# Patient Record
Sex: FEMALE | Race: WHITE | NOT HISPANIC OR LATINO | Employment: OTHER | URBAN - NONMETROPOLITAN AREA
[De-identification: names, ages, dates, MRNs, and addresses within clinical notes are randomized per-mention and may not be internally consistent; named-entity substitution may affect disease eponyms.]

---

## 2017-12-13 ENCOUNTER — TRANSCRIBE ORDERS (OUTPATIENT)
Dept: ADMINISTRATIVE | Facility: HOSPITAL | Age: 72
End: 2017-12-13

## 2017-12-13 DIAGNOSIS — Z87.891 PERSONAL HISTORY OF TOBACCO USE, PRESENTING HAZARDS TO HEALTH: ICD-10-CM

## 2017-12-13 DIAGNOSIS — Z78.0 POSTMENOPAUSAL STATE: Primary | ICD-10-CM

## 2017-12-18 ENCOUNTER — HOSPITAL ENCOUNTER (OUTPATIENT)
Dept: BONE DENSITY | Facility: HOSPITAL | Age: 72
Discharge: HOME | End: 2017-12-18
Attending: PEDIATRICS | Admitting: PEDIATRICS

## 2017-12-18 ENCOUNTER — HOSPITAL ENCOUNTER (OUTPATIENT)
Dept: CT IMAGING | Facility: HOSPITAL | Age: 72
Discharge: HOME | End: 2017-12-18
Attending: PEDIATRICS

## 2017-12-18 DIAGNOSIS — Z87.891 PERSONAL HISTORY OF TOBACCO USE, PRESENTING HAZARDS TO HEALTH: ICD-10-CM

## 2017-12-18 DIAGNOSIS — Z78.0 POSTMENOPAUSAL STATE: ICD-10-CM

## 2017-12-18 PROCEDURE — G0297 LDCT FOR LUNG CA SCREEN: HCPCS

## 2017-12-18 PROCEDURE — 77080 DXA BONE DENSITY AXIAL: CPT

## 2017-12-20 ENCOUNTER — LAB (OUTPATIENT)
Dept: LAB | Facility: HOSPITAL | Age: 72
End: 2017-12-20
Attending: PEDIATRICS

## 2017-12-20 ENCOUNTER — TRANSCRIBE ORDERS (OUTPATIENT)
Dept: ADMINISTRATIVE | Facility: HOSPITAL | Age: 72
End: 2017-12-20

## 2017-12-20 DIAGNOSIS — M81.0 AGE-RELATED OSTEOPOROSIS WITHOUT CURRENT PATHOLOGICAL FRACTURE: ICD-10-CM

## 2017-12-20 DIAGNOSIS — M81.0 AGE-RELATED OSTEOPOROSIS WITHOUT CURRENT PATHOLOGICAL FRACTURE: Primary | ICD-10-CM

## 2017-12-20 LAB — 25(OH)D3 SERPL-MCNC: 67.9 NG/ML (ref 30–100)

## 2017-12-20 PROCEDURE — 36415 COLL VENOUS BLD VENIPUNCTURE: CPT

## 2017-12-20 PROCEDURE — 82306 VITAMIN D 25 HYDROXY: CPT | Performed by: PEDIATRICS

## 2019-05-08 ENCOUNTER — HOSPITAL ENCOUNTER (OUTPATIENT)
Dept: NON INVASIVE DIAGNOSTICS | Age: 74
Discharge: HOME | End: 2019-05-08
Payer: MEDICARE

## 2019-05-08 VITALS — DIASTOLIC BLOOD PRESSURE: 74 MMHG | WEIGHT: 138.89 LBS | HEART RATE: 96 BPM | SYSTOLIC BLOOD PRESSURE: 159 MMHG

## 2019-05-08 LAB
LV EF: 58 %
LVEF MODALITY: NORMAL

## 2019-05-08 PROCEDURE — 6360000002 HC RX W HCPCS: Performed by: INTERNAL MEDICINE

## 2019-05-08 PROCEDURE — 93226 XTRNL ECG REC<48 HR SCAN A/R: CPT

## 2019-05-08 PROCEDURE — 93225 XTRNL ECG REC<48 HRS REC: CPT

## 2019-05-08 PROCEDURE — 2580000003 HC RX 258: Performed by: INTERNAL MEDICINE

## 2019-05-08 PROCEDURE — 93306 TTE W/DOPPLER COMPLETE: CPT

## 2019-05-08 PROCEDURE — 93350 STRESS TTE ONLY: CPT

## 2019-05-08 RX ORDER — SODIUM CHLORIDE 9 MG/ML
INJECTION, SOLUTION INTRAVENOUS
Status: COMPLETED | OUTPATIENT
Start: 2019-05-08 | End: 2019-05-08

## 2019-05-08 RX ORDER — DOBUTAMINE HYDROCHLORIDE 200 MG/100ML
10 INJECTION INTRAVENOUS CONTINUOUS PRN
Status: DISCONTINUED | OUTPATIENT
Start: 2019-05-08 | End: 2019-05-09 | Stop reason: HOSPADM

## 2019-05-08 RX ADMIN — DOBUTAMINE HYDROCHLORIDE 10 MCG/KG/MIN: 200 INJECTION INTRAVENOUS at 11:25

## 2019-05-08 RX ADMIN — SODIUM CHLORIDE: 9 INJECTION, SOLUTION INTRAVENOUS at 11:25

## 2019-05-11 NOTE — PROCEDURES
Holter monitor report    Device placed on 5/8/19 at 12:12 PM    Summary impressions:    1. Sinus rhythm mechanism throughout average heart rate 76 bpm minimal heart rate 55 bpm maximum heart rate 100 bpm    2. No pauses greater than 2.0 seconds recorded    3. 40 4/7/20 PVCs recorded including one run of 4 beats of ventricular tachycardia at a rate of 146 bpm 16 triplets and 394 couplets    4. No atrial fibrillation recorded    5. No episodes of ST segment depression recorded    6. 27 minutes 16 seconds of bradycardia recorded    7. 7 seconds of tachycardia recorded    8.  No symptoms or activities were submitted for correlation

## 2019-05-21 ENCOUNTER — OFFICE VISIT (OUTPATIENT)
Dept: CARDIOLOGY | Age: 74
End: 2019-05-21
Payer: MEDICARE

## 2019-05-21 VITALS
WEIGHT: 151 LBS | HEART RATE: 74 BPM | SYSTOLIC BLOOD PRESSURE: 158 MMHG | HEIGHT: 64 IN | DIASTOLIC BLOOD PRESSURE: 86 MMHG | BODY MASS INDEX: 25.78 KG/M2

## 2019-05-21 DIAGNOSIS — R06.00 DYSPNEA, UNSPECIFIED TYPE: ICD-10-CM

## 2019-05-21 DIAGNOSIS — I10 ESSENTIAL HYPERTENSION: Primary | ICD-10-CM

## 2019-05-21 DIAGNOSIS — I10 ESSENTIAL HYPERTENSION: ICD-10-CM

## 2019-05-21 DIAGNOSIS — R00.2 HEART PALPITATIONS: ICD-10-CM

## 2019-05-21 LAB
ANION GAP SERPL CALCULATED.3IONS-SCNC: 16 MMOL/L (ref 7–19)
BUN BLDV-MCNC: 20 MG/DL (ref 8–23)
CALCIUM SERPL-MCNC: 10.2 MG/DL (ref 8.8–10.2)
CHLORIDE BLD-SCNC: 106 MMOL/L (ref 98–111)
CO2: 23 MMOL/L (ref 22–29)
CREAT SERPL-MCNC: 0.9 MG/DL (ref 0.5–0.9)
GFR NON-AFRICAN AMERICAN: >60
GLUCOSE BLD-MCNC: 105 MG/DL (ref 74–109)
HCT VFR BLD CALC: 45 % (ref 37–47)
HEMOGLOBIN: 14.3 G/DL (ref 12–16)
MCH RBC QN AUTO: 30.6 PG (ref 27–31)
MCHC RBC AUTO-ENTMCNC: 31.8 G/DL (ref 33–37)
MCV RBC AUTO: 96.4 FL (ref 81–99)
PDW BLD-RTO: 13.3 % (ref 11.5–14.5)
PLATELET # BLD: 327 K/UL (ref 130–400)
PMV BLD AUTO: 11.3 FL (ref 9.4–12.3)
POTASSIUM SERPL-SCNC: 4.7 MMOL/L (ref 3.5–5)
RBC # BLD: 4.67 M/UL (ref 4.2–5.4)
SODIUM BLD-SCNC: 145 MMOL/L (ref 136–145)
TSH SERPL DL<=0.05 MIU/L-ACNC: 1.85 UIU/ML (ref 0.27–4.2)
VITAMIN B-12: >2000 PG/ML (ref 211–946)
WBC # BLD: 13.5 K/UL (ref 4.8–10.8)

## 2019-05-21 PROCEDURE — 0296T PR EXT ECG > 48HR TO 21 DAY RCRD W/CONECT INTL RCRD: CPT | Performed by: NURSE PRACTITIONER

## 2019-05-21 PROCEDURE — 99203 OFFICE O/P NEW LOW 30 MIN: CPT | Performed by: NURSE PRACTITIONER

## 2019-05-21 PROCEDURE — 93000 ELECTROCARDIOGRAM COMPLETE: CPT | Performed by: NURSE PRACTITIONER

## 2019-05-21 RX ORDER — LORATADINE 10 MG/1
10 TABLET ORAL DAILY
COMMUNITY

## 2019-05-21 RX ORDER — IBUPROFEN 200 MG
200 TABLET ORAL EVERY 6 HOURS PRN
COMMUNITY

## 2019-05-21 RX ORDER — ACETAMINOPHEN 160 MG
TABLET,DISINTEGRATING ORAL DAILY
COMMUNITY

## 2019-05-21 RX ORDER — AZITHROMYCIN 250 MG/1
250 TABLET, FILM COATED ORAL DAILY
COMMUNITY

## 2019-05-21 RX ORDER — METHYLPREDNISOLONE 4 MG/1
4 TABLET ORAL SEE ADMIN INSTRUCTIONS
COMMUNITY

## 2019-05-21 RX ORDER — ALPRAZOLAM 0.5 MG/1
0.5 TABLET ORAL 2 TIMES DAILY
COMMUNITY

## 2019-05-21 RX ORDER — GABAPENTIN 600 MG/1
900 TABLET ORAL 3 TIMES DAILY
COMMUNITY

## 2019-05-21 RX ORDER — CYANOCOBALAMIN (VITAMIN B-12) 5000 MCG
TABLET,DISINTEGRATING ORAL DAILY
COMMUNITY

## 2019-05-21 RX ORDER — MIRTAZAPINE 15 MG/1
15 TABLET, ORALLY DISINTEGRATING ORAL NIGHTLY
COMMUNITY

## 2019-05-21 NOTE — PROGRESS NOTES
[facility]  [address]  [phone]      Chief Complaint / Reason for Being Seen:  Referral to cardiology for Holter monitor results and shortness of breath. 1. Essential hypertension    2. Heart palpitations    3. Dyspnea, unspecified type        Subjective:  Patient presents to clinic today as a referral from primary care for Holter monitor results and shortness of breath. Patient has a negative family history of coronary artery disease. Patient is a former smoker she quit 3 years ago, she does a 58 year history of smoking a pack and a half of cigarettes per day. Patient denies any chest pain, pressure or tightness. She does have shortness of breath that she's had for a long time due to her COPD but this has worsen over the last several weeks. Patient also with complaints of fatigue and lethargy. Patient states since she stopped smoking that she was \"no longer with COPD\". Primary care provider has educated patient to understand that the COPD is a long-term diagnoses with her. Patient saw her primary care provider on Thursday and was given prescription for antibiotics and Medrol Dosepak. Patient has not started that yet. She wanted to talk with cardiology 1st. She and daughter states they were referred to cardiology for possible pacemaker/defibrillator implant. Primary care provider has ordered 2-D echo which showed  Normal left ventricular size with preserved LV function and an estimated   ejection fraction of approximately 55-60%.  Grade I diastolic dysfunction.   No evidence of left ventricular mass or thrombus noted. 24-hour Holter monitor was placed with results of sinus rhythm throughout. Average heart rate 76 bpm minimal heart rate 55 bpm, maximum heart rate 100 bpm. There were no pauses greater than 2 seconds recorded. There were 40 PVCs recorded including one run of a 4 beat VT run at a rate of 146 bpm, 16 triplets and 394 couplets. There is no atrial fib recorded. No episodes of ST segment depression recorded. 27 minutes 16 seconds of bradycardia recorded. 7 seconds of tachycardia reported. Dobutamine stress echo 5/8/2019  Dobutamine stress echocardiogram without clinical, electrocardiographic,   or echocardiographic evidence of myocardial ischemia.   Test was supervised by Dr. Jerome Jj . Criteria for pacemaker implantation includes pauses greater than 3 seconds. This criteria was not met on the 24-hour Holter monitor. Patient with a normal EF on 2-D echo 55-60%. Criteria for defibrillator is not met due to normal EF. Old records have been obtained from the referring providers. Those records have been reviewed and summarized. Dahiana Beebe is a 66 y.o. female with the following history as recorded in [facility]: There are no active problems to display for this patient. Current Outpatient Medications   Medication Sig Dispense Refill    Tiotropium Bromide-Olodaterol (STIOLTO RESPIMAT) 2.5-2.5 MCG/ACT AERS Inhale 2 puffs into the lungs daily      ALPRAZolam (XANAX) 0.5 MG tablet Take 0.5 mg by mouth 2 times daily.  Cyanocobalamin (VITAMIN B-12) 5000 MCG TBDP Take by mouth daily      loratadine (ALLERGY RELIEF) 10 MG tablet Take 10 mg by mouth daily      ibuprofen (ADVIL;MOTRIN) 200 MG tablet Take 200 mg by mouth every 6 hours as needed for Pain      mirtazapine (REMERON SOL-TAB) 15 MG disintegrating tablet Take 15 mg by mouth nightly      gabapentin (NEURONTIN) 600 MG tablet Take 900 mg by mouth 3 times daily.  Cholecalciferol (VITAMIN D3) 2000 units CAPS Take by mouth daily      azithromycin (ZITHROMAX) 250 MG tablet Take 250 mg by mouth daily 2 tablets today then one daily for 4 days      methylPREDNISolone (MEDROL DOSEPACK) 4 MG tablet Take 4 mg by mouth See Admin Instructions Take by mouth. No current facility-administered medications for this visit.       Allergies: Albuterol; Codeine; Levaquin [levofloxacin]; Lortab [hydrocodone-acetaminophen]; and Tramadol  Past Medical History:   Diagnosis Date    Bigeminal rhythm     COPD (chronic obstructive pulmonary disease) (HCC)     Essential hypertension     RICHARD (generalized anxiety disorder)     Hypercholesteremia     Neuropathy     Type 2 diabetes mellitus (HCC)      Past Surgical History:   Procedure Laterality Date    GALLBLADDER SURGERY      HYSTERECTOMY      JOINT REPLACEMENT Right     MASTECTOMY, BILATERAL       No family history on file. Social History     Tobacco Use    Smoking status: Former Smoker     Last attempt to quit: 2015     Years since quittin.3   Substance Use Topics    Alcohol use: Not on file          Review of System:      Except as noted in HPI, cardiovascular and respiratory systems are otherwise negative. Objective:    BP (!) 158/86 (Site: Right Upper Arm, Position: Sitting, Cuff Size: Medium Adult)   Pulse 74   Ht 5' 4\" (1.626 m)   Wt 151 lb (68.5 kg)   BMI 25.92 kg/m²     GENERAL - well developed and well nourished    HEENT -  PERRLA, Hearing appears normal  NECK - no thyromegaly, no JVD, trachea is in the midline  CARDIOVASCULAR - PMI is in the mid line clavicular position, Normal S1 and S2 with no systolic murmur. No S3 or S4    PULMONARY - no respiratory distress. No wheezes or rales. Lungs are clear to ausculation   ABDOMEN  - soft, non tender, no rebound  MUSCULOSKELETAL  - range of motion of the upper and lower extermites appears normal and equal and is without pain   EXTREMITIES - no significant edema   NEUROLOGIC - gait and station are normal  SKIN - turgor is normal  PSYCHIATRIC - normal mood and affect, alert and orientated x 3,      ASSESSMENT:    ALL THE CARDIOLOGY PROBLEMS ARE LISTED ABOVE; HOWEVER, THE FOLLOWING SPECIFIC CARDIAC PROBLEMS / CONDITIONS WERE ADDRESSED AND TREATED DURING THE OFFICE VISIT TODAY:       Cardiac Specific Problem  Discussion and Plan         1. Shortness of breath. Initial encounter   Will order pulmonary function test. CBC         2. 4 beat run of VT   Initial encounter   EKG in the office sinus rhythm with a heart rate of 71 bpm with bigeminy. Incomplete right bundle branch block. Will order BMP to evaluate potassium level. Patient with a negative dobutamine stress echo. Will place 14 day ZIO patch to address any further runs of VT, any prolonged pauses or atrial fib that was not noted on the 24-hour Holter monitor. 3. Fatigue   Initial encounter   Will order CBC, TSH levels and vitamin B12. PLAN:    Orders Placed This Encounter   Procedures    Basic Metabolic Panel     Standing Status:   Future     Standing Expiration Date:   5/21/2020    TSH without Reflex    CBC     Standing Status:   Future     Standing Expiration Date:   5/21/2020    Vitamin B12     Standing Status:   Future     Standing Expiration Date:   5/21/2020    EKG 12 lead     Order Specific Question:   Reason for Exam?     Answer: Other    KS PT RECORDED SPIROMETRY    KS EXT ECG > 48HR TO 21 DAY RCRD W/CONECT INTL RCRD (Zio Recording)     No orders of the defined types were placed in this encounter. Return in about 1 month (around 6/18/2019) for results . Discussed with patient and adult child. I greatly appreciate the opportunity to meet Vladimir Lowry and your confidence in allowing me to participate in her cardiovascular care.       BOUBACAR Thomas NP 5/21/2019 1:27 PM

## 2019-05-23 ENCOUNTER — TELEPHONE (OUTPATIENT)
Dept: CARDIOLOGY | Age: 74
End: 2019-05-23

## 2019-05-23 NOTE — TELEPHONE ENCOUNTER
Katelynn Durham, APRN - NP  Brisa York LPN             Is let patient know of normal TSH level    Result Notes for TSH without Reflex     Notes recorded by Brisa York LPN on 1/28/7777 at 6:34 AM CDT  Left message with normal results

## 2019-05-31 ENCOUNTER — HOSPITAL ENCOUNTER (OUTPATIENT)
Dept: PULMONOLOGY | Age: 74
Discharge: HOME | End: 2019-05-31
Payer: MEDICARE

## 2019-05-31 ENCOUNTER — OUTSIDE FACILITY SERVICE (OUTPATIENT)
Dept: PULMONOLOGY | Facility: CLINIC | Age: 74
End: 2019-05-31

## 2019-05-31 DIAGNOSIS — R06.00 DYSPNEA, UNSPECIFIED TYPE: ICD-10-CM

## 2019-05-31 PROCEDURE — 94727 GAS DIL/WSHOT DETER LNG VOL: CPT | Performed by: INTERNAL MEDICINE

## 2019-05-31 PROCEDURE — 94010 BREATHING CAPACITY TEST: CPT

## 2019-05-31 PROCEDURE — 94010 BREATHING CAPACITY TEST: CPT | Performed by: INTERNAL MEDICINE

## 2019-05-31 PROCEDURE — 94729 DIFFUSING CAPACITY: CPT

## 2019-05-31 PROCEDURE — 94729 DIFFUSING CAPACITY: CPT | Performed by: INTERNAL MEDICINE

## 2019-05-31 PROCEDURE — 94727 GAS DIL/WSHOT DETER LNG VOL: CPT

## 2019-06-04 NOTE — PROCEDURES
[facility]                               PULMONARY FUNCTION    PATIENT NAME: Vy Falcon                    :        1945  MED REC NO:   [id number]                              ROOM:  ACCOUNT NO:   [de-identified]                           ADMIT DATE: 2019  PROVIDER:     Sal Rai MD    DATE OF PROCEDURE:  2019    PULMONARY FUNCTION TESTS    IMPRESSION:  1. Spirometry shows mild airflow obstruction. 2.  Mid flow is reduced. 3.  Maximum ventilation is lower limits of normal.  4.  Lung volume measurements show elevation in residual volume  suggesting gas trapping. 5.  Total lung capacity is elevated suggesting hyperinflation.   6.  Diffusion capacity is reduced, but when corrected for alveolar  volume, it is normal.        Warner Hernandez MD    D: 2019 16:00:43      T: 2019 16:29:13     [id number]  Job#: [id number]     Doc#: [id number]    CC:

## 2019-06-05 ENCOUNTER — TELEPHONE (OUTPATIENT)
Dept: CARDIOLOGY | Age: 74
End: 2019-06-05

## 2019-06-17 ENCOUNTER — HOSPITAL ENCOUNTER (OUTPATIENT)
Dept: CT IMAGING | Age: 74
Discharge: HOME | End: 2019-06-17
Payer: MEDICARE

## 2019-06-17 ENCOUNTER — TELEPHONE (OUTPATIENT)
Dept: CARDIOLOGY | Age: 74
End: 2019-06-17

## 2019-06-17 DIAGNOSIS — R91.1 LUNG NODULE: ICD-10-CM

## 2019-06-17 PROCEDURE — 71250 CT THORAX DX C-: CPT

## 2019-06-17 NOTE — TELEPHONE ENCOUNTER
Mike Jones called with Dr. Cleopatra Pineda office requesting results of ZIO report. Report faxed to Dr. Juan Thomas office.

## 2019-06-19 ENCOUNTER — TELEPHONE (OUTPATIENT)
Dept: CARDIOLOGY | Facility: CLINIC | Age: 74
End: 2019-06-19

## 2019-06-19 ENCOUNTER — OFFICE VISIT (OUTPATIENT)
Dept: CARDIOLOGY | Facility: CLINIC | Age: 74
End: 2019-06-19

## 2019-06-19 DIAGNOSIS — I47.20 VT (VENTRICULAR TACHYCARDIA) (HCC): Primary | ICD-10-CM

## 2019-06-19 DIAGNOSIS — R06.02 SHORTNESS OF BREATH: ICD-10-CM

## 2019-06-19 PROCEDURE — 99204 OFFICE O/P NEW MOD 45 MIN: CPT | Performed by: INTERNAL MEDICINE

## 2019-06-19 PROCEDURE — 93000 ELECTROCARDIOGRAM COMPLETE: CPT | Performed by: INTERNAL MEDICINE

## 2019-06-19 NOTE — PROGRESS NOTES
Subjective:     Encounter Date:06/19/2019      Patient ID: Pattie Liu is a 74 y.o. female who presents for further evaluation of PVCs and shortness of breath.    Referring provider: Rafat Espinoza MD  Reason for referral: PVCs    Chief Complaint: Shortness of breath    Shortness of Breath   This is a new problem. The current episode started more than 1 month ago. The problem occurs daily. The problem has been gradually worsening. Pertinent negatives include no abdominal pain, chest pain, claudication, fever, headaches, hemoptysis, leg swelling, neck pain, orthopnea, PND, rash, sputum production, syncope, vomiting or wheezing. The symptoms are aggravated by exercise. The patient has no known risk factors for DVT/PE. She has tried nothing for the symptoms. There is no history of CAD.     This is a 74-year-old female with no prior cardiac history who presents for further evaluation of increasing shortness of breath, found to have frequent PVCs.  Patient says that over the past month or so, she has noted increasing shortness of breath to the point of shortness of breath with minimal exertion.  She has had a work-up which consisted of an echocardiogram, Holter monitor and ultimately a cardiac patch monitor.  The patch monitor showed short runs of ventricular tachycardia and a large burden of PVCs.  For this reason, the patient was referred to cardiology for further care.  Patient denies any significant palpitations.  She says that she has had her pulse taken manually with a pulse ox multiple times and says that her heart rate has been in the 30s or 40s.  She is unsure if this is sinus rhythm or has been related to PVCs which were not identified by pulse oximetry.  Her average heart rate on her cardiac monitor, however was normal.  Her ECG today in our office does show bigeminal PVCs.  The patient is short of breath at this time.  She denies orthopnea, PND or any significant edema.  She denies any chest  pain.  No lightheadedness, dizziness, syncope.    The following portions of the patient's history were reviewed and updated as appropriate: allergies, current medications, past family history, past medical history, past social history, past surgical history and problem list.     Past Medical History:   Diagnosis Date   • GERD (gastroesophageal reflux disease)    • Lung nodule      Past Surgical History:   Procedure Laterality Date   • BREAST IMPLANT REMOVAL     • BREAST TISSUE EXPANDER REMOVAL INSERTION OF IMPLANT     • CHOLECYSTECTOMY     • HYSTERECTOMY     • MASTECTOMY      BILATERAL   • OOPHORECTOMY         Current Outpatient Medications:   •  ALPRAZolam (XANAX) 0.5 MG tablet, Every Night., Disp: , Rfl:   •  clobetasol (CLOBEX) 0.05 % lotion, 2 (Two) Times a Day., Disp: , Rfl:   •  Fexofenadine HCl (ALLEGRA ALLERGY PO), 4 mg., Disp: , Rfl:   •  gabapentin (NEURONTIN) 600 MG tablet, Daily., Disp: , Rfl:   •  ibuprofen (ADVIL) 200 MG tablet, Every 6 (Six) Hours., Disp: , Rfl:   •  metoprolol tartrate (LOPRESSOR) 25 MG tablet, Take 0.5 tablets by mouth 2 (Two) Times a Day., Disp: 30 tablet, Rfl: 11  •  mirtazapine (REMERON SOLTAB) 15 MG disintegrating tablet, Take 15 mg by mouth Every Night., Disp: , Rfl:   •  STIOLTO RESPIMAT 2.5-2.5 MCG/ACT aerosol solution inhaler, , Disp: , Rfl:     Allergies   Allergen Reactions   • Albuterol Other (See Comments)   • Codeine Other (See Comments)   • Hydrocodone-Acetaminophen Other (See Comments)   • Levofloxacin Other (See Comments)   • Oxycodone-Acetaminophen Other (See Comments)   • Tramadol Other (See Comments)   • Zanaflex  [Tizanidine Hcl] Other (See Comments)     Social History     Socioeconomic History   • Marital status:      Spouse name: Not on file   • Number of children: Not on file   • Years of education: Not on file   • Highest education level: Not on file   Tobacco Use   • Smoking status: Former Smoker     Years: 62.00     Types: Electronic Cigarette,  Cigarettes   • Smokeless tobacco: Never Used   • Tobacco comment: USUES NO NICOTINE   Substance and Sexual Activity   • Alcohol use: No     Frequency: Never   • Drug use: No   • Sexual activity: Defer     Family History   Problem Relation Age of Onset   • Cancer Mother    • Cancer Father      Review of Systems   Constitution: Negative for chills, fever, night sweats and weight loss.   HENT: Negative for congestion and hearing loss.    Eyes: Negative for blurred vision and pain.   Cardiovascular: Positive for dyspnea on exertion. Negative for chest pain, claudication, leg swelling, orthopnea, palpitations, paroxysmal nocturnal dyspnea and syncope.   Respiratory: Positive for shortness of breath. Negative for cough, hemoptysis, sputum production and wheezing.    Endocrine: Negative for cold intolerance, heat intolerance, polydipsia and polyuria.   Hematologic/Lymphatic: Negative for adenopathy and bleeding problem. Does not bruise/bleed easily.   Skin: Negative for color change, poor wound healing and rash.   Musculoskeletal: Positive for arthritis, back pain and joint pain. Negative for joint swelling, myalgias and neck pain.   Gastrointestinal: Negative for abdominal pain, change in bowel habit, constipation, diarrhea, heartburn, hematochezia, melena, nausea and vomiting.   Genitourinary: Negative for dysuria, frequency, hematuria and nocturia.   Neurological: Negative for dizziness, focal weakness, headaches, light-headedness, loss of balance and numbness.   Psychiatric/Behavioral: Negative for altered mental status, memory loss and substance abuse.   Allergic/Immunologic: Negative for hives and persistent infections.         ECG 12 Lead  Date/Time: 6/19/2019 10:32 AM  Performed by: Edi Armijo MD  Authorized by: Edi Armijo MD   Previous ECG: no previous ECG available  Rhythm: sinus rhythm  Ectopy: unifocal PVCs and bigeminy  Rate: normal  BPM: 75  Conduction: incomplete right bundle branch  block  QRS axis: normal    Clinical impression: abnormal EKG               Objective:     Physical Exam   Constitutional: She is oriented to person, place, and time. Vital signs are normal. She appears well-developed and well-nourished. She is cooperative.  Non-toxic appearance. No distress.   HENT:   Head: Normocephalic and atraumatic.   Right Ear: External ear normal.   Left Ear: External ear normal.   Nose: Nose normal.   Mouth/Throat: Uvula is midline, oropharynx is clear and moist and mucous membranes are normal. Mucous membranes are not pale, not dry and not cyanotic. No oropharyngeal exudate.   Eyes: EOM and lids are normal. Pupils are equal, round, and reactive to light.   Neck: Normal range of motion. Neck supple. No hepatojugular reflux and no JVD present. Carotid bruit is not present. No tracheal deviation and no edema present. No thyroid mass and no thyromegaly present.   Cardiovascular: Normal rate, regular rhythm, S1 normal, S2 normal, normal heart sounds, intact distal pulses and normal pulses.  Occasional extrasystoles are present. PMI is not displaced. Exam reveals no gallop and no friction rub.   No murmur heard.  Pulses:       Radial pulses are 2+ on the right side, and 2+ on the left side.        Femoral pulses are 2+ on the right side, and 2+ on the left side.       Dorsalis pedis pulses are 2+ on the right side, and 2+ on the left side.        Posterior tibial pulses are 2+ on the right side, and 2+ on the left side.   Pulmonary/Chest: Effort normal and breath sounds normal. No accessory muscle usage. No respiratory distress. She has no wheezes. She has no rales. She exhibits no tenderness.   Abdominal: Soft. Normal appearance and bowel sounds are normal. She exhibits no distension, no abdominal bruit and no pulsatile midline mass. There is no hepatosplenomegaly. There is no tenderness.   Musculoskeletal: Normal range of motion. She exhibits no edema, tenderness or deformity.   Lymphadenopathy:  "    She has no cervical adenopathy.   Neurological: She is oriented to person, place, and time. She has normal strength. No cranial nerve deficit.   Skin: Skin is warm, dry and intact. No rash noted. She is not diaphoretic. No cyanosis or erythema. Nails show no clubbing.   Psychiatric: She has a normal mood and affect. Her speech is normal and behavior is normal. Thought content normal.   Vitals reviewed.    /63    Heart Rate 75    SpO2 98 %    Weight 68.5 kg (151 lb)    Height 162.6 cm (64\")    BMI (Calculated) 25.9      Data/Lab Review:     Holter 5/8/19:  1. Sinus rhythm mechanism throughout average heart rate 76 bpm minimal heart rate 55 bpm maximum heart rate 100 bpm  2. No pauses greater than 2.0 seconds recorded  3. 40 4/7/20 PVCs recorded including one run of 4 beats of ventricular tachycardia at a rate of 146 bpm 16 triplets and 394 couplets  4. No atrial fibrillation recorded  5. No episodes of ST segment depression recorded  6. 27 minutes 16 seconds of bradycardia recorded  7. 7 seconds of tachycardia recorded  8. No symptoms or activities were submitted for correlation     ==============================================================     Stress echo (DSE) 5/8/2019:  Dobutamine stress echocardiogram without clinical, electrocardiographic, or echocardiographic evidence of myocardial ischemia.     ==============================================================     Echo 5/8/2019:  Normal left ventricular size with preserved LV function and an estimated  ejection fraction of approximately 55-60%.  Grade I diastolic dysfunction.  No evidence of left ventricular mass or thrombus noted.      Assessment:          Diagnosis Plan   1. VT (ventricular tachycardia) (CMS/HCC)  ECG 12 Lead   2. Shortness of breath          Plan:       1. VT (ventricular tachycardia) (CMS/HCC)  metoprolol tartrate (LOPRESSOR) 25 MG tablet     Case Request Cath Lab: Left Heart Cath     aspirin chewable tablet 324 mg     aspirin EC " tablet 81 mg     sodium chloride 0.9 % infusion   2. SOB (shortness of breath)  Case Request Cath Lab: Left Heart Cath             1.  Ventricular tachycardia: Patient has multiple episodes of nonsustained ventricular tachycardia on her cardiac monitor.  On today's ECG, she has bigeminal PVCs.  This is likely the cause for her shortness of breath.  In addition, this is likely the reason for manual palpation of a pulse that is in the 30s or 40 range (bigeminal PVCs).  She did have an echocardiogram about 1 month ago showing normal systolic function and only grade 1 diastolic dysfunction.  I think that it is reasonable, especially given multiple runs of nonsustained ventricular tachycardia, to evaluate further for ischemia.  Even though her stress test was relatively low risk, I think concern still exists, therefore I have ordered a cardiac catheterization to further evaluate.  In addition, we will place her on low-dose beta-blocker.  The patient does not carry a diagnosis of hypertension so we will start low with 12.5 mill grams twice daily.    2.  Shortness of breath: Presumably, the patient shortness of breath is due to her ventricular ectopy.  We will evaluate further for cardiac ischemia.  Her lungs sound clear on examination today.  No evidence of heart failure.  Further plans will be pending the results the patient's cardiac catheterization.    Patient's There is no height or weight on file to calculate BMI. BMI is above normal parameters. Recommendations include: exercise counseling and nutrition counseling.    Follow-up: Pending the results the patient's cardiac catheterization.

## 2019-06-19 NOTE — PROGRESS NOTES
Edi Armijo MD   Physician   Cardiology   Progress Notes   Sign at close encounter   Encounter Date:  6/19/2019               Sign at close encounter        Expand All Collapse All          Show:Clear all  [x]Manual[x]Template[x]Copied    Added by:  [x]Edi Armijo MD      []Erickver for details            Subjective:      Encounter Date:06/19/2019        Subjective     Patient ID: Pattie Liu is a 74 y.o. female ***     Referring Provider: Rafat Espinoza MD  Reason for Referral: Frequent PVCs     Chief Complaint: Establish care - PVCs     History of Present Illness     The following portions of the patient's history were reviewed and updated as appropriate: allergies, current medications, past family history, past medical history, past social history, past surgical history and problem list.      Medical History   Past Medical History:   Diagnosis Date   • GERD (gastroesophageal reflux disease)     • Lung nodule           Surgical History         Past Surgical History:   Procedure Laterality Date   • BREAST IMPLANT REMOVAL       • BREAST TISSUE EXPANDER REMOVAL INSERTION OF IMPLANT       • CHOLECYSTECTOMY       • HYSTERECTOMY       • MASTECTOMY         BILATERAL   • OOPHORECTOMY                Current Outpatient Medications:   •  ALPRAZolam (XANAX) 0.5 MG tablet, Every Night., Disp: , Rfl:   •  clobetasol (CLOBEX) 0.05 % lotion, 2 (Two) Times a Day., Disp: , Rfl:   •  Fexofenadine HCl (ALLEGRA ALLERGY PO), 4 mg., Disp: , Rfl:   •  gabapentin (NEURONTIN) 600 MG tablet, Daily., Disp: , Rfl:   •  ibuprofen (ADVIL) 200 MG tablet, Every 6 (Six) Hours., Disp: , Rfl:   •  mirtazapine (REMERON SOLTAB) 15 MG disintegrating tablet, Take 15 mg by mouth Every Night., Disp: , Rfl:   •  STIOLTO RESPIMAT 2.5-2.5 MCG/ACT aerosol solution inhaler, , Disp: , Rfl:   •  metoprolol tartrate (LOPRESSOR) 25 MG tablet, Take 0.5 tablets by mouth 2 (Two) Times a Day., Disp: 30 tablet, Rfl: 11          Allergies      Allergen Reactions   • Albuterol Other (See Comments)   • Codeine Other (See Comments)   • Hydrocodone-Acetaminophen Other (See Comments)   • Levofloxacin Other (See Comments)   • Oxycodone-Acetaminophen Other (See Comments)   • Tramadol Other (See Comments)   • Zanaflex  [Tizanidine Hcl] Other (See Comments)      Social History            Tobacco Use   • Smoking status: Former Smoker       Years: 62.00       Types: Electronic Cigarette, Cigarettes   • Smokeless tobacco: Never Used   • Tobacco comment: USUES NO NICOTINE   Substance Use Topics   • Alcohol use: No       Frequency: Never            Family History   Problem Relation Age of Onset   • Cancer Mother     • Cancer Father        Review of Systems   Constitution: Positive for malaise/fatigue. Negative for chills, fever, night sweats and weight loss.   HENT: Negative for congestion and hearing loss.    Eyes: Negative for blurred vision and pain.   Cardiovascular: Positive for dyspnea on exertion. Negative for chest pain, claudication, leg swelling, orthopnea, palpitations, paroxysmal nocturnal dyspnea and syncope.   Respiratory: Positive for shortness of breath. Negative for cough, hemoptysis and wheezing.    Endocrine: Negative for cold intolerance, heat intolerance, polydipsia and polyuria.   Hematologic/Lymphatic: Negative for adenopathy and bleeding problem. Does not bruise/bleed easily.   Skin: Negative for color change, poor wound healing and rash.   Musculoskeletal: Negative for arthritis, back pain, joint pain, joint swelling, myalgias and neck pain.   Gastrointestinal: Negative for abdominal pain, change in bowel habit, constipation, diarrhea, heartburn, hematochezia, melena, nausea and vomiting.   Genitourinary: Negative for dysuria, frequency, hematuria and nocturia.   Neurological: Negative for dizziness, focal weakness, headaches, light-headedness, loss of balance and numbness.   Psychiatric/Behavioral: Negative for altered mental status, memory  loss and substance abuse.   Allergic/Immunologic: Negative for hives and persistent infections.         Procedures          Objective:      Objective     Physical Exam   Constitutional: She is oriented to person, place, and time. Vital signs are normal. She appears well-developed and well-nourished. She is cooperative.  Non-toxic appearance. No distress.   HENT:   Head: Normocephalic and atraumatic.   Right Ear: External ear normal.   Left Ear: External ear normal.   Nose: Nose normal.   Mouth/Throat: Uvula is midline, oropharynx is clear and moist and mucous membranes are normal. Mucous membranes are not pale, not dry and not cyanotic. No oropharyngeal exudate.   Eyes: EOM and lids are normal. Pupils are equal, round, and reactive to light.   Neck: Normal range of motion. Neck supple. No hepatojugular reflux and no JVD present. Carotid bruit is not present. No tracheal deviation and no edema present. No thyroid mass and no thyromegaly present.   Cardiovascular: Normal rate, regular rhythm, S1 normal, S2 normal, normal heart sounds, intact distal pulses and normal pulses.  Occasional extrasystoles are present. PMI is not displaced. Exam reveals no gallop and no friction rub.   No murmur heard.  Pulses:       Radial pulses are 2+ on the right side, and 2+ on the left side.        Femoral pulses are 2+ on the right side, and 2+ on the left side.       Dorsalis pedis pulses are 2+ on the right side, and 2+ on the left side.        Posterior tibial pulses are 2+ on the right side, and 2+ on the left side.   Pulmonary/Chest: Effort normal and breath sounds normal. No accessory muscle usage. No respiratory distress. She has no wheezes. She has no rales. She exhibits no tenderness.   Abdominal: Soft. Normal appearance and bowel sounds are normal. She exhibits no distension, no abdominal bruit and no pulsatile midline mass. There is no hepatosplenomegaly. There is no tenderness.   Musculoskeletal: Normal range of motion.  "She exhibits no edema, tenderness or deformity.   Lymphadenopathy:     She has no cervical adenopathy.   Neurological: She is oriented to person, place, and time. She has normal strength. No cranial nerve deficit.   Skin: Skin is warm, dry and intact. No rash noted. She is not diaphoretic. No cyanosis or erythema. Nails show no clubbing.   Psychiatric: She has a normal mood and affect. Her speech is normal and behavior is normal. Thought content normal.   Vitals reviewed.     /63   Pulse 75   Ht 162.6 cm (64\")   Wt 68.5 kg (151 lb)   SpO2 98%   BMI 25.92 kg/m²      Data/Lab Review:      Holter 5/8/19:  1. Sinus rhythm mechanism throughout average heart rate 76 bpm minimal heart rate 55 bpm maximum heart rate 100 bpm  2. No pauses greater than 2.0 seconds recorded  3. 40 4/7/20 PVCs recorded including one run of 4 beats of ventricular tachycardia at a rate of 146 bpm 16 triplets and 394 couplets  4. No atrial fibrillation recorded  5. No episodes of ST segment depression recorded  6. 27 minutes 16 seconds of bradycardia recorded  7. 7 seconds of tachycardia recorded  8. No symptoms or activities were submitted for correlation     ==============================================================     Stress echo (DSE) 5/8/2019:  Dobutamine stress echocardiogram without clinical, electrocardiographic, or echocardiographic evidence of myocardial ischemia.     ==============================================================     Echo 5/8/2019:  Normal left ventricular size with preserved LV function and an estimated  ejection fraction of approximately 55-60%.   Grade I diastolic dysfunction.   No evidence of left ventricular mass or thrombus noted.        Assessment:      Assessment            Diagnosis Plan   1. VT (ventricular tachycardia) (CMS/HCC)  metoprolol tartrate (LOPRESSOR) 25 MG tablet     Case Request Cath Lab: Left Heart Cath     aspirin chewable tablet 324 mg     aspirin EC tablet 81 mg     sodium " chloride 0.9 % infusion   2. SOB (shortness of breath)  Case Request Cath Lab: Left Heart Cath              Plan:      Plan                                       Office Visit on 6/19/2019            Detailed Report

## 2019-06-21 ENCOUNTER — HOSPITAL ENCOUNTER (OUTPATIENT)
Facility: HOSPITAL | Age: 74
Discharge: HOME | End: 2019-06-21
Attending: INTERNAL MEDICINE | Admitting: INTERNAL MEDICINE

## 2019-06-21 VITALS
OXYGEN SATURATION: 96 % | WEIGHT: 150.4 LBS | TEMPERATURE: 97.8 F | HEART RATE: 66 BPM | RESPIRATION RATE: 14 BRPM | HEIGHT: 65 IN | DIASTOLIC BLOOD PRESSURE: 72 MMHG | BODY MASS INDEX: 25.06 KG/M2 | SYSTOLIC BLOOD PRESSURE: 144 MMHG

## 2019-06-21 DIAGNOSIS — R06.02 SOB (SHORTNESS OF BREATH): ICD-10-CM

## 2019-06-21 DIAGNOSIS — I47.20 VT (VENTRICULAR TACHYCARDIA) (HCC): ICD-10-CM

## 2019-06-21 LAB
ANION GAP SERPL CALCULATED.3IONS-SCNC: 11 MMOL/L (ref 4–13)
BUN BLD-MCNC: 18 MG/DL (ref 5–21)
BUN/CREAT SERPL: 22.2 (ref 7–25)
CALCIUM SPEC-SCNC: 9.6 MG/DL (ref 8.4–10.4)
CHLORIDE SERPL-SCNC: 108 MMOL/L (ref 98–110)
CO2 SERPL-SCNC: 26 MMOL/L (ref 24–31)
CREAT BLD-MCNC: 0.81 MG/DL (ref 0.5–1.4)
DEPRECATED RDW RBC AUTO: 44.1 FL (ref 40–54)
ERYTHROCYTE [DISTWIDTH] IN BLOOD BY AUTOMATED COUNT: 13.2 % (ref 12–15)
GFR SERPL CREATININE-BSD FRML MDRD: 69 ML/MIN/1.73
GLUCOSE BLD-MCNC: 102 MG/DL (ref 70–100)
HCT VFR BLD AUTO: 42.4 % (ref 37–47)
HGB BLD-MCNC: 14.3 G/DL (ref 12–16)
MCH RBC QN AUTO: 30.9 PG (ref 28–32)
MCHC RBC AUTO-ENTMCNC: 33.7 G/DL (ref 33–36)
MCV RBC AUTO: 91.6 FL (ref 82–98)
PLATELET # BLD AUTO: 351 10*3/MM3 (ref 130–400)
PMV BLD AUTO: 10.4 FL (ref 6–12)
POTASSIUM BLD-SCNC: 3.8 MMOL/L (ref 3.5–5.3)
RBC # BLD AUTO: 4.63 10*6/MM3 (ref 4.2–5.4)
SODIUM BLD-SCNC: 145 MMOL/L (ref 135–145)
WBC NRBC COR # BLD: 11.02 10*3/MM3 (ref 4.8–10.8)

## 2019-06-21 PROCEDURE — C1894 INTRO/SHEATH, NON-LASER: HCPCS | Performed by: INTERNAL MEDICINE

## 2019-06-21 PROCEDURE — 80048 BASIC METABOLIC PNL TOTAL CA: CPT | Performed by: INTERNAL MEDICINE

## 2019-06-21 PROCEDURE — 25010000002 DIPHENHYDRAMINE PER 50 MG: Performed by: INTERNAL MEDICINE

## 2019-06-21 PROCEDURE — 75630 X-RAY AORTA LEG ARTERIES: CPT | Performed by: INTERNAL MEDICINE

## 2019-06-21 PROCEDURE — 63710000001 ASPIRIN 81 MG CHEWABLE TABLET

## 2019-06-21 PROCEDURE — 93454 CORONARY ARTERY ANGIO S&I: CPT | Performed by: INTERNAL MEDICINE

## 2019-06-21 PROCEDURE — 93567 NJX CAR CTH SPRVLV AORTGRPHY: CPT | Performed by: INTERNAL MEDICINE

## 2019-06-21 PROCEDURE — C1769 GUIDE WIRE: HCPCS | Performed by: INTERNAL MEDICINE

## 2019-06-21 PROCEDURE — A9270 NON-COVERED ITEM OR SERVICE: HCPCS

## 2019-06-21 PROCEDURE — 99152 MOD SED SAME PHYS/QHP 5/>YRS: CPT | Performed by: INTERNAL MEDICINE

## 2019-06-21 PROCEDURE — 85027 COMPLETE CBC AUTOMATED: CPT | Performed by: INTERNAL MEDICINE

## 2019-06-21 PROCEDURE — 25010000002 FENTANYL CITRATE (PF) 100 MCG/2ML SOLUTION: Performed by: INTERNAL MEDICINE

## 2019-06-21 PROCEDURE — 25010000002 MIDAZOLAM PER 1 MG: Performed by: INTERNAL MEDICINE

## 2019-06-21 PROCEDURE — C1760 CLOSURE DEV, VASC: HCPCS | Performed by: INTERNAL MEDICINE

## 2019-06-21 PROCEDURE — 25010000002 HEPARIN (PORCINE): Performed by: INTERNAL MEDICINE

## 2019-06-21 PROCEDURE — L1830 KO IMMOB CANVAS LONG PRE OTS: HCPCS | Performed by: INTERNAL MEDICINE

## 2019-06-21 RX ORDER — SODIUM CHLORIDE 0.9 % (FLUSH) 0.9 %
3 SYRINGE (ML) INJECTION EVERY 12 HOURS SCHEDULED
Status: DISCONTINUED | OUTPATIENT
Start: 2019-06-21 | End: 2019-06-21 | Stop reason: HOSPADM

## 2019-06-21 RX ORDER — DIPHENHYDRAMINE HYDROCHLORIDE 50 MG/ML
INJECTION INTRAMUSCULAR; INTRAVENOUS AS NEEDED
Status: DISCONTINUED | OUTPATIENT
Start: 2019-06-21 | End: 2019-06-21 | Stop reason: HOSPADM

## 2019-06-21 RX ORDER — ASPIRIN 81 MG/1
TABLET, CHEWABLE ORAL
Status: COMPLETED
Start: 2019-06-21 | End: 2019-06-21

## 2019-06-21 RX ORDER — ATORVASTATIN CALCIUM 20 MG/1
20 TABLET, FILM COATED ORAL DAILY
Qty: 30 TABLET | Refills: 11 | Status: SHIPPED | OUTPATIENT
Start: 2019-06-21

## 2019-06-21 RX ORDER — MIDAZOLAM HYDROCHLORIDE 1 MG/ML
INJECTION INTRAMUSCULAR; INTRAVENOUS AS NEEDED
Status: DISCONTINUED | OUTPATIENT
Start: 2019-06-21 | End: 2019-06-21 | Stop reason: HOSPADM

## 2019-06-21 RX ORDER — SODIUM CHLORIDE 0.9 % (FLUSH) 0.9 %
5 SYRINGE (ML) INJECTION AS NEEDED
Status: DISCONTINUED | OUTPATIENT
Start: 2019-06-21 | End: 2019-06-21 | Stop reason: HOSPADM

## 2019-06-21 RX ORDER — SODIUM CHLORIDE 9 MG/ML
100 INJECTION, SOLUTION INTRAVENOUS CONTINUOUS
Status: DISCONTINUED | OUTPATIENT
Start: 2019-06-21 | End: 2019-06-21 | Stop reason: HOSPADM

## 2019-06-21 RX ORDER — ASPIRIN 81 MG/1
324 TABLET, CHEWABLE ORAL ONCE
Status: DISCONTINUED | OUTPATIENT
Start: 2019-06-21 | End: 2019-06-21

## 2019-06-21 RX ORDER — LIDOCAINE HYDROCHLORIDE 20 MG/ML
INJECTION, SOLUTION INFILTRATION; PERINEURAL AS NEEDED
Status: DISCONTINUED | OUTPATIENT
Start: 2019-06-21 | End: 2019-06-21 | Stop reason: HOSPADM

## 2019-06-21 RX ORDER — FENTANYL CITRATE 50 UG/ML
INJECTION, SOLUTION INTRAMUSCULAR; INTRAVENOUS AS NEEDED
Status: DISCONTINUED | OUTPATIENT
Start: 2019-06-21 | End: 2019-06-21 | Stop reason: HOSPADM

## 2019-06-21 RX ORDER — ASPIRIN 81 MG/1
324 TABLET, CHEWABLE ORAL DAILY
Status: DISCONTINUED | OUTPATIENT
Start: 2019-06-21 | End: 2019-06-21

## 2019-06-21 RX ORDER — SODIUM CHLORIDE 9 MG/ML
1-3 INJECTION, SOLUTION INTRAVENOUS CONTINUOUS
Status: DISCONTINUED | OUTPATIENT
Start: 2019-06-21 | End: 2019-06-21 | Stop reason: HOSPADM

## 2019-06-21 RX ORDER — ASPIRIN 81 MG/1
81 TABLET ORAL DAILY
Status: DISCONTINUED | OUTPATIENT
Start: 2019-06-22 | End: 2019-06-21 | Stop reason: HOSPADM

## 2019-06-21 RX ADMIN — ASPIRIN 81 MG 324 MG: 81 TABLET ORAL at 07:51

## 2019-06-21 RX ADMIN — SODIUM CHLORIDE 75 ML/KG/HR: 9 INJECTION, SOLUTION INTRAVENOUS at 07:47

## 2019-07-29 ENCOUNTER — TELEPHONE (OUTPATIENT)
Dept: VASCULAR SURGERY | Facility: CLINIC | Age: 74
End: 2019-07-29

## 2019-07-29 ENCOUNTER — OFFICE VISIT (OUTPATIENT)
Dept: CARDIOLOGY | Facility: CLINIC | Age: 74
End: 2019-07-29

## 2019-07-29 VITALS
SYSTOLIC BLOOD PRESSURE: 140 MMHG | HEIGHT: 65 IN | HEART RATE: 78 BPM | OXYGEN SATURATION: 96 % | BODY MASS INDEX: 25.33 KG/M2 | DIASTOLIC BLOOD PRESSURE: 58 MMHG | WEIGHT: 152 LBS

## 2019-07-29 DIAGNOSIS — I49.3 FREQUENT PVCS: Primary | ICD-10-CM

## 2019-07-29 DIAGNOSIS — I73.9 PVD (PERIPHERAL VASCULAR DISEASE) (HCC): ICD-10-CM

## 2019-07-29 DIAGNOSIS — I25.10 CAD IN NATIVE ARTERY: ICD-10-CM

## 2019-07-29 PROCEDURE — 93000 ELECTROCARDIOGRAM COMPLETE: CPT | Performed by: INTERNAL MEDICINE

## 2019-07-29 PROCEDURE — 99214 OFFICE O/P EST MOD 30 MIN: CPT | Performed by: INTERNAL MEDICINE

## 2019-07-29 NOTE — PROGRESS NOTES
Subjective:     Encounter Date: 07/29/2019      Patient ID: Pattie Liu is a 74 y.o. female who presents today for follow-up.  She was initially seen on 6/19/2019 with increasing PVCs and shortness of breath, having then undergone a cardiac catheterization afterwards.  The results of this are referenced below.  She is here for follow-up.    Chief Complaint: Follow-up    Coronary Artery Disease   Presents for follow-up visit. Symptoms include shortness of breath. Pertinent negatives include no chest pain, dizziness, leg swelling or palpitations. The symptoms have been stable. Compliance with diet is variable. Compliance with exercise is poor. Compliance with medications is good.     This patient presents today for routine follow-up.  She says that from a cardiac standpoint, she has done well since we last saw her.  No complaints of palpitations, lightheadedness, dizziness, syncope.  Also, no chest pain.  The patient says that she has had multiple upper respiratory infections and lung issues since last being seen by us.  She says that she does have COPD and this seems to bother her quite a bit.  Even today, she says that she is still having some intermittent coughing at times.  She denies orthopnea, PND, edema.  She says that due to her breathing, she is tired and fatigued often times.  However, she does remain physically active as best as she can.  She would like to be more active but wants to make sure that it is okay from a cardiac standpoint to do so.  No problems or side effects with any of her medications.      Current Outpatient Medications:   •  ALPRAZolam (XANAX) 0.5 MG tablet, Take 0.5 mg by mouth Every Night., Disp: , Rfl:   •  aspirin 81 MG tablet, Take 1 tablet by mouth Daily., Disp: 30 tablet, Rfl: 11  •  atorvastatin (LIPITOR) 20 MG tablet, Take 1 tablet by mouth Daily., Disp: 30 tablet, Rfl: 11  •  gabapentin (NEURONTIN) 600 MG tablet, Take 600 mg by mouth Daily., Disp: , Rfl:   •  ibuprofen  (ADVIL) 200 MG tablet, Every 6 (Six) Hours., Disp: , Rfl:   •  metoprolol tartrate (LOPRESSOR) 25 MG tablet, Take 0.5 tablets by mouth 2 (Two) Times a Day., Disp: 30 tablet, Rfl: 11  •  mirtazapine (REMERON SOLTAB) 15 MG disintegrating tablet, Take 7.5 mg by mouth Every Night., Disp: , Rfl:   •  STIOLTO RESPIMAT 2.5-2.5 MCG/ACT aerosol solution inhaler, , Disp: , Rfl:   •  Fexofenadine HCl (ALLEGRA ALLERGY PO), Take 4 mg by mouth Daily., Disp: , Rfl:     Allergies   Allergen Reactions   • Codeine Other (See Comments)   • Hydrocodone-Acetaminophen Other (See Comments)   • Levofloxacin Other (See Comments)   • Oxycodone-Acetaminophen Other (See Comments)   • Tramadol Other (See Comments)   • Zanaflex  [Tizanidine Hcl] Other (See Comments)   • Albuterol Arrhythmia     Social History     Tobacco Use   • Smoking status: Former Smoker     Years: 62.00     Types: Electronic Cigarette, Cigarettes   • Smokeless tobacco: Never Used   • Tobacco comment: USUES NO NICOTINE   Substance Use Topics   • Alcohol use: No     Frequency: Never     Review of Systems   Constitution: Positive for weakness and malaise/fatigue. Negative for weight loss.   Cardiovascular: Positive for dyspnea on exertion. Negative for chest pain, leg swelling, orthopnea, palpitations, paroxysmal nocturnal dyspnea and syncope.   Respiratory: Positive for shortness of breath. Negative for cough, hemoptysis and wheezing.    Endocrine: Negative for cold intolerance and heat intolerance.   Hematologic/Lymphatic: Negative for bleeding problem. Does not bruise/bleed easily.   Gastrointestinal: Negative for abdominal pain, hematemesis, hematochezia, melena, nausea and vomiting.   Neurological: Negative for dizziness, headaches and loss of balance.       ECG 12 Lead  Date/Time: 7/29/2019 1:13 PM  Performed by: Edi Armijo MD  Authorized by: Edi Armijo MD   Comparison: compared with previous ECG from 6/29/2019  Similar to previous ECG  Rhythm:  "sinus rhythm  Ectopy: unifocal PVCs and bigeminy  Rate: normal  Conduction: conduction normal  ST Segments: ST segments normal  T Waves: T waves normal  QRS axis: normal    Clinical impression: abnormal EKG             Objective:     Physical Exam   Constitutional: She is oriented to person, place, and time. She appears well-developed and well-nourished. No distress.   HENT:   Head: Normocephalic and atraumatic.   Mouth/Throat: Oropharynx is clear and moist.   Eyes: EOM are normal. Pupils are equal, round, and reactive to light.   Neck: Normal range of motion. Neck supple. No JVD present. No thyromegaly present.   Cardiovascular: Normal rate, regular rhythm, S1 normal, S2 normal, normal heart sounds and intact distal pulses. Exam reveals no gallop and no friction rub.   No murmur heard.  Pulmonary/Chest: Effort normal. She has decreased breath sounds.   Abdominal: Soft. Bowel sounds are normal. She exhibits no distension. There is no tenderness.   Musculoskeletal: Normal range of motion. She exhibits no edema.   Neurological: She is alert and oriented to person, place, and time. No cranial nerve deficit.   Skin: Skin is warm and dry. No rash noted. No cyanosis or erythema. Nails show no clubbing.   Psychiatric: She has a normal mood and affect.   Vitals reviewed.    /58 (BP Location: Left arm, Patient Position: Sitting)   Pulse 78   Ht 165.1 cm (65\")   Wt 68.9 kg (152 lb)   SpO2 96%   BMI 25.29 kg/m²     Data/Lab Review:     Cardiac Cath 6/2019:    Selective Coronary Angiography:     Left Main Coronary Artery: The left main coronary artery arises from the left coronary cusp with an extreme angulation of the takeoff and is a long vessel that then bifurcates into the LAD and left circumflex arteries. Luminal irregularities are present, but no significant disease.     Left Anterior Descending Artery: The left anterior descending artery arises from the distal left main coronary artery and supplies to medium " sized diagonal branches and then terminates at the apex.  The LAD tapers significantly towards the apex, however the LAD appears to have no more than mild disease.      Left Circumflex: The left circumflex artery arises from the distal left main artery and supplies essentially one tortuous small caliber obtuse marginal branch.  The circumflex system has no more than mild disease.     Right Coronary Artery: The right coronary artery appears to arise from the right coronary cusp with a severely angulated superior takeoff.  The right coronary artery is dominant to the posterior circulation.  The midportion the right coronary artery appears to have mild to moderate disease.  The distal right coronary artery has moderate stenosis prior to the posterior descending artery.  The posterior descending artery has no more than mild disease.     Selective right iliofemoral angiography: The right iliac artery appears to have diffuse atherosclerotic disease.  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  Lumbar arteries appear to be patent.  The left iliac artery also appears to be diffusely diseased with heavy calcification.    ============================================================    Holter 5/8/19:  1. Sinus rhythm mechanism throughout average heart rate 76 bpm minimal heart rate 55 bpm maximum heart rate 100 bpm  2. No pauses greater than 2.0 seconds recorded  3. 40 4/7/20 PVCs recorded including one run of 4 beats of ventricular tachycardia at a rate of 146 bpm 16 triplets and 394 couplets  4. No atrial fibrillation recorded  5. No episodes of ST segment depression recorded  6. 27 minutes 16 seconds of bradycardia recorded  7. 7 seconds of tachycardia recorded  8. No symptoms or activities were submitted for correlation     ==============================================================     Stress echo (DSE) 5/8/2019:  Dobutamine stress echocardiogram  without clinical, electrocardiographic, or echocardiographic evidence of myocardial ischemia.     ==============================================================     Echo 5/8/2019:  Normal left ventricular size with preserved LV function and an estimated  ejection fraction of approximately 55-60%.  Grade I diastolic dysfunction.  No evidence of left ventricular mass or thrombus noted.        Assessment:          Diagnosis Plan   1. Frequent PVCs  ECG 12 Lead   2. CAD in native artery     3. PVD (peripheral vascular disease) (CMS/[facility])  Ambulatory Referral to Vascular Surgery          Plan:       1.  Frequent PVCs: The patient has had cardiac monitoring which did identify frequent PVCs.  Her cardiac catheterization showed no obstructive coronary artery disease, therefore it is not thought that her PVCs are secondary to ischemia, but rather benign PVCs.  Her echocardiogram showed preserved left ventricular ejection fraction as a 5/8/2019.  This study was reviewed by me again today.  At this time, we will continue to monitor.  She is asymptomatic at this time.  She is currently tolerating low-dose beta-blocker therapy.    2.  Coronary artery disease: The patient does have nonobstructive coronary artery disease based on her cardiac catheterization.  She is on aspirin, statin and beta-blocker therapies and is asymptomatic at this time.    3.  Peripheral vascular disease: The patient was found to have a near occlusion of her distal aorta at the time of her cardiac catheterization.  For this reason, we will refer her to vascular surgery.  She says that her legs do get somewhat fatigued with exertion, although she does not describe pain with exertion.  Previously, I had discussed this case with Dr. Smith, so we will make a referral to his clinic for further evaluation.    Patient's Body mass index is 25.29 kg/m². BMI is above normal parameters. Recommendations include: exercise counseling and nutrition  counseling.    Follow-up: 6 months unless otherwise needed sooner

## 2019-08-07 ENCOUNTER — OFFICE VISIT (OUTPATIENT)
Dept: VASCULAR SURGERY | Facility: CLINIC | Age: 74
End: 2019-08-07

## 2019-08-07 VITALS
HEIGHT: 65 IN | WEIGHT: 151 LBS | OXYGEN SATURATION: 98 % | SYSTOLIC BLOOD PRESSURE: 122 MMHG | DIASTOLIC BLOOD PRESSURE: 72 MMHG | HEART RATE: 84 BPM | BODY MASS INDEX: 25.16 KG/M2

## 2019-08-07 DIAGNOSIS — I70.213 ATHEROSCLEROSIS OF NATIVE ARTERY OF BOTH LOWER EXTREMITIES WITH INTERMITTENT CLAUDICATION (HCC): Primary | ICD-10-CM

## 2019-08-07 DIAGNOSIS — Q25.1 ABDOMINAL AORTIC STENOSIS: ICD-10-CM

## 2019-08-07 PROCEDURE — 99204 OFFICE O/P NEW MOD 45 MIN: CPT | Performed by: SURGERY

## 2019-08-07 NOTE — PROGRESS NOTES
08/07/2019      Edi Armijo MD  [address]  [place] [address]    Pattie Liu  1945    Chief Complaint   Patient presents with   • Establish Care     PVD.  [place] occlusion of the distal aorta during a heart cath.  Pt states that her legs get fatigued after walking, but not painful. Referred by Dr. Edi Armijo.       Dear Edi Armijo, *:      HPI  I had the pleasure of seeing your patient Pattie Liu in the office today.  Thank you kindly for this consultation.  As you recall, Pattie Liu is a 74 y.o.  female who you are currently following for cardiac care.  She had previously been seen for shortness of breath and underwent cardiac catheterization.  Initially right radial access was attempted but was unsuccessful and so a right femoral access was used.  As per report during the procedure there was difficulty passing the wire through the distal aorta and there was finding of distal aortic stenosis.  Ultimately wire and catheter were able to be passed and the catheterization was completed.  However given the findings of aortic stenosis of the abdominal aorta she was referred for vascular evaluation.  Here in the office today she is very anxious.  She reports fatigue to the bilateral lower extremities with any exertion however denies any classic claudication or ischemic rest pain.  She otherwise is without any acute complaints today.  I did review the report of the cardiac catheterization and reviewed the available images however there were no images saved of the abdominal aorta that I could review.    Past Medical History:   Diagnosis Date   • CAD in native artery 7/29/2019   • GERD (gastroesophageal reflux disease)    • Lung nodule        Past Surgical History:   Procedure Laterality Date   • BREAST IMPLANT REMOVAL     • BREAST TISSUE EXPANDER REMOVAL INSERTION OF IMPLANT     • CARDIAC CATHETERIZATION N/A 6/21/2019    Procedure: Left Heart Cath;  Surgeon:  Edi Armijo MD;  Location: [facility] INVASIVE LOCATION;  Service: Cardiology   • CHOLECYSTECTOMY     • HYSTERECTOMY     • MASTECTOMY      BILATERAL   • OOPHORECTOMY         Family History   Problem Relation Age of Onset   • Cancer Mother    • Cancer Father        Social History     Socioeconomic History   • Marital status:      Spouse name: Not on file   • Number of children: Not on file   • Years of education: Not on file   • Highest education level: Not on file   Tobacco Use   • Smoking status: Former Smoker     Years: 62.00     Types: Electronic Cigarette, Cigarettes   • Smokeless tobacco: Never Used   • Tobacco comment: USUES NO NICOTINE   Substance and Sexual Activity   • Alcohol use: No     Frequency: Never   • Drug use: No   • Sexual activity: Defer       Allergies   Allergen Reactions   • Codeine Other (See Comments)   • Hydrocodone-Acetaminophen Other (See Comments)   • Levofloxacin Other (See Comments)   • Oxycodone-Acetaminophen Other (See Comments)   • Tramadol Other (See Comments)   • Zanaflex  [Tizanidine Hcl] Other (See Comments)   • Albuterol Arrhythmia       Prior to Admission medications    Medication Sig Start Date End Date Taking? Authorizing Provider   ALPRAZolam (XANAX) 0.5 MG tablet Take 0.5 mg by mouth Every Night. 4/2/19  Yes Wilfredo Andujar MD   aspirin 81 MG tablet Take 1 tablet by mouth Daily.   Yes Edi Armijo MD   atorvastatin (LIPITOR) 20 MG tablet Take 1 tablet by mouth Daily. 6/21/19  Yes Edi Armijo MD   Fexofenadine HCl (ALLEGRA ALLERGY PO) Take 4 mg by mouth Daily.   Yes ProviderWilfredo MD   Fluticasone-Umeclidin-Vilant (TRELEGY ELLIPTA IN) Inhale Daily.   Yes ProviderWilfredo MD   gabapentin (NEURONTIN) 600 MG tablet Take 600 mg by mouth Daily.   Yes Wilfredo Andujar MD   ibuprofen (ADVIL) 200 MG tablet Every 6 (Six) Hours.   Yes Wilfredo Andujar MD   metoprolol tartrate (LOPRESSOR) 25 MG tablet Take 0.5  "tablets by mouth 2 (Two) Times a Day. 6/19/19  Yes Edi Armijo MD   mirtazapine (REMERON SOLTAB) 15 MG disintegrating tablet Take 7.5 mg by mouth Every Night.   Yes Provider, MD ANDREW VillalobosOLTO RESPIMAT 2.5-2.5 MCG/ACT aerosol solution inhaler  5/6/19 8/7/19  Provider, MD Wilfredo       Review of Systems   Constitutional: Negative.  Negative for activity change, appetite change, chills, diaphoresis, fatigue and fever.   HENT: Negative.  Negative for congestion, sneezing, sore throat and trouble swallowing.    Eyes: Negative.  Negative for visual disturbance.   Respiratory: Positive for shortness of breath (With exertion). Negative for chest tightness.    Cardiovascular: Negative.  Negative for chest pain, palpitations and leg swelling.   Gastrointestinal: Negative.  Negative for abdominal distention, abdominal pain, nausea and vomiting.   Endocrine: Negative.    Genitourinary: Negative.    Musculoskeletal: Positive for back pain (She has chronic low back pain).        She reports feelings of fatigue to the bilateral lower extremities with exertion.  However she denies any pain.   Skin: Negative.    Allergic/Immunologic: Negative.    Neurological: Negative.    Hematological: Negative.    Psychiatric/Behavioral: Negative.        /72   Pulse 84   Ht 165.1 cm (65\")   Wt 68.5 kg (151 lb)   SpO2 98%   BMI 25.13 kg/m²   Physical Exam   Constitutional: She is oriented to person, place, and time. She appears well-developed and well-nourished.   HENT:   Head: Normocephalic and atraumatic.   Eyes: EOM are normal. Pupils are equal, round, and reactive to light.   Neck: Normal range of motion. Neck supple. No JVD present.   Cardiovascular: Normal rate and regular rhythm.   Pulses:       Carotid pulses are 2+ on the right side, and 2+ on the left side.       Radial pulses are 0 on the right side, and 2+ on the left side.        Femoral pulses are 1+ on the right side, and 1+ on the left side.       " Popliteal pulses are 0 on the right side, and 0 on the left side.        Dorsalis pedis pulses are 0 on the right side, and 0 on the left side.        Posterior tibial pulses are 0 on the right side, and 0 on the left side.   Her right distal radial pulse is nonpalpable and this is the site of attempted access for her previous cardiac cath.  She does have a palpable right ulnar pulse.    She has monophasic Doppler signals bilaterally at the DP and PT.  Her femoral pulses are palpable but diminished.   Pulmonary/Chest: Effort normal. No respiratory distress.   Abdominal: Soft. She exhibits no distension and no mass. There is no tenderness.   Musculoskeletal: Normal range of motion. She exhibits no edema, tenderness or deformity.   Neurological: She is alert and oriented to person, place, and time. No sensory deficit. She exhibits normal muscle tone.   Skin: Skin is warm and dry. Capillary refill takes 2 to 3 seconds.   Psychiatric: She has a normal mood and affect. Her behavior is normal. Judgment and thought content normal.   Vitals reviewed.      No results found.    Patient Active Problem List   Diagnosis   • Frequent PVCs   • Shortness of breath   • SOB (shortness of breath)   • CAD in native artery   • PVD (peripheral vascular disease) (CMS/[facility])         ICD-10-CM ICD-9-CM   1. Atherosclerosis of native artery of both lower extremities with intermittent claudication (CMS/[facility]) I70.213 440.21   2. Abdominal aortic stenosis Q25.1 747.22       Lab Frequency Next Occurrence   CT Angio Abdominal Aorta Bilateral Iliofem Runoff With & Without Contrast Once 08/21/2019       Plan: After thoroughly evaluating Pattie Liu, I believe the best course of action is to obtain further imaging.  Clinically she does have decreased femoral pulses bilaterally although they are palpable.  She also does have evidence of lower extremity peripheral vascular disease and has only Doppler signals at the DP and PT bilaterally.  As such I  have ordered a CTA of the abdominal aorta with runoff for further evaluation of her arterial system.  This will be done in the next 1 to 2 weeks and then I will see her back here in the office to review the images and make further recommendations.  The patient can continue taking their current medication regimen as previously planned. I did discuss vascular risk factors as they pertain to the progression of vascular disease including controlling hypertension, and hyperlipidemia. Patient's Body mass index is 25.13 kg/m². BMI is above normal parameters. Recommendations include: educational material. This was all discussed in full with complete understanding.    Thank you for allowing me to participate in the care of your patient.  Please do not hesitate with any questions or concerns.  I will keep you aware of any further encounters with Pattie Liu.        Sincerely yours,         Juancarlos Smith MD

## 2019-08-07 NOTE — PATIENT INSTRUCTIONS

## 2019-08-21 ENCOUNTER — OFFICE VISIT (OUTPATIENT)
Dept: VASCULAR SURGERY | Facility: CLINIC | Age: 74
End: 2019-08-21

## 2019-08-21 ENCOUNTER — HOSPITAL ENCOUNTER (OUTPATIENT)
Dept: CT IMAGING | Facility: HOSPITAL | Age: 74
Discharge: HOME | End: 2019-08-21
Admitting: SURGERY

## 2019-08-21 VITALS
WEIGHT: 151 LBS | BODY MASS INDEX: 25.16 KG/M2 | HEIGHT: 65 IN | OXYGEN SATURATION: 97 % | SYSTOLIC BLOOD PRESSURE: 132 MMHG | DIASTOLIC BLOOD PRESSURE: 72 MMHG | HEART RATE: 82 BPM

## 2019-08-21 DIAGNOSIS — R06.02 SHORTNESS OF BREATH: ICD-10-CM

## 2019-08-21 DIAGNOSIS — I70.213 ATHEROSCLEROSIS OF NATIVE ARTERY OF BOTH LOWER EXTREMITIES WITH INTERMITTENT CLAUDICATION (HCC): ICD-10-CM

## 2019-08-21 DIAGNOSIS — Q25.1 ABDOMINAL AORTIC STENOSIS: ICD-10-CM

## 2019-08-21 DIAGNOSIS — I25.10 CAD IN NATIVE ARTERY: ICD-10-CM

## 2019-08-21 DIAGNOSIS — I70.0 ATHEROSCLEROSIS OF ABDOMINAL AORTA (HCC): Primary | ICD-10-CM

## 2019-08-21 LAB — CREAT BLDA-MCNC: 1 MG/DL (ref 0.6–1.3)

## 2019-08-21 PROCEDURE — 0 IOPAMIDOL PER 1 ML: Performed by: SURGERY

## 2019-08-21 PROCEDURE — 82565 ASSAY OF CREATININE: CPT

## 2019-08-21 PROCEDURE — 75635 CT ANGIO ABDOMINAL ARTERIES: CPT

## 2019-08-21 PROCEDURE — 99214 OFFICE O/P EST MOD 30 MIN: CPT | Performed by: SURGERY

## 2019-08-21 RX ADMIN — IOPAMIDOL 200 ML: 755 INJECTION, SOLUTION INTRAVENOUS at 11:44

## 2019-08-21 NOTE — PATIENT INSTRUCTIONS

## 2019-08-22 NOTE — PROGRESS NOTES
08/21/2019      Rafat Espinoza MD  [address]  [place] [address]        Pattie SCHWARTZ Noe  1945    Chief Complaint   Patient presents with   • Follow-up     2 wk f/u with CTA of the abdomen.         Dear Rafat Espinoza MD:    HPI     I had the pleasure of seeing your patient in the office today for follow up.  As you recall, the patient is a 74 y.o. female who we are currently following for abdominal aortic stenosis.  She returns today after having undergone a CTA of the abdomen and pelvis for further evaluation.  She had recently undergone a cardiac catheterization with an attempted radial approach however this was unsuccessful and so there was a right femoral artery approach however the cardiologist noted some difficulty traversing the distal aorta at the bifurcation and had some findings of stenosis.  When seen in the office last week she did report fatigue when walking but denied classic claudication symptoms with no pain to the lower extremities and no ischemic rest pain.  She reports that her ambulation was more limited by generalized fatigue in the bilateral legs as well as her COPD which made her short of breath with exertion.  I did review her CTA today in the office and it does show some evidence of calcification and mild to moderate stenosis at the aortic bifurcation however the distal aorta and bilateral common iliac arteries remain patent.  She does have diffuse atherosclerotic disease otherwise distally in the bilateral lower extremities but no significant lesions.  She otherwise feels well today and is without new complaints.      Review of Systems   Constitutional: Negative.  Negative for activity change, appetite change, chills, diaphoresis, fatigue and fever.   HENT: Negative.  Negative for congestion, sneezing, sore throat and trouble swallowing.    Eyes: Negative.  Negative for visual disturbance.   Respiratory: Positive for shortness of breath (With exertion). Negative for chest  "tightness.    Cardiovascular: Negative.  Negative for chest pain, palpitations and leg swelling.   Gastrointestinal: Negative.  Negative for abdominal distention, abdominal pain, nausea and vomiting.   Endocrine: Negative.    Genitourinary: Negative.    Musculoskeletal: Positive for back pain (She has chronic low back pain).        She reports feelings of fatigue to the bilateral lower extremities with exertion.  However she denies any pain.   Skin: Negative.    Allergic/Immunologic: Negative.    Neurological: Negative.    Hematological: Negative.    Psychiatric/Behavioral: Negative.        /72   Pulse 82   Ht 165.1 cm (65\")   Wt 68.5 kg (151 lb)   SpO2 97%   BMI 25.13 kg/m²   Physical Exam   Constitutional: She is oriented to person, place, and time. She appears well-developed and well-nourished.   HENT:   Head: Normocephalic and atraumatic.   Eyes: EOM are normal. Pupils are equal, round, and reactive to light.   Neck: Normal range of motion. Neck supple. No JVD present.   Cardiovascular: Normal rate and regular rhythm.   Pulses:       Carotid pulses are 2+ on the right side, and 2+ on the left side.       Radial pulses are 0 on the right side, and 2+ on the left side.        Femoral pulses are 2+ on the right side, and 2+ on the left side.       Popliteal pulses are 0 on the right side, and 0 on the left side.        Dorsalis pedis pulses are 1+ on the right side, and 1+ on the left side.        Posterior tibial pulses are 0 on the right side, and 0 on the left side.   Her right distal radial pulse is nonpalpable and this is the site of attempted access for her previous cardiac cath.  She does have a palpable right ulnar pulse.    Today I was more easily able to palpate her femoral pulses and there are 2+ and equal bilaterally.  It was still difficult to appreciate a palpable popliteal pulse but she does have strong Doppler signals present.  I was also able to palpate DP pulses bilaterally today and " she was noted to have multiphasic Doppler signals in the same location.  Posterior tibial pulses were difficult to palpate but she did have multiphasic Doppler signals present they are bilaterally at the PTs as well today.  Her feet are both warm and well perfused appearing.   Pulmonary/Chest: Effort normal. No respiratory distress.   Abdominal: Soft. She exhibits no distension and no mass. There is no tenderness.   Musculoskeletal: Normal range of motion. She exhibits no edema, tenderness or deformity.   Neurological: She is alert and oriented to person, place, and time. No sensory deficit. She exhibits normal muscle tone.   Skin: Skin is warm and dry. Capillary refill takes 2 to 3 seconds.   Psychiatric: She has a normal mood and affect. Her behavior is normal. Judgment and thought content normal.   Vitals reviewed.      DIAGNOSTIC DATA:    Ct Angio Abdominal Aorta Bilateral Iliofem Runoff With & Without Contrast    Result Date: 8/21/2019  Narrative: Exam: CT ANGIO ABDOMINAL AORTA BILAT ILIOFEM RUNOFF W WO CONTRAST-  Indication: Claudication  Comparison: None available.  DOSE LENGTH PRODUCT: 735 mGy cm. Automated exposure control was also utilized to decrease patient radiation dose.  Findings:  VASCULAR FINDINGS:  The abdominal aorta is nonaneurysmal and contains diffuse atherosclerotic calcification. Immediately prior to the bifurcation, there is focal narrowing of the abdominal aorta up to 50%.  Celiac trunk is widely patent. SMA is patent with mild proximal atherosclerotic narrowing. TIANA is patent. Renal arteries have mild atherosclerotic calcification at their origins but appear widely patent. There is an accessory left renal artery.  Right sided vasculature: Small focal nonflow-limiting dissection of the right common iliac artery (coronal series 6 image 72 and 73). Right common iliac artery is otherwise unremarkable. The right internal and external iliac arteries are widely patent. The right common femoral  artery is widely patent. The right deep femoral artery is widely patent. The right superficial femoral artery contains a few scattered areas of mild-moderate atherosclerotic narrowing distally. The right popliteal artery is widely patent. Normal trifurcation. The right anterior tibial and posterior tibial arteries are patent to the foot with patent dorsalis pedis and plantar arteries. The right peroneal artery is not visualized beyond the distal calf. Two-vessel runoff.  Left-sided vasculature: The left common iliac artery contains atherosclerotic calcification but does not demonstrate severe narrowing. The left internal and external iliac artery contains multifocal areas of mild-moderate atherosclerotic narrowing. The left common femoral artery is widely patent. The left deep femoral artery is widely patent. The left superficial femoral artery contains multifocal areas of mild-moderate atherosclerotic narrowing. The left popliteal artery is widely patent. Normal trifurcation. The left anterior and posterior tibial arteries are not visualized beyond the distal calf. Peroneal artery is patent through the foot and supplies the plantar artery. Left dorsalis pedis is not confidently identified. One vessel runoff.  NONVASCULAR FINDINGS: Bilateral breast augmentation. Lung bases are clear. Arterial phase liver, spleen, adrenal glands, and pancreas appear normal. Gallbladder is surgically absent. No biliary ductal dilatation. Low-density exophytic right lower pole renal lesion. No focal bladder abnormality. Sigmoid diverticulosis without evidence of diverticulitis. Normal appendix. No ascites or free pelvic fluid. No pelvic mass. No enlarged abdominal or pelvic lymph nodes. Right hip arthroplasty hardware. Multilevel degenerative change of the lumbar spine.      Impression:  Nonaneurysmal atherosclerotic abdominal aorta with focal 50% narrowing at the bifurcation.  Multifocal mild to moderate atherosclerotic narrowing in  the major vasculature of the right lower extremity with nonvisualization of the right peroneal artery beyond the distal calf. Two-vessel runoff.  Multifocal mild to moderate atherosclerotic narrowing in the major vasculature of the left lower extremity with nonvisualization of the anterior and posterior tibial arteries beyond the distal calf. One vessel runoff. This report was finalized on 08/21/2019 12:31 by Dr. Jose Luevano MD.      Patient Active Problem List   Diagnosis   • Frequent PVCs   • Shortness of breath   • SOB (shortness of breath)   • CAD in native artery   • PVD (peripheral vascular disease) (CMS/[facility])         ICD-10-CM ICD-9-CM   1. Atherosclerosis of abdominal aorta (CMS/[facility]) I70.0 440.0   2. CAD in native artery I25.10 414.01   3. Shortness of breath R06.02 786.05       Lab Frequency Next Occurrence   US Ankle / Brachial Indices Extremity Complete Once 11/17/2019       PLAN: After thoroughly evaluating Pattie Liu, I believe the best course of action is to remain conservative from a vascular standpoint.  I did review her CTA of the abdomen and pelvis with runoff today closely in the office and it does show some degree of stenosis in the distal abdominal aorta at the bifurcation with calcification present but this area remains patent and the bilateral common iliac arteries also remain widely patent.  Runoff shows some mild diffuse atherosclerotic disease in the bilateral lower extremities however no areas of significant stenosis.  As well and her clinical exam today I was able to appreciate palpable dorsalis pedis pulses in her femoral pulses bilaterally were 2+.  Given these findings and the fact that she does not have classic claudication symptoms and that she has no pain to the bilateral lower extremities and more fatigue which is generalized with activity.  She does have a history of COPD and does get short of breath with any exertion and this may be adding to her fatigue.  Given all these  findings I would tend to remain conservative from a vascular standpoint and continue with surveillance as she does have evidence of good perfusion to her bilateral lower extremities all the way to the feet.  She also does have a history of chronic lower back pain and may have a component of radiculopathy which may also contribute to her feelings of weakness and fatigue in her legs at times.  She also does have a history of peripheral neuropathy and does take gabapentin.  I will plan to see her back in the office in 3 months for continued surveillance and will repeat an TOMASA/PVR at that time.  The patient is to continue taking their medications as previously discussed.   I did discuss vascular risk factors as they pertain to the progression of vascular disease including controlling hypertension, and hyperlipidemia. Patient's Body mass index is 25.13 kg/m². BMI is above normal parameters. Recommendations include: educational material. This was all discussed in full with complete understanding.  Thank you for allowing me to participate in the care of your patient.  Please do not hesitate to call with any questions or concerns.  We will keep you aware of any further encounters with Pattie Liu.      Sincerely Yours,      Juancarlos Smith MD

## 2019-11-14 ENCOUNTER — APPOINTMENT (OUTPATIENT)
Dept: ULTRASOUND IMAGING | Facility: HOSPITAL | Age: 74
End: 2019-11-14

## 2019-11-15 ENCOUNTER — APPOINTMENT (OUTPATIENT)
Dept: ULTRASOUND IMAGING | Facility: HOSPITAL | Age: 74
End: 2019-11-15

## 2020-01-13 ENCOUNTER — TELEPHONE (OUTPATIENT)
Dept: VASCULAR SURGERY | Facility: CLINIC | Age: 75
End: 2020-01-13

## 2020-01-13 NOTE — TELEPHONE ENCOUNTER
Called the patient to remind her of her appts for Wednesday for testing and to see Dr. Smith.  She stated that she had already talked to someone from [facility] reminding her of her appt.

## 2020-01-15 ENCOUNTER — HOSPITAL ENCOUNTER (OUTPATIENT)
Dept: ULTRASOUND IMAGING | Facility: HOSPITAL | Age: 75
Discharge: HOME | End: 2020-01-15
Admitting: SURGERY

## 2020-01-15 DIAGNOSIS — I70.0 ATHEROSCLEROSIS OF ABDOMINAL AORTA (HCC): ICD-10-CM

## 2020-01-15 PROCEDURE — 93923 UPR/LXTR ART STDY 3+ LVLS: CPT

## 2020-01-15 PROCEDURE — 93924 LWR XTR VASC STDY BILAT: CPT | Performed by: SURGERY

## 2020-01-24 ENCOUNTER — OFFICE VISIT (OUTPATIENT)
Dept: VASCULAR SURGERY | Facility: CLINIC | Age: 75
End: 2020-01-24

## 2020-01-24 VITALS
HEIGHT: 65 IN | BODY MASS INDEX: 24.49 KG/M2 | SYSTOLIC BLOOD PRESSURE: 98 MMHG | DIASTOLIC BLOOD PRESSURE: 42 MMHG | WEIGHT: 147 LBS | HEART RATE: 91 BPM | OXYGEN SATURATION: 98 %

## 2020-01-24 DIAGNOSIS — I74.09 AORTOILIAC OCCLUSIVE DISEASE (HCC): ICD-10-CM

## 2020-01-24 DIAGNOSIS — I70.213 ATHEROSCLEROSIS OF NATIVE ARTERY OF BOTH LOWER EXTREMITIES WITH INTERMITTENT CLAUDICATION (HCC): Primary | ICD-10-CM

## 2020-01-24 PROCEDURE — 99214 OFFICE O/P EST MOD 30 MIN: CPT | Performed by: SURGERY

## 2020-01-24 RX ORDER — NAPROXEN SODIUM 220 MG
220 TABLET ORAL 2 TIMES DAILY PRN
Status: ON HOLD | COMMUNITY
End: 2021-12-10 | Stop reason: ALTCHOICE

## 2020-01-24 RX ORDER — LISINOPRIL 10 MG/1
20 TABLET ORAL DAILY
COMMUNITY

## 2020-01-24 RX ORDER — ASCORBIC ACID 125 MG
1 TABLET,CHEWABLE ORAL DAILY
COMMUNITY

## 2020-01-24 NOTE — PROGRESS NOTES
01/24/2020      Rafat Espinoza MD  [address]  [place] [address]        Pattie SCHWARTZ Noe  1945    Chief Complaint   Patient presents with   • Follow-up     3 month f/u with ABIs.  Pt last seen in the office on 8/21/19. Pt states that she is doing about the same.        Dear Rafat Espinoaz MD:    HPI     I had the pleasure of seeing your patient in the office today for follow up.  As you recall, the patient is a 74 y.o. female who we are currently following for peripheral vascular disease.  She returns today after having undergone TOMASA/PVR on 1/15 for continued surveillance.  I did review these images in the office in the show TOMASA values bilaterally of 0.85 which represents mild arterial insufficiency of the bilateral lower extremities.  Overall she reports no significant changes.  She continues to have very mild claudication to the bilateral lower extremities but this is not limiting.  She also does continue to report shortness of breath with exertion that is her more limiting factor.  She otherwise continues on her aspirin and statin on a daily basis.  She has no other acute complaints today.      Review of Systems   Constitutional: Negative.  Negative for activity change, appetite change, chills, diaphoresis, fatigue and fever.   HENT: Negative.  Negative for congestion, sneezing, sore throat and trouble swallowing.    Eyes: Negative.  Negative for visual disturbance.   Respiratory: Positive for shortness of breath (With exertion). Negative for chest tightness.    Cardiovascular: Negative.  Negative for chest pain, palpitations and leg swelling.   Gastrointestinal: Negative.  Negative for abdominal distention, abdominal pain, nausea and vomiting.   Endocrine: Negative.    Genitourinary: Negative.    Musculoskeletal: Positive for back pain (She has chronic low back pain).        She reports feelings of fatigue to the bilateral lower extremities with exertion.  However she denies any pain.   Skin:  "Negative.    Allergic/Immunologic: Negative.    Neurological: Negative.    Hematological: Negative.    Psychiatric/Behavioral: Negative.        BP 98/42   Pulse 91   Ht 165.1 cm (65\")   Wt 66.7 kg (147 lb)   SpO2 98%   BMI 24.46 kg/m²   Physical Exam   Constitutional: She is oriented to person, place, and time. She appears well-developed and well-nourished.   HENT:   Head: Normocephalic and atraumatic.   Eyes: Pupils are equal, round, and reactive to light. EOM are normal.   Neck: Normal range of motion. Neck supple. No JVD present.   Cardiovascular: Normal rate and regular rhythm.   Pulses:       Carotid pulses are 2+ on the right side, and 2+ on the left side.       Radial pulses are 1+ on the right side, and 2+ on the left side.        Femoral pulses are 2+ on the right side, and 2+ on the left side.       Popliteal pulses are 0 on the right side, and 0 on the left side.        Dorsalis pedis pulses are 1+ on the right side, and 1+ on the left side.        Posterior tibial pulses are 0 on the right side, and 0 on the left side.   Pulmonary/Chest: Effort normal. No respiratory distress.   Abdominal: Soft. She exhibits no distension and no mass. There is no tenderness.   Musculoskeletal: Normal range of motion. She exhibits no edema, tenderness or deformity.   Neurological: She is alert and oriented to person, place, and time. No sensory deficit. She exhibits normal muscle tone.   Skin: Skin is warm and dry. Capillary refill takes 2 to 3 seconds.   Psychiatric: She has a normal mood and affect. Her behavior is normal. Judgment and thought content normal.   Vitals reviewed.      DIAGNOSTIC DATA:    Us Ankle / Brachial Indices Extremity Complete    Result Date: 1/15/2020  Narrative:  History: [facility]  Comments: Bilateral lower extremity arterial with multi-level pulse volume recordings and segmental pressures were performed at rest and stress.  The right ankle/brachial index is 0.83. The waveforms are biphasic " without dampening.These findings are consistent with mild arterial insufficiency of the right lower extremity at rest.  The postexercise ABIs 1.02.  The left ankle/brachial index is 0.85. The waveforms are biphasic without dampening. These findings are consistent with mild arterial insufficiency of the left lower extremity at rest.    The postexercise ABIs 1.23.      Impression: Impression: 1. Mild arterial insufficiency of the right lower extremity at rest. 2. Mild arterial insufficiency of the left lower extremity at rest.   This report was finalized on 01/15/2020 15:46 by Dr. Franco Pryor MD.      Patient Active Problem List   Diagnosis   • Frequent PVCs   • Shortness of breath   • SOB (shortness of breath)   • CAD in native artery   • PVD (peripheral vascular disease) (CMS/LTAC, located within [facility])         ICD-10-CM ICD-9-CM   1. Atherosclerosis of native artery of both lower extremities with intermittent claudication (CMS/LTAC, located within [facility]) I70.213 440.21   2. Aortoiliac occlusive disease (CMS/LTAC, located within [facility]) I74.09 444.09           PLAN: After thoroughly evaluating Pattie Liu, I believe the best course of action is to remain conservative from a vascular standpoint.  Overall she continues to be doing well with no change in her lower extremity symptoms.  She does report mild claudication to the bilateral lower extremities at the calf level but this is not lifestyle limiting.  Her TOMASA/PVR which was recently done shows mild arterial insufficiency of the bilateral lower extremities but waveforms are fairly well maintained.  As such I recommended she continue with walking as much as possible that she continues on her aspirin and her statin.  I will plan to see her back in the office in 6 months with repeat TOMASA/PVR for continued surveillance.  The patient is to otherwise continue taking their medications as previously discussed.   I did discuss vascular risk factors as they pertain to the progression of vascular disease including controlling  hypertension, and hyperlipidemia. These factors remain stable. Patient's Body mass index is 24.46 kg/m². BMI is within normal parameters. No follow-up required. This was all discussed in full with complete understanding.  Thank you for allowing me to participate in the care of your patient.  Please do not hesitate to call with any questions or concerns.  We will keep you aware of any further encounters with Pattie Liu.      Sincerely Yours,      Juancarlos Smith MD

## 2020-07-10 ENCOUNTER — TELEPHONE (OUTPATIENT)
Dept: VASCULAR SURGERY | Facility: CLINIC | Age: 75
End: 2020-07-10

## 2020-07-10 NOTE — TELEPHONE ENCOUNTER
Spoke with patient to advised that her appointment had been moved to Aug.  Patient requested that it be moved to Sept due to her being quarantined by doctor.  Rescheduled and mailed to patient.

## 2020-07-31 ENCOUNTER — APPOINTMENT (OUTPATIENT)
Dept: ULTRASOUND IMAGING | Facility: HOSPITAL | Age: 75
End: 2020-07-31

## 2020-08-03 ENCOUNTER — APPOINTMENT (OUTPATIENT)
Dept: ULTRASOUND IMAGING | Facility: HOSPITAL | Age: 75
End: 2020-08-03

## 2020-09-25 ENCOUNTER — APPOINTMENT (OUTPATIENT)
Dept: ULTRASOUND IMAGING | Facility: HOSPITAL | Age: 75
End: 2020-09-25

## 2021-02-22 NOTE — PATIENT INSTRUCTIONS
14-day monitor (ZIO Patch)      ZIO Patch  The ZIO® Patch is a new form of ambulatory cardiac monitoring described as a wearable patch. The Melany Blight is unique compared to traditional Holter monitors as the monitoring device has no leads, no wires and no batteries. The Melany Blight weighs just a few ounces that is a peel and stick device that is worn for an extended monitoring period of up to 14 days. Even though the device is worn for a longer duration than a Holter monitor, the ZIO Patch is said to be preferred by nearly 80% of patients as compared to a traditional 24 Holter monitor. Please allow 8 to 10 days for results, once you have mailed off your device.     Features of the ZIO Patch:  Arguably the smallest ambulatory cardiac monitor   Light weight   No lead wires   Single channel ECG recording   No batteries   Superior patient compliance with a water resistant   Up to 14 days of continuous ECG recording Refill request for   Disp Refills Start End    atorvastatin (Lipitor) 20 MG tablet 90 tablet 1 8/25/2020     Sig - Route: Take 1 tablet by mouth nightly. - Oral    Sent to pharmacy as: Atorvastatin Calcium 20 MG Oral Tablet (Lipitor)    Class: Eprescribe       Disp Refills Start End    finasteride (PROSCAR) 5 MG tablet 90 tablet 3 8/25/2020 8/25/2021    Sig - Route: Take 1 tablet by mouth daily. - Oral    Sent to pharmacy as: Finasteride 5 MG Oral Tablet (PROSCAR)    Class: Eprescribe      LOV: 9/01/20  Upcoming appointment:  2/25/21  Last Rx: see above    *Pt is out of his medication.    Routed to Dr. Andino

## 2021-03-05 ENCOUNTER — IMMUNIZATION (OUTPATIENT)
Age: 76
End: 2021-03-05
Payer: MEDICARE

## 2021-03-05 PROCEDURE — 91300 COVID-19, PFIZER VACCINE 30MCG/0.3ML DOSE: CPT | Performed by: FAMILY MEDICINE

## 2021-03-05 PROCEDURE — 0001A COVID-19, PFIZER VACCINE 30MCG/0.3ML DOSE: CPT | Performed by: FAMILY MEDICINE

## 2021-03-26 ENCOUNTER — IMMUNIZATION (OUTPATIENT)
Age: 76
End: 2021-03-26
Payer: MEDICARE

## 2021-03-26 PROCEDURE — 0002A PR IMM ADMN SARSCOV2 30MCG/0.3ML DIL RECON 2ND DOSE: CPT | Performed by: FAMILY MEDICINE

## 2021-03-26 PROCEDURE — 91300 COVID-19, PFIZER VACCINE 30MCG/0.3ML DOSE: CPT | Performed by: FAMILY MEDICINE

## 2021-06-17 NOTE — TELEPHONE ENCOUNTER
Hpi Title: Evaluation of Skin Lesions
Left message with results of PFT. PULMONARY FUNCTION TESTS     IMPRESSION:  1. Spirometry shows mild airflow obstruction. 2.  Mid flow is reduced. 3.  Maximum ventilation is lower limits of normal.  4.  Lung volume measurements show elevation in residual volume  suggesting gas trapping. 5.  Total lung capacity is elevated suggesting hyperinflation.   6.  Diffusion capacity is reduced, but when corrected for alveolar  volume, it is normal.
How Severe Are Your Spot(S)?: moderate
Have Your Spot(S) Been Treated In The Past?: has not been treated

## 2021-12-06 ENCOUNTER — HOSPITAL ENCOUNTER (INPATIENT)
Facility: HOSPITAL | Age: 76
LOS: 4 days | Discharge: HOME HEALTH | End: 2021-12-10
Attending: EMERGENCY MEDICINE | Admitting: FAMILY MEDICINE

## 2021-12-06 ENCOUNTER — APPOINTMENT (OUTPATIENT)
Dept: GENERAL RADIOLOGY | Facility: HOSPITAL | Age: 76
End: 2021-12-06

## 2021-12-06 ENCOUNTER — APPOINTMENT (OUTPATIENT)
Dept: CT IMAGING | Facility: HOSPITAL | Age: 76
End: 2021-12-06

## 2021-12-06 DIAGNOSIS — R09.02 HYPOXIC: ICD-10-CM

## 2021-12-06 DIAGNOSIS — J18.9 PNEUMONIA OF BOTH LOWER LOBES DUE TO INFECTIOUS ORGANISM: ICD-10-CM

## 2021-12-06 DIAGNOSIS — J18.9 PNEUMONIA DUE TO INFECTIOUS ORGANISM, UNSPECIFIED LATERALITY, UNSPECIFIED PART OF LUNG: Primary | ICD-10-CM

## 2021-12-06 DIAGNOSIS — Z74.09 IMPAIRED MOBILITY: ICD-10-CM

## 2021-12-06 DIAGNOSIS — Z78.9 DECREASED ACTIVITIES OF DAILY LIVING (ADL): ICD-10-CM

## 2021-12-06 LAB
ALBUMIN SERPL-MCNC: 3.5 G/DL (ref 3.5–5.2)
ALBUMIN/GLOB SERPL: 1.1 G/DL
ALP SERPL-CCNC: 86 U/L (ref 39–117)
ALT SERPL W P-5'-P-CCNC: 15 U/L (ref 1–33)
ANION GAP SERPL CALCULATED.3IONS-SCNC: 10 MMOL/L (ref 5–15)
APTT PPP: 39.3 SECONDS (ref 24.1–35)
ARTERIAL PATENCY WRIST A: ABNORMAL
AST SERPL-CCNC: 30 U/L (ref 1–32)
ATMOSPHERIC PRESS: 755 MMHG
BACTERIA UR QL AUTO: ABNORMAL /HPF
BASE EXCESS BLDA CALC-SCNC: 1.1 MMOL/L (ref 0–2)
BASOPHILS # BLD AUTO: 0.02 10*3/MM3 (ref 0–0.2)
BASOPHILS NFR BLD AUTO: 0.2 % (ref 0–1.5)
BDY SITE: ABNORMAL
BILIRUB SERPL-MCNC: 0.2 MG/DL (ref 0–1.2)
BILIRUB UR QL STRIP: NEGATIVE
BODY TEMPERATURE: 37 C
BUN SERPL-MCNC: 23 MG/DL (ref 8–23)
BUN/CREAT SERPL: 22.3 (ref 7–25)
CALCIUM SPEC-SCNC: 9.6 MG/DL (ref 8.6–10.5)
CHLORIDE SERPL-SCNC: 103 MMOL/L (ref 98–107)
CLARITY UR: CLEAR
CO2 SERPL-SCNC: 27 MMOL/L (ref 22–29)
COLOR UR: ABNORMAL
CREAT SERPL-MCNC: 1.03 MG/DL (ref 0.57–1)
CRP SERPL-MCNC: 4.08 MG/DL (ref 0–0.5)
D DIMER PPP FEU-MCNC: 1.01 MG/L (FEU) (ref 0–0.5)
DEPRECATED RDW RBC AUTO: 50.3 FL (ref 37–54)
EOSINOPHIL # BLD AUTO: 0.07 10*3/MM3 (ref 0–0.4)
EOSINOPHIL NFR BLD AUTO: 0.9 % (ref 0.3–6.2)
ERYTHROCYTE [DISTWIDTH] IN BLOOD BY AUTOMATED COUNT: 14 % (ref 12.3–15.4)
GFR SERPL CREATININE-BSD FRML MDRD: 52 ML/MIN/1.73
GLOBULIN UR ELPH-MCNC: 3.1 GM/DL
GLUCOSE SERPL-MCNC: 89 MG/DL (ref 65–99)
GLUCOSE UR STRIP-MCNC: NEGATIVE MG/DL
HCO3 BLDA-SCNC: 26.6 MMOL/L (ref 20–26)
HCT VFR BLD AUTO: 43 % (ref 34–46.6)
HGB BLD-MCNC: 13.4 G/DL (ref 12–15.9)
HGB UR QL STRIP.AUTO: NEGATIVE
HYALINE CASTS UR QL AUTO: ABNORMAL /LPF
IMM GRANULOCYTES # BLD AUTO: 0.03 10*3/MM3 (ref 0–0.05)
IMM GRANULOCYTES NFR BLD AUTO: 0.4 % (ref 0–0.5)
INR PPP: 0.98 (ref 0.91–1.09)
KETONES UR QL STRIP: ABNORMAL
LEUKOCYTE ESTERASE UR QL STRIP.AUTO: ABNORMAL
LIPASE SERPL-CCNC: 16 U/L (ref 13–60)
LYMPHOCYTES # BLD AUTO: 1.89 10*3/MM3 (ref 0.7–3.1)
LYMPHOCYTES NFR BLD AUTO: 23.1 % (ref 19.6–45.3)
Lab: ABNORMAL
Lab: ABNORMAL
MAGNESIUM SERPL-MCNC: 1.9 MG/DL (ref 1.6–2.4)
MCH RBC QN AUTO: 30.4 PG (ref 26.6–33)
MCHC RBC AUTO-ENTMCNC: 31.2 G/DL (ref 31.5–35.7)
MCV RBC AUTO: 97.5 FL (ref 79–97)
MODALITY: ABNORMAL
MONOCYTES # BLD AUTO: 0.75 10*3/MM3 (ref 0.1–0.9)
MONOCYTES NFR BLD AUTO: 9.2 % (ref 5–12)
NEUTROPHILS NFR BLD AUTO: 5.41 10*3/MM3 (ref 1.7–7)
NEUTROPHILS NFR BLD AUTO: 66.2 % (ref 42.7–76)
NITRITE UR QL STRIP: NEGATIVE
NOTIFIED BY: ABNORMAL
NOTIFIED WHO: ABNORMAL
NRBC BLD AUTO-RTO: 0 /100 WBC (ref 0–0.2)
NT-PROBNP SERPL-MCNC: 1235 PG/ML (ref 0–1800)
PCO2 BLDA: 44.6 MM HG (ref 35–45)
PCO2 TEMP ADJ BLD: 44.6 MM HG (ref 35–45)
PH BLDA: 7.38 PH UNITS (ref 7.35–7.45)
PH UR STRIP.AUTO: <=5 [PH] (ref 5–8)
PH, TEMP CORRECTED: 7.38 PH UNITS (ref 7.35–7.45)
PLATELET # BLD AUTO: 240 10*3/MM3 (ref 140–450)
PMV BLD AUTO: 11.2 FL (ref 6–12)
PO2 BLDA: 46.7 MM HG (ref 83–108)
PO2 TEMP ADJ BLD: 46.7 MM HG (ref 83–108)
POTASSIUM SERPL-SCNC: 5.1 MMOL/L (ref 3.5–5.2)
PROCALCITONIN SERPL-MCNC: 0.03 NG/ML (ref 0–0.25)
PROT SERPL-MCNC: 6.6 G/DL (ref 6–8.5)
PROT UR QL STRIP: NEGATIVE
PROTHROMBIN TIME: 12.6 SECONDS (ref 11.9–14.6)
RBC # BLD AUTO: 4.41 10*6/MM3 (ref 3.77–5.28)
RBC # UR STRIP: ABNORMAL /HPF
REF LAB TEST METHOD: ABNORMAL
SAO2 % BLDCOA: 81.8 % (ref 94–99)
SARS-COV-2 RNA PNL SPEC NAA+PROBE: NOT DETECTED
SODIUM SERPL-SCNC: 140 MMOL/L (ref 136–145)
SP GR UR STRIP: 1.02 (ref 1–1.03)
SQUAMOUS #/AREA URNS HPF: ABNORMAL /HPF
T4 FREE SERPL-MCNC: 1.09 NG/DL (ref 0.93–1.7)
TROPONIN T SERPL-MCNC: <0.01 NG/ML (ref 0–0.03)
TSH SERPL DL<=0.05 MIU/L-ACNC: 3.02 UIU/ML (ref 0.27–4.2)
UROBILINOGEN UR QL STRIP: ABNORMAL
VENTILATOR MODE: ABNORMAL
WBC # UR STRIP: ABNORMAL /HPF
WBC NRBC COR # BLD: 8.17 10*3/MM3 (ref 3.4–10.8)

## 2021-12-06 PROCEDURE — 25010000002 AZITHROMYCIN PER 500 MG: Performed by: EMERGENCY MEDICINE

## 2021-12-06 PROCEDURE — 94799 UNLISTED PULMONARY SVC/PX: CPT

## 2021-12-06 PROCEDURE — 84484 ASSAY OF TROPONIN QUANT: CPT | Performed by: EMERGENCY MEDICINE

## 2021-12-06 PROCEDURE — 83690 ASSAY OF LIPASE: CPT | Performed by: EMERGENCY MEDICINE

## 2021-12-06 PROCEDURE — 84439 ASSAY OF FREE THYROXINE: CPT | Performed by: EMERGENCY MEDICINE

## 2021-12-06 PROCEDURE — 81001 URINALYSIS AUTO W/SCOPE: CPT | Performed by: EMERGENCY MEDICINE

## 2021-12-06 PROCEDURE — 0 IOPAMIDOL PER 1 ML: Performed by: EMERGENCY MEDICINE

## 2021-12-06 PROCEDURE — 84443 ASSAY THYROID STIM HORMONE: CPT | Performed by: EMERGENCY MEDICINE

## 2021-12-06 PROCEDURE — 83735 ASSAY OF MAGNESIUM: CPT | Performed by: EMERGENCY MEDICINE

## 2021-12-06 PROCEDURE — 99284 EMERGENCY DEPT VISIT MOD MDM: CPT

## 2021-12-06 PROCEDURE — 83880 ASSAY OF NATRIURETIC PEPTIDE: CPT | Performed by: EMERGENCY MEDICINE

## 2021-12-06 PROCEDURE — 85025 COMPLETE CBC W/AUTO DIFF WBC: CPT | Performed by: EMERGENCY MEDICINE

## 2021-12-06 PROCEDURE — 85730 THROMBOPLASTIN TIME PARTIAL: CPT | Performed by: EMERGENCY MEDICINE

## 2021-12-06 PROCEDURE — 36600 WITHDRAWAL OF ARTERIAL BLOOD: CPT

## 2021-12-06 PROCEDURE — 25010000002 CEFTRIAXONE PER 250 MG: Performed by: EMERGENCY MEDICINE

## 2021-12-06 PROCEDURE — 25010000002 ENOXAPARIN PER 10 MG: Performed by: FAMILY MEDICINE

## 2021-12-06 PROCEDURE — 85379 FIBRIN DEGRADATION QUANT: CPT | Performed by: EMERGENCY MEDICINE

## 2021-12-06 PROCEDURE — 87635 SARS-COV-2 COVID-19 AMP PRB: CPT | Performed by: EMERGENCY MEDICINE

## 2021-12-06 PROCEDURE — P9612 CATHETERIZE FOR URINE SPEC: HCPCS

## 2021-12-06 PROCEDURE — 87040 BLOOD CULTURE FOR BACTERIA: CPT | Performed by: EMERGENCY MEDICINE

## 2021-12-06 PROCEDURE — 36415 COLL VENOUS BLD VENIPUNCTURE: CPT

## 2021-12-06 PROCEDURE — 84145 PROCALCITONIN (PCT): CPT | Performed by: EMERGENCY MEDICINE

## 2021-12-06 PROCEDURE — 71275 CT ANGIOGRAPHY CHEST: CPT

## 2021-12-06 PROCEDURE — 93010 ELECTROCARDIOGRAM REPORT: CPT | Performed by: INTERNAL MEDICINE

## 2021-12-06 PROCEDURE — 71045 X-RAY EXAM CHEST 1 VIEW: CPT

## 2021-12-06 PROCEDURE — 85610 PROTHROMBIN TIME: CPT | Performed by: EMERGENCY MEDICINE

## 2021-12-06 PROCEDURE — 80053 COMPREHEN METABOLIC PANEL: CPT | Performed by: EMERGENCY MEDICINE

## 2021-12-06 PROCEDURE — 82803 BLOOD GASES ANY COMBINATION: CPT

## 2021-12-06 PROCEDURE — 86140 C-REACTIVE PROTEIN: CPT | Performed by: EMERGENCY MEDICINE

## 2021-12-06 PROCEDURE — 93005 ELECTROCARDIOGRAM TRACING: CPT | Performed by: EMERGENCY MEDICINE

## 2021-12-06 RX ORDER — ALPRAZOLAM 0.5 MG/1
0.5 TABLET ORAL 2 TIMES DAILY PRN
Status: DISCONTINUED | OUTPATIENT
Start: 2021-12-06 | End: 2021-12-10 | Stop reason: HOSPADM

## 2021-12-06 RX ORDER — GABAPENTIN 300 MG/1
600 CAPSULE ORAL DAILY
Status: DISCONTINUED | OUTPATIENT
Start: 2021-12-07 | End: 2021-12-10 | Stop reason: HOSPADM

## 2021-12-06 RX ORDER — GABAPENTIN 600 MG/1
1200 TABLET ORAL 2 TIMES DAILY
COMMUNITY

## 2021-12-06 RX ORDER — GABAPENTIN 300 MG/1
300 CAPSULE ORAL 3 TIMES DAILY
Status: DISCONTINUED | OUTPATIENT
Start: 2021-12-06 | End: 2021-12-06

## 2021-12-06 RX ORDER — AMOXICILLIN 250 MG
2 CAPSULE ORAL 2 TIMES DAILY
Status: DISCONTINUED | OUTPATIENT
Start: 2021-12-06 | End: 2021-12-10 | Stop reason: HOSPADM

## 2021-12-06 RX ORDER — POLYETHYLENE GLYCOL 3350 17 G/17G
17 POWDER, FOR SOLUTION ORAL DAILY PRN
Status: DISCONTINUED | OUTPATIENT
Start: 2021-12-06 | End: 2021-12-10 | Stop reason: HOSPADM

## 2021-12-06 RX ORDER — ONDANSETRON 2 MG/ML
4 INJECTION INTRAMUSCULAR; INTRAVENOUS EVERY 6 HOURS PRN
Status: DISCONTINUED | OUTPATIENT
Start: 2021-12-06 | End: 2021-12-10 | Stop reason: HOSPADM

## 2021-12-06 RX ORDER — GABAPENTIN 300 MG/1
900 CAPSULE ORAL 2 TIMES DAILY
Status: DISCONTINUED | OUTPATIENT
Start: 2021-12-06 | End: 2021-12-10 | Stop reason: HOSPADM

## 2021-12-06 RX ORDER — SODIUM CHLORIDE 0.9 % (FLUSH) 0.9 %
10 SYRINGE (ML) INJECTION EVERY 12 HOURS SCHEDULED
Status: DISCONTINUED | OUTPATIENT
Start: 2021-12-06 | End: 2021-12-10 | Stop reason: HOSPADM

## 2021-12-06 RX ORDER — IBUPROFEN 200 MG
200 TABLET ORAL EVERY 6 HOURS PRN
COMMUNITY

## 2021-12-06 RX ORDER — ACETAMINOPHEN 325 MG/1
650 TABLET ORAL EVERY 4 HOURS PRN
Status: DISCONTINUED | OUTPATIENT
Start: 2021-12-06 | End: 2021-12-10 | Stop reason: HOSPADM

## 2021-12-06 RX ORDER — LANOLIN ALCOHOL/MO/W.PET/CERES
5 CREAM (GRAM) TOPICAL NIGHTLY PRN
Status: DISCONTINUED | OUTPATIENT
Start: 2021-12-06 | End: 2021-12-07

## 2021-12-06 RX ORDER — ATORVASTATIN CALCIUM 10 MG/1
20 TABLET, FILM COATED ORAL DAILY
Status: DISCONTINUED | OUTPATIENT
Start: 2021-12-07 | End: 2021-12-10 | Stop reason: HOSPADM

## 2021-12-06 RX ORDER — BISACODYL 10 MG
10 SUPPOSITORY, RECTAL RECTAL DAILY PRN
Status: DISCONTINUED | OUTPATIENT
Start: 2021-12-06 | End: 2021-12-10 | Stop reason: HOSPADM

## 2021-12-06 RX ORDER — LISINOPRIL 20 MG/1
20 TABLET ORAL DAILY
Status: DISCONTINUED | OUTPATIENT
Start: 2021-12-07 | End: 2021-12-10 | Stop reason: HOSPADM

## 2021-12-06 RX ORDER — ASPIRIN 81 MG/1
81 TABLET ORAL DAILY
Status: DISCONTINUED | OUTPATIENT
Start: 2021-12-07 | End: 2021-12-10 | Stop reason: HOSPADM

## 2021-12-06 RX ORDER — SODIUM CHLORIDE 0.9 % (FLUSH) 0.9 %
10 SYRINGE (ML) INJECTION AS NEEDED
Status: DISCONTINUED | OUTPATIENT
Start: 2021-12-06 | End: 2021-12-10 | Stop reason: HOSPADM

## 2021-12-06 RX ORDER — BISACODYL 5 MG/1
5 TABLET, DELAYED RELEASE ORAL DAILY PRN
Status: DISCONTINUED | OUTPATIENT
Start: 2021-12-06 | End: 2021-12-10 | Stop reason: HOSPADM

## 2021-12-06 RX ORDER — ONDANSETRON 4 MG/1
4 TABLET, FILM COATED ORAL EVERY 6 HOURS PRN
Status: DISCONTINUED | OUTPATIENT
Start: 2021-12-06 | End: 2021-12-10 | Stop reason: HOSPADM

## 2021-12-06 RX ADMIN — IOPAMIDOL 100 ML: 755 INJECTION, SOLUTION INTRAVENOUS at 14:12

## 2021-12-06 RX ADMIN — AZITHROMYCIN MONOHYDRATE 500 MG: 500 INJECTION, POWDER, LYOPHILIZED, FOR SOLUTION INTRAVENOUS at 13:45

## 2021-12-06 RX ADMIN — ENOXAPARIN SODIUM 40 MG: 40 INJECTION SUBCUTANEOUS at 22:50

## 2021-12-06 RX ADMIN — SODIUM CHLORIDE 1 G: 900 INJECTION INTRAVENOUS at 13:46

## 2021-12-06 RX ADMIN — SODIUM CHLORIDE, POTASSIUM CHLORIDE, SODIUM LACTATE AND CALCIUM CHLORIDE 1000 ML: 600; 310; 30; 20 INJECTION, SOLUTION INTRAVENOUS at 13:21

## 2021-12-06 RX ADMIN — GABAPENTIN 900 MG: 300 CAPSULE ORAL at 22:50

## 2021-12-06 NOTE — ED PROVIDER NOTES
Subjective   She came in with generalized weakness and some diarrhea.  Her oxygen saturation on the lower side not getting a good saturation monitor on her.  Her heart rate is low also.  She appears generally sick      Weakness - Generalized  Severity:  Moderate  Onset quality:  Gradual  Timing:  Constant  Progression:  Worsening  Chronicity:  New  Context: not alcohol use, not allergies, not change in medication, not drug use, not increased activity and not recent infection    Relieved by:  Nothing  Worsened by:  Nothing  Ineffective treatments:  None tried  Associated symptoms: lethargy    Associated symptoms: no abdominal pain, no anorexia, no arthralgias, no ataxia, no cough, no diarrhea, no dizziness, no drooling, no dysphagia, no falls, no fever, no foul-smelling urine, no headaches, no hematochezia, no melena, no near-syncope, no seizures, no sensory-motor deficit, no shortness of breath and no vomiting    Risk factors: no congestive heart failure, no coronary artery disease, no family hx of stroke, no heart disease and no new medications        Review of Systems   Unable to perform ROS: Acuity of condition   Constitutional: Negative.  Negative for fever.   HENT: Negative.  Negative for drooling.    Eyes: Negative.    Respiratory: Negative.  Negative for cough and shortness of breath.    Cardiovascular: Negative.  Negative for near-syncope.   Gastrointestinal: Negative.  Negative for abdominal pain, anorexia, diarrhea, dysphagia, hematochezia, melena and vomiting.   Musculoskeletal: Negative.  Negative for arthralgias, back pain, falls and neck pain.   Skin: Negative.    Neurological: Negative for dizziness, seizures and headaches.   All other systems reviewed and are negative.      Past Medical History:   Diagnosis Date   • CAD in native artery 7/29/2019   • GERD (gastroesophageal reflux disease)    • Lung nodule        Allergies   Allergen Reactions   • Codeine Other (See Comments)     Unknown.     •  Hydrocodone-Acetaminophen Other (See Comments)   • Levofloxacin Other (See Comments)   • Oxycodone-Acetaminophen Other (See Comments)   • Tramadol Other (See Comments)   • Zanaflex  [Tizanidine Hcl] Other (See Comments)   • Albuterol Arrhythmia       Past Surgical History:   Procedure Laterality Date   • BREAST IMPLANT REMOVAL     • BREAST TISSUE EXPANDER REMOVAL INSERTION OF IMPLANT     • CARDIAC CATHETERIZATION N/A 6/21/2019    Procedure: Left Heart Cath;  Surgeon: Edi Armijo MD;  Location:  [facility];  Service: Cardiology   • CHOLECYSTECTOMY     • HYSTERECTOMY     • MASTECTOMY      BILATERAL   • OOPHORECTOMY         Family History   Problem Relation Age of Onset   • Cancer Mother    • Cancer Father        Social History     Socioeconomic History   • Marital status:    Tobacco Use   • Smoking status: Former Smoker     Years: 62.00     Types: Electronic Cigarette, Cigarettes   • Smokeless tobacco: Never Used   • Tobacco comment: USUES NO NICOTINE   Substance and Sexual Activity   • Alcohol use: No   • Drug use: No   • Sexual activity: Defer           Objective   Physical Exam  Vitals and nursing note reviewed. Exam conducted with a chaperone present.   Constitutional:       General: She is awake.      Appearance: Normal appearance. She is well-developed. She is ill-appearing.   HENT:      Head: Normocephalic and atraumatic.   Eyes:      General: Lids are normal.      Conjunctiva/sclera: Conjunctivae normal.      Pupils: Pupils are equal, round, and reactive to light.   Cardiovascular:      Rate and Rhythm: Regular rhythm. Bradycardia present.      Chest Wall: PMI is not displaced.      Pulses: Decreased pulses.      Heart sounds: Normal heart sounds.   Pulmonary:      Effort: Pulmonary effort is normal.      Breath sounds: Normal breath sounds. No decreased breath sounds.   Abdominal:      General: Abdomen is flat. Bowel sounds are normal.      Palpations: Abdomen is soft.       Tenderness: There is no abdominal tenderness.   Musculoskeletal:         General: Normal range of motion.      Cervical back: Full passive range of motion without pain, normal range of motion and neck supple.   Skin:     General: Skin is warm and dry.      Capillary Refill: Capillary refill takes less than 2 seconds.   Neurological:      General: No focal deficit present.      Mental Status: She is alert and oriented to person, place, and time.      Cranial Nerves: Cranial nerves are intact.      Motor: Motor function is intact.      Deep Tendon Reflexes: Reflexes are normal and symmetric.   Psychiatric:         Behavior: Behavior is cooperative.         Procedures           ED Course  ED Course as of 12/06/21 1539   Mon Dec 06, 2021   1320 Normal sinus rhythm with incomplete right bundle branch block [TS]   1536 Patient oxygen saturation improved she is more awake has got pneumonia CT of the chest is negative [TS]   1537 Case was discussed at length with the patient she is agreeable and staying in the hospital will admit her to the hospital her curb 65 score is 2 [TS]      ED Course User Index  [TS] Julian Lacy MD                                                 MDM  Number of Diagnoses or Management Options  Diagnosis management comments: Neurolyse weakness hypoxia bradycardia we will get some lab work-up       Amount and/or Complexity of Data Reviewed  Clinical lab tests: ordered and reviewed  Tests in the radiology section of CPT®: ordered and reviewed  Tests in the medicine section of CPT®: reviewed and ordered    Risk of Complications, Morbidity, and/or Mortality  Presenting problems: moderate  Diagnostic procedures: moderate  Management options: moderate        Final diagnoses:   Pneumonia due to infectious organism, unspecified laterality, unspecified part of lung   Hypoxic       ED Disposition  ED Disposition     ED Disposition Condition Comment    Decision to Admit  Level of Care: Telemetry [5]    Diagnosis: Pneumonia due to infectious organism, unspecified laterality, unspecified part of lung [[id number]]   Admitting Physician: LOGAN DIEZ [[id number]]   Attending Physician: LOGAN DIEZ [[id number]]   Certification: I Certify That Inpatient Hospital Services Are Medically Necessary For Greater Than 2 Midnights            No follow-up provider specified.       Medication List      No changes were made to your prescriptions during this visit.          Julian Lacy MD  12/06/21 3151

## 2021-12-06 NOTE — ED NOTES
The following fall interventions were initiated:    [x] Patient and/or family given education   [x] Call light within reach and educated on how to use   [x] Bed rails up per protocol    [x] Bed locked and in the lowest position   [] Bed alarm set and on loudest setting   [x] Fall wrist band applied   [x] Non skid footwear applied   [x] Room free of clutter   [x] Patient items within reach   [x] Adequate lighting provided  [x] Falls sign present    [x] Patient moved closer to nursing station   [] Restraints applied        Jacklyn Bourgeois, RN  12/06/21 0410

## 2021-12-07 PROBLEM — I10 ESSENTIAL HYPERTENSION: Status: ACTIVE | Noted: 2021-12-07

## 2021-12-07 PROBLEM — M54.9 CHRONIC BACK PAIN: Status: ACTIVE | Noted: 2021-12-07

## 2021-12-07 PROBLEM — G89.29 CHRONIC BACK PAIN: Status: ACTIVE | Noted: 2021-12-07

## 2021-12-07 LAB
ALBUMIN SERPL-MCNC: 3 G/DL (ref 3.5–5.2)
ALBUMIN/GLOB SERPL: 1 G/DL
ALP SERPL-CCNC: 78 U/L (ref 39–117)
ALT SERPL W P-5'-P-CCNC: 16 U/L (ref 1–33)
ANION GAP SERPL CALCULATED.3IONS-SCNC: 8 MMOL/L (ref 5–15)
AST SERPL-CCNC: 28 U/L (ref 1–32)
BILIRUB SERPL-MCNC: 0.2 MG/DL (ref 0–1.2)
BUN SERPL-MCNC: 16 MG/DL (ref 8–23)
BUN/CREAT SERPL: 20.3 (ref 7–25)
CALCIUM SPEC-SCNC: 9.2 MG/DL (ref 8.6–10.5)
CHLORIDE SERPL-SCNC: 107 MMOL/L (ref 98–107)
CO2 SERPL-SCNC: 27 MMOL/L (ref 22–29)
CREAT SERPL-MCNC: 0.79 MG/DL (ref 0.57–1)
DEPRECATED RDW RBC AUTO: 50.8 FL (ref 37–54)
ERYTHROCYTE [DISTWIDTH] IN BLOOD BY AUTOMATED COUNT: 13.9 % (ref 12.3–15.4)
GFR SERPL CREATININE-BSD FRML MDRD: 70 ML/MIN/1.73
GLOBULIN UR ELPH-MCNC: 3.1 GM/DL
GLUCOSE SERPL-MCNC: 87 MG/DL (ref 65–99)
HCT VFR BLD AUTO: 41.6 % (ref 34–46.6)
HGB BLD-MCNC: 12.7 G/DL (ref 12–15.9)
MCH RBC QN AUTO: 30 PG (ref 26.6–33)
MCHC RBC AUTO-ENTMCNC: 30.5 G/DL (ref 31.5–35.7)
MCV RBC AUTO: 98.1 FL (ref 79–97)
PLATELET # BLD AUTO: 232 10*3/MM3 (ref 140–450)
PMV BLD AUTO: 10.8 FL (ref 6–12)
POTASSIUM SERPL-SCNC: 4.7 MMOL/L (ref 3.5–5.2)
PROT SERPL-MCNC: 6.1 G/DL (ref 6–8.5)
QT INTERVAL: 426 MS
QTC INTERVAL: 450 MS
RBC # BLD AUTO: 4.24 10*6/MM3 (ref 3.77–5.28)
SODIUM SERPL-SCNC: 142 MMOL/L (ref 136–145)
WBC NRBC COR # BLD: 7.27 10*3/MM3 (ref 3.4–10.8)

## 2021-12-07 PROCEDURE — 85027 COMPLETE CBC AUTOMATED: CPT | Performed by: FAMILY MEDICINE

## 2021-12-07 PROCEDURE — 25010000002 AZITHROMYCIN PER 500 MG: Performed by: FAMILY MEDICINE

## 2021-12-07 PROCEDURE — 80053 COMPREHEN METABOLIC PANEL: CPT | Performed by: FAMILY MEDICINE

## 2021-12-07 PROCEDURE — 25010000002 ONDANSETRON PER 1 MG: Performed by: FAMILY MEDICINE

## 2021-12-07 PROCEDURE — 97165 OT EVAL LOW COMPLEX 30 MIN: CPT | Performed by: OCCUPATIONAL THERAPIST

## 2021-12-07 PROCEDURE — 94799 UNLISTED PULMONARY SVC/PX: CPT

## 2021-12-07 PROCEDURE — 25010000002 CEFTRIAXONE PER 250 MG: Performed by: FAMILY MEDICINE

## 2021-12-07 PROCEDURE — 25010000002 ENOXAPARIN PER 10 MG: Performed by: FAMILY MEDICINE

## 2021-12-07 PROCEDURE — 97161 PT EVAL LOW COMPLEX 20 MIN: CPT | Performed by: PHYSICAL THERAPIST

## 2021-12-07 RX ORDER — LANOLIN ALCOHOL/MO/W.PET/CERES
6 CREAM (GRAM) TOPICAL NIGHTLY PRN
Status: DISCONTINUED | OUTPATIENT
Start: 2021-12-07 | End: 2021-12-10 | Stop reason: HOSPADM

## 2021-12-07 RX ORDER — NYSTATIN 100000 [USP'U]/G
POWDER TOPICAL EVERY 12 HOURS SCHEDULED
Status: DISCONTINUED | OUTPATIENT
Start: 2021-12-07 | End: 2021-12-10 | Stop reason: HOSPADM

## 2021-12-07 RX ADMIN — GABAPENTIN 900 MG: 300 CAPSULE ORAL at 09:24

## 2021-12-07 RX ADMIN — NYSTATIN: 100000 POWDER TOPICAL at 21:26

## 2021-12-07 RX ADMIN — NYSTATIN: 100000 POWDER TOPICAL at 13:48

## 2021-12-07 RX ADMIN — GABAPENTIN 600 MG: 300 CAPSULE ORAL at 13:48

## 2021-12-07 RX ADMIN — ONDANSETRON 4 MG: 2 INJECTION INTRAMUSCULAR; INTRAVENOUS at 09:21

## 2021-12-07 RX ADMIN — GABAPENTIN 900 MG: 300 CAPSULE ORAL at 21:43

## 2021-12-07 RX ADMIN — ATORVASTATIN CALCIUM 20 MG: 10 TABLET, FILM COATED ORAL at 09:24

## 2021-12-07 RX ADMIN — SODIUM CHLORIDE 1 G: 900 INJECTION INTRAVENOUS at 15:17

## 2021-12-07 RX ADMIN — SODIUM CHLORIDE, PRESERVATIVE FREE 10 ML: 5 INJECTION INTRAVENOUS at 21:26

## 2021-12-07 RX ADMIN — ENOXAPARIN SODIUM 40 MG: 40 INJECTION SUBCUTANEOUS at 21:43

## 2021-12-07 RX ADMIN — DOCUSATE SODIUM 50 MG AND SENNOSIDES 8.6 MG 2 TABLET: 8.6; 5 TABLET, FILM COATED ORAL at 09:24

## 2021-12-07 RX ADMIN — SODIUM CHLORIDE, PRESERVATIVE FREE 10 ML: 5 INJECTION INTRAVENOUS at 01:06

## 2021-12-07 RX ADMIN — ASPIRIN 81 MG: 81 TABLET, COATED ORAL at 09:24

## 2021-12-07 RX ADMIN — SODIUM CHLORIDE, PRESERVATIVE FREE 10 ML: 5 INJECTION INTRAVENOUS at 09:24

## 2021-12-07 RX ADMIN — LISINOPRIL 20 MG: 20 TABLET ORAL at 09:24

## 2021-12-07 RX ADMIN — ACETAMINOPHEN 650 MG: 325 TABLET, FILM COATED ORAL at 09:21

## 2021-12-07 RX ADMIN — AZITHROMYCIN DIHYDRATE 500 MG: 500 INJECTION, POWDER, LYOPHILIZED, FOR SOLUTION INTRAVENOUS at 01:05

## 2021-12-07 NOTE — PROGRESS NOTES
RT Nebulizer Protocol                        Assessment tool to be used for patients with existing breathing treatments                                                              ordered by hospitalists   0  1  2  3  4      Respiratory History   No Smoking      Smoking History   x   1 Pack/Day      Pulmonary Disease      Exacerbation        Respiratory Rate   Normal   x   20-25      Dyspneic      Accessory Muscles      Severe Dyspnea        Breath Sounds   Clear      Crackles      Crackles/ Rhonchi      Wheezing      Absent/ Severe Wheezing        Chest   X-ray   Clear      1 Lobe Infiltration/ Consolidation/ PE      2 Lobe Same Lung Infiltration/ Consolidation/ PE    x   2 Lobe Infiltration/ Both Lungs/ Consolidation/ PE      Both Lungs/ More Than 1 Lobe/ Atelectasis/ Consolidation/  PE        Cough   Strong Non- Productive   x   Excessive Secretions/ Strong Cough      Excessive Secretions/ Weak Cough      Thick Bronchial Secretions/ Weak Cough      Thick Bronchial Secretions/ No Cough        Total Patient Score =  3  0-4=Q4 PRN  5-9=TID and Q4 PRN  10-14=QID and Q3 PRN  15-19=Q4 and Q2 PRN  20=Q3 and Q2 PRN    Bronchopulmonary Hygiene (CPT)   Q4 ATC Copious secretions, dyspnea, unable to sleep, mucus plug    QID & Q4 PRN Moderate secretions    TID Small amounts of secretions w/ poor cough and history of secretions    BID Unable to deep breathe and cough spontaneously       Lung Expansion Therapy (PEP)   Q4 & PRN at night Severe atelectasis, poor oxygenation    QID  High risk for persistent atelectasis, existence of same    TID At risk for developing atelectasis    BID Unable to deep breathe and cough spontaneously     Instruct, 1 follow up Patients able to perform well on their own      Patient already has Q4prn atrovent tx orders from home medications. Continue as ordered and pt will be re-evaluated in 24 hours.

## 2021-12-07 NOTE — PLAN OF CARE
Goal Outcome Evaluation:  Plan of Care Reviewed With: patient, daughter           Outcome Summary: OT eval completed. Pt is alert and oriented x4, with c/o fatigue. She reports at baseline being completely independent with all adls, t/fs and mobility. Today, she demonstrates decreased balance, activity tolerance and increased SOA with activity. She was able to bring self to sitting at EOB with CGA/Min A. CGA for sit <> stand t/f from EOB/commode and all functional mobility in hallways with rwx and supplemental O2. She would benefit from skilled OT services to address these deficits and assist with return to PLOF. Recommend d/c home with assist and HH.

## 2021-12-07 NOTE — H&P
History and Physical    Patient:  Pattie Liu  MRN: [id number]    CHIEF COMPLAINT: Shortness of breath    History Obtained From: the patient   PCP: Rafat Espinoza MD    HISTORY OF PRESENT ILLNESS:   The patient is a 76 y.o. female who presents with Shortness of breath that began around 12/1/2021.  She states she received the COVID-19 booster 1 that day and started noticing some shortness of breath and weakness approximately 4 hours after receiving the vaccine.  She continued to do her normal activities of daily living, but noticed persistent weakness and shortness of breath.  She had a cough that began as well during this time.  She presented to the emergency department on 12/6/2021 where she was found to have significant hypoxia.  CTA chest negative for PE, but with evidence of right-sided infiltrate concerning for community-acquired pneumonia.  She was given a dose of azithromycin and Rocephin and a breathing treatment in the emergency department with interval improvement.  She does not have an oxygen requirement at baseline, but was requiring oxygen via nasal cannula at the time of admission.    REVIEW OF SYSTEMS:    Review of Systems   Constitutional: Positive for activity change, appetite change and fatigue. Negative for fever.   HENT: Positive for congestion.    Respiratory: Positive for cough, shortness of breath and wheezing.    Cardiovascular: Negative for chest pain, palpitations and leg swelling.   Gastrointestinal: Negative for abdominal pain, nausea and vomiting.   Genitourinary: Negative for difficulty urinating.   Skin: Negative for color change and pallor.   Neurological: Positive for weakness. Negative for dizziness.          Past Medical History:  Past Medical History:   Diagnosis Date   • CAD in native artery 7/29/2019   • COPD (chronic obstructive pulmonary disease) (HCC)    • Essential hypertension 12/7/2021   • GERD (gastroesophageal reflux disease)    • Lung nodule        Past  Surgical History:  Past Surgical History:   Procedure Laterality Date   • BREAST IMPLANT REMOVAL     • BREAST TISSUE EXPANDER REMOVAL INSERTION OF IMPLANT     • CARDIAC CATHETERIZATION N/A 6/21/2019    Procedure: Left Heart Cath;  Surgeon: Edi Armijo MD;  Location:  [facility];  Service: Cardiology   • CHOLECYSTECTOMY     • HYSTERECTOMY     • MASTECTOMY      BILATERAL   • OOPHORECTOMY         Medications Prior to Admission:    Medications Prior to Admission   Medication Sig Dispense Refill Last Dose   • aspirin 81 MG tablet Take 1 tablet by mouth Daily. 30 tablet 11 12/6/2021 at Unknown time   • atorvastatin (LIPITOR) 20 MG tablet Take 1 tablet by mouth Daily. 30 tablet 11 12/5/2021 at Unknown time   • Calcium Carb-Cholecalciferol (CALCIUM 1000 + D PO) Take  by mouth Daily.   Patient Taking Differently   • Cyanocobalamin (B-12) 5000 MCG capsule Take  by mouth Daily.   12/6/2021 at Unknown time   • Fexofenadine HCl (ALLEGRA ALLERGY PO) Take 4 mg by mouth Daily.   12/6/2021 at Unknown time   • Fluticasone-Umeclidin-Vilant (TRELEGY ELLIPTA IN) Inhale Daily.   12/6/2021 at Unknown time   • gabapentin (NEURONTIN) 600 MG tablet Take 600 mg by mouth 3 (Three) Times a Day. 1 1/2 tabs in am  1 tablet at noon  1 1/2 tabs at bedtime   12/6/2021 at Unknown time   • lisinopril (PRINIVIL,ZESTRIL) 10 MG tablet Take 20 mg by mouth Daily.   12/6/2021 at Unknown time   • ALPRAZolam (XANAX) 0.5 MG tablet Take 0.5 mg by mouth 2 (Two) Times a Day As Needed for Anxiety.   Unknown at Unknown time   • ibuprofen (ADVIL,MOTRIN) 200 MG tablet Take 200 mg by mouth Every 6 (Six) Hours As Needed for Mild Pain .   Unknown at Unknown time   • ipratropium (ATROVENT HFA) 17 MCG/ACT inhaler Inhale 2 puffs 4 (Four) Times a Day.   Unknown at Unknown time   • naproxen sodium (ALEVE) 220 MG tablet Take 220 mg by mouth 2 (Two) Times a Day As Needed.          Allergies:  Codeine, Hydrocodone-acetaminophen, Levofloxacin,  "Oxycodone-acetaminophen, Tramadol, Zanaflex  [tizanidine hcl], and Albuterol    Social History:   Social History     Socioeconomic History   • Marital status:    Tobacco Use   • Smoking status: Current Every Day Smoker     Packs/day: 0.50     Years: 62.00     Pack years: 31.00     Types: Cigarettes   • Smokeless tobacco: Never Used   • Tobacco comment: states she has no plan to quit smoking   Vaping Use   • Vaping Use: Former   Substance and Sexual Activity   • Alcohol use: No   • Drug use: No   • Sexual activity: Not Currently       Family History:   Family History   Problem Relation Age of Onset   • Cancer Mother    • Cancer Father            Physical Exam:    Vitals: /48 (BP Location: Left arm, Patient Position: Sitting)   Pulse 65   Temp 97.9 °F (36.6 °C) (Oral)   Resp 16   Ht 160 cm (63\")   Wt 72.3 kg (159 lb 4.8 oz)   SpO2 97%   BMI 28.22 kg/m²   Physical Exam  Vitals reviewed.   Constitutional:       Appearance: Normal appearance. She is not ill-appearing.   HENT:      Head: Normocephalic and atraumatic.   Eyes:      General:         Right eye: No discharge.         Left eye: No discharge.      Extraocular Movements: Extraocular movements intact.      Conjunctiva/sclera: Conjunctivae normal.   Cardiovascular:      Rate and Rhythm: Normal rate and regular rhythm.      Pulses: Normal pulses.      Heart sounds: No murmur heard.      Pulmonary:      Effort: Pulmonary effort is normal. No respiratory distress.      Breath sounds: Rales (Right lower aspect of the posterior lung) present.      Comments: Nasal cannula in place  Abdominal:      General: Abdomen is flat. Bowel sounds are normal. There is no distension.   Musculoskeletal:      Right lower leg: No edema.      Left lower leg: No edema.   Skin:     Capillary Refill: Capillary refill takes less than 2 seconds.   Neurological:      General: No focal deficit present.      Mental Status: She is alert.   Psychiatric:         Mood and Affect: " Mood normal.         Behavior: Behavior normal.           Lab Results (last 24 hours)     Procedure Component Value Units Date/Time    Comprehensive Metabolic Panel [[id number]]  (Abnormal) Collected: 12/07/21 0509    Specimen: Blood Updated: 12/07/21 0559     Glucose 87 mg/dL      BUN 16 mg/dL      Creatinine 0.79 mg/dL      Sodium 142 mmol/L      Potassium 4.7 mmol/L      Comment: Slight hemolysis detected by analyzer. Results may be affected.        Chloride 107 mmol/L      CO2 27.0 mmol/L      Calcium 9.2 mg/dL      Total Protein 6.1 g/dL      Albumin 3.00 g/dL      ALT (SGPT) 16 U/L      AST (SGOT) 28 U/L      Alkaline Phosphatase 78 U/L      Total Bilirubin 0.2 mg/dL      eGFR Non African Amer 70 mL/min/1.73      Globulin 3.1 gm/dL      A/G Ratio 1.0 g/dL      BUN/Creatinine Ratio 20.3     Anion Gap 8.0 mmol/L     Narrative:      GFR Normal >60  Chronic Kidney Disease <60  Kidney Failure <15      CBC (No Diff) [[id number]]  (Abnormal) Collected: 12/07/21 0509    Specimen: Blood Updated: 12/07/21 0537     WBC 7.27 10*3/mm3      RBC 4.24 10*6/mm3      Hemoglobin 12.7 g/dL      Hematocrit 41.6 %      MCV 98.1 fL      MCH 30.0 pg      MCHC 30.5 g/dL      RDW 13.9 %      RDW-SD 50.8 fl      MPV 10.8 fL      Platelets 232 10*3/mm3     Urinalysis With Culture If Indicated - Urine, Catheter In/Out [[id number]]  (Abnormal) Collected: 12/06/21 1354    Specimen: Urine, Catheter In/Out Updated: 12/06/21 1445     Color, UA Dark Yellow     Appearance, UA Clear     pH, UA <=5.0     Specific Gravity, UA 1.020     Glucose, UA Negative     Ketones, UA Trace     Bilirubin, UA Negative     Blood, UA Negative     Protein, UA Negative     Leuk Esterase, UA Trace     Nitrite, UA Negative     Urobilinogen, UA 0.2 E.U./dL    Urinalysis, Microscopic Only - Urine, Catheter In/Out [[id number]]  (Abnormal) Collected: 12/06/21 1354    Specimen: Urine, Catheter In/Out Updated: 12/06/21 1445     RBC, UA None Seen /HPF      WBC, UA 3-5 /HPF       Bacteria, UA None Seen /HPF      Squamous Epithelial Cells, UA 0-2 /HPF      Hyaline Casts, UA 3-6 /LPF      Methodology Manual Light Microscopy    COVID PRE-OP / PRE-PROCEDURE SCREENING ORDER (NO ISOLATION) - Swab, Nasal Cavity [[id number]]  (Normal) Collected: 12/06/21 1259    Specimen: Swab from Nasal Cavity Updated: 12/06/21 1414    Narrative:      The following orders were created for panel order COVID PRE-OP / PRE-PROCEDURE SCREENING ORDER (NO ISOLATION) - Swab, Nasal Cavity.  Procedure                               Abnormality         Status                     ---------                               -----------         ------                     COVID-19,[facility] IN-PAT...[[id number]]  Normal              Final result                 Please view results for these tests on the individual orders.    COVID-19,[facility] IN-HOUSE,Nasal Swab No Transport Media 3-4 HR TAT - Swab, Nasal Cavity [[id number]]  (Normal) Collected: 12/06/21 1259    Specimen: Swab from Nasal Cavity Updated: 12/06/21 1414     COVID19 Not Detected    Narrative:      Fact sheet for providers: https://www.fda.gov/media/049039/download     Fact sheet for patients: https://www.fda.gov/media/805649/download    Test performed by PCR.    Consider negative results in combination with clinical observations, patient history, and epidemiological information.    TSH [[id number]]  (Normal) Collected: 12/06/21 1256    Specimen: Blood Updated: 12/06/21 1335     TSH 3.020 uIU/mL     Comprehensive Metabolic Panel [[id number]]  (Abnormal) Collected: 12/06/21 1256    Specimen: Blood Updated: 12/06/21 1335     Glucose 89 mg/dL      BUN 23 mg/dL      Creatinine 1.03 mg/dL      Sodium 140 mmol/L      Potassium 5.1 mmol/L      Comment: Slight hemolysis detected by analyzer. Results may be affected.        Chloride 103 mmol/L      CO2 27.0 mmol/L      Calcium 9.6 mg/dL      Total Protein 6.6 g/dL      Albumin 3.50 g/dL      ALT (SGPT) 15 U/L      AST (SGOT) 30 U/L       "Comment: Slight hemolysis detected by analyzer. Results may be affected.        Alkaline Phosphatase 86 U/L      Total Bilirubin 0.2 mg/dL      eGFR Non African Amer 52 mL/min/1.73      Globulin 3.1 gm/dL      A/G Ratio 1.1 g/dL      BUN/Creatinine Ratio 22.3     Anion Gap 10.0 mmol/L     Narrative:      GFR Normal >60  Chronic Kidney Disease <60  Kidney Failure <15      T4, Free [[id number]]  (Normal) Collected: 12/06/21 1256    Specimen: Blood Updated: 12/06/21 1335     Free T4 1.09 ng/dL     Narrative:      Results may be falsely increased if patient taking Biotin.      Procalcitonin [[id number]]  (Normal) Collected: 12/06/21 1256    Specimen: Blood Updated: 12/06/21 1334     Procalcitonin 0.03 ng/mL     Narrative:      As a Marker for Sepsis (Non-Neonates):     1. <0.5 ng/mL represents a low risk of severe sepsis and/or septic shock.  2. >2 ng/mL represents a high risk of severe sepsis and/or septic shock.    As a Marker for Lower Respiratory Tract Infections that require antibiotic therapy:  PCT on Admission     Antibiotic Therapy             6-12 Hrs later  >0.5                          Strongly Recommended            >0.25 - <0.5             Recommended  0.1 - 0.25                  Discouraged                       Remeasure/reassess PCT  <0.1                         Strongly Discouraged         Remeasure/reassess PCT      As 28 day mortality risk marker: \"Change in Procalcitonin Result\" (>80% or <=80%) if Day 0 (or Day 1) and Day 4 values are available. Refer to http://www.[employer]-pct-calculator.com/    Change in PCT <=80 %   A decrease of PCT levels below or equal to 80% defines a positive change in PCT test result representing a higher risk for 28-day all-cause mortality of patients diagnosed with severe sepsis or septic shock.    Change in PCT >80 %   A decrease of PCT levels of more than 80% defines a negative change in PCT result representing a lower risk for 28-day all-cause mortality of patients " diagnosed with severe sepsis or septic shock.                C-reactive Protein [[id number]]  (Abnormal) Collected: 12/06/21 1256    Specimen: Blood Updated: 12/06/21 1334     C-Reactive Protein 4.08 mg/dL     Troponin [[id number]]  (Normal) Collected: 12/06/21 1256    Specimen: Blood Updated: 12/06/21 1331     Troponin T <0.010 ng/mL     Narrative:      Troponin T Reference Range:  <= 0.03 ng/mL-   Negative for AMI  >0.03 ng/mL-     Abnormal for myocardial necrosis.  Clinicians would have to utilize clinical acumen, EKG, Troponin and serial changes to determine if it is an Acute Myocardial Infarction or myocardial injury due to an underlying chronic condition.       Results may be falsely decreased if patient taking Biotin.      BNP [[id number]]  (Normal) Collected: 12/06/21 1256    Specimen: Blood Updated: 12/06/21 1330     proBNP 1,235.0 pg/mL     Narrative:      Among patients with dyspnea, NT-proBNP is highly sensitive for the detection of acute congestive heart failure. In addition NT-proBNP of <300 pg/ml effectively rules out acute congestive heart failure with 99% negative predictive value.    Results may be falsely decreased if patient taking Biotin.      Lipase [[id number]]  (Normal) Collected: 12/06/21 1256    Specimen: Blood Updated: 12/06/21 1329     Lipase 16 U/L     Magnesium [[id number]]  (Normal) Collected: 12/06/21 1256    Specimen: Blood Updated: 12/06/21 1328     Magnesium 1.9 mg/dL     D-dimer, Quantitative [[id number]]  (Abnormal) Collected: 12/06/21 1256    Specimen: Blood Updated: 12/06/21 1318     D-Dimer, Quantitative 1.01 mg/L (FEU)     Narrative:      Reference Range is 0-0.50 mg/L FEU. However, results <0.50 mg/L FEU tends to rule out DVT or PE. Results >0.50 mg/L FEU are not useful in predicting absence or presence of DVT or PE.      Protime-INR [[id number]]  (Normal) Collected: 12/06/21 1256    Specimen: Blood Updated: 12/06/21 1318     Protime 12.6 Seconds      INR 0.98    aPTT [[id number]]   (Abnormal) Collected: 12/06/21 1256    Specimen: Blood Updated: 12/06/21 1318     PTT 39.3 seconds     Blood Culture - Blood, Arm, Right [[id number]] Collected: 12/06/21 1250    Specimen: Blood from Arm, Right Updated: 12/06/21 1316    Blood Culture - Blood, Arm, Right [[id number]] Collected: 12/06/21 1256    Specimen: Blood from Arm, Right Updated: 12/06/21 1316    CBC & Differential [[id number]]  (Abnormal) Collected: 12/06/21 1256    Specimen: Blood Updated: 12/06/21 1308    Narrative:      The following orders were created for panel order CBC & Differential.  Procedure                               Abnormality         Status                     ---------                               -----------         ------                     CBC Auto Differential[[id number]]        Abnormal            Final result                 Please view results for these tests on the individual orders.    CBC Auto Differential [[id number]]  (Abnormal) Collected: 12/06/21 1256    Specimen: Blood Updated: 12/06/21 1308     WBC 8.17 10*3/mm3      RBC 4.41 10*6/mm3      Hemoglobin 13.4 g/dL      Hematocrit 43.0 %      MCV 97.5 fL      MCH 30.4 pg      MCHC 31.2 g/dL      RDW 14.0 %      RDW-SD 50.3 fl      MPV 11.2 fL      Platelets 240 10*3/mm3      Neutrophil % 66.2 %      Lymphocyte % 23.1 %      Monocyte % 9.2 %      Eosinophil % 0.9 %      Basophil % 0.2 %      Immature Grans % 0.4 %      Neutrophils, Absolute 5.41 10*3/mm3      Lymphocytes, Absolute 1.89 10*3/mm3      Monocytes, Absolute 0.75 10*3/mm3      Eosinophils, Absolute 0.07 10*3/mm3      Basophils, Absolute 0.02 10*3/mm3      Immature Grans, Absolute 0.03 10*3/mm3      nRBC 0.0 /100 WBC     Blood Gas, Arterial - [[id number]]  (Abnormal) Collected: 12/06/21 1254    Specimen: Arterial Blood Updated: 12/06/21 1250     Site Right Radial     Darren's Test N/A     pH, Arterial 7.384 pH units      pCO2, Arterial 44.6 mm Hg      pO2, Arterial 46.7 mm Hg      Comment: 85 Value below  critical limit        HCO3, Arterial 26.6 mmol/L      Comment: 83 Value above reference range        Base Excess, Arterial 1.1 mmol/L      O2 Saturation, Arterial 81.8 %      Comment: 84 Value below reference range        Temperature 37.0 C      Barometric Pressure for Blood Gas 755 mmHg      Modality Room Air     Ventilator Mode NA     Notified Who DR GARCIA     Notified By [id number]     Notified Time 12/06/2021 12:59     Collected by [id number]     Comment: Meter: [id number]     :  [id number]        pCO2, Temperature Corrected 44.6 mm Hg      pH, Temp Corrected 7.384 pH Units      pO2, Temperature Corrected 46.7 mm Hg            -----------------------------------------------------------------  EKG:   Radiology:     XR Chest 1 View    Result Date: 12/6/2021  EXAM: XR CHEST 1 VW- 12/6/2021 1:06 PM CST  HISTORY: soa   COMPARISON: None.  TECHNIQUE: Frontal radiograph of the chest  FINDINGS: Bilateral groundglass infiltrates are present. Most likely this is interstitial pneumonia.. Cardiac silhouettes mildly enlarged. Vascular calcifications present arch..  The osseous structures and surrounding soft tissues demonstrate no acute abnormality.       1. Bilateral groundglass infiltrates most likely representing interstitial pneumonia.   This report was finalized on 12/06/2021 13:09 by Dr. Delfino Becker MD.    CT Angiogram Chest    Result Date: 12/6/2021  EXAMINATION: CT ANGIOGRAM CHEST-   12/6/2021 2:05 PM CST  HISTORY: PE suspected, low/intermediate prob, positive D-dimer. Generalized weakness and diarrhea. Low O2 saturation. Bradycardia.  In order to have a CT radiation dose as low as reasonably achievable Automated Exposure Control was utilized for adjustment of the mA and/or KV according to patient size.  DLP in mGycm= 286.  CT angiogram chest. CT angiography protocol. CT imaging with bolus IV contrast injection. Under concurrent supervision axial, sagittal, coronal, and three-dimensional data sets were  constructed.  Mild cardiomegaly.  Thoracic aortic calcification with no aneurysm or dissection. Coronary artery calcification is present.  Symmetric normally opacified pulmonary arteries. No pulmonary embolism.  Severe chronic lung changes. Emphysema. Patchy parenchymal opacity within the base of the right upper lobe and dependently within the right lower lobe compatible with superimposed pneumonia. A small focus of peripheral infiltrate is also present within the right middle lobe.  No pneumothorax or pleural effusion.  Summary: 1. No pulmonary embolism. 2. Chronic lung changes. 3. Patchy right-sided infiltrate compatible with pneumonia.            This report was finalized on 12/06/2021 14:34 by Dr. Kunal Miller MD.      Assessment and Plan   1.       Pneumonia due to infectious organism    Chronic back pain    Essential hypertension    Pneumonia: Appears to be community-acquired pneumonia.  Given her allergies she was started on azithromycin and Rocephin in the emergency department.  I will continue these at this time.  She will be on ipratropium nebulizer treatments as well.  RT consulted.    Debility: Seems to be worse from her baseline.  I will have her evaluated by PT/OT.    Hypertension: Continue home medication.    CODE STATUS: Full.    Disposition: Inpatient for increased oxygen requirement, anticipate discharge by 12/10/2021.    Anupam Ledezma MD 12/7/2021 08:34 CST

## 2021-12-07 NOTE — PROGRESS NOTES
SW received consult that says acute decline in self care. With this being the case, JULIAN would recommend PT/OT evaluations to determine what patient will need at discharge.

## 2021-12-07 NOTE — PLAN OF CARE
Goal Outcome Evaluation:  Plan of Care Reviewed With: patient, daughter        Progress: no change  Outcome Summary: PT eval completed. Pt alert and oriented x4 with c/o fatigue, on 2.5L O2 via NC with sats in 90s. Pt performs bed mob with CGA-min A and demonstrates generalized weakness and dec endurance. Pt performs sit to stand t/f and gait training with CGA and RW; able to ambulate 120 feet and demonstrated dec gait speed and forward flexed posture. Pt needed occasional cues for AD placement and safety. Pt left sitting up in recliner with dtr present at end of session and encouraged to perform OOB activity often. Pt will benefit from skilled PT to improve fxl mob, endurance and safety. Recommend d/c home with assist and HH.

## 2021-12-07 NOTE — PROGRESS NOTES
Discharge Planning Assessment   Babak     Patient Name: Pattie Liu  MRN: [id number]  Today's Date: 12/7/2021    Admit Date: 12/6/2021     Discharge Needs Assessment     Row Name 12/07/21 1544       Living Environment    Lives With alone    Current Living Arrangements home/apartment/condo    Primary Care Provided by self    Provides Primary Care For no one    Family Caregiver if Needed child(sheri), adult    Quality of Family Relationships helpful; involved; supportive    Able to Return to Prior Arrangements yes       Resource/Environmental Concerns    Resource/Environmental Concerns none    Transportation Concerns car, none       Transition Planning    Patient/Family Anticipates Transition to home       Discharge Needs Assessment    Readmission Within the Last 30 Days no previous admission in last 30 days    Equipment Currently Used at Home none    Concerns to be Addressed adjustment to diagnosis/illness    Equipment Needed After Discharge oxygen    Discharge Coordination/Progress Patient lives alone but says she has family that is able to help her if needed at discharge. Patient has Rx coverage and PCP. Patient may need oxygen at time of discharge, SW will arrange if needed.              LIONEL PatelW

## 2021-12-07 NOTE — ACP (ADVANCE CARE PLANNING)
"Date of [facility] ACP interview: 12/7/2021  Location of interview: Patient's room  [facility] ACP Facilitator: Pilar Garcia RN  Referral Source: consult  Present for facilitation: Patient and Healthcare Agent - daughter Go    SUMMARY OF ADVANCE CARE PLANNING DISCUSSION:  Pattie visited for consult for [facility] facilitation. We reviewed purpose and goals for advance care planning.    I reviewed with Pattie qualities to consider when choosing a healthcare agent. Pattie had selected Go as her healthcare agent. I encouraged Pattie to discuss her preferences for future care with healthcare agents and others close to her. She assures me they have had detailed discussions.      Pattie identified the following as most important to living well: She does not wish to live as a \"vegetable\"    Pattie describes cultural, Hinduism, spiritual or personal practices/beliefs that are important to her or might help her choose the care wanted, or not wanted: She is a believer and does not fear death.    Goals of medical care for severe, permanent brain injury were explored: Yes     Education materials were provided on: Advance Care Planning    Each section of the advance care/living will document was reviewed and discussed.    Advance care/living will document finished during this facilitation? yes    Time spent on preparation, facilitation and documentation was 31-60 minutes.    RECOMMENDATIONS/FOLLOW-UP:  Completed, faxed to HIM, copies provided for surrogates and PCP.    CONSULT/NOTE ROUTED  yes    Pilar Garcia RN  "

## 2021-12-07 NOTE — THERAPY EVALUATION
Patient Name: Pattie Liu  : 1945    MRN: [id number]                              Today's Date: 2021       Admit Date: 2021    Visit Dx:     ICD-10-CM ICD-9-CM   1. Pneumonia due to infectious organism, unspecified laterality, unspecified part of lung  J18.9 486   2. Hypoxic  R09.02 799.02   3. Decreased activities of daily living (ADL)  Z78.9 V49.89   4. Impaired mobility  Z74.09 799.89     Patient Active Problem List   Diagnosis   • Frequent PVCs   • Shortness of breath   • SOB (shortness of breath)   • CAD in native artery   • PVD (peripheral vascular disease) ([facility])   • Pneumonia due to infectious organism   • Chronic back pain   • Essential hypertension     Past Medical History:   Diagnosis Date   • CAD in native artery 2019   • COPD (chronic obstructive pulmonary disease) ([facility])    • Essential hypertension 2021   • GERD (gastroesophageal reflux disease)    • Lung nodule      Past Surgical History:   Procedure Laterality Date   • BREAST IMPLANT REMOVAL     • BREAST TISSUE EXPANDER REMOVAL INSERTION OF IMPLANT     • CARDIAC CATHETERIZATION N/A 2019    Procedure: Left Heart Cath;  Surgeon: Edi Armijo MD;  Location:  [facility];  Service: Cardiology   • CHOLECYSTECTOMY     • HYSTERECTOMY     • MASTECTOMY      BILATERAL   • OOPHORECTOMY        General Information     Row Name 21 1303          Physical Therapy Time and Intention    Document Type evaluation  presents with generalized weakness, SOA and diarrhea; dx CAP  -SB     Mode of Treatment physical therapy  -SB     Row Name 21 1303          General Information    Patient Profile Reviewed yes  -SB     Prior Level of Function independent:; all household mobility; ADL's; yard work  -SB     Existing Precautions/Restrictions fall; oxygen therapy device and L/min  step over tub, 2.5L  -SB     Barriers to Rehab medically complex  -SB     Row Name 21 1303          Living Environment     Lives With alone  -SB     Row Name 12/07/21 1303          Home Main Entrance    Number of Stairs, Main Entrance four  -SB     Stair Railings, Main Entrance railing on left side (ascending)  -SB     Row Name 12/07/21 1303          Stairs Within Home, Primary    Number of Stairs, Within Home, Primary one  -SB     Stair Railings, Within Home, Primary none  -SB     Row Name 12/07/21 1303          Cognition    Orientation Status (Cognition) oriented x 4  -SB     Row Name 12/07/21 1303          Safety Issues, Functional Mobility    Safety Issues Affecting Function (Mobility) impulsivity; friction/shear risk  -SB     Impairments Affecting Function (Mobility) endurance/activity tolerance; balance  -SB           User Key  (r) = Recorded By, (t) = Taken By, (c) = Cosigned By    Initials Name Provider Type    Marj Becerril PT DPT Physical Therapist               Mobility     Row Name 12/07/21 1303          Bed Mobility    Bed Mobility supine-sit  -SB     Supine-Sit [place] (Bed Mobility) minimum assist (75% patient effort); verbal cues  -SB     Assistive Device (Bed Mobility) head of bed elevated; bed rails  -SB     Comment (Bed Mobility) left sitting up in chair at end of session  -SB     Row Name 12/07/21 1303          Sit-Stand Transfer    Sit-Stand [place] (Transfers) contact guard  -SB     Row Name 12/07/21 1303          Gait/Stairs (Locomotion)    [place] Level (Gait) contact guard; verbal cues  -SB     Assistive Device (Gait) walker, front-wheeled  -SB     Distance in Feet (Gait) 120  -SB     Deviations/Abnormal Patterns (Gait) gait speed decreased  -SB     Bilateral Gait Deviations forward flexed posture  -SB           User Key  (r) = Recorded By, (t) = Taken By, (c) = Cosigned By    Initials Name Provider Type    Marj Becerril, PT DPT Physical Therapist               Obj/Interventions     Row Name 12/07/21 1303          Range of Motion Comprehensive    General Range of Motion bilateral lower  extremity ROM WFL  -SB     Row Name 12/07/21 1303          Strength Comprehensive (MMT)    General Manual Muscle Testing (MMT) Assessment lower extremity strength deficits identified  -SB     Comment, General Manual Muscle Testing (MMT) Assessment BLE 4/5  -SB     Row Name 12/07/21 1303          Balance    Balance Assessment sitting static balance; sitting dynamic balance; standing static balance; standing dynamic balance  -SB     Static Sitting Balance WFL; unsupported; sitting, edge of bed  -SB     Dynamic Sitting Balance sitting, edge of bed; unsupported; WFL  -SB     Static Standing Balance mild impairment; supported; standing  -SB     Dynamic Standing Balance standing; supported; mild impairment  -SB     Row Name 12/07/21 1303          Sensory Assessment (Somatosensory)    Sensory Assessment (Somatosensory) LE sensation intact  -SB           User Key  (r) = Recorded By, (t) = Taken By, (c) = Cosigned By    Initials Name Provider Type    Marj Becerril, PT DPT Physical Therapist               Goals/Plan     Row Name 12/07/21 1303          Bed Mobility Goal 1 (PT)    Activity/Assistive Device (Bed Mobility Goal 1, PT) sit to supine; supine to sit; rolling to left; rolling to right  -SB     [place] Level/Cues Needed (Bed Mobility Goal 1, PT) independent  -SB     Time Frame (Bed Mobility Goal 1, PT) long term goal (LTG)  -SB     Progress/Outcomes (Bed Mobility Goal 1, PT) goal ongoing  -SB     Row Name 12/07/21 1303          Transfer Goal 1 (PT)    Activity/Assistive Device (Transfer Goal 1, PT) sit-to-stand/stand-to-sit; bed-to-chair/chair-to-bed  -SB     [place] Level/Cues Needed (Transfer Goal 1, PT) independent  -SB     Time Frame (Transfer Goal 1, PT) long term goal (LTG)  -SB     Progress/Outcome (Transfer Goal 1, PT) goal ongoing  -SB     Row Name 12/07/21 1303          Gait Training Goal 1 (PT)    Activity/Assistive Device (Gait Training Goal 1, PT) gait (walking locomotion); increase  endurance/gait distance; increase energy conservation; improve balance and speed; other (see comments)  LRAD  -SB     [place] Level (Gait Training Goal 1, PT) standby assist  -SB     Distance (Gait Training Goal 1, PT) 250  -SB     Time Frame (Gait Training Goal 1, PT) long term goal (LTG)  -SB     Progress/Outcome (Gait Training Goal 1, PT) goal ongoing  -SB     Row Name 12/07/21 1303          Stairs Goal 1 (PT)    Activity/Assistive Device (Stairs Goal 1, PT) stairs, all skills; ascending stairs; descending stairs; using handrail, left  -SB     [place] Level/Cues Needed (Stairs Goal 1, PT) contact guard assist  -SB     Number of Stairs (Stairs Goal 1, PT) 4  -SB     Time Frame (Stairs Goal 1, PT) long term goal (LTG)  -SB     Progress/Outcome (Stairs Goal 1, PT) goal ongoing  -SB           User Key  (r) = Recorded By, (t) = Taken By, (c) = Cosigned By    Initials Name Provider Type    Marj Becerril, PT DPT Physical Therapist               Clinical Impression     Row Name 12/07/21 1303          Pain    Additional Documentation Pain Scale: Numbers Pre/Post-Treatment (Group)  -SB     Row Name 12/07/21 1303          Pain Scale: Numbers Pre/Post-Treatment    Pretreatment Pain Rating 0/10 - no pain  -SB     Pain Intervention(s) Medication (See MAR); Repositioned; Ambulation/increased activity  -SB     Row Name 12/07/21 1306          Plan of Care Review    Plan of Care Reviewed With patient; daughter  -SB     Progress no change  -SB     Outcome Summary PT eval completed. Pt alert and oriented x4 with c/o fatigue, on 2.5L O2 via NC with sats in 90s. Pt performs bed mob with CGA-min A and demonstrates generalized weakness and dec endurance. Pt performs sit to stand t/f and gait training with CGA and RW; able to ambulate 120 feet and demonstrated dec gait speed and forward flexed posture. Pt needed occasional cues for AD placement and safety. Pt left sitting up in recliner with dtr present at end of session  and encouraged to perform OOB activity often. Pt will benefit from skilled PT to improve fxl mob, endurance and safety. Recommend d/c home with assist and HH.  -SB     Row Name 12/07/21 1303          Therapy Assessment/Plan (PT)    Patient/Family Therapy Goals Statement (PT) improve function  -SB     Rehab Potential (PT) good, to achieve stated therapy goals  -SB     Criteria for Skilled Interventions Met (PT) yes; meets criteria; skilled treatment is necessary  -SB     Predicted Duration of Therapy Intervention (PT) until d/c or goals achieved  -SB     Row Name 12/07/21 1303          Vital Signs    Pretreatment Heart Rate (beats/min) 59  -SB     Pre SpO2 (%) 96  -SB     O2 Delivery Pre Treatment nasal cannula  2.5L  -SB     Pre Patient Position Supine  -SB     Intra Patient Position Standing  -SB     Post Patient Position Sitting  -SB     Row Name 12/07/21 1303          Positioning and Restraints    Pre-Treatment Position in bed  -SB     Post Treatment Position chair  -SB     In Chair notified nsg; reclined; call light within reach; encouraged to call for assist; with family/caregiver  -SB           User Key  (r) = Recorded By, (t) = Taken By, (c) = Cosigned By    Initials Name Provider Type    Marj Becerril, PT DPT Physical Therapist               Outcome Measures     Row Name 12/07/21 1303          How much help from another person do you currently need...    Turning from your back to your side while in flat bed without using bedrails? 4  -SB     Moving from lying on back to sitting on the side of a flat bed without bedrails? 3  -SB     Moving to and from a bed to a chair (including a wheelchair)? 3  -SB     Standing up from a chair using your arms (e.g., wheelchair, bedside chair)? 3  -SB     Climbing 3-5 steps with a railing? 3  -SB     To walk in hospital room? 3  -SB     AM-PAC 6 Clicks Score (PT) 19  -SB     Row Name 12/07/21 1303 12/07/21 1301       Functional Assessment    Outcome Measure Options  AM-PAC 6 Clicks Basic Mobility (PT)  -SULEMAN AM-PAC 6 Clicks Daily Activity (OT)  -LUIS MIGUEL          User Key  (r) = Recorded By, (t) = Taken By, (c) = Cosigned By    Initials Name Provider Type    SB Marj Bowden, PT DPT Physical Therapist    Emilie Javed, OTR/L, CSRS Occupational Therapist                             Physical Therapy Education                 Title: PT OT SLP Therapies (In Progress)     Topic: Physical Therapy (In Progress)     Point: Mobility training (Done)     Learning Progress Summary           Patient Acceptance, E, VU by SB at 12/7/2021 1551    Comment: pt edu on POC, benefits of act and d/c plans                   Point: Home exercise program (Not Started)     Learner Progress:  Not documented in this visit.          Point: Body mechanics (Done)     Learning Progress Summary           Patient Acceptance, E, VU by SB at 12/7/2021 1551    Comment: pt edu on POC, benefits of act and d/c plans                   Point: Precautions (Done)     Learning Progress Summary           Patient Acceptance, E, VU by SB at 12/7/2021 1551    Comment: pt edu on POC, benefits of act and d/c plans                               User Key     Initials Effective Dates Name Provider Type Discipline     06/16/21 -  Marj Bowden, PT DPT Physical Therapist PT              PT Recommendation and Plan  Planned Therapy Interventions (PT): balance training, bed mobility training, gait training, home exercise program, patient/family education, stair training, strengthening, transfer training  Plan of Care Reviewed With: patient, daughter  Progress: no change  Outcome Summary: PT eval completed. Pt alert and oriented x4 with c/o fatigue, on 2.5L O2 via NC with sats in 90s. Pt performs bed mob with CGA-min A and demonstrates generalized weakness and dec endurance. Pt performs sit to stand t/f and gait training with CGA and RW; able to ambulate 120 feet and demonstrated dec gait speed and forward flexed posture. Pt needed  occasional cues for AD placement and safety. Pt left sitting up in recliner with dtr present at end of session and encouraged to perform OOB activity often. Pt will benefit from skilled PT to improve fxl mob, endurance and safety. Recommend d/c home with assist and HH.     Time Calculation:    PT Charges     Row Name 12/07/21 1551             Time Calculation    Start Time 1303  -SB      Stop Time 1342  -SB      Time Calculation (min) 39 min  -SB      PT Received On 12/07/21  -SB      PT Goal Re-Cert Due Date 12/17/21  -SB            User Key  (r) = Recorded By, (t) = Taken By, (c) = Cosigned By    Initials Name Provider Type    SB Marj Bowden, PT DPT Physical Therapist              Therapy Charges for Today     Code Description Service Date Service Provider Modifiers Qty    [id number] HC PT EVAL LOW COMPLEXITY 3 12/7/2021 Marj Bowden PT DPT GP 1          PT G-Codes  Outcome Measure Options: AM-PAC 6 Clicks Basic Mobility (PT)  AM-PAC 6 Clicks Score (PT): 19  AM-PAC 6 Clicks Score (OT): 21    Marj Bowden PT DPT  12/7/2021

## 2021-12-07 NOTE — THERAPY EVALUATION
Patient Name: Pattie Liu  : 1945    MRN: [id number]                              Today's Date: 2021       Admit Date: 2021    Visit Dx:     ICD-10-CM ICD-9-CM   1. Pneumonia due to infectious organism, unspecified laterality, unspecified part of lung  J18.9 486   2. Hypoxic  R09.02 799.02   3. Decreased activities of daily living (ADL)  Z78.9 V49.89     Patient Active Problem List   Diagnosis   • Frequent PVCs   • Shortness of breath   • SOB (shortness of breath)   • CAD in native artery   • PVD (peripheral vascular disease) ([facility])   • Pneumonia due to infectious organism   • Chronic back pain   • Essential hypertension     Past Medical History:   Diagnosis Date   • CAD in native artery 2019   • COPD (chronic obstructive pulmonary disease) ([facility])    • Essential hypertension 2021   • GERD (gastroesophageal reflux disease)    • Lung nodule      Past Surgical History:   Procedure Laterality Date   • BREAST IMPLANT REMOVAL     • BREAST TISSUE EXPANDER REMOVAL INSERTION OF IMPLANT     • CARDIAC CATHETERIZATION N/A 2019    Procedure: Left Heart Cath;  Surgeon: Edi Armijo MD;  Location:  [facility] CATH INVASIVE LOCATION;  Service: Cardiology   • CHOLECYSTECTOMY     • HYSTERECTOMY     • MASTECTOMY      BILATERAL   • OOPHORECTOMY        General Information     Row Name 21 1301          OT Time and Intention    Document Type evaluation  ED with SOA. Dx: CAP  -JJ     Mode of Treatment occupational therapy  -JJ     Row Name 21 1301          General Information    Patient Profile Reviewed yes  -JJ     Prior Level of Function independent:; all household mobility; transfer; bed mobility; ADL's; home management; cooking; cleaning; driving; yard work  -JJ     Existing Precautions/Restrictions fall; oxygen therapy device and L/min  2L O2/NC  -JJ     Barriers to Rehab medically complex  -JJ     Row Name 21 1301          Living Environment    Lives With alone  -JJ     Row  Name 12/07/21 1301          Home Main Entrance    Number of Stairs, Main Entrance four  -     Stair Railings, Main Entrance railing on left side (ascending)  -     Row Name 12/07/21 1301          Stairs Within Home, Primary    Number of Stairs, Within Home, Primary one  -     Stair Railings, Within Home, Primary none  -     Row Name 12/07/21 1301          Cognition    Orientation Status (Cognition) oriented x 4  -     Row Name 12/07/21 1301          Safety Issues, Functional Mobility    Impairments Affecting Function (Mobility) endurance/activity tolerance; balance  -           User Key  (r) = Recorded By, (t) = Taken By, (c) = Cosigned By    Initials Name Provider Type    Emilie Javed, VENITA/L, CSRS Occupational Therapist                 Mobility/ADL's     Row Name 12/07/21 1301          Bed Mobility    Bed Mobility supine-sit  -     Supine-Sit [place] (Bed Mobility) minimum assist (75% patient effort); verbal cues  -     Assistive Device (Bed Mobility) head of bed elevated; bed rails  -     Row Name 12/07/21 1301          Transfers    Transfers sit-stand transfer  -     Sit-Stand [place] (Transfers) contact guard  -     Row Name 12/07/21 1301          Functional Mobility    Functional Mobility- Ind. Level contact guard assist; 1 person  -     Functional Mobility- Device rollator  -     Functional Mobility- Safety Issues supplemental O2  -     Functional Mobility- Comment in hallway, to bathroom and back to chair.  -     Row Name 12/07/21 1301          Activities of Daily Living    BADL Assessment/Intervention toileting  -     Row Name 12/07/21 1301          Lower Body Dressing Assessment/Training    [place] Level (Lower Body Dressing) --  -     Position (Lower Body Dressing) --  -     Row Name 12/07/21 1301          Toileting Assessment/Training    [place] Level (Toileting) adjust/manage clothing; perform perineal hygiene; set up; supervision  -      Assistive Devices (Toileting) commode; grab bar/safety frame  -J     Position (Toileting) supported standing; unsupported sitting  -JJ           User Key  (r) = Recorded By, (t) = Taken By, (c) = Cosigned By    Initials Name Provider Type    Emilie Javed OTR/L, AMANUEL Occupational Therapist               Obj/Interventions     Row Name 12/07/21 1301          Sensory Assessment (Somatosensory)    Sensory Assessment (Somatosensory) UE sensation intact  -     Row Name 12/07/21 1301          Vision Assessment/Intervention    Visual Impairment/Limitations WFL  -     Row Name 12/07/21 1301          Range of Motion Comprehensive    General Range of Motion bilateral upper extremity ROM WFL  -     Row Name 12/07/21 1301          Strength Comprehensive (MMT)    Comment, General Manual Muscle Testing (MMT) Assessment B UE strength 4/5  -     Row Name 12/07/21 1301          Balance    Balance Assessment sitting static balance; standing static balance  -J     Static Sitting Balance WFL; unsupported; sitting, edge of bed  -JJ     Static Standing Balance mild impairment; supported; standing  -JJ           User Key  (r) = Recorded By, (t) = Taken By, (c) = Cosigned By    Initials Name Provider Type    Emilie Javed OTR/L, AMANUEL Occupational Therapist               Goals/Plan     Row Name 12/07/21 1301          Bathing Goal 1 (OT)    Activity/Device (Bathing Goal 1, OT) bathing skills, all  -JJ     [place] Level/Cues Needed (Bathing Goal 1, OT) modified independence  -JJ     Time Frame (Bathing Goal 1, OT) long term goal (LTG); by discharge  -JJ     Progress/Outcomes (Bathing Goal 1, OT) goal ongoing  -J     Row Name 12/07/21 1301          Dressing Goal 1 (OT)    Activity/Device (Dressing Goal 1, OT) dressing skills, all  -JJ     [place]/Cues Needed (Dressing Goal 1, OT) independent  -JJ     Time Frame (Dressing Goal 1, OT) long term goal (LTG); by discharge  -JJ     Progress/Outcome (Dressing  Goal 1, OT) goal ongoing  -     Row Name 12/07/21 1301          Therapy Assessment/Plan (OT)    Planned Therapy Interventions (OT) activity tolerance training; BADL retraining; functional balance retraining; occupation/activity based interventions; patient/caregiver education/training; strengthening exercise; transfer/mobility retraining  -           User Key  (r) = Recorded By, (t) = Taken By, (c) = Cosigned By    Initials Name Provider Type    Emilie Javed, OTR/L, CSRS Occupational Therapist               Clinical Impression     Row Name 12/07/21 1301          Pain Assessment    Additional Documentation Pain Scale: Numbers Pre/Post-Treatment (Group)  -     Row Name 12/07/21 1301          Pain Scale: Numbers Pre/Post-Treatment    Pretreatment Pain Rating 0/10 - no pain  -     Row Name 12/07/21 1301          Plan of Care Review    Plan of Care Reviewed With patient; daughter  -     Outcome Summary OT eval completed. Pt is alert and oriented x4, with c/o fatigue. She reports at baseline being completely independent with all adls, t/fs and mobility. Today, she demonstrates decreased balance, activity tolerance and increased SOA with activity. She was able to bring self to sitting at EOB with CGA/Min A. CGA for sit <> stand t/f from EOB/commode and all functional mobility in hallways with rwx and supplemental O2. She would benefit from skilled OT services to address these deficits and assist with return to PLOF. Recommend d/c home with assist and HH.  -     Row Name 12/07/21 1301          Therapy Assessment/Plan (OT)    Patient/Family Therapy Goal Statement (OT) return home and to PLOF.  -     Rehab Potential (OT) good, to achieve stated therapy goals  -     Criteria for Skilled Therapeutic Interventions Met (OT) yes; skilled treatment is necessary  -     Therapy Frequency (OT) 5 times/wk  -J     Predicted Duration of Therapy Intervention (OT) 10 days  -     Row Name 12/07/21 1301           Therapy Plan Review/Discharge Plan (OT)    Anticipated Discharge Disposition (OT) home with assist; home with home health  -     Row Name 12/07/21 1301          Vital Signs    Pre Patient Position Supine  -JJ     Intra Patient Position Standing  -JJ     Post Patient Position Sitting  -J     Row Name 12/07/21 1301          Positioning and Restraints    Pre-Treatment Position in bed  -JJ     Post Treatment Position chair  -JJ     In Chair notified nsg; reclined; call light within reach; encouraged to call for assist; with family/caregiver  -           User Key  (r) = Recorded By, (t) = Taken By, (c) = Cosigned By    Initials Name Provider Type    Emilie Javed OTR/L, AMANUEL Occupational Therapist               Outcome Measures     Row Name 12/07/21 1301          How much help from another is currently needed...    Putting on and taking off regular lower body clothing? 3  -JJ     Bathing (including washing, rinsing, and drying) 3  -JJ     Toileting (which includes using toilet bed pan or urinal) 3  -JJ     Putting on and taking off regular upper body clothing 4  -JJ     Taking care of personal grooming (such as brushing teeth) 4  -JJ     Eating meals 4  -JJ     AM-PAC 6 Clicks Score (OT) 21  -     Row Name 12/07/21 1303 12/07/21 1301       Functional Assessment    Outcome Measure Options AM-PAC 6 Clicks Basic Mobility (PT)  -SB AM-PAC 6 Clicks Daily Activity (OT)  -          User Key  (r) = Recorded By, (t) = Taken By, (c) = Cosigned By    Initials Name Provider Type    Marj Becerril, PT DPT Physical Therapist    Emilie Javed OTR/L, CSRS Occupational Therapist                Occupational Therapy Education                 Title: PT OT SLP Therapies (In Progress)     Topic: Occupational Therapy (In Progress)     Point: ADL training (Done)     Description:   Instruct learner(s) on proper safety adaptation and remediation techniques during self care or transfers.   Instruct in proper use of  assistive devices.              Learning Progress Summary           Patient Acceptance, E, VU by LUIS MIGUEL at 12/7/2021 1437                   Point: Home exercise program (Not Started)     Description:   Instruct learner(s) on appropriate technique for monitoring, assisting and/or progressing therapeutic exercises/activities.              Learner Progress:  Not documented in this visit.          Point: Precautions (Done)     Description:   Instruct learner(s) on prescribed precautions during self-care and functional transfers.              Learning Progress Summary           Patient Acceptance, E, VU by LUIS MIGUEL at 12/7/2021 1437                   Point: Body mechanics (Not Started)     Description:   Instruct learner(s) on proper positioning and spine alignment during self-care, functional mobility activities and/or exercises.              Learner Progress:  Not documented in this visit.                      User Key     Initials Effective Dates Name Provider Type Discipline    LUIS MIGUEL 11/10/21 -  Emilie Hinds, VENITA/L, JUANITAS Occupational Therapist OT              OT Recommendation and Plan  Planned Therapy Interventions (OT): activity tolerance training, BADL retraining, functional balance retraining, occupation/activity based interventions, patient/caregiver education/training, strengthening exercise, transfer/mobility retraining  Therapy Frequency (OT): 5 times/wk  Plan of Care Review  Plan of Care Reviewed With: patient, daughter  Outcome Summary: OT eval completed. Pt is alert and oriented x4, with c/o fatigue. She reports at baseline being completely independent with all adls, t/fs and mobility. Today, she demonstrates decreased balance, activity tolerance and increased SOA with activity. She was able to bring self to sitting at EOB with CGA/Min A. CGA for sit <> stand t/f from EOB/commode and all functional mobility in hallways with rwx and supplemental O2. She would benefit from skilled OT services to address these  deficits and assist with return to PLOF. Recommend d/c home with assist and HH.     Time Calculation:    Time Calculation- OT     Row Name 12/07/21 1437             Time Calculation- OT    OT Start Time 1301  add 10 minutes for chart review  -      OT Stop Time 1345  -      OT Time Calculation (min) 44 min  -      OT Received On 12/07/21  -      OT Goal Re-Cert Due Date 12/17/21  -            User Key  (r) = Recorded By, (t) = Taken By, (c) = Cosigned By    Initials Name Provider Type    Emilie Javed OTR/L, CSRS Occupational Therapist              Therapy Charges for Today     Code Description Service Date Service Provider Modifiers Qty    62214021971 HC OT EVAL LOW COMPLEXITY 4 12/7/2021 Emilie Hinds OTR/L, CSRS GO 1               Emilie Hinds, OTR/L, CSRS  12/7/2021

## 2021-12-08 LAB
ALBUMIN SERPL-MCNC: 3.1 G/DL (ref 3.5–5.2)
ALBUMIN/GLOB SERPL: 1 G/DL
ALP SERPL-CCNC: 77 U/L (ref 39–117)
ALT SERPL W P-5'-P-CCNC: 14 U/L (ref 1–33)
ANION GAP SERPL CALCULATED.3IONS-SCNC: 8 MMOL/L (ref 5–15)
AST SERPL-CCNC: 28 U/L (ref 1–32)
BILIRUB SERPL-MCNC: <0.2 MG/DL (ref 0–1.2)
BUN SERPL-MCNC: 12 MG/DL (ref 8–23)
BUN/CREAT SERPL: 17.4 (ref 7–25)
CALCIUM SPEC-SCNC: 9.6 MG/DL (ref 8.6–10.5)
CHLORIDE SERPL-SCNC: 106 MMOL/L (ref 98–107)
CO2 SERPL-SCNC: 28 MMOL/L (ref 22–29)
CREAT SERPL-MCNC: 0.69 MG/DL (ref 0.57–1)
DEPRECATED RDW RBC AUTO: 48.5 FL (ref 37–54)
ERYTHROCYTE [DISTWIDTH] IN BLOOD BY AUTOMATED COUNT: 13.6 % (ref 12.3–15.4)
GFR SERPL CREATININE-BSD FRML MDRD: 82 ML/MIN/1.73
GLOBULIN UR ELPH-MCNC: 3.2 GM/DL
GLUCOSE SERPL-MCNC: 104 MG/DL (ref 65–99)
HCT VFR BLD AUTO: 41.3 % (ref 34–46.6)
HGB BLD-MCNC: 12.8 G/DL (ref 12–15.9)
MCH RBC QN AUTO: 29.9 PG (ref 26.6–33)
MCHC RBC AUTO-ENTMCNC: 31 G/DL (ref 31.5–35.7)
MCV RBC AUTO: 96.5 FL (ref 79–97)
PLATELET # BLD AUTO: 269 10*3/MM3 (ref 140–450)
PMV BLD AUTO: 10.9 FL (ref 6–12)
POTASSIUM SERPL-SCNC: 4.5 MMOL/L (ref 3.5–5.2)
PROT SERPL-MCNC: 6.3 G/DL (ref 6–8.5)
RBC # BLD AUTO: 4.28 10*6/MM3 (ref 3.77–5.28)
SODIUM SERPL-SCNC: 142 MMOL/L (ref 136–145)
WBC NRBC COR # BLD: 9.44 10*3/MM3 (ref 3.4–10.8)

## 2021-12-08 PROCEDURE — 25010000002 ONDANSETRON PER 1 MG: Performed by: FAMILY MEDICINE

## 2021-12-08 PROCEDURE — 97110 THERAPEUTIC EXERCISES: CPT

## 2021-12-08 PROCEDURE — 94799 UNLISTED PULMONARY SVC/PX: CPT

## 2021-12-08 PROCEDURE — 85027 COMPLETE CBC AUTOMATED: CPT | Performed by: FAMILY MEDICINE

## 2021-12-08 PROCEDURE — 25010000002 AZITHROMYCIN PER 500 MG: Performed by: FAMILY MEDICINE

## 2021-12-08 PROCEDURE — 97116 GAIT TRAINING THERAPY: CPT

## 2021-12-08 PROCEDURE — 97535 SELF CARE MNGMENT TRAINING: CPT

## 2021-12-08 PROCEDURE — 80053 COMPREHEN METABOLIC PANEL: CPT | Performed by: FAMILY MEDICINE

## 2021-12-08 PROCEDURE — 25010000002 ENOXAPARIN PER 10 MG: Performed by: FAMILY MEDICINE

## 2021-12-08 PROCEDURE — 25010000002 CEFTRIAXONE PER 250 MG: Performed by: FAMILY MEDICINE

## 2021-12-08 RX ADMIN — IPRATROPIUM BROMIDE 0.5 MG: 0.5 SOLUTION RESPIRATORY (INHALATION) at 15:15

## 2021-12-08 RX ADMIN — NYSTATIN: 100000 POWDER TOPICAL at 08:41

## 2021-12-08 RX ADMIN — AZITHROMYCIN DIHYDRATE 500 MG: 500 INJECTION, POWDER, LYOPHILIZED, FOR SOLUTION INTRAVENOUS at 00:29

## 2021-12-08 RX ADMIN — ATORVASTATIN CALCIUM 20 MG: 10 TABLET, FILM COATED ORAL at 08:40

## 2021-12-08 RX ADMIN — ASPIRIN 81 MG: 81 TABLET, COATED ORAL at 08:40

## 2021-12-08 RX ADMIN — GABAPENTIN 900 MG: 300 CAPSULE ORAL at 21:19

## 2021-12-08 RX ADMIN — AZITHROMYCIN DIHYDRATE 500 MG: 500 INJECTION, POWDER, LYOPHILIZED, FOR SOLUTION INTRAVENOUS at 23:36

## 2021-12-08 RX ADMIN — DOCUSATE SODIUM 50 MG AND SENNOSIDES 8.6 MG 2 TABLET: 8.6; 5 TABLET, FILM COATED ORAL at 08:40

## 2021-12-08 RX ADMIN — IPRATROPIUM BROMIDE 0.5 MG: 0.5 SOLUTION RESPIRATORY (INHALATION) at 10:57

## 2021-12-08 RX ADMIN — SODIUM CHLORIDE, PRESERVATIVE FREE 10 ML: 5 INJECTION INTRAVENOUS at 23:37

## 2021-12-08 RX ADMIN — GABAPENTIN 900 MG: 300 CAPSULE ORAL at 08:46

## 2021-12-08 RX ADMIN — SODIUM CHLORIDE, PRESERVATIVE FREE 10 ML: 5 INJECTION INTRAVENOUS at 08:41

## 2021-12-08 RX ADMIN — ENOXAPARIN SODIUM 40 MG: 40 INJECTION SUBCUTANEOUS at 21:19

## 2021-12-08 RX ADMIN — ONDANSETRON 4 MG: 2 INJECTION INTRAMUSCULAR; INTRAVENOUS at 17:27

## 2021-12-08 RX ADMIN — IPRATROPIUM BROMIDE 0.5 MG: 0.5 SOLUTION RESPIRATORY (INHALATION) at 19:37

## 2021-12-08 RX ADMIN — LISINOPRIL 20 MG: 20 TABLET ORAL at 08:40

## 2021-12-08 RX ADMIN — GABAPENTIN 600 MG: 300 CAPSULE ORAL at 11:44

## 2021-12-08 RX ADMIN — ALPRAZOLAM 0.5 MG: 0.5 TABLET ORAL at 08:46

## 2021-12-08 RX ADMIN — NYSTATIN: 100000 POWDER TOPICAL at 21:20

## 2021-12-08 RX ADMIN — SODIUM CHLORIDE 1 G: 900 INJECTION INTRAVENOUS at 15:54

## 2021-12-08 NOTE — THERAPY TREATMENT NOTE
Acute Care - Occupational Therapy Treatment Note  [facility]     Patient Name: Pattie Liu  : 1945  MRN: [id number]  Today's Date: 2021             Admit Date: 2021       ICD-10-CM ICD-9-CM   1. Pneumonia due to infectious organism, unspecified laterality, unspecified part of lung  J18.9 486   2. Hypoxic  R09.02 799.02   3. Decreased activities of daily living (ADL)  Z78.9 V49.89   4. Impaired mobility  Z74.09 799.89     Patient Active Problem List   Diagnosis   • Frequent PVCs   • Shortness of breath   • SOB (shortness of breath)   • CAD in native artery   • PVD (peripheral vascular disease) ([facility])   • Pneumonia due to infectious organism   • Chronic back pain   • Essential hypertension     Past Medical History:   Diagnosis Date   • CAD in native artery 2019   • COPD (chronic obstructive pulmonary disease) ([facility])    • Essential hypertension 2021   • GERD (gastroesophageal reflux disease)    • Lung nodule      Past Surgical History:   Procedure Laterality Date   • BREAST IMPLANT REMOVAL     • BREAST TISSUE EXPANDER REMOVAL INSERTION OF IMPLANT     • CARDIAC CATHETERIZATION N/A 2019    Procedure: Left Heart Cath;  Surgeon: Edi Armijo MD;  Location: [facility] INVASIVE LOCATION;  Service: Cardiology   • CHOLECYSTECTOMY     • HYSTERECTOMY     • MASTECTOMY      BILATERAL   • OOPHORECTOMY           OT ASSESSMENT FLOWSHEET (last 12 hours)     OT Evaluation and Treatment     Row Name 21 1135                   OT Time and Intention    Subjective Information no complaints  -TS        Document Type therapy note (daily note)  -TS        Mode of Treatment occupational therapy  -TS        Patient Effort good  -TS        Comment on 3L initially, down to 1L end of tx  -TS                  General Information    Existing Precautions/Restrictions fall; oxygen therapy device and L/min  -TS                  Cognition    Personal Safety Interventions fall prevention program  maintained; nonskid shoes/slippers when out of bed  -TS                  Functional Mobility    Functional Mobility- Ind. Level standby assist  -TS        Functional Mobility- Device rolling walker  -TS        Functional Mobility- Comment in room, in BR  -TS                  Transfer Assessment/Treatment    Transfers sit-stand transfer; stand-sit transfer; toilet transfer  -TS                  Transfers    Sit-Stand [place] (Transfers) supervision  -TS        Stand-Sit [place] (Transfers) supervision  -TS        [place] Level (Toilet Transfer) supervision  -TS        Assistive Device (Toilet Transfer) commode; walker, front-wheeled  -TS                  Sit-Stand Transfer    Assistive Device (Sit-Stand Transfers) walker, front-wheeled  -TS                  Stand-Sit Transfer    Assistive Device (Stand-Sit Transfers) walker, front-wheeled  -TS                  Toilet Transfer    Type (Toilet Transfer) sit-stand; stand-sit  -TS                  Activities of Daily Living    BADL Assessment/Intervention toileting  -TS                  Toileting Assessment/Training    [place] Level (Toileting) toileting skills; perform perineal hygiene; adjust/manage clothing; supervision  -TS        Assistive Devices (Toileting) commode  -TS        Position (Toileting) unsupported standing; unsupported sitting  -TS                  Plan of Care Review    Plan of Care Reviewed With patient  -TS        Progress improving  -TS        Outcome Summary Pt on 3L initially when RICE entered room, at end of tx pt on 1L O2 with O2 sat at 95%. Pt ambulates and completing toileting and transfers with S/SBA. Pt plans to discharge home with daughter for 2 weeks to improve strength prior to pt returning home. Pt provided with written education including pulmonary exercises, PLB technique, DME for home, and energy conservation techniques. RICE to follow up with any questions pt may have. Continue OT POC  -TS                  Vital  Signs    Post SpO2 (%) 995  -TS        O2 Delivery Post Treatment nasal cannula  -TS        Post Patient Position Sitting  -TS                  Positioning and Restraints    Pre-Treatment Position bathroom  -TS        Post Treatment Position chair  -TS        In Chair sitting; call light within reach; encouraged to call for assist; with family/caregiver  -TS              User Key  (r) = Recorded By, (t) = Taken By, (c) = Cosigned By    Initials Name Effective Dates    TS Hellen Bai DONGDWAYNE 06/16/21 -                  Occupational Therapy Education                 Title: PT OT SLP Therapies (In Progress)     Topic: Occupational Therapy (In Progress)     Point: ADL training (Done)     Description:   Instruct learner(s) on proper safety adaptation and remediation techniques during self care or transfers.   Instruct in proper use of assistive devices.              Learning Progress Summary           Patient Acceptance, E, VU by  at 12/7/2021 1437                   Point: Home exercise program (Not Started)     Description:   Instruct learner(s) on appropriate technique for monitoring, assisting and/or progressing therapeutic exercises/activities.              Learner Progress:  Not documented in this visit.          Point: Precautions (Done)     Description:   Instruct learner(s) on prescribed precautions during self-care and functional transfers.              Learning Progress Summary           Patient Acceptance, E, VU by  at 12/7/2021 1437                   Point: Body mechanics (Not Started)     Description:   Instruct learner(s) on proper positioning and spine alignment during self-care, functional mobility activities and/or exercises.              Learner Progress:  Not documented in this visit.                      User Key     Initials Effective Dates Name Provider Type Discipline     11/10/21 -  Emilie Hinds OTR/L, CSRS Occupational Therapist OT                  OT Recommendation and Plan      Plan of Care Review  Plan of Care Reviewed With: patient  Progress: improving  Outcome Summary: Pt on 3L initially when RICE entered room, at end of tx pt on 1L O2 with O2 sat at 95%. Pt ambulates and completing toileting and transfers with S/SBA. Pt plans to discharge home with daughter for 2 weeks to improve strength prior to pt returning home. Pt provided with written education including pulmonary exercises, PLB technique, DME for home, and energy conservation techniques. RICE to follow up with any questions pt may have. Continue OT POC  Plan of Care Reviewed With: patient  Outcome Summary: Pt on 3L initially when RICE entered room, at end of tx pt on 1L O2 with O2 sat at 95%. Pt ambulates and completing toileting and transfers with S/SBA. Pt plans to discharge home with daughter for 2 weeks to improve strength prior to pt returning home. Pt provided with written education including pulmonary exercises, PLB technique, DME for home, and energy conservation techniques. RICE to follow up with any questions pt may have. Continue OT POC     Outcome Measures     Row Name 12/08/21 1200             How much help from another is currently needed...    Putting on and taking off regular lower body clothing? 3  -TS      Bathing (including washing, rinsing, and drying) 3  -TS      Toileting (which includes using toilet bed pan or urinal) 4  -TS      Putting on and taking off regular upper body clothing 4  -TS      Taking care of personal grooming (such as brushing teeth) 4  -TS      Eating meals 4  -TS      AM-PAC 6 Clicks Score (OT) 22  -TS            User Key  (r) = Recorded By, (t) = Taken By, (c) = Cosigned By    Initials Name Provider Type    Hellen Bardales RICE Occupational Therapy Assistant                Time Calculation:    Time Calculation- OT     Row Name 12/08/21 1237             Time Calculation- OT    OT Start Time 1135  -TS      OT Stop Time 1200  -TS      OT Time Calculation (min) 25 min  -TS       Total Timed Code Minutes- OT 25 minute(s)  -TS      OT Received On 12/08/21  -TS              Timed Charges    11753 - OT Self Care/Mgmt Minutes 25  -TS              Total Minutes    Timed Charges Total Minutes 25  -TS       Total Minutes 25  -TS            User Key  (r) = Recorded By, (t) = Taken By, (c) = Cosigned By    Initials Name Provider Type    TS Hellen Bai COTA Occupational Therapy Assistant              Therapy Charges for Today     Code Description Service Date Service Provider Modifiers Qty    33132589920 HC OT SELF CARE/MGMT/TRAIN EA 15 MIN 12/8/2021 Hellen Bai COTA GO 2               Hellen UMANA. DWAYNE Bai  12/8/2021

## 2021-12-08 NOTE — PROGRESS NOTES
RT Nebulizer Protocol    Assessment tool to be used for patients with existing breathing treatments ordered by hospitalists                                                                  0  1  2  3  4      Respiratory History   No Smoking      Smoking History   x   1 Pack/Day      Pulmonary Disease      Exacerbation        Respiratory Rate   Normal   x   20-25      Dyspneic      Accessory Muscles      Severe Dyspnea        Breath Sounds   Clear   x   Crackles      Crackles/ Rhonchi      Wheezing      Absent/ Severe Wheezing      Chest   X-ray   Clear      1 Lobe Infiltration/ Consolidation/ PE      2 Lobe Same Lung Infiltration/ Consolidation/ PE       2 Lobe Infiltration/ Both Lungs/ Consolidation/ PE      Both Lungs/ More Than 1 Lobe/ Atelectasis/ Consolidation/  PE        Cough   Strong Non- Productive   x   Excessive Secretions/ Strong Cough      Excessive Secretions/ Weak Cough      Thick Bronchial Secretions/ Weak Cough      Thick Bronchial Secretions/ No Cough        Total Patient Score =  0  0-4=Q4 PRN  5-9=TID and Q4 PRN  10-14=QID and Q3 PRN  15-19=Q4 and Q2 PRN  20=Q3 and Q2 PRN    Bronchopulmonary Hygiene (CPT)   Q4 ATC Copious secretions, dyspnea, unable to sleep, mucus plug    QID & Q4 PRN Moderate secretions    TID Small amounts of secretions w/ poor cough and history of secretions    BID Unable to deep breathe and cough spontaneously       Lung Expansion Therapy (PEP)   Q4 & PRN at night Severe atelectasis, poor oxygenation    QID  High risk for persistent atelectasis, existence of same    TID At risk for developing atelectasis    BID Unable to deep breathe and cough spontaneously     Instruct, 1 follow up Patients able to perform well on their own        No current xray.    No changes at this time

## 2021-12-08 NOTE — PLAN OF CARE
Goal Outcome Evaluation:  Plan of Care Reviewed With: patient, daughter        Progress: improving  Outcome Summary: Pt alert nad oriented this shift. O2 at 2.5-3lnc with o2 sats >90%. Up with CGA to beathroom. Bed alarm and chair alarm when appropriate. Family at bedside.

## 2021-12-08 NOTE — THERAPY TREATMENT NOTE
Acute Care - Physical Therapy Treatment Note  [facility]     Patient Name: Pattie Liu  : 1945  MRN: [id number]  Today's Date: 2021      Visit Dx:     ICD-10-CM ICD-9-CM   1. Pneumonia due to infectious organism, unspecified laterality, unspecified part of lung  J18.9 486   2. Hypoxic  R09.02 799.02   3. Decreased activities of daily living (ADL)  Z78.9 V49.89   4. Impaired mobility  Z74.09 799.89     Patient Active Problem List   Diagnosis   • Frequent PVCs   • Shortness of breath   • SOB (shortness of breath)   • CAD in native artery   • PVD (peripheral vascular disease) (Formerly [facility])   • Pneumonia due to infectious organism   • Chronic back pain   • Essential hypertension     Past Medical History:   Diagnosis Date   • CAD in native artery 2019   • COPD (chronic obstructive pulmonary disease) (Formerly [facility])    • Essential hypertension 2021   • GERD (gastroesophageal reflux disease)    • Lung nodule      Past Surgical History:   Procedure Laterality Date   • BREAST IMPLANT REMOVAL     • BREAST TISSUE EXPANDER REMOVAL INSERTION OF IMPLANT     • CARDIAC CATHETERIZATION N/A 2019    Procedure: Left Heart Cath;  Surgeon: Edi Armijo MD;  Location: [facility] INVASIVE LOCATION;  Service: Cardiology   • CHOLECYSTECTOMY     • HYSTERECTOMY     • MASTECTOMY      BILATERAL   • OOPHORECTOMY       PT Assessment (last 12 hours)     PT Evaluation and Treatment     Row Name 21          Physical Therapy Time and Intention    Subjective Information no complaints (P)   -DS     Document Type therapy note (daily note) (P)   -DS     Mode of Treatment physical therapy (P)   -DS     Row Name 21          General Information    Existing Precautions/Restrictions fall; oxygen therapy device and L/min (P)   3 L  -DS     Row Name 21          Bed Mobility    Comment (Bed Mobility) up in chair (P)   -DS     Row Name 21          Transfers    Transfers sit-stand transfer;  stand-sit transfer (P)   -DS     Sit-Stand [place] (Transfers) contact guard (P)   -DS     Stand-Sit [place] (Transfers) contact guard; standby assist (P)   -DS     Row Name 12/08/21 0927          Sit-Stand Transfer    Assistive Device (Sit-Stand Transfers) walker, front-wheeled (P)   -DS     Row Name 12/08/21 0927          Stand-Sit Transfer    Assistive Device (Stand-Sit Transfers) walker, front-wheeled (P)   -DS     Row Name 12/08/21 0927          Gait/Stairs (Locomotion)    [place] Level (Gait) verbal cues; contact guard (P)   -DS     Assistive Device (Gait) walker, front-wheeled (P)   -DS     Distance in Feet (Gait) 100' (P)   -DS     Deviations/Abnormal Patterns (Gait) gait speed decreased (P)   -DS     Bilateral Gait Deviations forward flexed posture (P)   -DS     Comment (Gait/Stairs) 1 standing rest break (P)   -DS     Row Name 12/08/21 0927          Safety Issues, Functional Mobility    Impairments Affecting Function (Mobility) endurance/activity tolerance; balance (P)   -DS     Row Name 12/08/21 0927          Motor Skills    Therapeutic Exercise aerobic (P)   -DS     Row Name 12/08/21 0927          Aerobic Exercise    Time Performed (Aerobic Exercise) AROM BLE x 20 (P)   -DS     Row Name 12/08/21 0927          Vital Signs    Pre SpO2 (%) 93 (P)   -DS     O2 Delivery Pre Treatment nasal cannula (P)   -DS     Intra SpO2 (%) 88 (P)   -DS     O2 Delivery Intra Treatment nasal cannula (P)   -DS     Post SpO2 (%) 91 (P)   -DS     O2 Delivery Post Treatment nasal cannula (P)   -DS     Row Name 12/08/21 0927          Positioning and Restraints    Pre-Treatment Position sitting in chair/recliner (P)   -DS     Post Treatment Position chair (P)   -DS     In Chair sitting; call light within reach; encouraged to call for assist; with family/caregiver (P)   -DS           User Key  (r) = Recorded By, (t) = Taken By, (c) = Cosigned By    Initials Name Provider Type    DS Alvin Stuart, PTA Student PTA  Student                Physical Therapy Education                 Title: PT OT SLP Therapies (In Progress)     Topic: Physical Therapy (In Progress)     Point: Mobility training (Done)     Learning Progress Summary           Patient Acceptance, E, VU by SB at 12/7/2021 1551    Comment: pt edu on POC, benefits of act and d/c plans                   Point: Home exercise program (Not Started)     Learner Progress:  Not documented in this visit.          Point: Body mechanics (Done)     Learning Progress Summary           Patient Acceptance, E, VU by SB at 12/7/2021 1551    Comment: pt edu on POC, benefits of act and d/c plans                   Point: Precautions (Done)     Learning Progress Summary           Patient Acceptance, E, VU by SB at 12/7/2021 1551    Comment: pt edu on POC, benefits of act and d/c plans                               User Key     Initials Effective Dates Name Provider Type Discipline     06/16/21 -  Majr Bowden, PT DPT Physical Therapist PT              PT Recommendation and Plan     Plan of Care Reviewed With: (P) patient  Progress: (P) improving  Outcome Summary: (P) PT tx completed. Pt has no c/o this am. SBA/CGA sit<>stand transfers. CG amb 100' requiring 1 standing rest break O2 sat to 89% and pt was instructed on proper breathing techniques. Pt demonstrates Forward flexed posture and able to correct once VC given. Pt would benifit with continued therapy to increase strength and endurance.       Time Calculation:    PT Charges     Row Name 12/08/21 1013             Time Calculation    Start Time 0927 (P)   -DS      Stop Time 0952 (P)   -DS      Time Calculation (min) 25 min (P)   -DS      PT Received On 12/08/21 (P)   -DS              Time Calculation- PT    Total Timed Code Minutes- PT 25 minute(s) (P)   -DS            User Key  (r) = Recorded By, (t) = Taken By, (c) = Cosigned By    Initials Name Provider Type    Alvin Gunter, PTA Student PTA Student                  PT  G-Codes  Outcome Measure Options: AM-PAC 6 Clicks Basic Mobility (PT)  AM-PAC 6 Clicks Score (PT): 19  AM-PAC 6 Clicks Score (OT): 21    Alvin Stuart PTA Student  12/8/2021

## 2021-12-08 NOTE — PLAN OF CARE
Goal Outcome Evaluation:  Plan of Care Reviewed With: patient, daughter           Outcome Summary: Oriented, more alert.  O2 bnc @ 4L, rested well

## 2021-12-08 NOTE — PLAN OF CARE
Goal Outcome Evaluation:  Plan of Care Reviewed With: patient        Progress: no change  Outcome Summary: VSS. IV ABX cont. On 3L NC. Worked with PT today. No pain during shift. Up in chair all day. Safety maintained.

## 2021-12-08 NOTE — PROGRESS NOTES
Daily Progress Note  Pattie Liu  MRN: [id number] LOS: 2    Admit Date: 12/6/2021 12/8/2021 08:38 CST    Subjective:          Chief Complaint:  Chief Complaint   Patient presents with   • Weakness - Generalized       Interval History:    Reviewed overnight events and nursing notes.   Overall breathing seems to be improved.  Still subtle congestion in right lower aspect.  Continues to require oxygen at 4 L via nasal cannula.    Review of Systems   Constitutional: Positive for appetite change. Negative for fever.   HENT: Positive for congestion.    Respiratory: Positive for cough, shortness of breath and wheezing.    Cardiovascular: Negative for chest pain, palpitations and leg swelling.   Gastrointestinal: Negative for abdominal pain, nausea and vomiting.   Skin: Negative for color change.       DIET:  Diet Regular    Medications:      aspirin, 81 mg, Oral, Daily  atorvastatin, 20 mg, Oral, Daily  cefTRIAXone, 1 g, Intravenous, Q24H   And  azithromycin, 500 mg, Intravenous, Q24H  enoxaparin, 40 mg, Subcutaneous, Q24H  gabapentin, 600 mg, Oral, Daily  gabapentin, 900 mg, Oral, BID  lisinopril, 20 mg, Oral, Daily  nystatin, , Topical, Q12H  senna-docusate sodium, 2 tablet, Oral, BID  sodium chloride, 10 mL, Intravenous, Q12H        Data:     Code Status:   Code Status and Medical Interventions:   Ordered at: 12/07/21 0834     Code Status (Patient has no pulse and is not breathing):    CPR (Attempt to Resuscitate)     Medical Interventions (Patient has pulse or is breathing):    Full Support       Family History   Problem Relation Age of Onset   • Cancer Mother    • Cancer Father      Social History     Socioeconomic History   • Marital status:    Tobacco Use   • Smoking status: Current Every Day Smoker     Packs/day: 0.50     Years: 62.00     Pack years: 31.00     Types: Cigarettes   • Smokeless tobacco: Never Used   • Tobacco comment: states she has no plan to quit smoking   Vaping Use   • Vaping Use:  Former   Substance and Sexual Activity   • Alcohol use: No   • Drug use: No   • Sexual activity: Not Currently       Labs:    Lab Results (last 72 hours)     Procedure Component Value Units Date/Time    Comprehensive Metabolic Panel [[id number]]  (Abnormal) Collected: 12/08/21 0504    Specimen: Blood Updated: 12/08/21 0544     Glucose 104 mg/dL      BUN 12 mg/dL      Creatinine 0.69 mg/dL      Sodium 142 mmol/L      Potassium 4.5 mmol/L      Comment: Slight hemolysis detected by analyzer. Results may be affected.        Chloride 106 mmol/L      CO2 28.0 mmol/L      Calcium 9.6 mg/dL      Total Protein 6.3 g/dL      Albumin 3.10 g/dL      ALT (SGPT) 14 U/L      AST (SGOT) 28 U/L      Alkaline Phosphatase 77 U/L      Total Bilirubin <0.2 mg/dL      eGFR Non African Amer 82 mL/min/1.73      Globulin 3.2 gm/dL      A/G Ratio 1.0 g/dL      BUN/Creatinine Ratio 17.4     Anion Gap 8.0 mmol/L     Narrative:      GFR Normal >60  Chronic Kidney Disease <60  Kidney Failure <15      CBC (No Diff) [[id number]]  (Abnormal) Collected: 12/08/21 0504    Specimen: Blood Updated: 12/08/21 0526     WBC 9.44 10*3/mm3      RBC 4.28 10*6/mm3      Hemoglobin 12.8 g/dL      Hematocrit 41.3 %      MCV 96.5 fL      MCH 29.9 pg      MCHC 31.0 g/dL      RDW 13.6 %      RDW-SD 48.5 fl      MPV 10.9 fL      Platelets 269 10*3/mm3     Blood Culture - Blood, Arm, Right [[id number]]  (Normal) Collected: 12/06/21 1250    Specimen: Blood from Arm, Right Updated: 12/07/21 1330     Blood Culture No growth at 24 hours    Blood Culture - Blood, Arm, Right [[id number]]  (Normal) Collected: 12/06/21 1256    Specimen: Blood from Arm, Right Updated: 12/07/21 1330     Blood Culture No growth at 24 hours    Comprehensive Metabolic Panel [[id number]]  (Abnormal) Collected: 12/07/21 0509    Specimen: Blood Updated: 12/07/21 0559     Glucose 87 mg/dL      BUN 16 mg/dL      Creatinine 0.79 mg/dL      Sodium 142 mmol/L      Potassium 4.7 mmol/L      Comment: Slight  hemolysis detected by analyzer. Results may be affected.        Chloride 107 mmol/L      CO2 27.0 mmol/L      Calcium 9.2 mg/dL      Total Protein 6.1 g/dL      Albumin 3.00 g/dL      ALT (SGPT) 16 U/L      AST (SGOT) 28 U/L      Alkaline Phosphatase 78 U/L      Total Bilirubin 0.2 mg/dL      eGFR Non African Amer 70 mL/min/1.73      Globulin 3.1 gm/dL      A/G Ratio 1.0 g/dL      BUN/Creatinine Ratio 20.3     Anion Gap 8.0 mmol/L     Narrative:      GFR Normal >60  Chronic Kidney Disease <60  Kidney Failure <15      CBC (No Diff) [[id number]]  (Abnormal) Collected: 12/07/21 0509    Specimen: Blood Updated: 12/07/21 0537     WBC 7.27 10*3/mm3      RBC 4.24 10*6/mm3      Hemoglobin 12.7 g/dL      Hematocrit 41.6 %      MCV 98.1 fL      MCH 30.0 pg      MCHC 30.5 g/dL      RDW 13.9 %      RDW-SD 50.8 fl      MPV 10.8 fL      Platelets 232 10*3/mm3     Urinalysis With Culture If Indicated - Urine, Catheter In/Out [[id number]]  (Abnormal) Collected: 12/06/21 1354    Specimen: Urine, Catheter In/Out Updated: 12/06/21 1445     Color, UA Dark Yellow     Appearance, UA Clear     pH, UA <=5.0     Specific Gravity, UA 1.020     Glucose, UA Negative     Ketones, UA Trace     Bilirubin, UA Negative     Blood, UA Negative     Protein, UA Negative     Leuk Esterase, UA Trace     Nitrite, UA Negative     Urobilinogen, UA 0.2 E.U./dL    Urinalysis, Microscopic Only - Urine, Catheter In/Out [[id number]]  (Abnormal) Collected: 12/06/21 1354    Specimen: Urine, Catheter In/Out Updated: 12/06/21 1445     RBC, UA None Seen /HPF      WBC, UA 3-5 /HPF      Bacteria, UA None Seen /HPF      Squamous Epithelial Cells, UA 0-2 /HPF      Hyaline Casts, UA 3-6 /LPF      Methodology Manual Light Microscopy    COVID PRE-OP / PRE-PROCEDURE SCREENING ORDER (NO ISOLATION) - Swab, Nasal Cavity [[id number]]  (Normal) Collected: 12/06/21 1259    Specimen: Swab from Nasal Cavity Updated: 12/06/21 1414    Narrative:      The following orders were created for  panel order COVID PRE-OP / PRE-PROCEDURE SCREENING ORDER (NO ISOLATION) - Swab, Nasal Cavity.  Procedure                               Abnormality         Status                     ---------                               -----------         ------                     COVID-19,[facility] IN-PAT...[[id number]]  Normal              Final result                 Please view results for these tests on the individual orders.    COVID-19,[facility] IN-HOUSE,Nasal Swab No Transport Media 3-4 HR TAT - Swab, Nasal Cavity [[id number]]  (Normal) Collected: 12/06/21 1259    Specimen: Swab from Nasal Cavity Updated: 12/06/21 1414     COVID19 Not Detected    Narrative:      Fact sheet for providers: https://www.fda.gov/media/954829/download     Fact sheet for patients: https://www.fda.gov/media/698448/download    Test performed by PCR.    Consider negative results in combination with clinical observations, patient history, and epidemiological information.    TSH [[id number]]  (Normal) Collected: 12/06/21 1256    Specimen: Blood Updated: 12/06/21 1335     TSH 3.020 uIU/mL     Comprehensive Metabolic Panel [[id number]]  (Abnormal) Collected: 12/06/21 1256    Specimen: Blood Updated: 12/06/21 1335     Glucose 89 mg/dL      BUN 23 mg/dL      Creatinine 1.03 mg/dL      Sodium 140 mmol/L      Potassium 5.1 mmol/L      Comment: Slight hemolysis detected by analyzer. Results may be affected.        Chloride 103 mmol/L      CO2 27.0 mmol/L      Calcium 9.6 mg/dL      Total Protein 6.6 g/dL      Albumin 3.50 g/dL      ALT (SGPT) 15 U/L      AST (SGOT) 30 U/L      Comment: Slight hemolysis detected by analyzer. Results may be affected.        Alkaline Phosphatase 86 U/L      Total Bilirubin 0.2 mg/dL      eGFR Non African Amer 52 mL/min/1.73      Globulin 3.1 gm/dL      A/G Ratio 1.1 g/dL      BUN/Creatinine Ratio 22.3     Anion Gap 10.0 mmol/L     Narrative:      GFR Normal >60  Chronic Kidney Disease <60  Kidney Failure <15      T4, Free  "[[id number]]  (Normal) Collected: 12/06/21 1256    Specimen: Blood Updated: 12/06/21 1335     Free T4 1.09 ng/dL     Narrative:      Results may be falsely increased if patient taking Biotin.      Procalcitonin [[id number]]  (Normal) Collected: 12/06/21 1256    Specimen: Blood Updated: 12/06/21 1334     Procalcitonin 0.03 ng/mL     Narrative:      As a Marker for Sepsis (Non-Neonates):     1. <0.5 ng/mL represents a low risk of severe sepsis and/or septic shock.  2. >2 ng/mL represents a high risk of severe sepsis and/or septic shock.    As a Marker for Lower Respiratory Tract Infections that require antibiotic therapy:  PCT on Admission     Antibiotic Therapy             6-12 Hrs later  >0.5                          Strongly Recommended            >0.25 - <0.5             Recommended  0.1 - 0.25                  Discouraged                       Remeasure/reassess PCT  <0.1                         Strongly Discouraged         Remeasure/reassess PCT      As 28 day mortality risk marker: \"Change in Procalcitonin Result\" (>80% or <=80%) if Day 0 (or Day 1) and Day 4 values are available. Refer to http://www.[email or web address] [place]-pct-calculator.com/    Change in PCT <=80 %   A decrease of PCT levels below or equal to 80% defines a positive change in PCT test result representing a higher risk for 28-day all-cause mortality of patients diagnosed with severe sepsis or septic shock.    Change in PCT >80 %   A decrease of PCT levels of more than 80% defines a negative change in PCT result representing a lower risk for 28-day all-cause mortality of patients diagnosed with severe sepsis or septic shock.                C-reactive Protein [[id number]]  (Abnormal) Collected: 12/06/21 1256    Specimen: Blood Updated: 12/06/21 1334     C-Reactive Protein 4.08 mg/dL     Troponin [[id number]]  (Normal) Collected: 12/06/21 1256    Specimen: Blood Updated: 12/06/21 1331     Troponin T <0.010 ng/mL     Narrative:      Troponin T Reference Range:  <= 0.03 " ng/mL-   Negative for AMI  >0.03 ng/mL-     Abnormal for myocardial necrosis.  Clinicians would have to utilize clinical acumen, EKG, Troponin and serial changes to determine if it is an Acute Myocardial Infarction or myocardial injury due to an underlying chronic condition.       Results may be falsely decreased if patient taking Biotin.      BNP [[id number]]  (Normal) Collected: 12/06/21 1256    Specimen: Blood Updated: 12/06/21 1330     proBNP 1,235.0 pg/mL     Narrative:      Among patients with dyspnea, NT-proBNP is highly sensitive for the detection of acute congestive heart failure. In addition NT-proBNP of <300 pg/ml effectively rules out acute congestive heart failure with 99% negative predictive value.    Results may be falsely decreased if patient taking Biotin.      Lipase [[id number]]  (Normal) Collected: 12/06/21 1256    Specimen: Blood Updated: 12/06/21 1329     Lipase 16 U/L     Magnesium [[id number]]  (Normal) Collected: 12/06/21 1256    Specimen: Blood Updated: 12/06/21 1328     Magnesium 1.9 mg/dL     D-dimer, Quantitative [[id number]]  (Abnormal) Collected: 12/06/21 1256    Specimen: Blood Updated: 12/06/21 1318     D-Dimer, Quantitative 1.01 mg/L (FEU)     Narrative:      Reference Range is 0-0.50 mg/L FEU. However, results <0.50 mg/L FEU tends to rule out DVT or PE. Results >0.50 mg/L FEU are not useful in predicting absence or presence of DVT or PE.      Protime-INR [[id number]]  (Normal) Collected: 12/06/21 1256    Specimen: Blood Updated: 12/06/21 1318     Protime 12.6 Seconds      INR 0.98    aPTT [[id number]]  (Abnormal) Collected: 12/06/21 1256    Specimen: Blood Updated: 12/06/21 1318     PTT 39.3 seconds     CBC & Differential [[id number]]  (Abnormal) Collected: 12/06/21 1256    Specimen: Blood Updated: 12/06/21 1308    Narrative:      The following orders were created for panel order CBC & Differential.  Procedure                               Abnormality         Status                      ---------                               -----------         ------                     CBC Auto Differential[[id number]]        Abnormal            Final result                 Please view results for these tests on the individual orders.    CBC Auto Differential [[id number]]  (Abnormal) Collected: 12/06/21 1256    Specimen: Blood Updated: 12/06/21 1308     WBC 8.17 10*3/mm3      RBC 4.41 10*6/mm3      Hemoglobin 13.4 g/dL      Hematocrit 43.0 %      MCV 97.5 fL      MCH 30.4 pg      MCHC 31.2 g/dL      RDW 14.0 %      RDW-SD 50.3 fl      MPV 11.2 fL      Platelets 240 10*3/mm3      Neutrophil % 66.2 %      Lymphocyte % 23.1 %      Monocyte % 9.2 %      Eosinophil % 0.9 %      Basophil % 0.2 %      Immature Grans % 0.4 %      Neutrophils, Absolute 5.41 10*3/mm3      Lymphocytes, Absolute 1.89 10*3/mm3      Monocytes, Absolute 0.75 10*3/mm3      Eosinophils, Absolute 0.07 10*3/mm3      Basophils, Absolute 0.02 10*3/mm3      Immature Grans, Absolute 0.03 10*3/mm3      nRBC 0.0 /100 WBC     Blood Gas, Arterial - [[id number]]  (Abnormal) Collected: 12/06/21 1254    Specimen: Arterial Blood Updated: 12/06/21 1250     Site Right Radial     Darren's Test N/A     pH, Arterial 7.384 pH units      pCO2, Arterial 44.6 mm Hg      pO2, Arterial 46.7 mm Hg      Comment: 85 Value below critical limit        HCO3, Arterial 26.6 mmol/L      Comment: 83 Value above reference range        Base Excess, Arterial 1.1 mmol/L      O2 Saturation, Arterial 81.8 %      Comment: 84 Value below reference range        Temperature 37.0 C      Barometric Pressure for Blood Gas 755 mmHg      Modality Room Air     Ventilator Mode NA     Notified Who DR GARCIA     Notified By [id number]     Notified Time 12/06/2021 12:59     Collected by [id number]     Comment: Meter: [id number]     :  [id number]        pCO2, Temperature Corrected 44.6 mm Hg      pH, Temp Corrected 7.384 pH Units      pO2, Temperature Corrected 46.7 mm Hg             Objective:  "    Vitals: /62 (BP Location: Left arm, Patient Position: Sitting)   Pulse 55   Temp 97.4 °F (36.3 °C) (Oral)   Resp 18   Ht 160 cm (63\")   Wt 66.4 kg (146 lb 6.4 oz)   SpO2 95%   BMI 25.93 kg/m²      Intake/Output Summary (Last 24 hours) at 2021 0838  Last data filed at 2021 0357  Gross per 24 hour   Intake 360 ml   Output 1100 ml   Net -740 ml    Temp (24hrs), Av.9 °F (36.6 °C), Min:97.4 °F (36.3 °C), Max:98.1 °F (36.7 °C)      Physical Exam  Vitals reviewed.   Constitutional:       Appearance: Normal appearance. She is not ill-appearing.   HENT:      Head: Normocephalic and atraumatic.   Eyes:      General:         Right eye: No discharge.         Left eye: No discharge.      Extraocular Movements: Extraocular movements intact.      Conjunctiva/sclera: Conjunctivae normal.   Cardiovascular:      Rate and Rhythm: Normal rate and regular rhythm.      Pulses: Normal pulses.      Heart sounds: No murmur heard.      Pulmonary:      Effort: Pulmonary effort is normal. No respiratory distress.      Breath sounds: Rales (Best appreciated in the right posterior lower aspect) present.   Abdominal:      General: Abdomen is flat. Bowel sounds are normal. There is no distension.   Musculoskeletal:      Right lower leg: No edema.      Left lower leg: No edema.   Skin:     Capillary Refill: Capillary refill takes less than 2 seconds.   Neurological:      General: No focal deficit present.      Mental Status: She is alert.   Psychiatric:         Mood and Affect: Mood normal.         Behavior: Behavior normal.             Assessment and Plan:     Primary Problem:  Pneumonia due to infectious organism    Hospital Problem list:    Pneumonia due to infectious organism    Chronic back pain    Essential hypertension      PMH:  Past Medical History:   Diagnosis Date   • CAD in native artery 2019   • COPD (chronic obstructive pulmonary disease) (HCC)    • Essential hypertension 2021   • GERD " (gastroesophageal reflux disease)    • Lung nodule        Treatment Plan:  Pneumonia: Appears to be community-acquired pneumonia.  Given her allergies she was started on azithromycin and Rocephin in the emergency department.  She will be on ipratropium nebulizer treatments as well.  RT consulted.  -Continue azithromycin and Rocephin.     Debility: Seems to be worse from her baseline.  PT/OT evaluation performed.  Will need assist at home, case management consulted to assist with discharge planning as she does not want to go to a rehab facility but does also not want to go to her daughter's house.  She is unable to have 24-hour care at her house, but does want to return home.     Hypertension: Continue home medication.     CODE STATUS: Full.     Disposition: Inpatient for increased oxygen requirement, anticipate discharge by 12/10/2021.    Reviewed treatment plans with the patient and/or family.   30 minutes spent in face to face interaction and coordination of care.     Electronically signed by Anupam Ledezma MD on 12/8/2021 at 08:38 CST

## 2021-12-08 NOTE — PLAN OF CARE
Goal Outcome Evaluation:  Plan of Care Reviewed With: patient        Progress: improving  Outcome Summary: Pt on 3L initially when RICE entered room, at end of tx pt on 1L O2 with O2 sat at 95%. Pt ambulates and completing toileting and transfers with S/SBA. Pt plans to discharge home with daughter for 2 weeks to improve strength prior to pt returning home. Pt provided with written education including pulmonary exercises, PLB technique, DME for home, and energy conservation techniques. RICE to follow up with any questions pt may have. Continue OT POC

## 2021-12-08 NOTE — PLAN OF CARE
Goal Outcome Evaluation:  Plan of Care Reviewed With: (P) patient       Problem: Adult Inpatient Plan of Care  Goal: Plan of Care Review  Outcome: Ongoing, Progressing  Flowsheets (Taken 12/8/2021 1009)  Progress: improving (Pended)  Plan of Care Reviewed With: patient (Pended)  Outcome Summary: PT tx completed. Pt has no c/o this am. Pt on 3 L O2. SBA/CGA sit<>stand transfers. CG amb 100' requiring 1 standing rest break O2 sat to 89% and pt was instructed on proper breathing techniques. Pt demonstrates Forward flexed posture and able to correct once VC given. Pt would benifit with continued therapy to increase strength and endurance. (Pended)

## 2021-12-09 LAB
ALBUMIN SERPL-MCNC: 3.2 G/DL (ref 3.5–5.2)
ALBUMIN/GLOB SERPL: 1.1 G/DL
ALP SERPL-CCNC: 75 U/L (ref 39–117)
ALT SERPL W P-5'-P-CCNC: 13 U/L (ref 1–33)
ANION GAP SERPL CALCULATED.3IONS-SCNC: 7 MMOL/L (ref 5–15)
AST SERPL-CCNC: 28 U/L (ref 1–32)
BILIRUB SERPL-MCNC: 0.2 MG/DL (ref 0–1.2)
BUN SERPL-MCNC: 12 MG/DL (ref 8–23)
BUN/CREAT SERPL: 15.2 (ref 7–25)
CALCIUM SPEC-SCNC: 9.4 MG/DL (ref 8.6–10.5)
CHLORIDE SERPL-SCNC: 104 MMOL/L (ref 98–107)
CO2 SERPL-SCNC: 32 MMOL/L (ref 22–29)
CREAT SERPL-MCNC: 0.79 MG/DL (ref 0.57–1)
DEPRECATED RDW RBC AUTO: 47.4 FL (ref 37–54)
ERYTHROCYTE [DISTWIDTH] IN BLOOD BY AUTOMATED COUNT: 13.2 % (ref 12.3–15.4)
GFR SERPL CREATININE-BSD FRML MDRD: 70 ML/MIN/1.73
GLOBULIN UR ELPH-MCNC: 3 GM/DL
GLUCOSE SERPL-MCNC: 94 MG/DL (ref 65–99)
HCT VFR BLD AUTO: 41.4 % (ref 34–46.6)
HGB BLD-MCNC: 13.3 G/DL (ref 12–15.9)
MCH RBC QN AUTO: 31.4 PG (ref 26.6–33)
MCHC RBC AUTO-ENTMCNC: 32.1 G/DL (ref 31.5–35.7)
MCV RBC AUTO: 97.6 FL (ref 79–97)
PLATELET # BLD AUTO: 331 10*3/MM3 (ref 140–450)
PMV BLD AUTO: 10.9 FL (ref 6–12)
POTASSIUM SERPL-SCNC: 4.3 MMOL/L (ref 3.5–5.2)
PROT SERPL-MCNC: 6.2 G/DL (ref 6–8.5)
RBC # BLD AUTO: 4.24 10*6/MM3 (ref 3.77–5.28)
SODIUM SERPL-SCNC: 143 MMOL/L (ref 136–145)
WBC NRBC COR # BLD: 8.26 10*3/MM3 (ref 3.4–10.8)

## 2021-12-09 PROCEDURE — 80053 COMPREHEN METABOLIC PANEL: CPT | Performed by: FAMILY MEDICINE

## 2021-12-09 PROCEDURE — 97110 THERAPEUTIC EXERCISES: CPT

## 2021-12-09 PROCEDURE — 97535 SELF CARE MNGMENT TRAINING: CPT

## 2021-12-09 PROCEDURE — 94799 UNLISTED PULMONARY SVC/PX: CPT

## 2021-12-09 PROCEDURE — 25010000002 CEFTRIAXONE PER 250 MG: Performed by: FAMILY MEDICINE

## 2021-12-09 PROCEDURE — 25010000002 ENOXAPARIN PER 10 MG: Performed by: FAMILY MEDICINE

## 2021-12-09 PROCEDURE — 25010000002 AZITHROMYCIN PER 500 MG: Performed by: FAMILY MEDICINE

## 2021-12-09 PROCEDURE — 97116 GAIT TRAINING THERAPY: CPT

## 2021-12-09 PROCEDURE — 97530 THERAPEUTIC ACTIVITIES: CPT

## 2021-12-09 PROCEDURE — 85027 COMPLETE CBC AUTOMATED: CPT | Performed by: FAMILY MEDICINE

## 2021-12-09 RX ADMIN — NYSTATIN 1 APPLICATION: 100000 POWDER TOPICAL at 20:46

## 2021-12-09 RX ADMIN — ASPIRIN 81 MG: 81 TABLET, COATED ORAL at 08:11

## 2021-12-09 RX ADMIN — SODIUM CHLORIDE 1 G: 900 INJECTION INTRAVENOUS at 15:50

## 2021-12-09 RX ADMIN — DOCUSATE SODIUM 50 MG AND SENNOSIDES 8.6 MG 2 TABLET: 8.6; 5 TABLET, FILM COATED ORAL at 08:11

## 2021-12-09 RX ADMIN — SODIUM CHLORIDE, PRESERVATIVE FREE 10 ML: 5 INJECTION INTRAVENOUS at 08:12

## 2021-12-09 RX ADMIN — NYSTATIN: 100000 POWDER TOPICAL at 08:12

## 2021-12-09 RX ADMIN — IPRATROPIUM BROMIDE 0.5 MG: 0.5 SOLUTION RESPIRATORY (INHALATION) at 10:33

## 2021-12-09 RX ADMIN — IPRATROPIUM BROMIDE 0.5 MG: 0.5 SOLUTION RESPIRATORY (INHALATION) at 14:14

## 2021-12-09 RX ADMIN — GABAPENTIN 900 MG: 300 CAPSULE ORAL at 08:11

## 2021-12-09 RX ADMIN — GABAPENTIN 900 MG: 300 CAPSULE ORAL at 20:44

## 2021-12-09 RX ADMIN — IPRATROPIUM BROMIDE 0.5 MG: 0.5 SOLUTION RESPIRATORY (INHALATION) at 07:23

## 2021-12-09 RX ADMIN — ALPRAZOLAM 0.5 MG: 0.5 TABLET ORAL at 11:13

## 2021-12-09 RX ADMIN — ENOXAPARIN SODIUM 40 MG: 40 INJECTION SUBCUTANEOUS at 20:45

## 2021-12-09 RX ADMIN — LISINOPRIL 20 MG: 20 TABLET ORAL at 08:11

## 2021-12-09 RX ADMIN — ATORVASTATIN CALCIUM 20 MG: 10 TABLET, FILM COATED ORAL at 08:11

## 2021-12-09 RX ADMIN — IPRATROPIUM BROMIDE 0.5 MG: 0.5 SOLUTION RESPIRATORY (INHALATION) at 20:21

## 2021-12-09 RX ADMIN — GABAPENTIN 600 MG: 300 CAPSULE ORAL at 11:13

## 2021-12-09 RX ADMIN — SODIUM CHLORIDE, PRESERVATIVE FREE 10 ML: 5 INJECTION INTRAVENOUS at 20:46

## 2021-12-09 RX ADMIN — AZITHROMYCIN DIHYDRATE 500 MG: 500 INJECTION, POWDER, LYOPHILIZED, FOR SOLUTION INTRAVENOUS at 23:15

## 2021-12-09 NOTE — PROGRESS NOTES
Daily Progress Note  Pattie Liu  MRN: [id number] LOS: 3    Admit Date: 12/6/2021 12/9/2021 08:12 CST    Subjective:          Chief Complaint:  Chief Complaint   Patient presents with   • Weakness - Generalized       Interval History:    Reviewed overnight events and nursing notes.   Overall breathing seems to be improved.  Still subtle congestion in right lower aspect.  Continues to require oxygen, but down to 3 L via nasal cannula.    Review of Systems   Constitutional: Positive for appetite change. Negative for fever.   HENT: Positive for congestion.    Respiratory: Positive for cough, shortness of breath and wheezing.    Cardiovascular: Negative for chest pain, palpitations and leg swelling.   Gastrointestinal: Negative for abdominal pain, nausea and vomiting.   Skin: Negative for color change.       DIET:  Diet Regular    Medications:      aspirin, 81 mg, Oral, Daily  atorvastatin, 20 mg, Oral, Daily  cefTRIAXone, 1 g, Intravenous, Q24H   And  azithromycin, 500 mg, Intravenous, Q24H  enoxaparin, 40 mg, Subcutaneous, Q24H  gabapentin, 600 mg, Oral, Daily  gabapentin, 900 mg, Oral, BID  ipratropium, 0.5 mg, Nebulization, 4x Daily - RT  lisinopril, 20 mg, Oral, Daily  nystatin, , Topical, Q12H  senna-docusate sodium, 2 tablet, Oral, BID  sodium chloride, 10 mL, Intravenous, Q12H        Data:     Code Status:   Code Status and Medical Interventions:   Ordered at: 12/07/21 0834     Code Status (Patient has no pulse and is not breathing):    CPR (Attempt to Resuscitate)     Medical Interventions (Patient has pulse or is breathing):    Full Support       Family History   Problem Relation Age of Onset   • Cancer Mother    • Cancer Father      Social History     Socioeconomic History   • Marital status:    Tobacco Use   • Smoking status: Current Every Day Smoker     Packs/day: 0.50     Years: 62.00     Pack years: 31.00     Types: Cigarettes   • Smokeless tobacco: Never Used   • Tobacco comment: states  she has no plan to quit smoking   Vaping Use   • Vaping Use: Former   Substance and Sexual Activity   • Alcohol use: No   • Drug use: No   • Sexual activity: Not Currently       Labs:    Lab Results (last 72 hours)     Procedure Component Value Units Date/Time    Comprehensive Metabolic Panel [[id number]]  (Abnormal) Collected: 12/09/21 0459    Specimen: Blood Updated: 12/09/21 0607     Glucose 94 mg/dL      BUN 12 mg/dL      Creatinine 0.79 mg/dL      Sodium 143 mmol/L      Potassium 4.3 mmol/L      Chloride 104 mmol/L      CO2 32.0 mmol/L      Calcium 9.4 mg/dL      Total Protein 6.2 g/dL      Albumin 3.20 g/dL      ALT (SGPT) 13 U/L      AST (SGOT) 28 U/L      Alkaline Phosphatase 75 U/L      Total Bilirubin 0.2 mg/dL      eGFR Non African Amer 70 mL/min/1.73      Globulin 3.0 gm/dL      A/G Ratio 1.1 g/dL      BUN/Creatinine Ratio 15.2     Anion Gap 7.0 mmol/L     Narrative:      GFR Normal >60  Chronic Kidney Disease <60  Kidney Failure <15      CBC (No Diff) [[id number]]  (Abnormal) Collected: 12/09/21 0459    Specimen: Blood Updated: 12/09/21 0544     WBC 8.26 10*3/mm3      RBC 4.24 10*6/mm3      Hemoglobin 13.3 g/dL      Hematocrit 41.4 %      MCV 97.6 fL      MCH 31.4 pg      MCHC 32.1 g/dL      RDW 13.2 %      RDW-SD 47.4 fl      MPV 10.9 fL      Platelets 331 10*3/mm3     Blood Culture - Blood, Arm, Right [[id number]]  (Normal) Collected: 12/06/21 1250    Specimen: Blood from Arm, Right Updated: 12/08/21 1330     Blood Culture No growth at 2 days    Blood Culture - Blood, Arm, Right [[id number]]  (Normal) Collected: 12/06/21 1256    Specimen: Blood from Arm, Right Updated: 12/08/21 1330     Blood Culture No growth at 2 days    Comprehensive Metabolic Panel [[id number]]  (Abnormal) Collected: 12/08/21 0504    Specimen: Blood Updated: 12/08/21 0544     Glucose 104 mg/dL      BUN 12 mg/dL      Creatinine 0.69 mg/dL      Sodium 142 mmol/L      Potassium 4.5 mmol/L      Comment: Slight hemolysis detected by  analyzer. Results may be affected.        Chloride 106 mmol/L      CO2 28.0 mmol/L      Calcium 9.6 mg/dL      Total Protein 6.3 g/dL      Albumin 3.10 g/dL      ALT (SGPT) 14 U/L      AST (SGOT) 28 U/L      Alkaline Phosphatase 77 U/L      Total Bilirubin <0.2 mg/dL      eGFR Non African Amer 82 mL/min/1.73      Globulin 3.2 gm/dL      A/G Ratio 1.0 g/dL      BUN/Creatinine Ratio 17.4     Anion Gap 8.0 mmol/L     Narrative:      GFR Normal >60  Chronic Kidney Disease <60  Kidney Failure <15      CBC (No Diff) [[id number]]  (Abnormal) Collected: 12/08/21 0504    Specimen: Blood Updated: 12/08/21 0526     WBC 9.44 10*3/mm3      RBC 4.28 10*6/mm3      Hemoglobin 12.8 g/dL      Hematocrit 41.3 %      MCV 96.5 fL      MCH 29.9 pg      MCHC 31.0 g/dL      RDW 13.6 %      RDW-SD 48.5 fl      MPV 10.9 fL      Platelets 269 10*3/mm3     Comprehensive Metabolic Panel [[id number]]  (Abnormal) Collected: 12/07/21 0509    Specimen: Blood Updated: 12/07/21 0559     Glucose 87 mg/dL      BUN 16 mg/dL      Creatinine 0.79 mg/dL      Sodium 142 mmol/L      Potassium 4.7 mmol/L      Comment: Slight hemolysis detected by analyzer. Results may be affected.        Chloride 107 mmol/L      CO2 27.0 mmol/L      Calcium 9.2 mg/dL      Total Protein 6.1 g/dL      Albumin 3.00 g/dL      ALT (SGPT) 16 U/L      AST (SGOT) 28 U/L      Alkaline Phosphatase 78 U/L      Total Bilirubin 0.2 mg/dL      eGFR Non African Amer 70 mL/min/1.73      Globulin 3.1 gm/dL      A/G Ratio 1.0 g/dL      BUN/Creatinine Ratio 20.3     Anion Gap 8.0 mmol/L     Narrative:      GFR Normal >60  Chronic Kidney Disease <60  Kidney Failure <15      CBC (No Diff) [[id number]]  (Abnormal) Collected: 12/07/21 0509    Specimen: Blood Updated: 12/07/21 0537     WBC 7.27 10*3/mm3      RBC 4.24 10*6/mm3      Hemoglobin 12.7 g/dL      Hematocrit 41.6 %      MCV 98.1 fL      MCH 30.0 pg      MCHC 30.5 g/dL      RDW 13.9 %      RDW-SD 50.8 fl      MPV 10.8 fL      Platelets 232  10*3/mm3     Urinalysis With Culture If Indicated - Urine, Catheter In/Out [[id number]]  (Abnormal) Collected: 12/06/21 1354    Specimen: Urine, Catheter In/Out Updated: 12/06/21 1445     Color, UA Dark Yellow     Appearance, UA Clear     pH, UA <=5.0     Specific Gravity, UA 1.020     Glucose, UA Negative     Ketones, UA Trace     Bilirubin, UA Negative     Blood, UA Negative     Protein, UA Negative     Leuk Esterase, UA Trace     Nitrite, UA Negative     Urobilinogen, UA 0.2 E.U./dL    Urinalysis, Microscopic Only - Urine, Catheter In/Out [[id number]]  (Abnormal) Collected: 12/06/21 1354    Specimen: Urine, Catheter In/Out Updated: 12/06/21 1445     RBC, UA None Seen /HPF      WBC, UA 3-5 /HPF      Bacteria, UA None Seen /HPF      Squamous Epithelial Cells, UA 0-2 /HPF      Hyaline Casts, UA 3-6 /LPF      Methodology Manual Light Microscopy    COVID PRE-OP / PRE-PROCEDURE SCREENING ORDER (NO ISOLATION) - Swab, Nasal Cavity [[id number]]  (Normal) Collected: 12/06/21 1259    Specimen: Swab from Nasal Cavity Updated: 12/06/21 1414    Narrative:      The following orders were created for panel order COVID PRE-OP / PRE-PROCEDURE SCREENING ORDER (NO ISOLATION) - Swab, Nasal Cavity.  Procedure                               Abnormality         Status                     ---------                               -----------         ------                     COVID-19,[facility] IN-PAT...[[id number]]  Normal              Final result                 Please view results for these tests on the individual orders.    COVID-19,[facility] IN-HOUSE,Nasal Swab No Transport Media 3-4 HR TAT - Swab, Nasal Cavity [[id number]]  (Normal) Collected: 12/06/21 1259    Specimen: Swab from Nasal Cavity Updated: 12/06/21 1414     COVID19 Not Detected    Narrative:      Fact sheet for providers: https://www.fda.gov/media/768200/download     Fact sheet for patients: https://www.fda.gov/media/876274/download    Test performed by PCR.    Consider negative  results in combination with clinical observations, patient history, and epidemiological information.    TSH [[id number]]  (Normal) Collected: 12/06/21 1256    Specimen: Blood Updated: 12/06/21 1335     TSH 3.020 uIU/mL     Comprehensive Metabolic Panel [[id number]]  (Abnormal) Collected: 12/06/21 1256    Specimen: Blood Updated: 12/06/21 1335     Glucose 89 mg/dL      BUN 23 mg/dL      Creatinine 1.03 mg/dL      Sodium 140 mmol/L      Potassium 5.1 mmol/L      Comment: Slight hemolysis detected by analyzer. Results may be affected.        Chloride 103 mmol/L      CO2 27.0 mmol/L      Calcium 9.6 mg/dL      Total Protein 6.6 g/dL      Albumin 3.50 g/dL      ALT (SGPT) 15 U/L      AST (SGOT) 30 U/L      Comment: Slight hemolysis detected by analyzer. Results may be affected.        Alkaline Phosphatase 86 U/L      Total Bilirubin 0.2 mg/dL      eGFR Non African Amer 52 mL/min/1.73      Globulin 3.1 gm/dL      A/G Ratio 1.1 g/dL      BUN/Creatinine Ratio 22.3     Anion Gap 10.0 mmol/L     Narrative:      GFR Normal >60  Chronic Kidney Disease <60  Kidney Failure <15      T4, Free [[id number]]  (Normal) Collected: 12/06/21 1256    Specimen: Blood Updated: 12/06/21 1335     Free T4 1.09 ng/dL     Narrative:      Results may be falsely increased if patient taking Biotin.      Procalcitonin [[id number]]  (Normal) Collected: 12/06/21 1256    Specimen: Blood Updated: 12/06/21 1334     Procalcitonin 0.03 ng/mL     Narrative:      As a Marker for Sepsis (Non-Neonates):     1. <0.5 ng/mL represents a low risk of severe sepsis and/or septic shock.  2. >2 ng/mL represents a high risk of severe sepsis and/or septic shock.    As a Marker for Lower Respiratory Tract Infections that require antibiotic therapy:  PCT on Admission     Antibiotic Therapy             6-12 Hrs later  >0.5                          Strongly Recommended            >0.25 - <0.5             Recommended  0.1 - 0.25                  Discouraged                        "Remeasure/reassess PCT  <0.1                         Strongly Discouraged         Remeasure/reassess PCT      As 28 day mortality risk marker: \"Change in Procalcitonin Result\" (>80% or <=80%) if Day 0 (or Day 1) and Day 4 values are available. Refer to http://www.[facility]-pct-calculator.com/    Change in PCT <=80 %   A decrease of PCT levels below or equal to 80% defines a positive change in PCT test result representing a higher risk for 28-day all-cause mortality of patients diagnosed with severe sepsis or septic shock.    Change in PCT >80 %   A decrease of PCT levels of more than 80% defines a negative change in PCT result representing a lower risk for 28-day all-cause mortality of patients diagnosed with severe sepsis or septic shock.                C-reactive Protein [[id number]]  (Abnormal) Collected: 12/06/21 1256    Specimen: Blood Updated: 12/06/21 1334     C-Reactive Protein 4.08 mg/dL     Troponin [[id number]]  (Normal) Collected: 12/06/21 1256    Specimen: Blood Updated: 12/06/21 1331     Troponin T <0.010 ng/mL     Narrative:      Troponin T Reference Range:  <= 0.03 ng/mL-   Negative for AMI  >0.03 ng/mL-     Abnormal for myocardial necrosis.  Clinicians would have to utilize clinical acumen, EKG, Troponin and serial changes to determine if it is an Acute Myocardial Infarction or myocardial injury due to an underlying chronic condition.       Results may be falsely decreased if patient taking Biotin.      BNP [[id number]]  (Normal) Collected: 12/06/21 1256    Specimen: Blood Updated: 12/06/21 1330     proBNP 1,235.0 pg/mL     Narrative:      Among patients with dyspnea, NT-proBNP is highly sensitive for the detection of acute congestive heart failure. In addition NT-proBNP of <300 pg/ml effectively rules out acute congestive heart failure with 99% negative predictive value.    Results may be falsely decreased if patient taking Biotin.      Lipase [[id number]]  (Normal) Collected: 12/06/21 1256    Specimen: " Blood Updated: 12/06/21 1329     Lipase 16 U/L     Magnesium [[id number]]  (Normal) Collected: 12/06/21 1256    Specimen: Blood Updated: 12/06/21 1328     Magnesium 1.9 mg/dL     D-dimer, Quantitative [[id number]]  (Abnormal) Collected: 12/06/21 1256    Specimen: Blood Updated: 12/06/21 1318     D-Dimer, Quantitative 1.01 mg/L (FEU)     Narrative:      Reference Range is 0-0.50 mg/L FEU. However, results <0.50 mg/L FEU tends to rule out DVT or PE. Results >0.50 mg/L FEU are not useful in predicting absence or presence of DVT or PE.      Protime-INR [[id number]]  (Normal) Collected: 12/06/21 1256    Specimen: Blood Updated: 12/06/21 1318     Protime 12.6 Seconds      INR 0.98    aPTT [[id number]]  (Abnormal) Collected: 12/06/21 1256    Specimen: Blood Updated: 12/06/21 1318     PTT 39.3 seconds     CBC & Differential [[id number]]  (Abnormal) Collected: 12/06/21 1256    Specimen: Blood Updated: 12/06/21 1308    Narrative:      The following orders were created for panel order CBC & Differential.  Procedure                               Abnormality         Status                     ---------                               -----------         ------                     CBC Auto Differential[[id number]]        Abnormal            Final result                 Please view results for these tests on the individual orders.    CBC Auto Differential [[id number]]  (Abnormal) Collected: 12/06/21 1256    Specimen: Blood Updated: 12/06/21 1308     WBC 8.17 10*3/mm3      RBC 4.41 10*6/mm3      Hemoglobin 13.4 g/dL      Hematocrit 43.0 %      MCV 97.5 fL      MCH 30.4 pg      MCHC 31.2 g/dL      RDW 14.0 %      RDW-SD 50.3 fl      MPV 11.2 fL      Platelets 240 10*3/mm3      Neutrophil % 66.2 %      Lymphocyte % 23.1 %      Monocyte % 9.2 %      Eosinophil % 0.9 %      Basophil % 0.2 %      Immature Grans % 0.4 %      Neutrophils, Absolute 5.41 10*3/mm3      Lymphocytes, Absolute 1.89 10*3/mm3      Monocytes, Absolute 0.75 10*3/mm3       "Eosinophils, Absolute 0.07 10*3/mm3      Basophils, Absolute 0.02 10*3/mm3      Immature Grans, Absolute 0.03 10*3/mm3      nRBC 0.0 /100 WBC     Blood Gas, Arterial - [[id number]]  (Abnormal) Collected: 21 1254    Specimen: Arterial Blood Updated: 21 1250     Site Right Radial     Darren's Test N/A     pH, Arterial 7.384 pH units      pCO2, Arterial 44.6 mm Hg      pO2, Arterial 46.7 mm Hg      Comment: 85 Value below critical limit        HCO3, Arterial 26.6 mmol/L      Comment: 83 Value above reference range        Base Excess, Arterial 1.1 mmol/L      O2 Saturation, Arterial 81.8 %      Comment: 84 Value below reference range        Temperature 37.0 C      Barometric Pressure for Blood Gas 755 mmHg      Modality Room Air     Ventilator Mode NA     Notified Who DR GARCIA     Notified By 2012     Notified Time 2021 12:59     Collected by 2012     Comment: Meter: [id number]     :  2012        pCO2, Temperature Corrected 44.6 mm Hg      pH, Temp Corrected 7.384 pH Units      pO2, Temperature Corrected 46.7 mm Hg               Objective:     Vitals: /58 (BP Location: Left arm, Patient Position: Lying)   Pulse 69   Temp 97.7 °F (36.5 °C) (Oral)   Resp 16   Ht 160 cm (63\")   Wt 65.4 kg (144 lb 3.2 oz)   SpO2 94%   BMI 25.54 kg/m²      Intake/Output Summary (Last 24 hours) at 2021 0812  Last data filed at 2021 0744  Gross per 24 hour   Intake 300 ml   Output 1500 ml   Net -1200 ml    Temp (24hrs), Av.8 °F (36.6 °C), Min:97.6 °F (36.4 °C), Max:98.1 °F (36.7 °C)      Physical Exam  Vitals reviewed.   Constitutional:       Appearance: Normal appearance. She is not ill-appearing.   HENT:      Head: Normocephalic and atraumatic.   Eyes:      General:         Right eye: No discharge.         Left eye: No discharge.      Extraocular Movements: Extraocular movements intact.      Conjunctiva/sclera: Conjunctivae normal.   Cardiovascular:      Rate and Rhythm: " Normal rate and regular rhythm.      Pulses: Normal pulses.      Heart sounds: No murmur heard.      Pulmonary:      Effort: Pulmonary effort is normal. No respiratory distress.      Breath sounds: Rales (Best appreciated in the right posterior lower aspect) present.   Abdominal:      General: Abdomen is flat. Bowel sounds are normal. There is no distension.   Musculoskeletal:      Right lower leg: No edema.      Left lower leg: No edema.   Skin:     Capillary Refill: Capillary refill takes less than 2 seconds.   Neurological:      General: No focal deficit present.      Mental Status: She is alert.   Psychiatric:         Mood and Affect: Mood normal.         Behavior: Behavior normal.             Assessment and Plan:     Primary Problem:  Pneumonia due to infectious organism    Hospital Problem list:    Pneumonia due to infectious organism    Chronic back pain    Essential hypertension      PMH:  Past Medical History:   Diagnosis Date   • CAD in native artery 7/29/2019   • COPD (chronic obstructive pulmonary disease) ([facility])    • Essential hypertension 12/7/2021   • GERD (gastroesophageal reflux disease)    • Lung nodule        Treatment Plan:  Pneumonia: Appears to be community-acquired pneumonia.  Given her allergies she was started on azithromycin and Rocephin in the emergency department.  She will be on ipratropium nebulizer treatments as well.  RT following.  -Continue azithromycin and Rocephin started 12/6/2021, to finish 12/10/2021  -Currently on 3 L via nasal cannula, not on oxygen at home.     Debility: Seems to be worse from her baseline.  PT/OT evaluation performed.  Will need assist at home, case management consulted to assist with discharge planning as she does not want to go to a rehab facility but does also not want to go to her daughter's house.  She is unable to have 24-hour care at her house, but does want to return home.  -She has agreed to stay at her daughter's house in [place], [place] for 2  weeks.  She will need home health in [place].     Hypertension: Continue home medication.     CODE STATUS: Full.     Disposition: Inpatient for increased oxygen requirement, anticipate discharge by 12/10/2021.    Reviewed treatment plans with the patient and/or family.   30 minutes spent in face to face interaction and coordination of care.     Electronically signed by Anupam Ledezma MD on 12/9/2021 at 08:12 CST

## 2021-12-09 NOTE — PLAN OF CARE
Goal Outcome Evaluation:  Plan of Care Reviewed With: patient, daughter        Progress: improving  Outcome Summary: OT tx completed on this date. Pt is A&Ox4. Pt sitting up in recliner in her room. Pt is on 3L/NC with O2 sats remaining in the upper 90s during all aspects of tx. Pt complete sit to stands and static standing activities with RW for UE support to increase standing tolerance for ADLs requiring SBA and tolerating standing for 3-4 minute intervals x 2 episodes. Pt completed functional mobility in her room with transitional movements and change in thresholds with RW for UE support requiring SBA and no losses of balance. Pt completed toileting tasks with toilet and grab bar in BR and ambulatory level grooming tasks of washing hands and face at sink level with RW for UE support requiring SBA for all aspects. Pt’s daughter present during all aspects of tx. Pt will be discharging to her daughters home and receive HH. OT POC to continue until discharged.

## 2021-12-09 NOTE — PLAN OF CARE
Goal Outcome Evaluation:  Plan of Care Reviewed With: (P) patient    Problem: Adult Inpatient Plan of Care  Goal: Plan of Care Review  Flowsheets (Taken 12/9/2021 1042)  Plan of Care Reviewed With: patient (Pended)  Outcome Summary: PT TX completed. Pt has no c/o this am. Pt was positioned in chair on 3 L O2 upon arrival. Supervision sit<>stand T/F, CG/VC amb 150', pt required VC to increase trunk extension and handling of RW. Pt able to correct and maintain once cues given. Pt would benefit with HH to increase endurance and oxygen therapy. (Pended)

## 2021-12-09 NOTE — PLAN OF CARE
Goal Outcome Evaluation:  Plan of Care Reviewed With: patient, daughter           Outcome Summary: Uneventful night, ambulating to bathroom with assistance of daughter, natalie c/o

## 2021-12-09 NOTE — THERAPY TREATMENT NOTE
Acute Care - Occupational Therapy Treatment Note  [facility]     Patient Name: Pattie Liu  : 1945  MRN: [id number]  Today's Date: 2021             Admit Date: 2021       ICD-10-CM ICD-9-CM   1. Pneumonia due to infectious organism, unspecified laterality, unspecified part of lung  J18.9 486   2. Hypoxic  R09.02 799.02   3. Decreased activities of daily living (ADL)  Z78.9 V49.89   4. Impaired mobility  Z74.09 799.89     Patient Active Problem List   Diagnosis   • Frequent PVCs   • Shortness of breath   • SOB (shortness of breath)   • CAD in native artery   • PVD (peripheral vascular disease) ([facility])   • Pneumonia due to infectious organism   • Chronic back pain   • Essential hypertension     Past Medical History:   Diagnosis Date   • CAD in native artery 2019   • COPD (chronic obstructive pulmonary disease) ([facility])    • Essential hypertension 2021   • GERD (gastroesophageal reflux disease)    • Lung nodule      Past Surgical History:   Procedure Laterality Date   • BREAST IMPLANT REMOVAL     • BREAST TISSUE EXPANDER REMOVAL INSERTION OF IMPLANT     • CARDIAC CATHETERIZATION N/A 2019    Procedure: Left Heart Cath;  Surgeon: Edi Armijo MD;  Location: [facility] INVASIVE LOCATION;  Service: Cardiology   • CHOLECYSTECTOMY     • HYSTERECTOMY     • MASTECTOMY      BILATERAL   • OOPHORECTOMY           OT ASSESSMENT FLOWSHEET (last 12 hours)     OT Evaluation and Treatment     Row Name 21 1437                   OT Time and Intention    Subjective Information complains of; weakness; fatigue  -LS        Document Type therapy note (daily note)  -LS        Mode of Treatment occupational therapy  -LS        Patient Effort good  -LS                  General Information    Patient Profile Reviewed yes  -LS        Existing Precautions/Restrictions fall; oxygen therapy device and L/min  -LS        Barriers to Rehab medically complex  -LS                  Cognition    Orientation  Status (Cognition) oriented x 4  -LS                  Pain Assessment    Additional Documentation Pain Scale: Numbers Pre/Post-Treatment (Group)  -LS                  Pain Scale: Numbers Pre/Post-Treatment    Pretreatment Pain Rating 0/10 - no pain  -LS        Posttreatment Pain Rating 0/10 - no pain  -LS                  Functional Mobility    Functional Mobility- Ind. Level supervision required  -LS        Functional Mobility- Device rolling walker  -LS                  Transfer Assessment/Treatment    Transfers sit-stand transfer; stand-sit transfer  -LS                  Transfers    Sit-Stand Sebastian (Transfers) supervision  -LS        Stand-Sit Sebastian (Transfers) supervision  -LS        Sebastian Level (Toilet Transfer) standby assist  -LS        Assistive Device (Toilet Transfer) commode; grab bars/safety frame  -LS                  Toilet Transfer    Type (Toilet Transfer) sit-stand; stand-sit  -LS                  Balance    Balance Assessment sitting static balance; sitting dynamic balance; sit to stand dynamic balance; standing static balance; standing dynamic balance  -LS        Static Sitting Balance WFL  -LS        Dynamic Sitting Balance WFL  -LS        Sit to Stand Dynamic Balance WFL  -LS        Static Standing Balance WFL  -LS        Dynamic Standing Balance WFL  -LS                  Activities of Daily Living    BADL Assessment/Intervention toileting; grooming  -LS                  Grooming Assessment/Training    Sebastian Level (Grooming) wash face, hands  -LS        Position (Grooming) supported standing  -LS                  Toileting Assessment/Training    Sebastian Level (Toileting) toileting skills  -LS        Assistive Devices (Toileting) commode; grab bar/safety frame  -LS        Position (Toileting) supported sitting  -LS                  Plan of Care Review    Plan of Care Reviewed With patient; daughter  -LS        Progress improving  -LS        Outcome Summary OT tx  completed on this date. Pt is A&Ox4. Pt sitting up in recliner in her room. Pt is on 3L/NC with O2 sats remaining in the upper 90s during all aspects of tx. Pt complete sit to stands and static standing activities with RW for UE support to increase standing tolerance for ADLs requiring SBA and tolerating standing for 3-4 minute intervals x 2 episodes. Pt completed functional mobility in her room with transitional movements and change in thresholds with RW for UE support requiring SBA and no losses of balance. Pt completed toileting tasks with toilet and grab bar in BR and ambulatory level grooming tasks of washing hands and face at sink level with RW for UE support requiring SBA for all aspects. Pt’s daughter present during all aspects of tx. Pt will be discharging to her daughters home and receive HH. OT POC to continue until discharged.  -LS                  Vital Signs    Pre SpO2 (%) 93  93  -LS        O2 Delivery Pre Treatment nasal cannula  -LS        Intra SpO2 (%) 94  -LS        O2 Delivery Intra Treatment nasal cannula  -LS        Post SpO2 (%) 94  -LS        O2 Delivery Post Treatment nasal cannula  -LS        Pre Patient Position Sitting  -LS        Intra Patient Position Standing  -LS        Post Patient Position Sitting  -LS                  Positioning and Restraints    Pre-Treatment Position sitting in chair/recliner  -LS        Post Treatment Position chair  -LS        In Chair call light within reach; encouraged to call for assist; with family/caregiver  -                  Therapy Plan Review/Discharge Plan (OT)    Anticipated Discharge Disposition (OT) home with assist; home with home health  -              User Key  (r) = Recorded By, (t) = Taken By, (c) = Cosigned By    Initials Name Effective Dates    Brooklyn Lagos COTA 09/15/21 -                  Occupational Therapy Education                 Title: PT OT SLP Therapies (In Progress)     Topic: Occupational Therapy (In Progress)      Point: ADL training (Done)     Description:   Instruct learner(s) on proper safety adaptation and remediation techniques during self care or transfers.   Instruct in proper use of assistive devices.              Learning Progress Summary           Patient Acceptance, E, VU by ASHWIN at 12/9/2021 1553    Comment: Pt educated to on reaching back for safety with sit to stands and ADL transfers for maximum safety.    Acceptance, E, VU by LUIS MIGUEL at 12/7/2021 1437                   Point: Home exercise program (Not Started)     Description:   Instruct learner(s) on appropriate technique for monitoring, assisting and/or progressing therapeutic exercises/activities.              Learner Progress:  Not documented in this visit.          Point: Precautions (Done)     Description:   Instruct learner(s) on prescribed precautions during self-care and functional transfers.              Learning Progress Summary           Patient Acceptance, E, VU by LUIS MIGUEL at 12/7/2021 1437                   Point: Body mechanics (Not Started)     Description:   Instruct learner(s) on proper positioning and spine alignment during self-care, functional mobility activities and/or exercises.              Learner Progress:  Not documented in this visit.                      User Key     Initials Effective Dates Name Provider Type Discipline    LUIS MIGUEL 11/10/21 -  Emilie Hinds OTR/L, CSRS Occupational Therapist OT     09/15/21 -  Brooklyn Alba COTA Occupational Therapy Assistant THERAPIES                  OT Recommendation and Plan     Plan of Care Review  Plan of Care Reviewed With: patient, daughter  Progress: improving  Outcome Summary: OT tx completed on this date. Pt is A&Ox4. Pt sitting up in recliner in her room. Pt is on 3L/NC with O2 sats remaining in the upper 90s during all aspects of tx. Pt complete sit to stands and static standing activities with RW for UE support to increase standing tolerance for ADLs requiring SBA and tolerating standing for  3-4 minute intervals x 2 episodes. Pt completed functional mobility in her room with transitional movements and change in thresholds with RW for UE support requiring SBA and no losses of balance. Pt completed toileting tasks with toilet and grab bar in BR and ambulatory level grooming tasks of washing hands and face at sink level with RW for UE support requiring SBA for all aspects. Pt’s daughter present during all aspects of tx. Pt will be discharging to her daughters home and receive HH. OT POC to continue until discharged.  Plan of Care Reviewed With: patient, daughter  Outcome Summary: OT tx completed on this date. Pt is A&Ox4. Pt sitting up in recliner in her room. Pt is on 3L/NC with O2 sats remaining in the upper 90s during all aspects of tx. Pt complete sit to stands and static standing activities with RW for UE support to increase standing tolerance for ADLs requiring SBA and tolerating standing for 3-4 minute intervals x 2 episodes. Pt completed functional mobility in her room with transitional movements and change in thresholds with RW for UE support requiring SBA and no losses of balance. Pt completed toileting tasks with toilet and grab bar in BR and ambulatory level grooming tasks of washing hands and face at sink level with RW for UE support requiring SBA for all aspects. Pt’s daughter present during all aspects of tx. Pt will be discharging to her daughters home and receive HH. OT POC to continue until discharged.     Outcome Measures     Row Name 12/09/21 1500 12/08/21 1200          How much help from another is currently needed...    Putting on and taking off regular lower body clothing? 4  -LS 3  -TS     Bathing (including washing, rinsing, and drying) 4  -LS 3  -TS     Toileting (which includes using toilet bed pan or urinal) 4  -LS 4  -TS     Putting on and taking off regular upper body clothing 4  -LS 4  -TS     Taking care of personal grooming (such as brushing teeth) 4  -LS 4  -TS     Eating  meals 4  -LS 4  -TS     AM-PAC 6 Clicks Score (OT) 24  -LS 22  -TS           User Key  (r) = Recorded By, (t) = Taken By, (c) = Cosigned By    Initials Name Provider Type    Hellen Bardales COTA Occupational Therapy Assistant    Brooklyn Lagos COTA Occupational Therapy Assistant                Time Calculation:    Time Calculation- OT     Row Name 12/09/21 1555             Time Calculation- OT    OT Start Time 1437  -LS      OT Stop Time 1547  -      OT Time Calculation (min) 70 min  -      Total Timed Code Minutes- OT 70 minute(s)  -      OT Received On 12/09/21  -            User Key  (r) = Recorded By, (t) = Taken By, (c) = Cosigned By    Initials Name Provider Type    Brooklyn Lagos COTA Occupational Therapy Assistant              Therapy Charges for Today     Code Description Service Date Service Provider Modifiers Qty    12236658919 HC OT THERAPEUTIC ACT EA 15 MIN 12/9/2021 Brooklyn Alba COTA GO 3    38379400859 HC OT SELF CARE/MGMT/TRAIN EA 15 MIN 12/9/2021 Brooklyn Alba COTA GO 2               DWAYNE ANDERSON  12/9/2021

## 2021-12-09 NOTE — THERAPY TREATMENT NOTE
Acute Care - Physical Therapy Treatment Note  [facility]     Patient Name: Pattie Liu  : 1945  MRN: [id number]  Today's Date: 2021      Visit Dx:     ICD-10-CM ICD-9-CM   1. Pneumonia due to infectious organism, unspecified laterality, unspecified part of lung  J18.9 486   2. Hypoxic  R09.02 799.02   3. Decreased activities of daily living (ADL)  Z78.9 V49.89   4. Impaired mobility  Z74.09 799.89     Patient Active Problem List   Diagnosis   • Frequent PVCs   • Shortness of breath   • SOB (shortness of breath)   • CAD in native artery   • PVD (peripheral vascular disease) ([facility])   • Pneumonia due to infectious organism   • Chronic back pain   • Essential hypertension     Past Medical History:   Diagnosis Date   • CAD in native artery 2019   • COPD (chronic obstructive pulmonary disease) ([facility])    • Essential hypertension 2021   • GERD (gastroesophageal reflux disease)    • Lung nodule      Past Surgical History:   Procedure Laterality Date   • BREAST IMPLANT REMOVAL     • BREAST TISSUE EXPANDER REMOVAL INSERTION OF IMPLANT     • CARDIAC CATHETERIZATION N/A 2019    Procedure: Left Heart Cath;  Surgeon: Edi Armijo MD;  Location: [facility] INVASIVE LOCATION;  Service: Cardiology   • CHOLECYSTECTOMY     • HYSTERECTOMY     • MASTECTOMY      BILATERAL   • OOPHORECTOMY       PT Assessment (last 12 hours)     PT Evaluation and Treatment     Row Name 21 1010          Physical Therapy Time and Intention    Document Type therapy note (daily note) (P)   -DS     Mode of Treatment physical therapy (P)   -DS     Row Name 21 1010          General Information    Existing Precautions/Restrictions fall; oxygen therapy device and L/min (P)   3L  -DS     Row Name 21 1010          Pain Scale: Numbers Pre/Post-Treatment    Pretreatment Pain Rating 0/10 - no pain (P)   -DS     Row Name 21 1010          Bed Mobility    Comment (Bed Mobility) up in chair (P)   -DS     Row  Name 12/09/21 1010          Transfers    Transfers sit-stand transfer; stand-sit transfer (P)   -DS     Sit-Stand Bosque (Transfers) supervision (P)   -DS     Stand-Sit [place] (Transfers) supervision (P)   -DS     Row Name 12/09/21 1010          Sit-Stand Transfer    Assistive Device (Sit-Stand Transfers) walker, front-wheeled (P)   -DS     Row Name 12/09/21 1010          Stand-Sit Transfer    Assistive Device (Stand-Sit Transfers) walker, front-wheeled (P)   -DS     Row Name 12/09/21 1010          Gait/Stairs (Locomotion)    Bosque Level (Gait) verbal cues; contact guard (P)   -DS     Assistive Device (Gait) walker, front-wheeled (P)   -DS     Distance in Feet (Gait) 150' (P)   -DS     Deviations/Abnormal Patterns (Gait) gait speed decreased (P)   -DS     Bilateral Gait Deviations forward flexed posture (P)   -DS     Row Name 12/09/21 1010          Safety Issues, Functional Mobility    Safety Issues Affecting Function (Mobility) impulsivity; friction/shear risk (P)   -DS     Impairments Affecting Function (Mobility) balance; endurance/activity tolerance (P)   -DS     Row Name 12/09/21 1010          Motor Skills    Therapeutic Exercise aerobic (P)   -DS     Row Name 12/09/21 1010          Aerobic Exercise    Time Performed (Aerobic Exercise) AROM BLE X 20 (P)   -DS     Row Name 12/09/21 1010          Positioning and Restraints    Pre-Treatment Position sitting in chair/recliner (P)   -DS     Post Treatment Position chair (P)   -DS     In Chair sitting; call light within reach; encouraged to call for assist; with family/caregiver (P)   -DS           User Key  (r) = Recorded By, (t) = Taken By, (c) = Cosigned By    Initials Name Provider Type    Alvin Gunter PTA Student PTA Student                Physical Therapy Education                 Title: PT OT SLP Therapies (In Progress)     Topic: Physical Therapy (In Progress)     Point: Mobility training (Done)     Learning Progress Summary            Patient Acceptance, E, VU by SB at 12/7/2021 1551    Comment: pt edu on POC, benefits of act and d/c plans                   Point: Home exercise program (Not Started)     Learner Progress:  Not documented in this visit.          Point: Body mechanics (Done)     Learning Progress Summary           Patient Acceptance, E, VU by SB at 12/7/2021 1551    Comment: pt edu on POC, benefits of act and d/c plans                   Point: Precautions (Done)     Learning Progress Summary           Patient Acceptance, E, VU by SB at 12/7/2021 1551    Comment: pt edu on POC, benefits of act and d/c plans                               User Key     Initials Effective Dates Name Provider Type Discipline    SB 06/16/21 -  Marj Bowden, PT DPT Physical Therapist PT              PT Recommendation and Plan     Plan of Care Reviewed With: (P) patient  Progress: improving  Outcome Summary: (P) PT tx completed. Pt has no c/o this am. Pt was positioned in chair on 3 L O2 upon arrival. Supervision sit<>stand T/F, CG/VC amb 150', pt required VC to increase trunk extension and handling of RW. Pt able to correct and maintaine once cues given. Pt would benifit with HH to increase endurance and oxygen therapy.   Outcome Measures     Row Name 12/08/21 1200             How much help from another is currently needed...    Putting on and taking off regular lower body clothing? 3  -TS      Bathing (including washing, rinsing, and drying) 3  -TS      Toileting (which includes using toilet bed pan or urinal) 4  -TS      Putting on and taking off regular upper body clothing 4  -TS      Taking care of personal grooming (such as brushing teeth) 4  -TS      Eating meals 4  -TS      AM-PAC 6 Clicks Score (OT) 22  -TS            User Key  (r) = Recorded By, (t) = Taken By, (c) = Cosigned By    Initials Name Provider Type    TS Hellen Bai COTA Occupational Therapy Assistant                 Time Calculation:    PT Charges     Row Name 12/09/21  1051             Time Calculation    Start Time 1010 (P)   -DS      Stop Time 1033 (P)   -DS      Time Calculation (min) 23 min (P)   -DS      PT Received On 12/09/21 (P)   -DS              Time Calculation- PT    Total Timed Code Minutes- PT 23 minute(s) (P)   -DS            User Key  (r) = Recorded By, (t) = Taken By, (c) = Cosigned By    Initials Name Provider Type    Alvin Gunter PTA Student PTA Student                  PT G-Codes  Outcome Measure Options: AM-PAC 6 Clicks Basic Mobility (PT)  AM-PAC 6 Clicks Score (PT): 19  AM-PAC 6 Clicks Score (OT): 22    Alvin Stuart PTA Student  12/9/2021

## 2021-12-09 NOTE — PLAN OF CARE
Goal Outcome Evaluation:  Plan of Care Reviewed With: patient        Progress: improving  Outcome Summary: VSS. IV ABX cont. Denies pain during shift. Possible DC tomorrow.

## 2021-12-10 ENCOUNTER — READMISSION MANAGEMENT (OUTPATIENT)
Dept: CALL CENTER | Facility: HOSPITAL | Age: 76
End: 2021-12-10

## 2021-12-10 VITALS
OXYGEN SATURATION: 90 % | WEIGHT: 144.19 LBS | DIASTOLIC BLOOD PRESSURE: 59 MMHG | RESPIRATION RATE: 16 BRPM | BODY MASS INDEX: 25.55 KG/M2 | SYSTOLIC BLOOD PRESSURE: 149 MMHG | HEART RATE: 73 BPM | HEIGHT: 63 IN | TEMPERATURE: 97.9 F

## 2021-12-10 LAB
ALBUMIN SERPL-MCNC: 3.5 G/DL (ref 3.5–5.2)
ALBUMIN/GLOB SERPL: 1.4 G/DL
ALP SERPL-CCNC: 77 U/L (ref 39–117)
ALT SERPL W P-5'-P-CCNC: 15 U/L (ref 1–33)
ANION GAP SERPL CALCULATED.3IONS-SCNC: 8 MMOL/L (ref 5–15)
AST SERPL-CCNC: 20 U/L (ref 1–32)
BILIRUB SERPL-MCNC: 0.3 MG/DL (ref 0–1.2)
BUN SERPL-MCNC: 13 MG/DL (ref 8–23)
BUN/CREAT SERPL: 20.3 (ref 7–25)
CALCIUM SPEC-SCNC: 9.8 MG/DL (ref 8.6–10.5)
CHLORIDE SERPL-SCNC: 105 MMOL/L (ref 98–107)
CO2 SERPL-SCNC: 30 MMOL/L (ref 22–29)
CREAT SERPL-MCNC: 0.64 MG/DL (ref 0.57–1)
DEPRECATED RDW RBC AUTO: 46.7 FL (ref 37–54)
ERYTHROCYTE [DISTWIDTH] IN BLOOD BY AUTOMATED COUNT: 13.2 % (ref 12.3–15.4)
GFR SERPL CREATININE-BSD FRML MDRD: 90 ML/MIN/1.73
GLOBULIN UR ELPH-MCNC: 2.5 GM/DL
GLUCOSE SERPL-MCNC: 102 MG/DL (ref 65–99)
HCT VFR BLD AUTO: 42.2 % (ref 34–46.6)
HGB BLD-MCNC: 13.7 G/DL (ref 12–15.9)
MCH RBC QN AUTO: 31.1 PG (ref 26.6–33)
MCHC RBC AUTO-ENTMCNC: 32.5 G/DL (ref 31.5–35.7)
MCV RBC AUTO: 95.9 FL (ref 79–97)
PLATELET # BLD AUTO: 393 10*3/MM3 (ref 140–450)
PMV BLD AUTO: 10.7 FL (ref 6–12)
POTASSIUM SERPL-SCNC: 4.5 MMOL/L (ref 3.5–5.2)
PROT SERPL-MCNC: 6 G/DL (ref 6–8.5)
RBC # BLD AUTO: 4.4 10*6/MM3 (ref 3.77–5.28)
SODIUM SERPL-SCNC: 143 MMOL/L (ref 136–145)
WBC NRBC COR # BLD: 8.58 10*3/MM3 (ref 3.4–10.8)

## 2021-12-10 PROCEDURE — 97530 THERAPEUTIC ACTIVITIES: CPT

## 2021-12-10 PROCEDURE — 94799 UNLISTED PULMONARY SVC/PX: CPT

## 2021-12-10 PROCEDURE — 97110 THERAPEUTIC EXERCISES: CPT

## 2021-12-10 PROCEDURE — 85027 COMPLETE CBC AUTOMATED: CPT | Performed by: FAMILY MEDICINE

## 2021-12-10 PROCEDURE — 97116 GAIT TRAINING THERAPY: CPT

## 2021-12-10 PROCEDURE — 94618 PULMONARY STRESS TESTING: CPT

## 2021-12-10 PROCEDURE — 80053 COMPREHEN METABOLIC PANEL: CPT | Performed by: FAMILY MEDICINE

## 2021-12-10 RX ORDER — PREDNISONE 10 MG/1
TABLET ORAL
Qty: 14 TABLET | Refills: 0 | Status: SHIPPED | OUTPATIENT
Start: 2021-12-10 | End: 2021-12-22

## 2021-12-10 RX ADMIN — ALPRAZOLAM 0.5 MG: 0.5 TABLET ORAL at 08:10

## 2021-12-10 RX ADMIN — ASPIRIN 81 MG: 81 TABLET, COATED ORAL at 08:11

## 2021-12-10 RX ADMIN — SODIUM CHLORIDE, PRESERVATIVE FREE 10 ML: 5 INJECTION INTRAVENOUS at 08:11

## 2021-12-10 RX ADMIN — IPRATROPIUM BROMIDE 0.5 MG: 0.5 SOLUTION RESPIRATORY (INHALATION) at 07:16

## 2021-12-10 RX ADMIN — IPRATROPIUM BROMIDE 0.5 MG: 0.5 SOLUTION RESPIRATORY (INHALATION) at 10:30

## 2021-12-10 RX ADMIN — GABAPENTIN 600 MG: 300 CAPSULE ORAL at 12:24

## 2021-12-10 RX ADMIN — NYSTATIN 1 APPLICATION: 100000 POWDER TOPICAL at 08:11

## 2021-12-10 RX ADMIN — LISINOPRIL 20 MG: 20 TABLET ORAL at 08:10

## 2021-12-10 RX ADMIN — GABAPENTIN 900 MG: 300 CAPSULE ORAL at 08:10

## 2021-12-10 RX ADMIN — ATORVASTATIN CALCIUM 20 MG: 10 TABLET, FILM COATED ORAL at 08:10

## 2021-12-10 NOTE — DISCHARGE PLACEMENT REQUEST
"Pattie Solomon (76 y.o. Female)             Date of Birth Social Security Number Address Home Phone MRN    1945  [address]  [facility] [address] [phone] [id number]    Hoahaoism Marital Status             Shinto        Admission Date Admission Type Admitting Provider Attending Provider Department, Room/Bed    12/6/21 Emergency [place], MD [place] Iqbal Andrew W, MD [facility] 4B, 445/1    Discharge Date Discharge Disposition Discharge Destination           Home or Self Care              Attending Provider: Anupam Ledezma MD    Allergies: Codeine, Hydrocodone-acetaminophen, Levofloxacin, Oxycodone-acetaminophen, Tramadol, Zanaflex  [Tizanidine Hcl], Albuterol    Isolation: None   Infection: None   Code Status: CPR   Advance Care Planning Activity    Ht: 160 cm (63\")   Wt: 65.4 kg (144 lb 3 oz)    Admission Cmt: None   Principal Problem: Pneumonia due to infectious organism [J18.9]                 Active Insurance as of 12/6/2021     Primary Coverage     Payor Plan Insurance Group Employer/Plan Group    MEDICARE MEDICARE A & B      Payor Plan Address Payor Plan Phone Number Payor Plan Fax Number Effective Dates    [address] [phone]  3/1/2008 - None Entered    [facility] [id number]       Subscriber Name Subscriber Birth Date Member ID       PATTIE SOLOMON 1945 [id number]           Secondary Coverage     Payor Plan Insurance Group Employer/Plan Group    [employer] SUP AAR HEALTH CARE OPTIONS      Payor Plan Address Payor Plan Phone Number Payor Plan Fax Number Effective Dates    [facility] [phone]  1/1/2020 - None Entered    [address]       [facility] [address]       Subscriber Name Subscriber Birth Date Member ID       PATTIE SOLOMON 1945 [id number]                 Emergency Contacts      (Rel.) Home Phone Work Phone Mobile Phone    CANDIDO GUADALUPE (Daughter) [phone] -- --        Home Nebulizer [] (Order " [id number])  Order  Date: 12/10/2021 Department: [address] Ordering/Authorizing: Anupam Ledezma MD       Order History  Outpatient  Date/Time Action Taken User Additional Information   12/10/21 1237 Sign Светлана Luong RN Ordering Mode: Telephone with readback   12/10/21 1257 Verbal Cosign Anupam Ledezma MD      Order Details    Frequency Duration Priority Order Class   None None Routine External     Start Date/Time    Start Date   12/10/21     Order Information    Order Date Service Start Date Start Time   12/10/21 Medicine 12/10/21      Reference Links    Associated Reports   View Encounter     Order Questions    Question Answer   Nebulizer Equipment:  Nebulizer w/ Compressor   Nebulizer Accessories  Nebulizer Kit - Administration Set, Non-Disposable   Length of Need (99 Months = Lifetime) 99 Months = Lifetime   Note: Custom values to enter length of need for DME            Verbal Order Info    Action Created on Order Mode Entered by Responsible Provider Signed by Signed on   Ordering 12/10/21 1237 Telephone with readback Светлана Luong RN Kinchen, Andrew W, MD Kinchen, Andrew W, MD 12/10/21 1257     Source Order Set / Preference List    Preference List   AMB SUPPLIES [1416]     Clinical Indications     ICD-10-CM ICD-9-CM   Pneumonia of both lower lobes due to infectious organism     J18.9 486     Reprint Order Requisition    Home Nebulizer (Order #[id number]) on 12/10/21     Encounter    View Encounter            Order Provider Info        Office phone Pager E-mail   Ordering User Светлана Luong RN -- -- --   Authorizing Provider Anupam Ledezma [phone] -- --   Attending Provider Anupam Ledezma [phone] -- --   Entered By Светлана Luong RN -- -- --   Ordering Provider Anupam Ledezma [phone] -- --   Signed By (on 12/10/2021 1257) Anupam Ledezma [phone] -- --     Tracking Reports    Cosign Tracking Order Transmittal Tracking    Authorized by:  Anupam Ledezma MD  (NPI: [id number])           Lab Component SmartPhrase Guide    Home Nebulizer (Order #[id number]) on 12/10/21     Oxygen Therapy [EQ60] (Order [id number])  Order  Date: 12/10/2021 Department: [address] Ordering/Authorizing: Anupam Ledezma MD       Order History  Outpatient  Date/Time Action Taken User Additional Information   12/10/21 1231 Sign Светлана Luong RN Ordering Mode: Verbal with readback   12/10/21 1257 Verbal Cosign Anupam Ledezma MD      Order Details    Frequency Duration Priority Order Class   None None Routine External     Start Date/Time    Start Date   12/10/21     Order Information    Order Date Service Start Date Start Time   12/10/21 Medicine 12/10/21      Comments    Oxygen per NC, 2 liters at rest / 4 liters with exertion.            Reference Links    Associated Reports   View Encounter     Order Questions    Question Answer   Delivery Modality Nasal Cannula   Liters Per Minute: 2   Duration: Continuous   Equipment  Oxygen Concentrator &  &  Portable Gaseous Oxygen System & Portable Oxygen Contents Gaseous &  Conserving Regulator   Length of Need (99 Months = Lifetime) 99 Months = Lifetime   Note: Custom values to enter length of need for DME            Verbal Order Info    Action Created on Order Mode Entered by Responsible Provider Signed by Signed on   Ordering 12/10/21 1231 Verbal with readback Светлана Luong RN Kinchen, Andrew W, MD Kinchen, Andrew W, MD 12/10/21 1257     Source Order Set / Preference List    Preference List   AMB SUPPLIES [1416]     Clinical Indications     ICD-10-CM ICD-9-CM   Pneumonia due to infectious organism, unspecified laterality, unspecified part of lung  - Primary     J18.9 486     Reprint Order Requisition    Oxygen Therapy (Order #[id number]) on 12/10/21     Encounter    View Encounter            Order Provider Info        Office phone Pager E-mail   Ordering User Светлана Luong  MISTY SCHWARTZ -- -- --   Authorizing Provider Anupam Ledezma [phone] -- --   Attending Provider Anupam Ledezma [phone] -- --   Entered By Светлана Luong RN -- -- --   Ordering Provider Anupam Ledezma [phone] -- --   Signed By (on 12/10/2021 1257) Anupam Ledezma -[phone] -- --     Tracking Reports    Cosign Tracking Order Transmittal Tracking   Authorized by:  Anupam Ledezma MD  (NPI: [id number])           Lab Component SmartPhrase Guide    Oxygen Therapy (Order #[id number]) on 12/10/21            History & Physical      Anupam Ledezma MD at 12/07/21 0832            History and Physical    Patient:  Pattie Liu  MRN: [id number]    CHIEF COMPLAINT: Shortness of breath    History Obtained From: the patient   PCP: Rafat Espinoza MD    HISTORY OF PRESENT ILLNESS:   The patient is a 76 y.o. female who presents with Shortness of breath that began around 12/1/2021.  She states she received the COVID-19 booster 1 that day and started noticing some shortness of breath and weakness approximately 4 hours after receiving the vaccine.  She continued to do her normal activities of daily living, but noticed persistent weakness and shortness of breath.  She had a cough that began as well during this time.  She presented to the emergency department on 12/6/2021 where she was found to have significant hypoxia.  CTA chest negative for PE, but with evidence of right-sided infiltrate concerning for community-acquired pneumonia.  She was given a dose of azithromycin and Rocephin and a breathing treatment in the emergency department with interval improvement.  She does not have an oxygen requirement at baseline, but was requiring oxygen via nasal cannula at the time of admission.    REVIEW OF SYSTEMS:    Review of Systems   Constitutional: Positive for activity change, appetite change and fatigue. Negative for fever.   HENT: Positive for congestion.    Respiratory: Positive for cough,  shortness of breath and wheezing.    Cardiovascular: Negative for chest pain, palpitations and leg swelling.   Gastrointestinal: Negative for abdominal pain, nausea and vomiting.   Genitourinary: Negative for difficulty urinating.   Skin: Negative for color change and pallor.   Neurological: Positive for weakness. Negative for dizziness.          Past Medical History:  Past Medical History:   Diagnosis Date   • CAD in native artery 7/29/2019   • COPD (chronic obstructive pulmonary disease) (HCC)    • Essential hypertension 12/7/2021   • GERD (gastroesophageal reflux disease)    • Lung nodule        Past Surgical History:  Past Surgical History:   Procedure Laterality Date   • BREAST IMPLANT REMOVAL     • BREAST TISSUE EXPANDER REMOVAL INSERTION OF IMPLANT     • CARDIAC CATHETERIZATION N/A 6/21/2019    Procedure: Left Heart Cath;  Surgeon: Edi Armijo MD;  Location: [facility] CATH INVASIVE LOCATION;  Service: Cardiology   • CHOLECYSTECTOMY     • HYSTERECTOMY     • MASTECTOMY      BILATERAL   • OOPHORECTOMY         Medications Prior to Admission:    Medications Prior to Admission   Medication Sig Dispense Refill Last Dose   • aspirin 81 MG tablet Take 1 tablet by mouth Daily. 30 tablet 11 12/6/2021 at Unknown time   • atorvastatin (LIPITOR) 20 MG tablet Take 1 tablet by mouth Daily. 30 tablet 11 12/5/2021 at Unknown time   • Calcium Carb-Cholecalciferol (CALCIUM 1000 + D PO) Take  by mouth Daily.   Patient Taking Differently   • Cyanocobalamin (B-12) 5000 MCG capsule Take  by mouth Daily.   12/6/2021 at Unknown time   • Fexofenadine HCl (ALLEGRA ALLERGY PO) Take 4 mg by mouth Daily.   12/6/2021 at Unknown time   • Fluticasone-Umeclidin-Vilant (TRELEGY ELLIPTA IN) Inhale Daily.   12/6/2021 at Unknown time   • gabapentin (NEURONTIN) 600 MG tablet Take 600 mg by mouth 3 (Three) Times a Day. 1 1/2 tabs in am  1 tablet at noon  1 1/2 tabs at bedtime   12/6/2021 at Unknown time   • lisinopril (PRINIVIL,ZESTRIL) 10  "MG tablet Take 20 mg by mouth Daily.   12/6/2021 at Unknown time   • ALPRAZolam (XANAX) 0.5 MG tablet Take 0.5 mg by mouth 2 (Two) Times a Day As Needed for Anxiety.   Unknown at Unknown time   • ibuprofen (ADVIL,MOTRIN) 200 MG tablet Take 200 mg by mouth Every 6 (Six) Hours As Needed for Mild Pain .   Unknown at Unknown time   • ipratropium (ATROVENT HFA) 17 MCG/ACT inhaler Inhale 2 puffs 4 (Four) Times a Day.   Unknown at Unknown time   • naproxen sodium (ALEVE) 220 MG tablet Take 220 mg by mouth 2 (Two) Times a Day As Needed.          Allergies:  Codeine, Hydrocodone-acetaminophen, Levofloxacin, Oxycodone-acetaminophen, Tramadol, Zanaflex  [tizanidine hcl], and Albuterol    Social History:   Social History     Socioeconomic History   • Marital status:    Tobacco Use   • Smoking status: Current Every Day Smoker     Packs/day: 0.50     Years: 62.00     Pack years: 31.00     Types: Cigarettes   • Smokeless tobacco: Never Used   • Tobacco comment: states she has no plan to quit smoking   Vaping Use   • Vaping Use: Former   Substance and Sexual Activity   • Alcohol use: No   • Drug use: No   • Sexual activity: Not Currently       Family History:   Family History   Problem Relation Age of Onset   • Cancer Mother    • Cancer Father            Physical Exam:    Vitals: /48 (BP Location: Left arm, Patient Position: Sitting)   Pulse 65   Temp 97.9 °F (36.6 °C) (Oral)   Resp 16   Ht 160 cm (63\")   Wt 72.3 kg (159 lb 4.8 oz)   SpO2 97%   BMI 28.22 kg/m²   Physical Exam  Vitals reviewed.   Constitutional:       Appearance: Normal appearance. She is not ill-appearing.   HENT:      Head: Normocephalic and atraumatic.   Eyes:      General:         Right eye: No discharge.         Left eye: No discharge.      Extraocular Movements: Extraocular movements intact.      Conjunctiva/sclera: Conjunctivae normal.   Cardiovascular:      Rate and Rhythm: Normal rate and regular rhythm.      Pulses: Normal pulses.      " Heart sounds: No murmur heard.      Pulmonary:      Effort: Pulmonary effort is normal. No respiratory distress.      Breath sounds: Rales (Right lower aspect of the posterior lung) present.      Comments: Nasal cannula in place  Abdominal:      General: Abdomen is flat. Bowel sounds are normal. There is no distension.   Musculoskeletal:      Right lower leg: No edema.      Left lower leg: No edema.   Skin:     Capillary Refill: Capillary refill takes less than 2 seconds.   Neurological:      General: No focal deficit present.      Mental Status: She is alert.   Psychiatric:         Mood and Affect: Mood normal.         Behavior: Behavior normal.           Lab Results (last 24 hours)     Procedure Component Value Units Date/Time    Comprehensive Metabolic Panel [[id number]]  (Abnormal) Collected: 12/07/21 0509    Specimen: Blood Updated: 12/07/21 0559     Glucose 87 mg/dL      BUN 16 mg/dL      Creatinine 0.79 mg/dL      Sodium 142 mmol/L      Potassium 4.7 mmol/L      Comment: Slight hemolysis detected by analyzer. Results may be affected.        Chloride 107 mmol/L      CO2 27.0 mmol/L      Calcium 9.2 mg/dL      Total Protein 6.1 g/dL      Albumin 3.00 g/dL      ALT (SGPT) 16 U/L      AST (SGOT) 28 U/L      Alkaline Phosphatase 78 U/L      Total Bilirubin 0.2 mg/dL      eGFR Non African Amer 70 mL/min/1.73      Globulin 3.1 gm/dL      A/G Ratio 1.0 g/dL      BUN/Creatinine Ratio 20.3     Anion Gap 8.0 mmol/L     Narrative:      GFR Normal >60  Chronic Kidney Disease <60  Kidney Failure <15      CBC (No Diff) [[id number]]  (Abnormal) Collected: 12/07/21 0509    Specimen: Blood Updated: 12/07/21 0537     WBC 7.27 10*3/mm3      RBC 4.24 10*6/mm3      Hemoglobin 12.7 g/dL      Hematocrit 41.6 %      MCV 98.1 fL      MCH 30.0 pg      MCHC 30.5 g/dL      RDW 13.9 %      RDW-SD 50.8 fl      MPV 10.8 fL      Platelets 232 10*3/mm3     Urinalysis With Culture If Indicated - Urine, Catheter In/Out [[id number]]  (Abnormal)  Collected: 12/06/21 1354    Specimen: Urine, Catheter In/Out Updated: 12/06/21 1445     Color, UA Dark Yellow     Appearance, UA Clear     pH, UA <=5.0     Specific Gravity, UA 1.020     Glucose, UA Negative     Ketones, UA Trace     Bilirubin, UA Negative     Blood, UA Negative     Protein, UA Negative     Leuk Esterase, UA Trace     Nitrite, UA Negative     Urobilinogen, UA 0.2 E.U./dL    Urinalysis, Microscopic Only - Urine, Catheter In/Out [[id number]]  (Abnormal) Collected: 12/06/21 1354    Specimen: Urine, Catheter In/Out Updated: 12/06/21 1445     RBC, UA None Seen /HPF      WBC, UA 3-5 /HPF      Bacteria, UA None Seen /HPF      Squamous Epithelial Cells, UA 0-2 /HPF      Hyaline Casts, UA 3-6 /LPF      Methodology Manual Light Microscopy    COVID PRE-OP / PRE-PROCEDURE SCREENING ORDER (NO ISOLATION) - Swab, Nasal Cavity [[id number]]  (Normal) Collected: 12/06/21 1259    Specimen: Swab from Nasal Cavity Updated: 12/06/21 1414    Narrative:      The following orders were created for panel order COVID PRE-OP / PRE-PROCEDURE SCREENING ORDER (NO ISOLATION) - Swab, Nasal Cavity.  Procedure                               Abnormality         Status                     ---------                               -----------         ------                     COVID-19,[facility] IN-PAT...[[id number]]  Normal              Final result                 Please view results for these tests on the individual orders.    COVID-19,[facility] IN-HOUSE,Nasal Swab No Transport Media 3-4 HR TAT - Swab, Nasal Cavity [[id number]]  (Normal) Collected: 12/06/21 1259    Specimen: Swab from Nasal Cavity Updated: 12/06/21 1414     COVID19 Not Detected    Narrative:      Fact sheet for providers: https://www.fda.gov/media/244006/download     Fact sheet for patients: https://www.fda.gov/media/464333/download    Test performed by PCR.    Consider negative results in combination with clinical observations, patient history, and epidemiological  information.    TSH [[id number]]  (Normal) Collected: 12/06/21 1256    Specimen: Blood Updated: 12/06/21 1335     TSH 3.020 uIU/mL     Comprehensive Metabolic Panel [[id number]]  (Abnormal) Collected: 12/06/21 1256    Specimen: Blood Updated: 12/06/21 1335     Glucose 89 mg/dL      BUN 23 mg/dL      Creatinine 1.03 mg/dL      Sodium 140 mmol/L      Potassium 5.1 mmol/L      Comment: Slight hemolysis detected by analyzer. Results may be affected.        Chloride 103 mmol/L      CO2 27.0 mmol/L      Calcium 9.6 mg/dL      Total Protein 6.6 g/dL      Albumin 3.50 g/dL      ALT (SGPT) 15 U/L      AST (SGOT) 30 U/L      Comment: Slight hemolysis detected by analyzer. Results may be affected.        Alkaline Phosphatase 86 U/L      Total Bilirubin 0.2 mg/dL      eGFR Non African Amer 52 mL/min/1.73      Globulin 3.1 gm/dL      A/G Ratio 1.1 g/dL      BUN/Creatinine Ratio 22.3     Anion Gap 10.0 mmol/L     Narrative:      GFR Normal >60  Chronic Kidney Disease <60  Kidney Failure <15      T4, Free [[id number]]  (Normal) Collected: 12/06/21 1256    Specimen: Blood Updated: 12/06/21 1335     Free T4 1.09 ng/dL     Narrative:      Results may be falsely increased if patient taking Biotin.      Procalcitonin [[id number]]  (Normal) Collected: 12/06/21 1256    Specimen: Blood Updated: 12/06/21 1334     Procalcitonin 0.03 ng/mL     Narrative:      As a Marker for Sepsis (Non-Neonates):     1. <0.5 ng/mL represents a low risk of severe sepsis and/or septic shock.  2. >2 ng/mL represents a high risk of severe sepsis and/or septic shock.    As a Marker for Lower Respiratory Tract Infections that require antibiotic therapy:  PCT on Admission     Antibiotic Therapy             6-12 Hrs later  >0.5                          Strongly Recommended            >0.25 - <0.5             Recommended  0.1 - 0.25                  Discouraged                       Remeasure/reassess PCT  <0.1                         Strongly Discouraged          "Remeasure/reassess PCT      As 28 day mortality risk marker: \"Change in Procalcitonin Result\" (>80% or <=80%) if Day 0 (or Day 1) and Day 4 values are available. Refer to http://www.[facility]-pct-calculator.com/    Change in PCT <=80 %   A decrease of PCT levels below or equal to 80% defines a positive change in PCT test result representing a higher risk for 28-day all-cause mortality of patients diagnosed with severe sepsis or septic shock.    Change in PCT >80 %   A decrease of PCT levels of more than 80% defines a negative change in PCT result representing a lower risk for 28-day all-cause mortality of patients diagnosed with severe sepsis or septic shock.                C-reactive Protein [[id number]]  (Abnormal) Collected: 12/06/21 1256    Specimen: Blood Updated: 12/06/21 1334     C-Reactive Protein 4.08 mg/dL     Troponin [[id number]]  (Normal) Collected: 12/06/21 1256    Specimen: Blood Updated: 12/06/21 1331     Troponin T <0.010 ng/mL     Narrative:      Troponin T Reference Range:  <= 0.03 ng/mL-   Negative for AMI  >0.03 ng/mL-     Abnormal for myocardial necrosis.  Clinicians would have to utilize clinical acumen, EKG, Troponin and serial changes to determine if it is an Acute Myocardial Infarction or myocardial injury due to an underlying chronic condition.       Results may be falsely decreased if patient taking Biotin.      BNP [[id number]]  (Normal) Collected: 12/06/21 1256    Specimen: Blood Updated: 12/06/21 1330     proBNP 1,235.0 pg/mL     Narrative:      Among patients with dyspnea, NT-proBNP is highly sensitive for the detection of acute congestive heart failure. In addition NT-proBNP of <300 pg/ml effectively rules out acute congestive heart failure with 99% negative predictive value.    Results may be falsely decreased if patient taking Biotin.      Lipase [[id number]]  (Normal) Collected: 12/06/21 1256    Specimen: Blood Updated: 12/06/21 1329     Lipase 16 U/L     Magnesium [[id number]]  (Normal) " Collected: 12/06/21 1256    Specimen: Blood Updated: 12/06/21 1328     Magnesium 1.9 mg/dL     D-dimer, Quantitative [[id number]]  (Abnormal) Collected: 12/06/21 1256    Specimen: Blood Updated: 12/06/21 1318     D-Dimer, Quantitative 1.01 mg/L (FEU)     Narrative:      Reference Range is 0-0.50 mg/L FEU. However, results <0.50 mg/L FEU tends to rule out DVT or PE. Results >0.50 mg/L FEU are not useful in predicting absence or presence of DVT or PE.      Protime-INR [[id number]]  (Normal) Collected: 12/06/21 1256    Specimen: Blood Updated: 12/06/21 1318     Protime 12.6 Seconds      INR 0.98    aPTT [[id number]]  (Abnormal) Collected: 12/06/21 1256    Specimen: Blood Updated: 12/06/21 1318     PTT 39.3 seconds     Blood Culture - Blood, Arm, Right [[id number]] Collected: 12/06/21 1250    Specimen: Blood from Arm, Right Updated: 12/06/21 1316    Blood Culture - Blood, Arm, Right [[id number]] Collected: 12/06/21 1256    Specimen: Blood from Arm, Right Updated: 12/06/21 1316    CBC & Differential [[id number]]  (Abnormal) Collected: 12/06/21 1256    Specimen: Blood Updated: 12/06/21 1308    Narrative:      The following orders were created for panel order CBC & Differential.  Procedure                               Abnormality         Status                     ---------                               -----------         ------                     CBC Auto Differential[[id number]]        Abnormal            Final result                 Please view results for these tests on the individual orders.    CBC Auto Differential [[id number]]  (Abnormal) Collected: 12/06/21 1256    Specimen: Blood Updated: 12/06/21 1308     WBC 8.17 10*3/mm3      RBC 4.41 10*6/mm3      Hemoglobin 13.4 g/dL      Hematocrit 43.0 %      MCV 97.5 fL      MCH 30.4 pg      MCHC 31.2 g/dL      RDW 14.0 %      RDW-SD 50.3 fl      MPV 11.2 fL      Platelets 240 10*3/mm3      Neutrophil % 66.2 %      Lymphocyte % 23.1 %      Monocyte % 9.2 %      Eosinophil % 0.9  %      Basophil % 0.2 %      Immature Grans % 0.4 %      Neutrophils, Absolute 5.41 10*3/mm3      Lymphocytes, Absolute 1.89 10*3/mm3      Monocytes, Absolute 0.75 10*3/mm3      Eosinophils, Absolute 0.07 10*3/mm3      Basophils, Absolute 0.02 10*3/mm3      Immature Grans, Absolute 0.03 10*3/mm3      nRBC 0.0 /100 WBC     Blood Gas, Arterial - [[id number]]  (Abnormal) Collected: 12/06/21 1254    Specimen: Arterial Blood Updated: 12/06/21 1250     Site Right Radial     Darren's Test N/A     pH, Arterial 7.384 pH units      pCO2, Arterial 44.6 mm Hg      pO2, Arterial 46.7 mm Hg      Comment: 85 Value below critical limit        HCO3, Arterial 26.6 mmol/L      Comment: 83 Value above reference range        Base Excess, Arterial 1.1 mmol/L      O2 Saturation, Arterial 81.8 %      Comment: 84 Value below reference range        Temperature 37.0 C      Barometric Pressure for Blood Gas 755 mmHg      Modality Room Air     Ventilator Mode NA     Notified Who DR GARCIA     Notified By [id number]     Notified Time 12/06/2021 12:59     Collected by [id number]     Comment: Meter: [id number]     :  [id number]        pCO2, Temperature Corrected 44.6 mm Hg      pH, Temp Corrected 7.384 pH Units      pO2, Temperature Corrected 46.7 mm Hg            -----------------------------------------------------------------  EKG:   Radiology:     XR Chest 1 View    Result Date: 12/6/2021  EXAM: XR CHEST 1 VW- 12/6/2021 1:06 PM CST  HISTORY: soa   COMPARISON: None.  TECHNIQUE: Frontal radiograph of the chest  FINDINGS: Bilateral groundglass infiltrates are present. Most likely this is interstitial pneumonia.. Cardiac silhouettes mildly enlarged. Vascular calcifications present arch..  The osseous structures and surrounding soft tissues demonstrate no acute abnormality.       1. Bilateral groundglass infiltrates most likely representing interstitial pneumonia.   This report was finalized on 12/06/2021 13:09 by Dr. Delfino Becker MD.    CT  Angiogram Chest    Result Date: 12/6/2021  EXAMINATION: CT ANGIOGRAM CHEST-   12/6/2021 2:05 PM CST  HISTORY: PE suspected, low/intermediate prob, positive D-dimer. Generalized weakness and diarrhea. Low O2 saturation. Bradycardia.  In order to have a CT radiation dose as low as reasonably achievable Automated Exposure Control was utilized for adjustment of the mA and/or KV according to patient size.  DLP in mGycm= 286.  CT angiogram chest. CT angiography protocol. CT imaging with bolus IV contrast injection. Under concurrent supervision axial, sagittal, coronal, and three-dimensional data sets were constructed.  Mild cardiomegaly.  Thoracic aortic calcification with no aneurysm or dissection. Coronary artery calcification is present.  Symmetric normally opacified pulmonary arteries. No pulmonary embolism.  Severe chronic lung changes. Emphysema. Patchy parenchymal opacity within the base of the right upper lobe and dependently within the right lower lobe compatible with superimposed pneumonia. A small focus of peripheral infiltrate is also present within the right middle lobe.  No pneumothorax or pleural effusion.  Summary: 1. No pulmonary embolism. 2. Chronic lung changes. 3. Patchy right-sided infiltrate compatible with pneumonia.            This report was finalized on 12/06/2021 14:34 by Dr. Kunal Miller MD.      Assessment and Plan   1.       Pneumonia due to infectious organism    Chronic back pain    Essential hypertension    Pneumonia: Appears to be community-acquired pneumonia.  Given her allergies she was started on azithromycin and Rocephin in the emergency department.  I will continue these at this time.  She will be on ipratropium nebulizer treatments as well.  RT consulted.    Debility: Seems to be worse from her baseline.  I will have her evaluated by PT/OT.    Hypertension: Continue home medication.    CODE STATUS: Full.    Disposition: Inpatient for increased oxygen requirement, anticipate  discharge by 12/10/2021.    Anupam Ledezma MD 12/7/2021 08:34 CST      Electronically signed by Anupam Ledezma MD at 12/07/21 0839          Physician Progress Notes (most recent note)      Anupam Ledezma MD at 12/09/21 0812            Daily Progress Note  Pattie Liu  MRN: [id number] LOS: 3    Admit Date: 12/6/2021 12/9/2021 08:12 CST    Subjective:          Chief Complaint:  Chief Complaint   Patient presents with   • Weakness - Generalized       Interval History:    Reviewed overnight events and nursing notes.   Overall breathing seems to be improved.  Still subtle congestion in right lower aspect.  Continues to require oxygen, but down to 3 L via nasal cannula.    Review of Systems   Constitutional: Positive for appetite change. Negative for fever.   HENT: Positive for congestion.    Respiratory: Positive for cough, shortness of breath and wheezing.    Cardiovascular: Negative for chest pain, palpitations and leg swelling.   Gastrointestinal: Negative for abdominal pain, nausea and vomiting.   Skin: Negative for color change.       DIET:  Diet Regular    Medications:      aspirin, 81 mg, Oral, Daily  atorvastatin, 20 mg, Oral, Daily  cefTRIAXone, 1 g, Intravenous, Q24H   And  azithromycin, 500 mg, Intravenous, Q24H  enoxaparin, 40 mg, Subcutaneous, Q24H  gabapentin, 600 mg, Oral, Daily  gabapentin, 900 mg, Oral, BID  ipratropium, 0.5 mg, Nebulization, 4x Daily - RT  lisinopril, 20 mg, Oral, Daily  nystatin, , Topical, Q12H  senna-docusate sodium, 2 tablet, Oral, BID  sodium chloride, 10 mL, Intravenous, Q12H        Data:     Code Status:   Code Status and Medical Interventions:   Ordered at: 12/07/21 0834     Code Status (Patient has no pulse and is not breathing):    CPR (Attempt to Resuscitate)     Medical Interventions (Patient has pulse or is breathing):    Full Support       Family History   Problem Relation Age of Onset   • Cancer Mother    • Cancer Father      Social History      Socioeconomic History   • Marital status:    Tobacco Use   • Smoking status: Current Every Day Smoker     Packs/day: 0.50     Years: 62.00     Pack years: 31.00     Types: Cigarettes   • Smokeless tobacco: Never Used   • Tobacco comment: states she has no plan to quit smoking   Vaping Use   • Vaping Use: Former   Substance and Sexual Activity   • Alcohol use: No   • Drug use: No   • Sexual activity: Not Currently       Labs:    Lab Results (last 72 hours)     Procedure Component Value Units Date/Time    Comprehensive Metabolic Panel [[id number]]  (Abnormal) Collected: 12/09/21 0459    Specimen: Blood Updated: 12/09/21 0607     Glucose 94 mg/dL      BUN 12 mg/dL      Creatinine 0.79 mg/dL      Sodium 143 mmol/L      Potassium 4.3 mmol/L      Chloride 104 mmol/L      CO2 32.0 mmol/L      Calcium 9.4 mg/dL      Total Protein 6.2 g/dL      Albumin 3.20 g/dL      ALT (SGPT) 13 U/L      AST (SGOT) 28 U/L      Alkaline Phosphatase 75 U/L      Total Bilirubin 0.2 mg/dL      eGFR Non African Amer 70 mL/min/1.73      Globulin 3.0 gm/dL      A/G Ratio 1.1 g/dL      BUN/Creatinine Ratio 15.2     Anion Gap 7.0 mmol/L     Narrative:      GFR Normal >60  Chronic Kidney Disease <60  Kidney Failure <15      CBC (No Diff) [[id number]]  (Abnormal) Collected: 12/09/21 0459    Specimen: Blood Updated: 12/09/21 0544     WBC 8.26 10*3/mm3      RBC 4.24 10*6/mm3      Hemoglobin 13.3 g/dL      Hematocrit 41.4 %      MCV 97.6 fL      MCH 31.4 pg      MCHC 32.1 g/dL      RDW 13.2 %      RDW-SD 47.4 fl      MPV 10.9 fL      Platelets 331 10*3/mm3     Blood Culture - Blood, Arm, Right [[id number]]  (Normal) Collected: 12/06/21 1250    Specimen: Blood from Arm, Right Updated: 12/08/21 1330     Blood Culture No growth at 2 days    Blood Culture - Blood, Arm, Right [[id number]]  (Normal) Collected: 12/06/21 1256    Specimen: Blood from Arm, Right Updated: 12/08/21 1330     Blood Culture No growth at 2 days    Comprehensive Metabolic Panel  [[id number]]  (Abnormal) Collected: 12/08/21 0504    Specimen: Blood Updated: 12/08/21 0544     Glucose 104 mg/dL      BUN 12 mg/dL      Creatinine 0.69 mg/dL      Sodium 142 mmol/L      Potassium 4.5 mmol/L      Comment: Slight hemolysis detected by analyzer. Results may be affected.        Chloride 106 mmol/L      CO2 28.0 mmol/L      Calcium 9.6 mg/dL      Total Protein 6.3 g/dL      Albumin 3.10 g/dL      ALT (SGPT) 14 U/L      AST (SGOT) 28 U/L      Alkaline Phosphatase 77 U/L      Total Bilirubin <0.2 mg/dL      eGFR Non African Amer 82 mL/min/1.73      Globulin 3.2 gm/dL      A/G Ratio 1.0 g/dL      BUN/Creatinine Ratio 17.4     Anion Gap 8.0 mmol/L     Narrative:      GFR Normal >60  Chronic Kidney Disease <60  Kidney Failure <15      CBC (No Diff) [[id number]]  (Abnormal) Collected: 12/08/21 0504    Specimen: Blood Updated: 12/08/21 0526     WBC 9.44 10*3/mm3      RBC 4.28 10*6/mm3      Hemoglobin 12.8 g/dL      Hematocrit 41.3 %      MCV 96.5 fL      MCH 29.9 pg      MCHC 31.0 g/dL      RDW 13.6 %      RDW-SD 48.5 fl      MPV 10.9 fL      Platelets 269 10*3/mm3     Comprehensive Metabolic Panel [[id number]]  (Abnormal) Collected: 12/07/21 0509    Specimen: Blood Updated: 12/07/21 0559     Glucose 87 mg/dL      BUN 16 mg/dL      Creatinine 0.79 mg/dL      Sodium 142 mmol/L      Potassium 4.7 mmol/L      Comment: Slight hemolysis detected by analyzer. Results may be affected.        Chloride 107 mmol/L      CO2 27.0 mmol/L      Calcium 9.2 mg/dL      Total Protein 6.1 g/dL      Albumin 3.00 g/dL      ALT (SGPT) 16 U/L      AST (SGOT) 28 U/L      Alkaline Phosphatase 78 U/L      Total Bilirubin 0.2 mg/dL      eGFR Non African Amer 70 mL/min/1.73      Globulin 3.1 gm/dL      A/G Ratio 1.0 g/dL      BUN/Creatinine Ratio 20.3     Anion Gap 8.0 mmol/L     Narrative:      GFR Normal >60  Chronic Kidney Disease <60  Kidney Failure <15      CBC (No Diff) [[id number]]  (Abnormal) Collected: 12/07/21 6333    Specimen:  Blood Updated: 12/07/21 0537     WBC 7.27 10*3/mm3      RBC 4.24 10*6/mm3      Hemoglobin 12.7 g/dL      Hematocrit 41.6 %      MCV 98.1 fL      MCH 30.0 pg      MCHC 30.5 g/dL      RDW 13.9 %      RDW-SD 50.8 fl      MPV 10.8 fL      Platelets 232 10*3/mm3     Urinalysis With Culture If Indicated - Urine, Catheter In/Out [[id number]]  (Abnormal) Collected: 12/06/21 1354    Specimen: Urine, Catheter In/Out Updated: 12/06/21 1445     Color, UA Dark Yellow     Appearance, UA Clear     pH, UA <=5.0     Specific Gravity, UA 1.020     Glucose, UA Negative     Ketones, UA Trace     Bilirubin, UA Negative     Blood, UA Negative     Protein, UA Negative     Leuk Esterase, UA Trace     Nitrite, UA Negative     Urobilinogen, UA 0.2 E.U./dL    Urinalysis, Microscopic Only - Urine, Catheter In/Out [[id number]]  (Abnormal) Collected: 12/06/21 1354    Specimen: Urine, Catheter In/Out Updated: 12/06/21 1445     RBC, UA None Seen /HPF      WBC, UA 3-5 /HPF      Bacteria, UA None Seen /HPF      Squamous Epithelial Cells, UA 0-2 /HPF      Hyaline Casts, UA 3-6 /LPF      Methodology Manual Light Microscopy    COVID PRE-OP / PRE-PROCEDURE SCREENING ORDER (NO ISOLATION) - Swab, Nasal Cavity [[id number]]  (Normal) Collected: 12/06/21 1259    Specimen: Swab from Nasal Cavity Updated: 12/06/21 1414    Narrative:      The following orders were created for panel order COVID PRE-OP / PRE-PROCEDURE SCREENING ORDER (NO ISOLATION) - Swab, Nasal Cavity.  Procedure                               Abnormality         Status                     ---------                               -----------         ------                     COVID-19,[facility] IN-PAT...[[id number]]  Normal              Final result                 Please view results for these tests on the individual orders.    COVID-19,[facility] IN-HOUSE,Nasal Swab No Transport Media 3-4 HR TAT - Swab, Nasal Cavity [[id number]]  (Normal) Collected: 12/06/21 1259    Specimen: Swab from Nasal Cavity  Updated: 12/06/21 1414     COVID19 Not Detected    Narrative:      Fact sheet for providers: https://www.fda.gov/media/146073/download     Fact sheet for patients: https://www.fda.gov/media/715411/download    Test performed by PCR.    Consider negative results in combination with clinical observations, patient history, and epidemiological information.    TSH [[id number]]  (Normal) Collected: 12/06/21 1256    Specimen: Blood Updated: 12/06/21 1335     TSH 3.020 uIU/mL     Comprehensive Metabolic Panel [[id number]]  (Abnormal) Collected: 12/06/21 1256    Specimen: Blood Updated: 12/06/21 1335     Glucose 89 mg/dL      BUN 23 mg/dL      Creatinine 1.03 mg/dL      Sodium 140 mmol/L      Potassium 5.1 mmol/L      Comment: Slight hemolysis detected by analyzer. Results may be affected.        Chloride 103 mmol/L      CO2 27.0 mmol/L      Calcium 9.6 mg/dL      Total Protein 6.6 g/dL      Albumin 3.50 g/dL      ALT (SGPT) 15 U/L      AST (SGOT) 30 U/L      Comment: Slight hemolysis detected by analyzer. Results may be affected.        Alkaline Phosphatase 86 U/L      Total Bilirubin 0.2 mg/dL      eGFR Non African Amer 52 mL/min/1.73      Globulin 3.1 gm/dL      A/G Ratio 1.1 g/dL      BUN/Creatinine Ratio 22.3     Anion Gap 10.0 mmol/L     Narrative:      GFR Normal >60  Chronic Kidney Disease <60  Kidney Failure <15      T4, Free [[id number]]  (Normal) Collected: 12/06/21 1256    Specimen: Blood Updated: 12/06/21 1335     Free T4 1.09 ng/dL     Narrative:      Results may be falsely increased if patient taking Biotin.      Procalcitonin [[id number]]  (Normal) Collected: 12/06/21 1256    Specimen: Blood Updated: 12/06/21 1334     Procalcitonin 0.03 ng/mL     Narrative:      As a Marker for Sepsis (Non-Neonates):     1. <0.5 ng/mL represents a low risk of severe sepsis and/or septic shock.  2. >2 ng/mL represents a high risk of severe sepsis and/or septic shock.    As a Marker for Lower Respiratory Tract Infections that  "require antibiotic therapy:  PCT on Admission     Antibiotic Therapy             6-12 Hrs later  >0.5                          Strongly Recommended            >0.25 - <0.5             Recommended  0.1 - 0.25                  Discouraged                       Remeasure/reassess PCT  <0.1                         Strongly Discouraged         Remeasure/reassess PCT      As 28 day mortality risk marker: \"Change in Procalcitonin Result\" (>80% or <=80%) if Day 0 (or Day 1) and Day 4 values are available. Refer to http://www.[email or web address]-[email or web address]-[email or web address]/    Change in PCT <=80 %   A decrease of PCT levels below or equal to 80% defines a positive change in PCT test result representing a higher risk for 28-day all-cause mortality of patients diagnosed with severe sepsis or septic shock.    Change in PCT >80 %   A decrease of PCT levels of more than 80% defines a negative change in PCT result representing a lower risk for 28-day all-cause mortality of patients diagnosed with severe sepsis or septic shock.                C-reactive Protein [[id number]]  (Abnormal) Collected: 12/06/21 1256    Specimen: Blood Updated: 12/06/21 1334     C-Reactive Protein 4.08 mg/dL     Troponin [[id number]]  (Normal) Collected: 12/06/21 1256    Specimen: Blood Updated: 12/06/21 1331     Troponin T <0.010 ng/mL     Narrative:      Troponin T Reference Range:  <= 0.03 ng/mL-   Negative for AMI  >0.03 ng/mL-     Abnormal for myocardial necrosis.  Clinicians would have to utilize clinical acumen, EKG, Troponin and serial changes to determine if it is an Acute Myocardial Infarction or myocardial injury due to an underlying chronic condition.       Results may be falsely decreased if patient taking Biotin.      BNP [[id number]]  (Normal) Collected: 12/06/21 1256    Specimen: Blood Updated: 12/06/21 1330     proBNP 1,235.0 pg/mL     Narrative:      Among patients with dyspnea, NT-proBNP is highly sensitive for the detection of acute congestive heart failure. " In addition NT-proBNP of <300 pg/ml effectively rules out acute congestive heart failure with 99% negative predictive value.    Results may be falsely decreased if patient taking Biotin.      Lipase [[id number]]  (Normal) Collected: 12/06/21 1256    Specimen: Blood Updated: 12/06/21 1329     Lipase 16 U/L     Magnesium [[id number]]  (Normal) Collected: 12/06/21 1256    Specimen: Blood Updated: 12/06/21 1328     Magnesium 1.9 mg/dL     D-dimer, Quantitative [[id number]]  (Abnormal) Collected: 12/06/21 1256    Specimen: Blood Updated: 12/06/21 1318     D-Dimer, Quantitative 1.01 mg/L (FEU)     Narrative:      Reference Range is 0-0.50 mg/L FEU. However, results <0.50 mg/L FEU tends to rule out DVT or PE. Results >0.50 mg/L FEU are not useful in predicting absence or presence of DVT or PE.      Protime-INR [[id number]]  (Normal) Collected: 12/06/21 1256    Specimen: Blood Updated: 12/06/21 1318     Protime 12.6 Seconds      INR 0.98    aPTT [[id number]]  (Abnormal) Collected: 12/06/21 1256    Specimen: Blood Updated: 12/06/21 1318     PTT 39.3 seconds     CBC & Differential [[id number]]  (Abnormal) Collected: 12/06/21 1256    Specimen: Blood Updated: 12/06/21 1308    Narrative:      The following orders were created for panel order CBC & Differential.  Procedure                               Abnormality         Status                     ---------                               -----------         ------                     CBC Auto Differential[[id number]]        Abnormal            Final result                 Please view results for these tests on the individual orders.    CBC Auto Differential [[id number]]  (Abnormal) Collected: 12/06/21 1256    Specimen: Blood Updated: 12/06/21 1308     WBC 8.17 10*3/mm3      RBC 4.41 10*6/mm3      Hemoglobin 13.4 g/dL      Hematocrit 43.0 %      MCV 97.5 fL      MCH 30.4 pg      MCHC 31.2 g/dL      RDW 14.0 %      RDW-SD 50.3 fl      MPV 11.2 fL      Platelets 240 10*3/mm3      Neutrophil  "% 66.2 %      Lymphocyte % 23.1 %      Monocyte % 9.2 %      Eosinophil % 0.9 %      Basophil % 0.2 %      Immature Grans % 0.4 %      Neutrophils, Absolute 5.41 10*3/mm3      Lymphocytes, Absolute 1.89 10*3/mm3      Monocytes, Absolute 0.75 10*3/mm3      Eosinophils, Absolute 0.07 10*3/mm3      Basophils, Absolute 0.02 10*3/mm3      Immature Grans, Absolute 0.03 10*3/mm3      nRBC 0.0 /100 WBC     Blood Gas, Arterial - [[id number]]  (Abnormal) Collected: 21 1254    Specimen: Arterial Blood Updated: 21 1250     Site Right Radial     Darren's Test N/A     pH, Arterial 7.384 pH units      pCO2, Arterial 44.6 mm Hg      pO2, Arterial 46.7 mm Hg      Comment: 85 Value below critical limit        HCO3, Arterial 26.6 mmol/L      Comment: 83 Value above reference range        Base Excess, Arterial 1.1 mmol/L      O2 Saturation, Arterial 81.8 %      Comment: 84 Value below reference range        Temperature 37.0 C      Barometric Pressure for Blood Gas 755 mmHg      Modality Room Air     Ventilator Mode NA     Notified Who DR GARCIA     Notified By 2012     Notified Time 2021 12:59     Collected by 2012     Comment: Meter: [id number]     :  2012        pCO2, Temperature Corrected 44.6 mm Hg      pH, Temp Corrected 7.384 pH Units      pO2, Temperature Corrected 46.7 mm Hg               Objective:     Vitals: /58 (BP Location: Left arm, Patient Position: Lying)   Pulse 69   Temp 97.7 °F (36.5 °C) (Oral)   Resp 16   Ht 160 cm (63\")   Wt 65.4 kg (144 lb 3.2 oz)   SpO2 94%   BMI 25.54 kg/m²      Intake/Output Summary (Last 24 hours) at 2021 0812  Last data filed at 2021 0744  Gross per 24 hour   Intake 300 ml   Output 1500 ml   Net -1200 ml    Temp (24hrs), Av.8 °F (36.6 °C), Min:97.6 °F (36.4 °C), Max:98.1 °F (36.7 °C)      Physical Exam  Vitals reviewed.   Constitutional:       Appearance: Normal appearance. She is not ill-appearing.   HENT:      Head: " Normocephalic and atraumatic.   Eyes:      General:         Right eye: No discharge.         Left eye: No discharge.      Extraocular Movements: Extraocular movements intact.      Conjunctiva/sclera: Conjunctivae normal.   Cardiovascular:      Rate and Rhythm: Normal rate and regular rhythm.      Pulses: Normal pulses.      Heart sounds: No murmur heard.      Pulmonary:      Effort: Pulmonary effort is normal. No respiratory distress.      Breath sounds: Rales (Best appreciated in the right posterior lower aspect) present.   Abdominal:      General: Abdomen is flat. Bowel sounds are normal. There is no distension.   Musculoskeletal:      Right lower leg: No edema.      Left lower leg: No edema.   Skin:     Capillary Refill: Capillary refill takes less than 2 seconds.   Neurological:      General: No focal deficit present.      Mental Status: She is alert.   Psychiatric:         Mood and Affect: Mood normal.         Behavior: Behavior normal.             Assessment and Plan:     Primary Problem:  Pneumonia due to infectious organism    Hospital Problem list:    Pneumonia due to infectious organism    Chronic back pain    Essential hypertension      PMH:  Past Medical History:   Diagnosis Date   • CAD in native artery 7/29/2019   • COPD (chronic obstructive pulmonary disease) ([facility])    • Essential hypertension 12/7/2021   • GERD (gastroesophageal reflux disease)    • Lung nodule        Treatment Plan:  Pneumonia: Appears to be community-acquired pneumonia.  Given her allergies she was started on azithromycin and Rocephin in the emergency department.  She will be on ipratropium nebulizer treatments as well.  RT following.  -Continue azithromycin and Rocephin started 12/6/2021, to finish 12/10/2021  -Currently on 3 L via nasal cannula, not on oxygen at home.     Debility: Seems to be worse from her baseline.  PT/OT evaluation performed.  Will need assist at home, case management consulted to assist with discharge  planning as she does not want to go to a rehab facility but does also not want to go to her daughter's house.  She is unable to have 24-hour care at her house, but does want to return home.  -She has agreed to stay at her daughter's house in [place], [place] for 2 weeks.  She will need home health in [place].     Hypertension: Continue home medication.     CODE STATUS: Full.     Disposition: Inpatient for increased oxygen requirement, anticipate discharge by 12/10/2021.    Reviewed treatment plans with the patient and/or family.   30 minutes spent in face to face interaction and coordination of care.     Electronically signed by Anupam Ledezma MD on 12/9/2021 at 08:12 CST      Electronically signed by Anupam Ledezma MD at 12/09/21 0816          Respiratory Therapy Notes (most recent note)      Chin Houston, RRT at 12/10/21 1114          Exercise Oximetry    Patient Name:Pattie Liu   MRN: [id number]   Date: 12/10/21             ROOM AIR BASELINE   SpO2% 80   Heart Rate    Blood Pressure      EXERCISE ON ROOM AIR SpO2% EXERCISE ON O2 @  3LPM SpO2%   1 MINUTE  1 MINUTE 85   2 MINUTES  2 MINUTES o2 @ 4lpm  89   3 MINUTES  3 MINUTES 90   4 MINUTES  4 MINUTES 90   5 MINUTES  5 MINUTES    6 MINUTES  6 MINUTES               Distance Walked   Distance Walked   Dyspnea (Daniel Scale)   Dyspnea (Daniel Scale)   Fatigue (Daniel Scale)   Fatigue (Daniel Scale)   SpO2% Post Exercise   SpO2% Post Exercise   HR Post Exercise   HR Post Exercise   Time to Recovery   Time to Recovery     Comments: Patient will need oxygen @ 2lpm at all times. With exertion patient will need 4lpm .     Electronically signed by Chin Houston, RRT at 12/10/21 1112

## 2021-12-10 NOTE — THERAPY TREATMENT NOTE
Acute Care - Physical Therapy Treatment Note  [facility]     Patient Name: Pattie Liu  : 1945  MRN: [id number]  Today's Date: 12/10/2021      Visit Dx:     ICD-10-CM ICD-9-CM   1. Pneumonia due to infectious organism, unspecified laterality, unspecified part of lung  J18.9 486   2. Hypoxic  R09.02 799.02   3. Decreased activities of daily living (ADL)  Z78.9 V49.89   4. Impaired mobility  Z74.09 799.89     Patient Active Problem List   Diagnosis   • Frequent PVCs   • Shortness of breath   • SOB (shortness of breath)   • CAD in native artery   • PVD (peripheral vascular disease) (Formerly [facility])   • Pneumonia due to infectious organism   • Chronic back pain   • Essential hypertension     Past Medical History:   Diagnosis Date   • CAD in native artery 2019   • COPD (chronic obstructive pulmonary disease) ([facility])    • Essential hypertension 2021   • GERD (gastroesophageal reflux disease)    • Lung nodule      Past Surgical History:   Procedure Laterality Date   • BREAST IMPLANT REMOVAL     • BREAST TISSUE EXPANDER REMOVAL INSERTION OF IMPLANT     • CARDIAC CATHETERIZATION N/A 2019    Procedure: Left Heart Cath;  Surgeon: Edi Armijo MD;  Location: [facility] INVASIVE LOCATION;  Service: Cardiology   • CHOLECYSTECTOMY     • HYSTERECTOMY     • MASTECTOMY      BILATERAL   • OOPHORECTOMY       PT Assessment (last 12 hours)     PT Evaluation and Treatment     Row Name 12/10/21 1054          Physical Therapy Time and Intention    Subjective Information no complaints  -NW     Document Type therapy note (daily note)  -NW     Mode of Treatment physical therapy  -NW     Patient Effort good  -NW     Row Name 12/10/21 1054          General Information    Existing Precautions/Restrictions fall; oxygen therapy device and L/min  3L at rest but needs 4L w activity  -NW     Row Name 12/10/21 1054          Pain Scale: Numbers Pre/Post-Treatment    Pretreatment Pain Rating 0/10 - no pain  -NW     Row Name  12/10/21 1054          Bed Mobility    Comment (Bed Mobility) chair  -NW     Row Name 12/10/21 1054          Transfers    Sit-Stand [place] (Transfers) supervision  -NW     Stand-Sit [place] (Transfers) modified independence  -NW     Row Name 12/10/21 1054          Sit-Stand Transfer    Assistive Device (Sit-Stand Transfers) walker, front-wheeled  -NW     Row Name 12/10/21 1054          Stand-Sit Transfer    Assistive Device (Stand-Sit Transfers) walker, front-wheeled  -NW     Row Name 12/10/21 1054          Gait/Stairs (Locomotion)    [place] Level (Gait) verbal cues; supervision  -NW     Assistive Device (Gait) walker, front-wheeled  -NW     Distance in Feet (Gait) 125x2  -NW     Deviations/Abnormal Patterns (Gait) gait speed decreased; stride length decreased  -NW     Comment (Gait/Stairs) reviewed home safety and POC along w/ energy conservation  -NW     Row Name 12/10/21 1054          Safety Issues, Functional Mobility    Impairments Affecting Function (Mobility) endurance/activity tolerance  -NW     Row Name 12/10/21 1054          Vital Signs    Pre SpO2 (%) 93  -NW     O2 Delivery Pre Treatment nasal cannula  -NW     Intra SpO2 (%) 87  -NW     O2 Delivery Intra Treatment nasal cannula  -NW     Post SpO2 (%) 85  but w/ deep breathing came up to 92%  -NW     O2 Delivery Post Treatment nasal cannula  -NW     Row Name 12/10/21 1054          Positioning and Restraints    Pre-Treatment Position sitting in chair/recliner  -NW     Post Treatment Position chair  -NW     In Chair sitting; call light within reach; encouraged to call for assist; with family/caregiver  -NW           User Key  (r) = Recorded By, (t) = Taken By, (c) = Cosigned By    Initials Name Provider Type    NW Idania Hurst PTA Physical Therapy Assistant                Physical Therapy Education                 Title: PT OT SLP Therapies (In Progress)     Topic: Physical Therapy (In Progress)     Point: Mobility training (Done)      Learning Progress Summary           Patient Acceptance, E, VU by SB at 12/7/2021 1551    Comment: pt edu on POC, benefits of act and d/c plans                   Point: Home exercise program (Not Started)     Learner Progress:  Not documented in this visit.          Point: Body mechanics (Done)     Learning Progress Summary           Patient Acceptance, E, VU by SB at 12/7/2021 1551    Comment: pt edu on POC, benefits of act and d/c plans                   Point: Precautions (Done)     Learning Progress Summary           Patient Acceptance, E, VU by SB at 12/7/2021 1551    Comment: pt edu on POC, benefits of act and d/c plans                               User Key     Initials Effective Dates Name Provider Type Discipline    SB 06/16/21 -  Marj Bowden, PT DPT Physical Therapist PT              PT Recommendation and Plan     Plan of Care Reviewed With: patient  Progress: improving  Outcome Summary: Pt up in chair. sit-stand supervision. Pt amb 125'x2 rwx supervision. requried standing rest due to decreased endurance. Monitored o2 on 3L 93% before amb. after 125' and standing rest 87% then after amb bk to room dropped to 85%. May need 4L o2 w/ exertion. Discussed POC and enrgy conserrvation. Pt planss to d/c home w/ HH w/ daughte for few weeks in hopes to get back home independently. Pt may need a rwx for home.   Outcome Measures     Row Name 12/09/21 1500 12/08/21 1200          How much help from another is currently needed...    Putting on and taking off regular lower body clothing? 4  -LS 3  -TS     Bathing (including washing, rinsing, and drying) 4  -LS 3  -TS     Toileting (which includes using toilet bed pan or urinal) 4  -LS 4  -TS     Putting on and taking off regular upper body clothing 4  -LS 4  -TS     Taking care of personal grooming (such as brushing teeth) 4  -LS 4  -TS     Eating meals 4  -LS 4  -TS     AM-PAC 6 Clicks Score (OT) 24  -LS 22  -TS           User Key  (r) = Recorded By, (t) = Taken By,  (c) = Cosigned By    Initials Name Provider Type    TS Hellen Bai COTA Occupational Therapy Assistant    [place] COTA Occupational Therapy Assistant                 Time Calculation:    PT Charges     Row Name 12/10/21 1125             Time Calculation    Start Time 1054  -NW      Stop Time 1120  -NW      Time Calculation (min) 26 min  -NW      PT Received On 12/10/21  -NW      PT Goal Re-Cert Due Date 12/17/21  -NW              Time Calculation- PT    Total Timed Code Minutes- PT 26 minute(s)  -NW              Timed Charges    58652 - Gait Training Minutes  18  -NW      11680 - PT Therapeutic Activity Minutes 8  -NW              Total Minutes    Timed Charges Total Minutes 26  -NW       Total Minutes 26  -NW            User Key  (r) = Recorded By, (t) = Taken By, (c) = Cosigned By    Initials Name Provider Type    NW Idania Hurst PTA Physical Therapy Assistant              Therapy Charges for Today     Code Description Service Date Service Provider Modifiers Qty    09186605407 HC PT THER PROC EA 15 MIN 12/9/2021 Idania Hurst, PTA GP 1    48761042908 HC GAIT TRAINING EA 15 MIN 12/9/2021 Idania Hurst, PTA GP 1    70316509940 HC GAIT TRAINING EA 15 MIN 12/10/2021 Idania Hurst, PTA GP 1    85641250460 HC PT THERAPEUTIC ACT EA 15 MIN 12/10/2021 Idania Hurst, PTA GP 1          PT G-Codes  Outcome Measure Options: AM-PAC 6 Clicks Basic Mobility (PT)  AM-PAC 6 Clicks Score (PT): 19  AM-PAC 6 Clicks Score (OT): 24    Idania Hurst PTA  12/10/2021

## 2021-12-10 NOTE — DISCHARGE PLACEMENT REQUEST
"From:  CATHERINE Cantu  [phone]    PT GOING TO ADDRESS:    [address] [place] [address]   [id number]    Pattie Solomon (76 y.o. Female)             Date of Birth Social Security Number Address Home Phone MRN    1945  [id number]  [facility] [phone] [id number]    Church Marital Status             Ep[employer]        Admission Date Admission Type Admitting Provider Attending Provider Department, Room/Bed    12/6/21 Emergency [place], MD Darien Iqbal Andrew W, MD [facility] 4B, 445/1    Discharge Date Discharge Disposition Discharge Destination           Home or Self Care              Attending Provider: Anupam Ledezma MD    Allergies: Codeine, Hydrocodone-acetaminophen, Levofloxacin, Oxycodone-acetaminophen, Tramadol, Zanaflex  [Tizanidine Hcl], Albuterol    Isolation: None   Infection: None   Code Status: CPR   Advance Care Planning Activity    Ht: 160 cm (63\")   Wt: 65.4 kg (144 lb 3 oz)    Admission Cmt: None   Principal Problem: Pneumonia due to infectious organism [J18.9]                 Active Insurance as of 12/6/2021     Primary Coverage     Payor Plan Insurance Group Employer/Plan Group    MEDICARE MEDICARE A & B      Payor Plan Address Payor Plan Phone Number Payor Plan Fax Number Effective Dates    [address] [phone]  3/1/2008 - None Entered    [facility] [id number]       Subscriber Name Subscriber Birth Date Member ID       PATTIE SOLOMON 1945 [id number]           Secondary Coverage     Payor Plan Insurance Group Employer/Plan Group    AARP MC SUP AARP HEALTH CARE OPTIONS      Payor Plan Address Payor Plan Phone Number Payor Plan Fax Number Effective Dates    [facility] [phone]  1/1/2020 - None Entered    [address]       [facility] [address]       Subscriber Name Subscriber Birth Date Member ID       PATTIE SOLOMON 1945 [id number]                 Emergency Contacts      (Rel.) Home Phone Work Phone " Mobile Phone    CANDIDO GUADALUPE (Daughter) [phone] -- --        Ambulatory Referral to Home Health [[id number]] (Order [id number])  Order  Date: 12/10/2021 Department: [address] Ordering/Authorizing: Anupam Ledezma MD       Order History  Outpatient  Date/Time Action Taken User Additional Information   12/10/21 1235 Sign Светлана Luong RN Ordering Mode: Telephone with readback     Order Details    Frequency Duration Priority Order Class   None None Routine Internal Referral     Start Date/Time    Start Date   12/10/21     Order Information    Order Date Service Start Date Start Time   12/10/21 Medicine 12/10/21      Reference Links    Associated Reports External References   View Encounter Current Health Referral Information     Order Questions    Question Answer   Face to Face Visit Date: 12/10/2021   Follow-up provider for Plan of Care? I will be treating the patient on an ongoing basis.  Please send me the Plan of Care for signature.   Follow-up provider: LOGAN DIEZ   Reason/Clinical Findings Pneumonia   Describe mobility limitations that make leaving home difficult: decreased strength, activity intolerance   Nursing/Therapeutic Services Requested Skilled Nursing    Physical Therapy    Occupational Therapy   Skilled nursing orders: Medication education    Cardiopulmonary assessments    O2 instruction   PT orders: Therapeutic exercise    Gait Training    Strengthening    Home safety assessment   Weight Bearing Status As Tolerated   Occupational orders: Activities of daily living    Strengthening    Home safety assessment    Energy conservation   Frequency: 1 Week 1            Verbal Order Info    Action Created on Order Mode Entered by Responsible Provider Signed by Signed on   Ordering 12/10/21 1238 Telephone with readback Светлана Luong RN Kinchen, Andrew W, MD       Source Order Set / Preference List    Preference List   AMB [facility] REFERRALS [[id number]]     Clinical Indications      ICD-10-CM ICD-9-CM   Hypoxic     R09.02 799.02   Decreased activities of daily living (ADL)     Z78.9 V49.89   Impaired mobility     Z74.09 799.89   Pneumonia of both lower lobes due to infectious organism     J18.9 486     Reprint Order Requisition    Ambulatory Referral to Home Health (Order #[id number]) on 12/10/21     Encounter    View Encounter            Order Provider Info        Office phone Pager E-mail   Ordering User Светлана Luong RN -- -- --   Authorizing Provider Anupam Ledezma [phone] -- --   Attending Provider Anupam Ledezma [phone] -- --   Entered By Светлана Luong RN -- -- --   Ordering Provider Anupam Ledezma [phone] -- --     Tracking Reports    Cosign Tracking Order Transmittal Tracking   Authorized by:  Anupam Ledezma MD  (NPI: [id number])           Lab Component SmartPhrase Guide    Ambulatory Referral to Home Health (Order #[id number]) on 12/10/21            History & Physical      Anupam Ledezma MD at 12/07/21 0832            History and Physical    Patient:  Pattie Liu  MRN: [id number]    CHIEF COMPLAINT: Shortness of breath    History Obtained From: the patient   PCP: Rafat Espinoza MD    HISTORY OF PRESENT ILLNESS:   The patient is a 76 y.o. female who presents with Shortness of breath that began around 12/1/2021.  She states she received the COVID-19 booster 1 that day and started noticing some shortness of breath and weakness approximately 4 hours after receiving the vaccine.  She continued to do her normal activities of daily living, but noticed persistent weakness and shortness of breath.  She had a cough that began as well during this time.  She presented to the emergency department on 12/6/2021 where she was found to have significant hypoxia.  CTA chest negative for PE, but with evidence of right-sided infiltrate concerning for community-acquired pneumonia.  She was given a dose of azithromycin and Rocephin and a breathing  treatment in the emergency department with interval improvement.  She does not have an oxygen requirement at baseline, but was requiring oxygen via nasal cannula at the time of admission.    REVIEW OF SYSTEMS:    Review of Systems   Constitutional: Positive for activity change, appetite change and fatigue. Negative for fever.   HENT: Positive for congestion.    Respiratory: Positive for cough, shortness of breath and wheezing.    Cardiovascular: Negative for chest pain, palpitations and leg swelling.   Gastrointestinal: Negative for abdominal pain, nausea and vomiting.   Genitourinary: Negative for difficulty urinating.   Skin: Negative for color change and pallor.   Neurological: Positive for weakness. Negative for dizziness.          Past Medical History:  Past Medical History:   Diagnosis Date   • CAD in native artery 7/29/2019   • COPD (chronic obstructive pulmonary disease) (HCC)    • Essential hypertension 12/7/2021   • GERD (gastroesophageal reflux disease)    • Lung nodule        Past Surgical History:  Past Surgical History:   Procedure Laterality Date   • BREAST IMPLANT REMOVAL     • BREAST TISSUE EXPANDER REMOVAL INSERTION OF IMPLANT     • CARDIAC CATHETERIZATION N/A 6/21/2019    Procedure: Left Heart Cath;  Surgeon: Edi Armijo MD;  Location: [facility] CATH INVASIVE LOCATION;  Service: Cardiology   • CHOLECYSTECTOMY     • HYSTERECTOMY     • MASTECTOMY      BILATERAL   • OOPHORECTOMY         Medications Prior to Admission:    Medications Prior to Admission   Medication Sig Dispense Refill Last Dose   • aspirin 81 MG tablet Take 1 tablet by mouth Daily. 30 tablet 11 12/6/2021 at Unknown time   • atorvastatin (LIPITOR) 20 MG tablet Take 1 tablet by mouth Daily. 30 tablet 11 12/5/2021 at Unknown time   • Calcium Carb-Cholecalciferol (CALCIUM 1000 + D PO) Take  by mouth Daily.   Patient Taking Differently   • Cyanocobalamin (B-12) 5000 MCG capsule Take  by mouth Daily.   12/6/2021 at Unknown time   •  "Fexofenadine HCl (ALLEGRA ALLERGY PO) Take 4 mg by mouth Daily.   12/6/2021 at Unknown time   • Fluticasone-Umeclidin-Vilant (TRELEGY ELLIPTA IN) Inhale Daily.   12/6/2021 at Unknown time   • gabapentin (NEURONTIN) 600 MG tablet Take 600 mg by mouth 3 (Three) Times a Day. 1 1/2 tabs in am  1 tablet at noon  1 1/2 tabs at bedtime   12/6/2021 at Unknown time   • lisinopril (PRINIVIL,ZESTRIL) 10 MG tablet Take 20 mg by mouth Daily.   12/6/2021 at Unknown time   • ALPRAZolam (XANAX) 0.5 MG tablet Take 0.5 mg by mouth 2 (Two) Times a Day As Needed for Anxiety.   Unknown at Unknown time   • ibuprofen (ADVIL,MOTRIN) 200 MG tablet Take 200 mg by mouth Every 6 (Six) Hours As Needed for Mild Pain .   Unknown at Unknown time   • ipratropium (ATROVENT HFA) 17 MCG/ACT inhaler Inhale 2 puffs 4 (Four) Times a Day.   Unknown at Unknown time   • naproxen sodium (ALEVE) 220 MG tablet Take 220 mg by mouth 2 (Two) Times a Day As Needed.          Allergies:  Codeine, Hydrocodone-acetaminophen, Levofloxacin, Oxycodone-acetaminophen, Tramadol, Zanaflex  [tizanidine hcl], and Albuterol    Social History:   Social History     Socioeconomic History   • Marital status:    Tobacco Use   • Smoking status: Current Every Day Smoker     Packs/day: 0.50     Years: 62.00     Pack years: 31.00     Types: Cigarettes   • Smokeless tobacco: Never Used   • Tobacco comment: states she has no plan to quit smoking   Vaping Use   • Vaping Use: Former   Substance and Sexual Activity   • Alcohol use: No   • Drug use: No   • Sexual activity: Not Currently       Family History:   Family History   Problem Relation Age of Onset   • Cancer Mother    • Cancer Father            Physical Exam:    Vitals: /48 (BP Location: Left arm, Patient Position: Sitting)   Pulse 65   Temp 97.9 °F (36.6 °C) (Oral)   Resp 16   Ht 160 cm (63\")   Wt 72.3 kg (159 lb 4.8 oz)   SpO2 97%   BMI 28.22 kg/m²   Physical Exam  Vitals reviewed.   Constitutional:       " Appearance: Normal appearance. She is not ill-appearing.   HENT:      Head: Normocephalic and atraumatic.   Eyes:      General:         Right eye: No discharge.         Left eye: No discharge.      Extraocular Movements: Extraocular movements intact.      Conjunctiva/sclera: Conjunctivae normal.   Cardiovascular:      Rate and Rhythm: Normal rate and regular rhythm.      Pulses: Normal pulses.      Heart sounds: No murmur heard.      Pulmonary:      Effort: Pulmonary effort is normal. No respiratory distress.      Breath sounds: Rales (Right lower aspect of the posterior lung) present.      Comments: Nasal cannula in place  Abdominal:      General: Abdomen is flat. Bowel sounds are normal. There is no distension.   Musculoskeletal:      Right lower leg: No edema.      Left lower leg: No edema.   Skin:     Capillary Refill: Capillary refill takes less than 2 seconds.   Neurological:      General: No focal deficit present.      Mental Status: She is alert.   Psychiatric:         Mood and Affect: Mood normal.         Behavior: Behavior normal.           Lab Results (last 24 hours)     Procedure Component Value Units Date/Time    Comprehensive Metabolic Panel [[id number]]  (Abnormal) Collected: 12/07/21 0509    Specimen: Blood Updated: 12/07/21 0559     Glucose 87 mg/dL      BUN 16 mg/dL      Creatinine 0.79 mg/dL      Sodium 142 mmol/L      Potassium 4.7 mmol/L      Comment: Slight hemolysis detected by analyzer. Results may be affected.        Chloride 107 mmol/L      CO2 27.0 mmol/L      Calcium 9.2 mg/dL      Total Protein 6.1 g/dL      Albumin 3.00 g/dL      ALT (SGPT) 16 U/L      AST (SGOT) 28 U/L      Alkaline Phosphatase 78 U/L      Total Bilirubin 0.2 mg/dL      eGFR Non African Amer 70 mL/min/1.73      Globulin 3.1 gm/dL      A/G Ratio 1.0 g/dL      BUN/Creatinine Ratio 20.3     Anion Gap 8.0 mmol/L     Narrative:      GFR Normal >60  Chronic Kidney Disease <60  Kidney Failure <15      CBC (No Diff) [[id number]]   (Abnormal) Collected: 12/07/21 0509    Specimen: Blood Updated: 12/07/21 0537     WBC 7.27 10*3/mm3      RBC 4.24 10*6/mm3      Hemoglobin 12.7 g/dL      Hematocrit 41.6 %      MCV 98.1 fL      MCH 30.0 pg      MCHC 30.5 g/dL      RDW 13.9 %      RDW-SD 50.8 fl      MPV 10.8 fL      Platelets 232 10*3/mm3     Urinalysis With Culture If Indicated - Urine, Catheter In/Out [[id number]]  (Abnormal) Collected: 12/06/21 1354    Specimen: Urine, Catheter In/Out Updated: 12/06/21 1445     Color, UA Dark Yellow     Appearance, UA Clear     pH, UA <=5.0     Specific Gravity, UA 1.020     Glucose, UA Negative     Ketones, UA Trace     Bilirubin, UA Negative     Blood, UA Negative     Protein, UA Negative     Leuk Esterase, UA Trace     Nitrite, UA Negative     Urobilinogen, UA 0.2 E.U./dL    Urinalysis, Microscopic Only - Urine, Catheter In/Out [[id number]]  (Abnormal) Collected: 12/06/21 1354    Specimen: Urine, Catheter In/Out Updated: 12/06/21 1445     RBC, UA None Seen /HPF      WBC, UA 3-5 /HPF      Bacteria, UA None Seen /HPF      Squamous Epithelial Cells, UA 0-2 /HPF      Hyaline Casts, UA 3-6 /LPF      Methodology Manual Light Microscopy    COVID PRE-OP / PRE-PROCEDURE SCREENING ORDER (NO ISOLATION) - Swab, Nasal Cavity [[id number]]  (Normal) Collected: 12/06/21 1259    Specimen: Swab from Nasal Cavity Updated: 12/06/21 1414    Narrative:      The following orders were created for panel order COVID PRE-OP / PRE-PROCEDURE SCREENING ORDER (NO ISOLATION) - Swab, Nasal Cavity.  Procedure                               Abnormality         Status                     ---------                               -----------         ------                     COVID-19,[facility] IN-PAT...[[id number]]  Normal              Final result                 Please view results for these tests on the individual orders.    COVID-19,[facility] IN-HOUSE,Nasal Swab No Transport Media 3-4 HR TAT - Swab, Nasal Cavity [[id number]]  (Normal) Collected:  12/06/21 1259    Specimen: Swab from Nasal Cavity Updated: 12/06/21 1414     COVID19 Not Detected    Narrative:      Fact sheet for providers: https://www.fda.gov/media/592457/download     Fact sheet for patients: https://www.fda.gov/media/484155/download    Test performed by PCR.    Consider negative results in combination with clinical observations, patient history, and epidemiological information.    TSH [[id number]]  (Normal) Collected: 12/06/21 1256    Specimen: Blood Updated: 12/06/21 1335     TSH 3.020 uIU/mL     Comprehensive Metabolic Panel [[id number]]  (Abnormal) Collected: 12/06/21 1256    Specimen: Blood Updated: 12/06/21 1335     Glucose 89 mg/dL      BUN 23 mg/dL      Creatinine 1.03 mg/dL      Sodium 140 mmol/L      Potassium 5.1 mmol/L      Comment: Slight hemolysis detected by analyzer. Results may be affected.        Chloride 103 mmol/L      CO2 27.0 mmol/L      Calcium 9.6 mg/dL      Total Protein 6.6 g/dL      Albumin 3.50 g/dL      ALT (SGPT) 15 U/L      AST (SGOT) 30 U/L      Comment: Slight hemolysis detected by analyzer. Results may be affected.        Alkaline Phosphatase 86 U/L      Total Bilirubin 0.2 mg/dL      eGFR Non African Amer 52 mL/min/1.73      Globulin 3.1 gm/dL      A/G Ratio 1.1 g/dL      BUN/Creatinine Ratio 22.3     Anion Gap 10.0 mmol/L     Narrative:      GFR Normal >60  Chronic Kidney Disease <60  Kidney Failure <15      T4, Free [[id number]]  (Normal) Collected: 12/06/21 1256    Specimen: Blood Updated: 12/06/21 1335     Free T4 1.09 ng/dL     Narrative:      Results may be falsely increased if patient taking Biotin.      Procalcitonin [[id number]]  (Normal) Collected: 12/06/21 1256    Specimen: Blood Updated: 12/06/21 1334     Procalcitonin 0.03 ng/mL     Narrative:      As a Marker for Sepsis (Non-Neonates):     1. <0.5 ng/mL represents a low risk of severe sepsis and/or septic shock.  2. >2 ng/mL represents a high risk of severe sepsis and/or septic shock.    As a Marker  "for Lower Respiratory Tract Infections that require antibiotic therapy:  PCT on Admission     Antibiotic Therapy             6-12 Hrs later  >0.5                          Strongly Recommended            >0.25 - <0.5             Recommended  0.1 - 0.25                  Discouraged                       Remeasure/reassess PCT  <0.1                         Strongly Discouraged         Remeasure/reassess PCT      As 28 day mortality risk marker: \"Change in Procalcitonin Result\" (>80% or <=80%) if Day 0 (or Day 1) and Day 4 values are available. Refer to http://www.[facility]-pct-calculator.com/    Change in PCT <=80 %   A decrease of PCT levels below or equal to 80% defines a positive change in PCT test result representing a higher risk for 28-day all-cause mortality of patients diagnosed with severe sepsis or septic shock.    Change in PCT >80 %   A decrease of PCT levels of more than 80% defines a negative change in PCT result representing a lower risk for 28-day all-cause mortality of patients diagnosed with severe sepsis or septic shock.                C-reactive Protein [[id number]]  (Abnormal) Collected: 12/06/21 1256    Specimen: Blood Updated: 12/06/21 1334     C-Reactive Protein 4.08 mg/dL     Troponin [[id number]]  (Normal) Collected: 12/06/21 1256    Specimen: Blood Updated: 12/06/21 1331     Troponin T <0.010 ng/mL     Narrative:      Troponin T Reference Range:  <= 0.03 ng/mL-   Negative for AMI  >0.03 ng/mL-     Abnormal for myocardial necrosis.  Clinicians would have to utilize clinical acumen, EKG, Troponin and serial changes to determine if it is an Acute Myocardial Infarction or myocardial injury due to an underlying chronic condition.       Results may be falsely decreased if patient taking Biotin.      BNP [[id number]]  (Normal) Collected: 12/06/21 1256    Specimen: Blood Updated: 12/06/21 1330     proBNP 1,235.0 pg/mL     Narrative:      Among patients with dyspnea, NT-proBNP is highly sensitive for the " detection of acute congestive heart failure. In addition NT-proBNP of <300 pg/ml effectively rules out acute congestive heart failure with 99% negative predictive value.    Results may be falsely decreased if patient taking Biotin.      Lipase [[id number]]  (Normal) Collected: 12/06/21 1256    Specimen: Blood Updated: 12/06/21 1329     Lipase 16 U/L     Magnesium [[id number]]  (Normal) Collected: 12/06/21 1256    Specimen: Blood Updated: 12/06/21 1328     Magnesium 1.9 mg/dL     D-dimer, Quantitative [[id number]]  (Abnormal) Collected: 12/06/21 1256    Specimen: Blood Updated: 12/06/21 1318     D-Dimer, Quantitative 1.01 mg/L (FEU)     Narrative:      Reference Range is 0-0.50 mg/L FEU. However, results <0.50 mg/L FEU tends to rule out DVT or PE. Results >0.50 mg/L FEU are not useful in predicting absence or presence of DVT or PE.      Protime-INR [[id number]]  (Normal) Collected: 12/06/21 1256    Specimen: Blood Updated: 12/06/21 1318     Protime 12.6 Seconds      INR 0.98    aPTT [[id number]]  (Abnormal) Collected: 12/06/21 1256    Specimen: Blood Updated: 12/06/21 1318     PTT 39.3 seconds     Blood Culture - Blood, Arm, Right [[id number]] Collected: 12/06/21 1250    Specimen: Blood from Arm, Right Updated: 12/06/21 1316    Blood Culture - Blood, Arm, Right [[id number]] Collected: 12/06/21 1256    Specimen: Blood from Arm, Right Updated: 12/06/21 1316    CBC & Differential [[id number]]  (Abnormal) Collected: 12/06/21 1256    Specimen: Blood Updated: 12/06/21 1308    Narrative:      The following orders were created for panel order CBC & Differential.  Procedure                               Abnormality         Status                     ---------                               -----------         ------                     CBC Auto Differential[[id number]]        Abnormal            Final result                 Please view results for these tests on the individual orders.    CBC Auto Differential [[id number]]  (Abnormal)  Collected: 12/06/21 1256    Specimen: Blood Updated: 12/06/21 1308     WBC 8.17 10*3/mm3      RBC 4.41 10*6/mm3      Hemoglobin 13.4 g/dL      Hematocrit 43.0 %      MCV 97.5 fL      MCH 30.4 pg      MCHC 31.2 g/dL      RDW 14.0 %      RDW-SD 50.3 fl      MPV 11.2 fL      Platelets 240 10*3/mm3      Neutrophil % 66.2 %      Lymphocyte % 23.1 %      Monocyte % 9.2 %      Eosinophil % 0.9 %      Basophil % 0.2 %      Immature Grans % 0.4 %      Neutrophils, Absolute 5.41 10*3/mm3      Lymphocytes, Absolute 1.89 10*3/mm3      Monocytes, Absolute 0.75 10*3/mm3      Eosinophils, Absolute 0.07 10*3/mm3      Basophils, Absolute 0.02 10*3/mm3      Immature Grans, Absolute 0.03 10*3/mm3      nRBC 0.0 /100 WBC     Blood Gas, Arterial - [[id number]]  (Abnormal) Collected: 12/06/21 1254    Specimen: Arterial Blood Updated: 12/06/21 1250     Site Right Radial     Darren's Test N/A     pH, Arterial 7.384 pH units      pCO2, Arterial 44.6 mm Hg      pO2, Arterial 46.7 mm Hg      Comment: 85 Value below critical limit        HCO3, Arterial 26.6 mmol/L      Comment: 83 Value above reference range        Base Excess, Arterial 1.1 mmol/L      O2 Saturation, Arterial 81.8 %      Comment: 84 Value below reference range        Temperature 37.0 C      Barometric Pressure for Blood Gas 755 mmHg      Modality Room Air     Ventilator Mode NA     Notified Who DR GARCIA     Notified By [id number]     Notified Time 12/06/2021 12:59     Collected by [id number]     Comment: Meter: [id number]     :  [id number]        pCO2, Temperature Corrected 44.6 mm Hg      pH, Temp Corrected 7.384 pH Units      pO2, Temperature Corrected 46.7 mm Hg            -----------------------------------------------------------------  EKG:   Radiology:     XR Chest 1 View    Result Date: 12/6/2021  EXAM: XR CHEST 1 VW- 12/6/2021 1:06 PM CST  HISTORY: soa   COMPARISON: None.  TECHNIQUE: Frontal radiograph of the chest  FINDINGS: Bilateral groundglass infiltrates are  present. Most likely this is interstitial pneumonia.. Cardiac silhouettes mildly enlarged. Vascular calcifications present arch..  The osseous structures and surrounding soft tissues demonstrate no acute abnormality.       1. Bilateral groundglass infiltrates most likely representing interstitial pneumonia.   This report was finalized on 12/06/2021 13:09 by Dr. Delfino Becker MD.    CT Angiogram Chest    Result Date: 12/6/2021  EXAMINATION: CT ANGIOGRAM CHEST-   12/6/2021 2:05 PM CST  HISTORY: PE suspected, low/intermediate prob, positive D-dimer. Generalized weakness and diarrhea. Low O2 saturation. Bradycardia.  In order to have a CT radiation dose as low as reasonably achievable Automated Exposure Control was utilized for adjustment of the mA and/or KV according to patient size.  DLP in mGycm= 286.  CT angiogram chest. CT angiography protocol. CT imaging with bolus IV contrast injection. Under concurrent supervision axial, sagittal, coronal, and three-dimensional data sets were constructed.  Mild cardiomegaly.  Thoracic aortic calcification with no aneurysm or dissection. Coronary artery calcification is present.  Symmetric normally opacified pulmonary arteries. No pulmonary embolism.  Severe chronic lung changes. Emphysema. Patchy parenchymal opacity within the base of the right upper lobe and dependently within the right lower lobe compatible with superimposed pneumonia. A small focus of peripheral infiltrate is also present within the right middle lobe.  No pneumothorax or pleural effusion.  Summary: 1. No pulmonary embolism. 2. Chronic lung changes. 3. Patchy right-sided infiltrate compatible with pneumonia.            This report was finalized on 12/06/2021 14:34 by Dr. Kunal Miller MD.      Assessment and Plan   1.       Pneumonia due to infectious organism    Chronic back pain    Essential hypertension    Pneumonia: Appears to be community-acquired pneumonia.  Given her allergies she was started on  azithromycin and Rocephin in the emergency department.  I will continue these at this time.  She will be on ipratropium nebulizer treatments as well.  RT consulted.    Debility: Seems to be worse from her baseline.  I will have her evaluated by PT/OT.    Hypertension: Continue home medication.    CODE STATUS: Full.    Disposition: Inpatient for increased oxygen requirement, anticipate discharge by 12/10/2021.    Anupam Ledezma MD 12/7/2021 08:34 CST      Electronically signed by Anupam Ledezma MD at 12/07/21 0839          Physician Progress Notes (most recent note)      Anupam Ledezma MD at 12/09/21 0812            Daily Progress Note  Pattie Liu  MRN: [id number] LOS: 3    Admit Date: 12/6/2021 12/9/2021 08:12 CST    Subjective:          Chief Complaint:  Chief Complaint   Patient presents with   • Weakness - Generalized       Interval History:    Reviewed overnight events and nursing notes.   Overall breathing seems to be improved.  Still subtle congestion in right lower aspect.  Continues to require oxygen, but down to 3 L via nasal cannula.    Review of Systems   Constitutional: Positive for appetite change. Negative for fever.   HENT: Positive for congestion.    Respiratory: Positive for cough, shortness of breath and wheezing.    Cardiovascular: Negative for chest pain, palpitations and leg swelling.   Gastrointestinal: Negative for abdominal pain, nausea and vomiting.   Skin: Negative for color change.       DIET:  Diet Regular    Medications:      aspirin, 81 mg, Oral, Daily  atorvastatin, 20 mg, Oral, Daily  cefTRIAXone, 1 g, Intravenous, Q24H   And  azithromycin, 500 mg, Intravenous, Q24H  enoxaparin, 40 mg, Subcutaneous, Q24H  gabapentin, 600 mg, Oral, Daily  gabapentin, 900 mg, Oral, BID  ipratropium, 0.5 mg, Nebulization, 4x Daily - RT  lisinopril, 20 mg, Oral, Daily  nystatin, , Topical, Q12H  senna-docusate sodium, 2 tablet, Oral, BID  sodium chloride, 10 mL, Intravenous,  Q12H        Data:     Code Status:   Code Status and Medical Interventions:   Ordered at: 12/07/21 0834     Code Status (Patient has no pulse and is not breathing):    CPR (Attempt to Resuscitate)     Medical Interventions (Patient has pulse or is breathing):    Full Support       Family History   Problem Relation Age of Onset   • Cancer Mother    • Cancer Father      Social History     Socioeconomic History   • Marital status:    Tobacco Use   • Smoking status: Current Every Day Smoker     Packs/day: 0.50     Years: 62.00     Pack years: 31.00     Types: Cigarettes   • Smokeless tobacco: Never Used   • Tobacco comment: states she has no plan to quit smoking   Vaping Use   • Vaping Use: Former   Substance and Sexual Activity   • Alcohol use: No   • Drug use: No   • Sexual activity: Not Currently       Labs:    Lab Results (last 72 hours)     Procedure Component Value Units Date/Time    Comprehensive Metabolic Panel [[id number]]  (Abnormal) Collected: 12/09/21 0459    Specimen: Blood Updated: 12/09/21 0607     Glucose 94 mg/dL      BUN 12 mg/dL      Creatinine 0.79 mg/dL      Sodium 143 mmol/L      Potassium 4.3 mmol/L      Chloride 104 mmol/L      CO2 32.0 mmol/L      Calcium 9.4 mg/dL      Total Protein 6.2 g/dL      Albumin 3.20 g/dL      ALT (SGPT) 13 U/L      AST (SGOT) 28 U/L      Alkaline Phosphatase 75 U/L      Total Bilirubin 0.2 mg/dL      eGFR Non African Amer 70 mL/min/1.73      Globulin 3.0 gm/dL      A/G Ratio 1.1 g/dL      BUN/Creatinine Ratio 15.2     Anion Gap 7.0 mmol/L     Narrative:      GFR Normal >60  Chronic Kidney Disease <60  Kidney Failure <15      CBC (No Diff) [[id number]]  (Abnormal) Collected: 12/09/21 0459    Specimen: Blood Updated: 12/09/21 0544     WBC 8.26 10*3/mm3      RBC 4.24 10*6/mm3      Hemoglobin 13.3 g/dL      Hematocrit 41.4 %      MCV 97.6 fL      MCH 31.4 pg      MCHC 32.1 g/dL      RDW 13.2 %      RDW-SD 47.4 fl      MPV 10.9 fL      Platelets 331 10*3/mm3     Blood  Culture - Blood, Arm, Right [[id number]]  (Normal) Collected: 12/06/21 1250    Specimen: Blood from Arm, Right Updated: 12/08/21 1330     Blood Culture No growth at 2 days    Blood Culture - Blood, Arm, Right [[id number]]  (Normal) Collected: 12/06/21 1256    Specimen: Blood from Arm, Right Updated: 12/08/21 1330     Blood Culture No growth at 2 days    Comprehensive Metabolic Panel [[id number]]  (Abnormal) Collected: 12/08/21 0504    Specimen: Blood Updated: 12/08/21 0544     Glucose 104 mg/dL      BUN 12 mg/dL      Creatinine 0.69 mg/dL      Sodium 142 mmol/L      Potassium 4.5 mmol/L      Comment: Slight hemolysis detected by analyzer. Results may be affected.        Chloride 106 mmol/L      CO2 28.0 mmol/L      Calcium 9.6 mg/dL      Total Protein 6.3 g/dL      Albumin 3.10 g/dL      ALT (SGPT) 14 U/L      AST (SGOT) 28 U/L      Alkaline Phosphatase 77 U/L      Total Bilirubin <0.2 mg/dL      eGFR Non African Amer 82 mL/min/1.73      Globulin 3.2 gm/dL      A/G Ratio 1.0 g/dL      BUN/Creatinine Ratio 17.4     Anion Gap 8.0 mmol/L     Narrative:      GFR Normal >60  Chronic Kidney Disease <60  Kidney Failure <15      CBC (No Diff) [[id number]]  (Abnormal) Collected: 12/08/21 0504    Specimen: Blood Updated: 12/08/21 0526     WBC 9.44 10*3/mm3      RBC 4.28 10*6/mm3      Hemoglobin 12.8 g/dL      Hematocrit 41.3 %      MCV 96.5 fL      MCH 29.9 pg      MCHC 31.0 g/dL      RDW 13.6 %      RDW-SD 48.5 fl      MPV 10.9 fL      Platelets 269 10*3/mm3     Comprehensive Metabolic Panel [[id number]]  (Abnormal) Collected: 12/07/21 0509    Specimen: Blood Updated: 12/07/21 0559     Glucose 87 mg/dL      BUN 16 mg/dL      Creatinine 0.79 mg/dL      Sodium 142 mmol/L      Potassium 4.7 mmol/L      Comment: Slight hemolysis detected by analyzer. Results may be affected.        Chloride 107 mmol/L      CO2 27.0 mmol/L      Calcium 9.2 mg/dL      Total Protein 6.1 g/dL      Albumin 3.00 g/dL      ALT (SGPT) 16 U/L      AST (SGOT)  28 U/L      Alkaline Phosphatase 78 U/L      Total Bilirubin 0.2 mg/dL      eGFR Non African Amer 70 mL/min/1.73      Globulin 3.1 gm/dL      A/G Ratio 1.0 g/dL      BUN/Creatinine Ratio 20.3     Anion Gap 8.0 mmol/L     Narrative:      GFR Normal >60  Chronic Kidney Disease <60  Kidney Failure <15      CBC (No Diff) [[id number]]  (Abnormal) Collected: 12/07/21 0509    Specimen: Blood Updated: 12/07/21 0537     WBC 7.27 10*3/mm3      RBC 4.24 10*6/mm3      Hemoglobin 12.7 g/dL      Hematocrit 41.6 %      MCV 98.1 fL      MCH 30.0 pg      MCHC 30.5 g/dL      RDW 13.9 %      RDW-SD 50.8 fl      MPV 10.8 fL      Platelets 232 10*3/mm3     Urinalysis With Culture If Indicated - Urine, Catheter In/Out [[id number]]  (Abnormal) Collected: 12/06/21 1354    Specimen: Urine, Catheter In/Out Updated: 12/06/21 1445     Color, UA Dark Yellow     Appearance, UA Clear     pH, UA <=5.0     Specific Gravity, UA 1.020     Glucose, UA Negative     Ketones, UA Trace     Bilirubin, UA Negative     Blood, UA Negative     Protein, UA Negative     Leuk Esterase, UA Trace     Nitrite, UA Negative     Urobilinogen, UA 0.2 E.U./dL    Urinalysis, Microscopic Only - Urine, Catheter In/Out [[id number]]  (Abnormal) Collected: 12/06/21 1354    Specimen: Urine, Catheter In/Out Updated: 12/06/21 1445     RBC, UA None Seen /HPF      WBC, UA 3-5 /HPF      Bacteria, UA None Seen /HPF      Squamous Epithelial Cells, UA 0-2 /HPF      Hyaline Casts, UA 3-6 /LPF      Methodology Manual Light Microscopy    COVID PRE-OP / PRE-PROCEDURE SCREENING ORDER (NO ISOLATION) - Swab, Nasal Cavity [[id number]]  (Normal) Collected: 12/06/21 1259    Specimen: Swab from Nasal Cavity Updated: 12/06/21 1414    Narrative:      The following orders were created for panel order COVID PRE-OP / PRE-PROCEDURE SCREENING ORDER (NO ISOLATION) - Swab, Nasal Cavity.  Procedure                               Abnormality         Status                     ---------                                -----------         ------                     COVID-19,[facility] IN-PAT...[[id number]]  Normal              Final result                 Please view results for these tests on the individual orders.    COVID-19,[facility] IN-HOUSE,Nasal Swab No Transport Media 3-4 HR TAT - Swab, Nasal Cavity [[id number]]  (Normal) Collected: 12/06/21 1259    Specimen: Swab from Nasal Cavity Updated: 12/06/21 1414     COVID19 Not Detected    Narrative:      Fact sheet for providers: https://www.fda.gov/media/123310/download     Fact sheet for patients: https://www.fda.gov/media/847565/download    Test performed by PCR.    Consider negative results in combination with clinical observations, patient history, and epidemiological information.    TSH [[id number]]  (Normal) Collected: 12/06/21 1256    Specimen: Blood Updated: 12/06/21 1335     TSH 3.020 uIU/mL     Comprehensive Metabolic Panel [[id number]]  (Abnormal) Collected: 12/06/21 1256    Specimen: Blood Updated: 12/06/21 1335     Glucose 89 mg/dL      BUN 23 mg/dL      Creatinine 1.03 mg/dL      Sodium 140 mmol/L      Potassium 5.1 mmol/L      Comment: Slight hemolysis detected by analyzer. Results may be affected.        Chloride 103 mmol/L      CO2 27.0 mmol/L      Calcium 9.6 mg/dL      Total Protein 6.6 g/dL      Albumin 3.50 g/dL      ALT (SGPT) 15 U/L      AST (SGOT) 30 U/L      Comment: Slight hemolysis detected by analyzer. Results may be affected.        Alkaline Phosphatase 86 U/L      Total Bilirubin 0.2 mg/dL      eGFR Non African Amer 52 mL/min/1.73      Globulin 3.1 gm/dL      A/G Ratio 1.1 g/dL      BUN/Creatinine Ratio 22.3     Anion Gap 10.0 mmol/L     Narrative:      GFR Normal >60  Chronic Kidney Disease <60  Kidney Failure <15      T4, Free [[id number]]  (Normal) Collected: 12/06/21 1256    Specimen: Blood Updated: 12/06/21 1335     Free T4 1.09 ng/dL     Narrative:      Results may be falsely increased if patient taking Biotin.      Procalcitonin [[id number]]   "(Normal) Collected: 12/06/21 1256    Specimen: Blood Updated: 12/06/21 1334     Procalcitonin 0.03 ng/mL     Narrative:      As a Marker for Sepsis (Non-Neonates):     1. <0.5 ng/mL represents a low risk of severe sepsis and/or septic shock.  2. >2 ng/mL represents a high risk of severe sepsis and/or septic shock.    As a Marker for Lower Respiratory Tract Infections that require antibiotic therapy:  PCT on Admission     Antibiotic Therapy             6-12 Hrs later  >0.5                          Strongly Recommended            >0.25 - <0.5             Recommended  0.1 - 0.25                  Discouraged                       Remeasure/reassess PCT  <0.1                         Strongly Discouraged         Remeasure/reassess PCT      As 28 day mortality risk marker: \"Change in Procalcitonin Result\" (>80% or <=80%) if Day 0 (or Day 1) and Day 4 values are available. Refer to http://www.[employer]pct-calculator.com/    Change in PCT <=80 %   A decrease of PCT levels below or equal to 80% defines a positive change in PCT test result representing a higher risk for 28-day all-cause mortality of patients diagnosed with severe sepsis or septic shock.    Change in PCT >80 %   A decrease of PCT levels of more than 80% defines a negative change in PCT result representing a lower risk for 28-day all-cause mortality of patients diagnosed with severe sepsis or septic shock.                C-reactive Protein [[id number]]  (Abnormal) Collected: 12/06/21 1256    Specimen: Blood Updated: 12/06/21 1334     C-Reactive Protein 4.08 mg/dL     Troponin [[id number]]  (Normal) Collected: 12/06/21 1256    Specimen: Blood Updated: 12/06/21 1331     Troponin T <0.010 ng/mL     Narrative:      Troponin T Reference Range:  <= 0.03 ng/mL-   Negative for AMI  >0.03 ng/mL-     Abnormal for myocardial necrosis.  Clinicians would have to utilize clinical acumen, EKG, Troponin and serial changes to determine if it is an Acute Myocardial Infarction or " myocardial injury due to an underlying chronic condition.       Results may be falsely decreased if patient taking Biotin.      BNP [[id number]]  (Normal) Collected: 12/06/21 1256    Specimen: Blood Updated: 12/06/21 1330     proBNP 1,235.0 pg/mL     Narrative:      Among patients with dyspnea, NT-proBNP is highly sensitive for the detection of acute congestive heart failure. In addition NT-proBNP of <300 pg/ml effectively rules out acute congestive heart failure with 99% negative predictive value.    Results may be falsely decreased if patient taking Biotin.      Lipase [[id number]]  (Normal) Collected: 12/06/21 1256    Specimen: Blood Updated: 12/06/21 1329     Lipase 16 U/L     Magnesium [[id number]]  (Normal) Collected: 12/06/21 1256    Specimen: Blood Updated: 12/06/21 1328     Magnesium 1.9 mg/dL     D-dimer, Quantitative [[id number]]  (Abnormal) Collected: 12/06/21 1256    Specimen: Blood Updated: 12/06/21 1318     D-Dimer, Quantitative 1.01 mg/L (FEU)     Narrative:      Reference Range is 0-0.50 mg/L FEU. However, results <0.50 mg/L FEU tends to rule out DVT or PE. Results >0.50 mg/L FEU are not useful in predicting absence or presence of DVT or PE.      Protime-INR [[id number]]  (Normal) Collected: 12/06/21 1256    Specimen: Blood Updated: 12/06/21 1318     Protime 12.6 Seconds      INR 0.98    aPTT [[id number]]  (Abnormal) Collected: 12/06/21 1256    Specimen: Blood Updated: 12/06/21 1318     PTT 39.3 seconds     CBC & Differential [[id number]]  (Abnormal) Collected: 12/06/21 1256    Specimen: Blood Updated: 12/06/21 1308    Narrative:      The following orders were created for panel order CBC & Differential.  Procedure                               Abnormality         Status                     ---------                               -----------         ------                     CBC Auto Differential[[id number]]        Abnormal            Final result                 Please view results for these tests on the  "individual orders.    CBC Auto Differential [[id number]]  (Abnormal) Collected: 12/06/21 1256    Specimen: Blood Updated: 12/06/21 1308     WBC 8.17 10*3/mm3      RBC 4.41 10*6/mm3      Hemoglobin 13.4 g/dL      Hematocrit 43.0 %      MCV 97.5 fL      MCH 30.4 pg      MCHC 31.2 g/dL      RDW 14.0 %      RDW-SD 50.3 fl      MPV 11.2 fL      Platelets 240 10*3/mm3      Neutrophil % 66.2 %      Lymphocyte % 23.1 %      Monocyte % 9.2 %      Eosinophil % 0.9 %      Basophil % 0.2 %      Immature Grans % 0.4 %      Neutrophils, Absolute 5.41 10*3/mm3      Lymphocytes, Absolute 1.89 10*3/mm3      Monocytes, Absolute 0.75 10*3/mm3      Eosinophils, Absolute 0.07 10*3/mm3      Basophils, Absolute 0.02 10*3/mm3      Immature Grans, Absolute 0.03 10*3/mm3      nRBC 0.0 /100 WBC     Blood Gas, Arterial - [[id number]]  (Abnormal) Collected: 12/06/21 1254    Specimen: Arterial Blood Updated: 12/06/21 1250     Site Right Radial     Darren's Test N/A     pH, Arterial 7.384 pH units      pCO2, Arterial 44.6 mm Hg      pO2, Arterial 46.7 mm Hg      Comment: 85 Value below critical limit        HCO3, Arterial 26.6 mmol/L      Comment: 83 Value above reference range        Base Excess, Arterial 1.1 mmol/L      O2 Saturation, Arterial 81.8 %      Comment: 84 Value below reference range        Temperature 37.0 C      Barometric Pressure for Blood Gas 755 mmHg      Modality Room Air     Ventilator Mode NA     Notified Who DR GARCIA     Notified By [id number]     Notified Time 12/06/2021 12:59     Collected by [id number]     Comment: Meter: [id number]     :  [id number]        pCO2, Temperature Corrected 44.6 mm Hg      pH, Temp Corrected 7.384 pH Units      pO2, Temperature Corrected 46.7 mm Hg               Objective:     Vitals: /58 (BP Location: Left arm, Patient Position: Lying)   Pulse 69   Temp 97.7 °F (36.5 °C) (Oral)   Resp 16   Ht 160 cm (63\")   Wt 65.4 kg (144 lb 3.2 oz)   SpO2 94%   BMI 25.54 kg/m²  "     Intake/Output Summary (Last 24 hours) at 2021 0812  Last data filed at 2021 0744  Gross per 24 hour   Intake 300 ml   Output 1500 ml   Net -1200 ml    Temp (24hrs), Av.8 °F (36.6 °C), Min:97.6 °F (36.4 °C), Max:98.1 °F (36.7 °C)      Physical Exam  Vitals reviewed.   Constitutional:       Appearance: Normal appearance. She is not ill-appearing.   HENT:      Head: Normocephalic and atraumatic.   Eyes:      General:         Right eye: No discharge.         Left eye: No discharge.      Extraocular Movements: Extraocular movements intact.      Conjunctiva/sclera: Conjunctivae normal.   Cardiovascular:      Rate and Rhythm: Normal rate and regular rhythm.      Pulses: Normal pulses.      Heart sounds: No murmur heard.      Pulmonary:      Effort: Pulmonary effort is normal. No respiratory distress.      Breath sounds: Rales (Best appreciated in the right posterior lower aspect) present.   Abdominal:      General: Abdomen is flat. Bowel sounds are normal. There is no distension.   Musculoskeletal:      Right lower leg: No edema.      Left lower leg: No edema.   Skin:     Capillary Refill: Capillary refill takes less than 2 seconds.   Neurological:      General: No focal deficit present.      Mental Status: She is alert.   Psychiatric:         Mood and Affect: Mood normal.         Behavior: Behavior normal.             Assessment and Plan:     Primary Problem:  Pneumonia due to infectious organism    Hospital Problem list:    Pneumonia due to infectious organism    Chronic back pain    Essential hypertension      PMH:  Past Medical History:   Diagnosis Date   • CAD in native artery 2019   • COPD (chronic obstructive pulmonary disease) ([facility])    • Essential hypertension 2021   • GERD (gastroesophageal reflux disease)    • Lung nodule        Treatment Plan:  Pneumonia: Appears to be community-acquired pneumonia.  Given her allergies she was started on azithromycin and Rocephin in the emergency  department.  She will be on ipratropium nebulizer treatments as well.  RT following.  -Continue azithromycin and Rocephin started 2021, to finish 12/10/2021  -Currently on 3 L via nasal cannula, not on oxygen at home.     Debility: Seems to be worse from her baseline.  PT/OT evaluation performed.  Will need assist at home, case management consulted to assist with discharge planning as she does not want to go to a rehab facility but does also not want to go to her daughter's house.  She is unable to have 24-hour care at her house, but does want to return home.  -She has agreed to stay at her daughter's house in [place], [place] for 2 weeks.  She will need home health in [place].     Hypertension: Continue home medication.     CODE STATUS: Full.     Disposition: Inpatient for increased oxygen requirement, anticipate discharge by 12/10/2021.    Reviewed treatment plans with the patient and/or family.   30 minutes spent in face to face interaction and coordination of care.     Electronically signed by Anupam Ledezma MD on 2021 at 08:12 CST      Electronically signed by Anupam Ledezma MD at 21 0816       Consult Notes (most recent note)    No notes of this type exist for this encounter.              Discharge Summary      Anupam Ledezma MD at 12/10/21 1144            Hospital Discharge Summary    Pattie Liu  :  1945  MRN:  [id number]    Admit date:  2021  Discharge date:  12/10/2021    Admitting Physician:    Rafat Espinoza MD    Discharge Diagnoses:      Pneumonia due to infectious organism    Chronic back pain    Essential hypertension      Hospital Course:   The patient is a 76 y.o. female who presents with Shortness of breath that began around 2021.  She states she received the COVID-19 booster 1 that day and started noticing some shortness of breath and weakness approximately 4 hours after receiving the vaccine.  She continued to do her normal activities of  daily living, but noticed persistent weakness and shortness of breath.  She had a cough that began as well during this time.  She presented to the emergency department on 12/6/2021 where she was found to have significant hypoxia.  CTA chest negative for PE, but with evidence of right-sided infiltrate concerning for community-acquired pneumonia.  She was treated with 5 days of azithromycin and Rocephin and breathing treatments with improvement.  She does not have an oxygen requirement at baseline, but has required oxygen during admission. She will be discharged home to her daughter's house in [place], [place] with home health and home oxygen.    The patient was admitted for the above noted medical/surgical issues. Please see daily progress note for further details concerning their stay. The patient improved throughout their stay and reached maximum medical improvement on the day of discharge. The patient/family agree with the treatment plan as outlined above. All questions concerning their stay were answered prior to discharge. They understand the importance of follow up concerning any abnormal test results.     Physical Exam    Discharge Medications:         Discharge Medications      New Medications      Instructions Start Date   predniSONE 10 MG tablet  Commonly known as: DELTASONE   Take 2 tablets by mouth Daily for 4 days, THEN 1 tablet Daily for 4 days, THEN 0.5 tablets Daily for 4 days.   Start Date: December 10, 2021        Continue These Medications      Instructions Start Date   ALLEGRA ALLERGY PO   4 mg, Oral, Daily      ALPRAZolam 0.5 MG tablet  Commonly known as: XANAX   0.5 mg, Oral, 2 Times Daily PRN      aspirin 81 MG tablet   81 mg, Oral, Daily      atorvastatin 20 MG tablet  Commonly known as: LIPITOR   20 mg, Oral, Daily      B-12 5000 MCG capsule   Oral, Daily      CALCIUM 1000 + D PO   Oral, Daily      gabapentin 600 MG tablet  Commonly known as: NEURONTIN   600 mg, Oral, 3 Times Daily, 1 1/2  tabs in am 1 tablet at noon 1 1/2 tabs at bedtime       ibuprofen 200 MG tablet  Commonly known as: ADVIL,MOTRIN   200 mg, Oral, Every 6 Hours PRN      ipratropium 17 MCG/ACT inhaler  Commonly known as: ATROVENT HFA   2 puffs, Inhalation, 4 Times Daily - RT      lisinopril 10 MG tablet  Commonly known as: PRINIVIL,ZESTRIL   20 mg, Oral, Daily      naproxen sodium 220 MG tablet  Commonly known as: ALEVE   220 mg, Oral, 2 Times Daily PRN      TRELEGY ELLIPTA IN   Inhalation, Daily             Activity: As tolerated. HH ordered. Oxygen at 2 L at rest and 4 L with activity    Diet: as before    Consults: PT/OT/SW/RT    Significant Diagnostic Studies:    XR Chest 1 View    Result Date: 12/6/2021  1. Bilateral groundglass infiltrates most likely representing interstitial pneumonia.   This report was finalized on 12/06/2021 13:09 by Dr. Delfino Becker MD.           Treatments:   Azithro, rocephin, duo-meri    Disposition:   Discharge home with daughter with HH and O2. Patient will need oxygen @ 2lpm at all times. With exertion patient will need 4lpm .     Time spent on discharge including discussion with patient/family, SW, and coordination of care.     Follow up with Rafat Espinoza MD in 2 weeks.    Signed:  Neal Rowan MD   12/10/2021, 11:48 CST      Electronically signed by Neal Rowan MD at 12/10/21 1148       Discharge Order (From admission, onward)     Start     Ordered    12/10/21 1143  Discharge patient  Once        Expected Discharge Date: 12/10/21    Discharge Disposition: Home or Self Care    Physician of Record for Attribution - Please select from Treatment Team: NEAL ROWAN [[id number]]    Review needed by CMO to determine Physician of Record: No       Question Answer Comment   Physician of Record for Attribution - Please select from Treatment Team NEAL ROWAN    Review needed by CMO to determine Physician of Record No        12/10/21 0482

## 2021-12-10 NOTE — PLAN OF CARE
Goal Outcome Evaluation:      Pt has oxygen wnl on 3L. IV abx continues, plans to switch to po soon. At d/c pt will go home with daughter and have home health. No c/o pain. Daughter in room with pt. Safety maintained.     Tele: nsr/sb [phone]

## 2021-12-10 NOTE — PLAN OF CARE
Goal Outcome Evaluation:         Pt. Sitting in chair with daughter in room with her refused adl shower/dressing, and agreed to ue exs! Pt. States that she will feel better showering at home! Ue exs performed to increase her ability with adl tasks and transfers!

## 2021-12-10 NOTE — DISCHARGE SUMMARY
Hospital Discharge Summary    Pattie Liu  :  1945  MRN:  [id number]    Admit date:  2021  Discharge date:  12/10/2021    Admitting Physician:    Rafat Espinoza MD    Discharge Diagnoses:      Pneumonia due to infectious organism    Chronic back pain    Essential hypertension      Hospital Course:   The patient is a 76 y.o. female who presents with Shortness of breath that began around 2021.  She states she received the COVID-19 booster 1 that day and started noticing some shortness of breath and weakness approximately 4 hours after receiving the vaccine.  She continued to do her normal activities of daily living, but noticed persistent weakness and shortness of breath.  She had a cough that began as well during this time.  She presented to the emergency department on 2021 where she was found to have significant hypoxia.  CTA chest negative for PE, but with evidence of right-sided infiltrate concerning for community-acquired pneumonia.  She was treated with 5 days of azithromycin and Rocephin and breathing treatments with improvement.  She does not have an oxygen requirement at baseline, but has required oxygen during admission. She will be discharged home to her daughter's house in [place], [place] with home health and home oxygen.    The patient was admitted for the above noted medical/surgical issues. Please see daily progress note for further details concerning their stay. The patient improved throughout their stay and reached maximum medical improvement on the day of discharge. The patient/family agree with the treatment plan as outlined above. All questions concerning their stay were answered prior to discharge. They understand the importance of follow up concerning any abnormal test results.     Physical Exam  Vitals reviewed.   Constitutional:       Appearance: Normal appearance. She is not ill-appearing.   HENT:      Head: Normocephalic and atraumatic.   Eyes:       General:         Right eye: No discharge.         Left eye: No discharge.      Extraocular Movements: Extraocular movements intact.      Conjunctiva/sclera: Conjunctivae normal.   Cardiovascular:      Rate and Rhythm: Normal rate and regular rhythm.      Pulses: Normal pulses.      Heart sounds: No murmur heard.      Pulmonary:      Effort: Pulmonary effort is normal. No respiratory distress.      Breath sounds: Rales (Best appreciated in the right posterior lower aspect) present.   Abdominal:      General: Abdomen is flat. Bowel sounds are normal. There is no distension.   Musculoskeletal:      Right lower leg: No edema.      Left lower leg: No edema.   Skin:     Capillary Refill: Capillary refill takes less than 2 seconds.   Neurological:      General: No focal deficit present.      Mental Status: She is alert.   Psychiatric:         Mood and Affect: Mood normal.         Behavior: Behavior normal.         Discharge Medications:         Discharge Medications      New Medications      Instructions Start Date   predniSONE 10 MG tablet  Commonly known as: DELTASONE   Take 2 tablets by mouth Daily for 4 days, THEN 1 tablet Daily for 4 days, THEN 0.5 tablets Daily for 4 days.   Start Date: December 10, 2021        Changes to Medications      Instructions Start Date   Trelegy Ellipta 200-62.5-25 MCG/INH inhaler  Generic drug: Fluticasone-Umeclidin-Vilant  What changed:   · medication strength  · how much to take   2 puffs, Inhalation, Daily      Trelegy Ellipta 100-62.5-25 MCG/INH inhaler  Generic drug: Fluticasone-Umeclidin-Vilant  What changed: You were already taking a medication with the same name, and this prescription was added. Make sure you understand how and when to take each.   1 puff, Inhalation, Nightly         Continue These Medications      Instructions Start Date   ALLEGRA ALLERGY PO   4 mg, Oral, Daily      ALPRAZolam 0.5 MG tablet  Commonly known as: XANAX   0.5 mg, Oral, 2 Times Daily PRN      aspirin  81 MG tablet   81 mg, Oral, Daily      atorvastatin 20 MG tablet  Commonly known as: LIPITOR   20 mg, Oral, Daily      B-12 5000 MCG capsule   Oral, Daily      CALCIUM 1000 + D PO   Oral, Daily      gabapentin 600 MG tablet  Commonly known as: NEURONTIN   600 mg, Oral, 3 Times Daily, 1 1/2 tabs in am 1 tablet at noon 1 1/2 tabs at bedtime       ibuprofen 200 MG tablet  Commonly known as: ADVIL,MOTRIN   200 mg, Oral, Every 6 Hours PRN      ipratropium 17 MCG/ACT inhaler  Commonly known as: ATROVENT HFA   2 puffs, Inhalation, 4 Times Daily - RT      lisinopril 10 MG tablet  Commonly known as: PRINIVIL,ZESTRIL   20 mg, Oral, Daily      naproxen sodium 220 MG tablet  Commonly known as: ALEVE   220 mg, Oral, 2 Times Daily PRN             Activity: As tolerated. HH ordered. Oxygen at 2 L at rest and 4 L with activity    Diet: as before    Consults: PT/OT/SW/RT    Significant Diagnostic Studies:    XR Chest 1 View    Result Date: 12/6/2021  1. Bilateral groundglass infiltrates most likely representing interstitial pneumonia.   This report was finalized on 12/06/2021 13:09 by Dr. Delfino Becker MD.           Treatments:   Azithro, rocephin, duo-meri    Disposition:   Discharge home with daughter with HH and O2. Patient will need oxygen @ 2lpm at all times. With exertion patient will need 4lpm .     Time spent on discharge including discussion with patient/family, SW, and coordination of care.     Follow up with Rafat Espinoza MD in 2 weeks.    Signed:  Anupam Ledezma MD   12/10/2021, 13:01 CST

## 2021-12-10 NOTE — PROGRESS NOTES
Exercise Oximetry    Patient Name:Pattie Liu   MRN: [id number]   Date: 12/10/21             ROOM AIR BASELINE   SpO2% 80   Heart Rate    Blood Pressure      EXERCISE ON ROOM AIR SpO2% EXERCISE ON O2 @  3LPM SpO2%   1 MINUTE  1 MINUTE 85   2 MINUTES  2 MINUTES o2 @ 4lpm  89   3 MINUTES  3 MINUTES 90   4 MINUTES  4 MINUTES 90   5 MINUTES  5 MINUTES    6 MINUTES  6 MINUTES               Distance Walked   Distance Walked   Dyspnea (Daniel Scale)   Dyspnea (Daniel Scale)   Fatigue (Daniel Scale)   Fatigue (Daniel Scale)   SpO2% Post Exercise   SpO2% Post Exercise   HR Post Exercise   HR Post Exercise   Time to Recovery   Time to Recovery     Comments: Patient will need oxygen @ 2lpm at all times. With exertion patient will need 4lpm .

## 2021-12-10 NOTE — CASE MANAGEMENT/SOCIAL WORK
Continued Stay Note   [facility]     Patient Name: Pattie Liu  MRN: [id number]  Today's Date: 12/10/2021    Admit Date: 12/6/2021     Discharge Plan     Row Name 12/10/21 1113       Plan    Plan Home    Patient/Family in Agreement with Plan yes    Plan Comments Chart reviewed; events noted.  Pt plans to go stay with her daugher upon discharge.  Therapy is recommending home health.  Pt on 3LPM 02.  SW will follow for MD orders to arrange hh/02.               Discharge Codes    No documentation.                     CATHERINE Ramirez

## 2021-12-10 NOTE — CASE MANAGEMENT/SOCIAL WORK
Continued Stay Note   [place]     Patient Name: Pattie Liu  MRN: [id number]  Today's Date: 12/10/2021    Admit Date: 12/6/2021     Discharge Plan     Row Name 12/10/21 1245       Plan    Plan Home with home 02 and home health    Provided Post Acute Provider List? Yes    Delivered To Patient; Support Person    Method of Delivery In person    Patient/Family in Agreement with Plan yes    Final Discharge Disposition Code 06 - home with home health care    Final Note Referral for home 02 and nebulizer.  Pt was provided options and chose [facility].  SW spoke to Columba at [facility] to inform of referral and that pt would be staying with family at [address] [place] [address].  She will be staying there for 2 weeks prior to going home.  They will bring portable tank to pt's room today prior to dc.  Pt was provided options and chose [facility]  JULIAN spoke to [facility] to inform of referral and faxed.  [facility]:  Phone:  [phone], fax: [phone].    Row Name 12/10/21 1113       Plan    Plan Home    Patient/Family in Agreement with Plan yes    Plan Comments Chart reviewed; events noted.  Pt plans to go stay with her daugher upon discharge.  Therapy is recommending home health.  Pt on 3LPM 02.  JULIAN will follow for MD orders to arrange hh/02.               Discharge Codes    No documentation.               Expected Discharge Date and Time     Expected Discharge Date Expected Discharge Time    Dec 10, 2021             CATHERINE Ramirez

## 2021-12-10 NOTE — THERAPY TREATMENT NOTE
Acute Care - Occupational Therapy Treatment Note  [facility]     Patient Name: Pattie Liu  : 1945  MRN: [id number]  Today's Date: 12/10/2021             Admit Date: 2021       ICD-10-CM ICD-9-CM   1. Pneumonia due to infectious organism, unspecified laterality, unspecified part of lung  J18.9 486   2. Hypoxic  R09.02 799.02   3. Decreased activities of daily living (ADL)  Z78.9 V49.89   4. Impaired mobility  Z74.09 799.89   5. Pneumonia of both lower lobes due to infectious organism  J18.9 486     Patient Active Problem List   Diagnosis   • Frequent PVCs   • Shortness of breath   • SOB (shortness of breath)   • CAD in native artery   • PVD (peripheral vascular disease) ([facility])   • Pneumonia due to infectious organism   • Chronic back pain   • Essential hypertension     Past Medical History:   Diagnosis Date   • CAD in native artery 2019   • COPD (chronic obstructive pulmonary disease) ([facility])    • Essential hypertension 2021   • GERD (gastroesophageal reflux disease)    • Lung nodule      Past Surgical History:   Procedure Laterality Date   • BREAST IMPLANT REMOVAL     • BREAST TISSUE EXPANDER REMOVAL INSERTION OF IMPLANT     • CARDIAC CATHETERIZATION N/A 2019    Procedure: Left Heart Cath;  Surgeon: Edi Armijo MD;  Location: [facility] INVASIVE LOCATION;  Service: Cardiology   • CHOLECYSTECTOMY     • HYSTERECTOMY     • MASTECTOMY      BILATERAL   • OOPHORECTOMY           OT ASSESSMENT FLOWSHEET (last 12 hours)     OT Evaluation and Treatment     Row Name 12/10/21 1306                   OT Time and Intention    Subjective Information complains of; weakness; fatigue  -CJ        Document Type therapy note (daily note)  -CJ        Mode of Treatment occupational therapy  -CJ        Patient Effort good  -CJ                  General Information    Existing Precautions/Restrictions fall; oxygen therapy device and L/min  -CJ                  Pain Assessment    Additional  Documentation Pain Scale: Word Pre/Post-Treatment (Group)  -                  Pain Scale: Numbers Pre/Post-Treatment    Pretreatment Pain Rating 0/10 - no pain  -        Posttreatment Pain Rating 0/10 - no pain  -        Pain Intervention(s) Repositioned; Rest  -                  Bed Mobility    Comment (Bed Mobility) sitting in chair!  -                  Motor Skills    Motor Skills therapeutic exercise  -        Therapeutic Exercise elbow/forearm  -                  Elbow/Forearm (Therapeutic Exercise)    Elbow/Forearm (Therapeutic Exercise) AROM (active range of motion); strengthening exercise  -        Elbow/Forearm AROM (Therapeutic Exercise) bilateral; flexion; extension; sitting; 10 repetitions; 3 sets  -        Elbow/Forearm Strengthening (Therapeutic Exercise) bilateral; flexion; extension; sitting; 2 lb free weight; 3 lb free weight; 10 repetitions; 3 sets  -                  Positioning and Restraints    Pre-Treatment Position sitting in chair/recliner  -        Post Treatment Position chair  -        In Chair sitting; call light within reach; encouraged to call for assist; with family/caregiver  -                  Progress Summary (OT)    Progress Toward Functional Goals (OT) progress toward functional goals is good  -        Barriers to Overall Progress (OT) Fall/o2!  -        Impairments Still Limiting Function (OT) Plan d/c!  -              User Key  (r) = Recorded By, (t) = Taken By, (c) = Cosigned By    Initials Name Effective Dates     Elvin Doan COTA 06/16/21 -                  Occupational Therapy Education                 Title: PT OT SLP Therapies (Done)     Topic: Occupational Therapy (Done)     Point: ADL training (Resolved)     Description:   Instruct learner(s) on proper safety adaptation and remediation techniques during self care or transfers.   Instruct in proper use of assistive devices.              Learning Progress Summary           Patient  Acceptance, E, VU by ASHWIN at 12/9/2021 1553    Comment: Pt educated to on reaching back for safety with sit to stands and ADL transfers for maximum safety.    Acceptance, E, VU by LUIS MIGUEL at 12/7/2021 1437                   Point: Home exercise program (Done)     Description:   Instruct learner(s) on appropriate technique for monitoring, assisting and/or progressing therapeutic exercises/activities.              Learning Progress Summary           Patient Acceptance, E,TB, VU,DU,NR by  at 12/10/2021 1306    Comment: Pt. sitting in chair performed ue exs to increase her safety with adl tasks!   Family Acceptance, E,TB, VU,DU,NR by  at 12/10/2021 1306    Comment: Pt. sitting in chair performed ue exs to increase her safety with adl tasks!                   Point: Precautions (Done)     Description:   Instruct learner(s) on prescribed precautions during self-care and functional transfers.              Learning Progress Summary           Patient Acceptance, E,TB, VU,DU,NR by  at 12/10/2021 1306    Comment: Pt. sitting in chair performed ue exs to increase her safety with adl tasks!    Acceptance, E, VU by LUIS MIGUEL at 12/7/2021 1437   Family Acceptance, E,TB, VU,DU,NR by  at 12/10/2021 1306    Comment: Pt. sitting in chair performed ue exs to increase her safety with adl tasks!                   Point: Body mechanics (Done)     Description:   Instruct learner(s) on proper positioning and spine alignment during self-care, functional mobility activities and/or exercises.              Learning Progress Summary           Patient Acceptance, E,TB, VU,DU,NR by  at 12/10/2021 1306    Comment: Pt. sitting in chair performed ue exs to increase her safety with adl tasks!   Family Acceptance, E,TB, VU,DU,NR by  at 12/10/2021 1306    Comment: Pt. sitting in chair performed ue exs to increase her safety with adl tasks!                               User Key     Initials Effective Dates Name Provider Type Discipline     06/16/21 -   Elvin Doan COTA Occupational Therapy Assistant OT    J 11/10/21 -  Emilie Hinds OTR/L, CSRS Occupational Therapist OT     09/15/21 -  Brooklyn Alba COTA Occupational Therapy Assistant THERAPIES                  OT Recommendation and Plan     Progress Toward Functional Goals (OT): progress toward functional goals is good     Outcome Measures     Row Name 12/10/21 1306 12/09/21 1500 12/08/21 1200       How much help from another is currently needed...    Putting on and taking off regular lower body clothing? 4  -CJ 4  -LS 3  -TS    Bathing (including washing, rinsing, and drying) 4  - 4  -LS 3  -TS    Toileting (which includes using toilet bed pan or urinal) 4  - 4  -LS 4  -TS    Putting on and taking off regular upper body clothing 4  - 4  -LS 4  -TS    Taking care of personal grooming (such as brushing teeth) 4  -CJ 4  -LS 4  -TS    Eating meals 4  - 4  -LS 4  -TS    AM-PAC 6 Clicks Score (OT) 24  - 24  -LS 22  -TS       Functional Assessment    Outcome Measure Options AM-PAC 6 Clicks Daily Activity (OT)  - -- --          User Key  (r) = Recorded By, (t) = Taken By, (c) = Cosigned By    Initials Name Provider Type    CJ Elvin Doan COTA Occupational Therapy Assistant    TS Hellen Bai, DWAYNE Occupational Therapy Assistant     Brooklyn Alba COTA Occupational Therapy Assistant                Time Calculation:    Time Calculation- OT     Row Name 12/10/21 1306 12/10/21 1125          Time Calculation- OT    OT Start Time 1306  - --     OT Stop Time 1332  -CJ --     OT Time Calculation (min) 26 min  - --     Total Timed Code Minutes- OT 26 minute(s)  - --     OT Received On 12/10/21  - --            Timed Charges    41599 - OT Therapeutic Exercise Minutes 26 -CJ --     84809 - Gait Training Minutes  -- 18  -NW            Total Minutes    Timed Charges Total Minutes 26 -CJ 18  -NW      Total Minutes 26 -CJ 18  -NW           User Key  (r) = Recorded By, (t) =  Taken By, (c) = Cosigned By    Initials Name Provider Type    CJ Elvin Doan COTA Occupational Therapy Assistant    NW Idania Hurst, PTA Physical Therapy Assistant              Therapy Charges for Today     Code Description Service Date Service Provider Modifiers Qty    14809741451 HC OT THER PROC EA 15 MIN 12/10/2021 Elvin Doan COTA GO 2               DWAYNE King  12/10/2021

## 2021-12-10 NOTE — PLAN OF CARE
Goal Outcome Evaluation:  Plan of Care Reviewed With: patient        Progress: improving  Outcome Summary: Pt up in chair. sit-stand supervision. Pt amb 125'x2 rwx supervision. requried standing rest due to decreased endurance. Monitored o2 on 3L 93% before amb. after 125' and standing rest 87% then after amb bk to room dropped to 85%. May need 4L o2 w/ exertion. Discussed POC and enrgy conserrvation. Pt planss to d/c home w/ HH w/ [facility]aught[facility] for few weeks in hopes to get back home independently. Pt may need a rwx for home.

## 2021-12-11 LAB
BACTERIA SPEC AEROBE CULT: NORMAL
BACTERIA SPEC AEROBE CULT: NORMAL

## 2021-12-11 NOTE — OUTREACH NOTE
Prep Survey      Responses   [facility] facility patient discharged from? [facility]   Is LACE score < 7 ? No   Emergency Room discharge w/ pulse [facility]? No   Eligibility Readm Mgmt   Discharge diagnosis Pneumonia due to infectious organism   Does the patient have one of the following disease processes/diagnoses(primary or secondary)? COPD/Pneumonia   Does the patient have Home health ordered? Yes   What is the Home health agency?  [facility]    Is there a DME ordered? Yes   What DME was ordered? Oxygen and Nebulizer per [facility]    Prep survey completed? Yes          Alexandria Fernandez RN

## 2021-12-11 NOTE — THERAPY DISCHARGE NOTE
Acute Care - Physical Therapy Discharge Summary  [facility]       Patient Name: Pattie Liu  : 1945  MRN: [id number]    Today's Date: 2021                 Admit Date: 2021      PT Recommendation and Plan    Visit Dx:    ICD-10-CM ICD-9-CM   1. Pneumonia due to infectious organism, unspecified laterality, unspecified part of lung  J18.9 486   2. Hypoxic  R09.02 799.02   3. Decreased activities of daily living (ADL)  Z78.9 V49.89   4. Impaired mobility  Z74.09 799.89   5. Pneumonia of both lower lobes due to infectious organism  J18.9 486        Outcome Measures     Row Name 12/10/21 1306 21 1500 21 1200       How much help from another is currently needed...    Putting on and taking off regular lower body clothing? 4  - 4  -LS 3  -TS    Bathing (including washing, rinsing, and drying) 4  - 4  -LS 3  -TS    Toileting (which includes using toilet bed pan or urinal) 4  - 4  -LS 4  -TS    Putting on and taking off regular upper body clothing 4  - 4  -LS 4  -TS    Taking care of personal grooming (such as brushing teeth) 4  - 4  -LS 4  -TS    Eating meals 4  - 4  -LS 4  -TS    AM-PAC 6 Clicks Score (OT) 24  - 24  - 22  -TS       Functional Assessment    Outcome Measure Options AM-PAC 6 Clicks Daily Activity (OT)  - -- --          User Key  (r) = Recorded By, (t) = Taken By, (c) = Cosigned By    Initials Name Provider Type     Elvin Doan COTA Occupational Therapy Assistant    TS Hellen Bai COTA Occupational Therapy Assistant    Brooklyn Lagos COTA Occupational Therapy Assistant                     PT Rehab Goals     Row Name 21 0649             Bed Mobility Goal 1 (PT)    Activity/Assistive Device (Bed Mobility Goal 1, PT) sit to supine; supine to sit; rolling to left; rolling to right  -      [place]aron Level/Cues Needed (Bed Mobility Goal 1, PT) independent  -      Time Frame (Bed Mobility Goal 1, PT) long term goal (LTG)  -       Progress/Outcomes (Bed Mobility Goal 1, PT) goal not met  -AH              Transfer Goal 1 (PT)    Activity/Assistive Device (Transfer Goal 1, PT) sit-to-stand/stand-to-sit; bed-to-chair/chair-to-bed  -AH      [place] Level/Cues Needed (Transfer Goal 1, PT) independent  -AH      Time Frame (Transfer Goal 1, PT) long term goal (LTG)  -AH      Progress/Outcome (Transfer Goal 1, PT) goal not met  -AH              Gait Training Goal 1 (PT)    Activity/Assistive Device (Gait Training Goal 1, PT) gait (walking locomotion); increase endurance/gait distance; increase energy conservation; improve balance and speed; other (see comments)  LRAD  -AH      [place] Level (Gait Training Goal 1, PT) standby assist  -AH      Distance (Gait Training Goal 1, PT) 250  -AH      Time Frame (Gait Training Goal 1, PT) long term goal (LTG)  -AH      Progress/Outcome (Gait Training Goal 1, PT) goal not met  -AH              Stairs Goal 1 (PT)    Activity/Assistive Device (Stairs Goal 1, PT) stairs, all skills; ascending stairs; descending stairs; using handrail, left  -AH      [place] Level/Cues Needed (Stairs Goal 1, PT) contact guard assist  -AH      Number of Stairs (Stairs Goal 1, PT) 4  -AH      Time Frame (Stairs Goal 1, PT) long term goal (LTG)  -AH      Progress/Outcome (Stairs Goal 1, PT) goal not met  -AH            User Key  (r) = Recorded By, (t) = Taken By, (c) = Cosigned By    Initials Name Provider Type [facility]    Rashida Shea PTA Physical Therapy Assistant PT                    PT Discharge Summary  Reason for Discharge: Discharge from facility  Outcomes Achieved: Refer to plan of care for updates on goals achieved  Discharge Destination: Home      Rashida Crockett PTA   12/11/2021

## 2021-12-11 NOTE — THERAPY DISCHARGE NOTE
Acute Care - Occupational Therapy Discharge Summary  [facility]     Patient Name: Pattie Liu  : 1945  MRN: [id number]    Today's Date: 2021                 Admit Date: 2021        OT Recommendation and Plan    Visit Dx:    ICD-10-CM ICD-9-CM   1. Pneumonia due to infectious organism, unspecified laterality, unspecified part of lung  J18.9 486   2. Hypoxic  R09.02 799.02   3. Decreased activities of daily living (ADL)  Z78.9 V49.89   4. Impaired mobility  Z74.09 799.89   5. Pneumonia of both lower lobes due to infectious organism  J18.9 486                OT Rehab Goals     Row Name 21 1500             Bathing Goal 1 (OT)    Activity/Device (Bathing Goal 1, OT) bathing skills, all  -MW      Alexander Level/Cues Needed (Bathing Goal 1, OT) modified independence  -MW      Time Frame (Bathing Goal 1, OT) long term goal (LTG); by discharge  -MW      Progress/Outcomes (Bathing Goal 1, OT) goal not met  -MW              Dressing Goal 1 (OT)    Activity/Device (Dressing Goal 1, OT) dressing skills, all  -MW      Alexander/Cues Needed (Dressing Goal 1, OT) independent  -MW      Time Frame (Dressing Goal 1, OT) long term goal (LTG); by discharge  -MW      Progress/Outcome (Dressing Goal 1, OT) goal not met  -MW            User Key  (r) = Recorded By, (t) = Taken By, (c) = Cosigned By    Initials Name Provider Type Discipline    Janeen Maki, OTR/L Occupational Therapist OT                 Outcome Measures     Row Name 12/10/21 1306 21 1500          How much help from another is currently needed...    Putting on and taking off regular lower body clothing? 4  -CJ 4  -LS     Bathing (including washing, rinsing, and drying) 4  -CJ 4  -LS     Toileting (which includes using toilet bed pan or urinal) 4  -CJ 4  -LS     Putting on and taking off regular upper body clothing 4  -CJ 4  -LS     Taking care of personal grooming (such as brushing teeth) 4  -CJ 4  -LS     Eating meals 4  -CJ  4  -LS     AM-PAC 6 Clicks Score (OT) 24  -CJ 24  -LS            Functional Assessment    Outcome Measure Options AM-PAC 6 Clicks Daily Activity (OT)  - --           User Key  (r) = Recorded By, (t) = Taken By, (c) = Cosigned By    Initials Name Provider Type    CJ Elvin Doan COTA Occupational Therapy Assistant    Brooklyn Lagos COTA Occupational Therapy Assistant                Timed Therapy Charges  Total Units: 2    Charges  Total Units: 2    Procedure Name Documented Minutes Units Code    HC OT THER PROC EA 15 MIN 26  2    20762 (CPT®)               Documented Minutes  Total Minutes: 26    Therapy Provided Minutes    86898 - OT Therapeutic Exercise Minutes 26                    OT Discharge Summary  Anticipated Discharge Disposition (OT): home with assist, home with home health  Reason for Discharge: Discharge from facility  Outcomes Achieved: Refer to plan of care for updates on goals achieved  Discharge Destination: Home with assist, Home with home health      Janeen Cole OTR/L  12/11/2021

## 2021-12-15 ENCOUNTER — READMISSION MANAGEMENT (OUTPATIENT)
Dept: CALL CENTER | Facility: HOSPITAL | Age: 76
End: 2021-12-15

## 2021-12-15 NOTE — OUTREACH NOTE
"COPD/PN Week 1 Survey      Responses   [facility] patient discharged from? [place]   Does the patient have one of the following disease processes/diagnoses(primary or secondary)? COPD/Pneumonia   Was the primary reason for admission: Pneumonia   Week 1 attempt successful? Yes   Call start time 1614   Call end time 1621   Discharge diagnosis Pneumonia due to infectious organism   Meds reviewed with patient/caregiver? Yes   Is the patient having any side effects they believe may be caused by any medication additions or changes? Yes   Side effects comments  Steroids \"are making want to climb the wall\".   Does the patient have all medications ordered at discharge? Yes   Is the patient taking all medications as directed (includes completed medication regime)? Yes   Does the patient have a primary care provider?  Yes   Does the patient have an appointment with their PCP or specialist within 7 days of discharge? Yes   Has the patient kept scheduled appointments due by today? N/A   Comments States she has follow ups scheduled for the 12/17 and 12/22 but not who they are with.    What DME was ordered? Oxygen and Nebulizer per [facility]    Has all DME been delivered? Yes   Pulse Ox monitoring Intermittent   Pulse Ox device source Patient   O2 Sat comments Sats have been in the 90% on 2.5 L at rest and 4L when up   O2 Sat: education provided Sat levels,  When to seek care,  Monitoring frequency   O2 Sat education comments If sats drop below 90% while at rest with oxygen, she is to go to ED   Psychosocial issues? No   Comments Pt is currently staying with her daughter in [place]   Did the patient receive a copy of their discharge instructions? Yes   Nursing interventions Reviewed instructions with patient   What is the patient's perception of their health status since discharge? Improving   If the patient is a current smoker, are they able to teach back resources for cessation? Not a smoker  [States she smoked for 60 years but " does not currently]   Is the patient/caregiver able to teach back the hierarchy of who to call/visit for symptoms/problems? PCP, Specialist, Home health nurse, Urgent Care, ED, 911 Yes   Is the patient/caregiver able to teach back signs and symptoms of worsening condition: Fever/chills,  Shortness of breath,  Chest pain   Is the patient/caregiver able to teach back importance of completing antibiotic course of treatment? Yes   Week 1 call completed? Yes   Wrap up additional comments States she is doing very well, Is up moving around, helping wrap [place] present.  Does dishes after dinner.  States she does not need home health.            Radha Harvey LPN

## 2022-02-08 ENCOUNTER — HOSPITAL ENCOUNTER (OUTPATIENT)
Dept: GENERAL RADIOLOGY | Facility: HOSPITAL | Age: 77
Discharge: HOME | End: 2022-02-08
Admitting: FAMILY MEDICINE

## 2022-02-08 ENCOUNTER — TRANSCRIBE ORDERS (OUTPATIENT)
Dept: ADMINISTRATIVE | Facility: HOSPITAL | Age: 77
End: 2022-02-08

## 2022-02-08 DIAGNOSIS — J18.9 PNEUMONIA OF LOWER LOBE DUE TO INFECTIOUS ORGANISM, UNSPECIFIED LATERALITY: Primary | ICD-10-CM

## 2022-02-08 DIAGNOSIS — J18.9 PNEUMONIA OF LOWER LOBE DUE TO INFECTIOUS ORGANISM, UNSPECIFIED LATERALITY: ICD-10-CM

## 2022-02-08 PROCEDURE — 71046 X-RAY EXAM CHEST 2 VIEWS: CPT

## 2022-04-19 ENCOUNTER — TRANSCRIBE ORDERS (OUTPATIENT)
Dept: HOME HEALTH SERVICES | Facility: HOME HEALTHCARE | Age: 77
End: 2022-04-19

## 2022-04-19 ENCOUNTER — HOME HEALTH ADMISSION (OUTPATIENT)
Dept: HOME HEALTH SERVICES | Facility: HOME HEALTHCARE | Age: 77
End: 2022-04-19

## 2022-04-19 DIAGNOSIS — J44.1 OBSTRUCTIVE CHRONIC BRONCHITIS WITH EXACERBATION: Primary | ICD-10-CM

## 2022-04-21 ENCOUNTER — HOME CARE VISIT (OUTPATIENT)
Dept: HOME HEALTH SERVICES | Facility: CLINIC | Age: 77
End: 2022-04-21

## 2022-04-21 VITALS
HEART RATE: 61 BPM | OXYGEN SATURATION: 99 % | SYSTOLIC BLOOD PRESSURE: 122 MMHG | TEMPERATURE: 97.3 F | RESPIRATION RATE: 16 BRPM | DIASTOLIC BLOOD PRESSURE: 70 MMHG

## 2022-04-21 PROCEDURE — G0151 HHCP-SERV OF PT,EA 15 MIN: HCPCS

## 2022-04-21 NOTE — HOME HEALTH
Pre-screened for COVID 19.      Patient is 77 year old female using continous 2L continous oxygen.  Patient reports weakness, decreased endurance, shortness of breath, decreased balance, and fatigue.

## 2022-04-27 ENCOUNTER — HOME CARE VISIT (OUTPATIENT)
Dept: HOME HEALTH SERVICES | Facility: CLINIC | Age: 77
End: 2022-04-27

## 2022-04-27 VITALS
SYSTOLIC BLOOD PRESSURE: 124 MMHG | DIASTOLIC BLOOD PRESSURE: 62 MMHG | TEMPERATURE: 98.9 F | OXYGEN SATURATION: 97 % | RESPIRATION RATE: 18 BRPM | HEART RATE: 40 BPM

## 2022-04-27 PROCEDURE — G0157 HHC PT ASSISTANT EA 15: HCPCS

## 2022-04-27 NOTE — HOME HEALTH
Covid 19 pre-screen performed. Pt states no falls or changes to insurance but did have some new medications not listed which therapist added to med list. Pt sitting outside w/O2 donned upon arrival. Next visit to focus on balance and knowledge of therex.

## 2022-04-29 ENCOUNTER — HOME CARE VISIT (OUTPATIENT)
Dept: HOME HEALTH SERVICES | Facility: CLINIC | Age: 77
End: 2022-04-29

## 2022-04-29 NOTE — CASE COMMUNICATION
To Dr. Espinoza,    Patient missed a physical therapy visit on 4/29/2022 due to patient refused visit today.

## 2022-05-03 ENCOUNTER — HOME CARE VISIT (OUTPATIENT)
Dept: HOME HEALTH SERVICES | Facility: CLINIC | Age: 77
End: 2022-05-03

## 2022-05-03 VITALS
OXYGEN SATURATION: 96 % | DIASTOLIC BLOOD PRESSURE: 62 MMHG | RESPIRATION RATE: 18 BRPM | HEART RATE: 60 BPM | SYSTOLIC BLOOD PRESSURE: 128 MMHG | TEMPERATURE: 99.1 F

## 2022-05-03 PROCEDURE — G0157 HHC PT ASSISTANT EA 15: HCPCS

## 2022-05-03 NOTE — HOME HEALTH
Covid 19 pre-screen performed. Pt states no falls or changes to insurance or medication since last visit. Next visit to focus on gt mechanics and knowledge/compliance of HEP.

## 2022-05-04 ENCOUNTER — HOME CARE VISIT (OUTPATIENT)
Dept: HOME HEALTH SERVICES | Facility: CLINIC | Age: 77
End: 2022-05-04

## 2022-05-04 VITALS
DIASTOLIC BLOOD PRESSURE: 60 MMHG | OXYGEN SATURATION: 96 % | SYSTOLIC BLOOD PRESSURE: 140 MMHG | RESPIRATION RATE: 18 BRPM | TEMPERATURE: 96.9 F | HEART RATE: 72 BPM

## 2022-05-04 PROCEDURE — G0157 HHC PT ASSISTANT EA 15: HCPCS

## 2022-05-04 NOTE — HOME HEALTH
Covid 19 pre-screen performed. Pt states no falls or changes to insurance or medication since last visit. Pt states she doesn't feel good due to chest congestion. Next visit to focus on outdoor amb and progressive HEP.

## 2022-05-10 ENCOUNTER — HOME CARE VISIT (OUTPATIENT)
Dept: HOME HEALTH SERVICES | Facility: CLINIC | Age: 77
End: 2022-05-10

## 2022-05-10 VITALS
DIASTOLIC BLOOD PRESSURE: 54 MMHG | RESPIRATION RATE: 18 BRPM | TEMPERATURE: 98.4 F | HEART RATE: 45 BPM | OXYGEN SATURATION: 95 % | SYSTOLIC BLOOD PRESSURE: 144 MMHG

## 2022-05-10 PROCEDURE — G0157 HHC PT ASSISTANT EA 15: HCPCS

## 2022-05-10 NOTE — HOME HEALTH
Covid 19 pre-screen performed.Pt states no falls or changes to insurance or medicaiton since last visit. Pt states she went to [facility] yesterday and was given a steroid and antibiotic shot for her bronchitis. Pt outside on [facility] upon arrival. Next visit PT will assess for d/c.

## 2022-05-12 ENCOUNTER — HOME CARE VISIT (OUTPATIENT)
Dept: HOME HEALTH SERVICES | Facility: CLINIC | Age: 77
End: 2022-05-12

## 2022-05-12 VITALS
OXYGEN SATURATION: 94 % | TEMPERATURE: 97.1 F | DIASTOLIC BLOOD PRESSURE: 60 MMHG | SYSTOLIC BLOOD PRESSURE: 120 MMHG | HEART RATE: 74 BPM | RESPIRATION RATE: 18 BRPM

## 2022-05-12 PROCEDURE — G0151 HHCP-SERV OF PT,EA 15 MIN: HCPCS

## 2022-12-20 ENCOUNTER — APPOINTMENT (OUTPATIENT)
Dept: GENERAL RADIOLOGY | Facility: HOSPITAL | Age: 77
DRG: 558 | End: 2022-12-20
Payer: MEDICARE

## 2022-12-20 ENCOUNTER — APPOINTMENT (OUTPATIENT)
Dept: CT IMAGING | Facility: HOSPITAL | Age: 77
DRG: 558 | End: 2022-12-20
Payer: MEDICARE

## 2022-12-20 ENCOUNTER — HOSPITAL ENCOUNTER (INPATIENT)
Facility: HOSPITAL | Age: 77
LOS: 2 days | Discharge: HOME HEALTH | DRG: 558 | End: 2022-12-22
Attending: EMERGENCY MEDICINE | Admitting: FAMILY MEDICINE
Payer: MEDICARE

## 2022-12-20 DIAGNOSIS — R06.02 SOB (SHORTNESS OF BREATH): ICD-10-CM

## 2022-12-20 DIAGNOSIS — N28.1 RENAL CYST, RIGHT: ICD-10-CM

## 2022-12-20 DIAGNOSIS — T79.6XXA TRAUMATIC RHABDOMYOLYSIS, INITIAL ENCOUNTER: ICD-10-CM

## 2022-12-20 DIAGNOSIS — Z78.9 DECREASED ACTIVITIES OF DAILY LIVING (ADL): ICD-10-CM

## 2022-12-20 DIAGNOSIS — M51.36 DDD (DEGENERATIVE DISC DISEASE), LUMBAR: ICD-10-CM
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
## 2022-12-20 DIAGNOSIS — J18.9 PNEUMONIA DUE TO INFECTIOUS ORGANISM, UNSPECIFIED LATERALITY, UNSPECIFIED PART OF LUNG: ICD-10-CM

## 2022-12-20 LAB
ALBUMIN SERPL-MCNC: 4.1 G/DL (ref 3.5–5.2)
ALBUMIN/GLOB SERPL: 1.4 G/DL
ALP SERPL-CCNC: 92 U/L (ref 39–117)
ALT SERPL W P-5'-P-CCNC: 54 U/L (ref 1–33)
AMORPH URATE CRY URNS QL MICRO: ABNORMAL /HPF
ANION GAP SERPL CALCULATED.3IONS-SCNC: 12 MMOL/L (ref 5–15)
ANION GAP SERPL CALCULATED.3IONS-SCNC: 13 MMOL/L (ref 5–15)
APTT PPP: 37.1 SECONDS (ref 24.1–35)
AST SERPL-CCNC: 146 U/L (ref 1–32)
BACTERIA UR QL AUTO: ABNORMAL /HPF
BASOPHILS # BLD AUTO: 0.06 10*3/MM3 (ref 0–0.2)
BASOPHILS NFR BLD AUTO: 0.3 % (ref 0–1.5)
BILIRUB SERPL-MCNC: 0.5 MG/DL (ref 0–1.2)
BILIRUB UR QL STRIP: NEGATIVE
BUN SERPL-MCNC: 23 MG/DL (ref 8–23)
BUN SERPL-MCNC: 24 MG/DL (ref 8–23)
BUN/CREAT SERPL: 24.7 (ref 7–25)
BUN/CREAT SERPL: 25.8 (ref 7–25)
CALCIUM SPEC-SCNC: 10.1 MG/DL (ref 8.6–10.5)
CALCIUM SPEC-SCNC: 10.4 MG/DL (ref 8.6–10.5)
CHLORIDE SERPL-SCNC: 103 MMOL/L (ref 98–107)
CHLORIDE SERPL-SCNC: 105 MMOL/L (ref 98–107)
CK SERPL-CCNC: 5142 U/L (ref 20–180)
CLARITY UR: CLEAR
CO2 SERPL-SCNC: 26 MMOL/L (ref 22–29)
CO2 SERPL-SCNC: 28 MMOL/L (ref 22–29)
COLOR UR: ABNORMAL
CREAT SERPL-MCNC: 0.93 MG/DL (ref 0.57–1)
CREAT SERPL-MCNC: 0.93 MG/DL (ref 0.57–1)
DEPRECATED RDW RBC AUTO: 45.6 FL (ref 37–54)
DEPRECATED RDW RBC AUTO: 46.3 FL (ref 37–54)
EGFRCR SERPLBLD CKD-EPI 2021: 62.6 ML/MIN/1.73
EGFRCR SERPLBLD CKD-EPI 2021: 62.6 ML/MIN/1.73
EOSINOPHIL # BLD AUTO: 0.05 10*3/MM3 (ref 0–0.4)
EOSINOPHIL NFR BLD AUTO: 0.3 % (ref 0.3–6.2)
ERYTHROCYTE [DISTWIDTH] IN BLOOD BY AUTOMATED COUNT: 12.8 % (ref 12.3–15.4)
ERYTHROCYTE [DISTWIDTH] IN BLOOD BY AUTOMATED COUNT: 13 % (ref 12.3–15.4)
GLOBULIN UR ELPH-MCNC: 3 GM/DL
GLUCOSE SERPL-MCNC: 123 MG/DL (ref 65–99)
GLUCOSE SERPL-MCNC: 128 MG/DL (ref 65–99)
GLUCOSE UR STRIP-MCNC: NEGATIVE MG/DL
HCT VFR BLD AUTO: 42.3 % (ref 34–46.6)
HCT VFR BLD AUTO: 44.4 % (ref 34–46.6)
HGB BLD-MCNC: 13.7 G/DL (ref 12–15.9)
HGB BLD-MCNC: 13.8 G/DL (ref 12–15.9)
HGB UR QL STRIP.AUTO: NEGATIVE
HYALINE CASTS UR QL AUTO: ABNORMAL /LPF
IMM GRANULOCYTES # BLD AUTO: 0.13 10*3/MM3 (ref 0–0.05)
IMM GRANULOCYTES NFR BLD AUTO: 0.7 % (ref 0–0.5)
INR PPP: 1.01 (ref 0.91–1.09)
KETONES UR QL STRIP: ABNORMAL
LEUKOCYTE ESTERASE UR QL STRIP.AUTO: NEGATIVE
LYMPHOCYTES # BLD AUTO: 1.77 10*3/MM3 (ref 0.7–3.1)
LYMPHOCYTES # BLD MANUAL: 2.16 10*3/MM3 (ref 0.7–3.1)
LYMPHOCYTES NFR BLD AUTO: 9.2 % (ref 19.6–45.3)
LYMPHOCYTES NFR BLD MANUAL: 2 % (ref 5–12)
MAGNESIUM SERPL-MCNC: 2.2 MG/DL (ref 1.6–2.4)
MCH RBC QN AUTO: 30.5 PG (ref 26.6–33)
MCH RBC QN AUTO: 31.2 PG (ref 26.6–33)
MCHC RBC AUTO-ENTMCNC: 31.1 G/DL (ref 31.5–35.7)
MCHC RBC AUTO-ENTMCNC: 32.4 G/DL (ref 31.5–35.7)
MCV RBC AUTO: 96.4 FL (ref 79–97)
MCV RBC AUTO: 98.2 FL (ref 79–97)
METAMYELOCYTES NFR BLD MANUAL: 1 % (ref 0–0)
MONOCYTES # BLD AUTO: 1.05 10*3/MM3 (ref 0.1–0.9)
MONOCYTES # BLD: 0.36 10*3/MM3 (ref 0.1–0.9)
MONOCYTES NFR BLD AUTO: 5.5 % (ref 5–12)
NEUTROPHILS # BLD AUTO: 15.27 10*3/MM3 (ref 1.7–7)
NEUTROPHILS NFR BLD AUTO: 16.09 10*3/MM3 (ref 1.7–7)
NEUTROPHILS NFR BLD AUTO: 84 % (ref 42.7–76)
NEUTROPHILS NFR BLD MANUAL: 78 % (ref 42.7–76)
NEUTS BAND NFR BLD MANUAL: 7 % (ref 0–5)
NITRITE UR QL STRIP: NEGATIVE
NRBC BLD AUTO-RTO: 0 /100 WBC (ref 0–0.2)
PH UR STRIP.AUTO: <=5 [PH] (ref 5–8)
PLAT MORPH BLD: NORMAL
PLATELET # BLD AUTO: 331 10*3/MM3 (ref 140–450)
PLATELET # BLD AUTO: 382 10*3/MM3 (ref 140–450)
PMV BLD AUTO: 10.5 FL (ref 6–12)
PMV BLD AUTO: 11 FL (ref 6–12)
POTASSIUM SERPL-SCNC: 4.4 MMOL/L (ref 3.5–5.2)
POTASSIUM SERPL-SCNC: 4.6 MMOL/L (ref 3.5–5.2)
PROT SERPL-MCNC: 7.1 G/DL (ref 6–8.5)
PROT UR QL STRIP: ABNORMAL
PROTHROMBIN TIME: 13.4 SECONDS (ref 11.8–14.8)
RBC # BLD AUTO: 4.39 10*6/MM3 (ref 3.77–5.28)
RBC # BLD AUTO: 4.52 10*6/MM3 (ref 3.77–5.28)
RBC # UR STRIP: ABNORMAL /HPF
RBC MORPH BLD: NORMAL
REF LAB TEST METHOD: ABNORMAL
SODIUM SERPL-SCNC: 143 MMOL/L (ref 136–145)
SODIUM SERPL-SCNC: 144 MMOL/L (ref 136–145)
SP GR UR STRIP: 1.03 (ref 1–1.03)
SQUAMOUS #/AREA URNS HPF: ABNORMAL /HPF
UROBILINOGEN UR QL STRIP: ABNORMAL
VARIANT LYMPHS NFR BLD MANUAL: 12 % (ref 19.6–45.3)
WBC # UR STRIP: ABNORMAL /HPF
WBC MORPH BLD: NORMAL
WBC NRBC COR # BLD: 17.97 10*3/MM3 (ref 3.4–10.8)
WBC NRBC COR # BLD: 19.15 10*3/MM3 (ref 3.4–10.8)

## 2022-12-20 PROCEDURE — 90471 IMMUNIZATION ADMIN: CPT | Performed by: EMERGENCY MEDICINE

## 2022-12-20 PROCEDURE — 72128 CT CHEST SPINE W/O DYE: CPT

## 2022-12-20 PROCEDURE — 70450 CT HEAD/BRAIN W/O DYE: CPT

## 2022-12-20 PROCEDURE — 85730 THROMBOPLASTIN TIME PARTIAL: CPT | Performed by: EMERGENCY MEDICINE

## 2022-12-20 PROCEDURE — 36415 COLL VENOUS BLD VENIPUNCTURE: CPT

## 2022-12-20 PROCEDURE — 99285 EMERGENCY DEPT VISIT HI MDM: CPT

## 2022-12-20 PROCEDURE — 70486 CT MAXILLOFACIAL W/O DYE: CPT

## 2022-12-20 PROCEDURE — 93010 ELECTROCARDIOGRAM REPORT: CPT | Performed by: HOSPITALIST

## 2022-12-20 PROCEDURE — 93005 ELECTROCARDIOGRAM TRACING: CPT | Performed by: EMERGENCY MEDICINE

## 2022-12-20 PROCEDURE — 72131 CT LUMBAR SPINE W/O DYE: CPT

## 2022-12-20 PROCEDURE — 25010000002 ONDANSETRON PER 1 MG: Performed by: FAMILY MEDICINE

## 2022-12-20 PROCEDURE — 25010000002 TETANUS-DIPHTH-ACELL PERTUSSIS 5-2.5-18.5 LF-MCG/0.5 SUSPENSION PREFILLED SYRINGE: Performed by: EMERGENCY MEDICINE

## 2022-12-20 PROCEDURE — 83735 ASSAY OF MAGNESIUM: CPT | Performed by: FAMILY MEDICINE

## 2022-12-20 PROCEDURE — 85025 COMPLETE CBC W/AUTO DIFF WBC: CPT | Performed by: FAMILY MEDICINE

## 2022-12-20 PROCEDURE — P9612 CATHETERIZE FOR URINE SPEC: HCPCS

## 2022-12-20 PROCEDURE — 80053 COMPREHEN METABOLIC PANEL: CPT | Performed by: EMERGENCY MEDICINE

## 2022-12-20 PROCEDURE — 85610 PROTHROMBIN TIME: CPT | Performed by: EMERGENCY MEDICINE

## 2022-12-20 PROCEDURE — 71045 X-RAY EXAM CHEST 1 VIEW: CPT

## 2022-12-20 PROCEDURE — 85025 COMPLETE CBC W/AUTO DIFF WBC: CPT | Performed by: EMERGENCY MEDICINE

## 2022-12-20 PROCEDURE — 72125 CT NECK SPINE W/O DYE: CPT

## 2022-12-20 PROCEDURE — 90715 TDAP VACCINE 7 YRS/> IM: CPT | Performed by: EMERGENCY MEDICINE

## 2022-12-20 PROCEDURE — 81001 URINALYSIS AUTO W/SCOPE: CPT | Performed by: EMERGENCY MEDICINE

## 2022-12-20 PROCEDURE — 85007 BL SMEAR W/DIFF WBC COUNT: CPT | Performed by: FAMILY MEDICINE

## 2022-12-20 PROCEDURE — 82550 ASSAY OF CK (CPK): CPT | Performed by: EMERGENCY MEDICINE

## 2022-12-20 RX ORDER — SODIUM CHLORIDE 9 MG/ML
40 INJECTION, SOLUTION INTRAVENOUS AS NEEDED
Status: DISCONTINUED | OUTPATIENT
Start: 2022-12-20 | End: 2022-12-22 | Stop reason: HOSPADM

## 2022-12-20 RX ORDER — ONDANSETRON 2 MG/ML
4 INJECTION INTRAMUSCULAR; INTRAVENOUS EVERY 6 HOURS PRN
Status: DISCONTINUED | OUTPATIENT
Start: 2022-12-20 | End: 2022-12-22 | Stop reason: HOSPADM

## 2022-12-20 RX ORDER — FAMOTIDINE 20 MG/1
40 TABLET, FILM COATED ORAL DAILY
Status: DISCONTINUED | OUTPATIENT
Start: 2022-12-21 | End: 2022-12-22 | Stop reason: HOSPADM

## 2022-12-20 RX ORDER — ACETAMINOPHEN 500 MG
1000 TABLET ORAL 3 TIMES DAILY
Status: DISCONTINUED | OUTPATIENT
Start: 2022-12-20 | End: 2022-12-22 | Stop reason: HOSPADM

## 2022-12-20 RX ORDER — ONDANSETRON 4 MG/1
4 TABLET, FILM COATED ORAL EVERY 6 HOURS PRN
Status: DISCONTINUED | OUTPATIENT
Start: 2022-12-20 | End: 2022-12-22 | Stop reason: HOSPADM

## 2022-12-20 RX ORDER — SODIUM CHLORIDE 0.9 % (FLUSH) 0.9 %
10 SYRINGE (ML) INJECTION AS NEEDED
Status: DISCONTINUED | OUTPATIENT
Start: 2022-12-20 | End: 2022-12-22 | Stop reason: HOSPADM

## 2022-12-20 RX ORDER — ENOXAPARIN SODIUM 100 MG/ML
30 INJECTION SUBCUTANEOUS DAILY
Status: DISCONTINUED | OUTPATIENT
Start: 2022-12-21 | End: 2022-12-21

## 2022-12-20 RX ORDER — SODIUM CHLORIDE 0.9 % (FLUSH) 0.9 %
10 SYRINGE (ML) INJECTION EVERY 12 HOURS SCHEDULED
Status: DISCONTINUED | OUTPATIENT
Start: 2022-12-20 | End: 2022-12-22 | Stop reason: HOSPADM

## 2022-12-20 RX ORDER — SODIUM CHLORIDE 9 MG/ML
125 INJECTION, SOLUTION INTRAVENOUS CONTINUOUS
Status: DISCONTINUED | OUTPATIENT
Start: 2022-12-20 | End: 2022-12-22 | Stop reason: HOSPADM

## 2022-12-20 RX ADMIN — SODIUM CHLORIDE 125 ML/HR: 9 INJECTION, SOLUTION INTRAVENOUS at 21:23

## 2022-12-20 RX ADMIN — SODIUM CHLORIDE 500 ML: 9 INJECTION, SOLUTION INTRAVENOUS at 18:06

## 2022-12-20 RX ADMIN — TETANUS TOXOID, REDUCED DIPHTHERIA TOXOID AND ACELLULAR PERTUSSIS VACCINE, ADSORBED 0.5 ML: 5; 2.5; 8; 8; 2.5 SUSPENSION INTRAMUSCULAR at 17:01

## 2022-12-20 RX ADMIN — ONDANSETRON 4 MG: 2 INJECTION INTRAMUSCULAR; INTRAVENOUS at 21:32

## 2022-12-20 RX ADMIN — ACETAMINOPHEN 1000 MG: 500 TABLET ORAL at 21:22

## 2022-12-20 RX ADMIN — Medication 10 ML: at 21:23

## 2022-12-20 NOTE — ED PROVIDER NOTES
Subjective   History of Present Illness  Patient is a 77-year-old female who presents to the ER status post a fall.  Patient states she has chronic back pain and has had some weakness to her bilateral lower extremities for quite some time.  Patient states that yesterday around 10 AM her legs gave out and she fell to the ground.  Patient was unable to get up and has laid in the floor until today when someone found her.  Patient states she did scoot around trying to get to the restroom.  She is unsure of her last tetanus immunization.  Patient is unsure if she hit her head or had any loss of consciousness during the fall.  She does not take blood thinners.  Patient does live alone and does not feel that she can care for herself at this time.  She denies any fever, chest pain, shortness of air, abdominal pain, nausea vomiting diarrhea, urinary changes, numbness, neck or back pain, extremity pain.        Review of Systems   HENT: Negative.    Eyes: Negative.    Respiratory: Negative.    Cardiovascular: Negative.    Gastrointestinal: Negative.    Endocrine: Negative.    Genitourinary: Negative.    Musculoskeletal: Positive for back pain.   Skin: Negative.    Allergic/Immunologic: Negative.    Neurological: Positive for weakness.   Hematological: Negative.    Psychiatric/Behavioral: Negative.    All other systems reviewed and are negative.      Past Medical History:   Diagnosis Date   • CAD in native artery 7/29/2019   • COPD (chronic obstructive pulmonary disease) (HCC)    • Essential hypertension 12/7/2021   • GERD (gastroesophageal reflux disease)    • Lung nodule        Allergies   Allergen Reactions   • Codeine Other (See Comments)     Unknown.     • Hydrocodone-Acetaminophen Other (See Comments)   • Levofloxacin Other (See Comments)   • Oxycodone-Acetaminophen Other (See Comments)   • Tramadol Other (See Comments)   • Zanaflex  [Tizanidine Hcl] Other (See Comments)   • Albuterol Arrhythmia       Past Surgical  History:   Procedure Laterality Date   • BREAST IMPLANT REMOVAL     • BREAST TISSUE EXPANDER REMOVAL INSERTION OF IMPLANT     • CARDIAC CATHETERIZATION N/A 6/21/2019    Procedure: Left Heart Cath;  Surgeon: Edi Armijo MD;  Location:  [facility] CATH INVASIVE LOC[facility];  Service: Cardiology   • CHOLECYSTECTOMY     • HYSTERECTOMY     • MASTECTOMY      BILATERAL   • OOPHORECTOMY         Family History   Problem Relation Age of Onset   • Cancer Mother    • Cancer Father        Social History     Socioeconomic History   • Marital status:    Tobacco Use   • Smoking status: Every Day     Packs/day: 0.50     Years: 62.00     Pack years: 31.00     Types: Cigarettes   • Smokeless tobacco: Never   • Tobacco comments:     states she has no plan to quit smoking   Vaping Use   • Vaping Use: Former   Substance and Sexual Activity   • Alcohol use: No   • Drug use: No   • Sexual activity: Not Currently           Objective   Physical Exam  Vitals and nursing note reviewed.   Constitutional:       Appearance: She is well-developed.   HENT:      Head: Normocephalic.      Comments: Ecchymosis and edema to the left forehead and left periorbital area, nontender to palpation  Eyes:      Conjunctiva/sclera: Conjunctivae normal.      Pupils: Pupils are equal, round, and reactive to light.   Cardiovascular:      Rate and Rhythm: Normal rate and regular rhythm.      Heart sounds: Normal heart sounds.   Pulmonary:      Effort: Pulmonary effort is normal.      Breath sounds: Normal breath sounds.   Abdominal:      Palpations: Abdomen is soft.      Tenderness: There is no abdominal tenderness.   Musculoskeletal:         General: No deformity. Normal range of motion.      Cervical back: Normal range of motion.      Comments: See skin exam, nontender to palpation including CTL spines and all extremities, normal range of motion, neurovascularly intact, pulses 2+   Skin:     General: Skin is warm.      Comments: Superficial  abrasion/skin tears to the bilateral elbows, ecchymosis to the bilateral forearms   Neurological:      Mental Status: She is alert and oriented to person, place, and time.      Cranial Nerves: Cranial nerves 2-12 are intact.      Sensory: Sensation is intact.      Motor: Motor function is intact.   Psychiatric:         Behavior: Behavior normal.         Procedures           ED Course      Patient was given a Tdap and IV fluids     EKG: Sinus rhythm with a rate of 83 with PVCs, prolonged QT, bigeminy, no acute ischemia or infarction    Lab Results (last 24 hours)     Procedure Component Value Units Date/Time    CBC & Differential [[id number]]  (Abnormal) Collected: 12/20/22 1652    Specimen: Blood Updated: 12/20/22 1707    Narrative:      The following orders were created for panel order CBC & Differential.  Procedure                               Abnormality         Status                     ---------                               -----------         ------                     CBC Auto Differential[[id number]]        Abnormal            Final result                 Please view results for these tests on the individual orders.    Comprehensive Metabolic Panel [[id number]]  (Abnormal) Collected: 12/20/22 1652    Specimen: Blood Updated: 12/20/22 1733     Glucose 128 mg/dL      BUN 24 mg/dL      Creatinine 0.93 mg/dL      Sodium 144 mmol/L      Potassium 4.4 mmol/L      Comment: Slight hemolysis detected by analyzer. Results may be affected.        Chloride 103 mmol/L      CO2 28.0 mmol/L      Calcium 10.4 mg/dL      Total Protein 7.1 g/dL      Albumin 4.10 g/dL      ALT (SGPT) 54 U/L      AST (SGOT) 146 U/L      Alkaline Phosphatase 92 U/L      Total Bilirubin 0.5 mg/dL      Globulin 3.0 gm/dL      A/G Ratio 1.4 g/dL      BUN/Creatinine Ratio 25.8     Anion Gap 13.0 mmol/L      eGFR 62.6 mL/min/1.73      Comment: National Kidney Foundation and American Society of Nephrology (ASN) Task Force recommended calculation based  on the Chronic Kidney Disease Epidemiology Collaboration (CKD-EPI) equation refit without adjustment for race.       Narrative:      GFR Normal >60  Chronic Kidney Disease <60  Kidney Failure <15    The GFR formula is only valid for adults with stable renal function between ages 18 and 70.    Protime-INR [[id number]]  (Normal) Collected: 12/20/22 1652    Specimen: Blood Updated: 12/20/22 1719     Protime 13.4 Seconds      INR 1.01    aPTT [[id number]]  (Abnormal) Collected: 12/20/22 1652    Specimen: Blood Updated: 12/20/22 1719     PTT 37.1 seconds     CK [[id number]]  (Abnormal) Collected: 12/20/22 1652    Specimen: Blood Updated: 12/20/22 1748     Creatine Kinase 5,142 U/L     CBC Auto Differential [[id number]]  (Abnormal) Collected: 12/20/22 1652    Specimen: Blood Updated: 12/20/22 1707     WBC 19.15 10*3/mm3      RBC 4.39 10*6/mm3      Hemoglobin 13.7 g/dL      Hematocrit 42.3 %      MCV 96.4 fL      MCH 31.2 pg      MCHC 32.4 g/dL      RDW 12.8 %      RDW-SD 45.6 fl      MPV 10.5 fL      Platelets 382 10*3/mm3      Neutrophil % 84.0 %      Lymphocyte % 9.2 %      Monocyte % 5.5 %      Eosinophil % 0.3 %      Basophil % 0.3 %      Immature Grans % 0.7 %      Neutrophils, Absolute 16.09 10*3/mm3      Lymphocytes, Absolute 1.77 10*3/mm3      Monocytes, Absolute 1.05 10*3/mm3      Eosinophils, Absolute 0.05 10*3/mm3      Basophils, Absolute 0.06 10*3/mm3      Immature Grans, Absolute 0.13 10*3/mm3      nRBC 0.0 /100 WBC     Urinalysis With Culture If Indicated - Straight Cath [[id number]]  (Abnormal) Collected: 12/20/22 1708    Specimen: Urine from Straight Cath Updated: 12/20/22 1735     Color, UA Dark Yellow     Appearance, UA Clear     pH, UA <=5.0     Specific Gravity, UA 1.026     Glucose, UA Negative     Ketones, UA Trace     Bilirubin, UA Negative     Blood, UA Negative     Protein, UA 30 mg/dL (1+)     Leuk Esterase, UA Negative     Nitrite, UA Negative     Urobilinogen, UA 0.2 E.U./dL    Narrative:      In  absence of clinical symptoms, the presence of pyuria, bacteria, and/or nitrites on the urinalysis result does not correlate with infection.    Urinalysis, Microscopic Only - Straight Cath [[id number]]  (Abnormal) Collected: 12/20/22 1708    Specimen: Urine from Straight Cath Updated: 12/20/22 1554     RBC, UA 0-2 /HPF      WBC, UA 0-2 /HPF      Comment: Urine culture not indicated.        Bacteria, UA Trace /HPF      Squamous Epithelial Cells, UA 3-6 /HPF      Hyaline Casts, UA None Seen /LPF      Amorphous Crystals, UA Moderate/2+ /HPF      Methodology Manual Light Microscopy        CT Head Without Contrast   Final Result       1. Mild cerebral and cerebellar atrophy with chronic microvascular   disease but no evidence of acute intracranial process. 2. Air-fluid   levels within both maxillary sinuses suggesting sinusitis. Given history   of fall this could also potentially represent posttraumatic fluid.           This report was finalized on 12/20/2022 16:50 by Dr. Nik Randolph MD.      CT Facial Bones Without Contrast   Final Result   1. No facial bone fracture is seen.   2. Mucosal thickening and/or fluid in both maxillary sinuses. Mucosal   thickening involving the anterior ethmoid air cells and the inferior   frontal sinuses bilaterally. FINDINGS consistent with sinusitis.   3. Other nonacute findings, as discussed.       The full report of this exam was immediately signed and available to the   emergency room. The patient is currently in the emergency room.       This report was finalized on 12/20/2022 16:50 by Dr. Delfino Gamino MD.      CT Cervical Spine Without Contrast   Final Result   1. No evidence of acute osseous injury or malalignment in the cervical   spine.           This report was finalized on 12/20/2022 16:54 by Dr. Nik Randolph MD.      CT Thoracic Spine Without Contrast   Final Result   . 1. No evidence of acute fracture or listhesis. There is   spondylosis in the mid and lower  thoracic spine with an accentuated   thoracic kyphosis.       This report was finalized on 12/20/2022 16:57 by Dr. Nik Randolph MD.      CT Lumbar Spine Without Contrast   Final Result   1. No lumbar spine fracture is seen.   2. Degenerative changes of the lumbar spine, as described.   3. A 1.2 cm exophytic lesion arising from the lower pole right kidney   with a Hounsfield unit measurement of 41 is not a simple cyst. This   could be a complex cyst or a solid lesion. It is not fully evaluated on   this exam.   4. Atheromatous disease of the aortoiliac vessels.       The full report of this exam was immediately signed and available to the   emergency room. The patient is currently in the emergency room.    This report was finalized on 12/20/2022 16:57 by Dr. Delfino Gamino MD.      XR Chest 1 View   Final Result   1. Similar chronic changes with no acute process identified.   This report was finalized on 12/20/2022 16:15 by Dr. Emilie Crabtree MD.           labs showed an elevated ALT and AST and an elevated CK consistent with rhabdo.  Chest x-ray showed no acute findings..  CT scan of the head showed no acute findings other than sinusitis.  CT scan of the face showed no facial fractures.  CT scan of the C-spine showed no acute findings.  CT of the T-spine showed no acute findings.  CT of the L-spine showed no acute fractures.  Did show degenerative changes of the lumbar spine and an exophytic lesion arising from the right kidney.  Patient was then admitted to Dr. Espinoza's service for further work-up and treatment.                              [facility]    Final diagnoses:   Fall, initial encounter   Traumatic rhabdomyolysis, initial encounter ([facility])   DDD (degenerative disc disease), lumbar   Renal cyst, right       ED Disposition  ED Disposition     ED Disposition   Decision to Admit    Condition   --    Comment   --             No follow-up provider specified.       Medication List      No changes were made to your  prescriptions during this visit.          Zina Leslie MD  12/20/22 1816       Zina Leslie MD  12/20/22 1817

## 2022-12-21 PROBLEM — T79.6XXA TRAUMATIC RHABDOMYOLYSIS (HCC): Status: ACTIVE | Noted: 2022-12-21

## 2022-12-21 PROBLEM — M51.36 DEGENERATIVE DISC DISEASE, LUMBAR: Status: ACTIVE | Noted: 2022-12-21

## 2022-12-21 PROBLEM — D64.9 ANEMIA: Status: ACTIVE | Noted: 2022-12-21

## 2022-12-21 PROBLEM — M51.369 DEGENERATIVE DISC DISEASE, LUMBAR: Status: ACTIVE | Noted: 2022-12-21

## 2022-12-21 PROBLEM — D72.829 LEUKOCYTOSIS: Status: ACTIVE | Noted: 2022-12-21

## 2022-12-21 LAB
ANION GAP SERPL CALCULATED.3IONS-SCNC: 9 MMOL/L (ref 5–15)
BASOPHILS # BLD AUTO: 0.05 10*3/MM3 (ref 0–0.2)
BASOPHILS NFR BLD AUTO: 0.3 % (ref 0–1.5)
BUN SERPL-MCNC: 23 MG/DL (ref 8–23)
BUN/CREAT SERPL: 23.7 (ref 7–25)
CALCIUM SPEC-SCNC: 9.2 MG/DL (ref 8.6–10.5)
CHLORIDE SERPL-SCNC: 107 MMOL/L (ref 98–107)
CK SERPL-CCNC: 2874 U/L (ref 20–180)
CO2 SERPL-SCNC: 25 MMOL/L (ref 22–29)
CREAT SERPL-MCNC: 0.97 MG/DL (ref 0.57–1)
DEPRECATED RDW RBC AUTO: 47.3 FL (ref 37–54)
EGFRCR SERPLBLD CKD-EPI 2021: 59.6 ML/MIN/1.73
EOSINOPHIL # BLD AUTO: 0.34 10*3/MM3 (ref 0–0.4)
EOSINOPHIL NFR BLD AUTO: 2.4 % (ref 0.3–6.2)
ERYTHROCYTE [DISTWIDTH] IN BLOOD BY AUTOMATED COUNT: 13.1 % (ref 12.3–15.4)
GLUCOSE SERPL-MCNC: 100 MG/DL (ref 65–99)
HCT VFR BLD AUTO: 37.4 % (ref 34–46.6)
HGB BLD-MCNC: 11.5 G/DL (ref 12–15.9)
IMM GRANULOCYTES # BLD AUTO: 0.08 10*3/MM3 (ref 0–0.05)
IMM GRANULOCYTES NFR BLD AUTO: 0.6 % (ref 0–0.5)
LYMPHOCYTES # BLD AUTO: 3.27 10*3/MM3 (ref 0.7–3.1)
LYMPHOCYTES NFR BLD AUTO: 22.6 % (ref 19.6–45.3)
MAGNESIUM SERPL-MCNC: 2.1 MG/DL (ref 1.6–2.4)
MCH RBC QN AUTO: 30.4 PG (ref 26.6–33)
MCHC RBC AUTO-ENTMCNC: 30.7 G/DL (ref 31.5–35.7)
MCV RBC AUTO: 98.9 FL (ref 79–97)
MONOCYTES # BLD AUTO: 0.97 10*3/MM3 (ref 0.1–0.9)
MONOCYTES NFR BLD AUTO: 6.7 % (ref 5–12)
NEUTROPHILS NFR BLD AUTO: 67.4 % (ref 42.7–76)
NEUTROPHILS NFR BLD AUTO: 9.75 10*3/MM3 (ref 1.7–7)
NRBC BLD AUTO-RTO: 0 /100 WBC (ref 0–0.2)
PLATELET # BLD AUTO: 322 10*3/MM3 (ref 140–450)
PMV BLD AUTO: 10.9 FL (ref 6–12)
POTASSIUM SERPL-SCNC: 4.1 MMOL/L (ref 3.5–5.2)
QT INTERVAL: 454 MS
QTC INTERVAL: 533 MS
RBC # BLD AUTO: 3.78 10*6/MM3 (ref 3.77–5.28)
SODIUM SERPL-SCNC: 141 MMOL/L (ref 136–145)
WBC NRBC COR # BLD: 14.46 10*3/MM3 (ref 3.4–10.8)

## 2022-12-21 PROCEDURE — 83735 ASSAY OF MAGNESIUM: CPT | Performed by: FAMILY MEDICINE

## 2022-12-21 PROCEDURE — 25010000002 ONDANSETRON PER 1 MG: Performed by: FAMILY MEDICINE

## 2022-12-21 PROCEDURE — 82550 ASSAY OF CK (CPK): CPT | Performed by: FAMILY MEDICINE

## 2022-12-21 PROCEDURE — 85025 COMPLETE CBC W/AUTO DIFF WBC: CPT | Performed by: FAMILY MEDICINE

## 2022-12-21 PROCEDURE — 25010000002 ENOXAPARIN PER 10 MG: Performed by: FAMILY MEDICINE

## 2022-12-21 PROCEDURE — 97166 OT EVAL MOD COMPLEX 45 MIN: CPT

## 2022-12-21 PROCEDURE — 80048 BASIC METABOLIC PNL TOTAL CA: CPT | Performed by: FAMILY MEDICINE

## 2022-12-21 RX ORDER — GABAPENTIN 400 MG/1
1200 CAPSULE ORAL EVERY 12 HOURS SCHEDULED
Status: DISCONTINUED | OUTPATIENT
Start: 2022-12-21 | End: 2022-12-22 | Stop reason: HOSPADM

## 2022-12-21 RX ORDER — ATORVASTATIN CALCIUM 10 MG/1
20 TABLET, FILM COATED ORAL NIGHTLY
Status: DISCONTINUED | OUTPATIENT
Start: 2022-12-21 | End: 2022-12-22 | Stop reason: HOSPADM

## 2022-12-21 RX ORDER — LISINOPRIL 20 MG/1
20 TABLET ORAL DAILY
Status: DISCONTINUED | OUTPATIENT
Start: 2022-12-21 | End: 2022-12-22 | Stop reason: HOSPADM

## 2022-12-21 RX ORDER — ASPIRIN 81 MG/1
81 TABLET ORAL DAILY
Status: DISCONTINUED | OUTPATIENT
Start: 2022-12-21 | End: 2022-12-22 | Stop reason: HOSPADM

## 2022-12-21 RX ORDER — ENOXAPARIN SODIUM 100 MG/ML
40 INJECTION SUBCUTANEOUS DAILY
Status: DISCONTINUED | OUTPATIENT
Start: 2022-12-22 | End: 2022-12-22 | Stop reason: HOSPADM

## 2022-12-21 RX ORDER — ALPRAZOLAM 0.5 MG/1
0.5 TABLET ORAL 2 TIMES DAILY PRN
Status: DISCONTINUED | OUTPATIENT
Start: 2022-12-21 | End: 2022-12-22 | Stop reason: HOSPADM

## 2022-12-21 RX ADMIN — LISINOPRIL 20 MG: 20 TABLET ORAL at 10:08

## 2022-12-21 RX ADMIN — ONDANSETRON 4 MG: 2 INJECTION INTRAMUSCULAR; INTRAVENOUS at 15:36

## 2022-12-21 RX ADMIN — ACETAMINOPHEN 1000 MG: 500 TABLET ORAL at 20:32

## 2022-12-21 RX ADMIN — SODIUM CHLORIDE 125 ML/HR: 9 INJECTION, SOLUTION INTRAVENOUS at 12:23

## 2022-12-21 RX ADMIN — ACETAMINOPHEN 1000 MG: 500 TABLET ORAL at 09:03

## 2022-12-21 RX ADMIN — GABAPENTIN 1200 MG: 400 CAPSULE ORAL at 20:32

## 2022-12-21 RX ADMIN — FAMOTIDINE 40 MG: 20 TABLET, FILM COATED ORAL at 09:04

## 2022-12-21 RX ADMIN — Medication 10 ML: at 09:04

## 2022-12-21 RX ADMIN — ENOXAPARIN SODIUM 30 MG: 100 INJECTION SUBCUTANEOUS at 09:04

## 2022-12-21 RX ADMIN — ASPIRIN 81 MG: 81 TABLET, COATED ORAL at 10:08

## 2022-12-21 RX ADMIN — SODIUM CHLORIDE 125 ML/HR: 9 INJECTION, SOLUTION INTRAVENOUS at 23:47

## 2022-12-21 RX ADMIN — GABAPENTIN 1200 MG: 400 CAPSULE ORAL at 10:08

## 2022-12-21 RX ADMIN — ALPRAZOLAM 0.5 MG: 0.5 TABLET ORAL at 20:37

## 2022-12-21 RX ADMIN — Medication 10 ML: at 20:32

## 2022-12-21 RX ADMIN — ACETAMINOPHEN 1000 MG: 500 TABLET ORAL at 15:36

## 2022-12-21 RX ADMIN — ATORVASTATIN CALCIUM 20 MG: 10 TABLET, FILM COATED ORAL at 20:32

## 2022-12-21 RX ADMIN — ONDANSETRON 4 MG: 2 INJECTION INTRAMUSCULAR; INTRAVENOUS at 10:08

## 2022-12-21 NOTE — H&P
History and Physical    Patient:  Pattie Liu  MRN: [id number]    CHIEF COMPLAINT: Fall    History Obtained From: the patient   PCP: Rafat Espinoza MD    HISTORY OF PRESENT ILLNESS:   The patient is a 77 y.o. female who presents with traumatic rhabdomyolysis following a fall from standing while at home alone.  She was down for greater than 24 hours before family came to check on her.  She did not lose consciousness.  CT scans of the cervical spine, thoracic spine, lumbar spine, head, face all without acute fractures from the emergency department.  Her CPK was found to be greater than 5000 in the emergency department.    REVIEW OF SYSTEMS:    Review of Systems   Constitutional: Positive for activity change and fatigue. Negative for fever.   Respiratory: Positive for shortness of breath.    Gastrointestinal: Negative for abdominal pain and nausea.   Musculoskeletal: Positive for gait problem.   Neurological: Positive for weakness. Negative for syncope.          Past Medical History:  Past Medical History:   Diagnosis Date   • CAD in native artery 7/29/2019   • COPD (chronic obstructive pulmonary disease) (HCC)    • Essential hypertension 12/7/2021   • GERD (gastroesophageal reflux disease)    • Lung nodule        Past Surgical History:  Past Surgical History:   Procedure Laterality Date   • BREAST IMPLANT REMOVAL     • BREAST TISSUE EXPANDER REMOVAL INSERTION OF IMPLANT     • CARDIAC CATHETERIZATION N/A 6/21/2019    Procedure: Left Heart Cath;  Surgeon: Edi Armijo MD;  Location: [facility] CATH INVASIVE LOCATION;  Service: Cardiology   • CHOLECYSTECTOMY     • HYSTERECTOMY     • MASTECTOMY      BILATERAL   • OOPHORECTOMY         Medications Prior to Admission:    Medications Prior to Admission   Medication Sig Dispense Refill Last Dose   • ALPRAZolam (XANAX) 0.5 MG tablet Take 0.5 mg by mouth 2 (Two) Times a Day As Needed for Anxiety.      • aspirin 81 MG tablet Take 1 tablet by mouth Daily. 30  tablet 11    • atorvastatin (LIPITOR) 20 MG tablet Take 1 tablet by mouth Daily. 30 tablet 11    • Budeson-Glycopyrrol-Formoterol (Breztri Aerosphere) 160-9-4.8 MCG/ACT aerosol inhaler Inhale 2 puffs 2 (Two) Times a Day.      • Calcium Carb-Cholecalciferol (CALCIUM 1000 + D PO) Take 1 tablet by mouth Daily.      • Cyanocobalamin (B-12) 5000 MCG capsule Take 1 capsule by mouth Daily.      • Fexofenadine HCl (ALLEGRA ALLERGY PO) Take 4 mg by mouth Daily.      • Fluticasone-Umeclidin-Vilant (Trelegy Ellipta) 100-62.5-25 MCG/INH inhaler Inhale 1 puff Every Night. (Patient not taking: Reported on 5/10/2022) 1 each 0    • Fluticasone-Umeclidin-Vilant (Trelegy Ellipta) 200-62.5-25 MCG/INH inhaler Inhale 2 puffs Daily. (Patient not taking: Reported on 5/10/2022) 1 each 0    • gabapentin (NEURONTIN) 600 MG tablet Take 1,200 mg by mouth 2 (Two) Times a Day.      • ibuprofen (ADVIL,MOTRIN) 200 MG tablet Take 200 mg by mouth Every 6 (Six) Hours As Needed for Mild Pain .      • levalbuterol (XOPENEX) 1.25 MG/3ML nebulizer solution Take 1 ampule by nebulization Every 4 (Four) Hours As Needed.      • lisinopril (PRINIVIL,ZESTRIL) 10 MG tablet Take 20 mg by mouth Daily.      • O2 (OXYGEN) Inhale 2 L/min Continuous.      • omeprazole (PrilOSEC) 20 MG capsule Take 20 mg by mouth Daily.          Allergies:  Codeine, Hydrocodone-acetaminophen, Levofloxacin, Oxycodone-acetaminophen, Tramadol, Zanaflex  [tizanidine hcl], and Albuterol    Social History:   Social History     Socioeconomic History   • Marital status:    Tobacco Use   • Smoking status: Every Day     Packs/day: 0.50     Years: 62.00     Pack years: 31.00     Types: Cigarettes   • Smokeless tobacco: Never   • Tobacco comments:     states she has no plan to quit smoking   Vaping Use   • Vaping Use: Former   Substance and Sexual Activity   • Alcohol use: No   • Drug use: No   • Sexual activity: Not Currently       Family History:   Family History   Problem Relation Age  of Onset   • Cancer Mother    • Cancer Father            Physical Exam:    Vitals: /63 (BP Location: Left arm, Patient Position: Lying)   Pulse 53   Temp 98.3 °F (36.8 °C) (Oral)   Resp 18   Ht 165.1 cm (65\")   Wt 71.3 kg (157 lb 3 oz)   SpO2 97%   BMI 26.16 kg/m²   Physical Exam  Vitals reviewed.   Constitutional:       Appearance: Normal appearance. She is not ill-appearing.   HENT:      Head: Normocephalic and atraumatic.   Eyes:      General:         Right eye: No discharge.         Left eye: No discharge.      Extraocular Movements: Extraocular movements intact.      Conjunctiva/sclera: Conjunctivae normal.   Cardiovascular:      Rate and Rhythm: Normal rate and regular rhythm.      Pulses: Normal pulses.      Heart sounds: No murmur heard.  Pulmonary:      Effort: Pulmonary effort is normal. No respiratory distress.   Abdominal:      General: Abdomen is flat. Bowel sounds are normal. There is no distension.   Musculoskeletal:      Right lower leg: No edema.      Left lower leg: No edema.   Skin:     Capillary Refill: Capillary refill takes less than 2 seconds.      Findings: Bruising present.   Neurological:      General: No focal deficit present.      Mental Status: She is alert.   Psychiatric:         Mood and Affect: Mood normal.         Behavior: Behavior normal.           Lab Results (last 24 hours)     Procedure Component Value Units Date/Time    CK [[id number]]  (Abnormal) Collected: 12/21/22 0412    Specimen: Blood Updated: 12/21/22 0541     Creatine Kinase 2,874 U/L     Basic Metabolic Panel [[id number]]  (Abnormal) Collected: 12/21/22 0412    Specimen: Blood Updated: 12/21/22 0528     Glucose 100 mg/dL      BUN 23 mg/dL      Creatinine 0.97 mg/dL      Sodium 141 mmol/L      Potassium 4.1 mmol/L      Chloride 107 mmol/L      CO2 25.0 mmol/L      Calcium 9.2 mg/dL      BUN/Creatinine Ratio 23.7     Anion Gap 9.0 mmol/L      eGFR 59.6 mL/min/1.73      Comment: National Kidney Foundation and  American Society of Nephrology (ASN) Task Force recommended calculation based on the Chronic Kidney Disease Epidemiology Collaboration (CKD-EPI) equation refit without adjustment for race.       Narrative:      GFR Normal >60  Chronic Kidney Disease <60  Kidney Failure <15    The GFR formula is only valid for adults with stable renal function between ages 18 and 70.    Magnesium [[id number]]  (Normal) Collected: 12/21/22 0412    Specimen: Blood Updated: 12/21/22 0522     Magnesium 2.1 mg/dL     CBC & Differential [[id number]]  (Abnormal) Collected: 12/21/22 0412    Specimen: Blood Updated: 12/21/22 0501    Narrative:      The following orders were created for panel order CBC & Differential.  Procedure                               Abnormality         Status                     ---------                               -----------         ------                     CBC Auto Differential[[id number]]        Abnormal            Final result                 Please view results for these tests on the individual orders.    CBC Auto Differential [[id number]]  (Abnormal) Collected: 12/21/22 0412    Specimen: Blood Updated: 12/21/22 0501     WBC 14.46 10*3/mm3      RBC 3.78 10*6/mm3      Hemoglobin 11.5 g/dL      Hematocrit 37.4 %      MCV 98.9 fL      MCH 30.4 pg      MCHC 30.7 g/dL      RDW 13.1 %      RDW-SD 47.3 fl      MPV 10.9 fL      Platelets 322 10*3/mm3      Neutrophil % 67.4 %      Lymphocyte % 22.6 %      Monocyte % 6.7 %      Eosinophil % 2.4 %      Basophil % 0.3 %      Immature Grans % 0.6 %      Neutrophils, Absolute 9.75 10*3/mm3      Lymphocytes, Absolute 3.27 10*3/mm3      Monocytes, Absolute 0.97 10*3/mm3      Eosinophils, Absolute 0.34 10*3/mm3      Basophils, Absolute 0.05 10*3/mm3      Immature Grans, Absolute 0.08 10*3/mm3      nRBC 0.0 /100 WBC     Manual Differential [[id number]]  (Abnormal) Collected: 12/20/22 2128    Specimen: Blood Updated: 12/20/22 2222     Neutrophil % 78.0 %      Lymphocyte % 12.0 %       Monocyte % 2.0 %      Bands %  7.0 %      Metamyelocyte % 1.0 %      Neutrophils Absolute 15.27 10*3/mm3      Lymphocytes Absolute 2.16 10*3/mm3      Monocytes Absolute 0.36 10*3/mm3      RBC Morphology Normal     WBC Morphology Normal     Platelet Morphology Normal    CBC & Differential [[id number]]  (Abnormal) Collected: 12/20/22 2128    Specimen: Blood Updated: 12/20/22 2222    Narrative:      The following orders were created for panel order CBC & Differential.  Procedure                               Abnormality         Status                     ---------                               -----------         ------                     CBC Auto Differential[[id number]]        Abnormal            Final result                 Please view results for these tests on the individual orders.    CBC Auto Differential [[id number]]  (Abnormal) Collected: 12/20/22 2128    Specimen: Blood Updated: 12/20/22 2222     WBC 17.97 10*3/mm3      RBC 4.52 10*6/mm3      Hemoglobin 13.8 g/dL      Hematocrit 44.4 %      MCV 98.2 fL      MCH 30.5 pg      MCHC 31.1 g/dL      RDW 13.0 %      RDW-SD 46.3 fl      MPV 11.0 fL      Platelets 331 10*3/mm3     Basic Metabolic Panel [[id number]]  (Abnormal) Collected: 12/20/22 2128    Specimen: Blood Updated: 12/20/22 2218     Glucose 123 mg/dL      BUN 23 mg/dL      Creatinine 0.93 mg/dL      Sodium 143 mmol/L      Potassium 4.6 mmol/L      Chloride 105 mmol/L      CO2 26.0 mmol/L      Calcium 10.1 mg/dL      BUN/Creatinine Ratio 24.7     Anion Gap 12.0 mmol/L      eGFR 62.6 mL/min/1.73      Comment: National Kidney Foundation and American Society of Nephrology (ASN) Task Force recommended calculation based on the Chronic Kidney Disease Epidemiology Collaboration (CKD-EPI) equation refit without adjustment for race.       Narrative:      GFR Normal >60  Chronic Kidney Disease <60  Kidney Failure <15    The GFR formula is only valid for adults with stable renal function between ages 18 and 70.     Magnesium [[id number]]  (Normal) Collected: 12/20/22 2128    Specimen: Blood Updated: 12/20/22 2213     Magnesium 2.2 mg/dL     CK [[id number]]  (Abnormal) Collected: 12/20/22 1652    Specimen: Blood Updated: 12/20/22 1748     Creatine Kinase 5,142 U/L     Urinalysis With Culture If Indicated - Straight Cath [[id number]]  (Abnormal) Collected: 12/20/22 1708    Specimen: Urine from Straight Cath Updated: 12/20/22 1735     Color, UA Dark Yellow     Appearance, UA Clear     pH, UA <=5.0     Specific Gravity, UA 1.026     Glucose, UA Negative     Ketones, UA Trace     Bilirubin, UA Negative     Blood, UA Negative     Protein, UA 30 mg/dL (1+)     Leuk Esterase, UA Negative     Nitrite, UA Negative     Urobilinogen, UA 0.2 E.U./dL    Narrative:      In absence of clinical symptoms, the presence of pyuria, bacteria, and/or nitrites on the urinalysis result does not correlate with infection.    Urinalysis, Microscopic Only - Straight Cath [[id number]]  (Abnormal) Collected: 12/20/22 1708    Specimen: Urine from Straight Cath Updated: 12/20/22 1735     RBC, UA 0-2 /HPF      WBC, UA 0-2 /HPF      Comment: Urine culture not indicated.        Bacteria, UA Trace /HPF      Squamous Epithelial Cells, UA 3-6 /HPF      Hyaline Casts, UA None Seen /LPF      Amorphous Crystals, UA Moderate/2+ /HPF      Methodology Manual Light Microscopy    Comprehensive Metabolic Panel [[id number]]  (Abnormal) Collected: 12/20/22 1652    Specimen: Blood Updated: 12/20/22 1733     Glucose 128 mg/dL      BUN 24 mg/dL      Creatinine 0.93 mg/dL      Sodium 144 mmol/L      Potassium 4.4 mmol/L      Comment: Slight hemolysis detected by analyzer. Results may be affected.        Chloride 103 mmol/L      CO2 28.0 mmol/L      Calcium 10.4 mg/dL      Total Protein 7.1 g/dL      Albumin 4.10 g/dL      ALT (SGPT) 54 U/L      AST (SGOT) 146 U/L      Alkaline Phosphatase 92 U/L      Total Bilirubin 0.5 mg/dL      Globulin 3.0 gm/dL      A/G Ratio 1.4 g/dL       BUN/Creatinine Ratio 25.8     Anion Gap 13.0 mmol/L      eGFR 62.6 mL/min/1.73      Comment: National Kidney Foundation and American Society of Nephrology (ASN) Task Force recommended calculation based on the Chronic Kidney Disease Epidemiology Collaboration (CKD-EPI) equation refit without adjustment for race.       Narrative:      GFR Normal >60  Chronic Kidney Disease <60  Kidney Failure <15    The GFR formula is only valid for adults with stable renal function between ages 18 and 70.    Protime-INR [[id number]]  (Normal) Collected: 12/20/22 1652    Specimen: Blood Updated: 12/20/22 1719     Protime 13.4 Seconds      INR 1.01    aPTT [[id number]]  (Abnormal) Collected: 12/20/22 1652    Specimen: Blood Updated: 12/20/22 1719     PTT 37.1 seconds     CBC & Differential [[id number]]  (Abnormal) Collected: 12/20/22 1652    Specimen: Blood Updated: 12/20/22 1707    Narrative:      The following orders were created for panel order CBC & Differential.  Procedure                               Abnormality         Status                     ---------                               -----------         ------                     CBC Auto Differential[[id number]]        Abnormal            Final result                 Please view results for these tests on the individual orders.    CBC Auto Differential [[id number]]  (Abnormal) Collected: 12/20/22 1652    Specimen: Blood Updated: 12/20/22 1707     WBC 19.15 10*3/mm3      RBC 4.39 10*6/mm3      Hemoglobin 13.7 g/dL      Hematocrit 42.3 %      MCV 96.4 fL      MCH 31.2 pg      MCHC 32.4 g/dL      RDW 12.8 %      RDW-SD 45.6 fl      MPV 10.5 fL      Platelets 382 10*3/mm3      Neutrophil % 84.0 %      Lymphocyte % 9.2 %      Monocyte % 5.5 %      Eosinophil % 0.3 %      Basophil % 0.3 %      Immature Grans % 0.7 %      Neutrophils, Absolute 16.09 10*3/mm3      Lymphocytes, Absolute 1.77 10*3/mm3      Monocytes, Absolute 1.05 10*3/mm3      Eosinophils, Absolute 0.05 10*3/mm3       Basophils, Absolute 0.06 10*3/mm3      Immature Grans, Absolute 0.13 10*3/mm3      nRBC 0.0 /100 WBC            -----------------------------------------------------------------  EKG:   Radiology:     CT Head Without Contrast    Result Date: 12/20/2022  CT HEAD WO CONTRAST- 12/20/2022 4:22 PM CST  HISTORY: Head trauma, minor (Age >= 65y)  COMPARISON: None  DLP: 654 mGy cm. All CT scans are performed using dose optimization techniques as appropriate to the performed exam and including at least one of the following: Automated exposure control, adjustment of the mA and/or kV according to size, and the use of the iterative reconstruction technique.  TECHNIQUE: Serial axial tomographic images of the brain were obtained without the use of intravenous contrast.  FINDINGS: The midline structures are nondisplaced. There is mild cerebral and cerebellar atrophy, with an associated increase in the prominence of the ventricles and sulci. The basilar cisterns are normal in size and configuration. There is no evidence of intracranial hemorrhage or mass-effect. There is low attenuation in the periventricular white matter, consistent with chronic ischemic change. There are no abnormal extra-axial fluid collections. There is no evidence of tonsillar herniation.  The included orbits and their contents are unremarkable. Air-fluid levels are noted within both maxillary sinuses. The visualized paranasal sinuses and mastoid air cells are otherwise normally aerated.. The visualized osseous structures and overlying soft tissues of the skull and face are intact.       1. Mild cerebral and cerebellar atrophy with chronic microvascular disease but no evidence of acute intracranial process. 2. Air-fluid levels within both maxillary sinuses suggesting sinusitis. Given history of fall this could also potentially represent posttraumatic fluid.   This report was finalized on 12/20/2022 16:50 by Dr. Nik Randolph MD.    CT Cervical Spine Without  Contrast    Result Date: 12/20/2022  CT CERVICAL SPINE WO CONTRAST- 12/20/2022 4:22 PM CST  HISTORY: Neck trauma (Age >= 65y)  COMPARISON: None  DOSE LENGTH PRODUCT: 402 mGy cm. All CT scans are performed using dose optimization techniques as appropriate to the performed exam and including at least one of the following: Automated exposure control, adjustment of the mA and/or kV according to size, and the use of the iterative reconstruction technique.  TECHNIQUE: Serial helical tomographic images of the cervical spine were obtained without the use of intravenous contrast. Additionally, sagittal and coronal reformatted images were also provided for review.  FINDINGS: Alignment: Normal.  Bones: There is no evidence of fracture. Vertebral body heights are maintained.  Disc spaces: Maintained.  Canal and neuroforamina: Patent.  Soft tissues: Unremarkable.  Lung apices: Clear. No pneumothorax. There are interstitial lung changes within the lung apices including thickening of interlobar and interlobular septa suggesting an element of interstitial fibrosis.      1. No evidence of acute osseous injury or malalignment in the cervical spine.   This report was finalized on 12/20/2022 16:54 by Dr. Nik Randolph MD.    CT Thoracic Spine Without Contrast    Result Date: 12/20/2022  EXAMINATION: CT thoracic spine without contrast  HISTORY: Fall with pain  DOSE: 680. All CT scans are performed using dose optimization techniques as appropriate to the performed exam and including at least one of the following: Automated exposure control, adjustment of the mA and/or kV according to size, and the use of the iterative reconstruction technique.  FINDINGS: Multiple contiguous axial images are obtained through the thoracic spine at 2 mm intervals with reformatted images obtained in the sagittal and coronal projection from the original data set. There is no evidence of acute fracture or malalignment. The disc space heights are  well-maintained. There is an accentuated thoracic kyphosis with spondylosis in the mid and lower thoracic spine.      . 1. No evidence of acute fracture or listhesis. There is spondylosis in the mid and lower thoracic spine with an accentuated thoracic kyphosis.  This report was finalized on 12/20/2022 16:57 by Dr. Nik Randolph MD.    CT Lumbar Spine Without Contrast    Result Date: 12/20/2022  EXAMINATION:  CT LUMBAR SPINE WO CONTRAST-  12/20/2022 4:22 PM CST  HISTORY: Back trauma, no prior imaging (Age >= 16y). The patient fell. Rule out fracture.  TECHNIQUE: Spiral CT was performed of the lumbar spine. Sagittal and coronal images were reconstructed.  DLP: 398 mGy-cm. Automated dosage reduction technique was utilized to reduce patient dosage.  COMPARISON: No comparison study.  FINDINGS: There is atheromatous disease of the aortoiliac vessels. There is a 1.2 cm exophytic lesion arising from the lower pole right kidney with a Hounsfield unit measurement of 41. This is not a simple cyst. No acute lumbar spine fracture is seen.  DISC SPACES:  T12-L1: There is mild to moderate disc narrowing.. There is mild to moderate disc bulging. There is mild facet arthropathy. There is no significant spinal or foraminal stenosis.  L1-2: The disc is fairly well-maintained in height. There is mild disc bulging. There is mild facet arthropathy. There is mild to moderate foraminal narrowing on the left at this level.  L2-3: The disc is maintained in height. There is mild disc bulging and mild facet arthropathy. There is mild to moderate foraminal narrowing on the left.  L3-4: The disc is maintained in height. There is mild disc bulging. There is moderate to severe facet arthropathy. There is mild thickening of ligamentum flavum. There is minimal narrowing of the central canal. There is moderate left and mild right-sided foraminal narrowing.  L4-5: The disc is maintained in height. There is mild disc bulging. There is moderate  facet arthropathy and thickening of ligamentum flavum. There is no central spinal canal narrowing. There is mild inferior recess foraminal narrowing bilaterally.  L5-S1: There is mild to moderate disc narrowing with vacuum disc phenomena. There is mild disc bulging. There is moderate right and mild left-sided facet arthropathy. There is no central spinal canal narrowing. There is mild to moderate foraminal narrowing bilaterally.      1. No lumbar spine fracture is seen. 2. Degenerative changes of the lumbar spine, as described. 3. A 1.2 cm exophytic lesion arising from the lower pole right kidney with a Hounsfield unit measurement of 41 is not a simple cyst. This could be a complex cyst or a solid lesion. It is not fully evaluated on this exam. 4. Atheromatous disease of the aortoiliac vessels.  The full report of this exam was immediately signed and available to the emergency room. The patient is currently in the emergency room. This report was finalized on 12/20/2022 16:57 by Dr. Delfino Gamino MD.    XR Chest 1 View    Result Date: 12/20/2022  HISTORY: Fall  CXR: Frontal view the chest obtained  COMPARISON: 02/08/2022  FINDINGS: Stable chronic prominence of interstitial markings with no acute consolidation. Stable mediastinal contours. No pleural effusion or pneumothorax. No acute appearing regional bony pathology.      1. Similar chronic changes with no acute process identified. This report was finalized on 12/20/2022 16:15 by Dr. Emilie Crabtree MD.    CT Facial Bones Without Contrast    Result Date: 12/20/2022  EXAMINATION:  CT FACIAL BONES WO CONTRAST-  12/20/2022 4:22 PM CST  HISTORY: The patient fell. Facial pain.  TECHNIQUE: Spiral CT was performed of the facial bones. Sagittal and coronal images were reconstructed.  DLP: 207 mGy-cm. Automated dosage reduction technique was utilized to reduce patient dosage.  COMPARISON: No comparison study.  FINDINGS: No facial bone fracture is identified. There is  mucosal thickening and/or fluid in the maxillary sinuses bilaterally. There is mild mucosal thickening involving anterior ethmoid air cells and the inferior frontal sinuses bilaterally. The sphenoid sinuses are clear. The mastoid air cells are clear. There is carotid artery calcification in the neck bilaterally.      1. No facial bone fracture is seen. 2. Mucosal thickening and/or fluid in both maxillary sinuses. Mucosal thickening involving the anterior ethmoid air cells and the inferior frontal sinuses bilaterally. FINDINGS consistent with sinusitis. 3. Other nonacute findings, as discussed.  The full report of this exam was immediately signed and available to the emergency room. The patient is currently in the emergency room.  This report was finalized on 12/20/2022 16:50 by Dr. Delfino Gamino MD.      Assessment and Plan   1.       Fall, initial encounter    Traumatic rhabdomyolysis (HCC)    Leukocytosis    Anemia    Degenerative disc disease, lumbar    Rhabdomyolysis: After falling while home alone and being down for greater than 24 hours.  Monitor CPK.  IVF.    Fall: From standing without syncope while home alone.  Concern for this happening again if she is discharged back home.  We discussed skilled nursing facility versus assisted living, she is hesitant at this time.  We will have case management discuss options.    DVT prophylaxis: Lovenox    CODE STATUS: Full    Disposition: Inpatient    Anupam Ledezma MD 12/21/2022 08:32 CST

## 2022-12-21 NOTE — PROGRESS NOTES
Pharmacy Dosing Service  Anticoagulant  Enoxaparin    Assessment/Action/Plan:  Lovenox dose adjusted to 40mg sc q24 based on indication and renal function. Pharmacy will continue to follow daily and adjust as needed.      Subjective:  Pattie Liu is a 77 y.o. female on Enoxaparin 30 mg SQ every 24 hours for indication of VTE prophylaxis.  Objective:  [Ht: 165.1 cm (65\"); Wt: 71.3 kg (157 lb 3 oz); BMI: Body mass index is 26.16 kg/m².]  Estimated Creatinine Clearance: 46.5 mL/min (by C-G formula based on SCr of 0.97 mg/dL).     Lab Results   Component Value Date    INR 1.01 12/20/2022    PROTIME 13.4 12/20/2022       Lab Results   Component Value Date    HGB 11.5 (L) 12/21/2022    HGB 13.8 12/20/2022    HGB 13.7 12/20/2022      Lab Results   Component Value Date     12/21/2022     12/20/2022     12/20/2022       CHERI Vieyra PharmADAMARIS  12/21/22 12:14 CST

## 2022-12-21 NOTE — PLAN OF CARE
Goal Outcome Evaluation:  Plan of Care Reviewed With: patient, daughter        Progress: no change  Outcome Evaluation: OT eval completed. Pt in fowlers upon therapist arrival; A&Ox4; 4/10 generalized pain reported; Pt's daughters also present. Pt/Pt's daughters report that the Pt performed all BADLs at Mod I at [facility], however, BADLs were difficult for the Pt. Pt performed supine>sit utilizing bedrail with HOB elevated with Min A; sit>supine utilizing bedrail with HOB elevated with CGA. Pt dependent for donning B socks while seated EOB. Pt performed sit<>stand utilizing rwx with CGA. Pt ambulated short distance in room utilizing rwx with CGA. Pt additionally demonstrates significant BUE weakness as evidenced by MMT. Skilled OT intervention indicated in order to address deficits in fxl mobility, fxl activity tolerance, balance, coordination, and strength. Recommend short term rehab at discharge; Family reports that they would like for the Pt to go to inpatient rehab at [facility].

## 2022-12-21 NOTE — THERAPY EVALUATION
Patient Name: Pattie Liu  : 1945    MRN: [id number]                              Today's Date: 2022       Admit Date: 2022    Visit Dx:     ICD-10-CM ICD-9-CM   1. Fall, initial encounter  W19.XXXA E888.9   2. Traumatic rhabdomyolysis, initial encounter (formerly [facility])  T79.6XXA 958.6   3. DDD (degenerative disc disease), lumbar  M51.36 722.52   4. Renal cyst, right  N28.1 753.10     Patient Active Problem List   Diagnosis   • Frequent PVCs   • Shortness of breath   • SOB (shortness of breath)   • CAD in native artery   • PVD (peripheral vascular disease) (formerly [facility])   • Pneumonia due to infectious organism   • Chronic back pain   • Essential hypertension   • Fall, initial encounter   • Traumatic rhabdomyolysis (formerly [facility])   • Leukocytosis   • Anemia   • Degenerative disc disease, lumbar     Past Medical History:   Diagnosis Date   • CAD in native artery 2019   • COPD (chronic obstructive pulmonary disease) (formerly [facility])    • Essential hypertension 2021   • GERD (gastroesophageal reflux disease)    • Lung nodule      Past Surgical History:   Procedure Laterality Date   • BREAST IMPLANT REMOVAL     • BREAST TISSUE EXPANDER REMOVAL INSERTION OF IMPLANT     • CARDIAC CATHETERIZATION N/A 2019    Procedure: Left Heart Cath;  Surgeon: Edi Armijo MD;  Location: [facility] CATH INVASIVE LOCATION;  Service: Cardiology   • CHOLECYSTECTOMY     • HYSTERECTOMY     • MASTECTOMY      BILATERAL   • OOPHORECTOMY        General Information     Row Name 22 0917          OT Time and Intention    Document Type evaluation  Presented with traumatic rhabdomyolysis following a fall from standing while at home alone; down for greater than 24 hours. Dx: Fall, initial encounter, Traumatic rhabdomyolysis, Leukocytosis, Anemia, Degenerative disc disease, lumbar.  -LS     Mode of Treatment occupational therapy  -LS     Row Name 22 0917          General Information    Patient Profile Reviewed yes  -LS     Prior Level  of Function independent:;ADL's;all household mobility  Pt's daughter reports that the Pt was grabbing onto things within her home for increased stabilization, but should have been using a rwx or SC.  -     Existing Precautions/Restrictions fall;oxygen therapy device and L/min  3L  -     Row Name 12/21/22 0917          Occupational Profile    Environmental Supports and Barriers (Occupational Profile) Pt lives alone in a mobile home with 5 steps upon entrance with bilateral handrails. Pt sponge bathes only. Pt has a raised toilet seat with no grab bars nearby. Pt sleeps in a regular bed. Pt owns a SC, rwx, manual w/c, rollator, and portable O2.  -     Row Name 12/21/22 0917          Living Environment    People in Home alone  -     Row Name 12/21/22 0917          Home Main Entrance    Number of Stairs, Main Entrance five  -     Stair Railings, Main Entrance railings on both sides of stairs  -     Row Name 12/21/22 0917          Stairs Within Home, Primary    Number of Stairs, Within Home, Primary none  -     Row Name 12/21/22 0917          Cognition    Orientation Status (Cognition) oriented x 4  -     Row Name 12/21/22 0917          Safety Issues, Functional Mobility    Safety Issues Affecting Function (Mobility) safety precautions follow-through/compliance  -     Impairments Affecting Function (Mobility) balance;endurance/activity tolerance;range of motion (ROM);strength;pain  -           User Key  (r) = Recorded By, (t) = Taken By, (c) = Cosigned By    Initials Name Provider Type     Kala Silva, OTR/L Occupational Therapist                 Mobility/ADL's     Row Name 12/21/22 0917          Bed Mobility    Bed Mobility supine-sit;sit-supine  -LS     Supine-Sit [place] (Bed Mobility) minimum assist (75% patient effort)  -     Sit-Supine [place] (Bed Mobility) contact guard  -     Assistive Device (Bed Mobility) bed rails;head of bed elevated  -     Row Name 12/21/22 0917           Transfers    Transfers sit-stand transfer;stand-sit transfer  -LS     Row Name 12/21/22 0917          Sit-Stand Transfer    Sit-Stand [place] (Transfers) contact guard  -LS     Assistive Device (Sit-Stand Transfers) walker, front-wheeled  -LS     Row Name 12/21/22 0917          Stand-Sit Transfer    Stand-Sit [place] (Transfers) contact guard  -LS     Assistive Device (Stand-Sit Transfers) walker, front-wheeled  -LS     Row Name 12/21/22 0917          Functional Mobility    Functional Mobility- Ind. Level contact guard assist  Pt ambulated short distance in room utilizing rwx with CGA.  -LS     Row Name 12/21/22 0917          Activities of Daily Living    BADL Assessment/Intervention upper body dressing;lower body dressing;toileting  -LS     Row Name 12/21/22 0917          Upper Body Dressing Assessment/Training    [place] Level (Upper Body Dressing) upper body dressing skills;standby assist  -LS     Position (Upper Body Dressing) edge of bed sitting  -LS     Row Name 12/21/22 0917          Lower Body Dressing Assessment/Training    [place] Level (Lower Body Dressing) lower body dressing skills;maximum assist (25% patient effort)  -LS     Position (Lower Body Dressing) edge of bed sitting;supported standing  -LS     Row Name 12/21/22 0917          Toileting Assessment/Training    [place] Level (Toileting) toileting skills;maximum assist (25% patient effort)  -LS     Position (Toileting) unsupported sitting;supported standing  -LS           User Key  (r) = Recorded By, (t) = Taken By, (c) = Cosigned By    Initials Name Provider Type    Kala Longoria, OTR/L Occupational Therapist               Obj/Interventions     Row Name 12/21/22 0917          Sensory Assessment (Somatosensory)    Sensory Assessment (Somatosensory) UE sensation intact  -     Sensory Assessment Pt reports occasional numbness/tingling in B hands.  -LS     Row Name 12/21/22 0917          Vision Assessment/Intervention     Visual Impairment/Limitations WFL;corrective lenses for reading  -LS     Row Name 12/21/22 0917          Range of Motion Comprehensive    General Range of Motion bilateral upper extremity ROM WFL  -     Row Name 12/21/22 0917          Strength Comprehensive (MMT)    General Manual Muscle Testing (MMT) Assessment upper extremity strength deficits identified  -     Comment, General Manual Muscle Testing (MMT) Assessment BUE shoulder flex/ext 3-/5; BUE strength grossly 4-/5 at all other joints in all planes.  -     Row Name 12/21/22 0917          Balance    Balance Assessment sitting static balance;sitting dynamic balance;standing static balance;standing dynamic balance  -LS     Static Sitting Balance standby assist  -LS     Dynamic Sitting Balance contact guard  -LS     Position, Sitting Balance sitting edge of bed  -LS     Static Standing Balance contact guard  -LS     Dynamic Standing Balance contact guard  -LS     Position/Device Used, Standing Balance walker, front-wheeled  -LS           User Key  (r) = Recorded By, (t) = Taken By, (c) = Cosigned By    Initials Name Provider Type     Kala Silva, OTR/L Occupational Therapist               Goals/Plan     Row Name 12/21/22 0917          Transfer Goal 1 (OT)    Activity/Assistive Device (Transfer Goal 1, OT) toilet  -LS     [place] Level/Cues Needed (Transfer Goal 1, OT) supervision required  -LS     Time Frame (Transfer Goal 1, OT) long term goal (LTG);10 days  -LS     Progress/Outcome (Transfer Goal 1, OT) new goal  -     Row Name 12/21/22 0917          Dressing Goal 1 (OT)    Activity/Device (Dressing Goal 1, OT) dressing skills, all  -LS     [place]/Cues Needed (Dressing Goal 1, OT) minimum assist (75% or more patient effort)  -LS     Time Frame (Dressing Goal 1, OT) long term goal (LTG);10 days  -LS     Progress/Outcome (Dressing Goal 1, OT) new goal  -     Row Name 12/21/22 0917          Toileting Goal 1 (OT)    Activity/Device  (Toileting Goal 1, OT) toileting skills, all  -LS     [place] Level/Cues Needed (Toileting Goal 1, OT) supervision required  -LS     Time Frame (Toileting Goal 1, OT) long term goal (LTG);10 days  -LS     Progress/Outcome (Toileting Goal 1, OT) new goal  -LS     Row Name 12/21/22 0917          Therapy Assessment/Plan (OT)    Planned Therapy Interventions (OT) activity tolerance training;occupation/activity based interventions;functional balance retraining;ROM/therapeutic exercise;adaptive equipment training;BADL retraining;manual therapy/joint mobilization;strengthening exercise;passive ROM/stretching;transfer/mobility retraining;patient/caregiver education/training  -LS           User Key  (r) = Recorded By, (t) = Taken By, (c) = Cosigned By    Initials Name Provider Type    Kala Longoria OTR/L Occupational Therapist               Clinical Impression     Row Name 12/21/22 0917          Pain Assessment    Pretreatment Pain Rating 4/10  -LS     Posttreatment Pain Rating 8/10  -LS     Pain Location generalized  -LS     Pain Intervention(s) Medication (See MAR);Ambulation/increased activity;Repositioned;Nursing Notified  -LS     Row Name 12/21/22 0917          Plan of Care Review    Plan of Care Reviewed With patient;daughter  -LS     Progress no change  -LS     Outcome Evaluation OT eval completed. Pt in fowlers upon therapist arrival; A&Ox4; 4/10 generalized pain reported; Pt's daughters also present. Pt/Pt's daughters report that the Pt performed all BADLs at Mod I at [facility], however, BADLs were difficult for the Pt. Pt performed supine>sit utilizing bedrail with HOB elevated with Min A; sit>supine utilizing bedrail with HOB elevated with CGA. Pt dependent for donning B socks while seated EOB. Pt performed sit<>stand utilizing rwx with CGA. Pt ambulated short distance in room utilizing rwx with CGA. Pt additionally demonstrates significant BUE weakness as evidenced by MMT. Skilled OT intervention indicated in  order to address deficits in fxl mobility, fxl activity tolerance, balance, coordination, and strength. Recommend short term rehab at discharge; Family reports that they would like for the Pt to go to inpatient rehab at [facility].  -     Row Name 12/21/22 0917          Therapy Assessment/Plan (OT)    Rehab Potential (OT) good, to achieve stated therapy goals  -     Criteria for Skilled Therapeutic Interventions Met (OT) yes;skilled treatment is necessary  -     Therapy Frequency (OT) 5 times/wk  -     Row Name 12/21/22 0917          Therapy Plan Review/Discharge Plan (OT)    Anticipated Discharge Disposition (OT) sub acute care setting  -     Row Name 12/21/22 0917          Positioning and Restraints    Pre-Treatment Position in bed  -LS     Post Treatment Position bed  -LS     In Bed fowlers;call light within reach;encouraged to call for assist;with family/caregiver;side rails up x2  -LS           User Key  (r) = Recorded By, (t) = Taken By, (c) = Cosigned By    Initials Name Provider Type    Kala Longoria, OTR/L Occupational Therapist               Outcome Measures     Row Name 12/21/22 0917          How much help from another is currently needed...    Putting on and taking off regular lower body clothing? 2  -LS     Bathing (including washing, rinsing, and drying) 2  -LS     Toileting (which includes using toilet bed pan or urinal) 2  -LS     Putting on and taking off regular upper body clothing 3  -LS     Taking care of personal grooming (such as brushing teeth) 4  -LS     Eating meals 4  -LS     AM-PAC 6 Clicks Score (OT) 17  -     Row Name 12/21/22 0903          How much help from another person do you currently need...    Turning from your back to your side while in flat bed without using bedrails? 3  -TB     Moving from lying on back to sitting on the side of a flat bed without bedrails? 3  -TB     Moving to and from a bed to a chair (including a wheelchair)? 2  -TB     Standing up from a chair  using your arms (e.g., wheelchair, bedside chair)? 2  -TB     Climbing 3-5 steps with a railing? 2  -TB     To walk in hospital room? 2  -TB     AM-PAC 6 Clicks Score (PT) 14  -TB     Highest level of mobility 4 --> Transferred to chair/commode  -TB     Row Name 12/21/22 0917          Functional Assessment    Outcome Measure Options AM-PAC 6 Clicks Daily Activity (OT)  -           User Key  (r) = Recorded By, (t) = Taken By, (c) = Cosigned By    Initials Name Provider Type    TB [place] RN Registered Nurse    Kala Longoria, OTR/L Occupational Therapist                Occupational Therapy Education     Title: PT OT SLP Therapies (In Progress)     Topic: Occupational Therapy (In Progress)     Point: ADL training (Done)     Description:   Instruct learner(s) on proper safety adaptation and remediation techniques during self care or transfers.   Instruct in proper use of assistive devices.              Learning Progress Summary           Patient Acceptance, E, VU,NR by  at 12/21/2022 1012                   Point: Home exercise program (Not Started)     Description:   Instruct learner(s) on appropriate technique for monitoring, assisting and/or progressing therapeutic exercises/activities.              Learner Progress:  Not documented in this visit.          Point: Precautions (Done)     Description:   Instruct learner(s) on prescribed precautions during self-care and functional transfers.              Learning Progress Summary           Patient Acceptance, E, VU,NR by  at 12/21/2022 1012                   Point: Body mechanics (Done)     Description:   Instruct learner(s) on proper positioning and spine alignment during self-care, functional mobility activities and/or exercises.              Learning Progress Summary           Patient Acceptance, E, VU,NR by  at 12/21/2022 1012                               User Key     Initials Effective Dates Name Provider Type Discipline     06/20/22 -   Kala Silva OTR/L Occupational Therapist OT              OT Recommendation and Plan  Planned Therapy Interventions (OT): activity tolerance training, occupation/activity based interventions, functional balance retraining, ROM/therapeutic exercise, adaptive equipment training, BADL retraining, manual therapy/joint mobilization, strengthening exercise, passive ROM/stretching, transfer/mobility retraining, patient/caregiver education/training  Therapy Frequency (OT): 5 times/wk  Plan of Care Review  Plan of Care Reviewed With: patient, daughter  Progress: no change  Outcome Evaluation: OT eval completed. Pt in fowlers upon therapist arrival; A&Ox4; 4/10 generalized pain reported; Pt's daughters also present. Pt/Pt's daughters report that the Pt performed all BADLs at [facility] at [facility], however, BADLs were difficult for the Pt. Pt performed supine>sit utilizing bedrail with HOB elevated with Min A; sit>supine utilizing bedrail with HOB elevated with CGA. Pt dependent for donning B socks while seated EOB. Pt performed sit<>stand utilizing rwx with CGA. Pt ambulated short distance in room utilizing rwx with CGA. Pt additionally demonstrates significant BUE weakness as evidenced by MMT. Skilled OT intervention indicated in order to address deficits in fxl mobility, fxl activity tolerance, balance, coordination, and strength. Recommend short term rehab at discharge; Family reports that they would like for the Pt to go to inpatient rehab at [facility].     Time Calculation:    Time Calculation- OT     Row Name 12/21/22 0917             Time Calculation- OT    OT Start Time 0917  +10 minutes chart review  -      OT Stop Time 1002  -      OT Time Calculation (min) 45 min  -      OT Non-Billable Time (min) 55 min  -      OT Received On 12/21/22  -      OT Goal Re-Cert Due Date 12/31/22  -            User Key  (r) = Recorded By, (t) = Taken By, (c) = Cosigned By    Initials Name Provider Type    Kala Longoria OTR/ABHISHEK  Occupational Therapist              Therapy Charges for Today     Code Description Service Date Service Provider Modifiers Qty    84391051835 HC OT EVAL MOD COMPLEXITY 4 12/21/2022 Kala Silva, JENR/L GO 1               Kala Silva OTR/ABHISHEK  12/21/2022

## 2022-12-21 NOTE — PLAN OF CARE
Goal Outcome Evaluation:  Plan of Care Reviewed With: patient        Progress: no change  Outcome Evaluation: Patient is alert and oriented times 4 with periods of confusion, mobility limited, has scattered bruising, abrasions to knees and elbows, excoriation to bhavna area and under her breasts, continues on IV antibiotics, moves extremities with some difficulty, Tele running SB in the 40's-50's  PPP, VSS, safety maintained.

## 2022-12-21 NOTE — CASE MANAGEMENT/SOCIAL WORK
Discharge Planning Assessment  [facility]     Patient Name: Pattie Liu  MRN: [id number]  Today's Date: 12/21/2022    Admit Date: 12/20/2022        Discharge Needs Assessment     Row Name 12/21/22 1245       Living Environment    People in Home alone    Current Living Arrangements home    Primary Care Provided by self    Provides Primary Care For no one, unable/limited ability to care for self    Caregiving Concerns weak, falling    Family Caregiver if Needed child(sheri), adult    Family Caregiver Names bj Elam, yamiledest daughter;  Friend , Lauren Delgado    Quality of Family Relationships helpful;involved;supportive    Able to Return to Prior Arrangements no       Resource/Environmental Concerns    Resource/Environmental Concerns none       Transition Planning    Patient/Family Anticipates Transition to home with help/services    Patient/Family Anticipated Services at Transition home health care    Transportation Anticipated family or friend will provide       Discharge Needs Assessment    Readmission Within the Last 30 Days no previous admission in last 30 days    Current Outpatient/Agency/Support Group homecare agency    Equipment Currently Used at Home walker, rolling;respiratory supplies;oxygen  [employer]    Concerns to be Addressed no discharge needs identified    Equipment Needed After Discharge none    Outpatient/Agency/Support Group Needs homecare agency    Discharge Facility/Level of Care Needs home with home health    Provided Post Acute Provider List? N/A    N/A Provider List Comment Daughter has used [facility]  before , requesting referral with this agency    Provided Post Acute Provider Quality & Resource List? N/A    Patient's Choice of Community Agency(s) [facility]  [phone]   Fax:  [phone]    Discharge Coordination/Progress Patient usually lives alone. Oxygen in home by [employer]. Too weak to return home per therapy holden/ MD. Daughter requesting patient to  come home with her and have [facility] Home health services arranged. States last time this was tried Home health refused to admit after she got down to [place].  CM called [facility] , spoke with Kimberlee. Informed they would accept referral and PCP would need to be will to follow services.  updated SW who will send referral to [facility] who serves [place], [place],  daughter's address:  [address] ,  [place] [address].    contact num:  [phone],  Fax:  [phone].   Pending home health acceptance.    If home health is not appropriate in home [facility] has TCU that daughter would be interested in referral.     Received call from [facility] admissions, Emily, [phone]. Contact number if TCU bed needed. Fax number:  [phone].  This hospital is closest to daughter who will be caregiver post inpt rehab.               Discharge Plan    No documentation.               Continued Care and Services - Admitted Since 12/20/2022    Coordination has not been started for this encounter.       Expected Discharge Date and Time     Expected Discharge Date Expected Discharge Time    Dec 23, 2022          Demographic Summary    No documentation.                Functional Status    No documentation.                Psychosocial    No documentation.                Abuse/Neglect    No documentation.                Legal    No documentation.                Substance Abuse    No documentation.                Patient Forms    No documentation.                   Chasity Segura RN

## 2022-12-22 VITALS
OXYGEN SATURATION: 95 % | SYSTOLIC BLOOD PRESSURE: 142 MMHG | HEIGHT: 65 IN | DIASTOLIC BLOOD PRESSURE: 52 MMHG | WEIGHT: 157.19 LBS | BODY MASS INDEX: 26.19 KG/M2 | RESPIRATION RATE: 18 BRPM | TEMPERATURE: 97.9 F | HEART RATE: 56 BPM

## 2022-12-22 LAB
ANION GAP SERPL CALCULATED.3IONS-SCNC: 5 MMOL/L (ref 5–15)
BASOPHILS # BLD AUTO: 0.05 10*3/MM3 (ref 0–0.2)
BASOPHILS NFR BLD AUTO: 0.4 % (ref 0–1.5)
BUN SERPL-MCNC: 14 MG/DL (ref 8–23)
BUN/CREAT SERPL: 15.4 (ref 7–25)
CALCIUM SPEC-SCNC: 9.5 MG/DL (ref 8.6–10.5)
CHLORIDE SERPL-SCNC: 112 MMOL/L (ref 98–107)
CK SERPL-CCNC: 1649 U/L (ref 20–180)
CO2 SERPL-SCNC: 25 MMOL/L (ref 22–29)
CREAT SERPL-MCNC: 0.91 MG/DL (ref 0.57–1)
DEPRECATED RDW RBC AUTO: 46.8 FL (ref 37–54)
EGFRCR SERPLBLD CKD-EPI 2021: 64.3 ML/MIN/1.73
EOSINOPHIL # BLD AUTO: 0.48 10*3/MM3 (ref 0–0.4)
EOSINOPHIL NFR BLD AUTO: 4.2 % (ref 0.3–6.2)
ERYTHROCYTE [DISTWIDTH] IN BLOOD BY AUTOMATED COUNT: 12.8 % (ref 12.3–15.4)
GLUCOSE SERPL-MCNC: 92 MG/DL (ref 65–99)
HCT VFR BLD AUTO: 36.7 % (ref 34–46.6)
HGB BLD-MCNC: 11.2 G/DL (ref 12–15.9)
IMM GRANULOCYTES # BLD AUTO: 0.06 10*3/MM3 (ref 0–0.05)
IMM GRANULOCYTES NFR BLD AUTO: 0.5 % (ref 0–0.5)
LYMPHOCYTES # BLD AUTO: 3.13 10*3/MM3 (ref 0.7–3.1)
LYMPHOCYTES NFR BLD AUTO: 27.1 % (ref 19.6–45.3)
MAGNESIUM SERPL-MCNC: 1.9 MG/DL (ref 1.6–2.4)
MCH RBC QN AUTO: 30.5 PG (ref 26.6–33)
MCHC RBC AUTO-ENTMCNC: 30.5 G/DL (ref 31.5–35.7)
MCV RBC AUTO: 100 FL (ref 79–97)
MONOCYTES # BLD AUTO: 0.79 10*3/MM3 (ref 0.1–0.9)
MONOCYTES NFR BLD AUTO: 6.8 % (ref 5–12)
NEUTROPHILS NFR BLD AUTO: 61 % (ref 42.7–76)
NEUTROPHILS NFR BLD AUTO: 7.04 10*3/MM3 (ref 1.7–7)
NRBC BLD AUTO-RTO: 0 /100 WBC (ref 0–0.2)
PLATELET # BLD AUTO: 288 10*3/MM3 (ref 140–450)
PMV BLD AUTO: 10.9 FL (ref 6–12)
POTASSIUM SERPL-SCNC: 4 MMOL/L (ref 3.5–5.2)
RBC # BLD AUTO: 3.67 10*6/MM3 (ref 3.77–5.28)
SODIUM SERPL-SCNC: 142 MMOL/L (ref 136–145)
WBC NRBC COR # BLD: 11.55 10*3/MM3 (ref 3.4–10.8)

## 2022-12-22 PROCEDURE — 85025 COMPLETE CBC W/AUTO DIFF WBC: CPT | Performed by: FAMILY MEDICINE

## 2022-12-22 PROCEDURE — 25010000002 ENOXAPARIN PER 10 MG: Performed by: FAMILY MEDICINE

## 2022-12-22 PROCEDURE — 83735 ASSAY OF MAGNESIUM: CPT | Performed by: FAMILY MEDICINE

## 2022-12-22 PROCEDURE — 97535 SELF CARE MNGMENT TRAINING: CPT

## 2022-12-22 PROCEDURE — 80048 BASIC METABOLIC PNL TOTAL CA: CPT | Performed by: FAMILY MEDICINE

## 2022-12-22 PROCEDURE — 82550 ASSAY OF CK (CPK): CPT | Performed by: FAMILY MEDICINE

## 2022-12-22 RX ADMIN — ASPIRIN 81 MG: 81 TABLET, COATED ORAL at 08:55

## 2022-12-22 RX ADMIN — FAMOTIDINE 40 MG: 20 TABLET, FILM COATED ORAL at 08:55

## 2022-12-22 RX ADMIN — ENOXAPARIN SODIUM 40 MG: 100 INJECTION SUBCUTANEOUS at 08:55

## 2022-12-22 RX ADMIN — ACETAMINOPHEN 1000 MG: 500 TABLET ORAL at 08:55

## 2022-12-22 RX ADMIN — GABAPENTIN 1200 MG: 400 CAPSULE ORAL at 08:55

## 2022-12-22 RX ADMIN — LISINOPRIL 20 MG: 20 TABLET ORAL at 08:55

## 2022-12-22 RX ADMIN — Medication 10 ML: at 08:56

## 2022-12-22 NOTE — DISCHARGE SUMMARY
Hospital Discharge Summary    Pattie Liu  :  1945  MRN:  [id number]    Admit date:  2022  Discharge date: 2022    Admitting Physician:    Rafat Espinoza MD    Discharge Diagnoses:      Fall, initial encounter    Traumatic rhabdomyolysis (HCC)    Leukocytosis    Anemia    Degenerative disc disease, lumbar      Hospital Course:   The patient is a 77 y.o. female who presents with traumatic rhabdomyolysis following a fall from standing while at home alone.  She was down for greater than 24 hours before family came to check on her.  She did not lose consciousness.  CT scans of the cervical spine, thoracic spine, lumbar spine, head, face all without acute fractures from the emergency department.  Her CPK was found to be greater than 5000 in the emergency department.  Significant improvement with IVF.  CPK down to 1600 at the time of discharge.  Encourage continue hydration.    Is felt she is not safe to be home alone at this time.  She will go to her daughter's house for now in [place] where home health will be following.    The patient was admitted for the above noted medical/surgical issues. Please see daily progress note for further details concerning their stay. The patient improved throughout their stay and reached maximum medical improvement on the day of discharge. The patient/family agree with the treatment plan as outlined above. All questions concerning their stay were answered prior to discharge. They understand the importance of follow up concerning any abnormal test results.     Physical Exam  Vitals reviewed.   Constitutional:       Appearance: Normal appearance. She is not ill-appearing.   HENT:      Head: Normocephalic and atraumatic.   Eyes:      General:         Right eye: No discharge.         Left eye: No discharge.      Extraocular Movements: Extraocular movements intact.      Conjunctiva/sclera: Conjunctivae normal.   Cardiovascular:      Rate and Rhythm: Normal rate  and regular rhythm.      Pulses: Normal pulses.      Heart sounds: No murmur heard.  Pulmonary:      Effort: Pulmonary effort is normal. No respiratory distress.      Breath sounds: No wheezing.   Abdominal:      General: Abdomen is flat. Bowel sounds are normal. There is no distension.   Skin:     Capillary Refill: Capillary refill takes less than 2 seconds.      Findings: Bruising present.   Neurological:      General: No focal deficit present.      Mental Status: She is alert.   Psychiatric:         Mood and Affect: Mood normal.         Behavior: Behavior normal.           Discharge Medications:         Discharge Medications      Continue These Medications      Instructions Start Date   ALLEGRA ALLERGY PO   4 mg, Oral, Daily      ALPRAZolam 0.5 MG tablet  Commonly known as: XANAX   0.5 mg, Oral, 2 Times Daily PRN      aspirin 81 MG tablet   81 mg, Oral, Daily      atorvastatin 20 MG tablet  Commonly known as: LIPITOR   20 mg, Oral, Daily      B-12 5000 MCG capsule   1 capsule, Oral, Daily      Breztri Aerosphere 160-9-4.8 MCG/ACT aerosol inhaler  Generic drug: Budeson-Glycopyrrol-Formoterol   2 puffs, Inhalation, 2 Times Daily      CALCIUM 1000 + D PO   1 tablet, Oral, Daily      gabapentin 600 MG tablet  Commonly known as: NEURONTIN   1,200 mg, Oral, 2 Times Daily      ibuprofen 200 MG tablet  Commonly known as: ADVIL,MOTRIN   200 mg, Oral, Every 6 Hours PRN      levalbuterol 1.25 MG/3ML nebulizer solution  Commonly known as: XOPENEX   1 ampule, Nebulization, Every 4 Hours PRN      lisinopril 10 MG tablet  Commonly known as: PRINIVIL,ZESTRIL   20 mg, Oral, Daily      O2  Commonly known as: OXYGEN   2 L/min, Inhalation, Continuous      PrilOSEC 20 MG capsule  Generic drug: omeprazole   20 mg, Oral, Daily         Stop These Medications    Trelegy Ellipta 100-62.5-25 MCG/ACT inhaler  Generic drug: Fluticasone-Umeclidin-Vilant     Trelegy Ellipta 200-62.5-25 MCG/ACT aerosol powder   Generic drug:  Fluticasone-Umeclidin-Vilant            Activity: As tolerated    Diet: Regular    Consults: None    Significant Diagnostic Studies:    CT Head Without Contrast    Result Date: 12/20/2022   1. Mild cerebral and cerebellar atrophy with chronic microvascular disease but no evidence of acute intracranial process. 2. Air-fluid levels within both maxillary sinuses suggesting sinusitis. Given history of fall this could also potentially represent posttraumatic fluid.   This report was finalized on 12/20/2022 16:50 by Dr. Nik Randolph MD.    CT Cervical Spine Without Contrast    Result Date: 12/20/2022  1. No evidence of acute osseous injury or malalignment in the cervical spine.   This report was finalized on 12/20/2022 16:54 by Dr. Nik Randolph MD.    CT Thoracic Spine Without Contrast    Result Date: 12/20/2022  . 1. No evidence of acute fracture or listhesis. There is spondylosis in the mid and lower thoracic spine with an accentuated thoracic kyphosis.  This report was finalized on 12/20/2022 16:57 by Dr. Nik Randolph MD.    CT Lumbar Spine Without Contrast    Result Date: 12/20/2022  1. No lumbar spine fracture is seen. 2. Degenerative changes of the lumbar spine, as described. 3. A 1.2 cm exophytic lesion arising from the lower pole right kidney with a Hounsfield unit measurement of 41 is not a simple cyst. This could be a complex cyst or a solid lesion. It is not fully evaluated on this exam. 4. Atheromatous disease of the aortoiliac vessels.  The full report of this exam was immediately signed and available to the emergency room. The patient is currently in the emergency room. This report was finalized on 12/20/2022 16:57 by Dr. Delfino Gamino MD.    XR Chest 1 View    Result Date: 12/20/2022  1. Similar chronic changes with no acute process identified. This report was finalized on 12/20/2022 16:15 by Dr. Emilie Crabtree MD.    CT Facial Bones Without Contrast    Result Date: 12/20/2022  1. No facial  bone fracture is seen. 2. Mucosal thickening and/or fluid in both maxillary sinuses. Mucosal thickening involving the anterior ethmoid air cells and the inferior frontal sinuses bilaterally. FINDINGS consistent with sinusitis. 3. Other nonacute findings, as discussed.  The full report of this exam was immediately signed and available to the emergency room. The patient is currently in the emergency room.  This report was finalized on 12/20/2022 16:50 by Dr. Delfino Gamino MD.           Treatments:   IV fluids    Disposition:   Discharge home to daughter's house with home health    Time spent on discharge including discussion with patient/family, SW, and coordination of care.     Follow up with Rafat Espinoza MD in 1-2 weeks.    Signed:  Anupam Ledezma MD   12/22/2022, 09:26 CST

## 2022-12-22 NOTE — CASE MANAGEMENT/SOCIAL WORK
Continued Stay Note  [facility]     Patient Name: Pattie Liu  MRN: [id number]  Today's Date: 12/22/2022    Admit Date: 12/20/2022    Plan: DC summary/HH orders faxed to [facility]. Daughter to transport home to get home oxygen equipment then head to [facility].   Discharge Plan     Row Name 12/22/22 0955       Plan    Plan DC summary/HH orders faxed to [facility]. Daughter to transport home to get home oxygen equipment then head to [facility].    Final Discharge Disposition Code 06 - home with home health care    Final Note Home with [facility]. Daughter to transport home.    Row Name 12/22/22 0843       Plan    Plan Faxed referral to [facility]. spoke with  who will have her  for acceptance of in home skilled care. Also called [facility] to see if they service this [place] area for oxygen needs. pending return call and acceptance by [facility].    Row Name 12/22/22 0836       Plan    Final Discharge Disposition Code 06 - home with home health care               Discharge Codes    No documentation.               Expected Discharge Date and Time     Expected Discharge Date Expected Discharge Time    Dec 22, 2022             Chasity Segura RN

## 2022-12-22 NOTE — THERAPY TREATMENT NOTE
Acute Care - Occupational Therapy Treatment Note  [facility]     Patient Name: Pattie Liu  : 1945  MRN: [id number]  Today's Date: 2022             Admit Date: 2022       ICD-10-CM ICD-9-CM   1. Fall, initial encounter  W19.XXXA E888.9   2. Traumatic rhabdomyolysis, initial encounter ([facility])  T79.6XXA 958.6   3. DDD (degenerative disc disease), lumbar  M51.36 722.52   4. Renal cyst, right  N28.1 753.10   5. SOB (shortness of breath)  R06.02 786.05   6. Pneumonia due to infectious organism, unspecified laterality, unspecified part of lung  J18.9 486   7. Decreased activities of daily living (ADL)  Z78.9 V49.89     Patient Active Problem List   Diagnosis   • Frequent PVCs   • Shortness of breath   • SOB (shortness of breath)   • CAD in native artery   • PVD (peripheral vascular disease) ([facility])   • Pneumonia due to infectious organism   • Chronic back pain   • Essential hypertension   • Fall, initial encounter   • Traumatic rhabdomyolysis ([facility])   • Leukocytosis   • Anemia   • Degenerative disc disease, lumbar     Past Medical History:   Diagnosis Date   • CAD in native artery 2019   • COPD (chronic obstructive pulmonary disease) ([facility])    • Essential hypertension 2021   • GERD (gastroesophageal reflux disease)    • Lung nodule      Past Surgical History:   Procedure Laterality Date   • BREAST IMPLANT REMOVAL     • BREAST TISSUE EXPANDER REMOVAL INSERTION OF IMPLANT     • CARDIAC CATHETERIZATION N/A 2019    Procedure: Left Heart Cath;  Surgeon: Edi Armijo MD;  Location: [facility] INVASIVE LOCATION;  Service: Cardiology   • CHOLECYSTECTOMY     • HYSTERECTOMY     • MASTECTOMY      BILATERAL   • OOPHORECTOMY           OT ASSESSMENT FLOWSHEET (last 12 hours)     OT Evaluation and Treatment     Row Name 22 0730                   OT Time and Intention    Subjective Information no complaints  -TS        Document Type therapy note (daily note)  -TS        Mode of Treatment  occupational therapy  -TS           General Information    Existing Precautions/Restrictions fall;oxygen therapy device and L/min  -TS           Pain Assessment    Pretreatment Pain Rating 0/10 - no pain  -TS        Posttreatment Pain Rating 0/10 - no pain  -TS           Cognition    Personal Safety Interventions fall prevention program maintained;gait belt;nonskid shoes/slippers when out of bed  -TS           Activities of Daily Living    BADL Assessment/Intervention bathing;upper body dressing;lower body dressing  -TS           Bathing Assessment/Intervention    [place] Level (Bathing) upper body;lower body  -TS        Assistive Devices (Bathing) shower chair  -TS        Position (Bathing) unsupported sitting  -TS           Upper Body Dressing Assessment/Training    [place] Level (Upper Body Dressing) don;pull-over garment;set up  -TS        Position (Upper Body Dressing) unsupported sitting  -TS           Lower Body Dressing Assessment/Training    [place] Level (Lower Body Dressing) don;pants/bottoms;socks;supervision  -TS        Position (Lower Body Dressing) unsupported sitting;supported standing  -TS           Functional Mobility    Functional Mobility- Ind. Level standby assist  -TS        Functional Mobility- Device walker, front-wheeled  -TS           Transfer Assessment/Treatment    Transfers sit-stand transfer;stand-sit transfer;shower transfer  -TS           Sit-Stand Transfer    Sit-Stand [place] (Transfers) standby assist  -TS        Assistive Device (Sit-Stand Transfers) walker, front-wheeled  -TS           Stand-Sit Transfer    Stand-Sit [place] (Transfers) standby assist  -TS        Assistive Device (Stand-Sit Transfers) walker, front-wheeled  -TS           Shower Transfer    Type (Shower Transfer) sit-stand;stand-sit  -TS        [place] Level (Shower Transfer) stand by assist  -TS        Assistive Device (Shower Transfer) shower chair;grab bar, tub/shower  -TS            Positioning and Restraints    Pre-Treatment Position standing in room  -TS        Post Treatment Position chair  -TS        In Chair sitting;call light within reach;encouraged to call for assist;with family/caregiver  -TS              User Key  (r) = Recorded By, (t) = Taken By, (c) = Cosigned By    Initials Name Effective Dates    TS Hellen Bai COTA 06/16/21 -                  Occupational Therapy Education     Title: PT OT SLP Therapies (In Progress)     Topic: Occupational Therapy (In Progress)     Point: ADL training (Done)     Description:   Instruct learner(s) on proper safety adaptation and remediation techniques during self care or transfers.   Instruct in proper use of assistive devices.              Learning Progress Summary           Patient Acceptance, E, VU,NR by  at 12/21/2022 1012                   Point: Home exercise program (Not Started)     Description:   Instruct learner(s) on appropriate technique for monitoring, assisting and/or progressing therapeutic exercises/activities.              Learner Progress:  Not documented in this visit.          Point: Precautions (Done)     Description:   Instruct learner(s) on prescribed precautions during self-care and functional transfers.              Learning Progress Summary           Patient Acceptance, E, VU,NR by  at 12/21/2022 1012                   Point: Body mechanics (Done)     Description:   Instruct learner(s) on proper positioning and spine alignment during self-care, functional mobility activities and/or exercises.              Learning Progress Summary           Patient Acceptance, E, VU,NR by  at 12/21/2022 1012                               User Key     Initials Effective Dates Name Provider Type Discipline     06/20/22 -  Kala Silva, OTR/L Occupational Therapist OT                  OT Recommendation and Plan              Time Calculation:    Time Calculation- OT     Row Name 12/22/22 1250             Time  Calculation- OT    OT Start Time 0730  -TS      OT Stop Time 0845  -TS      OT Time Calculation (min) 75 min  -TS      Total Timed Code Minutes- OT 75 minute(s)  -TS      OT Received On 12/22/22  -TS         Timed Charges    45358 - OT Self Care/Mgmt Minutes 75  -TS         Total Minutes    Timed Charges Total Minutes 75  -TS       Total Minutes 75  -TS            User Key  (r) = Recorded By, (t) = Taken By, (c) = Cosigned By    Initials Name Provider Type    TS Hellen Bai COTA Occupational Therapist Assistant              Therapy Charges for Today     Code Description Service Date Service Provider Modifiers Qty    [id number] HC OT SELF CARE/MGMT/TRAIN EA 15 MIN 12/22/2022 Hellen Bai COTA GO 5               Hellen [employer]MA[employer]. DWAYNE Bai  12/22/2022

## 2022-12-22 NOTE — THERAPY DISCHARGE NOTE
Acute Care - Occupational Therapy Discharge Summary  [facility]     Patient Name: Pattie Liu  : 1945  MRN: [id number]    Today's Date: 2022                 Admit Date: 2022        OT Recommendation and Plan    Visit Dx:    ICD-10-CM ICD-9-CM   1. Fall, initial encounter  W19.XXXA E888.9   2. Traumatic rhabdomyolysis, initial encounter ([facility])  T79.6XXA 958.6   3. DDD (degenerative disc disease), lumbar  M51.36 722.52   4. Renal cyst, right  N28.1 753.10   5. SOB (shortness of breath)  R06.02 786.05   6. Pneumonia due to infectious organism, unspecified laterality, unspecified part of lung  J18.9 486                OT Rehab Goals     Row Name 22 1100             Transfer Goal 1 (OT)    Activity/Assistive Device (Transfer Goal 1, OT) toilet  -AC      [place] Level/Cues Needed (Transfer Goal 1, OT) supervision required  -AC      Time Frame (Transfer Goal 1, OT) long term goal (LTG);10 days  -AC      Progress/Outcome (Transfer Goal 1, OT) goal not met  -AC         Dressing Goal 1 (OT)    Activity/Device (Dressing Goal 1, OT) dressing skills, all  -AC      [place]/Cues Needed (Dressing Goal 1, OT) minimum assist (75% or more patient effort)  -AC      Time Frame (Dressing Goal 1, OT) long term goal (LTG);10 days  -AC      Progress/Outcome (Dressing Goal 1, OT) goal not met  -AC         Toileting Goal 1 (OT)    Activity/Device (Toileting Goal 1, OT) toileting skills, all  -AC      [place] Level/Cues Needed (Toileting Goal 1, OT) supervision required  -AC      Time Frame (Toileting Goal 1, OT) long term goal (LTG);10 days  -AC      Progress/Outcome (Toileting Goal 1, OT) goal not met  -AC            User Key  (r) = Recorded By, (t) = Taken By, (c) = Cosigned By    Initials Name Provider Type Discipline    AC Eusebio Nielsen, OTR/L, CNT Occupational Therapist OT                            OT Discharge Summary  Anticipated Discharge Disposition (OT): home with home health  Reason  for Discharge: Discharge from facility  Outcomes Achieved: Refer to plan of care for updates on goals achieved  Discharge Destination: Home with home health      Eusebio Nielsen OTR/L, CNT  12/22/2022

## 2022-12-22 NOTE — CASE MANAGEMENT/SOCIAL WORK
Continued Stay Note   [place]     Patient Name: Pattie Liu  MRN: [id number]  Today's Date: 12/22/2022    Admit Date: 12/20/2022    Plan: Faxed referral to [facility]. spoke with  who will have her  for acceptance of in home skilled care. Also called [facility] to see if they service this [place] area for oxygen needs. pending return call and acceptance by [facility].   Discharge Plan     Row Name 12/22/22 0843       Plan    Plan Faxed referral to [facility]. spoke with  who will have her  for acceptance of in home skilled care. Also called [facility] to see if they service this [place] area for oxygen needs. pending return call and acceptance by [facility].    Row Name 12/22/22 0836       Plan    Final Discharge Disposition Code 06 - home with home health care               Discharge Codes    No documentation.               Expected Discharge Date and Time     Expected Discharge Date Expected Discharge Time    Dec 24, 2022             Chasity Segura RN

## 2022-12-22 NOTE — NURSING NOTE
Patient discharged home today, educated patient and daughter about discharge paperwork. Both v/u. IV D/C. Patient is currently on 02 therapy and patient daughter states that she does not have a portable 02 in the car and is not willing to wait for someone to bring one as she is worried about the inclement weather and wants to get home she states that its only 30 mins away and she will be fine without oxygen for this short amount of time.Nurse educated the risks about patient not wearing 02 and daughter is aware. Patient left private vehicle.

## 2022-12-22 NOTE — PLAN OF CARE
Goal Outcome Evaluation:               Pt screened for PT.  DC today and declined PT evaluation and stair training.  PT to sign off.

## 2022-12-22 NOTE — PLAN OF CARE
Goal Outcome Evaluation:              Outcome Evaluation: Pt AxOx4, Zofran given x 2 for nausea w/ fair results.  Pt up x 1 to BSC.  Scheduled Tylenol given for pain w/ good results.  Pt had poor appetite this shift.  Periorbital swelling, brusing and abrasions noted.  Family at bedside assisting w/ care.  Pt would like to dc home w/ hh.       Pt AxOx4 but reports seeing [employer]it[employer] Lights and cobwebs in her room

## 2022-12-23 NOTE — PROGRESS NOTES
Enter Query Response Below      Query Response:     Nontraumatic rhabdomyolysis    Electronically signed by Anupam Ledezma MD, 22, 8:10 AM CST.           If applicable, please update the problem list.       Patient: Pattie Liu        : 1945  Account: [id number]           Admit Date: 2022        How to Respond to this query:       a. Click New Note     b. Answer query within the yellow box.                c. Update the Problem List, if applicable.      If you have any questions about this query contact me at: [email or web address]     Dr. Ledezma:    Patient admitted  after fall at home and being down greater than 24 hours.  Creatine kinase 5142 in ED.  Treatment included IV fluid bolus and 125 ml/hr. Creatine kinase levels - 2874, - 1649.    Please clarify if the patient is being treated/monitored for:    *Traumatic rhabdomyolysis with additional clinical indicators _______  *Nontraumatic rhabdomyolysis  *Other ____ (specify)  *Unable to determine      By submitting this query, we are merely seeking further clarification of documentation to accurately reflect all conditions that you are monitoring, evaluating, treating or that extend the hospitalization or utilize additional resources of care. Please utilize your independent clinical judgment when addressing the question(s) above.     This query and your response, once completed, will be entered into the legal medical record.    Sincerely,  Yoly Blue RN CCDS  Clinical Documentation Integrity Program

## 2023-05-04 ENCOUNTER — HOSPITAL ENCOUNTER (INPATIENT)
Facility: HOSPITAL | Age: 78
LOS: 4 days | Discharge: NURSING, REHAB OR LONG-TERM CARE | End: 2023-05-11
Attending: FAMILY MEDICINE | Admitting: FAMILY MEDICINE
Payer: MEDICARE

## 2023-05-04 DIAGNOSIS — J96.11 CHRONIC RESPIRATORY FAILURE WITH HYPOXIA: ICD-10-CM

## 2023-05-04 DIAGNOSIS — M54.9 CHRONIC BACK PAIN, UNSPECIFIED BACK LOCATION, UNSPECIFIED BACK PAIN LATERALITY: Primary | ICD-10-CM

## 2023-05-04 DIAGNOSIS — Z74.09 IMPAIRED MOBILITY: ICD-10-CM

## 2023-05-04 DIAGNOSIS — G89.29 CHRONIC BACK PAIN, UNSPECIFIED BACK LOCATION, UNSPECIFIED BACK PAIN LATERALITY: Primary | ICD-10-CM

## 2023-05-04 PROBLEM — E86.0 DEHYDRATION: Status: ACTIVE | Noted: 2023-05-04

## 2023-05-04 LAB
ALBUMIN SERPL-MCNC: 4.1 G/DL (ref 3.5–5.2)
ALBUMIN/GLOB SERPL: 1.6 G/DL
ALP SERPL-CCNC: 90 U/L (ref 39–117)
ALT SERPL W P-5'-P-CCNC: 15 U/L (ref 1–33)
ANION GAP SERPL CALCULATED.3IONS-SCNC: 8 MMOL/L (ref 5–15)
AST SERPL-CCNC: 16 U/L (ref 1–32)
BACTERIA UR QL AUTO: ABNORMAL /HPF
BASOPHILS # BLD AUTO: 0.05 10*3/MM3 (ref 0–0.2)
BASOPHILS NFR BLD AUTO: 0.5 % (ref 0–1.5)
BILIRUB SERPL-MCNC: 0.2 MG/DL (ref 0–1.2)
BILIRUB UR QL STRIP: NEGATIVE
BUN SERPL-MCNC: 16 MG/DL (ref 8–23)
BUN/CREAT SERPL: 17.6 (ref 7–25)
CALCIUM SPEC-SCNC: 10.1 MG/DL (ref 8.6–10.5)
CHLORIDE SERPL-SCNC: 105 MMOL/L (ref 98–107)
CLARITY UR: ABNORMAL
CO2 SERPL-SCNC: 32 MMOL/L (ref 22–29)
COLOR UR: ABNORMAL
CREAT SERPL-MCNC: 0.91 MG/DL (ref 0.57–1)
DEPRECATED RDW RBC AUTO: 46.8 FL (ref 37–54)
EGFRCR SERPLBLD CKD-EPI 2021: 63.9 ML/MIN/1.73
EOSINOPHIL # BLD AUTO: 0.23 10*3/MM3 (ref 0–0.4)
EOSINOPHIL NFR BLD AUTO: 2.2 % (ref 0.3–6.2)
ERYTHROCYTE [DISTWIDTH] IN BLOOD BY AUTOMATED COUNT: 13.4 % (ref 12.3–15.4)
GLOBULIN UR ELPH-MCNC: 2.5 GM/DL
GLUCOSE SERPL-MCNC: 110 MG/DL (ref 65–99)
GLUCOSE UR STRIP-MCNC: NEGATIVE MG/DL
HCT VFR BLD AUTO: 42.3 % (ref 34–46.6)
HGB BLD-MCNC: 13.1 G/DL (ref 12–15.9)
HGB UR QL STRIP.AUTO: NEGATIVE
HYALINE CASTS UR QL AUTO: ABNORMAL /LPF
IMM GRANULOCYTES # BLD AUTO: 0.03 10*3/MM3 (ref 0–0.05)
IMM GRANULOCYTES NFR BLD AUTO: 0.3 % (ref 0–0.5)
KETONES UR QL STRIP: ABNORMAL
LEUKOCYTE ESTERASE UR QL STRIP.AUTO: ABNORMAL
LYMPHOCYTES # BLD AUTO: 2.48 10*3/MM3 (ref 0.7–3.1)
LYMPHOCYTES NFR BLD AUTO: 24.1 % (ref 19.6–45.3)
MAGNESIUM SERPL-MCNC: 2.1 MG/DL (ref 1.6–2.4)
MCH RBC QN AUTO: 29.3 PG (ref 26.6–33)
MCHC RBC AUTO-ENTMCNC: 31 G/DL (ref 31.5–35.7)
MCV RBC AUTO: 94.6 FL (ref 79–97)
MONOCYTES # BLD AUTO: 0.54 10*3/MM3 (ref 0.1–0.9)
MONOCYTES NFR BLD AUTO: 5.3 % (ref 5–12)
NEUTROPHILS NFR BLD AUTO: 6.95 10*3/MM3 (ref 1.7–7)
NEUTROPHILS NFR BLD AUTO: 67.6 % (ref 42.7–76)
NITRITE UR QL STRIP: POSITIVE
NRBC BLD AUTO-RTO: 0 /100 WBC (ref 0–0.2)
PH UR STRIP.AUTO: 6 [PH] (ref 5–8)
PLATELET # BLD AUTO: 300 10*3/MM3 (ref 140–450)
PMV BLD AUTO: 11.1 FL (ref 6–12)
POTASSIUM SERPL-SCNC: 3.9 MMOL/L (ref 3.5–5.2)
PROT SERPL-MCNC: 6.6 G/DL (ref 6–8.5)
PROT UR QL STRIP: ABNORMAL
RBC # BLD AUTO: 4.47 10*6/MM3 (ref 3.77–5.28)
RBC # UR STRIP: ABNORMAL /HPF
REF LAB TEST METHOD: ABNORMAL
SODIUM SERPL-SCNC: 145 MMOL/L (ref 136–145)
SP GR UR STRIP: 1.02 (ref 1–1.03)
SQUAMOUS #/AREA URNS HPF: ABNORMAL /HPF
TSH SERPL DL<=0.05 MIU/L-ACNC: 2.31 UIU/ML (ref 0.27–4.2)
UROBILINOGEN UR QL STRIP: ABNORMAL
WBC # UR STRIP: ABNORMAL /HPF
WBC NRBC COR # BLD: 10.28 10*3/MM3 (ref 3.4–10.8)

## 2023-05-04 PROCEDURE — 85025 COMPLETE CBC W/AUTO DIFF WBC: CPT | Performed by: FAMILY MEDICINE

## 2023-05-04 PROCEDURE — 81001 URINALYSIS AUTO W/SCOPE: CPT | Performed by: FAMILY MEDICINE

## 2023-05-04 PROCEDURE — 87088 URINE BACTERIA CULTURE: CPT | Performed by: FAMILY MEDICINE

## 2023-05-04 PROCEDURE — 87040 BLOOD CULTURE FOR BACTERIA: CPT | Performed by: FAMILY MEDICINE

## 2023-05-04 PROCEDURE — 87086 URINE CULTURE/COLONY COUNT: CPT | Performed by: FAMILY MEDICINE

## 2023-05-04 PROCEDURE — 83735 ASSAY OF MAGNESIUM: CPT | Performed by: FAMILY MEDICINE

## 2023-05-04 PROCEDURE — 25010000002 ENOXAPARIN PER 10 MG: Performed by: FAMILY MEDICINE

## 2023-05-04 PROCEDURE — 87186 SC STD MICRODIL/AGAR DIL: CPT | Performed by: FAMILY MEDICINE

## 2023-05-04 PROCEDURE — 84443 ASSAY THYROID STIM HORMONE: CPT | Performed by: FAMILY MEDICINE

## 2023-05-04 PROCEDURE — 25010000002 CEFTRIAXONE PER 250 MG: Performed by: FAMILY MEDICINE

## 2023-05-04 PROCEDURE — 80053 COMPREHEN METABOLIC PANEL: CPT | Performed by: FAMILY MEDICINE

## 2023-05-04 RX ORDER — GABAPENTIN 400 MG/1
800 CAPSULE ORAL EVERY 12 HOURS SCHEDULED
Status: DISCONTINUED | OUTPATIENT
Start: 2023-05-04 | End: 2023-05-11 | Stop reason: HOSPADM

## 2023-05-04 RX ORDER — ALPRAZOLAM 0.5 MG/1
1 TABLET ORAL 2 TIMES DAILY
Status: DISCONTINUED | OUTPATIENT
Start: 2023-05-04 | End: 2023-05-11 | Stop reason: HOSPADM

## 2023-05-04 RX ORDER — BISACODYL 5 MG/1
5 TABLET, DELAYED RELEASE ORAL DAILY PRN
Status: DISCONTINUED | OUTPATIENT
Start: 2023-05-04 | End: 2023-05-11 | Stop reason: HOSPADM

## 2023-05-04 RX ORDER — CITALOPRAM 20 MG/1
20 TABLET ORAL DAILY
Status: DISCONTINUED | OUTPATIENT
Start: 2023-05-05 | End: 2023-05-11 | Stop reason: HOSPADM

## 2023-05-04 RX ORDER — ONDANSETRON 2 MG/ML
4 INJECTION INTRAMUSCULAR; INTRAVENOUS EVERY 6 HOURS PRN
Status: DISCONTINUED | OUTPATIENT
Start: 2023-05-04 | End: 2023-05-11 | Stop reason: HOSPADM

## 2023-05-04 RX ORDER — MONTELUKAST SODIUM 4 MG/1
1 TABLET, CHEWABLE ORAL DAILY
COMMUNITY

## 2023-05-04 RX ORDER — SODIUM CHLORIDE 0.9 % (FLUSH) 0.9 %
10 SYRINGE (ML) INJECTION EVERY 12 HOURS SCHEDULED
Status: DISCONTINUED | OUTPATIENT
Start: 2023-05-04 | End: 2023-05-11 | Stop reason: HOSPADM

## 2023-05-04 RX ORDER — BUMETANIDE 1 MG/1
1 TABLET ORAL DAILY
COMMUNITY

## 2023-05-04 RX ORDER — ENOXAPARIN SODIUM 100 MG/ML
40 INJECTION SUBCUTANEOUS DAILY
Status: DISCONTINUED | OUTPATIENT
Start: 2023-05-04 | End: 2023-05-11 | Stop reason: HOSPADM

## 2023-05-04 RX ORDER — GABAPENTIN 800 MG/1
1600 TABLET ORAL 2 TIMES DAILY
COMMUNITY

## 2023-05-04 RX ORDER — LISINOPRIL 20 MG/1
20 TABLET ORAL DAILY
Status: DISCONTINUED | OUTPATIENT
Start: 2023-05-05 | End: 2023-05-11 | Stop reason: HOSPADM

## 2023-05-04 RX ORDER — POLYETHYLENE GLYCOL 3350 17 G/17G
17 POWDER, FOR SOLUTION ORAL DAILY PRN
Status: DISCONTINUED | OUTPATIENT
Start: 2023-05-04 | End: 2023-05-11 | Stop reason: HOSPADM

## 2023-05-04 RX ORDER — LOPERAMIDE HYDROCHLORIDE 2 MG/1
2 CAPSULE ORAL 4 TIMES DAILY PRN
Status: DISCONTINUED | OUTPATIENT
Start: 2023-05-04 | End: 2023-05-11 | Stop reason: HOSPADM

## 2023-05-04 RX ORDER — SODIUM CHLORIDE 0.9 % (FLUSH) 0.9 %
10 SYRINGE (ML) INJECTION AS NEEDED
Status: DISCONTINUED | OUTPATIENT
Start: 2023-05-04 | End: 2023-05-11 | Stop reason: HOSPADM

## 2023-05-04 RX ORDER — LOPERAMIDE HYDROCHLORIDE 2 MG/1
2 CAPSULE ORAL 4 TIMES DAILY PRN
Status: ON HOLD | COMMUNITY
End: 2023-05-11 | Stop reason: SDUPTHER

## 2023-05-04 RX ORDER — CITALOPRAM 20 MG/1
20 TABLET ORAL DAILY
Status: ON HOLD | COMMUNITY
End: 2023-05-05 | Stop reason: DRUGHIGH

## 2023-05-04 RX ORDER — SODIUM CHLORIDE 9 MG/ML
40 INJECTION, SOLUTION INTRAVENOUS AS NEEDED
Status: DISCONTINUED | OUTPATIENT
Start: 2023-05-04 | End: 2023-05-11 | Stop reason: HOSPADM

## 2023-05-04 RX ORDER — DICYCLOMINE HCL 20 MG
20 TABLET ORAL 3 TIMES DAILY PRN
COMMUNITY

## 2023-05-04 RX ORDER — AMOXICILLIN 250 MG
2 CAPSULE ORAL 2 TIMES DAILY
Status: DISCONTINUED | OUTPATIENT
Start: 2023-05-04 | End: 2023-05-11 | Stop reason: HOSPADM

## 2023-05-04 RX ORDER — BISACODYL 10 MG
10 SUPPOSITORY, RECTAL RECTAL DAILY PRN
Status: DISCONTINUED | OUTPATIENT
Start: 2023-05-04 | End: 2023-05-11 | Stop reason: HOSPADM

## 2023-05-04 RX ORDER — ACETAMINOPHEN 325 MG/1
650 TABLET ORAL EVERY 4 HOURS PRN
Status: DISCONTINUED | OUTPATIENT
Start: 2023-05-04 | End: 2023-05-11 | Stop reason: HOSPADM

## 2023-05-04 RX ORDER — MELOXICAM 15 MG/1
15 TABLET ORAL DAILY
COMMUNITY

## 2023-05-04 RX ORDER — ONDANSETRON 4 MG/1
4 TABLET, FILM COATED ORAL EVERY 6 HOURS PRN
Status: DISCONTINUED | OUTPATIENT
Start: 2023-05-04 | End: 2023-05-11 | Stop reason: HOSPADM

## 2023-05-04 RX ORDER — ASPIRIN 81 MG/1
81 TABLET ORAL DAILY
Status: DISCONTINUED | OUTPATIENT
Start: 2023-05-05 | End: 2023-05-11 | Stop reason: HOSPADM

## 2023-05-04 RX ORDER — CHOLECALCIFEROL (VITAMIN D3) 125 MCG
5 CAPSULE ORAL NIGHTLY PRN
Status: DISCONTINUED | OUTPATIENT
Start: 2023-05-04 | End: 2023-05-11 | Stop reason: HOSPADM

## 2023-05-04 RX ORDER — BUMETANIDE 1 MG/1
1 TABLET ORAL DAILY
Status: DISCONTINUED | OUTPATIENT
Start: 2023-05-05 | End: 2023-05-11 | Stop reason: HOSPADM

## 2023-05-04 RX ORDER — ACETAMINOPHEN 650 MG/1
650 SUPPOSITORY RECTAL EVERY 4 HOURS PRN
Status: DISCONTINUED | OUTPATIENT
Start: 2023-05-04 | End: 2023-05-11 | Stop reason: HOSPADM

## 2023-05-04 RX ORDER — ACETAMINOPHEN 160 MG/5ML
650 SOLUTION ORAL EVERY 4 HOURS PRN
Status: DISCONTINUED | OUTPATIENT
Start: 2023-05-04 | End: 2023-05-11 | Stop reason: HOSPADM

## 2023-05-04 RX ORDER — SODIUM CHLORIDE 9 MG/ML
50 INJECTION, SOLUTION INTRAVENOUS CONTINUOUS
Status: DISCONTINUED | OUTPATIENT
Start: 2023-05-04 | End: 2023-05-11 | Stop reason: HOSPADM

## 2023-05-04 RX ORDER — DICYCLOMINE HCL 20 MG
20 TABLET ORAL 3 TIMES DAILY PRN
Status: DISCONTINUED | OUTPATIENT
Start: 2023-05-04 | End: 2023-05-11 | Stop reason: HOSPADM

## 2023-05-04 RX ADMIN — ENOXAPARIN SODIUM 40 MG: 100 INJECTION SUBCUTANEOUS at 21:26

## 2023-05-04 RX ADMIN — ALPRAZOLAM 1 MG: 0.5 TABLET ORAL at 21:26

## 2023-05-04 RX ADMIN — CEFTRIAXONE 1 G: 1 INJECTION, POWDER, FOR SOLUTION INTRAMUSCULAR; INTRAVENOUS at 23:43

## 2023-05-04 RX ADMIN — SODIUM CHLORIDE 50 ML/HR: 9 INJECTION, SOLUTION INTRAVENOUS at 19:59

## 2023-05-04 RX ADMIN — GABAPENTIN 800 MG: 400 CAPSULE ORAL at 21:26

## 2023-05-05 PROBLEM — N39.0 ACUTE UTI: Status: ACTIVE | Noted: 2023-05-05

## 2023-05-05 PROBLEM — J96.11 CHRONIC RESPIRATORY FAILURE WITH HYPOXIA: Status: ACTIVE | Noted: 2023-05-05

## 2023-05-05 PROBLEM — Z74.09 IMPAIRED MOBILITY: Status: ACTIVE | Noted: 2023-05-05

## 2023-05-05 LAB
ANION GAP SERPL CALCULATED.3IONS-SCNC: 9 MMOL/L (ref 5–15)
BASOPHILS # BLD AUTO: 0.06 10*3/MM3 (ref 0–0.2)
BASOPHILS NFR BLD AUTO: 0.5 % (ref 0–1.5)
BUN SERPL-MCNC: 18 MG/DL (ref 8–23)
BUN/CREAT SERPL: 22.8 (ref 7–25)
CALCIUM SPEC-SCNC: 9.2 MG/DL (ref 8.6–10.5)
CHLORIDE SERPL-SCNC: 109 MMOL/L (ref 98–107)
CO2 SERPL-SCNC: 25 MMOL/L (ref 22–29)
CREAT SERPL-MCNC: 0.79 MG/DL (ref 0.57–1)
DEPRECATED RDW RBC AUTO: 47.6 FL (ref 37–54)
EGFRCR SERPLBLD CKD-EPI 2021: 75.7 ML/MIN/1.73
EOSINOPHIL # BLD AUTO: 0.34 10*3/MM3 (ref 0–0.4)
EOSINOPHIL NFR BLD AUTO: 3.1 % (ref 0.3–6.2)
ERYTHROCYTE [DISTWIDTH] IN BLOOD BY AUTOMATED COUNT: 13.2 % (ref 12.3–15.4)
GLUCOSE SERPL-MCNC: 105 MG/DL (ref 65–99)
HCT VFR BLD AUTO: 38.7 % (ref 34–46.6)
HGB BLD-MCNC: 11.9 G/DL (ref 12–15.9)
IMM GRANULOCYTES # BLD AUTO: 0.04 10*3/MM3 (ref 0–0.05)
IMM GRANULOCYTES NFR BLD AUTO: 0.4 % (ref 0–0.5)
LYMPHOCYTES # BLD AUTO: 3.12 10*3/MM3 (ref 0.7–3.1)
LYMPHOCYTES NFR BLD AUTO: 28.5 % (ref 19.6–45.3)
MCH RBC QN AUTO: 29.7 PG (ref 26.6–33)
MCHC RBC AUTO-ENTMCNC: 30.7 G/DL (ref 31.5–35.7)
MCV RBC AUTO: 96.5 FL (ref 79–97)
MONOCYTES # BLD AUTO: 0.67 10*3/MM3 (ref 0.1–0.9)
MONOCYTES NFR BLD AUTO: 6.1 % (ref 5–12)
NEUTROPHILS NFR BLD AUTO: 6.73 10*3/MM3 (ref 1.7–7)
NEUTROPHILS NFR BLD AUTO: 61.4 % (ref 42.7–76)
NRBC BLD AUTO-RTO: 0 /100 WBC (ref 0–0.2)
PLATELET # BLD AUTO: 257 10*3/MM3 (ref 140–450)
PMV BLD AUTO: 11.6 FL (ref 6–12)
POTASSIUM SERPL-SCNC: 3.8 MMOL/L (ref 3.5–5.2)
RBC # BLD AUTO: 4.01 10*6/MM3 (ref 3.77–5.28)
SODIUM SERPL-SCNC: 143 MMOL/L (ref 136–145)
WBC NRBC COR # BLD: 10.96 10*3/MM3 (ref 3.4–10.8)

## 2023-05-05 PROCEDURE — 85025 COMPLETE CBC W/AUTO DIFF WBC: CPT | Performed by: FAMILY MEDICINE

## 2023-05-05 PROCEDURE — G0378 HOSPITAL OBSERVATION PER HR: HCPCS

## 2023-05-05 PROCEDURE — 97161 PT EVAL LOW COMPLEX 20 MIN: CPT | Performed by: PHYSICAL THERAPIST

## 2023-05-05 PROCEDURE — 25010000002 ENOXAPARIN PER 10 MG: Performed by: FAMILY MEDICINE

## 2023-05-05 PROCEDURE — 97166 OT EVAL MOD COMPLEX 45 MIN: CPT

## 2023-05-05 PROCEDURE — 25010000002 CEFTRIAXONE PER 250 MG: Performed by: FAMILY MEDICINE

## 2023-05-05 PROCEDURE — 80048 BASIC METABOLIC PNL TOTAL CA: CPT | Performed by: FAMILY MEDICINE

## 2023-05-05 RX ORDER — CITALOPRAM 10 MG/1
30 TABLET ORAL DAILY
COMMUNITY

## 2023-05-05 RX ORDER — ONDANSETRON HYDROCHLORIDE 8 MG/1
8 TABLET, FILM COATED ORAL 4 TIMES DAILY PRN
COMMUNITY

## 2023-05-05 RX ORDER — LISINOPRIL 20 MG/1
20 TABLET ORAL DAILY
COMMUNITY

## 2023-05-05 RX ADMIN — ASPIRIN 81 MG: 81 TABLET, COATED ORAL at 08:40

## 2023-05-05 RX ADMIN — CITALOPRAM 20 MG: 20 TABLET, FILM COATED ORAL at 08:40

## 2023-05-05 RX ADMIN — ALPRAZOLAM 1 MG: 0.5 TABLET ORAL at 21:30

## 2023-05-05 RX ADMIN — ENOXAPARIN SODIUM 40 MG: 100 INJECTION SUBCUTANEOUS at 08:40

## 2023-05-05 RX ADMIN — LISINOPRIL 20 MG: 20 TABLET ORAL at 08:40

## 2023-05-05 RX ADMIN — Medication 10 ML: at 21:34

## 2023-05-05 RX ADMIN — DOCUSATE SODIUM 50 MG AND SENNOSIDES 8.6 MG 2 TABLET: 8.6; 5 TABLET, FILM COATED ORAL at 08:40

## 2023-05-05 RX ADMIN — DOCUSATE SODIUM 50 MG AND SENNOSIDES 8.6 MG 2 TABLET: 8.6; 5 TABLET, FILM COATED ORAL at 21:30

## 2023-05-05 RX ADMIN — CEFTRIAXONE 1 G: 1 INJECTION, POWDER, FOR SOLUTION INTRAMUSCULAR; INTRAVENOUS at 21:34

## 2023-05-05 RX ADMIN — ALPRAZOLAM 1 MG: 0.5 TABLET ORAL at 08:40

## 2023-05-05 RX ADMIN — BUMETANIDE 1 MG: 1 TABLET ORAL at 08:40

## 2023-05-05 RX ADMIN — GABAPENTIN 800 MG: 400 CAPSULE ORAL at 08:40

## 2023-05-05 RX ADMIN — GABAPENTIN 800 MG: 400 CAPSULE ORAL at 21:30

## 2023-05-05 NOTE — PLAN OF CARE
Goal Outcome Evaluation:  Plan of Care Reviewed With: patient, daughter        Progress: no change  Outcome Evaluation: PT eval complete. Pt A&O x4 w/ 3L NC. Pt reports being independent w/ ADLs, using single point cane for mobility prior to admission. Daughter present in room and reports that pt has hx of injections into the low back every 3 wks for pain management, following these injections the pt has improved functional mobility and reduced LBP. Per report these are saline injections. Pt has been living w/ daughter for ~6 mo d/t frequent falls when living at home independently. Today pt was able to come EOB w/ SBA and sat EOB w/ good balance. Pt completed sit <> stand w/ RW and CGA and was able to ambulate a few short distances w/ RW, w/ breaks d/t fatigue. Pt assisted into bedside recliner upon return to room, left with daughter present in room. Pt demonstrated reduced activity tolerance, impaired functional mobility, reduced strength, and is of increased fall risk warranting the need for skilled PT services. PT recommend d/c to SNF d/t safety concern prior to returning home independently.

## 2023-05-05 NOTE — PLAN OF CARE
"Goal Outcome Evaluation:  Plan of Care Reviewed With: patient, daughter        Progress: no change  Outcome Evaluation: OT marlonal completed. Pt A&Ox4 with c/o pain at low back and B shoulders prior to activity. Pt reports PLOF of I with overall decline in last 2 years, reporting multiple falls d/t B LE buckling at times of back \"giving out\". Today, pt came to EOB with SBA, donned slippers with SBA, completed sit<>stand t/f with CGA, fxl mobility with CGA using FWW, toileting with CGA/SBA, and grooming with CGA/SBA. Pt attempted to abandon FWW at times while in BR, requiring cues. Skilled OT warranted to provide education and address pain, balance, activity tolerance, coordination, and strength in order to increase pt safety and I during ADLs, fxl mobility, and fxl t/f's. Recommend rehab placement at D/C prior to return home.  "

## 2023-05-05 NOTE — THERAPY EVALUATION
Patient Name: Pattie Liu  : 1945    MRN: [id number]                              Today's Date: 2023       Admit Date: 2023    Visit Dx:     ICD-10-CM ICD-9-CM   1. Impaired mobility  Z74.09 799.89     Patient Active Problem List   Diagnosis   • Frequent PVCs   • Shortness of breath   • SOB (shortness of breath)   • CAD in native artery   • PVD (peripheral vascular disease)   • Pneumonia due to infectious organism   • Chronic back pain   • Essential hypertension   • Fall, initial encounter   • Traumatic rhabdomyolysis   • Leukocytosis   • Anemia   • Degenerative disc disease, lumbar   • Dehydration   • Acute UTI   • Chronic respiratory failure with hypoxia   • Impaired mobility     Past Medical History:   Diagnosis Date   • CAD in native artery 2019   • COPD (chronic obstructive pulmonary disease)    • Essential hypertension 2021   • GERD (gastroesophageal reflux disease)    • Lung nodule      Past Surgical History:   Procedure Laterality Date   • BREAST IMPLANT REMOVAL     • BREAST TISSUE EXPANDER REMOVAL INSERTION OF IMPLANT     • CARDIAC CATHETERIZATION N/A 2019    Procedure: Left Heart Cath;  Surgeon: Edi Armijo MD;  Location:  [facility];  Service: Cardiology   • CHOLECYSTECTOMY     • HYSTERECTOMY     • MASTECTOMY      BILATERAL   • OOPHORECTOMY        General Information     Row Name 23 0955          Physical Therapy Time and Intention    Document Type evaluation  pt admitted d/t increased generaized weakness, falls; Hx of COPD, CAD, PVD, HTN  -MS (r) JQ (t) MS (c)     Mode of Treatment physical therapy  -MS (r) JQ (t) MS (c)     Row Name 23 0955          General Information    Patient Profile Reviewed yes  -MS (r) JQ (t) MS (c)     Prior Level of Function independent:;ADL's;all household mobility  single point cane for functional mobility, occassional RW when increased weakness  -MS (r) JQ (t) MS (c)     Existing  Precautions/Restrictions fall;oxygen therapy device and L/min  3L  -MS (r) JQ (t) MS (c)     Barriers to Rehab previous functional deficit  -MS (r) JQ (t) MS (c)     Row Name 05/05/23 0955          Living Environment    People in Home child(sheri), adult  Pt has lived w/ daughter for ~6 mo d/t falls at home and safety concern. Pt plans to go to SNF and then return home independently  -MS (r) JQ (t) MS (c)     Row Name 05/05/23 0955          Home Main Entrance    Number of Stairs, Main Entrance four  -MS (r) JQ (t) MS (c)     Stair Railings, Main Entrance railing on left side (ascending)  -MS (r) JQ (t) MS (c)     Row Name 05/05/23 0955          Stairs Within Home, Primary    Number of Stairs, Within Home, Primary none  -MS (r) JQ (t) MS (c)     Row Name 05/05/23 0955          Cognition    Orientation Status (Cognition) oriented x 4;verbal cues/prompts needed for orientation  -MS (r) JQ (t) MS (c)     Row Name 05/05/23 0955          Safety Issues, Functional Mobility    Safety Issues Affecting Function (Mobility) awareness of need for assistance;friction/shear risk  -MS (r) JQ (t) MS (c)     Impairments Affecting Function (Mobility) balance;coordination;endurance/activity tolerance;pain;shortness of breath;strength  -MS (r) JQ (t) MS (c)           User Key  (r) = Recorded By, (t) = Taken By, (c) = Cosigned By    Initials Name Provider Type    Sue Pearce, PT, DPT, NCS Physical Therapist    Makenna Toussaint, PT Student PT Student               Mobility     Row Name 05/05/23 0955          Bed Mobility    Bed Mobility supine-sit  -MS (r) JQ (t) MS (c)     Supine-Sit [place] (Bed Mobility) standby assist  -MS (r) JQ (t) MS (c)     Assistive Device (Bed Mobility) bed rails  -MS (r) JQ (t) MS (c)     Row Name 05/05/23 0955          Bed-Chair Transfer    Bed-Chair [place] (Transfers) contact guard;verbal cues  -MS (r) JQ (t) MS (c)     Assistive Device (Bed-Chair Transfers) walker, front-wheeled  -MS (r) JQ  (t) MS (c)     Row Name 05/05/23 0955          Sit-Stand Transfer    Sit-Stand [place] (Transfers) contact guard;verbal cues  -MS (r) JQ (t) MS (c)     Assistive Device (Sit-Stand Transfers) walker, front-wheeled  -MS (r) JQ (t) MS (c)     Row Name 05/05/23 0955          Gait/Stairs (Locomotion)    [place] Level (Gait) contact guard;verbal cues  -MS (r) JQ (t) MS (c)     Assistive Device (Gait) walker, front-wheeled  -MS (r) JQ (t) MS (c)     Distance in Feet (Gait) 25 ft x2, break d/t fatigue before returning to room,10 ft bed <> bathroom. Verbal cues for AD use.  -MS (r) JQ (t) MS (c)           User Key  (r) = Recorded By, (t) = Taken By, (c) = Cosigned By    Initials Name Provider Type    Sue Pearce, PT, DPT, NCS Physical Therapist    Makenna Toussaint, PT Student PT Student               Obj/Interventions     Row Name 05/05/23 0955          Range of Motion Comprehensive    Comment, General Range of Motion (B) UE AROM WFL, (B) LE AROM WFL  -MS (r) JQ (t) MS (c)     Row Name 05/05/23 0955          Strength Comprehensive (MMT)    Comment, General Manual Muscle Testing (MMT) Assessment (B) UE 4+/5, (B) LE 4+/5 with the exception of (B) hip flex 4/5  -MS (r) JQ (t) MS (c)     Row Name 05/05/23 0955          Motor Skills    Motor Skills coordination  -MS (r) JQ (t) MS (c)     Coordination fine motor deficit;bilateral;upper extremity;finger to nose  finger opposition  -MS (r) JQ (t) MS (c)     Row Name 05/05/23 0955          Balance    Balance Assessment sitting static balance;sitting dynamic balance;standing static balance;standing dynamic balance  -MS (r) JQ (t) MS (c)     Static Sitting Balance standby assist  -MS (r) JQ (t) MS (c)     Dynamic Sitting Balance standby assist  -MS (r) JQ (t) MS (c)     Position, Sitting Balance sitting edge of bed  -MS (r) JQ (t) MS (c)     Static Standing Balance standby assist  -MS (r) JQ (t) MS (c)     Dynamic Standing Balance contact guard  -MS (r) JQ (t) MS (c)      Position/Device Used, Standing Balance walker, front-wheeled  -MS (r) JQ (t) MS (c)     Row Name 05/05/23 0955          Sensory Assessment (Somatosensory)    Sensory Assessment (Somatosensory) sensation intact  -MS (r) JQ (t) MS (c)           User Key  (r) = Recorded By, (t) = Taken By, (c) = Cosigned By    Initials Name Provider Type    MS Jose Sue RICO, PT, DPT, NCS Physical Therapist    Makenna Toussaint, PT Student PT Student               Goals/Plan     Row Name 05/05/23 0955          Bed Mobility Goal 1 (PT)    Activity/Assistive Device (Bed Mobility Goal 1, PT) sit to supine/supine to sit  -MS (r) JQ (t) MS (c)     [place] Level/Cues Needed (Bed Mobility Goal 1, PT) independent  -MS (r) JQ (t) MS (c)     Time Frame (Bed Mobility Goal 1, PT) long term goal (LTG);by discharge  -MS (r) JQ (t) MS (c)     Progress/Outcomes (Bed Mobility Goal 1, PT) new goal  -MS (r) JQ (t) MS (c)     Row Name 05/05/23 0955          Transfer Goal 1 (PT)    Activity/Assistive Device (Transfer Goal 1, PT) sit-to-stand/stand-to-sit;bed-to-chair/chair-to-bed  -MS (r) JQ (t) MS (c)     [place] Level/Cues Needed (Transfer Goal 1, PT) independent  -MS (r) JQ (t) MS (c)     Time Frame (Transfer Goal 1, PT) long term goal (LTG);by discharge  -MS (r) JQ (t) MS (c)     Progress/Outcome (Transfer Goal 1, PT) new goal  -MS (r) JQ (t) MS (c)     Row Name 05/05/23 0955          Gait Training Goal 1 (PT)    Activity/Assistive Device (Gait Training Goal 1, PT) gait (walking locomotion);assistive device use;improve balance and speed;increase endurance/gait distance;decrease fall risk;walker, rolling  -MS (r) JQ (t) MS (c)     [place] Level (Gait Training Goal 1, PT) standby assist  -MS (r) JQ (t) MS (c)     Distance (Gait Training Goal 1, PT) 50 ft w/ navigating turns, walking backwards, obstacle navigation  -MS (r) JQ (t) MS (c)     Time Frame (Gait Training Goal 1, PT) long term goal (LTG);by discharge  -MS (r) JQ (t) MS (c)      Progress/Outcome (Gait Training Goal 1, PT) new goal  -MS (r) JQ (t) MS (c)     Row Name 05/05/23 0955          Therapy Assessment/Plan (PT)    Planned Therapy Interventions (PT) balance training;bed mobility training;gait training;transfer training;strengthening;ROM (range of motion);patient/family education  -MS (r) JQ (t) MS (c)           User Key  (r) = Recorded By, (t) = Taken By, (c) = Cosigned By    Initials Name Provider Type    Sue Pearce RICO, PT, DPT, NCS Physical Therapist    Makenna Toussaint, PT Student PT Student               Clinical Impression     Row Name 05/05/23 0921          Pain    Pretreatment Pain Rating 1/10  -MS (r) JQ (t) MS (c)     Posttreatment Pain Rating 1/10  -MS (r) JQ (t) MS (c)     Pain Location - Side/Orientation Bilateral  -MS (r) JQ (t) MS (c)     Pain Location - back;shoulder  -MS (r) JQ (t) MS (c)     Pre/Posttreatment Pain Comment Pt reports chronic LBP, (B) sh pain lying in bed  -MS (r) JQ (t) MS (c)     Pain Intervention(s) Medication (See MAR);Ambulation/increased activity;Repositioned  -MS (r) JQ (t) MS (c)     Row Name 05/05/23 0955          Plan of Care Review    Plan of Care Reviewed With patient;daughter  -MS (r) JQ (t) MS (c)     Progress no change  -MS (r) JQ (t) MS (c)     Outcome Evaluation PT eval complete. Pt A&O x4 w/ 3L NC. Pt reports being independent w/ ADLs, using single point cane for mobility prior to admission. Daughter present in room and reports that pt has hx of injections into the low back every 3 wks for pain management, following these injections the pt has improved functional mobility and reduced LBP. Per report these are saline injections. Pt has been living w/ daughter for ~6 mo d/t frequent falls when living at home independently. Today pt was able to come EOB w/ SBA and sat EOB w/ good balance. Pt completed sit <> stand w/ RW and CGA and was able to ambulate a few short distances w/ RW, w/ breaks d/t fatigue. Pt assisted into bedside  recliner upon return to room, left with daughter present in room. Pt demonstrated reduced activity tolerance, impaired functional mobility, reduced strength, and is of increased fall risk warranting the need for skilled PT services. PT recommend d/c to SNF d/t safety concern prior to returning home independently.  -MS (r) JQ (t) MS (c)     Row Name 05/05/23 0955          Therapy Assessment/Plan (PT)    Patient/Family Therapy Goals Statement (PT) Improve functional mobility, activity tolerance  -MS (r) JQ (t) MS (c)     Rehab Potential (PT) good, to achieve stated therapy goals  -MS (r) JQ (t) MS (c)     Criteria for Skilled Interventions Met (PT) yes  -MS (r) JQ (t) MS (c)     Therapy Frequency (PT) 2 times/day  -MS (r) JQ (t) MS (c)     Predicted Duration of Therapy Intervention (PT) until d/c  -MS (r) JQ (t) MS (c)     Row Name 05/05/23 0955          Vital Signs    Pre SpO2 (%) 96  -MS (r) JQ (t) MS (c)     O2 Delivery Pre Treatment supplemental O2  3L  -MS (r) JQ (t) MS (c)     Post SpO2 (%) 95  -MS (r) JQ (t) MS (c)     O2 Delivery Post Treatment supplemental O2  3L  -MS (r) JQ (t) MS (c)     Pre Patient Position Supine  -MS (r) JQ (t) MS (c)     Post Patient Position Sitting  -MS (r) JQ (t) MS (c)     Row Name 05/05/23 0955          Positioning and Restraints    Pre-Treatment Position in bed  -MS (r) JQ (t) MS (c)     Post Treatment Position chair  -MS (r) JQ (t) MS (c)     In Chair notified nsg;sitting;call light within reach;encouraged to call for assist;with family/caregiver  -MS (r) JQ (t) MS (c)           User Key  (r) = Recorded By, (t) = Taken By, (c) = Cosigned By    Initials Name Provider Type    Sue Pearce, PT, DPT, NCS Physical Therapist    Makenna Toussaint, PT Student PT Student               Outcome Measures     Row Name 05/05/23 4507          How much help from another person do you currently need...    Turning from your back to your side while in flat bed without using bedrails? 4  -MS  (r) JQ (t) MS (c)     Moving from lying on back to sitting on the side of a flat bed without bedrails? 4  -MS (r) JQ (t) MS (c)     Moving to and from a bed to a chair (including a wheelchair)? 3  -MS (r) JQ (t) MS (c)     Standing up from a chair using your arms (e.g., wheelchair, bedside chair)? 3  -MS (r) JQ (t) MS (c)     Climbing 3-5 steps with a railing? 3  -MS (r) JQ (t) MS (c)     To walk in hospital room? 3  -MS (r) JQ (t) MS (c)     AM-PAC 6 Clicks Score (PT) 20  -MS (r) JQ (t)     Highest level of mobility 6 --> Walked 10 steps or more  -MS (r) JQ (t)     Row Name 05/05/23 0955 05/05/23 0949       Functional Assessment    Outcome Measure Options AM-PAC 6 Clicks Basic Mobility (PT)  -MS (r) JQ (t) MS (c) AM-PAC 6 Clicks Daily Activity (OT)  -LR          User Key  (r) = Recorded By, (t) = Taken By, (c) = Cosigned By    Initials Name Provider Type    MS Garcia Sue RICO, PT, DPT, NCS Physical Therapist    LR Germania Stern, OTR/L Occupational Therapist    Makenna Toussaint, PT Student PT Student                             Physical Therapy Education     Title: PT OT SLP Therapies (In Progress)     Topic: Physical Therapy (In Progress)     Point: Mobility training (Done)     Learning Progress Summary           Patient Acceptance, E, VU by TRISTON at 5/5/2023 1107    Comment: Pt educated on AD use, transfer tech, d/c planning, benefits of skilled PT                   Point: Home exercise program (Not Started)     Learner Progress:  Not documented in this visit.          Point: Body mechanics (Done)     Learning Progress Summary           Patient Acceptance, E, VU by TRISTON at 5/5/2023 1107    Comment: Pt educated on AD use, transfer tech, d/c planning, benefits of skilled PT                   Point: Precautions (Done)     Learning Progress Summary           Patient Acceptance, E, VU by TRISTON at 5/5/2023 1107    Comment: Pt educated on AD use, transfer tech, d/c planning, benefits of skilled PT                                User Key     Initials Effective Dates Name Provider Type Discipline    JQ 02/20/23 -  Sharri [place]ica, PT Student PT Student PT              PT Recommendation and Plan  Planned Therapy Interventions (PT): balance training, bed mobility training, gait training, transfer training, strengthening, ROM (range of motion), patient/family education  Plan of Care Reviewed With: patient, daughter  Progress: no change  Outcome Evaluation: PT eval complete. Pt A&O x4 w/ 3L NC. Pt reports being independent w/ ADLs, using single point cane for mobility prior to admission. Daughter present in room and reports that pt has hx of injections into the low back every 3 wks for pain management, following these injections the pt has improved functional mobility and reduced LBP. Per report these are saline injections. Pt has been living w/ daughter for ~6 mo d/t frequent falls when living at home independently. Today pt was able to come EOB w/ SBA and sat EOB w/ good balance. Pt completed sit <> stand w/ RW and CGA and was able to ambulate a few short distances w/ RW, w/ breaks d/t fatigue. Pt assisted into bedside recliner upon return to room, left with daughter present in room. Pt demonstrated reduced activity tolerance, impaired functional mobility, reduced strength, and is of increased fall risk warranting the need for skilled PT services. PT recommend d/c to SNF d/t safety concern prior to returning home independently.     Time Calculation:    PT Charges     Row Name 05/05/23 0955             Time Calculation    Start Time 0955  -MS (r) JQ (t) MS (c)      Stop Time 1039  -MS (r) JQ (t) MS (c)      Time Calculation (min) 44 min  -MS (r) JQ (t)      PT Received On 05/05/23  -MS (r) JQ (t) MS (c)      PT Goal Re-Cert Due Date 05/15/23  -MS (r) JQ (t) MS (c)         Untimed Charges    PT Eval/Re-eval Minutes 44  -MS (r) JQ (t) MS (c)         Total Minutes    Untimed Charges Total Minutes 44  -MS (r) JQ (t)       Total Minutes 44  -MS  (r) JQ (t)            User Key  (r) = Recorded By, (t) = Taken By, (c) = Cosigned By    Initials Name Provider Type    Sue Pearce, PT, DPT, NCS Physical Therapist    Makenna Toussaint PT Student PT Student                  PT G-Codes  Outcome Measure Options: AM-PAC 6 Clicks Basic Mobility (PT)  AM-PAC 6 Clicks Score (PT): 20  AM-PAC 6 Clicks Score (OT): 19  PT Discharge Summary  Anticipated Discharge Disposition (PT): skilled nursing facility    Makenna Kirkland PT Student  5/5/2023

## 2023-05-05 NOTE — THERAPY EVALUATION
Patient Name: Pattie Liu  : 1945    MRN: [id number]                              Today's Date: 2023       Admit Date: 2023    Visit Dx: No diagnosis found.  Patient Active Problem List   Diagnosis   • Frequent PVCs   • Shortness of breath   • SOB (shortness of breath)   • CAD in native artery   • PVD (peripheral vascular disease)   • Pneumonia due to infectious organism   • Chronic back pain   • Essential hypertension   • Fall, initial encounter   • Traumatic rhabdomyolysis   • Leukocytosis   • Anemia   • Degenerative disc disease, lumbar   • Dehydration   • Acute UTI   • Chronic respiratory failure with hypoxia     Past Medical History:   Diagnosis Date   • CAD in native artery 2019   • COPD (chronic obstructive pulmonary disease)    • Essential hypertension 2021   • GERD (gastroesophageal reflux disease)    • Lung nodule      Past Surgical History:   Procedure Laterality Date   • BREAST IMPLANT REMOVAL     • BREAST TISSUE EXPANDER REMOVAL INSERTION OF IMPLANT     • CARDIAC CATHETERIZATION N/A 2019    Procedure: Left Heart Cath;  Surgeon: Edi Armioj MD;  Location: [facility] CATH INVASIVE LOCATION;  Service: Cardiology   • CHOLECYSTECTOMY     • HYSTERECTOMY     • MASTECTOMY      BILATERAL   • OOPHORECTOMY        General Information     Row Name 23 0949          OT Time and Intention    Document Type evaluation  Pt presents with c/o weakness, recurrent falls. Dx: UTI, chronic respiratory failure with hypoxia. Hx includes: COPD, CAD, HTN  -LR     Mode of Treatment occupational therapy;co-treatment  -LR     Row Name 23 0949          General Information    Patient Profile Reviewed yes  -LR     Prior Level of Function independent:;all household mobility;community mobility;transfer;ADL's;home management;cooking;cleaning;shopping;using stairs;yard work  spongebathing at baseline; pt and daughter report decline over last 2 years with fluctuation in assist required.   -LR     Existing Precautions/Restrictions fall;oxygen therapy device and L/min  -LR     Barriers to Rehab medically complex;previous functional deficit  -LR     Row Name 05/05/23 0949          Occupational Profile    Environmental Supports and Barriers (Occupational Profile) tub/shower combo, cane, FWW, 2-3 L home O2  -LR     Row Name 05/05/23 0949          Living Environment    People in Home child(sheri), adult  Pt has lived with daughter for last 6 months secondary to falls and safety concerns. Pt plans to return home I'ly following rehab stay.  -LR     Row Name 05/05/23 0949          Home Main Entrance    Number of Stairs, Main Entrance four  -LR     Stair Railings, Main Entrance railing on left side (ascending)  -LR     Row Name 05/05/23 0949          Stairs Within Home, Primary    Number of Stairs, Within Home, Primary none  -LR     Row Name 05/05/23 0949          Cognition    Orientation Status (Cognition) oriented x 4;verbal cues/prompts needed for orientation  -LR     Row Name 05/05/23 0949          Safety Issues, Functional Mobility    Safety Issues Affecting Function (Mobility) at risk behavior observed;awareness of need for assistance;friction/shear risk;impulsivity;insight into deficits/self-awareness;judgment;positioning of assistive device;problem-solving;safety precaution awareness;safety precautions follow-through/compliance;sequencing abilities  -LR     Impairments Affecting Function (Mobility) balance;coordination;endurance/activity tolerance;pain;shortness of breath;strength  -LR           User Key  (r) = Recorded By, (t) = Taken By, (c) = Cosigned By    Initials Name Provider Type    LR Germania Stern, JENR/L Occupational Therapist                 Mobility/ADL's     Row Name 05/05/23 0949          Bed Mobility    Bed Mobility supine-sit  -LR     Supine-Sit [place] (Bed Mobility) standby assist  -LR     Assistive Device (Bed Mobility) bed rails  -LR     Row Name 05/05/23 0949           Transfers    Transfers sit-stand transfer;stand-sit transfer  -LR     Row Name 05/05/23 0949          Sit-Stand Transfer    Sit-Stand [place] (Transfers) contact guard;verbal cues  -LR     Assistive Device (Sit-Stand Transfers) walker, front-wheeled  -LR     Row Name 05/05/23 0949          Stand-Sit Transfer    Stand-Sit [place] (Transfers) contact guard;verbal cues  -LR     Assistive Device (Stand-Sit Transfers) walker, front-wheeled  -LR     Row Name 05/05/23 0949          Functional Mobility    Functional Mobility- Ind. Level contact guard assist;verbal cues required  -LR     Functional Mobility- Device walker, front-wheeled  -LR     Functional Mobility- Safety Issues supplemental O2  -LR     Row Name 05/05/23 0949          Activities of Daily Living    BADL Assessment/Intervention lower body dressing;toileting;grooming  -LR     Row Name 05/05/23 0949          Lower Body Dressing Assessment/Training    [place] Level (Lower Body Dressing) don;shoes/slippers;standby assist  -LR     Position (Lower Body Dressing) edge of bed sitting  -LR     Comment, (Lower Body Dressing) heel lift in L shoe  -LR     Row Name 05/05/23 0949          Toileting Assessment/Training    [place] Level (Toileting) toileting skills;contact guard assist;standby assist;verbal cues  -LR     Assistive Devices (Toileting) commode;grab bar/safety frame  -LR     Position (Toileting) unsupported sitting;supported standing  -LR     Row Name 05/05/23 0949          Grooming Assessment/Training    [place] Level (Grooming) wash face, hands;contact guard assist;standby assist;verbal cues  -LR     Position (Grooming) sink side;supported standing  -LR           User Key  (r) = Recorded By, (t) = Taken By, (c) = Cosigned By    Initials Name Provider Type    LR Germania Stern, JENR/L Occupational Therapist               Obj/Interventions     Row Name 05/05/23 0949          Sensory Assessment (Somatosensory)    Sensory Assessment  (Somatosensory) UE sensation intact  -LR     Row Name 05/05/23 0949          Vision Assessment/Intervention    Visual Impairment/Limitations WFL  -LR     Row Name 05/05/23 0949          Range of Motion Comprehensive    General Range of Motion bilateral upper extremity ROM WFL  -LR     Row Name 05/05/23 0949          Strength Comprehensive (MMT)    General Manual Muscle Testing (MMT) Assessment upper extremity strength deficits identified  -LR     Comment, General Manual Muscle Testing (MMT) Assessment B UE 4+/5  -LR     Row Name 05/05/23 0949          Motor Skills    Motor Skills coordination  -LR     Coordination fine motor deficit;gross motor deficit;bilateral;upper extremity;minimal impairment  -LR     Row Name 05/05/23 0949          Balance    Balance Assessment sitting static balance;sitting dynamic balance;standing static balance;standing dynamic balance  -LR     Static Sitting Balance supervision  -LR     Dynamic Sitting Balance standby assist  -LR     Position, Sitting Balance unsupported;sitting edge of bed  -LR     Static Standing Balance contact guard;standby assist  -LR     Dynamic Standing Balance contact guard  -LR     Position/Device Used, Standing Balance supported;walker, front-wheeled  -LR           User Key  (r) = Recorded By, (t) = Taken By, (c) = Cosigned By    Initials Name Provider Type    LR Germania Stern, OTR/L Occupational Therapist               Goals/Plan     Row Name 05/05/23 0949          Transfer Goal 1 (OT)    Activity/Assistive Device (Transfer Goal 1, OT) bed-to-chair/chair-to-bed;toilet  -LR     [place] Level/Cues Needed (Transfer Goal 1, OT) supervision required  -LR     Time Frame (Transfer Goal 1, OT) long term goal (LTG);by discharge  -LR     Progress/Outcome (Transfer Goal 1, OT) new goal  -LR     Row Name 05/05/23 0949          Dressing Goal 1 (OT)    Activity/Device (Dressing Goal 1, OT) dressing skills, all  -LR     [place]/Cues Needed (Dressing Goal 1, OT)  "supervision required  -LR     Time Frame (Dressing Goal 1, OT) long term goal (LTG);by discharge  -LR     Progress/Outcome (Dressing Goal 1, OT) new goal  -LR     Row Name 05/05/23 0949          Toileting Goal 1 (OT)    Activity/Device (Toileting Goal 1, OT) toileting skills, all  -LR     [place] Level/Cues Needed (Toileting Goal 1, OT) supervision required  -LR     Time Frame (Toileting Goal 1, OT) long term goal (LTG);by discharge  -LR     Progress/Outcome (Toileting Goal 1, OT) new goal  -LR     Row Name 05/05/23 0949          Therapy Assessment/Plan (OT)    Planned Therapy Interventions (OT) activity tolerance training;adaptive equipment training;BADL retraining;functional balance retraining;IADL retraining;neuromuscular control/coordination retraining;occupation/activity based interventions;patient/caregiver education/training;ROM/therapeutic exercise;strengthening exercise;transfer/mobility retraining  -LR           User Key  (r) = Recorded By, (t) = Taken By, (c) = Cosigned By    Initials Name Provider Type    LR Germania Stern, OTR/L Occupational Therapist               Clinical Impression     Row Name 05/05/23 0949          Pain Assessment    Pretreatment Pain Rating 1/10  -LR     Posttreatment Pain Rating 1/10  -LR     Pain Location - Side/Orientation Bilateral  -LR     Pain Location lower  -LR     Pain Location - back;shoulder  -LR     Pre/Posttreatment Pain Comment B shoulders prior to activity, pt reports chronic low back pain  -LR     Pain Intervention(s) Medication (See MAR);Repositioned;Ambulation/increased activity  -LR     Row Name 05/05/23 0949          Plan of Care Review    Plan of Care Reviewed With patient;daughter  -LR     Progress no change  -LR     Outcome Evaluation OT eval completed. Pt A&Ox4 with c/o pain at low back and B shoulders prior to activity. Pt reports PLOF of I with overall decline in last 2 years, reporting multiple falls d/t B LE buckling at times of back \"giving " "out\". Today, pt came to EOB with SBA, donned slippers with SBA, completed sit<>stand t/f with CGA, fxl mobility with CGA using FWW, toileting with CGA/SBA, and grooming with CGA/SBA. Pt attempted to abandon FWW at times while in BR, requiring cues. Skilled OT warranted to provide education and address pain, balance, activity tolerance, coordination, and strength in order to increase pt safety and I during ADLs, fxl mobility, and fxl t/f's. Recommend rehab placement at D/C prior to return home.  -LR     Row Name 05/05/23 0949          Therapy Assessment/Plan (OT)    Rehab Potential (OT) good, to achieve stated therapy goals  -LR     Criteria for Skilled Therapeutic Interventions Met (OT) yes;skilled treatment is necessary  -LR     Therapy Frequency (OT) 5 times/wk  -LR     Predicted Duration of Therapy Intervention (OT) until hospital D/C  -LR     Row Name 05/05/23 0949          Therapy Plan Review/Discharge Plan (OT)    Anticipated Discharge Disposition (OT) [facility] nursing facility  -LR     Row Name 05/05/23 0949          Vital Signs    Pretreatment Heart Rate (beats/min) 54  -LR     Pre SpO2 (%) 96  -LR     O2 Delivery Pre Treatment supplemental O2  2.5 L  -LR     O2 Delivery Intra Treatment supplemental O2  3 L  -LR     Post SpO2 (%) 96  -LR     O2 Delivery Post Treatment supplemental O2  -LR     Pre Patient Position Supine  -LR     Intra Patient Position Standing  -LR     Post Patient Position Sitting  -LR     Row Name 05/05/23 0949          Positioning and Restraints    Pre-Treatment Position in bed  -LR     Post Treatment Position chair  -LR     In Chair notified nsg;sitting;call light within reach;encouraged to call for assist;with family/caregiver;with other staff  -LR           User Key  (r) = Recorded By, (t) = Taken By, (c) = Cosigned By    Initials Name Provider Type    LR Germania Stern, OTR/L Occupational Therapist               Outcome Measures     Row Name 05/05/23 0949          How much help " from another is currently needed...    Putting on and taking off regular lower body clothing? 3  -LR     Bathing (including washing, rinsing, and drying) 3  -LR     Toileting (which includes using toilet bed pan or urinal) 3  -LR     Putting on and taking off regular upper body clothing 3  -LR     Taking care of personal grooming (such as brushing teeth) 3  -LR     Eating meals 4  -LR     AM-PAC 6 Clicks Score (OT) 19  -LR     Row Name 05/05/23 0949          Functional Assessment    Outcome Measure Options AM-PAC 6 Clicks Daily Activity (OT)  -LR           User Key  (r) = Recorded By, (t) = Taken By, (c) = Cosigned By    Initials Name Provider Type    LR Germania Stern, OTR/L Occupational Therapist                Occupational Therapy Education     Title: PT OT SLP Therapies (In Progress)     Topic: Occupational Therapy (In Progress)     Point: ADL training (Done)     Description:   Instruct learner(s) on proper safety adaptation and remediation techniques during self care or transfers.   Instruct in proper use of assistive devices.              Learning Progress Summary           Patient Acceptance, E,D, VU,NR by LR at 5/5/2023 1051   Family Acceptance, E,D, VU,NR by LR at 5/5/2023 1051                   Point: Home exercise program (Not Started)     Description:   Instruct learner(s) on appropriate technique for monitoring, assisting and/or progressing therapeutic exercises/activities.              Learner Progress:  Not documented in this visit.          Point: Precautions (Done)     Description:   Instruct learner(s) on prescribed precautions during self-care and functional transfers.              Learning Progress Summary           Patient Acceptance, E,D, VU,NR by LR at 5/5/2023 1051   Family Acceptance, E,D, VU,NR by LR at 5/5/2023 1051                   Point: Body mechanics (Done)     Description:   Instruct learner(s) on proper positioning and spine alignment during self-care, functional mobility  "activities and/or exercises.              Learning Progress Summary           Patient Acceptance, E,D, VU,NR by LR at 5/5/2023 1051   Family Acceptance, E,D, VU,NR by LR at 5/5/2023 1051                               User Key     Initials Effective Dates Name Provider Type Discipline    LR 04/25/23 -  Germania Stern, OTR/L Occupational Therapist OT              OT Recommendation and Plan  Planned Therapy Interventions (OT): activity tolerance training, adaptive equipment training, BADL retraining, functional balance retraining, IADL retraining, neuromuscular control/coordination retraining, occupation/activity based interventions, patient/caregiver education/training, ROM/therapeutic exercise, strengthening exercise, transfer/mobility retraining  Therapy Frequency (OT): 5 times/wk  Plan of Care Review  Plan of Care Reviewed With: patient, daughter  Progress: no change  Outcome Evaluation: OT eval completed. Pt A&Ox4 with c/o pain at low back and B shoulders prior to activity. Pt reports PLOF of I with overall decline in last 2 years, reporting multiple falls d/t B LE buckling at times of back \"giving out\". Today, pt came to EOB with SBA, donned slippers with SBA, completed sit<>stand t/f with CGA, fxl mobility with CGA using FWW, toileting with CGA/SBA, and grooming with CGA/SBA. Pt attempted to abandon FWW at times while in BR, requiring cues. Skilled OT warranted to provide education and address pain, balance, activity tolerance, coordination, and strength in order to increase pt safety and I during ADLs, fxl mobility, and fxl t/f's. Recommend rehab placement at D/C prior to return home.     Time Calculation:    Time Calculation- OT     Row Name 05/05/23 0949             Time Calculation- OT    OT Start Time 0949  -LR      OT Stop Time 1045  -LR      OT Time Calculation (min) 56 min  -LR      OT Received On 05/05/23  -LR      OT Goal Re-Cert Due Date 05/15/23  -LR         Untimed Charges    OT Eval/Re-eval " Minutes 56  -LR         Total Minutes    Untimed Charges Total Minutes 56  -LR       Total Minutes 56  -LR            User Key  (r) = Recorded By, (t) = Taken By, (c) = Cosigned By    Initials Name Provider Type    LR Germania Stern OTR/L Occupational Therapist              Therapy Charges for Today     Code Description Service Date Service Provider Modifiers Qty    39800262716 HC OT EVAL MOD COMPLEXITY 4 5/5/2023 Germania Stern OTR/L GO 1               VENITA Romo/ABHISHEK  5/5/2023

## 2023-05-05 NOTE — CASE MANAGEMENT/SOCIAL WORK
Discharge Planning Assessment  [facility]     Patient Name: Pattie Liu  MRN: [id number]  Today's Date: 5/5/2023    Admit Date: 5/4/2023        Discharge Needs Assessment     Row Name 05/05/23 1037       Living Environment    People in Home child(sheri), adult    Current Living Arrangements home    Primary Care Provided by child(sheri);homecare agency    Family Caregiver if Needed child(sheri), adult    Quality of Family Relationships helpful;involved;supportive    Able to Return to Prior Arrangements yes       Resource/Environmental Concerns    Resource/Environmental Concerns none       Food Insecurity    Within the past 12 months, you worried that your food would run out before you got the money to buy more. Never true    Within the past 12 months, the food you bought just didn't last and you didn't have money to get more. Never true       Transition Planning    Patient/Family Anticipates Transition to long-term care facility    Transportation Anticipated family or friend will provide       Discharge Needs Assessment    Readmission Within the Last 30 Days no previous admission in last 30 days    Current Outpatient/Agency/Support Group skilled nursing facility    Discharge Facility/Level of Care Needs nursing facility, skilled    Provided Post Acute Provider List? Yes    Post Acute Provider List Nursing Home    Delivered To Support Person    Support Person [facility]    Method of Delivery Telephone    Patient's Choice of Community Agency(s) [facility]    Discharge Coordination/Progress Patient lives with her daughter and did have [facility] prior to admission. Patient's daughter says that [facility] discharged patient before admission here. Patient's daughter says that she and patient both feel short term rehab is needed and would like referral to [facility]. SW sent referral to Jennifer with [facility]. Will await bed offer. Three night inpatient stay will be complete on Sunday,  5/7.                Marissa Ribeiro MSW

## 2023-05-05 NOTE — H&P
History and Physical    Patient:  Pattie Liu  MRN: [id number]    CHIEF COMPLAINT: Weakness, recurrent falls    History Obtained From: the patient   PCP: Rafat Espinoza MD    HISTORY OF PRESENT ILLNESS:   The patient is a 78 y.o. female with COPD, coronary artery disease, peripheral vascular disease, hypertension who presents with increasing decline at home with recurrent falls.  Patient does note some urinary frequency and dysuria when initiating stream.  Denies any fever or chills.  She has chronic hypoxic respiratory failure and wears 3 L of oxygen at home due to COPD.  She was directly admitted from the office for further evaluation and work-up as she was unsafe to go home.  This morning, she is resting comfortably in bed.  Does continue to complain of urinary urgency.  She is incontinent of bowel and wears diapers.  Has chronic diarrhea as well.  REVIEW OF SYSTEMS:    Review of Systems   Constitutional: Negative for chills and fever.   HENT: Negative for congestion and sore throat.    Respiratory: Negative for shortness of breath.    Cardiovascular: Negative for chest pain and leg swelling.   Gastrointestinal: Positive for diarrhea. Negative for nausea and vomiting.   Genitourinary: Positive for dysuria and urgency.   Musculoskeletal: Positive for back pain. Negative for neck pain.   Skin: Negative for pallor and rash.   Neurological: Negative for syncope and headaches.   Psychiatric/Behavioral: Negative for agitation and confusion.          Past Medical History:  Past Medical History:   Diagnosis Date   • CAD in native artery 7/29/2019   • COPD (chronic obstructive pulmonary disease)    • Essential hypertension 12/7/2021   • GERD (gastroesophageal reflux disease)    • Lung nodule        Past Surgical History:  Past Surgical History:   Procedure Laterality Date   • BREAST IMPLANT REMOVAL     • BREAST TISSUE EXPANDER REMOVAL INSERTION OF IMPLANT     • CARDIAC CATHETERIZATION N/A 6/21/2019     Procedure: Left Heart Cath;  Surgeon: Edi Armijo MD;  Location:  [facility] CATH INVASIVE LOCATION;  Service: Cardiology   • CHOLECYSTECTOMY     • HYSTERECTOMY     • MASTECTOMY      BILATERAL   • OOPHORECTOMY         Medications Prior to Admission:    Medications Prior to Admission   Medication Sig Dispense Refill Last Dose   • ALPRAZolam (XANAX) 1 MG tablet Take 1 tablet by mouth 2 (Two) Times a Day.      • aspirin 81 MG tablet Take 1 tablet by mouth Daily. 30 tablet 11    • Budeson-Glycopyrrol-Formoterol (Breztri Aerosphere) 160-9-4.8 MCG/ACT aerosol inhaler Inhale 2 puffs 2 (Two) Times a Day.      • bumetanide (BUMEX) 1 MG tablet Take 1 tablet by mouth Daily.      • citalopram (CeleXA) 20 MG tablet Take 1 tablet by mouth Daily.      • colestipol (COLESTID) 1 g tablet Take 1 tablet by mouth Daily.      • dicyclomine (BENTYL) 20 MG tablet Take 1 tablet by mouth 3 (Three) Times a Day As Needed.      • gabapentin (NEURONTIN) 800 MG tablet Take 2 tablets by mouth 2 (Two) Times a Day.      • Ginkgo Biloba (GINKOBA PO) Take 60 mg by mouth Daily.      • lisinopril (PRINIVIL,ZESTRIL) 10 MG tablet Take 2 tablets by mouth Daily.      • loperamide (IMODIUM) 2 MG capsule Take 1 capsule by mouth 4 (Four) Times a Day As Needed for Diarrhea.      • meloxicam (MOBIC) 15 MG tablet Take 1 tablet by mouth Daily.      • Misc Natural Products (GINSENG-COMPLEX PO) Take 2 capsules by mouth 2 (Two) Times a Day.      • O2 (OXYGEN) Inhale 2 L/min Continuous.      • atorvastatin (LIPITOR) 20 MG tablet Take 1 tablet by mouth Daily. 30 tablet 11    • Calcium Carb-Cholecalciferol (CALCIUM 1000 + D PO) Take 1 tablet by mouth Daily.      • Cyanocobalamin (B-12) 5000 MCG capsule Take 1 capsule by mouth Daily.      • Fexofenadine HCl (ALLEGRA ALLERGY PO) Take 4 mg by mouth Daily.      • ibuprofen (ADVIL,MOTRIN) 200 MG tablet Take 1 tablet by mouth Every 6 (Six) Hours As Needed for Mild Pain.      • levalbuterol (XOPENEX) 1.25 MG/3ML  "nebulizer solution Take 1 ampule by nebulization Every 4 (Four) Hours As Needed.      • omeprazole (priLOSEC) 20 MG capsule Take 1 capsule by mouth Daily.          Allergies:  Codeine, Hydrocodone-acetaminophen, Levofloxacin, Oxycodone-acetaminophen, Tramadol, Zanaflex  [tizanidine hcl], and Albuterol    Social History:   Social History     Socioeconomic History   • Marital status:    Tobacco Use   • Smoking status: Every Day     Packs/day: 0.50     Years: 62.00     Pack years: 31.00     Types: Cigarettes   • Smokeless tobacco: Never   • Tobacco comments:     states she has no plan to quit smoking   Vaping Use   • Vaping Use: Former   Substance and Sexual Activity   • Alcohol use: No   • Drug use: No   • Sexual activity: Not Currently       Family History:   Family History   Problem Relation Age of Onset   • Cancer Mother    • Cancer Father            Physical Exam:    Vitals: /64 (BP Location: Left arm, Patient Position: Lying)   Pulse 56   Temp 98.2 °F (36.8 °C) (Oral)   Resp 16   Ht 160 cm (63\")   Wt 60.8 kg (134 lb)   SpO2 96%   BMI 23.74 kg/m²   Physical Exam  Constitutional:       Appearance: She is well-developed.   HENT:      Head: Normocephalic and atraumatic.   Eyes:      Conjunctiva/sclera: Conjunctivae normal.      Pupils: Pupils are equal, round, and reactive to light.   Cardiovascular:      Rate and Rhythm: Normal rate and regular rhythm.      Heart sounds: Normal heart sounds. No murmur heard.    No friction rub.   Pulmonary:      Effort: Pulmonary effort is normal. No respiratory distress.      Breath sounds: Normal breath sounds. No wheezing or rales.   Abdominal:      General: Bowel sounds are normal. There is no distension.      Palpations: Abdomen is soft.      Tenderness: There is no abdominal tenderness.   Musculoskeletal:         General: Normal range of motion.      Cervical back: Normal range of motion.   Skin:     Capillary Refill: Capillary refill takes less than 2 " seconds.   Neurological:      Mental Status: She is alert and oriented to person, place, and time.      Cranial Nerves: No cranial nerve deficit.   Psychiatric:         Behavior: Behavior normal.           Lab Results (last 24 hours)     Procedure Component Value Units Date/Time    Basic Metabolic Panel [[id number]]  (Abnormal) Collected: 05/05/23 0455    Specimen: Blood Updated: 05/05/23 0605     Glucose 105 mg/dL      BUN 18 mg/dL      Creatinine 0.79 mg/dL      Sodium 143 mmol/L      Potassium 3.8 mmol/L      Chloride 109 mmol/L      CO2 25.0 mmol/L      Calcium 9.2 mg/dL      BUN/Creatinine Ratio 22.8     Anion Gap 9.0 mmol/L      eGFR 75.7 mL/min/1.73     Narrative:      GFR Normal >60  Chronic Kidney Disease <60  Kidney Failure <15    The GFR formula is only valid for adults with stable renal function between ages 18 and 70.    CBC & Differential [[id number]]  (Abnormal) Collected: 05/05/23 0455    Specimen: Blood Updated: 05/05/23 0544    Narrative:      The following orders were created for panel order CBC & Differential.  Procedure                               Abnormality         Status                     ---------                               -----------         ------                     CBC Auto Differential[[id number]]        Abnormal            Final result                 Please view results for these tests on the individual orders.    CBC Auto Differential [[id number]]  (Abnormal) Collected: 05/05/23 0455    Specimen: Blood Updated: 05/05/23 0544     WBC 10.96 10*3/mm3      RBC 4.01 10*6/mm3      Hemoglobin 11.9 g/dL      Hematocrit 38.7 %      MCV 96.5 fL      MCH 29.7 pg      MCHC 30.7 g/dL      RDW 13.2 %      RDW-SD 47.6 fl      MPV 11.6 fL      Platelets 257 10*3/mm3      Neutrophil % 61.4 %      Lymphocyte % 28.5 %      Monocyte % 6.1 %      Eosinophil % 3.1 %      Basophil % 0.5 %      Immature Grans % 0.4 %      Neutrophils, Absolute 6.73 10*3/mm3      Lymphocytes, Absolute 3.12 10*3/mm3       Monocytes, Absolute 0.67 10*3/mm3      Eosinophils, Absolute 0.34 10*3/mm3      Basophils, Absolute 0.06 10*3/mm3      Immature Grans, Absolute 0.04 10*3/mm3      nRBC 0.0 /100 WBC     Blood Culture - Blood, Hand, Right [[id number]] Collected: 05/04/23 2327    Specimen: Blood from Hand, Right Updated: 05/04/23 2334    Blood Culture - Blood, Hand, Left [[id number]] Collected: 05/04/23 2327    Specimen: Blood from Hand, Left Updated: 05/04/23 2333    Urine Culture - Urine, Urine, Clean Catch [[id number]] Collected: 05/04/23 2005    Specimen: Urine, Clean Catch Updated: 05/04/23 2233    TSH [[id number]]  (Normal) Collected: 05/04/23 1955    Specimen: Blood Updated: 05/04/23 2032     TSH 2.310 uIU/mL     Urinalysis, Microscopic Only - Urine, Clean Catch [[id number]]  (Abnormal) Collected: 05/04/23 2005    Specimen: Urine, Clean Catch Updated: 05/04/23 2022     RBC, UA 0-2 /HPF      WBC, UA Too Numerous to Count /HPF      Bacteria, UA 4+ /HPF      Squamous Epithelial Cells, UA 0-2 /HPF      Hyaline Casts, UA 3-6 /LPF      Methodology Automated Microscopy    Urinalysis With Microscopic If Indicated (No Culture) - Urine, Clean Catch [[id number]]  (Abnormal) Collected: 05/04/23 2005    Specimen: Urine, Clean Catch Updated: 05/04/23 2022     Color, UA Dark Yellow     Appearance, UA Cloudy     pH, UA 6.0     Specific Gravity, UA 1.022     Glucose, UA Negative     Ketones, UA Trace     Bilirubin, UA Negative     Blood, UA Negative     Protein, UA Trace     Leuk Esterase, UA Moderate (2+)     Nitrite, UA Positive     Urobilinogen, UA 0.2 E.U./dL    Comprehensive Metabolic Panel [[id number]]  (Abnormal) Collected: 05/04/23 1955    Specimen: Blood Updated: 05/04/23 2018     Glucose 110 mg/dL      BUN 16 mg/dL      Creatinine 0.91 mg/dL      Sodium 145 mmol/L      Potassium 3.9 mmol/L      Chloride 105 mmol/L      CO2 32.0 mmol/L      Calcium 10.1 mg/dL      Total Protein 6.6 g/dL      Albumin 4.1 g/dL      ALT (SGPT) 15 U/L      AST  (SGOT) 16 U/L      Alkaline Phosphatase 90 U/L      Total Bilirubin 0.2 mg/dL      Globulin 2.5 gm/dL      A/G Ratio 1.6 g/dL      BUN/Creatinine Ratio 17.6     Anion Gap 8.0 mmol/L      eGFR 63.9 mL/min/1.73     Narrative:      GFR Normal >60  Chronic Kidney Disease <60  Kidney Failure <15    The GFR formula is only valid for adults with stable renal function between ages 18 and 70.    Magnesium [[id number]]  (Normal) Collected: 05/04/23 1955    Specimen: Blood Updated: 05/04/23 2015     Magnesium 2.1 mg/dL     CBC Auto Differential [[id number]]  (Abnormal) Collected: 05/04/23 1955    Specimen: Blood Updated: 05/04/23 2001     WBC 10.28 10*3/mm3      RBC 4.47 10*6/mm3      Hemoglobin 13.1 g/dL      Hematocrit 42.3 %      MCV 94.6 fL      MCH 29.3 pg      MCHC 31.0 g/dL      RDW 13.4 %      RDW-SD 46.8 fl      MPV 11.1 fL      Platelets 300 10*3/mm3      Neutrophil % 67.6 %      Lymphocyte % 24.1 %      Monocyte % 5.3 %      Eosinophil % 2.2 %      Basophil % 0.5 %      Immature Grans % 0.3 %      Neutrophils, Absolute 6.95 10*3/mm3      Lymphocytes, Absolute 2.48 10*3/mm3      Monocytes, Absolute 0.54 10*3/mm3      Eosinophils, Absolute 0.23 10*3/mm3      Basophils, Absolute 0.05 10*3/mm3      Immature Grans, Absolute 0.03 10*3/mm3      nRBC 0.0 /100 WBC            -----------------------------------------------------------------    Radiology:     No results found.    Assessment and Plan   Acute UTI  Likely contributing to her decline given that she is symptomatic with dysuria.  Placed on Rocephin.  Await cultures.  Blood cultures obtained as well.    Dehydration  Giving gentle IV fluids with improvement.    Recurrent falls  PT/OT evaluation.  Case management consulted as patient desires rehab placement at [facility].    Chronic respiratory failure with hypoxia  Secondary to COPD, at baseline.  Monitor respiratory status with IV fluids.    Disposition: Inpatient    CODE STATUS: DNR    DVT prophylaxis:  Lovenox      Dehydration    CAD in native artery    PVD (peripheral vascular disease)    Essential hypertension    Acute UTI    Chronic respiratory failure with hypoxia      Obed Dan MD 5/5/2023 07:31 CDT

## 2023-05-05 NOTE — PLAN OF CARE
Goal Outcome Evaluation:  Plan of Care Reviewed With: patient        Progress: no change       Urine sampled collected and sent to lab. Blood culture collected, before IV ABX were infused. Pt is on 3L of oxygen, sating in the mid to upper 90s. Lovenox given for VTE prevention. No distress noted.

## 2023-05-06 LAB
ANION GAP SERPL CALCULATED.3IONS-SCNC: 11 MMOL/L (ref 5–15)
BACTERIA SPEC AEROBE CULT: ABNORMAL
BASOPHILS # BLD AUTO: 0.05 10*3/MM3 (ref 0–0.2)
BASOPHILS NFR BLD AUTO: 0.6 % (ref 0–1.5)
BUN SERPL-MCNC: 17 MG/DL (ref 8–23)
BUN/CREAT SERPL: 20.7 (ref 7–25)
CALCIUM SPEC-SCNC: 9.1 MG/DL (ref 8.6–10.5)
CHLORIDE SERPL-SCNC: 107 MMOL/L (ref 98–107)
CO2 SERPL-SCNC: 25 MMOL/L (ref 22–29)
CREAT SERPL-MCNC: 0.82 MG/DL (ref 0.57–1)
DEPRECATED RDW RBC AUTO: 46.2 FL (ref 37–54)
EGFRCR SERPLBLD CKD-EPI 2021: 72.4 ML/MIN/1.73
EOSINOPHIL # BLD AUTO: 0.36 10*3/MM3 (ref 0–0.4)
EOSINOPHIL NFR BLD AUTO: 4.2 % (ref 0.3–6.2)
ERYTHROCYTE [DISTWIDTH] IN BLOOD BY AUTOMATED COUNT: 13.1 % (ref 12.3–15.4)
GLUCOSE SERPL-MCNC: 87 MG/DL (ref 65–99)
HCT VFR BLD AUTO: 39.8 % (ref 34–46.6)
HGB BLD-MCNC: 12.6 G/DL (ref 12–15.9)
IMM GRANULOCYTES # BLD AUTO: 0.03 10*3/MM3 (ref 0–0.05)
IMM GRANULOCYTES NFR BLD AUTO: 0.4 % (ref 0–0.5)
LYMPHOCYTES # BLD AUTO: 3.19 10*3/MM3 (ref 0.7–3.1)
LYMPHOCYTES NFR BLD AUTO: 37.4 % (ref 19.6–45.3)
MCH RBC QN AUTO: 30.1 PG (ref 26.6–33)
MCHC RBC AUTO-ENTMCNC: 31.7 G/DL (ref 31.5–35.7)
MCV RBC AUTO: 95 FL (ref 79–97)
MONOCYTES # BLD AUTO: 0.6 10*3/MM3 (ref 0.1–0.9)
MONOCYTES NFR BLD AUTO: 7 % (ref 5–12)
NEUTROPHILS NFR BLD AUTO: 4.29 10*3/MM3 (ref 1.7–7)
NEUTROPHILS NFR BLD AUTO: 50.4 % (ref 42.7–76)
NRBC BLD AUTO-RTO: 0 /100 WBC (ref 0–0.2)
PLATELET # BLD AUTO: 269 10*3/MM3 (ref 140–450)
PMV BLD AUTO: 11.6 FL (ref 6–12)
POTASSIUM SERPL-SCNC: 3.7 MMOL/L (ref 3.5–5.2)
RBC # BLD AUTO: 4.19 10*6/MM3 (ref 3.77–5.28)
SODIUM SERPL-SCNC: 143 MMOL/L (ref 136–145)
WBC NRBC COR # BLD: 8.52 10*3/MM3 (ref 3.4–10.8)

## 2023-05-06 PROCEDURE — 80048 BASIC METABOLIC PNL TOTAL CA: CPT | Performed by: FAMILY MEDICINE

## 2023-05-06 PROCEDURE — G0378 HOSPITAL OBSERVATION PER HR: HCPCS

## 2023-05-06 PROCEDURE — 97110 THERAPEUTIC EXERCISES: CPT

## 2023-05-06 PROCEDURE — 85025 COMPLETE CBC W/AUTO DIFF WBC: CPT | Performed by: FAMILY MEDICINE

## 2023-05-06 PROCEDURE — 97116 GAIT TRAINING THERAPY: CPT

## 2023-05-06 PROCEDURE — 25010000002 CEFTRIAXONE PER 250 MG: Performed by: FAMILY MEDICINE

## 2023-05-06 PROCEDURE — 25010000002 ENOXAPARIN PER 10 MG: Performed by: FAMILY MEDICINE

## 2023-05-06 RX ORDER — NICOTINE 21 MG/24HR
1 PATCH, TRANSDERMAL 24 HOURS TRANSDERMAL
Status: DISCONTINUED | OUTPATIENT
Start: 2023-05-06 | End: 2023-05-11 | Stop reason: HOSPADM

## 2023-05-06 RX ADMIN — ALPRAZOLAM 1 MG: 0.5 TABLET ORAL at 20:23

## 2023-05-06 RX ADMIN — NICOTINE 1 PATCH: 21 PATCH, EXTENDED RELEASE TRANSDERMAL at 19:40

## 2023-05-06 RX ADMIN — BUMETANIDE 1 MG: 1 TABLET ORAL at 08:26

## 2023-05-06 RX ADMIN — DOCUSATE SODIUM 50 MG AND SENNOSIDES 8.6 MG 2 TABLET: 8.6; 5 TABLET, FILM COATED ORAL at 08:26

## 2023-05-06 RX ADMIN — GABAPENTIN 800 MG: 400 CAPSULE ORAL at 08:26

## 2023-05-06 RX ADMIN — ENOXAPARIN SODIUM 40 MG: 100 INJECTION SUBCUTANEOUS at 08:25

## 2023-05-06 RX ADMIN — GABAPENTIN 800 MG: 400 CAPSULE ORAL at 20:23

## 2023-05-06 RX ADMIN — ASPIRIN 81 MG: 81 TABLET, COATED ORAL at 08:26

## 2023-05-06 RX ADMIN — DOCUSATE SODIUM 50 MG AND SENNOSIDES 8.6 MG 2 TABLET: 8.6; 5 TABLET, FILM COATED ORAL at 20:23

## 2023-05-06 RX ADMIN — LISINOPRIL 20 MG: 20 TABLET ORAL at 08:26

## 2023-05-06 RX ADMIN — CITALOPRAM 20 MG: 20 TABLET, FILM COATED ORAL at 08:26

## 2023-05-06 RX ADMIN — ALPRAZOLAM 1 MG: 0.5 TABLET ORAL at 08:26

## 2023-05-06 RX ADMIN — Medication 10 ML: at 08:27

## 2023-05-06 RX ADMIN — CEFTRIAXONE 1 G: 1 INJECTION, POWDER, FOR SOLUTION INTRAMUSCULAR; INTRAVENOUS at 20:24

## 2023-05-06 RX ADMIN — Medication 10 ML: at 20:25

## 2023-05-06 NOTE — PLAN OF CARE
Goal Outcome Evaluation:  Plan of Care Reviewed With: patient, daughter        Progress: no change  Outcome Evaluation: Patient having no c/o of pain so far this shift. Up with assist  and with walker. Ambulated in hallway. Voiding. Daughter at bedside.

## 2023-05-06 NOTE — PLAN OF CARE
Goal Outcome Evaluation:  Plan of Care Reviewed With: patient        Progress: improving  Outcome Evaluation: Pt sitting EOB on 2L 02 96%.PTA checked 02 on patients toe due to patient's shellac nail polish. Pt was cga to stand with vc's and was able to walk 40ft with RW at one time. Pt's post 02 95% on 2L. Pt continues to be weak and would benefit from continued PT at SNF.

## 2023-05-06 NOTE — CASE MANAGEMENT/SOCIAL WORK
Continued Stay Note   [facility]ak     Patient Name: Pattie Liu  MRN: [id number]  Today's Date: 5/6/2023    Admit Date: 5/4/2023    Plan: Referral to [facility]   Discharge Plan     Row Name 05/06/23 1514       Plan    Plan Referral to [facility]    Plan Comments Have left a message for Jennifer [phone] at [facility] to find out if pt has been accepted.               Discharge Codes    No documentation.               Expected Discharge Date and Time     Expected Discharge Date Expected Discharge Time    May 6, 2023             MIKE Aviles

## 2023-05-06 NOTE — THERAPY TREATMENT NOTE
Acute Care - Physical Therapy Treatment Note  [facility]     Patient Name: Pattie Liu  : 1945  MRN: [id number]  Today's Date: 2023      Visit Dx:     ICD-10-CM ICD-9-CM   1. Impaired mobility  Z74.09 799.89     Patient Active Problem List   Diagnosis   • Frequent PVCs   • Shortness of breath   • SOB (shortness of breath)   • CAD in native artery   • PVD (peripheral vascular disease)   • Pneumonia due to infectious organism   • Chronic back pain   • Essential hypertension   • Fall, initial encounter   • Traumatic rhabdomyolysis   • Leukocytosis   • Anemia   • Degenerative disc disease, lumbar   • Dehydration   • Acute UTI   • Chronic respiratory failure with hypoxia   • Impaired mobility     Past Medical History:   Diagnosis Date   • CAD in native artery 2019   • COPD (chronic obstructive pulmonary disease)    • Essential hypertension 2021   • GERD (gastroesophageal reflux disease)    • Lung nodule      Past Surgical History:   Procedure Laterality Date   • BREAST IMPLANT REMOVAL     • BREAST TISSUE EXPANDER REMOVAL INSERTION OF IMPLANT     • CARDIAC CATHETERIZATION N/A 2019    Procedure: Left Heart Cath;  Surgeon: Edi Armijo MD;  Location: [facility] INVASIVE LOCATION;  Service: Cardiology   • CHOLECYSTECTOMY     • HYSTERECTOMY     • MASTECTOMY      BILATERAL   • OOPHORECTOMY       PT Assessment (last 12 hours)     PT Evaluation and Treatment     Row Name 23 1126          Physical Therapy Time and Intention    Subjective Information no complaints  -AE     Document Type therapy note (daily note)  -AE     Mode of Treatment physical therapy  -AE     Row Name 23 1126          General Information    Existing Precautions/Restrictions fall;oxygen therapy device and L/min  2L  -AE     Row Name 23 1126          Pain    Pretreatment Pain Rating 0/10 - no pain  -AE     Posttreatment Pain Rating 0/10 - no pain  -AE     Row Name 23 1126          Bed Mobility     Comment, (Bed Mobility) sitting EOB with family  -AE     Row Name 05/06/23 1126          Sit-Stand Transfer    Sit-Stand Mountain (Transfers) contact guard;verbal cues  practiced x 5 reps post walk  -AE     Row Name 05/06/23 1126          Stand-Sit Transfer    Stand-Sit Mountain (Transfers) contact guard;verbal cues  -AE     Row Name 05/06/23 1126          Gait/Stairs (Locomotion)    Mountain Level (Gait) contact guard  -AE     Assistive Device (Gait) walker, front-wheeled  -AE     Distance in Feet (Gait) 40  x 2 reps  -AE     Bilateral Gait Deviations forward flexed posture  -AE     Row Name 05/06/23 1126          Knee (Therapeutic Exercise)    Knee (Therapeutic Exercise) AROM (active range of motion)  -AE     Knee AROM (Therapeutic Exercise) heel slides;20 repititions  -AE     Row Name 05/06/23 1126          Ankle (Therapeutic Exercise)    Ankle (Therapeutic Exercise) AROM (active range of motion)  -AE     Ankle AROM (Therapeutic Exercise) dorsiflexion;plantarflexion;20 repititions  -AE     Row Name             Wound 05/04/23 2135 Left lower arm    Wound - Properties Group Placement Date: 05/04/23  -LL Placement Time: 2135 -LL Side: Left  -LL Orientation: lower  -LL Location: arm  -LL    Retired Wound - Properties Group Placement Date: 05/04/23  -LL Placement Time: 2135 -LL Side: Left  -LL Orientation: lower  -LL Location: arm  -LL    Retired Wound - Properties Group Date first assessed: 05/04/23  -LL Time first assessed: 2135 -LL Side: Left  -LL Location: arm  -LL    Row Name 05/06/23 1126          Vital Signs    Pre SpO2 (%) 96  -AE     O2 Delivery Pre Treatment supplemental O2  2L  -AE     O2 Delivery Intra Treatment supplemental O2  2L  -AE     Post SpO2 (%) 95  -AE     O2 Delivery Post Treatment supplemental O2  2L  -AE     Row Name 05/06/23 1126          Positioning and Restraints    Pre-Treatment Position in bed  -AE     Post Treatment Position bed  -AE     In Bed sitting EOB;call light  within reach;with family/caregiver  -AE           User Key  (r) = Recorded By, (t) = Taken By, (c) = Cosigned By    Initials Name Provider Type    AE Grisel Denny PTA Physical Therapist Assistant    Candy Gallardo, RN Registered Nurse                Physical Therapy Education     Title: PT OT SLP Therapies (Done)     Topic: Physical Therapy (Done)     Point: Mobility training (Done)     Learning Progress Summary           Patient Eager, E, VU by AE at 5/6/2023 1256    Comment: t/f's    Acceptance, E,TB, VU by CM at 5/6/2023 1055    Acceptance, E, VU by JQ at 5/5/2023 1107    Comment: Pt educated on AD use, transfer tech, d/c planning, benefits of skilled PT   Family Eager, E, VU by AE at 5/6/2023 1256    Comment: t/f's    Acceptance, E,TB, VU by CM at 5/6/2023 1055                   Point: Home exercise program (Done)     Learning Progress Summary           Patient Eager, E, VU by AE at 5/6/2023 1256    Comment: t/f's    Acceptance, E,TB, VU by CM at 5/6/2023 1055   Family Eager, E, VU by AE at 5/6/2023 1256    Comment: t/f's    Acceptance, E,TB, VU by CM at 5/6/2023 1055                   Point: Body mechanics (Done)     Learning Progress Summary           Patient Acceptance, E,TB, VU by CM at 5/6/2023 1055    Acceptance, E, VU by JQ at 5/5/2023 1107    Comment: Pt educated on AD use, transfer tech, d/c planning, benefits of skilled PT   Family Acceptance, E,TB, VU by CM at 5/6/2023 1055                   Point: Precautions (Done)     Learning Progress Summary           Patient Acceptance, E,TB, VU by CM at 5/6/2023 1055    Acceptance, E, VU by JQ at 5/5/2023 1107    Comment: Pt educated on AD use, transfer tech, d/c planning, benefits of skilled PT   Family Acceptance, E,TB, VU by CM at 5/6/2023 1055                               User Key     Initials Effective Dates Name Provider Type Discipline    AE 02/03/23 -  Grisel Denny PTA Physical Therapist Assistant PT    CM 03/02/23 -  Ana Marsh, RN  Registered Nurse Nurse    JJOÃO 02/20/23 -  Makenna Kirkland, JEFFREY Student PT Student PT              PT Recommendation and Plan     Plan of Care Reviewed With: patient  Progress: improving  Outcome Evaluation: Pt sitting EOB on 2L 02 96%.PTA checked 02 on patients toe due to patient's shellac nail polish. Pt was cga to stand with v's and was able to walk 40ft with RW at one time. Pt's post 02 95% on 2L. Pt continues to be weak and would benefit from continued PT at SNF.   Outcome Measures     Row Name 05/06/23 1200             How much help from another person do you currently need...    Turning from your back to your side while in flat bed without using bedrails? 4  -AE      Moving from lying on back to sitting on the side of a flat bed without bedrails? 4  -AE      Moving to and from a bed to a chair (including a wheelchair)? 3  -AE      Standing up from a chair using your arms (e.g., wheelchair, bedside chair)? 3  -AE      Climbing 3-5 steps with a railing? 3  -AE      To walk in hospital room? 3  -AE      AM-PAC 6 Clicks Score (PT) 20  -AE         Functional Assessment    Outcome Measure Options AM-PAC 6 Clicks Basic Mobility (PT)  -AE            User Key  (r) = Recorded By, (t) = Taken By, (c) = Cosigned By    Initials Name Provider Type    AE Grisel Denny, PTA Physical Therapist Assistant                 Time Calculation:    PT Charges     Row Name 05/06/23 1303             Time Calculation    Start Time 1126  -AE      Stop Time 1153  -AE      Time Calculation (min) 27 min  -AE      PT Received On 05/06/23  -AE      PT Goal Re-Cert Due Date 05/15/23  -AE         Time Calculation- PT    Total Timed Code Minutes- PT 27 minute(s)  -AE         Timed Charges    21090 - PT Therapeutic Exercise Minutes 8  -AE      53670 - Gait Training Minutes  15  -AE         Total Minutes    Timed Charges Total Minutes 23  -AE       Total Minutes 23  -AE            User Key  (r) = Recorded By, (t) = Taken By, (c) = Cosigned By     Initials Name Provider Type    AE Grisel Denny PTA Physical Therapist Assistant              Therapy Charges for Today     Code Description Service Date Service Provider Modifiers Qty    76198269536 HC GAIT TRAINING EA 15 MIN 5/6/2023 Grisel Denny PTA GP 1    19164013682 HC PT THER PROC EA 15 MIN 5/6/2023 Grisel Denny PTA GP 1          PT G-Codes  Outcome Measure Options: AM-PAC 6 Clicks Basic Mobility (PT)  AM-PAC 6 Clicks Score (PT): 20  AM-PAC 6 Clicks Score (OT): 19    Grisel Denny PTA  5/6/2023

## 2023-05-06 NOTE — PROGRESS NOTES
"    Daily Progress Note  Pattie Liu  MRN: [id number] LOS: 1    Admit Date: 2023 09:14 CDT    Subjective:         Interval History:    Reviewed overnight events and nursing notes.     Doing well this morning.  Sitting up at side of bed.  Is doing fairly well with PT/OT but has rehab recs.    ROS:  Review of Systems   Constitutional: Negative for chills and fever.   Respiratory: Negative for cough, chest tightness and shortness of breath.    Cardiovascular: Negative for chest pain.   Gastrointestinal: Negative for abdominal pain, diarrhea, nausea and vomiting.       DIET:  Diet: Regular/House Diet; Texture: Regular Texture (IDDSI 7); Fluid Consistency: Thin (IDDSI 0)    Medications:   O2, 2 L/min, Last Rate: 2 L/min (23)  sodium chloride, 50 mL/hr, Last Rate: 50 mL/hr (23)      ALPRAZolam, 1 mg, Oral, BID  aspirin, 81 mg, Oral, Daily  bumetanide, 1 mg, Oral, Daily  cefTRIAXone, 1 g, Intravenous, Q24H  citalopram, 20 mg, Oral, Daily  enoxaparin, 40 mg, Subcutaneous, Daily  gabapentin, 800 mg, Oral, Q12H  lisinopril, 20 mg, Oral, Daily  senna-docusate sodium, 2 tablet, Oral, BID  sodium chloride, 10 mL, Intravenous, Q12H          Objective:     Vitals: /52 (BP Location: Left arm, Patient Position: Lying)   Pulse 55   Temp 98.4 °F (36.9 °C) (Oral)   Resp 16   Ht 160 cm (63\")   Wt 60.8 kg (134 lb)   SpO2 97%   BMI 23.74 kg/m²    Intake/Output Summary (Last 24 hours) at 2023 0914  Last data filed at 2023 1900  Gross per 24 hour   Intake 480 ml   Output --   Net 480 ml    Temp (24hrs), Av.2 °F (36.8 °C), Min:97.3 °F (36.3 °C), Max:99 °F (37.2 °C)    Glucose:  No results found for: POCGLU  Physical Examination:   Physical Exam  Constitutional:       General: She is not in acute distress.     Appearance: Normal appearance. She is not ill-appearing or toxic-appearing.   Cardiovascular:      Rate and Rhythm: Normal rate and regular rhythm.      Pulses: Normal " pulses.      Heart sounds: Normal heart sounds. No murmur heard.  Pulmonary:      Effort: Pulmonary effort is normal. No respiratory distress.      Breath sounds: Normal breath sounds. No stridor. No wheezing or rales.   Abdominal:      General: Abdomen is flat. Bowel sounds are normal. There is no distension.      Palpations: Abdomen is soft.      Tenderness: There is no abdominal tenderness.   Neurological:      Mental Status: She is alert.         Labs:  Lab Results (last 24 hours)     Procedure Component Value Units Date/Time    Urine Culture - Urine, Urine, Clean Catch [[id number]]  (Abnormal) Collected: 05/04/23 2005    Specimen: Urine, Clean Catch Updated: 05/06/23 0837     Urine Culture >100,000 CFU/mL Escherichia coli    Narrative:      Colonization of the urinary tract without infection is common. Treatment is discouraged unless the patient is symptomatic, pregnant, or undergoing an invasive urologic procedure.      Basic Metabolic Panel [[id number]]  (Normal) Collected: 05/06/23 0358    Specimen: Blood Updated: 05/06/23 0522     Glucose 87 mg/dL      BUN 17 mg/dL      Creatinine 0.82 mg/dL      Sodium 143 mmol/L      Potassium 3.7 mmol/L      Chloride 107 mmol/L      CO2 25.0 mmol/L      Calcium 9.1 mg/dL      BUN/Creatinine Ratio 20.7     Anion Gap 11.0 mmol/L      eGFR 72.4 mL/min/1.73     Narrative:      GFR Normal >60  Chronic Kidney Disease <60  Kidney Failure <15    The GFR formula is only valid for adults with stable renal function between ages 18 and 70.    CBC & Differential [[id number]]  (Abnormal) Collected: 05/06/23 0358    Specimen: Blood Updated: 05/06/23 0505    Narrative:      The following orders were created for panel order CBC & Differential.  Procedure                               Abnormality         Status                     ---------                               -----------         ------                     CBC Auto Differential[[id number]]        Abnormal            Final result                  Please view results for these tests on the individual orders.    CBC Auto Differential [[id number]]  (Abnormal) Collected: 05/06/23 0358    Specimen: Blood Updated: 05/06/23 0505     WBC 8.52 10*3/mm3      RBC 4.19 10*6/mm3      Hemoglobin 12.6 g/dL      Hematocrit 39.8 %      MCV 95.0 fL      MCH 30.1 pg      MCHC 31.7 g/dL      RDW 13.1 %      RDW-SD 46.2 fl      MPV 11.6 fL      Platelets 269 10*3/mm3      Neutrophil % 50.4 %      Lymphocyte % 37.4 %      Monocyte % 7.0 %      Eosinophil % 4.2 %      Basophil % 0.6 %      Immature Grans % 0.4 %      Neutrophils, Absolute 4.29 10*3/mm3      Lymphocytes, Absolute 3.19 10*3/mm3      Monocytes, Absolute 0.60 10*3/mm3      Eosinophils, Absolute 0.36 10*3/mm3      Basophils, Absolute 0.05 10*3/mm3      Immature Grans, Absolute 0.03 10*3/mm3      nRBC 0.0 /100 WBC     Blood Culture - Blood, Hand, Left [[id number]]  (Normal) Collected: 05/04/23 2327    Specimen: Blood from Hand, Left Updated: 05/05/23 2345     Blood Culture No growth at 24 hours    Blood Culture - Blood, Hand, Right [[id number]]  (Normal) Collected: 05/04/23 2327    Specimen: Blood from Hand, Right Updated: 05/05/23 2345     Blood Culture No growth at 24 hours           Imaging:  No radiology results from the last 24 hrs       Assessment and Plan:     Primary Problem:  Dehydration    Hospital Problem list:    Dehydration    CAD in native artery    PVD (peripheral vascular disease)    Essential hypertension    Acute UTI    Chronic respiratory failure with hypoxia    Impaired mobility        Assessment: 78-year-old female with CAD, PVD, hypertension, and chronic respiratory failure who presents with acute on chronic decline likely secondary to urinary tract infection.    Plan:    E. coli UTI  Continue antibiotics, will narrow as sensitivities arrive.    Acute on chronic decline  Had falls prior to admission.  This is most likely secondary to UTI.  We will continue PT/OT and refer out for  rehabilitation.    Adding nicotine replacement today.    Discharge planning:   Rehab    Reviewed treatment plans with the patient and/or family.     Code Status:   Code Status and Medical Interventions:   Ordered at: 05/05/23 0730     Code Status (Patient has no pulse and is not breathing):    No CPR (Do Not Attempt to Resuscitate)     Medical Interventions (Patient has pulse or is breathing):    Full Support     Release to patient:    Routine Release       Electronically signed by Williams Galvez MD on 5/6/2023 at 09:14 CDT

## 2023-05-06 NOTE — PLAN OF CARE
Goal Outcome Evaluation:  Plan of Care Reviewed With: patient, daughter            VSS, patient has been ambulating well, family is bedside.

## 2023-05-07 LAB
ANION GAP SERPL CALCULATED.3IONS-SCNC: 9 MMOL/L (ref 5–15)
BASOPHILS # BLD AUTO: 0.05 10*3/MM3 (ref 0–0.2)
BASOPHILS NFR BLD AUTO: 0.6 % (ref 0–1.5)
BUN SERPL-MCNC: 16 MG/DL (ref 8–23)
BUN/CREAT SERPL: 21.3 (ref 7–25)
CALCIUM SPEC-SCNC: 9.3 MG/DL (ref 8.6–10.5)
CHLORIDE SERPL-SCNC: 107 MMOL/L (ref 98–107)
CO2 SERPL-SCNC: 26 MMOL/L (ref 22–29)
CREAT SERPL-MCNC: 0.75 MG/DL (ref 0.57–1)
DEPRECATED RDW RBC AUTO: 45.2 FL (ref 37–54)
EGFRCR SERPLBLD CKD-EPI 2021: 80.6 ML/MIN/1.73
EOSINOPHIL # BLD AUTO: 0.4 10*3/MM3 (ref 0–0.4)
EOSINOPHIL NFR BLD AUTO: 5.1 % (ref 0.3–6.2)
ERYTHROCYTE [DISTWIDTH] IN BLOOD BY AUTOMATED COUNT: 13.2 % (ref 12.3–15.4)
GLUCOSE SERPL-MCNC: 88 MG/DL (ref 65–99)
HCT VFR BLD AUTO: 39.5 % (ref 34–46.6)
HGB BLD-MCNC: 12.3 G/DL (ref 12–15.9)
IMM GRANULOCYTES # BLD AUTO: 0.02 10*3/MM3 (ref 0–0.05)
IMM GRANULOCYTES NFR BLD AUTO: 0.3 % (ref 0–0.5)
LYMPHOCYTES # BLD AUTO: 2.82 10*3/MM3 (ref 0.7–3.1)
LYMPHOCYTES NFR BLD AUTO: 36 % (ref 19.6–45.3)
MCH RBC QN AUTO: 29.5 PG (ref 26.6–33)
MCHC RBC AUTO-ENTMCNC: 31.1 G/DL (ref 31.5–35.7)
MCV RBC AUTO: 94.7 FL (ref 79–97)
MONOCYTES # BLD AUTO: 0.6 10*3/MM3 (ref 0.1–0.9)
MONOCYTES NFR BLD AUTO: 7.7 % (ref 5–12)
NEUTROPHILS NFR BLD AUTO: 3.95 10*3/MM3 (ref 1.7–7)
NEUTROPHILS NFR BLD AUTO: 50.3 % (ref 42.7–76)
NRBC BLD AUTO-RTO: 0 /100 WBC (ref 0–0.2)
PLATELET # BLD AUTO: 275 10*3/MM3 (ref 140–450)
PMV BLD AUTO: 11.7 FL (ref 6–12)
POTASSIUM SERPL-SCNC: 3.9 MMOL/L (ref 3.5–5.2)
RBC # BLD AUTO: 4.17 10*6/MM3 (ref 3.77–5.28)
SODIUM SERPL-SCNC: 142 MMOL/L (ref 136–145)
WBC NRBC COR # BLD: 7.84 10*3/MM3 (ref 3.4–10.8)

## 2023-05-07 PROCEDURE — 85025 COMPLETE CBC W/AUTO DIFF WBC: CPT | Performed by: FAMILY MEDICINE

## 2023-05-07 PROCEDURE — G0378 HOSPITAL OBSERVATION PER HR: HCPCS

## 2023-05-07 PROCEDURE — 63710000001 ONDANSETRON PER 8 MG: Performed by: FAMILY MEDICINE

## 2023-05-07 PROCEDURE — 25010000002 ENOXAPARIN PER 10 MG: Performed by: FAMILY MEDICINE

## 2023-05-07 PROCEDURE — 80048 BASIC METABOLIC PNL TOTAL CA: CPT | Performed by: FAMILY MEDICINE

## 2023-05-07 PROCEDURE — 25010000002 CEFTRIAXONE PER 250 MG: Performed by: FAMILY MEDICINE

## 2023-05-07 RX ADMIN — CITALOPRAM 20 MG: 20 TABLET, FILM COATED ORAL at 08:00

## 2023-05-07 RX ADMIN — ENOXAPARIN SODIUM 40 MG: 100 INJECTION SUBCUTANEOUS at 08:01

## 2023-05-07 RX ADMIN — ASPIRIN 81 MG: 81 TABLET, COATED ORAL at 08:01

## 2023-05-07 RX ADMIN — CEFTRIAXONE 1 G: 1 INJECTION, POWDER, FOR SOLUTION INTRAMUSCULAR; INTRAVENOUS at 20:19

## 2023-05-07 RX ADMIN — NICOTINE 1 PATCH: 21 PATCH, EXTENDED RELEASE TRANSDERMAL at 20:15

## 2023-05-07 RX ADMIN — ACETAMINOPHEN 650 MG: 325 TABLET, FILM COATED ORAL at 07:58

## 2023-05-07 RX ADMIN — ALPRAZOLAM 1 MG: 0.5 TABLET ORAL at 20:17

## 2023-05-07 RX ADMIN — GABAPENTIN 800 MG: 400 CAPSULE ORAL at 20:17

## 2023-05-07 RX ADMIN — ONDANSETRON 4 MG: 4 TABLET, FILM COATED ORAL at 08:00

## 2023-05-07 RX ADMIN — Medication 10 ML: at 20:22

## 2023-05-07 RX ADMIN — BUMETANIDE 1 MG: 1 TABLET ORAL at 08:01

## 2023-05-07 RX ADMIN — LISINOPRIL 20 MG: 20 TABLET ORAL at 08:00

## 2023-05-07 RX ADMIN — GABAPENTIN 800 MG: 400 CAPSULE ORAL at 08:01

## 2023-05-07 RX ADMIN — DOCUSATE SODIUM 50 MG AND SENNOSIDES 8.6 MG 2 TABLET: 8.6; 5 TABLET, FILM COATED ORAL at 08:00

## 2023-05-07 RX ADMIN — ALPRAZOLAM 1 MG: 0.5 TABLET ORAL at 08:01

## 2023-05-07 NOTE — PLAN OF CARE
Goal Outcome Evaluation:  Plan of Care Reviewed With: patient, family      Patient waiting for placement, no significant events during shift.   Progress: improving

## 2023-05-07 NOTE — CASE MANAGEMENT/SOCIAL WORK
Continued Stay Note   [place]     Patient Name: Pattie Liu  MRN: [id number]  Today's Date: 5/7/2023    Admit Date: 5/4/2023    Plan: [facility]   Discharge Plan     Row Name 05/07/23 1204       Plan    Plan [facility]    Patient/Family in Agreement with Plan yes    Plan Comments Spoke with Jennifer from [facility] [phone] and they can offer a bed. Informed pt's daughter Kate [phone] and she accepts. They can take pt tomorrow.               Discharge Codes    No documentation.               Expected Discharge Date and Time     Expected Discharge Date Expected Discharge Time    May 6, 2023             MIKE Aviles

## 2023-05-07 NOTE — PLAN OF CARE
Goal Outcome Evaluation:  Plan of Care Reviewed With: patient, daughter        Progress: no change  Outcome Evaluation: PAtient up with assist and walker. No c/o pain so far this shift. Voiding. IVF and IV abx as ordered. Nicotine patch applied.

## 2023-05-07 NOTE — CASE MANAGEMENT/SOCIAL WORK
Continued Stay Note   [place]     Patient Name: Pattie Liu  MRN: [id number]  Today's Date: 5/7/2023    Admit Date: 5/4/2023    Plan: Undetermined   Discharge Plan     Row Name 05/07/23 1604       Plan    Plan Undetermined    Plan Comments Did not realize pt was changed back to observation because she does not meet inpt criteria. She will not have a 3 night inpt stay. [facility] is unable to do a waiver. Informed Dr. Ledezma as well as pt's daughter Kate [phone]. She is concerned of course because pt is in need of snf placement. Only option would be private pay. She would like to speak with a cm tomorrow to help her understand how inpt vs obs is determined.               Discharge Codes    No documentation.               Expected Discharge Date and Time     Expected Discharge Date Expected Discharge Time    May 6, 2023             MIKE Aviles

## 2023-05-07 NOTE — PROGRESS NOTES
"    Daily Progress Note  Pattie Liu  MRN: [id number] LOS: 1    Admit Date: 2023 10:19 CDT    Subjective:         Interval History:    Reviewed overnight events and nursing notes.     Doing well this morning.  No new complaints.    ROS:  Review of Systems   Constitutional: Negative for chills and fever.   Respiratory: Negative for cough, chest tightness and shortness of breath.    Cardiovascular: Negative for chest pain.   Gastrointestinal: Negative for abdominal pain, diarrhea, nausea and vomiting.       DIET:  Diet: Regular/House Diet; Texture: Regular Texture (IDDSI 7); Fluid Consistency: Thin (IDDSI 0)    Medications:   O2, 2 L/min, Last Rate: 2 L/min (23)  sodium chloride, 50 mL/hr, Last Rate: 50 mL/hr (23)      ALPRAZolam, 1 mg, Oral, BID  aspirin, 81 mg, Oral, Daily  bumetanide, 1 mg, Oral, Daily  cefTRIAXone, 1 g, Intravenous, Q24H  citalopram, 20 mg, Oral, Daily  enoxaparin, 40 mg, Subcutaneous, Daily  gabapentin, 800 mg, Oral, Q12H  lisinopril, 20 mg, Oral, Daily  nicotine, 1 patch, Transdermal, Q24H  senna-docusate sodium, 2 tablet, Oral, BID  sodium chloride, 10 mL, Intravenous, Q12H          Objective:     Vitals: /55 (BP Location: Right arm, Patient Position: Lying)   Pulse 53   Temp 98.8 °F (37.1 °C) (Oral)   Resp 16   Ht 160 cm (63\")   Wt 60.8 kg (134 lb)   SpO2 98%   BMI 23.74 kg/m²  No intake or output data in the 24 hours ending 23 1019 Temp (24hrs), Av.5 °F (36.9 °C), Min:98.1 °F (36.7 °C), Max:98.8 °F (37.1 °C)    Glucose:  No results found for: POCGLU  Physical Examination:   Physical Exam  Constitutional:       General: She is not in acute distress.     Appearance: Normal appearance. She is not ill-appearing or toxic-appearing.   Cardiovascular:      Rate and Rhythm: Normal rate and regular rhythm.      Pulses: Normal pulses.      Heart sounds: Normal heart sounds. No murmur heard.  Pulmonary:      Effort: Pulmonary effort is " normal. No respiratory distress.      Breath sounds: Normal breath sounds. No stridor. No wheezing or rales.   Abdominal:      General: Abdomen is flat. Bowel sounds are normal. There is no distension.      Palpations: Abdomen is soft.      Tenderness: There is no abdominal tenderness.   Neurological:      Mental Status: She is alert.         Labs:  Lab Results (last 24 hours)     Procedure Component Value Units Date/Time    Basic Metabolic Panel [[id number]]  (Normal) Collected: 05/07/23 0323    Specimen: Blood Updated: 05/07/23 0455     Glucose 88 mg/dL      BUN 16 mg/dL      Creatinine 0.75 mg/dL      Sodium 142 mmol/L      Potassium 3.9 mmol/L      Chloride 107 mmol/L      CO2 26.0 mmol/L      Calcium 9.3 mg/dL      BUN/Creatinine Ratio 21.3     Anion Gap 9.0 mmol/L      eGFR 80.6 mL/min/1.73     Narrative:      GFR Normal >60  Chronic Kidney Disease <60  Kidney Failure <15    The GFR formula is only valid for adults with stable renal function between ages 18 and 70.    CBC & Differential [[id number]]  (Abnormal) Collected: 05/07/23 0323    Specimen: Blood Updated: 05/07/23 0425    Narrative:      The following orders were created for panel order CBC & Differential.  Procedure                               Abnormality         Status                     ---------                               -----------         ------                     CBC Auto Differential[[id number]]        Abnormal            Final result                 Please view results for these tests on the individual orders.    CBC Auto Differential [[id number]]  (Abnormal) Collected: 05/07/23 0323    Specimen: Blood Updated: 05/07/23 0425     WBC 7.84 10*3/mm3      RBC 4.17 10*6/mm3      Hemoglobin 12.3 g/dL      Hematocrit 39.5 %      MCV 94.7 fL      MCH 29.5 pg      MCHC 31.1 g/dL      RDW 13.2 %      RDW-SD 45.2 fl      MPV 11.7 fL      Platelets 275 10*3/mm3      Neutrophil % 50.3 %      Lymphocyte % 36.0 %      Monocyte % 7.7 %      Eosinophil %  5.1 %      Basophil % 0.6 %      Immature Grans % 0.3 %      Neutrophils, Absolute 3.95 10*3/mm3      Lymphocytes, Absolute 2.82 10*3/mm3      Monocytes, Absolute 0.60 10*3/mm3      Eosinophils, Absolute 0.40 10*3/mm3      Basophils, Absolute 0.05 10*3/mm3      Immature Grans, Absolute 0.02 10*3/mm3      nRBC 0.0 /100 WBC     Blood Culture - Blood, Hand, Left [[id number]]  (Normal) Collected: 05/04/23 2327    Specimen: Blood from Hand, Left Updated: 05/06/23 2345     Blood Culture No growth at 2 days    Blood Culture - Blood, Hand, Right [[id number]]  (Normal) Collected: 05/04/23 2327    Specimen: Blood from Hand, Right Updated: 05/06/23 2345     Blood Culture No growth at 2 days    Urine Culture - Urine, Urine, Clean Catch [[id number]]  (Abnormal)  (Susceptibility) Collected: 05/04/23 2005    Specimen: Urine, Clean Catch Updated: 05/06/23 1116     Urine Culture >100,000 CFU/mL Escherichia coli    Narrative:      Colonization of the urinary tract without infection is common. Treatment is discouraged unless the patient is symptomatic, pregnant, or undergoing an invasive urologic procedure.        Susceptibility      Escherichia coli      [place]      Ampicillin Resistant     Ampicillin + Sulbactam Intermediate      Cefazolin Susceptible      Cefepime Susceptible      Ceftazidime Susceptible      Ceftriaxone Susceptible      Gentamicin Resistant     Levofloxacin Susceptible      Nitrofurantoin Susceptible      Piperacillin + Tazobactam Susceptible      Tobramycin Susceptible      Trimethoprim + Sulfamethoxazole Resistant                                 Imaging:  No radiology results from the last 24 hrs       Assessment and Plan:     Primary Problem:  Dehydration    Hospital Problem list:    Dehydration    CAD in native artery    PVD (peripheral vascular disease)    Essential hypertension    Acute UTI    Chronic respiratory failure with hypoxia    Impaired mobility      Assessment: 78-year-old female with CAD, PVD,  hypertension, and chronic respiratory failure who presents with acute on chronic decline likely secondary to urinary tract infection.     Plan:     E. coli UTI  Continue antibiotics, sensitive to ceftriaxone.  We will continue, last day on 5/8.     Acute on chronic decline  Had falls prior to admission.  This is most likely secondary to UTI.  We will continue PT/OT and refer out for rehabilitation.     Referral placed to [facility]      Discharge planning:   Rehab    Reviewed treatment plans with the patient and/or family.     Code Status:   Code Status and Medical Interventions:   Ordered at: 05/05/23 0730     Code Status (Patient has no pulse and is not breathing):    No CPR (Do Not Attempt to Resuscitate)     Medical Interventions (Patient has pulse or is breathing):    Full Support     Release to patient:    Routine Release       Electronically signed by Williams Galvez MD on 5/7/2023 at 10:19 CDT

## 2023-05-08 PROBLEM — N39.0 UTI (URINARY TRACT INFECTION): Status: ACTIVE | Noted: 2023-05-08

## 2023-05-08 LAB
ANION GAP SERPL CALCULATED.3IONS-SCNC: 10 MMOL/L (ref 5–15)
BUN SERPL-MCNC: 13 MG/DL (ref 8–23)
BUN/CREAT SERPL: 17.8 (ref 7–25)
CALCIUM SPEC-SCNC: 8.9 MG/DL (ref 8.6–10.5)
CHLORIDE SERPL-SCNC: 106 MMOL/L (ref 98–107)
CO2 SERPL-SCNC: 26 MMOL/L (ref 22–29)
CREAT SERPL-MCNC: 0.73 MG/DL (ref 0.57–1)
EGFRCR SERPLBLD CKD-EPI 2021: 83.3 ML/MIN/1.73
GLUCOSE SERPL-MCNC: 86 MG/DL (ref 65–99)
POTASSIUM SERPL-SCNC: 3.7 MMOL/L (ref 3.5–5.2)
SODIUM SERPL-SCNC: 142 MMOL/L (ref 136–145)

## 2023-05-08 PROCEDURE — 25010000002 CEFTRIAXONE PER 250 MG: Performed by: FAMILY MEDICINE

## 2023-05-08 PROCEDURE — 80048 BASIC METABOLIC PNL TOTAL CA: CPT | Performed by: FAMILY MEDICINE

## 2023-05-08 PROCEDURE — 25010000002 ONDANSETRON PER 1 MG: Performed by: FAMILY MEDICINE

## 2023-05-08 PROCEDURE — 97116 GAIT TRAINING THERAPY: CPT

## 2023-05-08 PROCEDURE — 25010000002 ENOXAPARIN PER 10 MG: Performed by: FAMILY MEDICINE

## 2023-05-08 PROCEDURE — 97535 SELF CARE MNGMENT TRAINING: CPT | Performed by: OCCUPATIONAL THERAPIST

## 2023-05-08 PROCEDURE — 97110 THERAPEUTIC EXERCISES: CPT

## 2023-05-08 RX ADMIN — Medication 10 ML: at 20:09

## 2023-05-08 RX ADMIN — ACETAMINOPHEN 650 MG: 325 TABLET, FILM COATED ORAL at 19:40

## 2023-05-08 RX ADMIN — ALPRAZOLAM 1 MG: 0.5 TABLET ORAL at 09:26

## 2023-05-08 RX ADMIN — GABAPENTIN 800 MG: 400 CAPSULE ORAL at 20:09

## 2023-05-08 RX ADMIN — SODIUM CHLORIDE 50 ML/HR: 9 INJECTION, SOLUTION INTRAVENOUS at 09:27

## 2023-05-08 RX ADMIN — LISINOPRIL 20 MG: 20 TABLET ORAL at 09:21

## 2023-05-08 RX ADMIN — ALPRAZOLAM 1 MG: 0.5 TABLET ORAL at 20:09

## 2023-05-08 RX ADMIN — BUMETANIDE 1 MG: 1 TABLET ORAL at 09:21

## 2023-05-08 RX ADMIN — ONDANSETRON 4 MG: 2 INJECTION INTRAMUSCULAR; INTRAVENOUS at 09:21

## 2023-05-08 RX ADMIN — GABAPENTIN 800 MG: 400 CAPSULE ORAL at 09:26

## 2023-05-08 RX ADMIN — ASPIRIN 81 MG: 81 TABLET, COATED ORAL at 09:21

## 2023-05-08 RX ADMIN — Medication 10 ML: at 09:22

## 2023-05-08 RX ADMIN — CITALOPRAM 20 MG: 20 TABLET, FILM COATED ORAL at 09:21

## 2023-05-08 RX ADMIN — NICOTINE 1 PATCH: 21 PATCH, EXTENDED RELEASE TRANSDERMAL at 20:08

## 2023-05-08 RX ADMIN — CEFTRIAXONE 1 G: 1 INJECTION, POWDER, FOR SOLUTION INTRAMUSCULAR; INTRAVENOUS at 20:07

## 2023-05-08 RX ADMIN — ENOXAPARIN SODIUM 40 MG: 100 INJECTION SUBCUTANEOUS at 09:26

## 2023-05-08 RX ADMIN — ONDANSETRON 4 MG: 2 INJECTION INTRAMUSCULAR; INTRAVENOUS at 19:40

## 2023-05-08 NOTE — PLAN OF CARE
Goal Outcome Evaluation:  Plan of Care Reviewed With: patient        Progress: no change  Outcome Evaluation: Pt. in bathroom finishing toileting at [facility].  Pt. able to stand from toilet at [facility] & then stood in bathroom to perform standing sink side hygiene.  Ms. Liu was able to mobilize with rwx to chair at [facility].  She had no LOB & demo'd good safety.  She then DONNed/DOFFed gown with assist for buttons & IV site.  Cont OT tx.  Ms. Liu plans to DC to SNF.

## 2023-05-08 NOTE — THERAPY TREATMENT NOTE
Acute Care - Physical Therapy Treatment Note  [facility]     Patient Name: Pattie Liu  : 1945  MRN: [id number]  Today's Date: 2023      Visit Dx:     ICD-10-CM ICD-9-CM   1. Impaired mobility  Z74.09 799.89     Patient Active Problem List   Diagnosis   • Frequent PVCs   • Shortness of breath   • SOB (shortness of breath)   • CAD in native artery   • PVD (peripheral vascular disease)   • Pneumonia due to infectious organism   • Chronic back pain   • Essential hypertension   • Fall, initial encounter   • Traumatic rhabdomyolysis   • Leukocytosis   • Anemia   • Degenerative disc disease, lumbar   • Dehydration   • Acute UTI   • Chronic respiratory failure with hypoxia   • Impaired mobility   • UTI (urinary tract infection)     Past Medical History:   Diagnosis Date   • CAD in native artery 2019   • COPD (chronic obstructive pulmonary disease)    • Essential hypertension 2021   • GERD (gastroesophageal reflux disease)    • Lung nodule      Past Surgical History:   Procedure Laterality Date   • BREAST IMPLANT REMOVAL     • BREAST TISSUE EXPANDER REMOVAL INSERTION OF IMPLANT     • CARDIAC CATHETERIZATION N/A 2019    Procedure: Left Heart Cath;  Surgeon: Edi Armijo MD;  Location: [facility] INVASIVE LOCATION;  Service: Cardiology   • CHOLECYSTECTOMY     • HYSTERECTOMY     • MASTECTOMY      BILATERAL   • OOPHORECTOMY       PT Assessment (last 12 hours)     PT Evaluation and Treatment     Row Name 23 0742          Physical Therapy Time and Intention    Subjective Information complains of;pain;weakness;fatigue  -     Document Type therapy note (daily note)  -     Mode of Treatment physical therapy  -     Row Name 23 0742          General Information    Patient/Family/Caregiver Comments/Observations daughter  -     Existing Precautions/Restrictions fall;oxygen therapy device and L/min  3L  -     Row Name 23 0742          Pain    Pain Intervention(s)  Repositioned;Ambulation/increased activity  -     Additional Documentation Pain Scale: Word Pre/Post-Treatment (Group)  -     Row Name 05/08/23 0742          Pain Scale: Word Pre/Post-Treatment    Pain: Word Scale, Pretreatment 2 - mild pain  -     Posttreatment Pain Rating 2 - mild pain  -     Pain Location generalized  -     Row Name 05/08/23 0742          Bed Mobility    Bed Mobility supine-sit;sit-supine  -     Supine-Sit [place] (Bed Mobility) contact guard;verbal cues  -     Sit-Supine [place] (Bed Mobility) --  chair  -     Row Name 05/08/23 0742          Sit-Stand Transfer    Sit-Stand [place] (Transfers) contact guard;verbal cues  -     Row Name 05/08/23 0742          Stand-Sit Transfer    Stand-Sit [place] (Transfers) contact guard;verbal cues  -     Row Name 05/08/23 0742          Gait/Stairs (Locomotion)    [place] Level (Gait) contact guard;verbal cues  -     Assistive Device (Gait) walker, front-wheeled  -     Distance in Feet (Gait) 50  -     Bilateral Gait Deviations forward flexed posture  -     Row Name 05/08/23 0742          Motor Skills    Comments, Therapeutic Exercise BLE AROM standing at rail  -     Additional Documentation Advanced Stepping/Walking Interventions (Group);Comments, Therapeutic Exercise (Row)  -     Row Name 05/08/23 0742          Advanced Stepping/Walking Interventions    Stepping/Walking Interventions backward walking;side stepping  -     Backward Walking (Stepping/Walking Interventions) 20 x 2 cga  -     Side Stepping (Stepping/Walking Interventions) 20 x 2 [facility]     Row Name             Wound 05/04/23 2135 Left lower arm    Wound - Properties Group Placement Date: 05/04/23  -LL Placement Time: 2135 -LL Side: Left  -LL Orientation: lower  -LL Location: arm  -LL    Retired Wound - Properties Group Placement Date: 05/04/23  -LL Placement Time: 2135 -LL Side: Left  -LL Orientation: lower  -LL Location: arm  -LL     Retired Wound - Properties Group Date first assessed: 05/04/23  -LL Time first assessed: 2135 -LL Side: Left  -LL Location: arm  -LL    Row Name 05/08/23 0742          Plan of Care Review    Plan of Care Reviewed With patient;daughter  -     Progress improving  -     Outcome Evaluation pt trans to EOB cga, sit-stand cga, pt amb 50 feet at a time rwx cga, worked on balance activities at rail walking backwards,sidestepping, BLE AROM,trans to chair cga, pt on 3L 02, daughter states she uses 3L at home, pt would benefit from [facility]  -     Row Name 05/08/23 0742          Positioning and Restraints    Pre-Treatment Position in bed  -     Post Treatment Position chair  -     In Chair sitting;call light within reach;encouraged to call for assist;exit alarm on;with family/caregiver;notified [facility]           User Key  (r) = Recorded By, (t) = Taken By, (c) = Cosigned By    Initials Name Provider Type     Rashida Crockett, TRISHA Physical Therapist Assistant     Candy Crowe, RN Registered Nurse                Physical Therapy Education     Title: PT OT SLP Therapies (Done)     Topic: Physical Therapy (Done)     Point: Mobility training (Done)     Learning Progress Summary           Patient Acceptance, E,TB, VU by CM at 5/7/2023 1721    Eager, E, VU by AE at 5/6/2023 1256    Comment: t/f's    Acceptance, E,TB, VU by CM at 5/6/2023 1055    Acceptance, E, VU by JQ at 5/5/2023 1107    Comment: Pt educated on AD use, transfer tech, d/c planning, benefits of skilled PT   Family Acceptance, E,TB, VU by CM at 5/7/2023 1721    Eager, E, VU by AE at 5/6/2023 1256    Comment: t/f's    Acceptance, E,TB, VU by CM at 5/6/2023 1055                   Point: Home exercise program (Done)     Learning Progress Summary           Patient Acceptance, E,TB, VU by CM at 5/7/2023 1721    Eager, E, VU by AE at 5/6/2023 1256    Comment: t/f's    Acceptance, E,TB, VU by CM at 5/6/2023 1055   Family Acceptance, E,TB, VU by CM at 5/7/2023 3614     Eager, E, VU by AE at 5/6/2023 1256    Comment: t/f's    Acceptance, E,TB, VU by CM at 5/6/2023 1055                   Point: Body mechanics (Done)     Learning Progress Summary           Patient Acceptance, E,TB, VU by CM at 5/7/2023 1721    Acceptance, E,TB, VU by CM at 5/6/2023 1055    Acceptance, E, VU by JQ at 5/5/2023 1107    Comment: Pt educated on AD use, transfer tech, d/c planning, benefits of skilled PT   Family Acceptance, E,TB, VU by CM at 5/7/2023 1721    Acceptance, E,TB, VU by CM at 5/6/2023 1055                   Point: Precautions (Done)     Learning Progress Summary           Patient Acceptance, E,TB, VU by CM at 5/7/2023 1721    Acceptance, E,TB, VU by CM at 5/6/2023 1055    Acceptance, E, VU by JQ at 5/5/2023 1107    Comment: Pt educated on AD use, transfer tech, d/c planning, benefits of skilled PT   Family Acceptance, E,TB, VU by CM at 5/7/2023 1721    Acceptance, E,TB, VU by CM at 5/6/2023 1055                               User Key     Initials Effective Dates Name Provider Type Discipline    AE 02/03/23 -  Grisel Denny, PTA Physical Therapist Assistant PT    CM 03/02/23 -  Ana Marsh, RN Registered Nurse Nurse    JQ 02/20/23 -  Makenna Kirkland, JEFFREY Student PT Student PT              PT Recommendation and Plan     Plan of Care Reviewed With: patient, daughter  Progress: improving  Outcome Evaluation: pt trans to EOB cga, sit-stand cga, pt amb 50 feet at a time rwx cga, worked on balance activities at rail walking backwards,sidestepping, BLE AROM,trans to chair cga, pt on 3L 02, daughter states she uses 3L at home, pt would benefit from SNF   Outcome Measures     Row Name 05/08/23 0800 05/06/23 1200          How much help from another person do you currently need...    Turning from your back to your side while in flat bed without using bedrails? 4  - 4  -AE     Moving from lying on back to sitting on the side of a flat bed without bedrails? 4  - 4  -AE     Moving to and from a  bed to a chair (including a wheelchair)? 3  - 3  -AE     Standing up from a chair using your arms (e.g., wheelchair, bedside chair)? 3  - 3  -AE     Climbing 3-5 steps with a railing? 3  - 3  -AE     To walk in hospital room? 3  -AH 3  -AE     AM-PAC 6 Clicks Score (PT) 20  - 20  -AE        Functional Assessment    Outcome Measure Options AM-PAC 6 Clicks Basic Mobility (PT)  -AH AM-PAC 6 Clicks Basic Mobility (PT)  -AE           User Key  (r) = Recorded By, (t) = Taken By, (c) = Cosigned By    Initials Name Provider Type     Grisel Denny PTA Physical Therapist Assistant    Rashida Shea PTA Physical Therapist Assistant                 Time Calculation:    PT Charges     Row Name 05/08/23 0855             Time Calculation    Start Time 0742  -      Stop Time 0820  -      Time Calculation (min) 38 min  -      PT Received On 05/08/23  -         Time Calculation- PT    Total Timed Code Minutes- PT 38 minute(s)  -         Timed Charges    78598 - PT Therapeutic Exercise Minutes 15  -      40231 - Gait Training Minutes  23  -         Total Minutes    Timed Charges Total Minutes 38  -       Total Minutes 38  -AH            User Key  (r) = Recorded By, (t) = Taken By, (c) = Cosigned By    Initials Name Provider Type     Rashida Crockett PTA Physical Therapist Assistant              Therapy Charges for Today     Code Description Service Date Service Provider Modifiers Qty    55224344713 HC PT THER PROC EA 15 MIN 5/8/2023 Rashida Crockett, TRISHA GP 1    04417449918 HC GAIT TRAINING EA 15 MIN 5/8/2023 Rashida Crockett, TRISHA GP 2          PT G-Codes  Outcome Measure Options: AM-PAC 6 Clicks Basic Mobility (PT)  AM-PAC 6 Clicks Score (PT): 20  AM-PAC 6 Clicks Score (OT): 19    Rashida Crockett PTA  5/8/2023

## 2023-05-08 NOTE — PROGRESS NOTES
Daily Progress Note  Pattie Liu  MRN: [id number] LOS: 1    Admit Date: 5/4/2023 5/8/2023 08:17 CDT    Subjective:          Chief Complaint:  No chief complaint on file.      Interval History:    Reviewed overnight events and nursing notes.   Improved, but still significant weakness and slow gait.  Continues to be incontinent of bowels and bladder.  SNF recommendations from PT/OT.  Still requiring IV rehydration and IV antibiotics for UTI.    Review of Systems   Constitutional: Positive for activity change, appetite change and fatigue. Negative for fever.   Respiratory: Positive for shortness of breath.    Gastrointestinal: Negative for nausea and vomiting.   Genitourinary: Positive for decreased urine volume and dysuria.   Musculoskeletal: Positive for gait problem.   Neurological: Positive for weakness.       DIET:  Diet: Regular/House Diet; Texture: Regular Texture (IDDSI 7); Fluid Consistency: Thin (IDDSI 0)    Medications:   O2, 2 L/min, Last Rate: 2 L/min (05/04/23 2127)  sodium chloride, 50 mL/hr, Last Rate: 50 mL/hr (05/04/23 1959)      ALPRAZolam, 1 mg, Oral, BID  aspirin, 81 mg, Oral, Daily  bumetanide, 1 mg, Oral, Daily  cefTRIAXone, 1 g, Intravenous, Q24H  citalopram, 20 mg, Oral, Daily  enoxaparin, 40 mg, Subcutaneous, Daily  gabapentin, 800 mg, Oral, Q12H  lisinopril, 20 mg, Oral, Daily  nicotine, 1 patch, Transdermal, Q24H  senna-docusate sodium, 2 tablet, Oral, BID  sodium chloride, 10 mL, Intravenous, Q12H        Data:     Code Status:   Code Status and Medical Interventions:   Ordered at: 05/05/23 0730     Code Status (Patient has no pulse and is not breathing):    No CPR (Do Not Attempt to Resuscitate)     Medical Interventions (Patient has pulse or is breathing):    Full Support     Release to patient:    Routine Release       Family History   Problem Relation Age of Onset   • Cancer Mother    • Cancer Father      Social History     Socioeconomic History   • Marital status:     Tobacco Use   • Smoking status: Every Day     Packs/day: 0.50     Years: 62.00     Pack years: 31.00     Types: Cigarettes   • Smokeless tobacco: Never   • Tobacco comments:     states she has no plan to quit smoking   Vaping Use   • Vaping Use: Former   Substance and Sexual Activity   • Alcohol use: No   • Drug use: No   • Sexual activity: Not Currently       Labs:    Lab Results (last 72 hours)     Procedure Component Value Units Date/Time    Basic Metabolic Panel [[id number]]  (Normal) Collected: 05/08/23 0350    Specimen: Blood Updated: 05/08/23 0520     Glucose 86 mg/dL      BUN 13 mg/dL      Creatinine 0.73 mg/dL      Sodium 142 mmol/L      Potassium 3.7 mmol/L      Chloride 106 mmol/L      CO2 26.0 mmol/L      Calcium 8.9 mg/dL      BUN/Creatinine Ratio 17.8     Anion Gap 10.0 mmol/L      eGFR 83.3 mL/min/1.73     Narrative:      GFR Normal >60  Chronic Kidney Disease <60  Kidney Failure <15    The GFR formula is only valid for adults with stable renal function between ages 18 and 70.    Blood Culture - Blood, Hand, Left [[id number]]  (Normal) Collected: 05/04/23 2327    Specimen: Blood from Hand, Left Updated: 05/07/23 2345     Blood Culture No growth at 3 days    Blood Culture - Blood, Hand, Right [[id number]]  (Normal) Collected: 05/04/23 2327    Specimen: Blood from Hand, Right Updated: 05/07/23 2345     Blood Culture No growth at 3 days    Basic Metabolic Panel [[id number]]  (Normal) Collected: 05/07/23 0323    Specimen: Blood Updated: 05/07/23 0455     Glucose 88 mg/dL      BUN 16 mg/dL      Creatinine 0.75 mg/dL      Sodium 142 mmol/L      Potassium 3.9 mmol/L      Chloride 107 mmol/L      CO2 26.0 mmol/L      Calcium 9.3 mg/dL      BUN/Creatinine Ratio 21.3     Anion Gap 9.0 mmol/L      eGFR 80.6 mL/min/1.73     Narrative:      GFR Normal >60  Chronic Kidney Disease <60  Kidney Failure <15    The GFR formula is only valid for adults with stable renal function between ages 18 and 70.    CBC &  Differential [[id number]]  (Abnormal) Collected: 05/07/23 0323    Specimen: Blood Updated: 05/07/23 0425    Narrative:      The following orders were created for panel order CBC & Differential.  Procedure                               Abnormality         Status                     ---------                               -----------         ------                     CBC Auto Differential[[id number]]        Abnormal            Final result                 Please view results for these tests on the individual orders.    CBC Auto Differential [[id number]]  (Abnormal) Collected: 05/07/23 0323    Specimen: Blood Updated: 05/07/23 0425     WBC 7.84 10*3/mm3      RBC 4.17 10*6/mm3      Hemoglobin 12.3 g/dL      Hematocrit 39.5 %      MCV 94.7 fL      MCH 29.5 pg      MCHC 31.1 g/dL      RDW 13.2 %      RDW-SD 45.2 fl      MPV 11.7 fL      Platelets 275 10*3/mm3      Neutrophil % 50.3 %      Lymphocyte % 36.0 %      Monocyte % 7.7 %      Eosinophil % 5.1 %      Basophil % 0.6 %      Immature Grans % 0.3 %      Neutrophils, Absolute 3.95 10*3/mm3      Lymphocytes, Absolute 2.82 10*3/mm3      Monocytes, Absolute 0.60 10*3/mm3      Eosinophils, Absolute 0.40 10*3/mm3      Basophils, Absolute 0.05 10*3/mm3      Immature Grans, Absolute 0.02 10*3/mm3      nRBC 0.0 /100 WBC     Urine Culture - Urine, Urine, Clean Catch [[id number]]  (Abnormal)  (Susceptibility) Collected: 05/04/23 2005    Specimen: Urine, Clean Catch Updated: 05/06/23 1116     Urine Culture >100,000 CFU/mL Escherichia coli    Narrative:      Colonization of the urinary tract without infection is common. Treatment is discouraged unless the patient is symptomatic, pregnant, or undergoing an invasive urologic procedure.        Susceptibility      Escherichia coli      [place]      Ampicillin Resistant     Ampicillin + Sulbactam Intermediate      Cefazolin Susceptible      Cefepime Susceptible      Ceftazidime Susceptible      Ceftriaxone Susceptible      Gentamicin  Resistant     Levofloxacin Susceptible      Nitrofurantoin Susceptible      Piperacillin + Tazobactam Susceptible      Tobramycin Susceptible      Trimethoprim + Sulfamethoxazole Resistant                          Basic Metabolic Panel [[id number]]  (Normal) Collected: 05/06/23 0358    Specimen: Blood Updated: 05/06/23 0522     Glucose 87 mg/dL      BUN 17 mg/dL      Creatinine 0.82 mg/dL      Sodium 143 mmol/L      Potassium 3.7 mmol/L      Chloride 107 mmol/L      CO2 25.0 mmol/L      Calcium 9.1 mg/dL      BUN/Creatinine Ratio 20.7     Anion Gap 11.0 mmol/L      eGFR 72.4 mL/min/1.73     Narrative:      GFR Normal >60  Chronic Kidney Disease <60  Kidney Failure <15    The GFR formula is only valid for adults with stable renal function between ages 18 and 70.    CBC & Differential [[id number]]  (Abnormal) Collected: 05/06/23 0358    Specimen: Blood Updated: 05/06/23 0505    Narrative:      The following orders were created for panel order CBC & Differential.  Procedure                               Abnormality         Status                     ---------                               -----------         ------                     CBC Auto Differential[[id number]]        Abnormal            Final result                 Please view results for these tests on the individual orders.    CBC Auto Differential [[id number]]  (Abnormal) Collected: 05/06/23 0358    Specimen: Blood Updated: 05/06/23 0505     WBC 8.52 10*3/mm3      RBC 4.19 10*6/mm3      Hemoglobin 12.6 g/dL      Hematocrit 39.8 %      MCV 95.0 fL      MCH 30.1 pg      MCHC 31.7 g/dL      RDW 13.1 %      RDW-SD 46.2 fl      MPV 11.6 fL      Platelets 269 10*3/mm3      Neutrophil % 50.4 %      Lymphocyte % 37.4 %      Monocyte % 7.0 %      Eosinophil % 4.2 %      Basophil % 0.6 %      Immature Grans % 0.4 %      Neutrophils, Absolute 4.29 10*3/mm3      Lymphocytes, Absolute 3.19 10*3/mm3      Monocytes, Absolute 0.60 10*3/mm3      Eosinophils, Absolute 0.36  "10*3/mm3      Basophils, Absolute 0.05 10*3/mm3      Immature Grans, Absolute 0.03 10*3/mm3      nRBC 0.0 /100 WBC             Objective:     Vitals: /72 (BP Location: Left arm, Patient Position: Lying)   Pulse 60   Temp 97.9 °F (36.6 °C) (Oral)   Resp 17   Ht 160 cm (63\")   Wt 60.8 kg (134 lb)   SpO2 95%   BMI 23.74 kg/m²      Intake/Output Summary (Last 24 hours) at 2023 0817  Last data filed at 2023 1230  Gross per 24 hour   Intake 480 ml   Output --   Net 480 ml    Temp (24hrs), Av.2 °F (36.8 °C), Min:97.9 °F (36.6 °C), Max:98.7 °F (37.1 °C)      Physical Exam  Vitals reviewed.   Constitutional:       Appearance: Normal appearance. She is not ill-appearing.   HENT:      Head: Normocephalic and atraumatic.   Eyes:      General:         Right eye: No discharge.         Left eye: No discharge.      Extraocular Movements: Extraocular movements intact.      Conjunctiva/sclera: Conjunctivae normal.   Cardiovascular:      Rate and Rhythm: Normal rate and regular rhythm.      Pulses: Normal pulses.      Heart sounds: No murmur heard.  Pulmonary:      Effort: Pulmonary effort is normal. No respiratory distress.      Comments: Nasal cannula in place  Abdominal:      General: Abdomen is flat. Bowel sounds are normal. There is no distension.   Musculoskeletal:      Right lower leg: No edema.      Left lower leg: No edema.   Skin:     Capillary Refill: Capillary refill takes less than 2 seconds.   Neurological:      General: No focal deficit present.      Mental Status: She is alert.   Psychiatric:         Mood and Affect: Mood normal.         Behavior: Behavior normal.             Assessment and Plan:     Primary Problem:  Dehydration    Hospital Problem list:    Dehydration    CAD in native artery    PVD (peripheral vascular disease)    Essential hypertension    Acute UTI    Chronic respiratory failure with hypoxia    Impaired mobility    UTI (urinary tract infection)      PMH:  Past Medical " History:   Diagnosis Date   • CAD in native artery 7/29/2019   • COPD (chronic obstructive pulmonary disease)    • Essential hypertension 12/7/2021   • GERD (gastroesophageal reflux disease)    • Lung nodule        Treatment Plan:    E. coli UTI  Sensitive to ceftriaxone. Continue, last day on 5/8.     Acute on chronic decline  Had falls prior to admission.  This is most likely secondary to UTI.  Continue PT/OT and refer out for rehabilitation.  Case management assisting for SNF.    Dehydration  Secondary to above, improved with IVF rehydration.    Disposition: Inpatient, medically ready for discharge to SNF when approved.    Reviewed treatment plans with the patient and/or family.   20 minutes spent in face to face interaction and coordination of care.     Electronically signed by Anupam Ledezma MD on 5/8/2023 at 08:17 CDT

## 2023-05-08 NOTE — CASE MANAGEMENT/SOCIAL WORK
Continued Stay Note  [facility]     Patient Name: Pattie Liu  MRN: [id number]  Today's Date: 5/8/2023    Admit Date: 5/4/2023    Plan: [facility]   Discharge Plan     Row Name 05/08/23 1115       Plan    Plan [facility]    Patient/Family in Agreement with Plan yes    Plan Comments Pt now inpt again as of today. She will meet her 3 day stay on Thursday. She can go to [facility] at that time.               Discharge Codes    No documentation.               Expected Discharge Date and Time     Expected Discharge Date Expected Discharge Time    May 6, 2023             MIKE Aviles

## 2023-05-08 NOTE — THERAPY TREATMENT NOTE
Acute Care - Physical Therapy Treatment Note  [facility]     Patient Name: Pattie Liu  : 1945  MRN: [id number]  Today's Date: 2023      Visit Dx:     ICD-10-CM ICD-9-CM   1. Impaired mobility  Z74.09 799.89     Patient Active Problem List   Diagnosis   • Frequent PVCs   • Shortness of breath   • SOB (shortness of breath)   • CAD in native artery   • PVD (peripheral vascular disease)   • Pneumonia due to infectious organism   • Chronic back pain   • Essential hypertension   • Fall, initial encounter   • Traumatic rhabdomyolysis   • Leukocytosis   • Anemia   • Degenerative disc disease, lumbar   • Dehydration   • Acute UTI   • Chronic respiratory failure with hypoxia   • Impaired mobility   • UTI (urinary tract infection)     Past Medical History:   Diagnosis Date   • CAD in native artery 2019   • COPD (chronic obstructive pulmonary disease)    • Essential hypertension 2021   • GERD (gastroesophageal reflux disease)    • Lung nodule      Past Surgical History:   Procedure Laterality Date   • BREAST IMPLANT REMOVAL     • BREAST TISSUE EXPANDER REMOVAL INSERTION OF IMPLANT     • CARDIAC CATHETERIZATION N/A 2019    Procedure: Left Heart Cath;  Surgeon: Edi Armijo MD;  Location: [facility] INVASIVE LOCATION;  Service: Cardiology   • CHOLECYSTECTOMY     • HYSTERECTOMY     • MASTECTOMY      BILATERAL   • OOPHORECTOMY       PT Assessment (last 12 hours)     PT Evaluation and Treatment     Row Name 23 1424 23 0742       Physical Therapy Time and Intention    Subjective Information no complaints  - complains of;pain;weakness;fatigue  -    Document Type therapy note (daily note)  - therapy note (daily note)  -    Mode of Treatment physical therapy  - physical therapy  -    Row Name 23 1424 23 0742       General Information    Patient/Family/Caregiver Comments/Observations -- daughter  -    Existing Precautions/Restrictions fall;oxygen therapy  device and L/min  3L  - fall;oxygen therapy device and L/min  3L  -    Row Name 05/08/23 1424 05/08/23 0742       Pain    Pretreatment Pain Rating 0/10 - no pain  - --    Posttreatment Pain Rating 0/10 - no pain  - --    Pain Intervention(s) -- Repositioned;Ambulation/increased activity  -    Additional Documentation -- Pain Scale: Word Pre/Post-Treatment (Group)  -[facility] Name 05/08/23 0742          Pain Scale: Word Pre/Post-Treatment    Pain: Word Scale, Pretreatment 2 - mild pain  -     Posttreatment Pain Rating 2 - mild pain  -     Pain Location generalized  -     Row Name 05/08/23 1424 05/08/23 0742       Bed Mobility    Bed Mobility supine-sit;sit-supine  - supine-sit;sit-supine  -    Supine-Sit [place] (Bed Mobility) contact guard;verbal cues  - contact guard;verbal cues  -    Sit-Supine [place] (Bed Mobility) --  chair  - --  chair  -[facility] Name 05/08/23 1424 05/08/23 0742       Sit-Stand Transfer    Sit-Stand [place] (Transfers) contact guard;verbal cues  - contact guard;verbal cues  -    Row Name 05/08/23 1424 05/08/23 0742       Stand-Sit Transfer    Stand-Sit [place] (Transfers) contact guard;verbal cues  - contact guard;verbal cues  -[facility] Name 05/08/23 1424 05/08/23 0742       Gait/Stairs (Locomotion)    [place] Level (Gait) contact guard;verbal cues  - contact guard;verbal cues  -    Assistive Device (Gait) walker, front-wheeled  - walker, front-wheeled  -    Distance in Feet (Gait) 50  -AH 50  -AH    Bilateral Gait Deviations forward flexed posture  - forward flexed posture  -    Comment, (Gait/Stairs) 50 x 4  -AH --    Row Name 05/08/23 0742          Motor Skills    Comments, Therapeutic Exercise BLE AROM standing at rail  -     Additional Documentation Advanced Stepping/Walking Interventions (Group);Comments, Therapeutic Exercise (Row)  -     Row Name 05/08/23 1424 05/08/23 0742       Advanced Stepping/Walking  Interventions    Stepping/Walking Interventions -- backward walking;side stepping  -    Backward Walking (Stepping/Walking Interventions) 20 x 2  -AH 20 x 2 cga  -    Side Stepping (Stepping/Walking Interventions) 20 x2  -AH 20 x 2 cga  -    Row Name             Wound 05/04/23 2135 Left lower arm    Wound - Properties Group Placement Date: 05/04/23  -LL Placement Time: 2135 -LL Side: Left  -LL Orientation: lower  -LL Location: arm  -LL    Retired Wound - Properties Group Placement Date: 05/04/23  -LL Placement Time: 2135 -LL Side: Left  -LL Orientation: lower  -LL Location: arm  -LL    Retired Wound - Properties Group Date first assessed: 05/04/23  -LL Time first assessed: 2135 -LL Side: Left  -LL Location: arm  -LL    Row Name 05/08/23 0742          Plan of Care Review    Plan of Care Reviewed With patient;daughter  -     Progress [facility]     Outcome Evaluation pt trans to EOB cga, sit-stand cga, pt amb 50 feet at a time rwx cga, worked on balance activities at rail walking backwards,sidestepping, BLE AROM,trans to chair cga, pt on 3L 02, daughter states she uses 3L at home, pt would benefit from [facility]  -     Row Name 05/08/23 1424 05/08/23 0742       Positioning and Restraints    Pre-Treatment Position in bed  - in bed  -    Post Treatment Position chair  - chair  -    In Chair sitting;call light within reach;encouraged to call for assist;exit alarm on;with family/caregiver  - sitting;call light within reach;encouraged to call for assist;exit alarm on;with family/caregiver;notified [facility]          User Key  (r) = Recorded By, (t) = Taken By, (c) = Cosigned By    Initials Name Provider Type     Rashida Crockett, PTA Physical Therapist Assistant    Candy Gallardo, RN Registered Nurse                Physical Therapy Education     Title: PT OT SLP Therapies (Done)     Topic: Physical Therapy (Done)     Point: Mobility training (Done)     Learning Progress Summary           Patient  Acceptance, E,TB, VU by CM at 5/7/2023 1721    Eager, E, VU by AE at 5/6/2023 1256    Comment: t/f's    Acceptance, E,TB, VU by CM at 5/6/2023 1055    Acceptance, E, VU by JQ at 5/5/2023 1107    Comment: Pt educated on AD use, transfer tech, d/c planning, benefits of skilled PT   Family Acceptance, E,TB, VU by CM at 5/7/2023 1721    Eager, E, VU by AE at 5/6/2023 1256    Comment: t/f's    Acceptance, E,TB, VU by CM at 5/6/2023 1055                   Point: Home exercise program (Done)     Learning Progress Summary           Patient Acceptance, E,TB, VU by CM at 5/7/2023 1721    Eager, E, VU by AE at 5/6/2023 1256    Comment: t/f's    Acceptance, E,TB, VU by CM at 5/6/2023 1055   Family Acceptance, E,TB, VU by CM at 5/7/2023 1721    Eager, E, VU by AE at 5/6/2023 1256    Comment: t/f's    Acceptance, E,TB, VU by CM at 5/6/2023 1055                   Point: Body mechanics (Done)     Learning Progress Summary           Patient Acceptance, E,TB, VU by CM at 5/7/2023 1721    Acceptance, E,TB, VU by CM at 5/6/2023 1055    Acceptance, E, VU by JQ at 5/5/2023 1107    Comment: Pt educated on AD use, transfer tech, d/c planning, benefits of skilled PT   Family Acceptance, E,TB, VU by CM at 5/7/2023 1721    Acceptance, E,TB, VU by CM at 5/6/2023 1055                   Point: Precautions (Done)     Learning Progress Summary           Patient Acceptance, E,TB, VU by CM at 5/7/2023 1721    Acceptance, E,TB, VU by CM at 5/6/2023 1055    Acceptance, E, VU by JQ at 5/5/2023 1107    Comment: Pt educated on AD use, transfer tech, d/c planning, benefits of skilled PT   Family Acceptance, E,TB, VU by CM at 5/7/2023 1721    Acceptance, E,TB, VU by CM at 5/6/2023 1055                               User Key     Initials Effective Dates Name Provider Type Discipline    AE 02/03/23 -  Grisel Denny, TRISHA Physical Therapist Assistant PT    CM 03/02/23 -  Ana Marsh, RN Registered Nurse Nurse    JQ 02/20/23 -  Makenna Kirkland, PT Student  PT Student PT              PT Recommendation and Plan     Plan of Care Reviewed With: patient, daughter  Progress: improving  Outcome Evaluation: pt trans to EOB cga, sit-stand cga, pt amb 50 feet at a time rwx cga, worked on balance activities at rail walking backwards,sidestepping, BLE AROM,trans to chair cga, pt on 3L 02, daughter states she uses 3L at home, pt would benefit from SNF   Outcome Measures     Row Name 05/08/23 0800 05/06/23 1200          How much help from another person do you currently need...    Turning from your back to your side while in flat bed without using bedrails? 4  - 4  -AE     Moving from lying on back to sitting on the side of a flat bed without bedrails? 4  - 4  -AE     Moving to and from a bed to a chair (including a wheelchair)? 3  - 3  -AE     Standing up from a chair using your arms (e.g., wheelchair, bedside chair)? 3  - 3  -AE     Climbing 3-5 steps with a railing? 3  - 3  -AE     To walk in hospital room? 3  - 3  -AE     AM-PAC 6 Clicks Score (PT) 20  - 20  -AE        Functional Assessment    Outcome Measure Options AM-PAC 6 Clicks Basic Mobility (PT)  -AH AM-PAC 6 Clicks Basic Mobility (PT)  -AE           User Key  (r) = Recorded By, (t) = Taken By, (c) = Cosigned By    Initials Name Provider Type    AE Grisel Denny, PTA Physical Therapist Assistant     Rashida Crockett, PTA Physical Therapist Assistant                 Time Calculation:    PT Charges     Row Name 05/08/23 1500 05/08/23 0855          Time Calculation    Start Time 1424  - 0742  -     Stop Time 1454  - 0820  -     Time Calculation (min) 30 min  - 38 min  -     PT Received On -- 05/08/23  -        Time Calculation- PT    Total Timed Code Minutes- PT 30 minute(s)  - 38 minute(s)  -        Timed Charges    82634 - PT Therapeutic Exercise Minutes 15  -AH 15  -     92068 - Gait Training Minutes  15  -AH 23  -        Total Minutes    Timed Charges Total Minutes 30  - 38  -       Total Minutes 30  -AH 38  -AH           User Key  (r) = Recorded By, (t) = Taken By, (c) = Cosigned By    Initials Name Provider Type    Rashida Shea PTA Physical Therapist Assistant              Therapy Charges for Today     Code Description Service Date Service Provider Modifiers Qty    81299191516 HC PT THER PROC EA 15 MIN 5/8/2023 Rashida Crockett, PTA GP 1    58133309067 HC GAIT TRAINING EA 15 MIN 5/8/2023 Rashida Crockett, PTA GP 2    27751620528 HC PT THER PROC EA 15 MIN 5/8/2023 Rashida Crockett, PTA GP 1    21108012513 HC GAIT TRAINING EA 15 MIN 5/8/2023 Rashida Crockett, PTA GP 1          PT G-Codes  Outcome Measure Options: AM-PAC 6 Clicks Basic Mobility (PT)  AM-PAC 6 Clicks Score (PT): 19  AM-PAC 6 Clicks Score (OT): 19    Rashida Crockett PTA  5/8/2023

## 2023-05-08 NOTE — PLAN OF CARE
Goal Outcome Evaluation:  Plan of Care Reviewed With: patient, daughter        Progress: no change  Outcome Evaluation: Patient up assist with walker. IVF and IV abx as ordered. No c/o of pain. Voiding.

## 2023-05-08 NOTE — PLAN OF CARE
Goal Outcome Evaluation:  Plan of Care Reviewed With: patient, daughter        Progress: improving  Outcome Evaluation: pt trans to EOB cga, sit-stand cga, pt amb 50 feet at a time rwx cga, worked on balance activities at rail walking backwards,sidestepping, BLE AROM,trans to chair cga, pt on 3L 02, daughter states she uses 3L at home, pt would benefit from SNF

## 2023-05-08 NOTE — THERAPY TREATMENT NOTE
Patient Name: Pattie Liu  : 1945    MRN: [id number]                              Today's Date: 2023       Admit Date: 2023    Visit Dx:     ICD-10-CM ICD-9-CM   1. Impaired mobility  Z74.09 799.89     Patient Active Problem List   Diagnosis   • Frequent PVCs   • Shortness of breath   • SOB (shortness of breath)   • CAD in native artery   • PVD (peripheral vascular disease)   • Pneumonia due to infectious organism   • Chronic back pain   • Essential hypertension   • Fall, initial encounter   • Traumatic rhabdomyolysis   • Leukocytosis   • Anemia   • Degenerative disc disease, lumbar   • Dehydration   • Acute UTI   • Chronic respiratory failure with hypoxia   • Impaired mobility   • UTI (urinary tract infection)     Past Medical History:   Diagnosis Date   • CAD in native artery 2019   • COPD (chronic obstructive pulmonary disease)    • Essential hypertension 2021   • GERD (gastroesophageal reflux disease)    • Lung nodule      Past Surgical History:   Procedure Laterality Date   • BREAST IMPLANT REMOVAL     • BREAST TISSUE EXPANDER REMOVAL INSERTION OF IMPLANT     • CARDIAC CATHETERIZATION N/A 2019    Procedure: Left Heart Cath;  Surgeon: Edi rAmijo MD;  Location:  [facility] CATH INVASIVE LOCATION;  Service: Cardiology   • CHOLECYSTECTOMY     • HYSTERECTOMY     • MASTECTOMY      BILATERAL   • OOPHORECTOMY        General Information     Row Name 23 0945          OT Time and Intention    Document Type therapy note (daily note)  -     Mode of Treatment occupational therapy  -     Row Name 23 0945          General Information    Patient Profile Reviewed yes  -     Existing Precautions/Restrictions fall;oxygen therapy device and L/min  -     Row Name 23 0945          Safety Issues, Functional Mobility    Impairments Affecting Function (Mobility) balance;coordination;endurance/activity tolerance;pain;shortness of breath;strength  -           User Key   (r) = Recorded By, (t) = Taken By, (c) = Cosigned By    Initials Name Provider Type     Cecily Raymond, OTR/L Occupational Therapist                 Mobility/ADL's     Row Name 05/08/23 0945          Transfers    Transfers sit-stand transfer;stand-sit transfer;toilet transfer  -     Row Name 05/08/23 0945          Bed-Chair Transfer    Bed-Chair [place] (Transfers) verbal cues;contact guard  -     Assistive Device (Bed-Chair Transfers) walker, front-wheeled  -     Row Name 05/08/23 0945          Sit-Stand Transfer    Sit-Stand [place] (Transfers) contact guard;verbal cues  -     Assistive Device (Sit-Stand Transfers) walker, front-wheeled  -     Row Name 05/08/23 0945          Stand-Sit Transfer    Stand-Sit [place] (Transfers) contact guard;verbal cues  -     Assistive Device (Stand-Sit Transfers) walker, front-wheeled  -     Row Name 05/08/23 0945          Toilet Transfer    Type (Toilet Transfer) stand-sit;sit-stand  -     [place] Level (Toilet Transfer) contact guard;verbal cues  -     Assistive Device (Toilet Transfer) walker, front-wheeled  -     Row Name 05/08/23 0945          Functional Mobility    Functional Mobility- Ind. Level contact guard assist;verbal cues required  -     Functional Mobility- Device walker, front-wheeled  -     Row Name 05/08/23 0945          Activities of Daily Living    BADL Assessment/Intervention grooming;toileting;upper body dressing  -     Row Name 05/08/23 0945          Toileting Assessment/Training    [place] Level (Toileting) toileting skills;contact guard assist;standby assist;verbal cues  -     Assistive Devices (Toileting) commode;grab bar/safety frame  -     Position (Toileting) unsupported sitting;supported standing  -     Row Name 05/08/23 0945          Grooming Assessment/Training    [place] Level (Grooming) wash face, hands;contact guard assist;verbal cues;set up  -     Position (Grooming) supported  sitting  -     Row Name 05/08/23 0945          Upper Body Dressing Assessment/Training    [place] Level (Upper Body Dressing) set up;don;doff  -     Position (Upper Body Dressing) supported sitting  -     Comment, (Upper Body Dressing) hospital gown  -           User Key  (r) = Recorded By, (t) = Taken By, (c) = Cosigned By    Initials Name Provider Type    Cecily Morrow, OTR/L Occupational Therapist               Obj/Interventions     Row Name 05/08/23 0945          Balance    Balance Assessment sitting static balance;sit to stand dynamic balance;standing static balance;standing dynamic balance;sitting dynamic balance  -     Static Sitting Balance standby assist  -     Dynamic Sitting Balance standby assist  -     Position, Sitting Balance unsupported  -     Sit to Stand Dynamic Balance contact guard  -     Static Standing Balance contact guard  -     Dynamic Standing Balance contact guard  -     Position/Device Used, Standing Balance walker, front-wheeled  -     Balance Interventions sitting;standing;sit to stand;supported;static;dynamic;occupation based/functional task  -           User Key  (r) = Recorded By, (t) = Taken By, (c) = Cosigned By    Initials Name Provider Type     Cecily Raymond, OTR/L Occupational Therapist               Goals/Plan    No documentation.                Clinical Impression     Row Name 05/08/23 0945          Pain Assessment    Pretreatment Pain Rating 0/10 - no pain  -     Row Name 05/08/23 0945          Plan of Care Review    Plan of Care Reviewed With patient  -     Progress no change  -     Outcome Evaluation Pt. in bathroom finishing toileting at [facility].  Pt. able to stand from toilet at [facility] & then stood in bathroom to perform standing sink side hygiene.  Ms. Liu was able to mobilize with rwx to chair at [facility].  She had no LOB & demo'd good safety.  She then DONNed/DOFFed gown with assist for buttons & IV site.  Cont OT tx.  Ms. Liu  plans to DC to SNF.  -     Row Name 05/08/23 0945          Therapy Assessment/Plan (OT)    Rehab Potential (OT) good, to achieve stated therapy goals  -     Criteria for Skilled Therapeutic Interventions Met (OT) yes;skilled treatment is necessary  -     Therapy Frequency (OT) 5 times/wk  -     Row Name 05/08/23 0945          Therapy Plan Review/Discharge Plan (OT)    Anticipated Discharge Disposition (OT) skilled nursing facility  -           User Key  (r) = Recorded By, (t) = Taken By, (c) = Cosigned By    Initials Name Provider Type     Cecily Raymond, OTR/L Occupational Therapist               Outcome Measures     Row Name 05/08/23 0800          How much help from another person do you currently need...    Turning from your back to your side while in flat bed without using bedrails? 4  -AH     Moving from lying on back to sitting on the side of a flat bed without bedrails? 4  -AH     Moving to and from a bed to a chair (including a wheelchair)? 3  -AH     Standing up from a chair using your arms (e.g., wheelchair, bedside chair)? 3  -AH     Climbing 3-5 steps with a railing? 3  -AH     To walk in hospital room? 3  -AH     AM-PAC 6 Clicks Score (PT) 20  -AH     Highest level of mobility 6 --> Walked 10 steps or more  -     Row Name 05/08/23 0800          Functional Assessment    Outcome Measure Options AM-PAC 6 Clicks Basic Mobility (PT)  -           User Key  (r) = Recorded By, (t) = Taken By, (c) = Cosigned By    Initials Name Provider Type     Rashida Crockett PTA Physical Therapist Assistant                Occupational Therapy Education     Title: PT OT SLP Therapies (Done)     Topic: Occupational Therapy (Done)     Point: ADL training (Done)     Description:   Instruct learner(s) on proper safety adaptation and remediation techniques during self care or transfers.   Instruct in proper use of assistive devices.              Learning Progress Summary           Patient Acceptance, E,D, VU,NR  by CH at 5/8/2023 1038    Acceptance, E,TB, VU by CM at 5/7/2023 1721    Acceptance, E,TB, VU by CM at 5/6/2023 1055    Acceptance, E,D, VU,NR by LR at 5/5/2023 1051   Family Acceptance, E,TB, VU by CM at 5/7/2023 1721    Acceptance, E,TB, VU by CM at 5/6/2023 1055    Acceptance, E,D, VU,NR by LR at 5/5/2023 1051                   Point: Home exercise program (Done)     Description:   Instruct learner(s) on appropriate technique for monitoring, assisting and/or progressing therapeutic exercises/activities.              Learning Progress Summary           Patient Acceptance, E,TB, VU by CM at 5/7/2023 1721    Acceptance, E,TB, VU by CM at 5/6/2023 1055   Family Acceptance, E,TB, VU by CM at 5/7/2023 1721    Acceptance, E,TB, VU by CM at 5/6/2023 1055                   Point: Precautions (Done)     Description:   Instruct learner(s) on prescribed precautions during self-care and functional transfers.              Learning Progress Summary           Patient Acceptance, E,TB, VU by CM at 5/7/2023 1721    Acceptance, E,TB, VU by CM at 5/6/2023 1055    Acceptance, E,D, VU,NR by LR at 5/5/2023 1051   Family Acceptance, E,TB, VU by CM at 5/7/2023 1721    Acceptance, E,TB, VU by CM at 5/6/2023 1055    Acceptance, E,D, VU,NR by LR at 5/5/2023 1051                   Point: Body mechanics (Done)     Description:   Instruct learner(s) on proper positioning and spine alignment during self-care, functional mobility activities and/or exercises.              Learning Progress Summary           Patient Acceptance, E,TB, VU by CM at 5/7/2023 1721    Acceptance, E,TB, VU by CM at 5/6/2023 1055    Acceptance, E,D, VU,NR by LR at 5/5/2023 1051   Family Acceptance, E,TB, VU by CM at 5/7/2023 1721    Acceptance, E,TB, VU by CM at 5/6/2023 1055    Acceptance, E,D, VU,NR by LR at 5/5/2023 1051                               User Key     Initials Effective Dates Name Provider Type Discipline     06/16/21 -  Cecily Raymond, VENITA/ABHISHEK Occupational  Therapist OT    CM 03/02/23 -  Ana Marsh, RN Registered Nurse Nurse     04/25/23 -  Germania Stern, OTR/L Occupational Therapist OT              OT Recommendation and Plan  Therapy Frequency (OT): 5 times/wk  Plan of Care Review  Plan of Care Reviewed With: patient  Progress: no change  Outcome Evaluation: Pt. in bathroom finishing toileting at [facility].  Pt. able to stand from toilet at [facility] & then stood in bathroom to perform standing sink side hygiene.  Ms. Liu was able to mobilize with rwx to chair at [facility].  She had no LOB & demo'd good safety.  She then DONNed/DOFFed gown with assist for buttons & IV site.  Cont OT tx.  Ms. Liu plans to DC to SNF.     Time Calculation:    Time Calculation- OT     Row Name 05/08/23 0945 05/08/23 0855          Time Calculation- OT    OT Start Time 0940  - --     OT Stop Time 1022  - --     OT Time Calculation (min) 42 min  - --     Total Timed Code Minutes- OT 42 minute(s)  - --     OT Received On 05/08/23  - --        Timed Charges    67280 - Gait Training Minutes  -- 23  -     08884 - OT Self Care/Mgmt Minutes 42  -CH --        Total Minutes    Timed Charges Total Minutes 42  - 23  -AH      Total Minutes 42  - 23  -           User Key  (r) = Recorded By, (t) = Taken By, (c) = Cosigned By    Initials Name Provider Type     Rashida Crockett, PTA Physical Therapist Assistant     Cecily Raymond, OTR/L Occupational Therapist              Therapy Charges for Today     Code Description Service Date Service Provider Modifiers Qty    [id number]  OT SELF CARE/MGMT/TRAIN EA 15 MIN 5/8/2023 Cecily Raymond OTR/L GO 3               VENITA Schmitz/ABHISHEK  5/8/2023

## 2023-05-08 NOTE — PLAN OF CARE
Goal Outcome Evaluation:  Plan of Care Reviewed With: patient, daughter        Progress: improving     VSS.  O2 2.5L.  IVF as ordered.  Lovenox.  Plans for discharge to [place] likely Thursday.  Medicated X1 for c/o nausea.  Daughter remains at bedside.  Fall precautions.  Bed and chair alarm.  Safety maintained.  Cooperative with care.

## 2023-05-09 LAB
ANION GAP SERPL CALCULATED.3IONS-SCNC: 8 MMOL/L (ref 5–15)
BACTERIA SPEC AEROBE CULT: NORMAL
BACTERIA SPEC AEROBE CULT: NORMAL
BUN SERPL-MCNC: 14 MG/DL (ref 8–23)
BUN/CREAT SERPL: 17.1 (ref 7–25)
CALCIUM SPEC-SCNC: 9 MG/DL (ref 8.6–10.5)
CHLORIDE SERPL-SCNC: 107 MMOL/L (ref 98–107)
CO2 SERPL-SCNC: 29 MMOL/L (ref 22–29)
CREAT SERPL-MCNC: 0.82 MG/DL (ref 0.57–1)
EGFRCR SERPLBLD CKD-EPI 2021: 72.4 ML/MIN/1.73
GLUCOSE SERPL-MCNC: 94 MG/DL (ref 65–99)
POTASSIUM SERPL-SCNC: 3.5 MMOL/L (ref 3.5–5.2)
SODIUM SERPL-SCNC: 144 MMOL/L (ref 136–145)

## 2023-05-09 PROCEDURE — 25010000002 ENOXAPARIN PER 10 MG: Performed by: FAMILY MEDICINE

## 2023-05-09 PROCEDURE — 97110 THERAPEUTIC EXERCISES: CPT

## 2023-05-09 PROCEDURE — 97535 SELF CARE MNGMENT TRAINING: CPT

## 2023-05-09 PROCEDURE — 63710000001 ONDANSETRON PER 8 MG: Performed by: FAMILY MEDICINE

## 2023-05-09 PROCEDURE — 97116 GAIT TRAINING THERAPY: CPT

## 2023-05-09 PROCEDURE — 80048 BASIC METABOLIC PNL TOTAL CA: CPT | Performed by: FAMILY MEDICINE

## 2023-05-09 RX ORDER — AMLODIPINE BESYLATE 10 MG/1
10 TABLET ORAL
Status: DISCONTINUED | OUTPATIENT
Start: 2023-05-09 | End: 2023-05-11 | Stop reason: HOSPADM

## 2023-05-09 RX ADMIN — ONDANSETRON 4 MG: 4 TABLET, FILM COATED ORAL at 09:00

## 2023-05-09 RX ADMIN — CITALOPRAM 20 MG: 20 TABLET, FILM COATED ORAL at 08:53

## 2023-05-09 RX ADMIN — Medication 10 ML: at 08:53

## 2023-05-09 RX ADMIN — ACETAMINOPHEN 650 MG: 325 TABLET, FILM COATED ORAL at 09:00

## 2023-05-09 RX ADMIN — SODIUM CHLORIDE 50 ML/HR: 9 INJECTION, SOLUTION INTRAVENOUS at 04:26

## 2023-05-09 RX ADMIN — LISINOPRIL 20 MG: 20 TABLET ORAL at 08:53

## 2023-05-09 RX ADMIN — DOCUSATE SODIUM 50 MG AND SENNOSIDES 8.6 MG 2 TABLET: 8.6; 5 TABLET, FILM COATED ORAL at 08:53

## 2023-05-09 RX ADMIN — BUMETANIDE 1 MG: 1 TABLET ORAL at 08:52

## 2023-05-09 RX ADMIN — ALPRAZOLAM 1 MG: 0.5 TABLET ORAL at 20:17

## 2023-05-09 RX ADMIN — SODIUM CHLORIDE 50 ML/HR: 9 INJECTION, SOLUTION INTRAVENOUS at 23:49

## 2023-05-09 RX ADMIN — ENOXAPARIN SODIUM 40 MG: 100 INJECTION SUBCUTANEOUS at 08:53

## 2023-05-09 RX ADMIN — GABAPENTIN 800 MG: 400 CAPSULE ORAL at 08:53

## 2023-05-09 RX ADMIN — NICOTINE 1 PATCH: 21 PATCH, EXTENDED RELEASE TRANSDERMAL at 20:20

## 2023-05-09 RX ADMIN — ACETAMINOPHEN 650 MG: 325 TABLET, FILM COATED ORAL at 16:47

## 2023-05-09 RX ADMIN — AMLODIPINE BESYLATE 10 MG: 10 TABLET ORAL at 17:53

## 2023-05-09 RX ADMIN — GABAPENTIN 800 MG: 400 CAPSULE ORAL at 20:17

## 2023-05-09 RX ADMIN — Medication 10 ML: at 20:18

## 2023-05-09 RX ADMIN — ASPIRIN 81 MG: 81 TABLET, COATED ORAL at 08:52

## 2023-05-09 RX ADMIN — ALPRAZOLAM 1 MG: 0.5 TABLET ORAL at 08:53

## 2023-05-09 NOTE — PROGRESS NOTES
Daily Progress Note  Pattie Liu  MRN: [id number] LOS: 2    Admit Date: 5/4/2023 5/9/2023 08:02 CDT    Subjective:          Chief Complaint:  No chief complaint on file.      Interval History:    Reviewed overnight events and nursing notes.   Improved, but still significant weakness and slow gait.  Continues to be incontinent of bowels and bladder.  SNF recommendations from PT/OT.  Still requiring IV rehydration. Completed IV antibiotics for UTI.    Review of Systems   Constitutional: Positive for activity change, appetite change and fatigue. Negative for fever.   Respiratory: Positive for shortness of breath.    Gastrointestinal: Negative for nausea and vomiting.   Genitourinary: Positive for decreased urine volume and dysuria.   Musculoskeletal: Positive for gait problem.   Neurological: Positive for weakness.       DIET:  Diet: Regular/House Diet, Cardiac Diets; Healthy Heart (2-3 Na+); Texture: Regular Texture (IDDSI 7); Fluid Consistency: Thin (IDDSI 0)    Medications:   O2, 2 L/min, Last Rate: 2 L/min (05/04/23 2127)  sodium chloride, 50 mL/hr, Last Rate: 50 mL/hr (05/09/23 0426)      ALPRAZolam, 1 mg, Oral, BID  aspirin, 81 mg, Oral, Daily  bumetanide, 1 mg, Oral, Daily  citalopram, 20 mg, Oral, Daily  enoxaparin, 40 mg, Subcutaneous, Daily  gabapentin, 800 mg, Oral, Q12H  lisinopril, 20 mg, Oral, Daily  nicotine, 1 patch, Transdermal, Q24H  senna-docusate sodium, 2 tablet, Oral, BID  sodium chloride, 10 mL, Intravenous, Q12H        Data:     Code Status:   Code Status and Medical Interventions:   Ordered at: 05/05/23 0730     Code Status (Patient has no pulse and is not breathing):    No CPR (Do Not Attempt to Resuscitate)     Medical Interventions (Patient has pulse or is breathing):    Full Support     Release to patient:    Routine Release       Family History   Problem Relation Age of Onset   • Cancer Mother    • Cancer Father      Social History     Socioeconomic History   • Marital status:     Tobacco Use   • Smoking status: Every Day     Packs/day: 0.50     Years: 62.00     Pack years: 31.00     Types: Cigarettes   • Smokeless tobacco: Never   • Tobacco comments:     states she has no plan to quit smoking   Vaping Use   • Vaping Use: Former   Substance and Sexual Activity   • Alcohol use: No   • Drug use: No   • Sexual activity: Not Currently       Labs:    Lab Results (last 72 hours)     Procedure Component Value Units Date/Time    Basic Metabolic Panel [[id number]]  (Normal) Collected: 05/09/23 0348    Specimen: Blood Updated: 05/09/23 0431     Glucose 94 mg/dL      BUN 14 mg/dL      Creatinine 0.82 mg/dL      Sodium 144 mmol/L      Potassium 3.5 mmol/L      Chloride 107 mmol/L      CO2 29.0 mmol/L      Calcium 9.0 mg/dL      BUN/Creatinine Ratio 17.1     Anion Gap 8.0 mmol/L      eGFR 72.4 mL/min/1.73     Narrative:      GFR Normal >60  Chronic Kidney Disease <60  Kidney Failure <15    The GFR formula is only valid for adults with stable renal function between ages 18 and 70.    Blood Culture - Blood, Hand, Left [[id number]]  (Normal) Collected: 05/04/23 2327    Specimen: Blood from Hand, Left Updated: 05/08/23 2345     Blood Culture No growth at 4 days    Blood Culture - Blood, Hand, Right [[id number]]  (Normal) Collected: 05/04/23 2327    Specimen: Blood from Hand, Right Updated: 05/08/23 2345     Blood Culture No growth at 4 days    Basic Metabolic Panel [[id number]]  (Normal) Collected: 05/08/23 0350    Specimen: Blood Updated: 05/08/23 0520     Glucose 86 mg/dL      BUN 13 mg/dL      Creatinine 0.73 mg/dL      Sodium 142 mmol/L      Potassium 3.7 mmol/L      Chloride 106 mmol/L      CO2 26.0 mmol/L      Calcium 8.9 mg/dL      BUN/Creatinine Ratio 17.8     Anion Gap 10.0 mmol/L      eGFR 83.3 mL/min/1.73     Narrative:      GFR Normal >60  Chronic Kidney Disease <60  Kidney Failure <15    The GFR formula is only valid for adults with stable renal function between ages 18 and 70.    Basic  Metabolic Panel [[id number]]  (Normal) Collected: 05/07/23 0323    Specimen: Blood Updated: 05/07/23 0455     Glucose 88 mg/dL      BUN 16 mg/dL      Creatinine 0.75 mg/dL      Sodium 142 mmol/L      Potassium 3.9 mmol/L      Chloride 107 mmol/L      CO2 26.0 mmol/L      Calcium 9.3 mg/dL      BUN/Creatinine Ratio 21.3     Anion Gap 9.0 mmol/L      eGFR 80.6 mL/min/1.73     Narrative:      GFR Normal >60  Chronic Kidney Disease <60  Kidney Failure <15    The GFR formula is only valid for adults with stable renal function between ages 18 and 70.    CBC & Differential [[id number]]  (Abnormal) Collected: 05/07/23 0323    Specimen: Blood Updated: 05/07/23 0425    Narrative:      The following orders were created for panel order CBC & Differential.  Procedure                               Abnormality         Status                     ---------                               -----------         ------                     CBC Auto Differential[[id number]]        Abnormal            Final result                 Please view results for these tests on the individual orders.    CBC Auto Differential [[id number]]  (Abnormal) Collected: 05/07/23 0323    Specimen: Blood Updated: 05/07/23 0425     WBC 7.84 10*3/mm3      RBC 4.17 10*6/mm3      Hemoglobin 12.3 g/dL      Hematocrit 39.5 %      MCV 94.7 fL      MCH 29.5 pg      MCHC 31.1 g/dL      RDW 13.2 %      RDW-SD 45.2 fl      MPV 11.7 fL      Platelets 275 10*3/mm3      Neutrophil % 50.3 %      Lymphocyte % 36.0 %      Monocyte % 7.7 %      Eosinophil % 5.1 %      Basophil % 0.6 %      Immature Grans % 0.3 %      Neutrophils, Absolute 3.95 10*3/mm3      Lymphocytes, Absolute 2.82 10*3/mm3      Monocytes, Absolute 0.60 10*3/mm3      Eosinophils, Absolute 0.40 10*3/mm3      Basophils, Absolute 0.05 10*3/mm3      Immature Grans, Absolute 0.02 10*3/mm3      nRBC 0.0 /100 WBC     Urine Culture - Urine, Urine, Clean Catch [[id number]]  (Abnormal)  (Susceptibility) Collected: 05/04/23 2005  "   Specimen: Urine, Clean Catch Updated: 23 1116     Urine Culture >100,000 CFU/mL Escherichia coli    Narrative:      Colonization of the urinary tract without infection is common. Treatment is discouraged unless the patient is symptomatic, pregnant, or undergoing an invasive urologic procedure.        Susceptibility      Escherichia coli      [place]      Ampicillin Resistant     Ampicillin + Sulbactam Intermediate      Cefazolin Susceptible      Cefepime Susceptible      Ceftazidime Susceptible      Ceftriaxone Susceptible      Gentamicin Resistant     Levofloxacin Susceptible      Nitrofurantoin Susceptible      Piperacillin + Tazobactam Susceptible      Tobramycin Susceptible      Trimethoprim + Sulfamethoxazole Resistant                                  Objective:     Vitals: /65 (BP Location: Left arm, Patient Position: Lying)   Pulse 50   Temp 97.5 °F (36.4 °C) (Oral)   Resp 16   Ht 160 cm (63\")   Wt 60.8 kg (134 lb)   SpO2 97%   BMI 23.74 kg/m²      Intake/Output Summary (Last 24 hours) at 2023 0802  Last data filed at 2023 0426  Gross per 24 hour   Intake 2078 ml   Output --   Net 2078 ml    Temp (24hrs), Av.9 °F (36.6 °C), Min:97.5 °F (36.4 °C), Max:98.6 °F (37 °C)      Physical Exam  Vitals reviewed.   Constitutional:       Appearance: Normal appearance. She is not ill-appearing.   HENT:      Head: Normocephalic and atraumatic.   Eyes:      General:         Right eye: No discharge.         Left eye: No discharge.      Extraocular Movements: Extraocular movements intact.      Conjunctiva/sclera: Conjunctivae normal.   Cardiovascular:      Rate and Rhythm: Normal rate and regular rhythm.      Pulses: Normal pulses.      Heart sounds: No murmur heard.  Pulmonary:      Effort: Pulmonary effort is normal. No respiratory distress.      Comments: Nasal cannula in place  Abdominal:      General: Abdomen is flat. Bowel sounds are normal. There is no distension.   Musculoskeletal:      " Right lower leg: No edema.      Left lower leg: No edema.   Skin:     Capillary Refill: Capillary refill takes less than 2 seconds.   Neurological:      General: No focal deficit present.      Mental Status: She is alert.   Psychiatric:         Mood and Affect: Mood normal.         Behavior: Behavior normal.             Assessment and Plan:     Primary Problem:  Dehydration    Hospital Problem list:    Dehydration    CAD in native artery    PVD (peripheral vascular disease)    Essential hypertension    Acute UTI    Chronic respiratory failure with hypoxia    Impaired mobility    UTI (urinary tract infection)      PMH:  Past Medical History:   Diagnosis Date   • CAD in native artery 7/29/2019   • COPD (chronic obstructive pulmonary disease)    • Essential hypertension 12/7/2021   • GERD (gastroesophageal reflux disease)    • Lung nodule        Treatment Plan:    E. coli UTI  Completed rocephin.     Acute on chronic decline  Had falls prior to admission.  This is most likely secondary to UTI.  Continue PT/OT and refer out for rehabilitation.  Case management assisting for SNF.    Dehydration  Secondary to above, improved with IVF rehydration.    Disposition: Inpatient, medically ready for discharge to SNF when approved.    Reviewed treatment plans with the patient and/or family.   20 minutes spent in face to face interaction and coordination of care.     Electronically signed by Anupam Ledezma MD on 5/9/2023 at 08:02 CDT

## 2023-05-09 NOTE — THERAPY TREATMENT NOTE
Acute Care - Physical Therapy Treatment Note  [facility]     Patient Name: Pattie Liu  : 1945  MRN: [id number]  Today's Date: 2023      Visit Dx:     ICD-10-CM ICD-9-CM   1. Impaired mobility  Z74.09 799.89     Patient Active Problem List   Diagnosis   • Frequent PVCs   • Shortness of breath   • SOB (shortness of breath)   • CAD in native artery   • PVD (peripheral vascular disease)   • Pneumonia due to infectious organism   • Chronic back pain   • Essential hypertension   • Fall, initial encounter   • Traumatic rhabdomyolysis   • Leukocytosis   • Anemia   • Degenerative disc disease, lumbar   • Dehydration   • Acute UTI   • Chronic respiratory failure with hypoxia   • Impaired mobility   • UTI (urinary tract infection)     Past Medical History:   Diagnosis Date   • CAD in native artery 2019   • COPD (chronic obstructive pulmonary disease)    • Essential hypertension 2021   • GERD (gastroesophageal reflux disease)    • Lung nodule      Past Surgical History:   Procedure Laterality Date   • BREAST IMPLANT REMOVAL     • BREAST TISSUE EXPANDER REMOVAL INSERTION OF IMPLANT     • CARDIAC CATHETERIZATION N/A 2019    Procedure: Left Heart Cath;  Surgeon: Edi Armijo MD;  Location: [facility] INVASIVE LOCATION;  Service: Cardiology   • CHOLECYSTECTOMY     • HYSTERECTOMY     • MASTECTOMY      BILATERAL   • OOPHORECTOMY       PT Assessment (last 12 hours)     PT Evaluation and Treatment     Row Name 2352          Physical Therapy Time and Intention    Subjective Information complains of;weakness;pain  -     Document Type therapy note (daily note)  -     Mode of Treatment physical therapy  -     Row Name 2352          General Information    Existing Precautions/Restrictions fall;oxygen therapy device and L/min  3L  -     Row Name 2352          Pain    Pain Intervention(s) Repositioned;Ambulation/increased activity  -     Row Name 2352           Pain Scale: Word Pre/Post-Treatment    Pain: Word Scale, Pretreatment 6 - moderate-severe pain  -AH     Posttreatment Pain Rating 6 - moderate-severe pain  -     Pain Location - back  -     Row Name 05/09/23 0952          Bed Mobility    Bed Mobility supine-sit;sit-supine  -     Comment, (Bed Mobility) CHAIR  -     Row Name 05/09/23 0952          Sit-Stand Transfer    Sit-Stand Stephenson (Transfers) contact guard;verbal cues  -     Row Name 05/09/23 0952          Stand-Sit Transfer    Stand-Sit Stephenson (Transfers) contact guard;verbal cues  -     Row Name 05/09/23 0952          Gait/Stairs (Locomotion)    Stephenson Level (Gait) contact guard;verbal cues  -     Assistive Device (Gait) walker, front-wheeled  -     Distance in Feet (Gait) 50  -     Bilateral Gait Deviations forward flexed posture  -     Comment, (Gait/Stairs) 50 X 4  -     Row Name 05/09/23 0952          Motor Skills    Comments, Therapeutic Exercise BLE AROM standing at rail  -     Row Name 05/09/23 0952          Advanced Stepping/Walking Interventions    Backward Walking (Stepping/Walking Interventions) 20 x 2 cga/HHA  -     Side Stepping (Stepping/Walking Interventions) 20 X 2 CGA  -     Row Name             Wound 05/04/23 2135 Left lower arm    Wound - Properties Group Placement Date: 05/04/23  -LL Placement Time: 2135 -LL Side: Left  -LL Orientation: lower  -LL Location: arm  -LL    Retired Wound - Properties Group Placement Date: 05/04/23  -LL Placement Time: 2135 -LL Side: Left  -LL Orientation: lower  -LL Location: arm  -LL    Retired Wound - Properties Group Date first assessed: 05/04/23  -LL Time first assessed: 2135  -LL Side: Left  -LL Location: arm  -LL    Row Name 05/09/23 0952          Plan of Care Review    Plan of Care Reviewed With patient;daughter  -     Progress no change  -     Outcome Evaluation pt in chair, c/o back pain, trans sit-stand cga, pt amb 50 feet rwx 02 @ 3L, worked on  BLE AROM standing at rail, as well as balance activities with gait cga, pt would benefit from [facility] for strengthening,balance and endurance  -     Row Name 05/09/23 0952          Positioning and Restraints    Pre-Treatment Position sitting in chair/recliner  -     Post Treatment Position chair  -     In Chair sitting;call light within reach;encouraged to call for assist;with family/caregiver  -           User Key  (r) = Recorded By, (t) = Taken By, (c) = Cosigned By    Initials Name Provider Type     Rashida Crockett, PTA Physical Therapist Assistant    Candy Gallardo, RN Registered Nurse                Physical Therapy Education     Title: PT OT SLP Therapies (Done)     Topic: Physical Therapy (Done)     Point: Mobility training (Done)     Learning Progress Summary           Patient Acceptance, E,TB, VU by CM at 5/7/2023 1721    Eager, E, VU by AE at 5/6/2023 1256    Comment: t/f's    Acceptance, E,TB, VU by CM at 5/6/2023 1055    Acceptance, E, VU by JQ at 5/5/2023 1107    Comment: Pt educated on AD use, transfer tech, d/c planning, benefits of skilled PT   Family Acceptance, E,TB, VU by CM at 5/7/2023 1721    Eager, E, VU by AE at 5/6/2023 1256    Comment: t/f's    Acceptance, E,TB, VU by CM at 5/6/2023 1055                   Point: Home exercise program (Done)     Learning Progress Summary           Patient Acceptance, E,TB, VU by CM at 5/7/2023 1721    Eager, E, VU by AE at 5/6/2023 1256    Comment: t/f's    Acceptance, E,TB, VU by CM at 5/6/2023 1055   Family Acceptance, E,TB, VU by CM at 5/7/2023 1721    Eager, E, VU by AE at 5/6/2023 1256    Comment: t/f's    Acceptance, E,TB, VU by CM at 5/6/2023 1055                   Point: Body mechanics (Done)     Learning Progress Summary           Patient Acceptance, E,TB, VU by CM at 5/7/2023 1721    Acceptance, E,TB, VU by CM at 5/6/2023 1055    Acceptance, E, VU by JQ at 5/5/2023 1107    Comment: Pt educated on AD use, transfer tech, d/c planning,  benefits of skilled PT   Family Acceptance, E,TB, VU by CM at 5/7/2023 1721    Acceptance, E,TB, VU by CM at 5/6/2023 1055                   Point: Precautions (Done)     Learning Progress Summary           Patient Acceptance, E,TB, VU by CM at 5/7/2023 1721    Acceptance, E,TB, VU by CM at 5/6/2023 1055    Acceptance, E, VU by JQ at 5/5/2023 1107    Comment: Pt educated on AD use, transfer tech, d/c planning, benefits of skilled PT   Family Acceptance, E,TB, VU by CM at 5/7/2023 1721    Acceptance, E,TB, VU by CM at 5/6/2023 1055                               User Key     Initials Effective Dates Name Provider Type Discipline    AE 02/03/23 -  Grisel Denny, PTA Physical Therapist Assistant PT    CM 03/02/23 -  Ana Marsh, RN Registered Nurse Nurse    JQ 02/20/23 -  Makenna Kirkland, JEFFREY Student PT Student PT              PT Recommendation and Plan     Plan of Care Reviewed With: patient, daughter  Progress: no change  Outcome Evaluation: pt in chair, c/o back pain, trans sit-stand cga, pt amb 50 feet rwx 02 @ 3L, worked on BLE AROM standing at rail, as well as balance activities with gait cga, pt would benefit from SNF for strengthening,balance and endurance   Outcome Measures     Row Name 05/09/23 1000 05/08/23 0800 05/06/23 1200       How much help from another person do you currently need...    Turning from your back to your side while in flat bed without using bedrails? 4  - 4  - 4  -AE    Moving from lying on back to sitting on the side of a flat bed without bedrails? 4  - 4  - 4  -AE    Moving to and from a bed to a chair (including a wheelchair)? 3  - 3  -AH 3  -AE    Standing up from a chair using your arms (e.g., wheelchair, bedside chair)? 3  - 3  -AH 3  -AE    Climbing 3-5 steps with a railing? 2  - 3  -AH 3  -AE    To walk in hospital room? 3  - 3  -AH 3  -AE    AM-PAC 6 Clicks Score (PT) 19  - 20  -AH 20  -AE       Functional Assessment    Outcome Measure Options AM-PAC 6 Clicks  Basic Mobility (PT)  - AM-PAC 6 Clicks Basic Mobility (PT)  - AM-PAC 6 Clicks Basic Mobility (PT)  -AE          User Key  (r) = Recorded By, (t) = Taken By, (c) = Cosigned By    Initials Name Provider Type    AE Grisel Denny, TRISHA Physical Therapist Assistant     Rashida Crockett PTA Physical Therapist Assistant                 Time Calculation:    PT Charges     Row Name 05/09/23 1025             Time Calculation    Start Time 0952  -      Stop Time 1022  -      Time Calculation (min) 30 min  -      PT Received On 05/09/23  -         Time Calculation- PT    Total Timed Code Minutes- PT 30 minute(s)  -         Timed Charges    81964 - PT Therapeutic Exercise Minutes 15  -AH      29228 - Gait Training Minutes  15  -AH         Total Minutes    Timed Charges Total Minutes 30  -AH       Total Minutes 30  -AH            User Key  (r) = Recorded By, (t) = Taken By, (c) = Cosigned By    Initials Name Provider Type     Rashida Crockett PTA Physical Therapist Assistant              Therapy Charges for Today     Code Description Service Date Service Provider Modifiers Qty    00235215856 HC PT THER PROC EA 15 MIN 5/8/2023 Rashida Crockett, PTA GP 1    14235792405 HC GAIT TRAINING EA 15 MIN 5/8/2023 Rashida Crockett, PTA GP 2    88082156119 HC PT THER PROC EA 15 MIN 5/8/2023 Rashida Crockett, PTA GP 1    60248503192 HC GAIT TRAINING EA 15 MIN 5/8/2023 Rashida Crockett, PTA GP 1    95058942338 HC PT THER PROC EA 15 MIN 5/9/2023 Rashida Crockett, PTA GP 1    55295126707 HC GAIT TRAINING EA 15 MIN 5/9/2023 Rashida Crockett, PTA GP 1          PT G-Codes  Outcome Measure Options: AM-PAC 6 Clicks Basic Mobility (PT)  AM-PAC 6 Clicks Score (PT): 19  AM-PAC 6 Clicks Score (OT): 19    Rashida Crockett PTA  5/9/2023

## 2023-05-09 NOTE — PLAN OF CARE
Goal Outcome Evaluation:  Plan of Care Reviewed With: patient, daughter        Progress: improving  Outcome Evaluation: OT tx completed. Pt presents requesting a shower. Pt completed sit<>stand t/f's with CGA, fxl mobility using FWW with CGA, toileting with CGA/SBA, UB dressing with SBA/Min A, LB dressing with SBA/CGA, bathing with SBA/CGA using shower chair, and grooming with SBA. Pt educated on energy conservation tech during fxl tasks. Pt required occ cues throughout for safety and tech. Continue OT POC. Recommend rehab placement at D/C.

## 2023-05-09 NOTE — PLAN OF CARE
Goal Outcome Evaluation:  Plan of Care Reviewed With: patient, daughter        Progress: no change  Outcome Evaluation: Pt currently in bed and asleep;  daughter at bedside;  zofran given for nausea and tylenol for HA;  skin tear to bilateral arms covered with mepilex;  up with assist and walker to BR;  Lovenox for VTE;  IVF and ABX given as ordered;  safety maintained

## 2023-05-09 NOTE — PLAN OF CARE
Goal Outcome Evaluation:   A&O. RA. Pt has been resting throughout the day. Complained of a headache, tylenol prn given per chart, also complained of nausea x 1, zofran prn given. Sat up in chair for several hours, tolerated well. IV infusing. No complaints of SOB, no fever noted. Monitor BP. Tolerated meals. Family at bedside.

## 2023-05-09 NOTE — PLAN OF CARE
Goal Outcome Evaluation:  Plan of Care Reviewed With: patient, daughter        Progress: no change  Outcome Evaluation: pt in chair, c/o back pain, trans sit-stand cga, pt amb 50 feet rwx 02 @ 3L, worked on BLE AROM standing at rail, as well as balance activities with gait cga, pt would benefit from SNF for strengthening,balance and endurance

## 2023-05-09 NOTE — THERAPY TREATMENT NOTE
Patient Name: Pattie Liu  : 1945    MRN: [id number]                              Today's Date: 2023       Admit Date: 2023    Visit Dx:     ICD-10-CM ICD-9-CM   1. Impaired mobility  Z74.09 799.89     Patient Active Problem List   Diagnosis   • Frequent PVCs   • Shortness of breath   • SOB (shortness of breath)   • CAD in native artery   • PVD (peripheral vascular disease)   • Pneumonia due to infectious organism   • Chronic back pain   • Essential hypertension   • Fall, initial encounter   • Traumatic rhabdomyolysis   • Leukocytosis   • Anemia   • Degenerative disc disease, lumbar   • Dehydration   • Acute UTI   • Chronic respiratory failure with hypoxia   • Impaired mobility   • UTI (urinary tract infection)     Past Medical History:   Diagnosis Date   • CAD in native artery 2019   • COPD (chronic obstructive pulmonary disease)    • Essential hypertension 2021   • GERD (gastroesophageal reflux disease)    • Lung nodule      Past Surgical History:   Procedure Laterality Date   • BREAST IMPLANT REMOVAL     • BREAST TISSUE EXPANDER REMOVAL INSERTION OF IMPLANT     • CARDIAC CATHETERIZATION N/A 2019    Procedure: Left Heart Cath;  Surgeon: Edi Armijo MD;  Location: [facility] CATH INVASIVE LOCATION;  Service: Cardiology   • CHOLECYSTECTOMY     • HYSTERECTOMY     • MASTECTOMY      BILATERAL   • OOPHORECTOMY        General Information     Row Name 23 1334          OT Time and Intention    Document Type therapy note (daily note)  -LR     Mode of Treatment occupational therapy  -LR     Row Name 23 1333          General Information    Patient Profile Reviewed yes  -LR     Existing Precautions/Restrictions fall;oxygen therapy device and L/min  -LR           User Key  (r) = Recorded By, (t) = Taken By, (c) = Cosigned By    Initials Name Provider Type    LR Germania Stern OTR/L Occupational Therapist                 Mobility/ADL's     Row Name 23 1332           Bed Mobility    Bed Mobility --  -LR     Comment, (Bed Mobility) sitting in chair  -LR     Row Name 05/09/23 1334          Transfers    Transfers sit-stand transfer;stand-sit transfer;toilet transfer  -LR     Comment, (Transfers) multiple sit<>stand t/f's from chair, shower chair, and toilet  -LR     Row Name 05/09/23 1334          Sit-Stand Transfer    Sit-Stand [place] (Transfers) contact guard;verbal cues  -LR     Assistive Device (Sit-Stand Transfers) walker, front-wheeled  -LR     Row Name 05/09/23 1334          Stand-Sit Transfer    Stand-Sit [place] (Transfers) contact guard;verbal cues  -LR     Assistive Device (Stand-Sit Transfers) walker, front-wheeled  -LR     Row Name 05/09/23 1334          Toilet Transfer    Type (Toilet Transfer) stand-sit;sit-stand  -LR     [place] Level (Toilet Transfer) contact guard;verbal cues  -LR     Assistive Device (Toilet Transfer) walker, front-wheeled  -LR     Row Name 05/09/23 1334          Functional Mobility    Functional Mobility- Ind. Level contact guard assist;verbal cues required  -LR     Functional Mobility- Device walker, front-wheeled  -LR     Functional Mobility- Safety Issues supplemental O2  -LR     Row Name 05/09/23 1334          Activities of Daily Living    BADL Assessment/Intervention bathing;upper body dressing;lower body dressing;grooming;toileting  -LR     Row Name 05/09/23 1334          Lower Body Dressing Assessment/Training    [place] Level (Lower Body Dressing) doff;don;socks;shoes/slippers;standby assist;pants/bottoms;contact guard assist;verbal cues  -LR     Position (Lower Body Dressing) unsupported sitting;unsupported standing  -LR     Row Name 05/09/23 1334          Toileting Assessment/Training    [place] Level (Toileting) toileting skills;contact guard assist;standby assist;verbal cues  -LR     Assistive Devices (Toileting) commode;grab bar/safety frame  -LR     Position (Toileting) unsupported sitting;supported  standing  -LR     Row Name 05/09/23 1334          Grooming Assessment/Training    [place] Level (Grooming) hair care, combing/brushing;oral care regimen;wash face, hands;standby assist;verbal cues  -LR     Position (Grooming) unsupported standing  -LR     [facility] Name 05/09/23 1334          Upper Body Dressing Assessment/Training    [place] Level (Upper Body Dressing) doff;standby assist;don;minimum assist (75% patient effort)  gown  -LR     Position (Upper Body Dressing) unsupported sitting;unsupported standing  -LR     [facility] Name 05/09/23 1334          Bathing Assessment/Intervention    [place] Level (Bathing) bathing skills;standby assist;contact guard assist;verbal cues  -LR     Assistive Devices (Bathing) grab bar, tub/shower;hand-held shower spray hose;shower chair  -LR     Position (Bathing) unsupported sitting;supported standing  -LR           User Key  (r) = Recorded By, (t) = Taken By, (c) = Cosigned By    Initials Name Provider Type    Germania Tao OTR/L Occupational Therapist               Obj/Interventions     Row Name 05/09/23 1334          Balance    Balance Assessment sitting static balance;sitting dynamic balance;standing static balance;standing dynamic balance  -LR     Static Sitting Balance supervision  -LR     Dynamic Sitting Balance standby assist  -LR     Position, Sitting Balance unsupported  -LR     Static Standing Balance contact guard;verbal cues  -LR     Dynamic Standing Balance contact guard  -LR     Position/Device Used, Standing Balance supported;walker, front-wheeled;unsupported  -LR           User Key  (r) = Recorded By, (t) = Taken By, (c) = Cosigned By    Initials Name Provider Type    Germania Tao, OTR/L Occupational Therapist               Goals/Plan    No documentation.                Clinical Impression     [facility] Name 05/09/23 1334          Pain Assessment    Pretreatment Pain Rating 0/10 - no pain  -LR     Posttreatment Pain Rating 0/10 - no pain   -LR     Row Name 05/09/23 3135          Plan of Care Review    Plan of Care Reviewed With patient;daughter  -LR     Progress improving  -LR     Outcome Evaluation OT tx completed. Pt presents requesting a shower. Pt completed sit<>stand t/f's with CGA, fxl mobility using FWW with CGA, toileting with CGA/SBA, UB dressing with SBA/Min A, LB dressing with SBA/CGA, bathing with SBA/CGA using shower chair, and grooming with SBA. Pt educated on energy conservation tech during fxl tasks. Pt required occ cues throughout for safety and tech. Continue OT POC. Recommend rehab placement at D/C.  -LR     Row Name 05/09/23 9559          Vital Signs    Intratreatment Heart Rate (beats/min) 53  -LR     O2 Delivery Pre Treatment supplemental O2  2.5 L  -LR     Intra SpO2 (%) 95  -LR     O2 Delivery Intra Treatment supplemental O2  3 L  -LR     O2 Delivery Post Treatment supplemental O2  2.5 L  -LR     Pre Patient Position Sitting  -LR     Intra Patient Position Standing  -LR     Post Patient Position Sitting  -LR     Row Name 05/09/23 5800          Positioning and Restraints    Pre-Treatment Position sitting in chair/recliner  -LR     Post Treatment Position chair  -LR     In Chair notified nsg;sitting;call light within reach;encouraged to call for assist;with family/caregiver  -LR           User Key  (r) = Recorded By, (t) = Taken By, (c) = Cosigned By    Initials Name Provider Type    LR Germania Stern, OTR/L Occupational Therapist               Outcome Measures     Row Name 05/09/23 6624          How much help from another is currently needed...    Putting on and taking off regular lower body clothing? 3  -LR     Bathing (including washing, rinsing, and drying) 3  -LR     Toileting (which includes using toilet bed pan or urinal) 3  -LR     Putting on and taking off regular upper body clothing 3  -LR     Taking care of personal grooming (such as brushing teeth) 3  -LR     Eating meals 4  -LR     AM-PAC 6 Clicks Score (OT) 19   -LR     Row Name 05/09/23 1000 05/09/23 0800       How much help from another person do you currently need...    Turning from your back to your side while in flat bed without using bedrails? 4  -AH 4  -EG    Moving from lying on back to sitting on the side of a flat bed without bedrails? 4  -AH 4  -EG    Moving to and from a bed to a chair (including a wheelchair)? 3  -AH 3  -EG    Standing up from a chair using your arms (e.g., wheelchair, bedside chair)? 3  -AH 3  -EG    Climbing 3-5 steps with a railing? 2  -AH 2  -EG    To walk in hospital room? 3  -AH 3  -EG    AM-PAC 6 Clicks Score (PT) 19  -AH 19  -EG    Highest level of mobility 6 --> Walked 10 steps or more  -AH 6 --> Walked 10 steps or more  -EG    Row Name 05/09/23 1334 05/09/23 1000       Functional Assessment    Outcome Measure Options AM-PAC 6 Clicks Daily Activity (OT)  -LR AM-PAC 6 Clicks Basic Mobility (PT)  -          User Key  (r) = Recorded By, (t) = Taken By, (c) = Cosigned By    Initials Name Provider Type     Rashida Crockett PTA Physical Therapist Assistant    Jasmine Eason RN Registered Nurse    Germania Tao OTR/L Occupational Therapist                Occupational Therapy Education     Title: PT OT SLP Therapies (Done)     Topic: Occupational Therapy (Done)     Point: ADL training (Done)     Description:   Instruct learner(s) on proper safety adaptation and remediation techniques during self care or transfers.   Instruct in proper use of assistive devices.              Learning Progress Summary           Patient Acceptance, E,D, VU,NR by LR at 5/9/2023 1457    Acceptance, E,D, VU,NR by CH at 5/8/2023 1038    Acceptance, E,TB, VU by CM at 5/7/2023 1721    Acceptance, E,TB, VU by CM at 5/6/2023 1055    Acceptance, E,D, VU,NR by LR at 5/5/2023 1051   Family Acceptance, E,D, VU,NR by LR at 5/9/2023 1457    Acceptance, E,TB, VU by CM at 5/7/2023 1721    Acceptance, E,TB, VU by CM at 5/6/2023 1055    Acceptance, E,D, VU,NR by  LR at 5/5/2023 1051                   Point: Home exercise program (Done)     Description:   Instruct learner(s) on appropriate technique for monitoring, assisting and/or progressing therapeutic exercises/activities.              Learning Progress Summary           Patient Acceptance, E,TB, VU by CM at 5/7/2023 1721    Acceptance, E,TB, VU by CM at 5/6/2023 1055   Family Acceptance, E,TB, VU by CM at 5/7/2023 1721    Acceptance, E,TB, VU by CM at 5/6/2023 1055                   Point: Precautions (Done)     Description:   Instruct learner(s) on prescribed precautions during self-care and functional transfers.              Learning Progress Summary           Patient Acceptance, E,D, VU,NR by LR at 5/9/2023 1457    Acceptance, E,TB, VU by CM at 5/7/2023 1721    Acceptance, E,TB, VU by CM at 5/6/2023 1055    Acceptance, E,D, VU,NR by LR at 5/5/2023 1051   Family Acceptance, E,D, VU,NR by LR at 5/9/2023 1457    Acceptance, E,TB, VU by CM at 5/7/2023 1721    Acceptance, E,TB, VU by CM at 5/6/2023 1055    Acceptance, E,D, VU,NR by LR at 5/5/2023 1051                   Point: Body mechanics (Done)     Description:   Instruct learner(s) on proper positioning and spine alignment during self-care, functional mobility activities and/or exercises.              Learning Progress Summary           Patient Acceptance, E,D, VU,NR by LR at 5/9/2023 1457    Acceptance, E,TB, VU by CM at 5/7/2023 1721    Acceptance, E,TB, VU by CM at 5/6/2023 1055    Acceptance, E,D, VU,NR by LR at 5/5/2023 1051   Family Acceptance, E,D, VU,NR by LR at 5/9/2023 1457    Acceptance, E,TB, VU by CM at 5/7/2023 1721    Acceptance, E,TB, VU by CM at 5/6/2023 1055    Acceptance, E,D, VU,NR by LR at 5/5/2023 1051                               User Key     Initials Effective Dates Name Provider Type Discipline     06/16/21 -  Cecily Raymond, OTR/L Occupational Therapist OT    CM 03/02/23 -  Ana Marsh, RN Registered Nurse Nurse    LR 04/25/23 -   Germania Stern, OTR/L Occupational Therapist OT              OT Recommendation and Plan  Planned Therapy Interventions (OT): activity tolerance training, adaptive equipment training, BADL retraining, functional balance retraining, IADL retraining, neuromuscular control/coordination retraining, occupation/activity based interventions, patient/caregiver education/training, ROM/therapeutic exercise, strengthening exercise, transfer/mobility retraining  Therapy Frequency (OT): 5 times/wk  Plan of Care Review  Plan of Care Reviewed With: patient, daughter  Progress: improving  Outcome Evaluation: OT tx completed. Pt presents requesting a shower. Pt completed sit<>stand t/f's with CGA, fxl mobility using FWW with CGA, toileting with CGA/SBA, UB dressing with SBA/Min A, LB dressing with SBA/CGA, bathing with SBA/CGA using shower chair, and grooming with SBA. Pt educated on energy conservation tech during fxl tasks. Pt required occ cues throughout for safety and tech. Continue OT POC. Recommend rehab placement at D/C.     Time Calculation:    Time Calculation- OT     Row Name 05/09/23 1334 05/09/23 1025          Time Calculation- OT    OT Start Time 1334  -LR --     OT Stop Time 1445  -LR --     OT Time Calculation (min) 71 min  -LR --     Total Timed Code Minutes- OT 71 minute(s)  -LR --     OT Received On 05/09/23  -LR --        Timed Charges    65824 - Gait Training Minutes  -- 15  -AH     59553 - OT Self Care/Mgmt Minutes 71  -LR --        Total Minutes    Timed Charges Total Minutes 71  -LR 15  -AH      Total Minutes 71  -LR 15  -AH           User Key  (r) = Recorded By, (t) = Taken By, (c) = Cosigned By    Initials Name Provider Type     Rashida Crockett, PTA Physical Therapist Assistant    LR Germania Stern, OTR/L Occupational Therapist              Therapy Charges for Today     Code Description Service Date Service Provider Modifiers Qty    [id number]  OT SELF CARE/MGMT/TRAIN EA 15 MIN 5/9/2023  Germania Stern, OTR/L GO 5               Germania Stern, OTR/L  5/9/2023

## 2023-05-10 LAB
ANION GAP SERPL CALCULATED.3IONS-SCNC: 7 MMOL/L (ref 5–15)
BUN SERPL-MCNC: 13 MG/DL (ref 8–23)
BUN/CREAT SERPL: 17.8 (ref 7–25)
CALCIUM SPEC-SCNC: 9.5 MG/DL (ref 8.6–10.5)
CHLORIDE SERPL-SCNC: 106 MMOL/L (ref 98–107)
CO2 SERPL-SCNC: 29 MMOL/L (ref 22–29)
CREAT SERPL-MCNC: 0.73 MG/DL (ref 0.57–1)
EGFRCR SERPLBLD CKD-EPI 2021: 83.3 ML/MIN/1.73
GLUCOSE SERPL-MCNC: 88 MG/DL (ref 65–99)
POTASSIUM SERPL-SCNC: 3.8 MMOL/L (ref 3.5–5.2)
SODIUM SERPL-SCNC: 142 MMOL/L (ref 136–145)

## 2023-05-10 PROCEDURE — 25010000002 ENOXAPARIN PER 10 MG: Performed by: FAMILY MEDICINE

## 2023-05-10 PROCEDURE — 97110 THERAPEUTIC EXERCISES: CPT

## 2023-05-10 PROCEDURE — 97535 SELF CARE MNGMENT TRAINING: CPT | Performed by: OCCUPATIONAL THERAPIST

## 2023-05-10 PROCEDURE — 80048 BASIC METABOLIC PNL TOTAL CA: CPT | Performed by: FAMILY MEDICINE

## 2023-05-10 PROCEDURE — 97116 GAIT TRAINING THERAPY: CPT

## 2023-05-10 PROCEDURE — 25010000002 ONDANSETRON PER 1 MG: Performed by: FAMILY MEDICINE

## 2023-05-10 RX ADMIN — ACETAMINOPHEN 650 MG: 325 TABLET, FILM COATED ORAL at 13:56

## 2023-05-10 RX ADMIN — GABAPENTIN 800 MG: 400 CAPSULE ORAL at 10:00

## 2023-05-10 RX ADMIN — ACETAMINOPHEN 650 MG: 325 TABLET, FILM COATED ORAL at 20:26

## 2023-05-10 RX ADMIN — ALPRAZOLAM 1 MG: 0.5 TABLET ORAL at 10:00

## 2023-05-10 RX ADMIN — AMLODIPINE BESYLATE 10 MG: 10 TABLET ORAL at 10:00

## 2023-05-10 RX ADMIN — LISINOPRIL 20 MG: 20 TABLET ORAL at 10:00

## 2023-05-10 RX ADMIN — ASPIRIN 81 MG: 81 TABLET, COATED ORAL at 10:00

## 2023-05-10 RX ADMIN — ONDANSETRON 4 MG: 2 INJECTION INTRAMUSCULAR; INTRAVENOUS at 17:42

## 2023-05-10 RX ADMIN — ONDANSETRON 4 MG: 2 INJECTION INTRAMUSCULAR; INTRAVENOUS at 10:00

## 2023-05-10 RX ADMIN — ENOXAPARIN SODIUM 40 MG: 100 INJECTION SUBCUTANEOUS at 10:01

## 2023-05-10 RX ADMIN — SODIUM CHLORIDE 50 ML/HR: 9 INJECTION, SOLUTION INTRAVENOUS at 19:40

## 2023-05-10 RX ADMIN — GABAPENTIN 800 MG: 400 CAPSULE ORAL at 20:20

## 2023-05-10 RX ADMIN — DOCUSATE SODIUM 50 MG AND SENNOSIDES 8.6 MG 2 TABLET: 8.6; 5 TABLET, FILM COATED ORAL at 10:00

## 2023-05-10 RX ADMIN — ALPRAZOLAM 1 MG: 0.5 TABLET ORAL at 20:20

## 2023-05-10 RX ADMIN — BUMETANIDE 1 MG: 1 TABLET ORAL at 10:00

## 2023-05-10 RX ADMIN — ACETAMINOPHEN 650 MG: 325 TABLET, FILM COATED ORAL at 10:00

## 2023-05-10 RX ADMIN — NICOTINE 1 PATCH: 21 PATCH, EXTENDED RELEASE TRANSDERMAL at 20:20

## 2023-05-10 RX ADMIN — CITALOPRAM 20 MG: 20 TABLET, FILM COATED ORAL at 10:00

## 2023-05-10 RX ADMIN — Medication 10 ML: at 10:01

## 2023-05-10 NOTE — PLAN OF CARE
Goal Outcome Evaluation:   A&O. 2.5 NC. Pt has been resting. Ambulated in bathroom. Complained of a headache, as well as nausea, zofran and tylenol prn was given. IV fluid infusing. Daughter at bedside. VSS.

## 2023-05-10 NOTE — PLAN OF CARE
Goal Outcome Evaluation:  Plan of Care Reviewed With: patient, daughter        Progress: no change  Outcome Evaluation: pt in chair, trans sit-stand cga, pt amb 50 feet a time rwx cga,worked on BLE strengthening ex standing at rail, as well as balance activities cga HHA, pt would benefit from SNF for strengthening,balance and endurance

## 2023-05-10 NOTE — PROGRESS NOTES
Daily Progress Note  Pattie Liu  MRN: [id number] LOS: 3    Admit Date: 5/4/2023   5/10/2023 08:17 CDT    Subjective:          Chief Complaint:  No chief complaint on file.      Interval History:    Reviewed overnight events and nursing notes.   Improved, but still significant weakness and slow gait.  Continues to be incontinent of bowels and bladder.  SNF recommendations from PT/OT.  Still requiring IV rehydration. Completed IV antibiotics for UTI.    Review of Systems   Constitutional: Positive for activity change, appetite change and fatigue. Negative for fever.   Respiratory: Positive for shortness of breath.    Gastrointestinal: Negative for nausea and vomiting.   Genitourinary: Positive for decreased urine volume and dysuria.   Musculoskeletal: Positive for gait problem.   Neurological: Positive for weakness.       DIET:  Diet: Regular/House Diet, Cardiac Diets; Healthy Heart (2-3 Na+); Texture: Regular Texture (IDDSI 7); Fluid Consistency: Thin (IDDSI 0)    Medications:   O2, 2 L/min, Last Rate: 2 L/min (05/04/23 2127)  sodium chloride, 50 mL/hr, Last Rate: 50 mL/hr (05/09/23 2474)      ALPRAZolam, 1 mg, Oral, BID  amLODIPine, 10 mg, Oral, Q24H  aspirin, 81 mg, Oral, Daily  bumetanide, 1 mg, Oral, Daily  citalopram, 20 mg, Oral, Daily  enoxaparin, 40 mg, Subcutaneous, Daily  gabapentin, 800 mg, Oral, Q12H  lisinopril, 20 mg, Oral, Daily  nicotine, 1 patch, Transdermal, Q24H  senna-docusate sodium, 2 tablet, Oral, BID  sodium chloride, 10 mL, Intravenous, Q12H        Data:     Code Status:   Code Status and Medical Interventions:   Ordered at: 05/05/23 4581     Code Status (Patient has no pulse and is not breathing):    No CPR (Do Not Attempt to Resuscitate)     Medical Interventions (Patient has pulse or is breathing):    Full Support     Release to patient:    Routine Release       Family History   Problem Relation Age of Onset   • Cancer Mother    • Cancer Father      Social History     Socioeconomic  History   • Marital status:    Tobacco Use   • Smoking status: Every Day     Packs/day: 0.50     Years: 62.00     Pack years: 31.00     Types: Cigarettes   • Smokeless tobacco: Never   • Tobacco comments:     states she has no plan to quit smoking   Vaping Use   • Vaping Use: Former   Substance and Sexual Activity   • Alcohol use: No   • Drug use: No   • Sexual activity: Not Currently       Labs:    Lab Results (last 72 hours)     Procedure Component Value Units Date/Time    Basic Metabolic Panel [[id number]]  (Normal) Collected: 05/10/23 0457    Specimen: Blood Updated: 05/10/23 0546     Glucose 88 mg/dL      BUN 13 mg/dL      Creatinine 0.73 mg/dL      Sodium 142 mmol/L      Potassium 3.8 mmol/L      Chloride 106 mmol/L      CO2 29.0 mmol/L      Calcium 9.5 mg/dL      BUN/Creatinine Ratio 17.8     Anion Gap 7.0 mmol/L      eGFR 83.3 mL/min/1.73     Narrative:      GFR Normal >60  Chronic Kidney Disease <60  Kidney Failure <15    The GFR formula is only valid for adults with stable renal function between ages 18 and 70.    Blood Culture - Blood, Hand, Right [[id number]]  (Normal) Collected: 05/04/23 2327    Specimen: Blood from Hand, Right Updated: 05/09/23 2345     Blood Culture No growth at 5 days    Blood Culture - Blood, Hand, Left [[id number]]  (Normal) Collected: 05/04/23 2327    Specimen: Blood from Hand, Left Updated: 05/09/23 2345     Blood Culture No growth at 5 days    Basic Metabolic Panel [[id number]]  (Normal) Collected: 05/09/23 0348    Specimen: Blood Updated: 05/09/23 0431     Glucose 94 mg/dL      BUN 14 mg/dL      Creatinine 0.82 mg/dL      Sodium 144 mmol/L      Potassium 3.5 mmol/L      Chloride 107 mmol/L      CO2 29.0 mmol/L      Calcium 9.0 mg/dL      BUN/Creatinine Ratio 17.1     Anion Gap 8.0 mmol/L      eGFR 72.4 mL/min/1.73     Narrative:      GFR Normal >60  Chronic Kidney Disease <60  Kidney Failure <15    The GFR formula is only valid for adults with stable renal function between  "ages 18 and 70.    Basic Metabolic Panel [[id number]]  (Normal) Collected: 23 0350    Specimen: Blood Updated: 23 05     Glucose 86 mg/dL      BUN 13 mg/dL      Creatinine 0.73 mg/dL      Sodium 142 mmol/L      Potassium 3.7 mmol/L      Chloride 106 mmol/L      CO2 26.0 mmol/L      Calcium 8.9 mg/dL      BUN/Creatinine Ratio 17.8     Anion Gap 10.0 mmol/L      eGFR 83.3 mL/min/1.73     Narrative:      GFR Normal >60  Chronic Kidney Disease <60  Kidney Failure <15    The GFR formula is only valid for adults with stable renal function between ages 18 and 70.              Objective:     Vitals: /56 (BP Location: Left arm, Patient Position: Lying)   Pulse 64   Temp 97.6 °F (36.4 °C) (Oral)   Resp 16   Ht 160 cm (63\")   Wt 60.8 kg (134 lb)   SpO2 97%   BMI 23.74 kg/m²      Intake/Output Summary (Last 24 hours) at 5/10/2023 0817  Last data filed at 2023 2349  Gross per 24 hour   Intake 1220 ml   Output --   Net 1220 ml    Temp (24hrs), Av.1 °F (36.7 °C), Min:97.6 °F (36.4 °C), Max:98.4 °F (36.9 °C)      Physical Exam  Vitals reviewed.   Constitutional:       Appearance: Normal appearance. She is not ill-appearing.   HENT:      Head: Normocephalic and atraumatic.   Eyes:      General:         Right eye: No discharge.         Left eye: No discharge.      Extraocular Movements: Extraocular movements intact.      Conjunctiva/sclera: Conjunctivae normal.   Cardiovascular:      Rate and Rhythm: Normal rate and regular rhythm.      Pulses: Normal pulses.      Heart sounds: No murmur heard.  Pulmonary:      Effort: Pulmonary effort is normal. No respiratory distress.      Comments: Nasal cannula in place  Abdominal:      General: Abdomen is flat. Bowel sounds are normal. There is no distension.   Musculoskeletal:      Right lower leg: No edema.      Left lower leg: No edema.   Skin:     Capillary Refill: Capillary refill takes less than 2 seconds.   Neurological:      General: No focal deficit " present.      Mental Status: She is alert.   Psychiatric:         Mood and Affect: Mood normal.         Behavior: Behavior normal.             Assessment and Plan:     Primary Problem:  Dehydration    Hospital Problem list:    Dehydration    CAD in native artery    PVD (peripheral vascular disease)    Essential hypertension    Acute UTI    Chronic respiratory failure with hypoxia    Impaired mobility    UTI (urinary tract infection)      PMH:  Past Medical History:   Diagnosis Date   • CAD in native artery 7/29/2019   • COPD (chronic obstructive pulmonary disease)    • Essential hypertension 12/7/2021   • GERD (gastroesophageal reflux disease)    • Lung nodule        Treatment Plan:    E. coli UTI  Completed rocephin.     Acute on chronic decline  Had falls prior to admission.  This is most likely secondary to UTI.  Continue PT/OT and refer out for rehabilitation.  Case management assisting for SNF.    Dehydration  Secondary to above, improved with IVF rehydration.    Disposition: Inpatient, medically ready for discharge to SNF when approved.    Reviewed treatment plans with the patient and/or family.   20 minutes spent in face to face interaction and coordination of care.     Electronically signed by Anupam Ledezma MD on 5/10/2023 at 08:17 CDT

## 2023-05-10 NOTE — THERAPY TREATMENT NOTE
Patient Name: Pattie Liu  : 1945    MRN: [id number]                              Today's Date: 5/10/2023       Admit Date: 2023    Visit Dx:     ICD-10-CM ICD-9-CM   1. Impaired mobility  Z74.09 799.89     Patient Active Problem List   Diagnosis   • Frequent PVCs   • Shortness of breath   • SOB (shortness of breath)   • CAD in native artery   • PVD (peripheral vascular disease)   • Pneumonia due to infectious organism   • Chronic back pain   • Essential hypertension   • Fall, initial encounter   • Traumatic rhabdomyolysis   • Leukocytosis   • Anemia   • Degenerative disc disease, lumbar   • Dehydration   • Acute UTI   • Chronic respiratory failure with hypoxia   • Impaired mobility   • UTI (urinary tract infection)     Past Medical History:   Diagnosis Date   • CAD in native artery 2019   • COPD (chronic obstructive pulmonary disease)    • Essential hypertension 2021   • GERD (gastroesophageal reflux disease)    • Lung nodule      Past Surgical History:   Procedure Laterality Date   • BREAST IMPLANT REMOVAL     • BREAST TISSUE EXPANDER REMOVAL INSERTION OF IMPLANT     • CARDIAC CATHETERIZATION N/A 2019    Procedure: Left Heart Cath;  Surgeon: Edi Armijo MD;  Location:  [facility] C[facility] INVASIVE LOC[facility];  Service: Cardiology   • CHOLECYSTECTOMY     • HYSTERECTOMY     • MASTECTOMY      BILATERAL   • OOPHORECTOMY        General Information     Row Name 05/10/23 1425          OT Time and Intention    Document Type therapy note (daily note)  -     Mode of Treatment occupational therapy  -     Row Name 05/10/23 1425          General Information    Patient Profile Reviewed yes  -CH     Existing Precautions/Restrictions fall;oxygen therapy device and L/min  -     Row Name 05/10/23 1425          Cognition    Orientation Status (Cognition) oriented x 4  -     Row Name 05/10/23 1425          Safety Issues, Functional Mobility    Impairments Affecting Function (Mobility)  balance;coordination;endurance/activity tolerance;pain;shortness of breath;strength  -           User Key  (r) = Recorded By, (t) = Taken By, (c) = Cosigned By    Initials Name Provider Type    Cecily Morrow OTR/L Occupational Therapist                 Mobility/ADL's     Row Name 05/10/23 1425          Bed Mobility    Bed Mobility rolling left;rolling right  -     Rolling Left [place] (Bed Mobility) modified independence  -     Rolling Right [place] (Bed Mobility) modified independence  -     Supine-Sit [place] (Bed Mobility) modified independence  -     Sit-Supine [place] (Bed Mobility) modified independence  -     Row Name 05/10/23 1425          Activities of Daily Living    BADL Assessment/Intervention upper body dressing  -     Row Name 05/10/23 1425          Upper Body Dressing Assessment/Training    [place] Level (Upper Body Dressing) don;doff  -     Position (Upper Body Dressing) unsupported sitting;unsupported standing  -           User Key  (r) = Recorded By, (t) = Taken By, (c) = Cosigned By    Initials Name Provider Type    Cecily Morrow, OTR/L Occupational Therapist               Obj/Interventions     Row Name 05/10/23 1425          Balance    Balance Interventions sitting;static;dynamic  -           User Key  (r) = Recorded By, (t) = Taken By, (c) = Cosigned By    Initials Name Provider Type    Cecily Morrow, OTR/L Occupational Therapist               Goals/Plan    No documentation.                Clinical Impression     Row Name 05/10/23 1425          Pain Assessment    Pretreatment Pain Rating 4/10  -CH     Posttreatment Pain Rating 4/10  -CH     Pain Location - head  -     Pre/Posttreatment Pain Comment nausea  -     Row Name 05/10/23 1425          Plan of Care Review    Plan of Care Reviewed With patient;daughter  -     Outcome Evaluation pt. agreeable to mobilize in bed and change gown.  She reports nausea & headache. Pt. appears upset  regarding her fatigue, headache, & cont'd nausea.  Cont OT tx  -CH     Row Name 05/10/23 1425          Therapy Assessment/Plan (OT)    Criteria for Skilled Therapeutic Interventions Met (OT) yes;skilled treatment is necessary  -     Therapy Frequency (OT) 5 times/wk  -     Row Name 05/10/23 1425          Positioning and Restraints    Pre-Treatment Position in bed  -CH     Post Treatment Position bed  -CH     In Bed fowlers;call light within reach;encouraged to call for assist;side rails up x2;patient within staff view  -           User Key  (r) = Recorded By, (t) = Taken By, (c) = Cosigned By    Initials Name Provider Type     Cecily Raymond, OTR/L Occupational Therapist               Outcome Measures     Row Name 05/10/23 1513          How much help from another is currently needed...    Putting on and taking off regular lower body clothing? 3  -CH     Bathing (including washing, rinsing, and drying) 3  -CH     Toileting (which includes using toilet bed pan or urinal) 3  -CH     Putting on and taking off regular upper body clothing 3  -CH     Taking care of personal grooming (such as brushing teeth) 4  -CH     Eating meals 4  -CH     AM-PAC 6 Clicks Score (OT) 20  -CH     Row Name 05/10/23 0800          How much help from another person do you currently need...    Turning from your back to your side while in flat bed without using bedrails? 4  -AH     Moving from lying on back to sitting on the side of a flat bed without bedrails? 4  -AH     Moving to and from a bed to a chair (including a wheelchair)? 3  -AH     Standing up from a chair using your arms (e.g., wheelchair, bedside chair)? 3  -AH     Climbing 3-5 steps with a railing? 3  -AH     To walk in hospital room? 3  -AH     AM-PAC 6 Clicks Score (PT) 20  -AH     Highest level of mobility 6 --> Walked 10 steps or more  -     Row Name 05/10/23 1513 05/10/23 0800       Functional Assessment    Outcome Measure Options AM-PAC 6 Clicks Daily Activity (OT)   - AM-PAC 6 Clicks Basic Mobility (PT)  -          User Key  (r) = Recorded By, (t) = Taken By, (c) = Cosigned By    Initials Name Provider Type     Rashida Crockett, PTA Physical Therapist Assistant     Cecily Raymond, OTR/L Occupational Therapist                Occupational Therapy Education     Title: PT OT SLP Therapies (Done)     Topic: Occupational Therapy (Done)     Point: ADL training (Done)     Description:   Instruct learner(s) on proper safety adaptation and remediation techniques during self care or transfers.   Instruct in proper use of assistive devices.              Learning Progress Summary           Patient Acceptance, E,D, VU,NR by LR at 5/9/2023 1457    Acceptance, E,D, VU,NR by CH at 5/8/2023 1038    Acceptance, E,TB, VU by CM at 5/7/2023 1721    Acceptance, E,TB, VU by CM at 5/6/2023 1055    Acceptance, E,D, VU,NR by LR at 5/5/2023 1051   Family Acceptance, E,D, VU,NR by LR at 5/9/2023 1457    Acceptance, E,TB, VU by CM at 5/7/2023 1721    Acceptance, E,TB, VU by CM at 5/6/2023 1055    Acceptance, E,D, VU,NR by LR at 5/5/2023 1051                   Point: Home exercise program (Done)     Description:   Instruct learner(s) on appropriate technique for monitoring, assisting and/or progressing therapeutic exercises/activities.              Learning Progress Summary           Patient Acceptance, E,TB, VU by CM at 5/7/2023 1721    Acceptance, E,TB, VU by CM at 5/6/2023 1055   Family Acceptance, E,TB, VU by CM at 5/7/2023 1721    Acceptance, E,TB, VU by CM at 5/6/2023 1055                   Point: Precautions (Done)     Description:   Instruct learner(s) on prescribed precautions during self-care and functional transfers.              Learning Progress Summary           Patient Acceptance, E,D, VU,NR by LR at 5/9/2023 1457    Acceptance, E,TB, VU by CM at 5/7/2023 1721    Acceptance, E,TB, VU by CM at 5/6/2023 1055    Acceptance, E,D, VU,NR by LR at 5/5/2023 1051   Family Acceptance, E,D, VU,NR  by LR at 5/9/2023 1457    Acceptance, E,TB, VU by CM at 5/7/2023 1721    Acceptance, E,TB, VU by CM at 5/6/2023 1055    Acceptance, E,D, VU,NR by LR at 5/5/2023 1051                   Point: Body mechanics (Done)     Description:   Instruct learner(s) on proper positioning and spine alignment during self-care, functional mobility activities and/or exercises.              Learning Progress Summary           Patient Acceptance, E,D, VU,NR by LR at 5/9/2023 1457    Acceptance, E,TB, VU by CM at 5/7/2023 1721    Acceptance, E,TB, VU by CM at 5/6/2023 1055    Acceptance, E,D, VU,NR by LR at 5/5/2023 1051   Family Acceptance, E,D, VU,NR by LR at 5/9/2023 1457    Acceptance, E,TB, VU by CM at 5/7/2023 1721    Acceptance, E,TB, VU by CM at 5/6/2023 1055    Acceptance, E,D, VU,NR by LR at 5/5/2023 1051                               User Key     Initials Effective Dates Name Provider Type Discipline     06/16/21 -  Cecily Raymond, OTR/L Occupational Therapist OT     03/02/23 -  Ana Marsh, RN Registered Nurse Nurse     04/25/23 -  Germania Stern OTR/L Occupational Therapist OT              OT Recommendation and Plan  Therapy Frequency (OT): 5 times/wk  Plan of Care Review  Plan of Care Reviewed With: patient, daughter  Progress: no change  Outcome Evaluation: pt. agreeable to mobilize in bed and change gown.  She reports nausea & headache. Pt. appears upset regarding her fatigue, headache, & cont'd nausea.  Cont OT tx     Time Calculation:    Time Calculation- OT     Row Name 05/10/23 1425 05/10/23 1155 05/10/23 0855       Time Calculation- OT    OT Start Time 1425  - -- --    OT Stop Time 1455  - -- --    OT Time Calculation (min) 30 min  - -- --    Total Timed Code Minutes- OT 30 minute(s)  - -- --    OT Received On 05/10/23  - -- --       Timed Charges    75548 - Gait Training Minutes  -- 12  -AH 12  -    03305 - OT Self Care/Mgmt Minutes 30  -CH -- --       Total Minutes    Timed Charges  Total Minutes 30  - 12  -AH 12  -AH     Total Minutes 30  - 12  -AH 12  -AH          User Key  (r) = Recorded By, (t) = Taken By, (c) = Cosigned By    Initials Name Provider Type     Rashida Crockett, PTA Physical Therapist Assistant     Cecily Raymond, OTR/L Occupational Therapist              Therapy Charges for Today     Code Description Service Date Service Provider Modifiers Qty    48404116270 HC OT SELF CARE/MGMT/TRAIN EA 15 MIN 5/10/2023 Cecily Raymond OTR/L GO 2               VENITA Schmitz/ABHISHEK  5/10/2023

## 2023-05-10 NOTE — THERAPY TREATMENT NOTE
Acute Care - Physical Therapy Treatment Note  [facility]     Patient Name: Pattie Liu  : 1945  MRN: [id number]  Today's Date: 5/10/2023      Visit Dx:     ICD-10-CM ICD-9-CM   1. Impaired mobility  Z74.09 799.89     Patient Active Problem List   Diagnosis   • Frequent PVCs   • Shortness of breath   • SOB (shortness of breath)   • CAD in native artery   • PVD (peripheral vascular disease)   • Pneumonia due to infectious organism   • Chronic back pain   • Essential hypertension   • Fall, initial encounter   • Traumatic rhabdomyolysis   • Leukocytosis   • Anemia   • Degenerative disc disease, lumbar   • Dehydration   • Acute UTI   • Chronic respiratory failure with hypoxia   • Impaired mobility   • UTI (urinary tract infection)     Past Medical History:   Diagnosis Date   • CAD in native artery 2019   • COPD (chronic obstructive pulmonary disease)    • Essential hypertension 2021   • GERD (gastroesophageal reflux disease)    • Lung nodule      Past Surgical History:   Procedure Laterality Date   • BREAST IMPLANT REMOVAL     • BREAST TISSUE EXPANDER REMOVAL INSERTION OF IMPLANT     • CARDIAC CATHETERIZATION N/A 2019    Procedure: Left Heart Cath;  Surgeon: Edi Armijo MD;  Location: [facility] INVASIVE LOCATION;  Service: Cardiology   • CHOLECYSTECTOMY     • HYSTERECTOMY     • MASTECTOMY      BILATERAL   • OOPHORECTOMY       PT Assessment (last 12 hours)     PT Evaluation and Treatment     Row Name 05/10/23 0826 05/10/23 0752       Physical Therapy Time and Intention    Subjective Information complains of;pain  -AH --    Document Type therapy note (daily note)  - --    Mode of Treatment physical therapy  - --    Session Not Performed -- other (see comments)  -    Comment, Session Not Performed -- with other staff  -    Row Name 05/10/23 0826          General Information    Existing Precautions/Restrictions fall;oxygen therapy device and L/min  3L  -     Row Name  05/10/23 0826          Pain    Pain Intervention(s) Repositioned;Ambulation/increased activity  -     Row Name 05/10/23 0826          Pain Scale: Word Pre/Post-Treatment    Pain: Word Scale, Pretreatment 6 - moderate-severe pain  -     Posttreatment Pain Rating 6 - moderate-severe pain  -     Pain Location - head  -     Row Name 05/10/23 0826          Bed Mobility    Bed Mobility supine-sit;sit-supine  -     Comment, (Bed Mobility) chair  -     Row Name 05/10/23 0826          Sit-Stand Transfer    Sit-Stand [place] (Transfers) contact guard;verbal cues  -     Row Name 05/10/23 0826          Stand-Sit Transfer    Stand-Sit [place] (Transfers) contact guard;verbal cues  -     Row Name 05/10/23 0826          Gait/Stairs (Locomotion)    [place] Level (Gait) contact guard;verbal cues  -     Assistive Device (Gait) walker, front-wheeled  -     Distance in Feet (Gait) 50  -     Bilateral Gait Deviations forward flexed posture  -     Comment, (Gait/Stairs) 50 x 4  -     Row Name 05/10/23 0826          Motor Skills    Comments, Therapeutic Exercise BLE AROM standing at rail  -[facility] Name 05/10/23 0826          Advanced Stepping/Walking Interventions    Backward Walking (Stepping/Walking Interventions) 20 x 2 cga A  -     Side Stepping (Stepping/Walking Interventions) 20 X 2  -     Row Name             Wound 05/04/23 2135 Left lower arm    Wound - Properties Group Placement Date: 05/04/23  -LL Placement Time: 2135 -LL Side: Left  -LL Orientation: lower  -LL Location: arm  -LL    Retired Wound - Properties Group Placement Date: 05/04/23  -LL Placement Time: 2135 -LL Side: Left  -LL Orientation: lower  -LL Location: arm  -LL    Retired Wound - Properties Group Date first assessed: 05/04/23  -LL Time first assessed: 2135 -LL Side: Left  -LL Location: arm  -LL    Row Name 05/10/23 0826          Plan of Care Review    Plan of Care Reviewed With patient;daughter  -      Progress no change  -     Outcome Evaluation pt in chair, trans sit-stand cga, pt amb 50 feet a time rwx cga,worked on BLE strengthening ex standing at rail, as well as balance activities cga A, pt would benefit from [facility] for strengthening,balance and endurance  -     Row Name 05/10/23 0826          Positioning and Restraints    Pre-Treatment Position sitting in chair/recliner  -     Post Treatment Position chair  -     In Chair sitting;call light within reach;encouraged to call for assist;exit alarm on;with family/caregiver  -           User Key  (r) = Recorded By, (t) = Taken By, (c) = Cosigned By    Initials Name Provider Type     Rashida Crockett, PTA Physical Therapist Assistant    Candy Gallardo, RN Registered Nurse                Physical Therapy Education     Title: PT OT SLP Therapies (Done)     Topic: Physical Therapy (Done)     Point: Mobility training (Done)     Learning Progress Summary           Patient Acceptance, E,TB, VU by CM at 5/7/2023 1721    Eager, E, VU by AE at 5/6/2023 1256    Comment: t/f's    Acceptance, E,TB, VU by CM at 5/6/2023 1055    Acceptance, E, VU by JQ at 5/5/2023 1107    Comment: Pt educated on AD use, transfer tech, d/c planning, benefits of skilled PT   Family Acceptance, E,TB, VU by CM at 5/7/2023 1721    Eager, E, VU by AE at 5/6/2023 1256    Comment: t/f's    Acceptance, E,TB, VU by CM at 5/6/2023 1055                   Point: Home exercise program (Done)     Learning Progress Summary           Patient Acceptance, E,TB, VU by CM at 5/7/2023 1721    Eager, E, VU by AE at 5/6/2023 1256    Comment: t/f's    Acceptance, E,TB, VU by CM at 5/6/2023 1055   Family Acceptance, E,TB, VU by CM at 5/7/2023 1721    Eager, E, VU by AE at 5/6/2023 1256    Comment: t/f's    Acceptance, E,TB, VU by CM at 5/6/2023 1055                   Point: Body mechanics (Done)     Learning Progress Summary           Patient Acceptance, E,TB, VU by CM at 5/7/2023 1721    Acceptance, E,TB,  VU by CM at 5/6/2023 1055    Acceptance, E, VU by JQ at 5/5/2023 1107    Comment: Pt educated on AD use, transfer tech, d/c planning, benefits of skilled PT   Family Acceptance, E,TB, VU by CM at 5/7/2023 1721    Acceptance, E,TB, VU by CM at 5/6/2023 1055                   Point: Precautions (Done)     Learning Progress Summary           Patient Acceptance, E,TB, VU by CM at 5/7/2023 1721    Acceptance, E,TB, VU by CM at 5/6/2023 1055    Acceptance, E, VU by JQ at 5/5/2023 1107    Comment: Pt educated on AD use, transfer tech, d/c planning, benefits of skilled PT   Family Acceptance, E,TB, VU by CM at 5/7/2023 1721    Acceptance, E,TB, VU by CM at 5/6/2023 1055                               User Key     Initials Effective Dates Name Provider Type Discipline    AE 02/03/23 -  Grisel Denny, PTA Physical Therapist Assistant PT    CM 03/02/23 -  Ana Marsh, RN Registered Nurse Nurse    JQ 02/20/23 -  Makenna Kirkland, PT Student PT Student PT              PT Recommendation and Plan     Plan of Care Reviewed With: patient, daughter  Progress: no change  Outcome Evaluation: pt in chair, trans sit-stand cga, pt amb 50 feet a time rwx cga,worked on BLE strengthening ex standing at rail, as well as balance activities cga HHA, pt would benefit from SNF for strengthening,balance and endurance   Outcome Measures     Row Name 05/10/23 0800 05/09/23 1000 05/08/23 0800       How much help from another person do you currently need...    Turning from your back to your side while in flat bed without using bedrails? 4  -AH 4  -AH 4  -AH    Moving from lying on back to sitting on the side of a flat bed without bedrails? 4  -AH 4  -AH 4  -AH    Moving to and from a bed to a chair (including a wheelchair)? 3  -AH 3  -AH 3  -AH    Standing up from a chair using your arms (e.g., wheelchair, bedside chair)? 3  -AH 3  -AH 3  -AH    Climbing 3-5 steps with a railing? 3  -AH 2  -AH 3  -AH    To walk in hospital room? 3  -AH 3  -AH 3   -    AM-PAC 6 Clicks Score (PT) 20  -AH 19  -AH 20  -AH       Functional Assessment    Outcome Measure Options AM-PAC 6 Clicks Basic Mobility (PT)  - AM-PAC 6 Clicks Basic Mobility (PT)  - AM-PAC 6 Clicks Basic Mobility (PT)  -          User Key  (r) = Recorded By, (t) = Taken By, (c) = Cosigned By    Initials Name Provider Type     Rashida Crockett PTA Physical Therapist Assistant                 Time Calculation:    PT Charges     Row Name 05/10/23 0855             Time Calculation    Start Time 0826  -      Stop Time 0850  -      Time Calculation (min) 24 min  -      PT Received On 05/10/23  -         Time Calculation- PT    Total Timed Code Minutes- PT 24 minute(s)  -         Timed Charges    91020 - PT Therapeutic Exercise Minutes 12  -      76604 - Gait Training Minutes  12  -         Total Minutes    Timed Charges Total Minutes 24  -       Total Minutes 24  -            User Key  (r) = Recorded By, (t) = Taken By, (c) = Cosigned By    Initials Name Provider Type     Rashida Crockett PTA Physical Therapist Assistant              Therapy Charges for Today     Code Description Service Date Service Provider Modifiers Qty    51104008644 HC PT THER PROC EA 15 MIN 5/9/2023 Rashida Crockett, PTA GP 1    09393763640 HC GAIT TRAINING EA 15 MIN 5/9/2023 Rashida Crockett, PTA GP 1    79259102967 HC PT THER PROC EA 15 MIN 5/10/2023 Rashida Crockett, PTA GP 1    36043026908 HC GAIT TRAINING EA 15 MIN 5/10/2023 Rashida Crockett, PTA GP 1          PT G-Codes  Outcome Measure Options: AM-PAC 6 Clicks Basic Mobility (PT)  AM-PAC 6 Clicks Score (PT): 20  AM-PAC 6 Clicks Score (OT): 19    Rashida Crockett PTA  5/10/2023

## 2023-05-11 VITALS
RESPIRATION RATE: 16 BRPM | BODY MASS INDEX: 23.74 KG/M2 | OXYGEN SATURATION: 98 % | HEIGHT: 63 IN | DIASTOLIC BLOOD PRESSURE: 53 MMHG | SYSTOLIC BLOOD PRESSURE: 146 MMHG | HEART RATE: 52 BPM | TEMPERATURE: 97.6 F | WEIGHT: 134 LBS

## 2023-05-11 LAB
ANION GAP SERPL CALCULATED.3IONS-SCNC: 7 MMOL/L (ref 5–15)
BUN SERPL-MCNC: 13 MG/DL (ref 8–23)
BUN/CREAT SERPL: 19.7 (ref 7–25)
CALCIUM SPEC-SCNC: 9.2 MG/DL (ref 8.6–10.5)
CHLORIDE SERPL-SCNC: 106 MMOL/L (ref 98–107)
CO2 SERPL-SCNC: 30 MMOL/L (ref 22–29)
CREAT SERPL-MCNC: 0.66 MG/DL (ref 0.57–1)
EGFRCR SERPLBLD CKD-EPI 2021: 88.8 ML/MIN/1.73
GLUCOSE SERPL-MCNC: 94 MG/DL (ref 65–99)
POTASSIUM SERPL-SCNC: 3.6 MMOL/L (ref 3.5–5.2)
SARS-COV-2 AG RESP QL IA.RAPID: NORMAL
SODIUM SERPL-SCNC: 143 MMOL/L (ref 136–145)

## 2023-05-11 PROCEDURE — 80048 BASIC METABOLIC PNL TOTAL CA: CPT | Performed by: FAMILY MEDICINE

## 2023-05-11 PROCEDURE — 87426 SARSCOV CORONAVIRUS AG IA: CPT | Performed by: FAMILY MEDICINE

## 2023-05-11 PROCEDURE — 25010000002 ENOXAPARIN PER 10 MG: Performed by: FAMILY MEDICINE

## 2023-05-11 PROCEDURE — 97116 GAIT TRAINING THERAPY: CPT

## 2023-05-11 PROCEDURE — 97110 THERAPEUTIC EXERCISES: CPT

## 2023-05-11 RX ORDER — AMLODIPINE BESYLATE 10 MG/1
10 TABLET ORAL
Qty: 90 TABLET | Refills: 0 | Status: SHIPPED | OUTPATIENT
Start: 2023-05-11

## 2023-05-11 RX ORDER — IPRATROPIUM BROMIDE AND ALBUTEROL SULFATE 2.5; .5 MG/3ML; MG/3ML
3 SOLUTION RESPIRATORY (INHALATION)
Status: DISCONTINUED | OUTPATIENT
Start: 2023-05-11 | End: 2023-05-11 | Stop reason: HOSPADM

## 2023-05-11 RX ORDER — LOPERAMIDE HYDROCHLORIDE 2 MG/1
2 CAPSULE ORAL 4 TIMES DAILY PRN
Qty: 60 CAPSULE | Refills: 0 | Status: SHIPPED | OUTPATIENT
Start: 2023-05-11

## 2023-05-11 RX ADMIN — ACETAMINOPHEN 650 MG: 325 TABLET, FILM COATED ORAL at 10:01

## 2023-05-11 RX ADMIN — ALPRAZOLAM 1 MG: 0.5 TABLET ORAL at 09:10

## 2023-05-11 RX ADMIN — AMLODIPINE BESYLATE 10 MG: 10 TABLET ORAL at 09:10

## 2023-05-11 RX ADMIN — ENOXAPARIN SODIUM 40 MG: 100 INJECTION SUBCUTANEOUS at 09:11

## 2023-05-11 RX ADMIN — LISINOPRIL 20 MG: 20 TABLET ORAL at 09:11

## 2023-05-11 RX ADMIN — CITALOPRAM 20 MG: 20 TABLET, FILM COATED ORAL at 09:10

## 2023-05-11 RX ADMIN — GABAPENTIN 800 MG: 400 CAPSULE ORAL at 09:10

## 2023-05-11 RX ADMIN — BUMETANIDE 1 MG: 1 TABLET ORAL at 09:10

## 2023-05-11 RX ADMIN — ASPIRIN 81 MG: 81 TABLET, COATED ORAL at 09:11

## 2023-05-11 NOTE — PROGRESS NOTES
Daily Progress Note  Pattie Liu  MRN: [id number] LOS: 4    Admit Date: 5/4/2023 5/11/2023 08:21 CDT    Subjective:          Chief Complaint:  No chief complaint on file.      Interval History:    Reviewed overnight events and nursing notes.   Improved, but still significant weakness and slow gait.  Continues to be incontinent of bowels and bladder.  SNF recommendations from PT/OT.  Still requiring IV rehydration. Completed IV antibiotics for UTI.    Review of Systems   Constitutional: Positive for activity change, appetite change and fatigue. Negative for fever.   Respiratory: Positive for shortness of breath.    Gastrointestinal: Negative for nausea and vomiting.   Genitourinary: Positive for decreased urine volume and dysuria.   Musculoskeletal: Positive for gait problem.   Neurological: Positive for weakness.       DIET:  Diet: Regular/House Diet, Cardiac Diets; Healthy Heart (2-3 Na+); Texture: Regular Texture (IDDSI 7); Fluid Consistency: Thin (IDDSI 0)    Medications:   O2, 2 L/min, Last Rate: 2 L/min (05/04/23 2127)  sodium chloride, 50 mL/hr, Last Rate: 50 mL/hr (05/10/23 1940)      ALPRAZolam, 1 mg, Oral, BID  amLODIPine, 10 mg, Oral, Q24H  aspirin, 81 mg, Oral, Daily  bumetanide, 1 mg, Oral, Daily  citalopram, 20 mg, Oral, Daily  enoxaparin, 40 mg, Subcutaneous, Daily  gabapentin, 800 mg, Oral, Q12H  ipratropium-albuterol, 3 mL, Nebulization, 4x Daily - RT  lisinopril, 20 mg, Oral, Daily  nicotine, 1 patch, Transdermal, Q24H  senna-docusate sodium, 2 tablet, Oral, BID  sodium chloride, 10 mL, Intravenous, Q12H        Data:     Code Status:   Code Status and Medical Interventions:   Ordered at: 05/05/23 0345     Code Status (Patient has no pulse and is not breathing):    No CPR (Do Not Attempt to Resuscitate)     Medical Interventions (Patient has pulse or is breathing):    Full Support     Release to patient:    Routine Release       Family History   Problem Relation Age of Onset   • Cancer Mother     • Cancer Father      Social History     Socioeconomic History   • Marital status:    Tobacco Use   • Smoking status: Every Day     Packs/day: 0.50     Years: 62.00     Pack years: 31.00     Types: Cigarettes   • Smokeless tobacco: Never   • Tobacco comments:     states she has no plan to quit smoking   Vaping Use   • Vaping Use: Former   Substance and Sexual Activity   • Alcohol use: No   • Drug use: No   • Sexual activity: Not Currently       Labs:    Lab Results (last 72 hours)     Procedure Component Value Units Date/Time    Basic Metabolic Panel [[id number]]  (Abnormal) Collected: 05/11/23 0355    Specimen: Blood Updated: 05/11/23 0433     Glucose 94 mg/dL      BUN 13 mg/dL      Creatinine 0.66 mg/dL      Sodium 143 mmol/L      Potassium 3.6 mmol/L      Chloride 106 mmol/L      CO2 30.0 mmol/L      Calcium 9.2 mg/dL      BUN/Creatinine Ratio 19.7     Anion Gap 7.0 mmol/L      eGFR 88.8 mL/min/1.73     Narrative:      GFR Normal >60  Chronic Kidney Disease <60  Kidney Failure <15    The GFR formula is only valid for adults with stable renal function between ages 18 and 70.    Basic Metabolic Panel [[id number]]  (Normal) Collected: 05/10/23 0457    Specimen: Blood Updated: 05/10/23 0546     Glucose 88 mg/dL      BUN 13 mg/dL      Creatinine 0.73 mg/dL      Sodium 142 mmol/L      Potassium 3.8 mmol/L      Chloride 106 mmol/L      CO2 29.0 mmol/L      Calcium 9.5 mg/dL      BUN/Creatinine Ratio 17.8     Anion Gap 7.0 mmol/L      eGFR 83.3 mL/min/1.73     Narrative:      GFR Normal >60  Chronic Kidney Disease <60  Kidney Failure <15    The GFR formula is only valid for adults with stable renal function between ages 18 and 70.    Blood Culture - Blood, Hand, Right [[id number]]  (Normal) Collected: 05/04/23 2327    Specimen: Blood from Hand, Right Updated: 05/09/23 2345     Blood Culture No growth at 5 days    Blood Culture - Blood, Hand, Left [[id number]]  (Normal) Collected: 05/04/23 2327    Specimen: Blood  "from Hand, Left Updated: 23 2344     Blood Culture No growth at 5 days    Basic Metabolic Panel [[id number]]  (Normal) Collected: 23 0348    Specimen: Blood Updated: 23 0431     Glucose 94 mg/dL      BUN 14 mg/dL      Creatinine 0.82 mg/dL      Sodium 144 mmol/L      Potassium 3.5 mmol/L      Chloride 107 mmol/L      CO2 29.0 mmol/L      Calcium 9.0 mg/dL      BUN/Creatinine Ratio 17.1     Anion Gap 8.0 mmol/L      eGFR 72.4 mL/min/1.73     Narrative:      GFR Normal >60  Chronic Kidney Disease <60  Kidney Failure <15    The GFR formula is only valid for adults with stable renal function between ages 18 and 70.              Objective:     Vitals: /53 (BP Location: Left arm, Patient Position: Lying)   Pulse 52   Temp 97.6 °F (36.4 °C) (Oral)   Resp 16   Ht 160 cm (63\")   Wt 60.8 kg (134 lb)   SpO2 98%   BMI 23.74 kg/m²      Intake/Output Summary (Last 24 hours) at 2023 0821  Last data filed at 5/10/2023 1940  Gross per 24 hour   Intake 1695 ml   Output --   Net 1695 ml    Temp (24hrs), Av.8 °F (36.6 °C), Min:97.6 °F (36.4 °C), Max:98 °F (36.7 °C)      Physical Exam  Vitals reviewed.   Constitutional:       Appearance: Normal appearance. She is not ill-appearing.   HENT:      Head: Normocephalic and atraumatic.   Eyes:      General:         Right eye: No discharge.         Left eye: No discharge.      Extraocular Movements: Extraocular movements intact.      Conjunctiva/sclera: Conjunctivae normal.   Cardiovascular:      Rate and Rhythm: Normal rate and regular rhythm.      Pulses: Normal pulses.      Heart sounds: No murmur heard.  Pulmonary:      Effort: Pulmonary effort is normal. No respiratory distress.      Comments: Nasal cannula in place  Abdominal:      General: Abdomen is flat. Bowel sounds are normal. There is no distension.   Musculoskeletal:      Right lower leg: No edema.      Left lower leg: No edema.   Skin:     Capillary Refill: Capillary refill takes less than 2 " seconds.   Neurological:      General: No focal deficit present.      Mental Status: She is alert.   Psychiatric:         Mood and Affect: Mood normal.         Behavior: Behavior normal.             Assessment and Plan:     Primary Problem:  Dehydration    Hospital Problem list:    Dehydration    CAD in native artery    PVD (peripheral vascular disease)    Essential hypertension    Acute UTI    Chronic respiratory failure with hypoxia    Impaired mobility    UTI (urinary tract infection)      PMH:  Past Medical History:   Diagnosis Date   • CAD in native artery 7/29/2019   • COPD (chronic obstructive pulmonary disease)    • Essential hypertension 12/7/2021   • GERD (gastroesophageal reflux disease)    • Lung nodule        Treatment Plan:    E. coli UTI  Completed rocephin.     Acute on chronic decline  Had falls prior to admission.  This is most likely secondary to UTI.  Continue PT/OT and refer out for rehabilitation.  Case management assisting for SNF.    Dehydration  Secondary to above, improved with IVF rehydration.    Disposition: Inpatient, medically ready for discharge to SNF when approved.    Reviewed treatment plans with the patient and/or family.   20 minutes spent in face to face interaction and coordination of care.     Electronically signed by Anupam Ledezma MD on 5/11/2023 at 08:21 CDT

## 2023-05-11 NOTE — THERAPY DISCHARGE NOTE
Acute Care - Occupational Therapy Discharge Summary  [facility]     Patient Name: Pattie Liu  : 1945  MRN: [id number]    Today's Date: 2023                 Admit Date: 2023        OT Recommendation and Plan    Visit Dx:    ICD-10-CM ICD-9-CM   1. Chronic back pain, unspecified back location, unspecified back pain laterality  M54.9 724.5    G89.29 338.29   2. Impaired mobility  Z74.09 799.89   3. Chronic respiratory failure with hypoxia  J96.11 518.83     799.02          Time Calculation- OT     Row Name 23 1009             Timed Charges    06449 - Gait Training Minutes  18  -AH         Total Minutes    Timed Charges Total Minutes 18  -AH       Total Minutes 18  -AH            User Key  (r) = Recorded By, (t) = Taken By, (c) = Cosigned By    Initials Name Provider Type    Rashida Shea, TRISHA Physical Therapist Assistant                   OT Rehab Goals     Row Name 23 1400             Transfer Goal 1 (OT)    Activity/Assistive Device (Transfer Goal 1, OT) bed-to-chair/chair-to-bed;toilet  -LS      [place] Level/Cues Needed (Transfer Goal 1, OT) supervision required  -LS      Time Frame (Transfer Goal 1, OT) long term goal (LTG);by discharge  -LS      Progress/Outcome (Transfer Goal 1, OT) goal not met  -LS         Dressing Goal 1 (OT)    Activity/Device (Dressing Goal 1, OT) dressing skills, all  -LS      [place]/Cues Needed (Dressing Goal 1, OT) supervision required  -LS      Time Frame (Dressing Goal 1, OT) long term goal (LTG);by discharge  -LS      Progress/Outcome (Dressing Goal 1, OT) goal not met  -LS         Toileting Goal 1 (OT)    Activity/Device (Toileting Goal 1, OT) toileting skills, all  -LS      [place] Level/Cues Needed (Toileting Goal 1, OT) supervision required  -LS      Time Frame (Toileting Goal 1, OT) long term goal (LTG);by discharge  -LS      Progress/Outcome (Toileting Goal 1, OT) goal not met  -LS            User Key  (r) = Recorded By, (t)  = Taken By, (c) = Cosigned By    Initials Name Provider Type [facility] COTA Occupational Therapist Assistant THERAPIES                 Outcome Measures     Row Name 05/10/23 0800 05/09/23 1000          How much help from another person do you currently need...    Turning from your back to your side while in flat bed without using bedrails? 4  -AH 4  -AH     Moving from lying on back to sitting on the side of a flat bed without bedrails? 4  -AH 4  -AH     Moving to and from a bed to a chair (including a wheelchair)? 3  -AH 3  -AH     Standing up from a chair using your arms (e.g., wheelchair, bedside chair)? 3  -AH 3  -AH     Climbing 3-5 steps with a railing? 3  -AH 2  -AH     To walk in hospital room? 3  -AH 3  -AH     AM-PAC 6 Clicks Score (PT) 20  -AH 19  -AH        Functional Assessment    Outcome Measure Options AM-PAC 6 Clicks Basic Mobility (PT)  -AH AM-PAC 6 Clicks Basic Mobility (PT)  -AH           User Key  (r) = Recorded By, (t) = Taken By, (c) = Cosigned By    Initials Name Provider Type     Rashida Crockett, PTA Physical Therapist Assistant                        OT Discharge Summary  Anticipated Discharge Disposition (OT): skilled nursing facility  Reason for Discharge: Discharge from facility  Outcomes Achieved: Refer to plan of care for updates on goals achieved  Discharge Destination: SNF      DWAYNE Azar  5/11/2023

## 2023-05-11 NOTE — DISCHARGE SUMMARY
Hospital Discharge Summary    Pattie Liu  :  1945  MRN:  [id number]    Admit date:  2023  Discharge date:  23    Admitting Physician:    Anupam Ledezma MD    Discharge Diagnoses:      Dehydration    CAD in native artery    PVD (peripheral vascular disease)    Essential hypertension    Acute UTI    Chronic respiratory failure with hypoxia    Impaired mobility    UTI (urinary tract infection)      Hospital Course:   The patient is a 78 y.o. female with COPD, coronary artery disease, peripheral vascular disease, hypertension who presents with increasing decline at home with recurrent falls.  Patient does note some urinary frequency and dysuria when initiating stream.  Denies any fever or chills.  She has chronic hypoxic respiratory failure and wears 3 L of oxygen at home due to COPD.  She was directly admitted from the office for further evaluation and work-up as she was unsafe to go home.    She was found to have very significant urinary tract infection with dehydration.  Treated with Rocephin for UTI.  PT/OT evaluation and treatment with recommendations for skilled nursing facility.  She was discharged to skilled nursing facility for more in-depth rehab.    The patient was admitted for the above noted medical/surgical issues. Please see daily progress note for further details concerning their stay. The patient improved throughout their stay and reached maximum medical improvement on the day of discharge. The patient/family agree with the treatment plan as outlined above. All questions concerning their stay were answered prior to discharge. They understand the importance of follow up concerning any abnormal test results.     Physical Exam  Vitals reviewed.   Constitutional:       Appearance: Normal appearance. She is not ill-appearing.   HENT:      Head: Normocephalic and atraumatic.   Eyes:      General:         Right eye: No discharge.         Left eye: No discharge.      Extraocular  Movements: Extraocular movements intact.      Conjunctiva/sclera: Conjunctivae normal.   Cardiovascular:      Rate and Rhythm: Normal rate and regular rhythm.      Pulses: Normal pulses.      Heart sounds: No murmur heard.  Pulmonary:      Effort: Pulmonary effort is normal. No respiratory distress.      Comments: Nasal cannula in place  Abdominal:      General: Abdomen is flat. Bowel sounds are normal. There is no distension.   Musculoskeletal:      Right lower leg: No edema.      Left lower leg: No edema.   Skin:     Capillary Refill: Capillary refill takes less than 2 seconds.   Neurological:      General: No focal deficit present.      Mental Status: She is alert.   Psychiatric:         Mood and Affect: Mood normal.         Behavior: Behavior normal.           Discharge Medications:         Discharge Medications      ASK your doctor about these medications      Instructions Start Date   ALLEGRA ALLERGY PO   4 mg, Daily      ALPRAZolam 1 MG tablet  Commonly known as: XANAX   1 mg, Oral, 2 Times Daily      aspirin 81 MG tablet   81 mg, Oral, Daily      atorvastatin 20 MG tablet  Commonly known as: LIPITOR   20 mg, Oral, Daily      B-12 5000 MCG capsule   1 capsule, Daily      Breztri Aerosphere 160-9-4.8 MCG/ACT aerosol inhaler  Generic drug: Budeson-Glycopyrrol-Formoterol   2 puffs, Inhalation, 2 Times Daily      bumetanide 1 MG tablet  Commonly known as: BUMEX   1 mg, Oral, Daily      CALCIUM 1000 + D PO   1 tablet, Daily      citalopram 10 MG tablet  Commonly known as: CeleXA  Ask about: Which instructions should I use?   30 mg, Oral, Daily      colestipol 1 g tablet  Commonly known as: COLESTID   1 g, Oral, Daily      dicyclomine 20 MG tablet  Commonly known as: BENTYL   20 mg, Oral, 3 Times Daily PRN      gabapentin 800 MG tablet  Commonly known as: NEURONTIN  Ask about: Which instructions should I use?   1,600 mg, Oral, 2 Times Daily      GINKOBA PO   60 mg, Daily      GINSENG-COMPLEX PO   2 capsules, Oral,  2 Times Daily      ibuprofen 200 MG tablet  Commonly known as: ADVIL,MOTRIN   200 mg, Every 6 Hours PRN      levalbuterol 1.25 MG/3ML nebulizer solution  Commonly known as: XOPENEX   1 ampule, Every 4 Hours PRN      lisinopril 20 MG tablet  Commonly known as: PRINIVIL,ZESTRIL  Ask about: Which instructions should I use?   20 mg, Oral, Daily      loperamide 2 MG capsule  Commonly known as: IMODIUM   2 mg, Oral, 4 Times Daily PRN      meloxicam 15 MG tablet  Commonly known as: MOBIC   15 mg, Oral, Daily      O2  Commonly known as: OXYGEN   2 L/min, Inhalation, Continuous      omeprazole 20 MG capsule  Commonly known as: priLOSEC   20 mg, Daily      ondansetron 8 MG tablet  Commonly known as: ZOFRAN   8 mg, Oral, 4 Times Daily PRN             Activity: As tolerated    Diet: Regular    Consults: PT/OT/CM    Significant Diagnostic Studies:    No radiology results for the last 7 days         Treatments:   IV fluids, IV antibiotics    Disposition:   Discharged to SNF    Time spent on discharge including discussion with patient/family, SW, and coordination of care.     Follow up with Rafat Espinoza MD in 1-2 weeks.    Signed:  Anupam Ledezma MD   5/11/2023, 08:24 CDT

## 2023-05-11 NOTE — PLAN OF CARE
Problem: Adult Inpatient Plan of Care  Goal: Plan of Care Review  Outcome: Ongoing, Progressing  Flowsheets (Taken 5/11/2023 0316)  Progress: no change  Plan of Care Reviewed With: patient  Outcome Evaluation: Pt complained of headache x1, see MAR. Up x1 assist with walker. Voiding. Daughter at bedside. Bed check. Safety maintained.

## 2023-05-11 NOTE — THERAPY TREATMENT NOTE
Acute Care - Physical Therapy Treatment Note  [facility]     Patient Name: Pattie Liu  : 1945  MRN: [id number]  Today's Date: 2023      Visit Dx:     ICD-10-CM ICD-9-CM   1. Chronic back pain, unspecified back location, unspecified back pain laterality  M54.9 724.5    G89.29 338.29   2. Impaired mobility  Z74.09 799.89   3. Chronic respiratory failure with hypoxia  J96.11 518.83     799.02     Patient Active Problem List   Diagnosis   • Frequent PVCs   • Shortness of breath   • SOB (shortness of breath)   • CAD in native artery   • PVD (peripheral vascular disease)   • Pneumonia due to infectious organism   • Chronic back pain   • Essential hypertension   • Fall, initial encounter   • Traumatic rhabdomyolysis   • Leukocytosis   • Anemia   • Degenerative disc disease, lumbar   • Dehydration   • Acute UTI   • Chronic respiratory failure with hypoxia   • Impaired mobility   • UTI (urinary tract infection)     Past Medical History:   Diagnosis Date   • CAD in native artery 2019   • COPD (chronic obstructive pulmonary disease)    • Essential hypertension 2021   • GERD (gastroesophageal reflux disease)    • Lung nodule      Past Surgical History:   Procedure Laterality Date   • BREAST IMPLANT REMOVAL     • BREAST TISSUE EXPANDER REMOVAL INSERTION OF IMPLANT     • CARDIAC CATHETERIZATION N/A 2019    Procedure: Left Heart Cath;  Surgeon: Edi Armijo MD;  Location: [facility] INVASIVE LOCATION;  Service: Cardiology   • CHOLECYSTECTOMY     • HYSTERECTOMY     • MASTECTOMY      BILATERAL   • OOPHORECTOMY       PT Assessment (last 12 hours)     PT Evaluation and Treatment     Row Name 23 0929          Physical Therapy Time and Intention    Subjective Information complains of;pain  -AH     Document Type therapy note (daily note)  -     Mode of Treatment physical therapy  -     Row Name 2329          General Information    Existing Precautions/Restrictions  fall;oxygen therapy device and L/min  3L  -     Row Name 05/11/23 0929          Pain    Pain Intervention(s) Ambulation/increased activity;Nursing Notified  -[facility] Name 05/11/23 0929          Pain Scale: Word Pre/Post-Treatment    Pain: Word Scale, Pretreatment 8 - severe pain  -     Posttreatment Pain Rating 8 - severe pain  -     Pain Location - head  -     Row Name 05/11/23 0929          Bed Mobility    Bed Mobility supine-sit;sit-supine  -     Supine-Sit [place] (Bed Mobility) modified independence  -     Sit-Supine [place] (Bed Mobility) modified independence  -     Row Name 05/11/23 0929          Sit-Stand Transfer    Sit-Stand [place] (Transfers) contact guard;verbal cues  -     Row Name 05/11/23 0929          Stand-Sit Transfer    Stand-Sit [place] (Transfers) contact guard;verbal cues  -[facility] Name 05/11/23 0929          Gait/Stairs (Locomotion)    [place] Level (Gait) contact guard;verbal cues  -     Assistive Device (Gait) walker, front-wheeled  -     Distance in Feet (Gait) 50  -AH     Bilateral Gait Deviations forward flexed posture  -     Comment, (Gait/Stairs) pt able to manuever around obstacles with cga  -     Row Name 05/11/23 0929          Motor Skills    Comments, Therapeutic Exercise BLE HEP with green Tband  -     Row Name             Wound 05/04/23 2135 Left lower arm    Wound - Properties Group Placement Date: 05/04/23  -LL Placement Time: 2135 -LL Side: Left  -LL Orientation: lower  -LL Location: arm  -LL    Retired Wound - Properties Group Placement Date: 05/04/23  -LL Placement Time: 2135 -LL Side: Left  -LL Orientation: lower  -LL Location: arm  -LL    Retired Wound - Properties Group Date first assessed: 05/04/23  -LL Time first assessed: 2135 -LL Side: Left  -LL Location: arm  -LL    Row Name 05/11/23 0929          Positioning and Restraints    Pre-Treatment Position in bed  -AH     Post Treatment Position bed  -AH     In  Bed fowlers;call light within reach;encouraged to call for assist;with family/caregiver  -           User Key  (r) = Recorded By, (t) = Taken By, (c) = Cosigned By    Initials Name Provider Type     Rashida Crockett, PTA Physical Therapist Assistant    Candy Gallardo, RN Registered Nurse                Physical Therapy Education     Title: PT OT SLP Therapies (Done)     Topic: Physical Therapy (Done)     Point: Mobility training (Done)     Learning Progress Summary           Patient Acceptance, E,TB, VU by CM at 5/7/2023 1721    Eager, E, VU by AE at 5/6/2023 1256    Comment: t/f's    Acceptance, E,TB, VU by CM at 5/6/2023 1055    Acceptance, E, VU by JQ at 5/5/2023 1107    Comment: Pt educated on AD use, transfer tech, d/c planning, benefits of skilled PT   Family Acceptance, E,TB, VU by CM at 5/7/2023 1721    Eager, E, VU by AE at 5/6/2023 1256    Comment: t/f's    Acceptance, E,TB, VU by CM at 5/6/2023 1055                   Point: Home exercise program (Done)     Learning Progress Summary           Patient Acceptance, E,TB,D,H, VU by  at 5/11/2023 1009    Comment: BLE HEP with green Tband    Acceptance, E,TB, VU by CM at 5/7/2023 1721    Eager, E, VU by AE at 5/6/2023 1256    Comment: t/f's    Acceptance, E,TB, VU by CM at 5/6/2023 1055   Family Acceptance, E,TB, VU by CM at 5/7/2023 1721    Eager, E, VU by AE at 5/6/2023 1256    Comment: t/f's    Acceptance, E,TB, VU by CM at 5/6/2023 1055                   Point: Body mechanics (Done)     Learning Progress Summary           Patient Acceptance, E,TB, VU by CM at 5/7/2023 1721    Acceptance, E,TB, VU by CM at 5/6/2023 1055    Acceptance, E, VU by JQ at 5/5/2023 1107    Comment: Pt educated on AD use, transfer tech, d/c planning, benefits of skilled PT   Family Acceptance, E,TB, VU by CM at 5/7/2023 1721    Acceptance, E,TB, VU by CM at 5/6/2023 1055                   Point: Precautions (Done)     Learning Progress Summary           Patient Acceptance,  E,TB, VU by CM at 5/7/2023 1721    Acceptance, E,TB, VU by CM at 5/6/2023 1055    Acceptance, E, VU by JQ at 5/5/2023 1107    Comment: Pt educated on AD use, transfer tech, d/c planning, benefits of skilled PT   Family Acceptance, E,TB, VU by CM at 5/7/2023 1721    Acceptance, E,TB, VU by CM at 5/6/2023 1055                               User Key     Initials Effective Dates Name Provider Type Discipline    AE 02/03/23 -  Grisel Denny PTA Physical Therapist Assistant PT     02/03/23 -  Rashida Crockett PTA Physical Therapist Assistant PT     03/02/23 -  Ana Marsh, RN Registered Nurse Nurse    [place] 02/20/23 -  Makenna Kirkland, PT Student PT Student PT              PT Recommendation and Plan     Plan of Care Reviewed With: patient, daughter  Progress: no change  Outcome Evaluation: pt in chair, trans sit-stand cga, pt amb 50 feet a time rwx cga,worked on BLE strengthening ex standing at rail, as well as balance activities cga HHA, pt would benefit from SNF for strengthening,balance and endurance   Outcome Measures     Row Name 05/10/23 0800 05/09/23 1000          How much help from another person do you currently need...    Turning from your back to your side while in flat bed without using bedrails? 4  -AH 4  -AH     Moving from lying on back to sitting on the side of a flat bed without bedrails? 4  -AH 4  -AH     Moving to and from a bed to a chair (including a wheelchair)? 3  -AH 3  -AH     Standing up from a chair using your arms (e.g., wheelchair, bedside chair)? 3  -AH 3  -AH     Climbing 3-5 steps with a railing? 3  -AH 2  -AH     To walk in hospital room? 3  -AH 3  -AH     AM-PAC 6 Clicks Score (PT) 20  -AH 19  -AH        Functional Assessment    Outcome Measure Options AM-PAC 6 Clicks Basic Mobility (PT)  -AH AM-PAC 6 Clicks Basic Mobility (PT)  -           User Key  (r) = Recorded By, (t) = Taken By, (c) = Cosigned By    Initials Name Provider Type     Rashida Crockett PTA Physical Therapist  Assistant                 Time Calculation:    PT Charges     Row Name 05/11/23 1009             Time Calculation    Start Time 0929  -      Stop Time 1007  -      Time Calculation (min) 38 min  -      PT Received On 05/11/23  -         Time Calculation- PT    Total Timed Code Minutes- PT 38 minute(s)  -         Timed Charges    83205 - PT Therapeutic Exercise Minutes 20  -AH      18557 - Gait Training Minutes  18  -AH         Total Minutes    Timed Charges Total Minutes 38  -AH       Total Minutes 38  -AH            User Key  (r) = Recorded By, (t) = Taken By, (c) = Cosigned By    Initials Name Provider Type     Rashida Crockett PTA Physical Therapist Assistant              Therapy Charges for Today     Code Description Service Date Service Provider Modifiers Qty    98291647418 HC PT THER PROC EA 15 MIN 5/10/2023 Rashida Crockett, PTA GP 1    05656029268 HC GAIT TRAINING EA 15 MIN 5/10/2023 Rashida Crockett, PTA GP 1    34967006304 HC GAIT TRAINING EA 15 MIN 5/10/2023 Rashida Crockett, PTA GP 1    14530948194 HC PT THER PROC EA 15 MIN 5/11/2023 Rashida Crockett, PTA GP 2    87696897757 HC GAIT TRAINING EA 15 MIN 5/11/2023 Rashida Crockett, PTA GP 1          PT G-Codes  Outcome Measure Options: AM-PAC 6 Clicks Daily Activity (OT)  AM-PAC 6 Clicks Score (PT): 17  AM-PAC 6 Clicks Score (OT): 20    Rashida Crockett PTA  5/11/2023

## 2023-05-11 NOTE — CASE MANAGEMENT/SOCIAL WORK
Continued Stay Note   [facility]     Patient Name: Pattie Liu  MRN: [id number]  Today's Date: 5/11/2023    Admit Date: 5/4/2023    Plan: [facility]   Discharge Plan     Row Name 05/11/23 0928       Plan    Plan [facility]    Plan Comments Patient has bed at [facility] and can dc today.  Nursing to call report to [phone] ask for nurse for room 320.  Patient will need negative COVID test.  Patient is going to wing 300.    Final Discharge Disposition Code 03 - skilled nursing facility (SNF)    Final Note [facility]               Discharge Codes    No documentation.               Expected Discharge Date and Time     Expected Discharge Date Expected Discharge Time    May 11, 2023             CATHERINE Aguilar

## 2023-05-11 NOTE — THERAPY DISCHARGE NOTE
Acute Care - Physical Therapy Discharge Summary  [facility]       Patient Name: Pattie Liu  : 1945  MRN: [id number]    Today's Date: 2023                 Admit Date: 2023      PT Recommendation and Plan    Visit Dx:    ICD-10-CM ICD-9-CM   1. Chronic back pain, unspecified back location, unspecified back pain laterality  M54.9 724.5    G89.29 338.29   2. Impaired mobility  Z74.09 799.89   3. Chronic respiratory failure with hypoxia  J96.11 518.83     799.02        Outcome Measures     Row Name 05/10/23 0800 23 1000          How much help from another person do you currently need...    Turning from your back to your side while in flat bed without using bedrails? 4  -AH 4  -AH     Moving from lying on back to sitting on the side of a flat bed without bedrails? 4  -AH 4  -AH     Moving to and from a bed to a chair (including a wheelchair)? 3  -AH 3  -AH     Standing up from a chair using your arms (e.g., wheelchair, bedside chair)? 3  -AH 3  -AH     Climbing 3-5 steps with a railing? 3  -AH 2  -AH     To walk in hospital room? 3  -AH 3  -AH     AM-PAC 6 Clicks Score (PT) 20  -AH 19  -AH        Functional Assessment    Outcome Measure Options AM-PAC 6 Clicks Basic Mobility (PT)  -AH AM-PAC 6 Clicks Basic Mobility (PT)  -           User Key  (r) = Recorded By, (t) = Taken By, (c) = Cosigned By    Initials Name Provider Type     Rashida Crockett, PTA Physical Therapist Assistant                 PT Charges     Row Name 23 1009             Time Calculation    Start Time 0929  -      Stop Time 1007  -AH      Time Calculation (min) 38 min  -      PT Received On 23  -         Time Calculation- PT    Total Timed Code Minutes- PT 38 minute(s)  -         Timed Charges    98844 - PT Therapeutic Exercise Minutes 20  -      34400 - Gait Training Minutes  18  -AH         Total Minutes    Timed Charges Total Minutes 38  -AH       Total Minutes 38  -            User Key  (r) =  Recorded By, (t) = Taken By, (c) = Cosigned By    Initials Name Provider Type     Rashida Crockett PTA Physical Therapist Assistant                 PT Rehab Goals     Row Name 05/11/23 1300             Bed Mobility Goal 1 (PT)    Activity/Assistive Device (Bed Mobility Goal 1, PT) sit to supine/supine to sit  -AB      [place] Level/Cues Needed (Bed Mobility Goal 1, PT) independent  -AB      Time Frame (Bed Mobility Goal 1, PT) long term goal (LTG);by discharge  -AB      Progress/Outcomes (Bed Mobility Goal 1, PT) goal not met  -AB         Transfer Goal 1 (PT)    Activity/Assistive Device (Transfer Goal 1, PT) sit-to-stand/stand-to-sit;bed-to-chair/chair-to-bed  -AB      [place] Level/Cues Needed (Transfer Goal 1, PT) independent  -AB      Time Frame (Transfer Goal 1, PT) long term goal (LTG);by discharge  -AB      Progress/Outcome (Transfer Goal 1, PT) goal not met  -AB         Gait Training Goal 1 (PT)    Activity/Assistive Device (Gait Training Goal 1, PT) gait (walking locomotion);assistive device use;improve balance and speed;increase endurance/gait distance;decrease fall risk;walker, rolling  -AB      [place] Level (Gait Training Goal 1, PT) standby assist  -AB      Distance (Gait Training Goal 1, PT) 50 ft w/ navigating turns, walking backwards, obstacle navigation  -AB      Time Frame (Gait Training Goal 1, PT) long term goal (LTG);by discharge  -AB      Progress/Outcome (Gait Training Goal 1, PT) goal not met  -AB            User Key  (r) = Recorded By, (t) = Taken By, (c) = Cosigned By    Initials Name Provider Type Desirae Layne PTA Physical Therapist Assistant PT                    PT Discharge Summary  Anticipated Discharge Disposition (PT): skilled nursing facility  Reason for Discharge: Discharge from facility  Outcomes Achieved: Refer to plan of care for updates on goals achieved  Discharge Destination: SNF      Desirae Beckwith PTA   5/11/2023

## 2023-05-15 ENCOUNTER — TRANSCRIBE ORDERS (OUTPATIENT)
Dept: ADMINISTRATIVE | Facility: HOSPITAL | Age: 78
End: 2023-05-15
Payer: MEDICARE

## 2023-05-15 DIAGNOSIS — R29.6 FALLS FREQUENTLY: Primary | ICD-10-CM

## 2023-05-22 ENCOUNTER — HOSPITAL ENCOUNTER (OUTPATIENT)
Dept: CT IMAGING | Facility: HOSPITAL | Age: 78
Discharge: HOME | End: 2023-05-22
Admitting: INTERNAL MEDICINE
Payer: MEDICARE

## 2023-05-22 PROCEDURE — 70450 CT HEAD/BRAIN W/O DYE: CPT

## 2023-08-08 ENCOUNTER — TELEPHONE (OUTPATIENT)
Dept: CARDIOLOGY | Facility: CLINIC | Age: 78
End: 2023-08-08
Payer: MEDICARE

## 2023-08-08 NOTE — TELEPHONE ENCOUNTER
I called Dr. Espinoza's office to let them know that we did not put a monitor on the pt and neither did the heart center.  There was not an order for a monitor in her chart.  I told Ramsey that she might have had this done at [facility].  The pt has not been seen by us since 7/2019.  Cecilio Mcdowell, CMA

## 2023-08-08 NOTE — TELEPHONE ENCOUNTER
[facility] received a fax that requires your attention. The document has been indexed to the patient's chart for your review.      Reason for sending: EXTERNAL MEDICAL RECORD NOTIFICATION     Documents Description: HOLTER MONITOR RESULTS     Name of Sender: [employer] [facility]     Date Indexed: 8.7.23

## 2024-08-01 ENCOUNTER — HOME HEALTH ADMISSION (OUTPATIENT)
Dept: HOME HEALTH SERVICES | Facility: HOME HEALTHCARE | Age: 79
End: 2024-08-01
Payer: MEDICARE

## 2024-08-09 ENCOUNTER — HOME HEALTH ADMISSION (OUTPATIENT)
Dept: HOME HEALTH SERVICES | Facility: HOME HEALTHCARE | Age: 79
End: 2024-08-09
Payer: MEDICARE

## 2024-08-26 ENCOUNTER — HOSPITAL ENCOUNTER (INPATIENT)
Facility: HOSPITAL | Age: 79
LOS: 4 days | Discharge: NURSING, REHAB OR LONG-TERM CARE | DRG: 690 | End: 2024-08-30
Attending: FAMILY MEDICINE | Admitting: FAMILY MEDICINE
Payer: MEDICARE

## 2024-08-26 ENCOUNTER — APPOINTMENT (OUTPATIENT)
Dept: CT IMAGING | Facility: HOSPITAL | Age: 79
DRG: 690 | End: 2024-08-26
Payer: MEDICARE

## 2024-08-26 ENCOUNTER — APPOINTMENT (OUTPATIENT)
Dept: GENERAL RADIOLOGY | Facility: HOSPITAL | Age: 79
DRG: 690 | End: 2024-08-26
Payer: MEDICARE

## 2024-08-26 DIAGNOSIS — M54.41 CHRONIC BILATERAL LOW BACK PAIN WITH BILATERAL SCIATICA: ICD-10-CM

## 2024-08-26 DIAGNOSIS — Z74.09 IMPAIRED MOBILITY: ICD-10-CM

## 2024-08-26 DIAGNOSIS — N39.0 URINARY TRACT INFECTION WITHOUT HEMATURIA, SITE UNSPECIFIED: ICD-10-CM

## 2024-08-26 DIAGNOSIS — R29.6 MULTIPLE FALLS: ICD-10-CM

## 2024-08-26 DIAGNOSIS — S32.591A CLOSED FRACTURE OF RAMUS OF RIGHT PUBIS, INITIAL ENCOUNTER: ICD-10-CM

## 2024-08-26 DIAGNOSIS — G89.29 CHRONIC BILATERAL LOW BACK PAIN WITH BILATERAL SCIATICA: ICD-10-CM

## 2024-08-26 DIAGNOSIS — R26.81 GAIT INSTABILITY: Primary | ICD-10-CM

## 2024-08-26 DIAGNOSIS — M54.42 CHRONIC BILATERAL LOW BACK PAIN WITH BILATERAL SCIATICA: ICD-10-CM

## 2024-08-26 DIAGNOSIS — F41.1 GAD (GENERALIZED ANXIETY DISORDER): ICD-10-CM

## 2024-08-26 LAB
ALBUMIN SERPL-MCNC: 3.9 G/DL (ref 3.5–5.2)
ALBUMIN/GLOB SERPL: 1.2 G/DL
ALP SERPL-CCNC: 110 U/L (ref 39–117)
ALT SERPL W P-5'-P-CCNC: 10 U/L (ref 1–33)
ANION GAP SERPL CALCULATED.3IONS-SCNC: 13 MMOL/L (ref 5–15)
AST SERPL-CCNC: 17 U/L (ref 1–32)
BACTERIA UR QL AUTO: ABNORMAL /HPF
BASOPHILS # BLD AUTO: 0.07 10*3/MM3 (ref 0–0.2)
BASOPHILS NFR BLD AUTO: 0.4 % (ref 0–1.5)
BILIRUB SERPL-MCNC: 0.2 MG/DL (ref 0–1.2)
BILIRUB UR QL STRIP: NEGATIVE
BUN SERPL-MCNC: 19 MG/DL (ref 8–23)
BUN/CREAT SERPL: 14 (ref 7–25)
CALCIUM SPEC-SCNC: 10 MG/DL (ref 8.6–10.5)
CHLORIDE SERPL-SCNC: 101 MMOL/L (ref 98–107)
CLARITY UR: CLEAR
CO2 SERPL-SCNC: 27 MMOL/L (ref 22–29)
COLOR UR: YELLOW
CREAT SERPL-MCNC: 1.36 MG/DL (ref 0.57–1)
DEPRECATED RDW RBC AUTO: 46.2 FL (ref 37–54)
EGFRCR SERPLBLD CKD-EPI 2021: 39.2 ML/MIN/1.73
EOSINOPHIL # BLD AUTO: 0.18 10*3/MM3 (ref 0–0.4)
EOSINOPHIL NFR BLD AUTO: 1.2 % (ref 0.3–6.2)
ERYTHROCYTE [DISTWIDTH] IN BLOOD BY AUTOMATED COUNT: 13.4 % (ref 12.3–15.4)
GLOBULIN UR ELPH-MCNC: 3.3 GM/DL
GLUCOSE SERPL-MCNC: 102 MG/DL (ref 65–99)
GLUCOSE UR STRIP-MCNC: NEGATIVE MG/DL
HCT VFR BLD AUTO: 38 % (ref 34–46.6)
HGB BLD-MCNC: 12.2 G/DL (ref 12–15.9)
HGB UR QL STRIP.AUTO: NEGATIVE
HYALINE CASTS UR QL AUTO: ABNORMAL /LPF
IMM GRANULOCYTES # BLD AUTO: 0.09 10*3/MM3 (ref 0–0.05)
IMM GRANULOCYTES NFR BLD AUTO: 0.6 % (ref 0–0.5)
KETONES UR QL STRIP: NEGATIVE
LEUKOCYTE ESTERASE UR QL STRIP.AUTO: ABNORMAL
LYMPHOCYTES # BLD AUTO: 2.08 10*3/MM3 (ref 0.7–3.1)
LYMPHOCYTES NFR BLD AUTO: 13.4 % (ref 19.6–45.3)
MCH RBC QN AUTO: 30.3 PG (ref 26.6–33)
MCHC RBC AUTO-ENTMCNC: 32.1 G/DL (ref 31.5–35.7)
MCV RBC AUTO: 94.3 FL (ref 79–97)
MONOCYTES # BLD AUTO: 0.76 10*3/MM3 (ref 0.1–0.9)
MONOCYTES NFR BLD AUTO: 4.9 % (ref 5–12)
NEUTROPHILS NFR BLD AUTO: 12.38 10*3/MM3 (ref 1.7–7)
NEUTROPHILS NFR BLD AUTO: 79.5 % (ref 42.7–76)
NITRITE UR QL STRIP: NEGATIVE
NRBC BLD AUTO-RTO: 0 /100 WBC (ref 0–0.2)
PH UR STRIP.AUTO: 5.5 [PH] (ref 5–8)
PLATELET # BLD AUTO: 394 10*3/MM3 (ref 140–450)
PMV BLD AUTO: 10.1 FL (ref 6–12)
POTASSIUM SERPL-SCNC: 4 MMOL/L (ref 3.5–5.2)
PROT SERPL-MCNC: 7.2 G/DL (ref 6–8.5)
PROT UR QL STRIP: NEGATIVE
RBC # BLD AUTO: 4.03 10*6/MM3 (ref 3.77–5.28)
RBC # UR STRIP: ABNORMAL /HPF
REF LAB TEST METHOD: ABNORMAL
SODIUM SERPL-SCNC: 141 MMOL/L (ref 136–145)
SP GR UR STRIP: 1.01 (ref 1–1.03)
SQUAMOUS #/AREA URNS HPF: ABNORMAL /HPF
UROBILINOGEN UR QL STRIP: ABNORMAL
WBC # UR STRIP: ABNORMAL /HPF
WBC NRBC COR # BLD AUTO: 15.56 10*3/MM3 (ref 3.4–10.8)

## 2024-08-26 PROCEDURE — 72125 CT NECK SPINE W/O DYE: CPT

## 2024-08-26 PROCEDURE — 25010000002 CEFTRIAXONE PER 250 MG

## 2024-08-26 PROCEDURE — 80053 COMPREHEN METABOLIC PANEL: CPT

## 2024-08-26 PROCEDURE — 72192 CT PELVIS W/O DYE: CPT

## 2024-08-26 PROCEDURE — 81001 URINALYSIS AUTO W/SCOPE: CPT

## 2024-08-26 PROCEDURE — 85025 COMPLETE CBC W/AUTO DIFF WBC: CPT

## 2024-08-26 PROCEDURE — 72131 CT LUMBAR SPINE W/O DYE: CPT

## 2024-08-26 PROCEDURE — 73552 X-RAY EXAM OF FEMUR 2/>: CPT

## 2024-08-26 PROCEDURE — P9612 CATHETERIZE FOR URINE SPEC: HCPCS

## 2024-08-26 PROCEDURE — 99285 EMERGENCY DEPT VISIT HI MDM: CPT

## 2024-08-26 PROCEDURE — 70450 CT HEAD/BRAIN W/O DYE: CPT

## 2024-08-26 RX ORDER — SODIUM CHLORIDE 9 MG/ML
75 INJECTION, SOLUTION INTRAVENOUS CONTINUOUS
Status: DISCONTINUED | OUTPATIENT
Start: 2024-08-27 | End: 2024-08-30 | Stop reason: HOSPADM

## 2024-08-26 RX ORDER — IBUPROFEN 400 MG/1
400 TABLET, FILM COATED ORAL EVERY 6 HOURS PRN
Status: DISCONTINUED | OUTPATIENT
Start: 2024-08-26 | End: 2024-08-27

## 2024-08-26 RX ORDER — ENOXAPARIN SODIUM 100 MG/ML
40 INJECTION SUBCUTANEOUS DAILY
Status: DISCONTINUED | OUTPATIENT
Start: 2024-08-27 | End: 2024-08-30 | Stop reason: HOSPADM

## 2024-08-26 RX ORDER — SODIUM CHLORIDE 0.9 % (FLUSH) 0.9 %
10 SYRINGE (ML) INJECTION AS NEEDED
Status: DISCONTINUED | OUTPATIENT
Start: 2024-08-26 | End: 2024-08-30 | Stop reason: HOSPADM

## 2024-08-26 RX ORDER — IBUPROFEN 400 MG/1
400 TABLET, FILM COATED ORAL ONCE
Status: COMPLETED | OUTPATIENT
Start: 2024-08-26 | End: 2024-08-26

## 2024-08-26 RX ORDER — ONDANSETRON 2 MG/ML
4 INJECTION INTRAMUSCULAR; INTRAVENOUS EVERY 6 HOURS PRN
Status: DISCONTINUED | OUTPATIENT
Start: 2024-08-26 | End: 2024-08-30 | Stop reason: HOSPADM

## 2024-08-26 RX ADMIN — IBUPROFEN 400 MG: 400 TABLET ORAL at 20:29

## 2024-08-26 RX ADMIN — SODIUM CHLORIDE 1000 MG: 900 INJECTION INTRAVENOUS at 21:23

## 2024-08-26 NOTE — ED PROVIDER NOTES
Subjective   History of Present Illness  Patient is an 79-year-old female who presents to the ED post fall.  Patient presents with her daughter who is the primary historian and the patient supplements history.  Patient says she was washing her carpet on her knees and when she stood up to get to her walker and tripped causing her to lose her balance causing her to fall on her carpet floor.  She states she is uncertain of what caused her to fall.  Patient states she was not lightheaded or dizzy.  She states that the majority of her pain is localized to her right medial thigh.  She states she is unable to put any pressure on her right hip because of the pain.  Patient reports she did not lose consciousness and does not think she hit her head. She states she has a history of a hip replacement on the right side.  Patient also states she has a abrasion on her left elbow but believes this to have happened prior to the fall.  Denies any elbow or forearm pain.  Patient denies any hip, groin, or knee pain.  Patient denies any headache, or recent syncope.  Patient denies any abdominal or flank pain.  Patient denies any recent fevers, nausea, or vomiting.  Patient denies any pain in her back or spine.     Daughter states the patient lives alone and has a history of dementia.  Daughter reports the patient's current mentation is baseline. Daughter states the patient has had at least 3 falls in the last week. Daughter reports that she has a shuffling gait at baseline.  She also reports that her most recent fall prior to this one she landed on her buttocks and has been complaining of soreness in that area as well.  Reports the patient had a urinary tract infection prior to that fall.           Review of Systems   Musculoskeletal:  Positive for arthralgias, gait problem and myalgias.   Skin:  Positive for wound.   All other systems reviewed and are negative.      Past Medical History:   Diagnosis Date    CAD in native artery  7/29/2019    COPD (chronic obstructive pulmonary disease)     Essential hypertension 12/7/2021    GERD (gastroesophageal reflux disease)     Lung nodule        Allergies   Allergen Reactions    Codeine Other (See Comments)     Unknown.      Hydrocodone-Acetaminophen Other (See Comments)    Levofloxacin Other (See Comments)    Oxycodone-Acetaminophen Other (See Comments)    Tramadol Other (See Comments)    Zanaflex  [Tizanidine Hcl] Other (See Comments)    Albuterol Arrhythmia       Past Surgical History:   Procedure Laterality Date    BREAST IMPLANT REMOVAL      BREAST TISSUE EXPANDER REMOVAL INSERTION OF IMPLANT      CARDIAC CATHETERIZATION N/A 6/21/2019    Procedure: Left Heart Cath;  Surgeon: Edi Armijo MD;  Location:  [facility];  Service: Cardiology    CHOLECYSTECTOMY      HYSTERECTOMY      MASTECTOMY      BILATERAL    OOPHORECTOMY         Family History   Problem Relation Age of Onset    Cancer Mother     Cancer Father        Social History     Socioeconomic History    Marital status:    Tobacco Use    Smoking status: Every Day     Current packs/day: 0.50     Average packs/day: 0.5 packs/day for 62.0 years (31.0 ttl pk-yrs)     Types: Cigarettes    Smokeless tobacco: Never    Tobacco comments:     states she has no plan to quit smoking   Vaping Use    Vaping status: Former   Substance and Sexual Activity    Alcohol use: No    Drug use: No    Sexual activity: Not Currently           Objective   Physical Exam  Vitals and nursing note reviewed.   Constitutional:       Appearance: Normal appearance.      Comments: Nontoxic appearing. In no acute distress.    HENT:      Head: Normocephalic and atraumatic.      Right Ear: External ear normal.      Left Ear: External ear normal.      Nose: Nose normal.      Mouth/Throat:      Mouth: Mucous membranes are moist.      Pharynx: Oropharynx is clear.   Eyes:      Extraocular Movements: Extraocular movements intact.       Conjunctiva/sclera: Conjunctivae normal.      Pupils: Pupils are equal, round, and reactive to light.   Cardiovascular:      Rate and Rhythm: Normal rate and regular rhythm.      Pulses: Normal pulses.      Heart sounds: Normal heart sounds.   Pulmonary:      Effort: Pulmonary effort is normal.      Breath sounds: Normal breath sounds.   Abdominal:      General: Abdomen is flat. Bowel sounds are normal. There is no distension.      Tenderness: There is no abdominal tenderness. There is no guarding.   Musculoskeletal:         General: Normal range of motion.      Cervical back: Normal range of motion and neck supple. No rigidity or tenderness.      Right lower leg: No edema.      Left lower leg: No edema.   Skin:     General: Skin is warm and dry.      Capillary Refill: Capillary refill takes 2 to 3 seconds.   Neurological:      General: No focal deficit present.      Mental Status: She is alert and oriented to person, place, and time.      Cranial Nerves: No cranial nerve deficit.      Sensory: No sensory deficit.      Motor: Weakness present.   Psychiatric:         Mood and Affect: Mood normal.         Behavior: Behavior normal.         Thought Content: Thought content normal.         Judgment: Judgment normal.         Procedures           ED Course  ED Course as of 08/26/24 2129   Mon Aug 26, 2024   1952 Care resumed from MYRNA Cam. CT scans, labs, disposition pending. [KR]   2111 Call placed to Dr. Espinoza for admit.  CT pelvis revealed acute minimally displaced right inferior pubic ramus fracture, otherwise imaging unremarkable for acute findings.  Given gait instability, we will plan to admit patient. [KR]   2127 Case was discussed with MYRNA Zhang with Dr. Espinoza.  She has agreed to accept patient for further management. Daughter is agreeable to plan of care. [KR]      ED Course User Index  [KR] Laura Cortes APRN                                   CT Pelvis Without Contrast   Final Result       1.  Acute minimally displaced right inferior pubic ramus fracture. No   other acute pelvic fracture.        2. No hip fracture or dislocation. Right hip arthroplasty without   evidence of complication.       This report was signed and finalized on 8/26/2024 8:11 PM by Dr. Jose Luevano MD.          CT Lumbar Spine Without Contrast   Final Result   1. No acute fracture or subluxation.   2. Mild multilevel lumbar spine degenerative change.           This report was signed and finalized on 8/26/2024 8:07 PM by Dr. Jose Luevano MD.          CT Head Without Contrast   Final Result   1. No acute intracranial findings.   2. Global cerebral volume loss and presumed chronic microvascular   ischemic white matter change.           This report was signed and finalized on 8/26/2024 7:58 PM by Dr. Jose Luevano MD.          CT Cervical Spine Without Contrast   Final Result       1. No acute cervical spine fracture or subluxation.       2. Age-indeterminate mild compression deformity of T1 in the partially   imaged upper thoracic spine. Correlate with point tenderness.               This report was signed and finalized on 8/26/2024 8:04 PM by Dr. Jose Luevano MD.          XR Femur 2 View Right   Final Result       No acute osseous findings. Postoperative change of right hip   arthroplasty without evidence of complication.       This report was signed and finalized on 8/26/2024 7:41 PM by Dr. Jose Luevano MD.            Labs Reviewed   COMPREHENSIVE METABOLIC PANEL - Abnormal; Notable for the following components:       Result Value    Glucose 102 (*)     Creatinine 1.36 (*)     eGFR 39.2 (*)     All other components within normal limits    Narrative:     GFR Normal >60  Chronic Kidney Disease <60  Kidney Failure <15    The GFR formula is only valid for adults with stable renal function between ages 18 and 70.   URINALYSIS W/ CULTURE IF INDICATED - Abnormal; Notable for the following components:    Leuk  Esterase, UA Small (1+) (*)     All other components within normal limits    Narrative:     In absence of clinical symptoms, the presence of pyuria, bacteria, and/or nitrites on the urinalysis result does not correlate with infection.   CBC WITH AUTO DIFFERENTIAL - Abnormal; Notable for the following components:    WBC 15.56 (*)     Neutrophil % 79.5 (*)     Lymphocyte % 13.4 (*)     Monocyte % 4.9 (*)     Immature Grans % 0.6 (*)     Neutrophils, Absolute 12.38 (*)     Immature Grans, Absolute 0.09 (*)     All other components within normal limits   URINALYSIS, MICROSCOPIC ONLY - Abnormal; Notable for the following components:    WBC, UA 3-5 (*)     Bacteria, UA 4+ (*)     Squamous Epithelial Cells, UA 3-6 (*)     All other components within normal limits   CBC AND DIFFERENTIAL    Narrative:     The following orders were created for panel order CBC & Differential.  Procedure                               Abnormality         Status                     ---------                               -----------         ------                     CBC Auto Differential[[id number]]        Abnormal            Final result                 Please view results for these tests on the individual orders.               Medical Decision Making  Pattie Liu is a 79 y.o. female who presents to the ED ED post fall.  Patient presents with her daughter who is the primary historian and the patient supplements history.  Patient says she was washing her carpet on her knees and when she stood up to get to her walker and tripped causing her to lose her balance causing her to fall on her carpet floor.  She states she is uncertain of what caused her to fall.  Patient states she was not lightheaded or dizzy.  She states that the majority of her pain is localized to her right medial thigh.  She states she is unable to put any pressure on her right hip because of the pain.  Patient reports she did not lose consciousness and does not think she hit  her head. She states she has a history of a hip replacement on the right side.  Patient also states she has a abrasion on her left elbow but believes this to have happened prior to the fall.  Denies any elbow or forearm pain.  Patient denies any hip, groin, or knee pain.  Patient denies any headache, or recent syncope.  Patient denies any abdominal or flank pain.  Patient denies any recent fevers, nausea, or vomiting.  Patient denies any pain in her back or spine.     Daughter states the patient lives alone and has a history of dementia.  Daughter reports the patient's current mentation is baseline. Daughter states the patient has had at least 3 falls in the last week. Daughter reports that she has a shuffling gait at baseline.  She also reports that her most recent fall prior to this one she landed on her buttocks and has been complaining of soreness in that area as well.  Reports the patient had a urinary tract infection prior to that fall.     Patient was non-toxic appearing on arrival. No acute distress was noted.  Vital signs stable.     Patient's presentation raises suspicion for differentials including, but not limited to, ICH, skull fracture, hip fracture, hip dislocation.     Past medical history, surgical history, and medication regimen reviewed.     Previous notes, labs, imaging and more reviewed.     Patient was signed out to [employer] CRISTIAN, MYRNA Sanchez, in stable condition pending any workup, reassessment, and final disposition.     Dragon disclaimer:  Parts of this note may be an electronic transcription/translation of spoken language to printed text using the Dragon dictation system.       Problems Addressed:  Closed fracture of ramus of right pubis, initial encounter: complicated acute illness or injury  Gait instability: complicated acute illness or injury  Multiple falls: complicated acute illness or injury  Urinary tract infection without hematuria, site unspecified: complicated acute illness or  injury    Amount and/or Complexity of Data Reviewed  Labs: ordered.  Radiology: ordered.    Risk  Prescription drug management.  Decision regarding hospitalization.        Final diagnoses:   Gait instability   Closed fracture of ramus of right pubis, initial encounter   Multiple falls   Urinary tract infection without hematuria, site unspecified       ED Disposition  ED Disposition       ED Disposition   Decision to Admit    Condition   --    Comment   Level of Care: Med/Surg [1]   Diagnosis: Gait instability [185640]   Admitting Physician: LOGAN DIEZ [[id number]]   Certification: I Certify That Inpatient Hospital Services Are Medically Necessary For Greater Than 2 Midnights                 No follow-up provider specified.       Medication List      No changes were made to your prescriptions during this visit.            Laura Cortes, APRN  08/26/24 [id number]

## 2024-08-27 ENCOUNTER — APPOINTMENT (OUTPATIENT)
Dept: GENERAL RADIOLOGY | Facility: HOSPITAL | Age: 79
DRG: 690 | End: 2024-08-27
Payer: MEDICARE

## 2024-08-27 PROCEDURE — 73020 X-RAY EXAM OF SHOULDER: CPT

## 2024-08-27 PROCEDURE — 25810000003 SODIUM CHLORIDE 0.9 % SOLUTION: Performed by: PHYSICIAN ASSISTANT

## 2024-08-27 PROCEDURE — 25010000002 ENOXAPARIN PER 10 MG: Performed by: PHYSICIAN ASSISTANT

## 2024-08-27 PROCEDURE — 25010000002 CEFTRIAXONE PER 250 MG: Performed by: FAMILY MEDICINE

## 2024-08-27 PROCEDURE — 25010000002 ONDANSETRON PER 1 MG: Performed by: PHYSICIAN ASSISTANT

## 2024-08-27 PROCEDURE — 25010000002 PROMETHAZINE PER 50 MG: Performed by: FAMILY MEDICINE

## 2024-08-27 RX ORDER — BISACODYL 5 MG/1
5 TABLET, DELAYED RELEASE ORAL DAILY PRN
Status: DISCONTINUED | OUTPATIENT
Start: 2024-08-27 | End: 2024-08-30 | Stop reason: HOSPADM

## 2024-08-27 RX ORDER — NYSTATIN 100000 [USP'U]/G
POWDER TOPICAL EVERY 12 HOURS SCHEDULED
Status: DISCONTINUED | OUTPATIENT
Start: 2024-08-27 | End: 2024-08-30 | Stop reason: HOSPADM

## 2024-08-27 RX ORDER — GABAPENTIN 400 MG/1
400 CAPSULE ORAL EVERY 12 HOURS SCHEDULED
Status: DISCONTINUED | OUTPATIENT
Start: 2024-08-27 | End: 2024-08-30 | Stop reason: HOSPADM

## 2024-08-27 RX ORDER — BISACODYL 10 MG
10 SUPPOSITORY, RECTAL RECTAL DAILY PRN
Status: DISCONTINUED | OUTPATIENT
Start: 2024-08-27 | End: 2024-08-30 | Stop reason: HOSPADM

## 2024-08-27 RX ORDER — BUMETANIDE 1 MG/1
1 TABLET ORAL DAILY
Status: DISCONTINUED | OUTPATIENT
Start: 2024-08-27 | End: 2024-08-30 | Stop reason: HOSPADM

## 2024-08-27 RX ORDER — SODIUM CHLORIDE 0.9 % (FLUSH) 0.9 %
10 SYRINGE (ML) INJECTION AS NEEDED
Status: DISCONTINUED | OUTPATIENT
Start: 2024-08-27 | End: 2024-08-30 | Stop reason: HOSPADM

## 2024-08-27 RX ORDER — IBUPROFEN 400 MG/1
400 TABLET, FILM COATED ORAL EVERY 6 HOURS PRN
Status: DISCONTINUED | OUTPATIENT
Start: 2024-08-27 | End: 2024-08-30 | Stop reason: HOSPADM

## 2024-08-27 RX ORDER — SODIUM CHLORIDE 9 MG/ML
40 INJECTION, SOLUTION INTRAVENOUS AS NEEDED
Status: DISCONTINUED | OUTPATIENT
Start: 2024-08-27 | End: 2024-08-30 | Stop reason: HOSPADM

## 2024-08-27 RX ORDER — POLYETHYLENE GLYCOL 3350 17 G/17G
17 POWDER, FOR SOLUTION ORAL DAILY PRN
Status: DISCONTINUED | OUTPATIENT
Start: 2024-08-27 | End: 2024-08-30 | Stop reason: HOSPADM

## 2024-08-27 RX ORDER — NICOTINE 21 MG/24HR
1 PATCH, TRANSDERMAL 24 HOURS TRANSDERMAL
Status: DISCONTINUED | OUTPATIENT
Start: 2024-08-27 | End: 2024-08-30 | Stop reason: HOSPADM

## 2024-08-27 RX ORDER — LISINOPRIL 20 MG/1
20 TABLET ORAL DAILY
Status: DISCONTINUED | OUTPATIENT
Start: 2024-08-27 | End: 2024-08-30 | Stop reason: HOSPADM

## 2024-08-27 RX ORDER — ATORVASTATIN CALCIUM 20 MG/1
20 TABLET, FILM COATED ORAL DAILY
Status: ON HOLD | COMMUNITY
End: 2024-08-30

## 2024-08-27 RX ORDER — CITALOPRAM HYDROBROMIDE 20 MG/1
20 TABLET ORAL 2 TIMES DAILY
Status: DISCONTINUED | OUTPATIENT
Start: 2024-08-27 | End: 2024-08-30 | Stop reason: HOSPADM

## 2024-08-27 RX ORDER — ALPRAZOLAM 0.5 MG
1 TABLET ORAL 2 TIMES DAILY
Status: DISCONTINUED | OUTPATIENT
Start: 2024-08-27 | End: 2024-08-30 | Stop reason: HOSPADM

## 2024-08-27 RX ORDER — ASPIRIN 81 MG/1
81 TABLET ORAL DAILY
Status: DISCONTINUED | OUTPATIENT
Start: 2024-08-27 | End: 2024-08-30 | Stop reason: HOSPADM

## 2024-08-27 RX ORDER — SODIUM CHLORIDE 0.9 % (FLUSH) 0.9 %
10 SYRINGE (ML) INJECTION EVERY 12 HOURS SCHEDULED
Status: DISCONTINUED | OUTPATIENT
Start: 2024-08-27 | End: 2024-08-30 | Stop reason: HOSPADM

## 2024-08-27 RX ORDER — TRAMADOL HYDROCHLORIDE 50 MG/1
50 TABLET ORAL EVERY 12 HOURS PRN
Status: DISCONTINUED | OUTPATIENT
Start: 2024-08-27 | End: 2024-08-30 | Stop reason: HOSPADM

## 2024-08-27 RX ORDER — AMOXICILLIN 250 MG
2 CAPSULE ORAL 2 TIMES DAILY PRN
Status: DISCONTINUED | OUTPATIENT
Start: 2024-08-27 | End: 2024-08-30 | Stop reason: HOSPADM

## 2024-08-27 RX ORDER — ACETAMINOPHEN 325 MG/1
650 TABLET ORAL EVERY 6 HOURS PRN
Status: DISCONTINUED | OUTPATIENT
Start: 2024-08-27 | End: 2024-08-30 | Stop reason: HOSPADM

## 2024-08-27 RX ORDER — AMLODIPINE BESYLATE 10 MG/1
10 TABLET ORAL
Status: DISCONTINUED | OUTPATIENT
Start: 2024-08-27 | End: 2024-08-28

## 2024-08-27 RX ADMIN — ACETAMINOPHEN 650 MG: 325 TABLET ORAL at 19:46

## 2024-08-27 RX ADMIN — ENOXAPARIN SODIUM 40 MG: 100 INJECTION SUBCUTANEOUS at 08:18

## 2024-08-27 RX ADMIN — ACETAMINOPHEN 650 MG: 325 TABLET ORAL at 10:11

## 2024-08-27 RX ADMIN — ONDANSETRON 4 MG: 2 INJECTION INTRAMUSCULAR; INTRAVENOUS at 10:23

## 2024-08-27 RX ADMIN — ALPRAZOLAM 1 MG: 0.5 TABLET ORAL at 19:46

## 2024-08-27 RX ADMIN — SODIUM CHLORIDE 75 ML/HR: 9 INJECTION, SOLUTION INTRAVENOUS at 15:20

## 2024-08-27 RX ADMIN — ONDANSETRON 4 MG: 2 INJECTION INTRAMUSCULAR; INTRAVENOUS at 17:59

## 2024-08-27 RX ADMIN — GABAPENTIN 400 MG: 400 CAPSULE ORAL at 19:46

## 2024-08-27 RX ADMIN — PROMETHAZINE HYDROCHLORIDE 12.5 MG: 25 INJECTION INTRAMUSCULAR; INTRAVENOUS at 20:52

## 2024-08-27 RX ADMIN — NICOTINE 1 PATCH: 21 PATCH, EXTENDED RELEASE TRANSDERMAL at 19:46

## 2024-08-27 RX ADMIN — NYSTATIN: 100000 POWDER TOPICAL at 19:47

## 2024-08-27 RX ADMIN — AMLODIPINE BESYLATE 10 MG: 10 TABLET ORAL at 18:15

## 2024-08-27 RX ADMIN — BUMETANIDE 1 MG: 1 TABLET ORAL at 18:15

## 2024-08-27 RX ADMIN — LISINOPRIL 20 MG: 20 TABLET ORAL at 18:15

## 2024-08-27 RX ADMIN — SODIUM CHLORIDE 1000 MG: 900 INJECTION INTRAVENOUS at 10:56

## 2024-08-27 RX ADMIN — SODIUM CHLORIDE 75 ML/HR: 9 INJECTION, SOLUTION INTRAVENOUS at 00:03

## 2024-08-27 RX ADMIN — ACETAMINOPHEN 650 MG: 325 TABLET ORAL at 02:35

## 2024-08-27 RX ADMIN — ASPIRIN 81 MG: 81 TABLET, COATED ORAL at 18:15

## 2024-08-27 RX ADMIN — Medication 10 ML: at 10:58

## 2024-08-27 RX ADMIN — IBUPROFEN 400 MG: 400 TABLET, FILM COATED ORAL at 21:44

## 2024-08-27 RX ADMIN — CITALOPRAM 20 MG: 20 TABLET, FILM COATED ORAL at 19:46

## 2024-08-27 NOTE — NURSING NOTE
[facility]  INPATIENT WOUND & OSTOMY CARE    Today's Date: 08/27/24    Patient Name: Pattie Liu  MRN: [id number]  CSN: [id number]  PCP: Rafat Espinoza MD  Attending Provider: Rafat Espinoza MD  Length of Stay: 1    Pressure injury prevention measure ordered per protocol due to patient being at risk for skin breakdown.    Apply silicone foam border dressing per protocol to sacral spine/bilateral heels for protection.  Nursing to change dressing every 3 days and PRN if soiled. Nursing is to peel back dressing with every assessment to assess skin underneath dressing. No barrier cream under dressing.     Please reach out to wound care nurse if any skin issues arise.     This document has been electronically signed by Hayley Badillo RN on 8/27/2024 06:18 CDT

## 2024-08-27 NOTE — ED NOTES
The following fall interventions were initiated:    [x] Patient and/or family given education   [x] Call light within reach and educated on how to use   [x] Bed rails up per protocol    [x] Bed locked and in the lowest position   [] Bed alarm set and on loudest setting   [x] Fall wrist band applied   [x] Non skid footwear applied   [x] Room free of clutter   [x] Patient items within reach   [x] Adequate lighting provided  [] Falls sign present    [x] Patient moved closer to nursing station   [] Restraints applied    Family at bedside

## 2024-08-27 NOTE — CONSULTS
Inpatient Orthopedic Surgery Consult  Consult performed by: Francisco Bolden MD  Consult ordered by: Anupam Ledezma MD  Reason for consult: right hip pain  Assessment/Recommendations:     78 YO F s/p fall at home with right nondisplaced inferior pubic rami fracture  CT reviewed showing no posterior pelvic injury, no extension into acetabulum, components of JENNIFER intact.    1) WBAT  2) pain control, PT/OT  3) no ortho f/u needed    Electronically signed by Francisco Bolden MD on 8/27/2024 at 15:18 CDT

## 2024-08-27 NOTE — CONSULTS
[facility]  INPATIENT WOUND & OSTOMY CONSULTATION    Today's Date: 08/27/24    Patient Name: Pattie Liu  MRN: [id number]  CSN: [id number]  PCP: Logan Diez MD  Referring Provider:   Consulting Provider (From admission, onward)      Start Ordered     Status Ordering Provider    08/27/24 0040 08/27/24 0040  Inpatient Wound Care MD Consult  Once        Specialty:  Wound Care  Provider:  Vida Church APRN    Acknowledged LOGAN DIEZ           Attending Provider: Anupam Ledezma MD  Length of Stay: 1    SUBJECTIVE   Chief Complaint: ***    HPI: Pattie Liu, a 79 y.o.female, presents with a past medical history of ***.  A full past medical history is listed below.  Inpatient wound care consulted due to ***      Visit Dx:    ICD-10-CM ICD-9-CM   1. Gait instability  R26.81 781.2   2. Closed fracture of ramus of right pubis, initial encounter  S32.591A 808.2   3. Multiple falls  R29.6 V15.88   4. Urinary tract infection without hematuria, site unspecified  N39.0 599.0       Hospital Problem List:     Gait instability      History:   Past Medical History:   Diagnosis Date    CAD in native artery 7/29/2019    COPD (chronic obstructive pulmonary disease)     Essential hypertension 12/7/2021    GERD (gastroesophageal reflux disease)     Lung nodule      Past Surgical History:   Procedure Laterality Date    BREAST IMPLANT REMOVAL      BREAST TISSUE EXPANDER REMOVAL INSERTION OF IMPLANT      CARDIAC CATHETERIZATION N/A 6/21/2019    Procedure: Left Heart Cath;  Surgeon: Edi Armijo MD;  Location: [facility] CATH INVASIVE LOCATION;  Service: Cardiology    CHOLECYSTECTOMY      HYSTERECTOMY      MASTECTOMY      BILATERAL    OOPHORECTOMY       Social History     Socioeconomic History    Marital status:    Tobacco Use    Smoking status: Every Day     Current packs/day: 0.50     Average packs/day: 0.5 packs/day for 62.0 years (31.0 ttl pk-yrs)     Types: Cigarettes     Smokeless tobacco: Never    Tobacco comments:     states she has no plan to quit smoking   Vaping Use    Vaping status: Former   Substance and Sexual Activity    Alcohol use: No    Drug use: No    Sexual activity: Not Currently     Family History   Problem Relation Age of Onset    Cancer Mother     Cancer Father        Allergies:  Allergies   Allergen Reactions    Codeine Other (See Comments)     Unknown.      Hydrocodone-Acetaminophen Other (See Comments)    Levofloxacin Other (See Comments)    Oxycodone-Acetaminophen Other (See Comments)    Tramadol Other (See Comments)    Zanaflex  [Tizanidine Hcl] Other (See Comments)    Albuterol Arrhythmia       Medications:    Current Facility-Administered Medications:     acetaminophen (TYLENOL) tablet 650 mg, 650 mg, Oral, Q6H PRN, Leatha Morrow PA, 650 mg at 08/27/24 1011    sennosides-docusate (PERICOLACE) 8.6-50 MG per tablet 2 tablet, 2 tablet, Oral, BID PRN **AND** polyethylene glycol (MIRALAX) packet 17 g, 17 g, Oral, Daily PRN **AND** bisacodyl (DULCOLAX) EC tablet 5 mg, 5 mg, Oral, Daily PRN **AND** bisacodyl (DULCOLAX) suppository 10 mg, 10 mg, Rectal, Daily PRN, Anupam Ledezma MD    cefTRIAXone (ROCEPHIN) 1,000 mg in sodium chloride 0.9 % 100 mL MBP, 1,000 mg, Intravenous, Q24H, Anupam Ledezma MD, Last Rate: 200 mL/hr at 08/27/24 1056, 1,000 mg at 08/27/24 1056    Enoxaparin Sodium (LOVENOX) syringe 40 mg, 40 mg, Subcutaneous, Daily, Leatha Morrow PA, 40 mg at 08/27/24 0818    ibuprofen (ADVIL,MOTRIN) tablet 400 mg, 400 mg, Oral, Q6H PRN, Leatha Morrow PA    melatonin tablet 5 mg, 5 mg, Oral, Nightly PRN, Anupam Ledezma MD    nystatin (MYCOSTATIN) powder, , Topical, Q12H, Vida Church APRN    ondansetron (ZOFRAN) injection 4 mg, 4 mg, Intravenous, Q6H PRN, Leatha Morrow PA, 4 mg at 08/27/24 1023    [COMPLETED] Insert Peripheral IV, , , Once **AND** sodium chloride 0.9 % flush 10 mL, 10 mL, Intravenous, PRN,  Airam José APRN    sodium chloride 0.9 % flush 10 mL, 10 mL, Intravenous, Q12H, Anupam Ledezma MD, 10 mL at 08/27/24 1058    sodium chloride 0.9 % flush 10 mL, 10 mL, Intravenous, PRN, Anupam Ledezma MD    sodium chloride 0.9 % infusion 40 mL, 40 mL, Intravenous, PRN, Anupam Ledezma MD    sodium chloride 0.9 % infusion, 75 mL/hr, Intravenous, Continuous, Leatha Morrow PA, Last Rate: 75 mL/hr at 08/27/24 0003, 75 mL/hr at 08/27/24 0003    OBJECTIVE     Vitals:    08/27/24 1144   BP: (!) 138/37   Pulse: 50   Resp: 16   Temp: 98.3 °F (36.8 °C)   SpO2: 94%       PHYSICAL EXAM: ***  Physical Exam       Results Review:  Lab Results (last 48 hours)       Procedure Component Value Units Date/Time    Comprehensive Metabolic Panel [[id number]]  (Abnormal) Collected: 08/26/24 2039    Specimen: Blood Updated: 08/26/24 2108     Glucose 102 mg/dL      BUN 19 mg/dL      Creatinine 1.36 mg/dL      Sodium 141 mmol/L      Potassium 4.0 mmol/L      Chloride 101 mmol/L      CO2 27.0 mmol/L      Calcium 10.0 mg/dL      Total Protein 7.2 g/dL      Albumin 3.9 g/dL      ALT (SGPT) 10 U/L      AST (SGOT) 17 U/L      Alkaline Phosphatase 110 U/L      Total Bilirubin 0.2 mg/dL      Globulin 3.3 gm/dL      A/G Ratio 1.2 g/dL      BUN/Creatinine Ratio 14.0     Anion Gap 13.0 mmol/L      eGFR 39.2 mL/min/1.73     Narrative:      GFR Normal >60  Chronic Kidney Disease <60  Kidney Failure <15    The GFR formula is only valid for adults with stable renal function between ages 18 and 70.    Urinalysis, Microscopic Only - Straight Cath [[id number]]  (Abnormal) Collected: 08/26/24 2031    Specimen: Urine from Straight Cath Updated: 08/26/24 2056     RBC, UA None Seen /HPF      WBC, UA 3-5 /HPF      Comment: Urine culture not indicated.        Bacteria, UA 4+ /HPF      Squamous Epithelial Cells, UA 3-6 /HPF      Hyaline Casts, UA 0-2 /LPF      Methodology Automated Microscopy    Urinalysis With Culture If Indicated -  Straight Cath [[id number]]  (Abnormal) Collected: 08/26/24 2031    Specimen: Urine from Straight Cath Updated: 08/26/24 2056     Color, UA Yellow     Appearance, UA Clear     pH, UA 5.5     Specific Gravity, UA 1.012     Glucose, UA Negative     Ketones, UA Negative     Bilirubin, UA Negative     Blood, UA Negative     Protein, UA Negative     Leuk Esterase, UA Small (1+)     Nitrite, UA Negative     Urobilinogen, UA 0.2 E.U./dL    Narrative:      In absence of clinical symptoms, the presence of pyuria, bacteria, and/or nitrites on the urinalysis result does not correlate with infection.    CBC & Differential [[id number]]  (Abnormal) Collected: 08/26/24 2039    Specimen: Blood Updated: 08/26/24 2049    Narrative:      The following orders were created for panel order CBC & Differential.  Procedure                               Abnormality         Status                     ---------                               -----------         ------                     CBC Auto Differential[[id number]]        Abnormal            Final result                 Please view results for these tests on the individual orders.    CBC Auto Differential [[id number]]  (Abnormal) Collected: 08/26/24 2039    Specimen: Blood Updated: 08/26/24 2049     WBC 15.56 10*3/mm3      RBC 4.03 10*6/mm3      Hemoglobin 12.2 g/dL      Hematocrit 38.0 %      MCV 94.3 fL      MCH 30.3 pg      MCHC 32.1 g/dL      RDW 13.4 %      RDW-SD 46.2 fl      MPV 10.1 fL      Platelets 394 10*3/mm3      Neutrophil % 79.5 %      Lymphocyte % 13.4 %      Monocyte % 4.9 %      Eosinophil % 1.2 %      Basophil % 0.4 %      Immature Grans % 0.6 %      Neutrophils, Absolute 12.38 10*3/mm3      Lymphocytes, Absolute 2.08 10*3/mm3      Monocytes, Absolute 0.76 10*3/mm3      Eosinophils, Absolute 0.18 10*3/mm3      Basophils, Absolute 0.07 10*3/mm3      Immature Grans, Absolute 0.09 10*3/mm3      nRBC 0.0 /100 WBC           Imaging Results (Last 72 Hours)       Procedure  Component Value Units Date/Time    XR Shoulder 1 View Left [[id number]] Collected: 08/27/24 0700     Updated: 08/27/24 0705    Narrative:      EXAMINATION: XR SHOULDER 1 VW LEFT-     8/27/2024 2:25 AM     HISTORY: shoulder pain, fall today; R26.81-Unsteadiness on feet;  S32.591A-Other specified fracture of right pubis, initial encounter for  closed fracture; R29.6-Repeated falls; N39.0-Urinary tract infection,  site not specified     A single frontal projection of the left shoulder is obtained. There is  no previous study for comparison.     There is no evidence of an acute fracture or dislocation.     Limited visualized and evaluated glenohumeral and acromioclavicular  articulations are intact. Moderate arthropathy of both joints.     There is a linear calcification in the acromiohumeral space, parallel to  the lateral aspect of the humeral head, representing supraspinatus  calcific tendinopathy.     Acromiohumeral space is maintained.     The scapula is not optimally visualized or evaluated.     Limited visualized proximal left ribs are unremarkable. No displaced  fracture.       Impression:      1. No acute fracture or dislocation.  2. Other nonacute findings as above.        This report was signed and finalized on 8/27/2024 7:02 AM by Dr. Tong Orellana MD.       CT Pelvis Without Contrast [[id number]] Collected: 08/26/24 2008     Updated: 08/26/24 2014    Narrative:      EXAM/TECHNIQUE: CT pelvis without contrast     INDICATION: fall with right hip pain and low back pain     COMPARISON: 8/21/2019     DLP: 1673.11 mGy.cm. Automated exposure control was also utilized to  decrease patient radiation dose.     FINDINGS:     Acute minimally displaced right inferior pubic ramus fracture. Right  superior pubic rami and acetabulum are intact. No widening of the pubic  symphysis. No other acute pelvic fracture. No sacral fracture.     No evidence of acute hip fracture or dislocation. Postoperative change  of right hip  arthroplasty without evidence of complication.     No free pelvic fluid. Sigmoid diverticulosis without diverticulitis. No  abnormal bowel distention or active bowel inflammation in the pelvis.  Prior hysterectomy. No focal urinary bladder abnormality.  Atherosclerotic nonaneurysmal distal abdominal aorta. No pelvic  lymphadenopathy. No large soft tissue hematoma.       Impression:         1. Acute minimally displaced right inferior pubic ramus fracture. No  other acute pelvic fracture.      2. No hip fracture or dislocation. Right hip arthroplasty without  evidence of complication.     This report was signed and finalized on 8/26/2024 8:11 PM by Dr. Jose Luevano MD.       CT Lumbar Spine Without Contrast [[id number]] Collected: 08/26/24 2004     Updated: 08/26/24 2010    Narrative:      EXAM/TECHNIQUE: CT lumbar spine without contrast     INDICATION: fall with low back pain and right hip pain     COMPARISON: 12/20/2022     DLP: 1673.11 mGy.cm. Automated exposure control was also utilized to  decrease patient radiation dose.     FINDINGS:     5 lumbar-type vertebral bodies. Lumbar lordosis is maintained. No  subluxations. Vertebral body heights are maintained. No acute or chronic  fracture. Mild multilevel lumbar spine degenerative change most  prominently characterized by facet arthropathy. No area of high-grade  central canal or neural foraminal stenosis. Nonaneurysmal abdominal  aorta with heavy atherosclerotic calcification. Paravertebral soft  tissues are unremarkable.       Impression:      1. No acute fracture or subluxation.  2. Mild multilevel lumbar spine degenerative change.        This report was signed and finalized on 8/26/2024 8:07 PM by Dr. Jose Luevano MD.       CT Cervical Spine Without Contrast [[id number]] Collected: 08/26/24 1958     Updated: 08/26/24 2007    Narrative:      EXAM/TECHNIQUE: CT cervical spine without contrast     INDICATION: fall, poor historian, AMS     COMPARISON:  12/20/2022     DLP: 1673.11 mGy.cm. Automated exposure control was also utilized to  decrease patient radiation dose.     FINDINGS:     Craniocervical relationships are maintained. The odontoid process is  intact. Cervical spine alignment is anatomic. Cervical vertebral body  heights are maintained. No acute cervical fracture or subluxation.  Age-indeterminate mild anterior compression deformity of T1 with 5%  height loss. Mild multilevel cervical spine degenerative change without  evidence of high-grade central canal or neural foraminal stenosis.  Paravertebral soft tissues are unremarkable.       Impression:         1. No acute cervical spine fracture or subluxation.     2. Age-indeterminate mild compression deformity of T1 in the partially  imaged upper thoracic spine. Correlate with point tenderness.           This report was signed and finalized on 8/26/2024 8:04 PM by Dr. Jose Luevano MD.       CT Head Without Contrast [[id number]] Collected: 08/26/24 1954     Updated: 08/26/24 2001    Narrative:      EXAM/TECHNIQUE: CT head without contrast     INDICATION: fall, poor historian, AMS     COMPARISON: 5/22/2023     DLP: 1673.11 mGy.cm. Automated exposure control was also utilized to  decrease patient radiation dose.     FINDINGS:     No evidence of intracranial hemorrhage. Gray-white differentiation is  maintained. Global cerebral volume loss and presumed chronic  microvascular ischemic white matter change. No midline shift or mass  effect. Lateral ventricles are nondilated. Basilar cisterns are patent.  No acute orbital finding. Mastoid air cells are clear. Visualized  paranasal sinuses are clear. No acute osseous finding.       Impression:      1. No acute intracranial findings.  2. Global cerebral volume loss and presumed chronic microvascular  ischemic white matter change.        This report was signed and finalized on 8/26/2024 7:58 PM by Dr. Jose Luevano MD.       XR Femur 2 View Right [[id number]]  Collected: 08/26/24 1940     Updated: 08/26/24 1945    Narrative:      EXAM/TECHNIQUE: XR FEMUR 2 VW RIGHT-     INDICATION: fall with right hip and femur pain     COMPARISON: None available.     FINDINGS:     Postoperative change of right hip arthroplasty. No evidence of hardware  complication. No periprosthetic fracture. No fracture involving the  right femur. No suspicious osseous lesion. No unexpected radiopaque  foreign body or soft tissue gas.       Impression:         No acute osseous findings. Postoperative change of right hip  arthroplasty without evidence of complication.     This report was signed and finalized on 8/26/2024 7:41 PM by Dr. Jose Luevano MD.                  ASSESSMENT/PLAN       Examination and evaluation of wound(s) was performed.    DIAGNOSIS:   ISTAP type II skin tear of left elbow  Candidal intertrigo  At high risk for skin breakdown    Gait instability        PLAN:   Orders placed for wound care and pressure/moisture management as listed below.       Start     Ordered    08/27/24 1800  Skin Care  2 Times Daily      Question Answer Comment   Wound Locations Bilateral groins and perineal area    Wound Care Instructions Clean area with no rinse foam cleanser.  Pat dry.  Apply nystatin as ordered.        08/27/24 1442    08/27/24 1443  Wound Care Skin Tear  Daily      Question Answer Comment   Wound Locations Left elbow skin tear    Wound Care Instructions Clean with normal saline.  Apply Vaseline gauze over wound.  Cover with Kerlix.  May use stockinette/mesh dressing to keep in place.    Cleanse Normal Saline    Intervention Vaseline Gauze    Dressing: Gauze Roll    Securement Stockinette / Mesh        08/27/24 1442    08/27/24 0618  Turn Patient  Now Then Every 2 Hours         08/27/24 0618    08/27/24 0618  Elevate Heels Off of Bed  Until Discontinued         08/27/24 0618    08/27/24 0618  Use Seat Cushion When Up In Chair  Continuous         08/27/24 0618    08/27/24 0618   Silicone Border Dressing to Bony Prominences  Per Order Details        Comments: Apply silicone foam border dressing per protocol to sacral spine/bilateral heels for protection.  Nursing to change dressing every 3 days and PRN if soiled. Nursing is to peel back dressing with every assessment to assess skin underneath dressing. No barrier cream under dressing.    08/27/24 0618 08/27/24 0618  Use Repositioning Wedge to Position Patient  Continuous        Comments: Use Comfort Glide repositioning sheet and wedges to position patient.    08/27/24 0618    Unscheduled  Wound Care  As Needed      Question:  Wound Care Instructions  Answer:  Apply Moisture Barrier After Any Incontinence    08/27/24 0618          Discussed findings and treatment plan including risks, benefits, and treatment options with patient in detail. Patient agreed with treatment plan.      This document has been electronically signed by MYRNA Mann on 8/27/2024 14:43 CDT

## 2024-08-27 NOTE — CASE MANAGEMENT/SOCIAL WORK
Discharge Planning Assessment   [facility]     Patient Name: Pattie Liu  MRN: [id number]  Today's Date: 8/27/2024    Admit Date: 8/26/2024        Discharge Needs Assessment       Row Name 08/27/24 1214       Living Environment    People in Home alone    Potentially Unsafe Housing Conditions none    In the past 12 months has the electric, gas, oil, or water company threatened to shut off services in your home? No    Primary Care Provided by self    Provides Primary Care For no one    Family Caregiver if Needed child(sheri), adult    Quality of Family Relationships helpful;involved    Able to Return to Prior Arrangements other (see comments)    Living Arrangement Comments Pt unsure of dc plan at this point       Resource/Environmental Concerns    Resource/Environmental Concerns none    Transportation Concerns none       Transportation Needs    In the past 12 months, has lack of transportation kept you from medical appointments or from getting medications? no    In the past 12 months, has lack of transportation kept you from meetings, work, or from getting things needed for daily living? No       Food Insecurity    Within the past 12 months, you worried that your food would run out before you got the money to buy more. Never true    Within the past 12 months, the food you bought just didn't last and you didn't have money to get more. Never true       Transition Planning    Patient/Family Anticipates Transition to home with family;home with help/services;inpatient rehabilitation facility;long-term care facility    Patient/Family Anticipated Services at Transition outpatient care;rehabilitation services;skilled nursing;home health care    Transportation Anticipated family or friend will provide       Discharge Needs Assessment    Readmission Within the Last 30 Days no previous admission in last 30 days    Equipment Currently Used at Home cane, straight;walker, rolling    Concerns to be Addressed adjustment to  diagnosis/illness;discharge planning    Anticipated Changes Related to Illness inability to care for self    Equipment Needed After Discharge other (see comments)  Will follow to see after therapy evaluates    Outpatient/Agency/Support Group Needs homecare agency;skilled nursing facility    Discharge Facility/Level of Care Needs home with home health;nursing facility, skilled    Current Discharge Risk lives alone    Discharge Coordination/Progress Pt lives at home alone but has supportive family/friends that does help when able. Pt has refused [facility] placement in the past. Pt is unsure of dc plan at this point. Pt states she was independent prior to admit but does admit to extreme weakness. Therapy has been ordered to eval. Will follow back up with pt after therapy evaluates.                   Discharge Plan    No documentation.                 Continued Care and Services - Admitted Since 8/26/2024    No active coordination exists for this encounter.          Demographic Summary    No documentation.                  Functional Status    No documentation.                  Psychosocial    No documentation.                  Abuse/Neglect    No documentation.                  Legal    No documentation.                  Substance Abuse    No documentation.                  Patient Forms    No documentation.                     ERIC Matias

## 2024-08-27 NOTE — PLAN OF CARE
Problem: Adult Inpatient Plan of Care  Goal: Plan of Care Review  Outcome: Ongoing, Progressing  Flowsheets (Taken 8/27/2024 1741)  Progress: no change  Plan of Care Reviewed With: patient  Outcome Evaluation: Pt is A&ox4. PRN tylenol given with relief of pain noted. turns as pt allows. Purewick in use. VSS. Safety maintained.

## 2024-08-27 NOTE — ED NOTES
"Nursing report ED to floor  Pattie Liu  79 y.o.  female    HPI:   Chief Complaint   Patient presents with    Fall       Admitting doctor:   Rafat Espinoza MD    Consulting provider(s):  Consults       No orders found from 7/28/2024 to 8/27/2024.             Admitting diagnosis:   The primary encounter diagnosis was Gait instability. Diagnoses of Closed fracture of ramus of right pubis, initial encounter, Multiple falls, and Urinary tract infection without hematuria, site unspecified were also pertinent to this visit.    Code status:   Current Code Status       Date Active Code Status Order ID Comments User Context       Prior            Allergies:   Codeine, Hydrocodone-acetaminophen, Levofloxacin, Oxycodone-acetaminophen, Tramadol, Zanaflex  [tizanidine hcl], and Albuterol    Intake and Output  No intake or output data in the 24 hours ending 08/26/24 2220    Weight:       08/26/24 1834   Weight: 65.8 kg (145 lb)       Most recent vitals:   Vitals:    08/26/24 1834 08/26/24 2044 08/26/24 2216   BP: 131/67  143/70   BP Location: Right arm     Patient Position: Sitting     Pulse: 62  59   Resp: 14     Temp:  98.2 °F (36.8 °C)    TempSrc:  Oral    SpO2: 91%  96%   Weight: 65.8 kg (145 lb)     Height: 160 cm (63\")       Oxygen Therapy: .    Active LDAs/IV Access:   Lines, Drains & Airways       Active LDAs       Name Placement date Placement time Site Days    Peripheral IV 08/26/24 2041 Right Antecubital 08/26/24 2041  Antecubital  less than 1                    Labs (abnormal labs have a star):   Labs Reviewed   COMPREHENSIVE METABOLIC PANEL - Abnormal; Notable for the following components:       Result Value    Glucose 102 (*)     Creatinine 1.36 (*)     eGFR 39.2 (*)     All other components within normal limits    Narrative:     GFR Normal >60  Chronic Kidney Disease <60  Kidney Failure <15    The GFR formula is only valid for adults with stable renal function between ages 18 and 70.   URINALYSIS W/ " CULTURE IF INDICATED - Abnormal; Notable for the following components:    Leuk Esterase, UA Small (1+) (*)     All other components within normal limits    Narrative:     In absence of clinical symptoms, the presence of pyuria, bacteria, and/or nitrites on the urinalysis result does not correlate with infection.   CBC WITH AUTO DIFFERENTIAL - Abnormal; Notable for the following components:    WBC 15.56 (*)     Neutrophil % 79.5 (*)     Lymphocyte % 13.4 (*)     Monocyte % 4.9 (*)     Immature Grans % 0.6 (*)     Neutrophils, Absolute 12.38 (*)     Immature Grans, Absolute 0.09 (*)     All other components within normal limits   URINALYSIS, MICROSCOPIC ONLY - Abnormal; Notable for the following components:    WBC, UA 3-5 (*)     Bacteria, UA 4+ (*)     Squamous Epithelial Cells, UA 3-6 (*)     All other components within normal limits   CBC AND DIFFERENTIAL    Narrative:     The following orders were created for panel order CBC & Differential.  Procedure                               Abnormality         Status                     ---------                               -----------         ------                     CBC Auto Differential[[id number]]        Abnormal            Final result                 Please view results for these tests on the individual orders.       Meds given in ED:   Medications   sodium chloride 0.9 % flush 10 mL (has no administration in time range)   ibuprofen (ADVIL,MOTRIN) tablet 400 mg (400 mg Oral Given 8/26/24 2029)   cefTRIAXone (ROCEPHIN) 1,000 mg in sodium chloride 0.9 % 100 mL MBP (1,000 mg Intravenous New Bag 8/26/24 2123)           NIH Stroke Scale:       Isolation/Infection(s):  No active isolations   No active infections     COVID Testing  Collected .not ordered  Resulted .    Nursing report ED to floor:  Mental status: .AxOx4   Ambulatory status: . FALL RISK  Precautions: .HARD OF HEARING, NO HEARING AIDS PRESENT    ED nurse phone extentsion- ..

## 2024-08-27 NOTE — H&P
History and Physical    Patient:  Pattie Liu  MRN: [id number]    CHIEF COMPLAINT:  fall at home    History Obtained From: the patient   PCP: Rafat Espinoza MD    HISTORY OF PRESENT ILLNESS:   The patient is a 79 y.o. female who presents with right thigh pain after fall at home on 8/26/24. She tripped while at home, denies dizziness or lightheadedness prior to fall. Imaging in ED revealed minimal displaced acute fracture of right inferior pubic ramus. She lives at home alone currently and has had multiple falls recently.    REVIEW OF SYSTEMS:    Review of Systems   Constitutional:  Positive for fatigue. Negative for chills and fever.   Respiratory:  Positive for shortness of breath.    Gastrointestinal:  Negative for nausea and vomiting.   Genitourinary:  Positive for dysuria.   Neurological:  Positive for weakness.          Past Medical History:  Past Medical History:   Diagnosis Date    CAD in native artery 7/29/2019    COPD (chronic obstructive pulmonary disease)     Essential hypertension 12/7/2021    GERD (gastroesophageal reflux disease)     Lung nodule        Past Surgical History:  Past Surgical History:   Procedure Laterality Date    BREAST IMPLANT REMOVAL      BREAST TISSUE EXPANDER REMOVAL INSERTION OF IMPLANT      CARDIAC CATHETERIZATION N/A 6/21/2019    Procedure: Left Heart Cath;  Surgeon: Edi Armijo MD;  Location:  [facility];  Service: Cardiology    CHOLECYSTECTOMY      HYSTERECTOMY      MASTECTOMY      BILATERAL    OOPHORECTOMY         Medications Prior to Admission:    Medications Prior to Admission   Medication Sig Dispense Refill Last Dose    ALPRAZolam (XANAX) 1 MG tablet Take 1 tablet by mouth 2 (Two) Times a Day.       amLODIPine (NORVASC) 10 MG tablet Take 1 tablet by mouth Daily. 90 tablet 0     aspirin 81 MG tablet Take 1 tablet by mouth Daily. 30 tablet 11     Budeson-Glycopyrrol-Formoterol (Breztri Aerosphere) 160-9-4.8 MCG/ACT aerosol inhaler  "Inhale 2 puffs 2 (Two) Times a Day.       bumetanide (BUMEX) 1 MG tablet Take 1 tablet by mouth Daily.       citalopram (CeleXA) 10 MG tablet Take 3 tablets by mouth Daily.       colestipol (COLESTID) 1 g tablet Take 1 tablet by mouth Daily.       dicyclomine (BENTYL) 20 MG tablet Take 1 tablet by mouth 3 (Three) Times a Day As Needed.       gabapentin (NEURONTIN) 800 MG tablet Take 2 tablets by mouth 2 (Two) Times a Day.       lisinopril (PRINIVIL,ZESTRIL) 20 MG tablet Take 1 tablet by mouth Daily.       loperamide (IMODIUM) 2 MG capsule Take 1 capsule by mouth 4 (Four) Times a Day As Needed for Diarrhea. 60 capsule 0     meloxicam (MOBIC) 15 MG tablet Take 1 tablet by mouth Daily.       Misc Natural Products (GINSENG-COMPLEX PO) Take 2 capsules by mouth 2 (Two) Times a Day.       O2 (OXYGEN) Inhale 2 L/min Continuous.       ondansetron (ZOFRAN) 8 MG tablet Take 1 tablet by mouth 4 (Four) Times a Day As Needed for Nausea or Vomiting.          Allergies:  Codeine, Hydrocodone-acetaminophen, Levofloxacin, Oxycodone-acetaminophen, Tramadol, Zanaflex  [tizanidine hcl], and Albuterol    Social History:   Social History     Socioeconomic History    Marital status:    Tobacco Use    Smoking status: Every Day     Current packs/day: 0.50     Average packs/day: 0.5 packs/day for 62.0 years (31.0 ttl pk-yrs)     Types: Cigarettes    Smokeless tobacco: Never    Tobacco comments:     states she has no plan to quit smoking   Vaping Use    Vaping status: Former   Substance and Sexual Activity    Alcohol use: No    Drug use: No    Sexual activity: Not Currently       Family History:   Family History   Problem Relation Age of Onset    Cancer Mother     Cancer Father            Physical Exam:    Vitals: /52 (BP Location: Right arm, Patient Position: Lying)   Pulse 52   Temp 98 °F (36.7 °C) (Oral)   Resp 16   Ht 160 cm (62.99\")   Wt 63.7 kg (140 lb 8 oz)   SpO2 95%   BMI 24.89 kg/m²   Physical Exam  Vitals " reviewed.   Constitutional:       Appearance: Normal appearance. She is not ill-appearing.   HENT:      Head: Normocephalic and atraumatic.   Eyes:      General:         Right eye: No discharge.         Left eye: No discharge.      Extraocular Movements: Extraocular movements intact.      Conjunctiva/sclera: Conjunctivae normal.   Cardiovascular:      Rate and Rhythm: Normal rate and regular rhythm.      Pulses: Normal pulses.      Heart sounds: No murmur heard.  Pulmonary:      Effort: Pulmonary effort is normal. No respiratory distress.      Comments: Nasal cannula in place  Abdominal:      General: Abdomen is flat. Bowel sounds are normal. There is no distension.   Musculoskeletal:      Right lower leg: No edema.      Left lower leg: No edema.   Skin:     Capillary Refill: Capillary refill takes less than 2 seconds.   Neurological:      Mental Status: She is alert.   Psychiatric:         Mood and Affect: Mood normal.         Behavior: Behavior normal.           Lab Results (last 24 hours)       Procedure Component Value Units Date/Time    Comprehensive Metabolic Panel [[id number]]  (Abnormal) Collected: 08/26/24 2039    Specimen: Blood Updated: 08/26/24 2108     Glucose 102 mg/dL      BUN 19 mg/dL      Creatinine 1.36 mg/dL      Sodium 141 mmol/L      Potassium 4.0 mmol/L      Chloride 101 mmol/L      CO2 27.0 mmol/L      Calcium 10.0 mg/dL      Total Protein 7.2 g/dL      Albumin 3.9 g/dL      ALT (SGPT) 10 U/L      AST (SGOT) 17 U/L      Alkaline Phosphatase 110 U/L      Total Bilirubin 0.2 mg/dL      Globulin 3.3 gm/dL      A/G Ratio 1.2 g/dL      BUN/Creatinine Ratio 14.0     Anion Gap 13.0 mmol/L      eGFR 39.2 mL/min/1.73     Narrative:      GFR Normal >60  Chronic Kidney Disease <60  Kidney Failure <15    The GFR formula is only valid for adults with stable renal function between ages 18 and 70.    Urinalysis, Microscopic Only - Straight Cath [[id number]]  (Abnormal) Collected: 08/26/24 2031    Specimen:  Urine from Straight Cath Updated: 08/26/24 2056     RBC, UA None Seen /HPF      WBC, UA 3-5 /HPF      Comment: Urine culture not indicated.        Bacteria, UA 4+ /HPF      Squamous Epithelial Cells, UA 3-6 /HPF      Hyaline Casts, UA 0-2 /LPF      Methodology Automated Microscopy    Urinalysis With Culture If Indicated - Straight Cath [[id number]]  (Abnormal) Collected: 08/26/24 2031    Specimen: Urine from Straight Cath Updated: 08/26/24 2056     Color, UA Yellow     Appearance, UA Clear     pH, UA 5.5     Specific Gravity, UA 1.012     Glucose, UA Negative     Ketones, UA Negative     Bilirubin, UA Negative     Blood, UA Negative     Protein, UA Negative     Leuk Esterase, UA Small (1+)     Nitrite, UA Negative     Urobilinogen, UA 0.2 E.U./dL    Narrative:      In absence of clinical symptoms, the presence of pyuria, bacteria, and/or nitrites on the urinalysis result does not correlate with infection.    CBC & Differential [[id number]]  (Abnormal) Collected: 08/26/24 2039    Specimen: Blood Updated: 08/26/24 2049    Narrative:      The following orders were created for panel order CBC & Differential.  Procedure                               Abnormality         Status                     ---------                               -----------         ------                     CBC Auto Differential[[id number]]        Abnormal            Final result                 Please view results for these tests on the individual orders.    CBC Auto Differential [[id number]]  (Abnormal) Collected: 08/26/24 2039    Specimen: Blood Updated: 08/26/24 2049     WBC 15.56 10*3/mm3      RBC 4.03 10*6/mm3      Hemoglobin 12.2 g/dL      Hematocrit 38.0 %      MCV 94.3 fL      MCH 30.3 pg      MCHC 32.1 g/dL      RDW 13.4 %      RDW-SD 46.2 fl      MPV 10.1 fL      Platelets 394 10*3/mm3      Neutrophil % 79.5 %      Lymphocyte % 13.4 %      Monocyte % 4.9 %      Eosinophil % 1.2 %      Basophil % 0.4 %      Immature Grans % 0.6 %       Neutrophils, Absolute 12.38 10*3/mm3      Lymphocytes, Absolute 2.08 10*3/mm3      Monocytes, Absolute 0.76 10*3/mm3      Eosinophils, Absolute 0.18 10*3/mm3      Basophils, Absolute 0.07 10*3/mm3      Immature Grans, Absolute 0.09 10*3/mm3      nRBC 0.0 /100 WBC              -----------------------------------------------------------------  EKG:   Radiology:     XR Shoulder 1 View Left    Result Date: 8/27/2024  EXAMINATION: XR SHOULDER 1 VW LEFT-  8/27/2024 2:25 AM  HISTORY: shoulder pain, fall today; R26.81-Unsteadiness on feet; S32.591A-Other specified fracture of right pubis, initial encounter for closed fracture; R29.6-Repeated falls; N39.0-Urinary tract infection, site not specified  A single frontal projection of the left shoulder is obtained. There is no previous study for comparison.  There is no evidence of an acute fracture or dislocation.  Limited visualized and evaluated glenohumeral and acromioclavicular articulations are intact. Moderate arthropathy of both joints.  There is a linear calcification in the acromiohumeral space, parallel to the lateral aspect of the humeral head, representing supraspinatus calcific tendinopathy.  Acromiohumeral space is maintained.  The scapula is not optimally visualized or evaluated.  Limited visualized proximal left ribs are unremarkable. No displaced fracture.      1. No acute fracture or dislocation. 2. Other nonacute findings as above.   This report was signed and finalized on 8/27/2024 7:02 AM by Dr. Tong Orellana MD.      CT Pelvis Without Contrast    Result Date: 8/26/2024  EXAM/TECHNIQUE: CT pelvis without contrast  INDICATION: fall with right hip pain and low back pain  COMPARISON: 8/21/2019  DLP: 1673.11 mGy.cm. Automated exposure control was also utilized to decrease patient radiation dose.  FINDINGS:  Acute minimally displaced right inferior pubic ramus fracture. Right superior pubic rami and acetabulum are intact. No widening of the pubic symphysis.  No other acute pelvic fracture. No sacral fracture.  No evidence of acute hip fracture or dislocation. Postoperative change of right hip arthroplasty without evidence of complication.  No free pelvic fluid. Sigmoid diverticulosis without diverticulitis. No abnormal bowel distention or active bowel inflammation in the pelvis. Prior hysterectomy. No focal urinary bladder abnormality. Atherosclerotic nonaneurysmal distal abdominal aorta. No pelvic lymphadenopathy. No large soft tissue hematoma.       1. Acute minimally displaced right inferior pubic ramus fracture. No other acute pelvic fracture.  2. No hip fracture or dislocation. Right hip arthroplasty without evidence of complication.  This report was signed and finalized on 8/26/2024 8:11 PM by Dr. Jose Luevano MD.      CT Lumbar Spine Without Contrast    Result Date: 8/26/2024  EXAM/TECHNIQUE: CT lumbar spine without contrast  INDICATION: fall with low back pain and right hip pain  COMPARISON: 12/20/2022  DLP: 1673.11 mGy.cm. Automated exposure control was also utilized to decrease patient radiation dose.  FINDINGS:  5 lumbar-type vertebral bodies. Lumbar lordosis is maintained. No subluxations. Vertebral body heights are maintained. No acute or chronic fracture. Mild multilevel lumbar spine degenerative change most prominently characterized by facet arthropathy. No area of high-grade central canal or neural foraminal stenosis. Nonaneurysmal abdominal aorta with heavy atherosclerotic calcification. Paravertebral soft tissues are unremarkable.      1. No acute fracture or subluxation. 2. Mild multilevel lumbar spine degenerative change.   This report was signed and finalized on 8/26/2024 8:07 PM by Dr. Jose Luevano MD.      CT Cervical Spine Without Contrast    Result Date: 8/26/2024  EXAM/TECHNIQUE: CT cervical spine without contrast  INDICATION: fall, poor historian, AMS  COMPARISON: 12/20/2022  DLP: 1673.11 mGy.cm. Automated exposure control was also  utilized to decrease patient radiation dose.  FINDINGS:  Craniocervical relationships are maintained. The odontoid process is intact. Cervical spine alignment is anatomic. Cervical vertebral body heights are maintained. No acute cervical fracture or subluxation. Age-indeterminate mild anterior compression deformity of T1 with 5% height loss. Mild multilevel cervical spine degenerative change without evidence of high-grade central canal or neural foraminal stenosis. Paravertebral soft tissues are unremarkable.       1. No acute cervical spine fracture or subluxation.  2. Age-indeterminate mild compression deformity of T1 in the partially imaged upper thoracic spine. Correlate with point tenderness.    This report was signed and finalized on 8/26/2024 8:04 PM by Dr. Jose Luevano MD.      CT Head Without Contrast    Result Date: 8/26/2024  EXAM/TECHNIQUE: CT head without contrast  INDICATION: fall, poor historian, AMS  COMPARISON: 5/22/2023  DLP: 1673.11 mGy.cm. Automated exposure control was also utilized to decrease patient radiation dose.  FINDINGS:  No evidence of intracranial hemorrhage. Gray-white differentiation is maintained. Global cerebral volume loss and presumed chronic microvascular ischemic white matter change. No midline shift or mass effect. Lateral ventricles are nondilated. Basilar cisterns are patent. No acute orbital finding. Mastoid air cells are clear. Visualized paranasal sinuses are clear. No acute osseous finding.      1. No acute intracranial findings. 2. Global cerebral volume loss and presumed chronic microvascular ischemic white matter change.   This report was signed and finalized on 8/26/2024 7:58 PM by Dr. Jose Luevano MD.      XR Femur 2 View Right    Result Date: 8/26/2024  EXAM/TECHNIQUE: XR FEMUR 2 VW RIGHT-  INDICATION: fall with right hip and femur pain  COMPARISON: None available.  FINDINGS:  Postoperative change of right hip arthroplasty. No evidence of hardware  complication. No periprosthetic fracture. No fracture involving the right femur. No suspicious osseous lesion. No unexpected radiopaque foreign body or soft tissue gas.       No acute osseous findings. Postoperative change of right hip arthroplasty without evidence of complication.  This report was signed and finalized on 8/26/2024 7:41 PM by Dr. Jose Luevano MD.        Assessment and Plan     Primary Problem:  Gait instability    Hospital Problem list:    Gait instability      PMH:  Past Medical History:   Diagnosis Date    CAD in native artery 7/29/2019    COPD (chronic obstructive pulmonary disease)     Essential hypertension 12/7/2021    GERD (gastroesophageal reflux disease)     Lung nodule        Treatment Plan:  Inferior pubic ramus fracture: Ortho consultation, appreciate recommendations.    Gait instability: resulting in problem #1 above. PT/OT consultations. She has declined SNF in the past. Lives alone currently. CM for placement options.    UTI: continue rocephin, follow Ucx.    Disposition: inpatient.     Anupam Ledezma MD 8/27/2024 10:20 CDT

## 2024-08-28 LAB
ALBUMIN SERPL-MCNC: 3.4 G/DL (ref 3.5–5.2)
ALBUMIN/GLOB SERPL: 1.1 G/DL
ALP SERPL-CCNC: 101 U/L (ref 39–117)
ALT SERPL W P-5'-P-CCNC: 8 U/L (ref 1–33)
ANION GAP SERPL CALCULATED.3IONS-SCNC: 10 MMOL/L (ref 5–15)
AST SERPL-CCNC: 16 U/L (ref 1–32)
BASOPHILS # BLD AUTO: 0.08 10*3/MM3 (ref 0–0.2)
BASOPHILS NFR BLD AUTO: 0.7 % (ref 0–1.5)
BILIRUB SERPL-MCNC: 0.2 MG/DL (ref 0–1.2)
BUN SERPL-MCNC: 14 MG/DL (ref 8–23)
BUN/CREAT SERPL: 13.3 (ref 7–25)
CALCIUM SPEC-SCNC: 9.4 MG/DL (ref 8.6–10.5)
CHLORIDE SERPL-SCNC: 109 MMOL/L (ref 98–107)
CO2 SERPL-SCNC: 25 MMOL/L (ref 22–29)
CREAT SERPL-MCNC: 1.05 MG/DL (ref 0.57–1)
DEPRECATED RDW RBC AUTO: 46.5 FL (ref 37–54)
EGFRCR SERPLBLD CKD-EPI 2021: 53.5 ML/MIN/1.73
EOSINOPHIL # BLD AUTO: 0.36 10*3/MM3 (ref 0–0.4)
EOSINOPHIL NFR BLD AUTO: 3.3 % (ref 0.3–6.2)
ERYTHROCYTE [DISTWIDTH] IN BLOOD BY AUTOMATED COUNT: 13.1 % (ref 12.3–15.4)
GLOBULIN UR ELPH-MCNC: 3.2 GM/DL
GLUCOSE SERPL-MCNC: 88 MG/DL (ref 65–99)
HCT VFR BLD AUTO: 39.1 % (ref 34–46.6)
HGB BLD-MCNC: 12.1 G/DL (ref 12–15.9)
IMM GRANULOCYTES # BLD AUTO: 0.04 10*3/MM3 (ref 0–0.05)
IMM GRANULOCYTES NFR BLD AUTO: 0.4 % (ref 0–0.5)
LYMPHOCYTES # BLD AUTO: 2.46 10*3/MM3 (ref 0.7–3.1)
LYMPHOCYTES NFR BLD AUTO: 22.8 % (ref 19.6–45.3)
MCH RBC QN AUTO: 29.7 PG (ref 26.6–33)
MCHC RBC AUTO-ENTMCNC: 30.9 G/DL (ref 31.5–35.7)
MCV RBC AUTO: 95.8 FL (ref 79–97)
MONOCYTES # BLD AUTO: 0.73 10*3/MM3 (ref 0.1–0.9)
MONOCYTES NFR BLD AUTO: 6.8 % (ref 5–12)
NEUTROPHILS NFR BLD AUTO: 66 % (ref 42.7–76)
NEUTROPHILS NFR BLD AUTO: 7.14 10*3/MM3 (ref 1.7–7)
NRBC BLD AUTO-RTO: 0 /100 WBC (ref 0–0.2)
PLATELET # BLD AUTO: 394 10*3/MM3 (ref 140–450)
PMV BLD AUTO: 10.4 FL (ref 6–12)
POTASSIUM SERPL-SCNC: 4 MMOL/L (ref 3.5–5.2)
PROT SERPL-MCNC: 6.6 G/DL (ref 6–8.5)
RBC # BLD AUTO: 4.08 10*6/MM3 (ref 3.77–5.28)
SODIUM SERPL-SCNC: 144 MMOL/L (ref 136–145)
WBC NRBC COR # BLD AUTO: 10.81 10*3/MM3 (ref 3.4–10.8)

## 2024-08-28 PROCEDURE — 25810000003 SODIUM CHLORIDE 0.9 % SOLUTION: Performed by: PHYSICIAN ASSISTANT

## 2024-08-28 PROCEDURE — 80053 COMPREHEN METABOLIC PANEL: CPT | Performed by: FAMILY MEDICINE

## 2024-08-28 PROCEDURE — 25010000002 ENOXAPARIN PER 10 MG: Performed by: PHYSICIAN ASSISTANT

## 2024-08-28 PROCEDURE — 97530 THERAPEUTIC ACTIVITIES: CPT

## 2024-08-28 PROCEDURE — 97165 OT EVAL LOW COMPLEX 30 MIN: CPT

## 2024-08-28 PROCEDURE — 25010000002 CEFTRIAXONE PER 250 MG: Performed by: FAMILY MEDICINE

## 2024-08-28 PROCEDURE — 97161 PT EVAL LOW COMPLEX 20 MIN: CPT | Performed by: PHYSICAL THERAPIST

## 2024-08-28 PROCEDURE — 85025 COMPLETE CBC W/AUTO DIFF WBC: CPT | Performed by: FAMILY MEDICINE

## 2024-08-28 RX ADMIN — ENOXAPARIN SODIUM 40 MG: 100 INJECTION SUBCUTANEOUS at 09:08

## 2024-08-28 RX ADMIN — IBUPROFEN 400 MG: 400 TABLET, FILM COATED ORAL at 12:36

## 2024-08-28 RX ADMIN — Medication 10 ML: at 20:38

## 2024-08-28 RX ADMIN — ASPIRIN 81 MG: 81 TABLET, COATED ORAL at 09:09

## 2024-08-28 RX ADMIN — CITALOPRAM 20 MG: 20 TABLET, FILM COATED ORAL at 09:09

## 2024-08-28 RX ADMIN — BUMETANIDE 1 MG: 1 TABLET ORAL at 09:09

## 2024-08-28 RX ADMIN — ACETAMINOPHEN 650 MG: 325 TABLET ORAL at 20:37

## 2024-08-28 RX ADMIN — NYSTATIN: 100000 POWDER TOPICAL at 20:37

## 2024-08-28 RX ADMIN — GABAPENTIN 400 MG: 400 CAPSULE ORAL at 20:37

## 2024-08-28 RX ADMIN — SODIUM CHLORIDE 1000 MG: 900 INJECTION INTRAVENOUS at 12:28

## 2024-08-28 RX ADMIN — ALPRAZOLAM 1 MG: 0.5 TABLET ORAL at 09:09

## 2024-08-28 RX ADMIN — SODIUM CHLORIDE 75 ML/HR: 9 INJECTION, SOLUTION INTRAVENOUS at 04:10

## 2024-08-28 RX ADMIN — SODIUM CHLORIDE 75 ML/HR: 9 INJECTION, SOLUTION INTRAVENOUS at 18:53

## 2024-08-28 RX ADMIN — NICOTINE 1 PATCH: 21 PATCH, EXTENDED RELEASE TRANSDERMAL at 09:13

## 2024-08-28 RX ADMIN — CITALOPRAM 20 MG: 20 TABLET, FILM COATED ORAL at 20:37

## 2024-08-28 RX ADMIN — Medication 10 MG: at 20:37

## 2024-08-28 RX ADMIN — ALPRAZOLAM 1 MG: 0.5 TABLET ORAL at 20:37

## 2024-08-28 RX ADMIN — LISINOPRIL 20 MG: 20 TABLET ORAL at 09:09

## 2024-08-28 RX ADMIN — NYSTATIN: 100000 POWDER TOPICAL at 09:13

## 2024-08-28 RX ADMIN — GABAPENTIN 400 MG: 400 CAPSULE ORAL at 09:09

## 2024-08-28 NOTE — PLAN OF CARE
Goal Outcome Evaluation:  Plan of Care Reviewed With: patient, family        Progress: no change  Outcome Evaluation: The patient presents alert and oriented x4 with prompting however she is confused in conversation. She has some difficulty with following directions and requires frequent redirection. She has chronic weakness in her R LE from a R hip surgery it seems, but she was ambulatory at home prior to the fall. She was able to stand multiple times this morning with assist and take 4 steps from the bed to chair with use of a RW. PT will continue to work with her to improve her mobility and strength. Recommen discharge to SNF.      Anticipated Discharge Disposition (PT): skilled nursing facility

## 2024-08-28 NOTE — PLAN OF CARE
Goal Outcome Evaluation:  Plan of Care Reviewed With: patient, family        Progress: no change   Pt A/Ox4. VSS on 2L NC. Non surgical pelvic fx. Ortho consulted, nothing else needed. ABX given for UTI. Voiding via purwick. Skin tear to L elbow. Nystatin for groin excoriation. Pain meds order. SCDs. Tele: sinus amrik. Refused to work woth PT today. Home meds verified. Allergies adjusted. Nicotine patch ordered. Family at bedside. Safety maintained.

## 2024-08-28 NOTE — PLAN OF CARE
Goal Outcome Evaluation:  Plan of Care Reviewed With: patient, family        Progress: no change  Outcome Evaluation: OT eval completed. Pt in fowlers upon therapist arrival; A&Ox4 with verbal cues; c/o RLE pain with movement; 2 family members also present. Per Pt's son, the Pt was living home alone and performing BADLs at Mod I-I level, however, Pt was not performing BADLs well and was unable to take care of herself well. Today, Pt performed supine>sit utilizing bedrail with HOB elevated with Mod A x2 and verbal/visual/tactile cues for sequencing and body mechanics. Pt required Max A to lucretia B socks. Pt performed sit<>stand and took a few steps from bed>chair utilizing rwx with Min A and frequent verbal/visual/tactile cues for sequencing, positioning of AD, and body mechanics. Pt additionally demos some BUE weakness. Skilled OT intervention indicated in order to address deficits in fxl mobility, fxl activity tolerance, balance, strength, and use of adaptive techniques/equipment during performance of BADLs. Recommend SNF at discharge.      Anticipated Discharge Disposition (OT): skilled nursing facility

## 2024-08-28 NOTE — THERAPY EVALUATION
Patient Name: Pattie Liu  : 1945    MRN: [id number]                              Today's Date: 2024       Admit Date: 2024    Visit Dx:     ICD-10-CM ICD-9-CM   1. Gait instability  R26.81 781.2   2. Closed fracture of ramus of right pubis, initial encounter  S32.591A 808.2   3. Multiple falls  R29.6 V15.88   4. Urinary tract infection without hematuria, site unspecified  N39.0 599.0     Patient Active Problem List   Diagnosis    Frequent PVCs    Shortness of breath    SOB (shortness of breath)    CAD in native artery    PVD (peripheral vascular disease)    Pneumonia due to infectious organism    Chronic back pain    Essential hypertension    Fall, initial encounter    Traumatic rhabdomyolysis    Leukocytosis    Anemia    Degenerative disc disease, lumbar    Dehydration    Acute UTI    Chronic respiratory failure with hypoxia    Impaired mobility    UTI (urinary tract infection)    Gait instability     Past Medical History:   Diagnosis Date    CAD in native artery 2019    COPD (chronic obstructive pulmonary disease)     Essential hypertension 2021    GERD (gastroesophageal reflux disease)     Lung nodule      Past Surgical History:   Procedure Laterality Date    BREAST IMPLANT REMOVAL      BREAST TISSUE EXPANDER REMOVAL INSERTION OF IMPLANT      CARDIAC CATHETERIZATION N/A 2019    Procedure: Left Heart Cath;  Surgeon: Edi Armijo MD;  Location:  [facility] CATH INVASIVE LOC[facility];  Service: Cardiology    CHOLECYSTECTOMY      HYSTERECTOMY      MASTECTOMY      BILATERAL    OOPHORECTOMY        General Information       Row Name 24 0806          Physical Therapy Time and Intention    Document Type evaluation  s/p fall at home with R nondisplaced inferior pubic rami fx, confusion with hallucination over night  -MS     Mode of Treatment physical therapy;co-treatment  -MS       Row Name 24 0806          General Information    Patient Profile Reviewed yes  -MS      Prior Level of Function independent:;all household mobility;ADL's;dependent:;home management;cooking;cleaning;driving;shopping  ambulates with RW at times  -MS     Existing Precautions/Restrictions fall  WBAT for LEs  -MS     Barriers to Rehab previous functional deficit;cognitive status;hearing deficit  -MS       Row Name 08/28/24 0806          Living Environment    People in Home alone  -MS       Row Name 08/28/24 0806          Home Main Entrance    Number of Stairs, Main Entrance five  -MS     Stair Railings, Main Entrance railings on both sides of stairs  -MS       [facility] Name 08/28/24 0806          Cognition    Orientation Status (Cognition) oriented x 4;verbal cues/prompts needed for orientation  confusion in conversation  -MS       Row Name 08/28/24 0806          Safety Issues, Functional Mobility    Impairments Affecting Function (Mobility) balance;endurance/activity tolerance;strength;pain  -MS               User Key  (r) = Recorded By, (t) = Taken By, (c) = Cosigned By      Initials Name Provider Type    MS Sue Garcia, PT, DPT, NCS Physical Therapist                   Mobility       Row Name 08/28/24 0806          Bed Mobility    Bed Mobility supine-sit  -MS     Supine-Sit [place] (Bed Mobility) minimum assist (75% patient effort);moderate assist (50% patient effort);2 person assist;verbal cues;nonverbal cues (demo/gesture)  -MS     Assistive Device (Bed Mobility) head of bed elevated;draw sheet;bed rails  -MS       Row Name 08/28/24 0806          Bed-Chair Transfer    Bed-Chair [place] (Transfers) minimum assist (75% patient effort);verbal cues;nonverbal cues (demo/gesture)  -MS     Assistive Device (Bed-Chair Transfers) walker, front-wheeled  -MS     Comment, (Bed-Chair Transfer) 4 steps bed to chair  -MS       Row Name 08/28/24 0806          Sit-Stand Transfer    Sit-Stand [place] (Transfers) minimum assist (75% patient effort);verbal cues;nonverbal cues (demo/gesture)  -MS      Assistive Device (Sit-Stand Transfers) walker, front-wheeled  -MS               User Key  (r) = Recorded By, (t) = Taken By, (c) = Cosigned By      Initials Name Provider Type    MS Sue Garcia, PT, DPT, NCS Physical Therapist                   Obj/Interventions       Row Name 08/28/24 0806          Range of Motion Comprehensive    General Range of Motion bilateral upper extremity ROM WFL  -MS     Comment, General Range of Motion R hip flexion impaired 75%, R knee ext impaired 25%  -MS       Row Name 08/28/24 0806          Strength Comprehensive (MMT)    Comment, General Manual Muscle Testing (MMT) Assessment R hip flexion 2-/5, R knee ext 3-/5  -MS       Row Name 08/28/24 0806          Balance    Balance Assessment sitting static balance;sitting dynamic balance;standing static balance;standing dynamic balance  -MS     Static Sitting Balance standby assist  -MS     Dynamic Sitting Balance contact guard  -MS     Position, Sitting Balance supported;sitting edge of bed  -MS     Static Standing Balance contact guard;verbal cues;non-verbal cues (demo/gesture)  -MS     Dynamic Standing Balance minimal assist;contact guard;verbal cues;non-verbal cues (demo/gesture)  -MS     Position/Device Used, Standing Balance supported;walker, rolling  -MS               User Key  (r) = Recorded By, (t) = Taken By, (c) = Cosigned By      Initials Name Provider Type    MS GarciaSue, PT, DPT, NCS Physical Therapist                   Goals/Plan       Row Name 08/28/24 0806          Bed Mobility Goal 1 (PT)    Activity/Assistive Device (Bed Mobility Goal 1, PT) bed mobility activities, all  -MS     [place] Level/Cues Needed (Bed Mobility Goal 1, PT) contact guard required;tactile cues required;verbal cues required  -MS     Time Frame (Bed Mobility Goal 1, PT) long term goal (LTG);by discharge  -MS     Progress/Outcomes (Bed Mobility Goal 1, PT) new goal  -MS       Row Name 08/28/24 0806          Transfer Goal 1 (PT)     Activity/Assistive Device (Transfer Goal 1, PT) sit-to-stand/stand-to-sit;bed-to-chair/chair-to-bed;walker, rolling  -MS     [place] Level/Cues Needed (Transfer Goal 1, PT) contact guard required  -MS     Time Frame (Transfer Goal 1, PT) long term goal (LTG);by discharge  -MS     Progress/Outcome (Transfer Goal 1, PT) new goal  -MS       Row Name 08/28/24 0806          Gait Training Goal 1 (PT)    Activity/Assistive Device (Gait Training Goal 1, PT) gait (walking locomotion);assistive device use;decrease fall risk;increase endurance/gait distance;improve balance and speed;walker, rolling  -MS     [place] Level (Gait Training Goal 1, PT) contact guard required  -MS     Distance (Gait Training Goal 1, PT) 50ft  -MS     Time Frame (Gait Training Goal 1, PT) long term goal (LTG);by discharge  -MS     Progress/Outcome (Gait Training Goal 1, PT) new goal  -MS       Row Name 08/28/24 0806          Therapy Assessment/Plan (PT)    Planned Therapy Interventions (PT) balance training;bed mobility training;gait training;patient/family education;strengthening;ROM (range of motion);transfer training  -MS               User Key  (r) = Recorded By, (t) = Taken By, (c) = Cosigned By      Initials Name Provider Type    Sue Pearce, PT, DPT, NCS Physical Therapist                   Clinical Impression       Row Name 08/28/24 0806          Pain    Additional Documentation Pain Scale: FACES Pre/Post-Treatment (Group)  -MS       Row Name 08/28/24 0806          Pain Scale: FACES Pre/Post-Treatment    Pain: FACES Scale, Pretreatment 0-->no hurt  -MS     Posttreatment Pain Rating 4-->hurts little more  -MS     Pre/Posttreatment Pain Comment pelvis area hurts with LE movement and WB  -MS       Row Name 08/28/24 0806          Plan of Care Review    Plan of Care Reviewed With patient;family  -MS     Progress no change  -MS     Outcome Evaluation The patient presents alert and oriented x4 with prompting however she is confused  in conversation. She has some difficulty with following directions and requires frequent redirection. She has chronic weakness in her R LE from a R hip surgery it seems, but she was ambulatory at home prior to the fall. She was able to stand multiple times this morning with assist and take 4 steps from the bed to chair with use of a RW. PT will continue to work with her to improve her mobility and strength. Recommen discharge to SNF.  -MS       Row Name 08/28/24 0806          Therapy Assessment/Plan (PT)    Patient/Family Therapy Goals Statement (PT) care for herself again  -MS     Rehab Potential (PT) good, to achieve stated therapy goals  -MS     Criteria for Skilled Interventions Met (PT) yes;meets criteria;skilled treatment is necessary  -MS     Therapy Frequency (PT) 2 times/day  -MS     Predicted Duration of Therapy Intervention (PT) until discharge  -MS       Row Name 08/28/24 0806          Vital Signs    O2 Delivery Pre Treatment supplemental O2  3L, pt wears at baseline  -MS     O2 Delivery Intra Treatment supplemental O2  -MS     O2 Delivery Post Treatment supplemental O2  -MS       Row Name 08/28/24 0806          Positioning and Restraints    Post Treatment Position chair  -MS     In Chair sitting;call light within reach;encouraged to call for assist;with family/caregiver;exit alarm on  -MS               User Key  (r) = Recorded By, (t) = Taken By, (c) = Cosigned By      Initials Name Provider Type    MS Jose Sue R, PT, DPT, NCS Physical Therapist                   Outcome Measures       Row Name 08/28/24 0806          How much help from another person do you currently need...    Turning from your back to your side while in flat bed without using bedrails? 2  -MS     Moving from lying on back to sitting on the side of a flat bed without bedrails? 2  -MS     Moving to and from a bed to a chair (including a wheelchair)? 3  -MS     Standing up from a chair using your arms (e.g., wheelchair, bedside  chair)? 3  -MS     Climbing 3-5 steps with a railing? 1  -MS     To walk in hospital room? 1  -MS     AM-PAC 6 Clicks Score (PT) 12  -MS     Highest Level of Mobility Goal 4 --> Transfer to chair/commode  -MS       Row Name 08/28/24 0806          Functional Assessment    Outcome Measure Options AM-PAC 6 Clicks Basic Mobility (PT)  -MS               User Key  (r) = Recorded By, (t) = Taken By, (c) = Cosigned By      Initials Name Provider Type    MS Jose Sue GÓMEZ, PT, DPT, NCS Physical Therapist                                 Physical Therapy Education       Title: PT OT SLP Therapies (In Progress)       Topic: Physical Therapy (In Progress)       Point: Mobility training (In Progress)       Learning Progress Summary             Patient Acceptance, E, NR by MS at 8/28/2024 0952    Comment: role of PT in her care                         Point: Home exercise program (Not Started)       Learner Progress:  Not documented in this visit.              Point: Body mechanics (Not Started)       Learner Progress:  Not documented in this visit.              Point: Precautions (Not Started)       Learner Progress:  Not documented in this visit.                              User Key       Initials Effective Dates Name Provider Type Discipline    MS 07/11/23 -  Sue Garcia, PT, DPT, NCS Physical Therapist PT                  PT Recommendation and Plan  Planned Therapy Interventions (PT): balance training, bed mobility training, gait training, patient/family education, strengthening, ROM (range of motion), transfer training  Plan of Care Reviewed With: patient, family  Progress: no change  Outcome Evaluation: The patient presents alert and oriented x4 with prompting however she is confused in conversation. She has some difficulty with following directions and requires frequent redirection. She has chronic weakness in her R LE from a R hip surgery it seems, but she was ambulatory at home prior to the fall. She was able to  stand multiple times this morning with assist and take 4 steps from the bed to chair with use of a RW. PT will continue to work with her to improve her mobility and strength. Recommen discharge to SNF.     Time Calculation:         PT Charges       Row Name 08/28/24 0806             Time Calculation    Start Time 0806  -MS      Stop Time 0850  -MS      Time Calculation (min) 44 min  -MS      PT Received On 08/28/24  -MS      PT Goal Re-Cert Due Date 09/07/24  -MS         Untimed Charges    PT Eval/Re-eval Minutes 44  -MS         Total Minutes    Untimed Charges Total Minutes 44  -MS       Total Minutes 44  -MS                User Key  (r) = Recorded By, (t) = Taken By, (c) = Cosigned By      Initials Name Provider Type    Sue Pearce, PT, DPT, NCS Physical Therapist                      PT G-Codes  Outcome Measure Options: AM-PAC 6 Clicks Basic Mobility (PT)  AM-PAC 6 Clicks Score (PT): 12  PT Discharge Summary  Anticipated Discharge Disposition (PT): skilled nursing facility    Sue Garcia, PT, DPT, NCS  8/28/2024

## 2024-08-28 NOTE — THERAPY TREATMENT NOTE
Acute Care - Physical Therapy Treatment Note  [facility]     Patient Name: Pattie Liu  : 1945  MRN: [id number]  Today's Date: 2024      Visit Dx:     ICD-10-CM ICD-9-CM   1. Gait instability  R26.81 781.2   2. Closed fracture of ramus of right pubis, initial encounter  S32.591A 808.2   3. Multiple falls  R29.6 V15.88   4. Urinary tract infection without hematuria, site unspecified  N39.0 599.0   5. Impaired mobility [Z74.09]  Z74.09 799.89     Patient Active Problem List   Diagnosis    Frequent PVCs    Shortness of breath    SOB (shortness of breath)    CAD in native artery    PVD (peripheral vascular disease)    Pneumonia due to infectious organism    Chronic back pain    Essential hypertension    Fall, initial encounter    Traumatic rhabdomyolysis    Leukocytosis    Anemia    Degenerative disc disease, lumbar    Dehydration    Acute UTI    Chronic respiratory failure with hypoxia    Impaired mobility    UTI (urinary tract infection)    Gait instability     Past Medical History:   Diagnosis Date    CAD in native artery 2019    COPD (chronic obstructive pulmonary disease)     Essential hypertension 2021    GERD (gastroesophageal reflux disease)     Lung nodule      Past Surgical History:   Procedure Laterality Date    BREAST IMPLANT REMOVAL      BREAST TISSUE EXPANDER REMOVAL INSERTION OF IMPLANT      CARDIAC CATHETERIZATION N/A 2019    Procedure: Left Heart Cath;  Surgeon: Edi Armijo MD;  Location: [facility] INVASIVE LOCATION;  Service: Cardiology    CHOLECYSTECTOMY      HYSTERECTOMY      MASTECTOMY      BILATERAL    OOPHORECTOMY       PT Assessment (Last 12 Hours)       PT Evaluation and Treatment       Row Name 24 3494          Physical Therapy Time and Intention    Subjective Information complains of;weakness;fatigue;pain  -AH     Document Type therapy note (daily note)  -     Mode of Treatment physical therapy  -       Row Name 24 7494           General Information    Existing Precautions/Restrictions fall  WBAT BLE  -       Row Name 08/28/24 1355          Pain    Pain Intervention(s) Repositioned  -     Additional Documentation Pain Scale: Word Pre/Post-Treatment (Group)  -AH       Row Name 08/28/24 1432          Pain Scale: Word Pre/Post-Treatment    Pain: Word Scale, Pretreatment 2 - mild pain  AT REST  -     Posttreatment Pain Rating 4 - moderate pain  with mobility  -     Pain Location - Side/Orientation Right  -     Pain Location lower  -     Pain Location - extremity  -[facility] Name 08/28/24 1355          Bed Mobility    Bed Mobility supine-sit;sit-supine  -     Supine-Sit [place] (Bed Mobility) minimum assist (75% patient effort);verbal cues  -     Sit-Supine [place] (Bed Mobility) minimum assist (75% patient effort);moderate assist (50% patient effort);verbal cues  -AH       Row Name 08/28/24 1352          Transfers    Transfers sit-stand transfer;stand-sit transfer;toilet transfer  -AH       Row Name 08/28/24 8935          Sit-Stand Transfer    Sit-Stand [place] (Transfers) minimum assist (75% patient effort);moderate assist (50% patient effort);verbal cues  -AH       Row Name 08/28/24 5159          Stand-Sit Transfer    Stand-Sit [place] (Transfers) minimum assist (75% patient effort);verbal cues  -AH       Row Name 08/28/24 3712          Toilet Transfer    [place] Level (Toilet Transfer) minimum assist (75% patient effort);moderate assist (50% patient effort);verbal cues  [facility]     Assistive Device (Toilet Transfer) commode, bedside without drop arms;walker, front-wheeled  -     Comment, (Toilet Transfer) steps bed-BSC-bed  -[facility] Name             Wound 08/26/24 2326 Right anterior hip MASD (Moisture associated skin damage)    Wound - Properties Group Placement Date: 08/26/24  -TM Placement Time: 2326 -TM Present on Original Admission: Y  -TM Side: Right  -TM Orientation: anterior  -TM  Location: hip  -TM Primary Wound Type: MASD  -TM    Retired Wound - Properties Group Placement Date: 08/26/24  -TM Placement Time: 2326  -TM Present on Original Admission: Y  -TM Side: Right  -TM Orientation: anterior  -TM Location: hip  -TM Primary Wound Type: MASD  -TM    Retired Wound - Properties Group Date first assessed: 08/26/24  -TM Time first assessed: 2326  -TM Present on Original Admission: Y  -TM Side: Right  -TM Location: hip  -TM Primary Wound Type: MASD  -TM      Row Name             Wound 08/26/24 2330 Left proximal arm Skin Tear    Wound - Properties Group Placement Date: 08/26/24  -TM Placement Time: 2330  -TM Present on Original Admission: Y  -TM Side: Left  -TM Orientation: proximal  -TM Location: arm  -TM Primary Wound Type: Skin tear  -TM    Retired Wound - Properties Group Placement Date: 08/26/24  -TM Placement Time: 2330  -TM Present on Original Admission: Y  -TM Side: Left  -TM Orientation: proximal  -TM Location: arm  -TM Primary Wound Type: Skin tear  -TM    Retired Wound - Properties Group Date first assessed: 08/26/24  -TM Time first assessed: 2330  -TM Present on Original Admission: Y  -TM Side: Left  -TM Location: arm  -TM Primary Wound Type: Skin tear  -TM      Row Name 08/28/24 1355          Positioning and Restraints    Pre-Treatment Position in bed  -AH     Post Treatment Position bed  -AH     In Bed fowlers;call light within reach;encouraged to call for assist;exit alarm on;with family/caregiver;SCD pump applied  -               User Key  (r) = Recorded By, (t) = Taken By, (c) = Cosigned By      Initials Name Provider Type    Rashida Shea PTA Physical Therapist Assistant    TM Natanael Knox, RN Registered Nurse                    Physical Therapy Education       Title: PT OT SLP Therapies (In Progress)       Topic: Physical Therapy (In Progress)       Point: Mobility training (In Progress)       Learning Progress Summary             Patient Acceptance, E, NR by MS at  8/28/2024 0913    Comment: role of PT in her care                         Point: Home exercise program (Not Started)       Learner Progress:  Not documented in this visit.              Point: Body mechanics (Not Started)       Learner Progress:  Not documented in this visit.              Point: Precautions (Not Started)       Learner Progress:  Not documented in this visit.                              User Key       Initials Effective Dates Name Provider Type Discipline    MS 07/11/23 -  Sue Garcia, PT, DPT, NCS Physical Therapist PT                  PT Recommendation and Plan             Time Calculation:    PT Charges       Row Name 08/28/24 1445 08/28/24 0806          Time Calculation    Start Time 1355  -AH 0806  -MS     Stop Time 1440  -AH 0850  -MS     Time Calculation (min) 45 min  -AH 44 min  -MS     PT Received On -- 08/28/24  -MS     PT Goal Re-Cert Due Date -- 09/07/24  -MS        Time Calculation- PT    Total Timed Code Minutes- PT 45 minute(s)  -AH --        Timed Charges    29148 - PT Therapeutic Activity Minutes 45  -AH --        Untimed Charges    PT Eval/Re-eval Minutes -- 44  -MS        Total Minutes    Timed Charges Total Minutes 45  -AH --     Untimed Charges Total Minutes -- 44  -MS      Total Minutes 45  -AH 44  -MS               User Key  (r) = Recorded By, (t) = Taken By, (c) = Cosigned By      Initials Name Provider Type     Rashida Crockett PTA Physical Therapist Assistant    MS Sue Garcia, PT, DPT, NCS Physical Therapist                  Therapy Charges for Today       Code Description Service Date Service Provider Modifiers Qty    92820961102 HC PT THERAPEUTIC ACT EA 15 MIN 8/28/2024 Rashida Crockett PTA GP 3            PT G-Codes  Outcome Measure Options: AM-PAC 6 Clicks Basic Mobility (PT)  AM-PAC 6 Clicks Score (PT): 10  AM-PAC 6 Clicks Score (OT): 17    Rashida Crockett PTA  8/28/2024

## 2024-08-28 NOTE — THERAPY EVALUATION
Patient Name: Pattie Liu  : 1945    MRN: [id number]                              Today's Date: 2024       Admit Date: 2024    Visit Dx:     ICD-10-CM ICD-9-CM   1. Gait instability  R26.81 781.2   2. Closed fracture of ramus of right pubis, initial encounter  S32.591A 808.2   3. Multiple falls  R29.6 V15.88   4. Urinary tract infection without hematuria, site unspecified  N39.0 599.0     Patient Active Problem List   Diagnosis    Frequent PVCs    Shortness of breath    SOB (shortness of breath)    CAD in native artery    PVD (peripheral vascular disease)    Pneumonia due to infectious organism    Chronic back pain    Essential hypertension    Fall, initial encounter    Traumatic rhabdomyolysis    Leukocytosis    Anemia    Degenerative disc disease, lumbar    Dehydration    Acute UTI    Chronic respiratory failure with hypoxia    Impaired mobility    UTI (urinary tract infection)    Gait instability     Past Medical History:   Diagnosis Date    CAD in native artery 2019    COPD (chronic obstructive pulmonary disease)     Essential hypertension 2021    GERD (gastroesophageal reflux disease)     Lung nodule      Past Surgical History:   Procedure Laterality Date    BREAST IMPLANT REMOVAL      BREAST TISSUE EXPANDER REMOVAL INSERTION OF IMPLANT      CARDIAC CATHETERIZATION N/A 2019    Procedure: Left Heart Cath;  Surgeon: Edi Armijo MD;  Location:  [facility];  Service: Cardiology    CHOLECYSTECTOMY      HYSTERECTOMY      MASTECTOMY      BILATERAL    OOPHORECTOMY        General Information       Row Name 24 0757          OT Time and Intention    Document Type evaluation  cc: right thigh pain after fall at home. Dx: minimal displaced acute fracture of right inferior pubic ramus  -LS     Mode of Treatment occupational therapy  -LS       Row Name 24 0757          General Information    Patient Profile Reviewed yes  -LS     Prior Level of  Function independent:;ADL's;all household mobility;dependent:;home management;cooking;cleaning;driving;shopping  Utilized rwx some of the time  -     Existing Precautions/Restrictions fall;other (see comments)  WBAT RLE  -     Barriers to Rehab previous functional deficit  -       Row Name 08/28/24 0757          Occupational Profile    Environmental Supports and Barriers (Occupational Profile) Tub shower. Was sponge bathing only. Uses step stool to get in bed. Other AD/DME:rwx  -       Row Name 08/28/24 [facility]7          Living Environment    People in Home alone  -       Row Name 08/28/24 [facility]7          Home Main Entrance    Number of Stairs, Main Entrance five  -LS     Stair Railings, Main Entrance railings on both sides of stairs  -       Row Name 08/28/24 0757          Stairs Within Home, Primary    Number of Stairs, Within Home, Primary other (see comments)  flight of stairs but Pt has access to all needs on main level  -       Row Name 08/28/24 [facility]7          Cognition    Orientation Status (Cognition) oriented x 4;verbal cues/prompts needed for orientation  -       Row Name 08/28/24 [facility]7          Safety Issues, Functional Mobility    Safety Issues Affecting Function (Mobility) awareness of need for assistance;friction/shear risk;insight into deficits/self-awareness;positioning of assistive device;safety precaution awareness;safety precautions follow-through/compliance;sequencing abilities  -     Impairments Affecting Function (Mobility) balance;endurance/activity tolerance;strength;pain  -               User Key  (r) = Recorded By, (t) = Taken By, (c) = Cosigned By      Initials Name Provider Type     Kala Silva OTR/L Occupational Therapist                     Mobility/ADL's       Row Name 08/28/24 0757          Bed Mobility    Bed Mobility supine-sit  -LS     Supine-Sit [place] (Bed Mobility) moderate assist (50% patient effort);2 person assist;verbal cues;nonverbal cues  (demo/gesture)  -     Bed Mobility, Safety Issues decreased use of legs for bridging/pushing  -     Assistive Device (Bed Mobility) head of bed elevated;draw sheet;bed rails  -       Row Name 08/28/24 0757          Bed-Chair Transfer    Bed-Chair [place] (Transfers) minimum assist (75% patient effort);verbal cues;nonverbal cues (demo/gesture)  -     Assistive Device (Bed-Chair Transfers) walker, front-wheeled  -LS       Row Name 08/28/24 0757          Sit-Stand Transfer    Sit-Stand [place] (Transfers) minimum assist (75% patient effort);verbal cues;nonverbal cues (demo/gesture)  -     Assistive Device (Sit-Stand Transfers) walker, front-wheeled  -LS       Row Name 08/28/24 0757          Activities of Daily Living    BADL Assessment/Intervention upper body dressing;lower body dressing;toileting  -       Row Name 08/28/24 0757          Upper Body Dressing Assessment/Training    [place] Level (Upper Body Dressing) upper body dressing skills;standby assist  -LS     Position (Upper Body Dressing) edge of bed sitting  -       Row Name 08/28/24 0757          Lower Body Dressing Assessment/Training    [place] Level (Lower Body Dressing) lower body dressing skills;maximum assist (25% patient effort)  -LS     Position (Lower Body Dressing) unsupported sitting;supported standing  -       Row Name 08/28/24 0757          Toileting Assessment/Training    [place] Level (Toileting) toileting skills;maximum assist (25% patient effort)  -LS     Position (Toileting) unsupported sitting;supported standing  -               User Key  (r) = Recorded By, (t) = Taken By, (c) = Cosigned By      Initials Name Provider Type     Kala Silva, OTR/L Occupational Therapist                   Obj/Interventions       Row Name 08/28/24 0757          Sensory Assessment (Somatosensory)    Sensory Assessment (Somatosensory) UE sensation intact  -       Row Name 08/28/24 0757          Vision  Assessment/Intervention    Visual Impairment/Limitations WFL;corrective lenses for reading  -LS       Row Name 08/28/24 0757          Range of Motion Comprehensive    General Range of Motion bilateral upper extremity ROM WFL  -[facility] Name 08/28/24 0757          Strength Comprehensive (MMT)    General Manual Muscle Testing (MMT) Assessment upper extremity strength deficits identified  -LS     Comment, General Manual Muscle Testing (MMT) Assessment B shoulders 4-/5; BUE strength grossly 4+/5 at all other joints  -LS       Row Name 08/28/24 0757          Motor Skills    Motor Skills coordination  -     Coordination bilateral;finger to nose;WNL  -LS       Row Name 08/28/24 0757          Balance    Balance Assessment sitting static balance;sitting dynamic balance;standing static balance;standing dynamic balance  -     Static Sitting Balance standby assist  -LS     Dynamic Sitting Balance contact guard  -LS     Position, Sitting Balance supported;unsupported;sitting edge of bed  -LS     Static Standing Balance contact guard  -LS     Dynamic Standing Balance contact guard;minimal assist;verbal cues;non-verbal cues (demo/gesture)  -LS     Position/Device Used, Standing Balance supported;walker, front-wheeled  -LS               User Key  (r) = Recorded By, (t) = Taken By, (c) = Cosigned By      Initials Name Provider Type    LS Kala Silva, OTR/L Occupational Therapist                   Goals/Plan       [facility] Name 08/28/24 0757          Transfer Goal 1 (OT)    Activity/Assistive Device (Transfer Goal 1, OT) commode, bedside without drop arms  -LS     [place] Level/Cues Needed (Transfer Goal 1, OT) standby assist  -LS     Time Frame (Transfer Goal 1, OT) long term goal (LTG);10 days  -LS     Progress/Outcome (Transfer Goal 1, OT) new goal  -[facility] Name 08/28/24 0757          Dressing Goal 1 (OT)    Activity/Device (Dressing Goal 1, OT) dressing skills, all  -LS     [place]/Cues Needed (Dressing Goal  1, OT) minimum assist (75% or more patient effort)  -LS     Time Frame (Dressing Goal 1, OT) long term goal (LTG);10 days  -LS     Progress/Outcome (Dressing Goal 1, OT) new goal  -       Row Name 08/28/24 0757          Toileting Goal 1 (OT)    Activity/Device (Toileting Goal 1, OT) toileting skills, all  -LS     [place] Level/Cues Needed (Toileting Goal 1, OT) minimum assist (75% or more patient effort)  -LS     Time Frame (Toileting Goal 1, OT) long term goal (LTG);10 days  -LS     Progress/Outcome (Toileting Goal 1, OT) new goal  -       Row Name 08/28/24 0757          Problem Specific Goal 1 (OT)    Problem Specific Goal 1 (OT) Pt will independently implement one pain management technique to decrease pain and improve functional performance.  -LS     Time Frame (Problem Specific Goal 1, OT) long term goal (LTG);by discharge  -LS     Progress/Outcome (Problem Specific Goal 1, OT) new goal  -LS       Row Name 08/28/24 0757          Therapy Assessment/Plan (OT)    Planned Therapy Interventions (OT) transfer/mobility retraining;strengthening exercise;patient/caregiver education/training;occupation/activity based interventions;functional balance retraining;activity tolerance training;BADL retraining;adaptive equipment training;ROM/therapeutic exercise  -               User Key  (r) = Recorded By, (t) = Taken By, (c) = Cosigned By      Initials Name Provider Type     Kala Silva OTR/L Occupational Therapist                   Clinical Impression       Row Name 08/28/24 0757          Pain Assessment    Pretreatment Pain Rating --  -LS       Row Name 08/28/24 0757          Pain Scale: FACES Pre/Post-Treatment    Pain: FACES Scale, Pretreatment 0-->no hurt  -     Posttreatment Pain Rating 4-->hurts little more  -       Row Name 08/28/24 0757          Plan of Care Review    Plan of Care Reviewed With patient;family  -     Progress no change  -     Outcome Evaluation OT eval completed. Pt in fowlers  upon therapist arrival; A&Ox4 with verbal cues; c/o RLE pain with movement; 2 family members also present. Per Pt's son, the Pt was living home alone and performing BADLs at Mod I-I level, however, Pt was not performing BADLs well and was unable to take care of herself well. Today, Pt performed supine>sit utilizing bedrail with HOB elevated with Mod A x2 and verbal/visual/tactile cues for sequencing and body mechanics. Pt required Max A to lucretia B socks. Pt performed sit<>stand and took a few steps from bed>chair utilizing rwx with Min A and frequent verbal/visual/tactile cues for sequencing, positioning of AD, and body mechanics. Pt additionally demos some BUE weakness. Skilled OT intervention indicated in order to address deficits in fxl mobility, fxl activity tolerance, balance, strength, and use of adaptive techniques/equipment during performance of BADLs. Recommend SNF at discharge.  -       Row Name 08/28/24 0757          Therapy Assessment/Plan (OT)    Rehab Potential (OT) good, to achieve stated therapy goals  -     Criteria for Skilled Therapeutic Interventions Met (OT) yes;skilled treatment is necessary  -     Therapy Frequency (OT) 5 times/wk  -       Row Name 08/28/24 0751          Therapy Plan Review/Discharge Plan (OT)    Anticipated Discharge Disposition (OT) skilled nursing facility  -       Row Name 08/28/24 0759          Positioning and Restraints    Pre-Treatment Position in bed  -LS     Post Treatment Position chair  -LS     In Chair sitting;call light within reach;encouraged to call for assist;exit alarm on;with family/caregiver  -               User Key  (r) = Recorded By, (t) = Taken By, (c) = Cosigned By      Initials Name Provider Type     Kala Silva, OTR/L Occupational Therapist                   Outcome Measures       Row Name 08/28/24 0751          How much help from another is currently needed...    Putting on and taking off regular lower body clothing? 2  -LS     Bathing  (including washing, rinsing, and drying) 2  -LS     Toileting (which includes using toilet bed pan or urinal) 2  -LS     Putting on and taking off regular upper body clothing 3  -LS     Taking care of personal grooming (such as brushing teeth) 4  -LS     Eating meals 4  -LS     AM-PAC 6 Clicks Score (OT) 17  -LS       Row Name 08/28/24 0806          How much help from another person do you currently need...    Turning from your back to your side while in flat bed without using bedrails? 2  -MS     Moving from lying on back to sitting on the side of a flat bed without bedrails? 2  -MS     Moving to and from a bed to a chair (including a wheelchair)? 3  -MS     Standing up from a chair using your arms (e.g., wheelchair, bedside chair)? 3  -MS     Climbing 3-5 steps with a railing? 1  -MS     To walk in hospital room? 1  -MS     AM-PAC 6 Clicks Score (PT) 12  -MS     Highest Level of Mobility Goal 4 --> Transfer to chair/commode  -MS       Row Name 08/28/24 0806 08/28/24 0757       Functional Assessment    Outcome Measure Options AM-PAC 6 Clicks Basic Mobility (PT)  -MS AM-PAC 6 Clicks Daily Activity (OT)  -LS              User Key  (r) = Recorded By, (t) = Taken By, (c) = Cosigned By      Initials Name Provider Type    Sue Pearce R, PT, DPT, NCS Physical Therapist    Kala Longoria, OTR/L Occupational Therapist                    Occupational Therapy Education       Title: PT OT SLP Therapies (In Progress)       Topic: Occupational Therapy (In Progress)       Point: ADL training (Done)       Description:   Instruct learner(s) on proper safety adaptation and remediation techniques during self care or transfers.   Instruct in proper use of assistive devices.                  Learning Progress Summary             Patient Acceptance, E, VU,NR by  at 8/28/2024 0913                         Point: Home exercise program (Not Started)       Description:   Instruct learner(s) on appropriate technique for monitoring,  assisting and/or progressing therapeutic exercises/activities.                  Learner Progress:  Not documented in this visit.              Point: Precautions (Done)       Description:   Instruct learner(s) on prescribed precautions during self-care and functional transfers.                  Learning Progress Summary             Patient Acceptance, E, VU,NR by  at 8/28/2024 0953                         Point: Body mechanics (Done)       Description:   Instruct learner(s) on proper positioning and spine alignment during self-care, functional mobility activities and/or exercises.                  Learning Progress Summary             Patient Acceptance, E, VU,NR by  at 8/28/2024 0953                                         User Key       Initials Effective Dates Name Provider Type Discipline     06/20/22 -  Kala Silva, OTR/L Occupational Therapist OT                  OT Recommendation and Plan  Planned Therapy Interventions (OT): transfer/mobility retraining, strengthening exercise, patient/caregiver education/training, occupation/activity based interventions, functional balance retraining, activity tolerance training, BADL retraining, adaptive equipment training, ROM/therapeutic exercise  Therapy Frequency (OT): 5 times/wk  Plan of Care Review  Plan of Care Reviewed With: patient, family  Progress: no change  Outcome Evaluation: OT eval completed. Pt in fowlers upon therapist arrival; A&Ox4 with verbal cues; c/o RLE pain with movement; 2 family members also present. Per Pt's son, the Pt was living home alone and performing BADLs at Mod I-I level, however, Pt was not performing BADLs well and was unable to take care of herself well. Today, Pt performed supine>sit utilizing bedrail with HOB elevated with Mod A x2 and verbal/visual/tactile cues for sequencing and body mechanics. Pt required Max A to lucretia B socks. Pt performed sit<>stand and took a few steps from bed>chair utilizing rwx with Min A and frequent  verbal/visual/tactile cues for sequencing, positioning of AD, and body mechanics. Pt additionally demos some BUE weakness. Skilled OT intervention indicated in order to address deficits in fxl mobility, fxl activity tolerance, balance, strength, and use of adaptive techniques/equipment during performance of BADLs. Recommend SNF at discharge.     Time Calculation:         Time Calculation- OT       Row Name 08/28/24 0757             Time Calculation- OT    OT Start Time 0757  +10 minutes chart review  -      OT Stop Time 0844  -      OT Time Calculation (min) 47 min  -      OT Non-Billable Time (min) 57 min  -      OT Received On 08/28/24  -      OT Goal Re-Cert Due Date 09/07/24  -                User Key  (r) = Recorded By, (t) = Taken By, (c) = Cosigned By      Initials Name Provider Type    Kala Longoria OTR/L Occupational Therapist                  Therapy Charges for Today       Code Description Service Date Service Provider Modifiers Qty    [id number] HC OT EVAL LOW COMPLEXITY 4 8/28/2024 Kala Silva OTR/L GO 1                 VENITA Gamboa/ABHISHEK  8/28/2024

## 2024-08-28 NOTE — PROGRESS NOTES
Daily Progress Note  Pattie Liu  MRN: [id number] LOS: 2    Admit Date: 8/26/2024 8/28/2024 07:51 CDT    Subjective:      Chief Complaint:  Chief Complaint   Patient presents with    Fall       Interval History:    Reviewed overnight events and nursing notes.   No acute events overnight.  Pain is better controlled.  Has not yet been evaluated by PT/OT, hopeful this will be done today as this will determine placement options.    Review of Systems   Constitutional:  Positive for activity change, appetite change and fatigue. Negative for fever.   Respiratory:  Positive for shortness of breath.    Gastrointestinal:  Negative for nausea and vomiting.   Genitourinary:  Positive for decreased urine volume and dysuria.   Musculoskeletal:  Positive for gait problem.   Neurological:  Positive for weakness.       DIET:  Diet: Regular/House; Fluid Consistency: Thin (IDDSI 0)    Medications:   sodium chloride, 75 mL/hr, Last Rate: 75 mL/hr (08/28/24 0410)      ALPRAZolam, 1 mg, Oral, BID  aspirin, 81 mg, Oral, Daily  bumetanide, 1 mg, Oral, Daily  cefTRIAXone, 1,000 mg, Intravenous, Q24H  citalopram, 20 mg, Oral, BID  enoxaparin, 40 mg, Subcutaneous, Daily  gabapentin, 400 mg, Oral, Q12H  lisinopril, 20 mg, Oral, Daily  nicotine, 1 patch, Transdermal, Q24H  nystatin, , Topical, Q12H  sodium chloride, 10 mL, Intravenous, Q12H        Data:     Code Status:   Code Status and Medical Interventions: No CPR (Do Not Attempt to Resuscitate); Full Support   Ordered at: 08/27/24 1032     Code Status (Patient has no pulse and is not breathing):    No CPR (Do Not Attempt to Resuscitate)     Medical Interventions (Patient has pulse or is breathing):    Full Support       Family History   Problem Relation Age of Onset    Cancer Mother     Cancer Father      Social History     Socioeconomic History    Marital status:    Tobacco Use    Smoking status: Every Day     Current packs/day: 0.50     Average packs/day: 0.5 packs/day for  62.0 years (31.0 ttl pk-yrs)     Types: Cigarettes    Smokeless tobacco: Never    Tobacco comments:     states she has no plan to quit smoking   Vaping Use    Vaping status: Former   Substance and Sexual Activity    Alcohol use: No    Drug use: No    Sexual activity: Not Currently       Labs:    Lab Results (last 72 hours)       Procedure Component Value Units Date/Time    Comprehensive Metabolic Panel [[id number]]  (Abnormal) Collected: 08/28/24 0438    Specimen: Blood Updated: 08/28/24 0516     Glucose 88 mg/dL      BUN 14 mg/dL      Creatinine 1.05 mg/dL      Sodium 144 mmol/L      Potassium 4.0 mmol/L      Chloride 109 mmol/L      CO2 25.0 mmol/L      Calcium 9.4 mg/dL      Total Protein 6.6 g/dL      Albumin 3.4 g/dL      ALT (SGPT) 8 U/L      AST (SGOT) 16 U/L      Alkaline Phosphatase 101 U/L      Total Bilirubin 0.2 mg/dL      Globulin 3.2 gm/dL      A/G Ratio 1.1 g/dL      BUN/Creatinine Ratio 13.3     Anion Gap 10.0 mmol/L      eGFR 53.5 mL/min/1.73     Narrative:      GFR Normal >60  Chronic Kidney Disease <60  Kidney Failure <15    The GFR formula is only valid for adults with stable renal function between ages 18 and 70.    CBC Auto Differential [[id number]]  (Abnormal) Collected: 08/28/24 0438    Specimen: Blood Updated: 08/28/24 0455     WBC 10.81 10*3/mm3      RBC 4.08 10*6/mm3      Hemoglobin 12.1 g/dL      Hematocrit 39.1 %      MCV 95.8 fL      MCH 29.7 pg      MCHC 30.9 g/dL      RDW 13.1 %      RDW-SD 46.5 fl      MPV 10.4 fL      Platelets 394 10*3/mm3      Neutrophil % 66.0 %      Lymphocyte % 22.8 %      Monocyte % 6.8 %      Eosinophil % 3.3 %      Basophil % 0.7 %      Immature Grans % 0.4 %      Neutrophils, Absolute 7.14 10*3/mm3      Lymphocytes, Absolute 2.46 10*3/mm3      Monocytes, Absolute 0.73 10*3/mm3      Eosinophils, Absolute 0.36 10*3/mm3      Basophils, Absolute 0.08 10*3/mm3      Immature Grans, Absolute 0.04 10*3/mm3      nRBC 0.0 /100 WBC     Comprehensive Metabolic Panel  [[id number]]  (Abnormal) Collected: 08/26/24 2039    Specimen: Blood Updated: 08/26/24 2108     Glucose 102 mg/dL      BUN 19 mg/dL      Creatinine 1.36 mg/dL      Sodium 141 mmol/L      Potassium 4.0 mmol/L      Chloride 101 mmol/L      CO2 27.0 mmol/L      Calcium 10.0 mg/dL      Total Protein 7.2 g/dL      Albumin 3.9 g/dL      ALT (SGPT) 10 U/L      AST (SGOT) 17 U/L      Alkaline Phosphatase 110 U/L      Total Bilirubin 0.2 mg/dL      Globulin 3.3 gm/dL      A/G Ratio 1.2 g/dL      BUN/Creatinine Ratio 14.0     Anion Gap 13.0 mmol/L      eGFR 39.2 mL/min/1.73     Narrative:      GFR Normal >60  Chronic Kidney Disease <60  Kidney Failure <15    The GFR formula is only valid for adults with stable renal function between ages 18 and 70.    Urinalysis, Microscopic Only - Straight Cath [[id number]]  (Abnormal) Collected: 08/26/24 2031    Specimen: Urine from Straight Cath Updated: 08/26/24 2056     RBC, UA None Seen /HPF      WBC, UA 3-5 /HPF      Comment: Urine culture not indicated.        Bacteria, UA 4+ /HPF      Squamous Epithelial Cells, UA 3-6 /HPF      Hyaline Casts, UA 0-2 /LPF      Methodology Automated Microscopy    Urinalysis With Culture If Indicated - Straight Cath [[id number]]  (Abnormal) Collected: 08/26/24 2031    Specimen: Urine from Straight Cath Updated: 08/26/24 2056     Color, UA Yellow     Appearance, UA Clear     pH, UA 5.5     Specific Gravity, UA 1.012     Glucose, UA Negative     Ketones, UA Negative     Bilirubin, UA Negative     Blood, UA Negative     Protein, UA Negative     Leuk Esterase, UA Small (1+)     Nitrite, UA Negative     Urobilinogen, UA 0.2 E.U./dL    Narrative:      In absence of clinical symptoms, the presence of pyuria, bacteria, and/or nitrites on the urinalysis result does not correlate with infection.    CBC & Differential [[id number]]  (Abnormal) Collected: 08/26/24 2039    Specimen: Blood Updated: 08/26/24 2049    Narrative:      The following orders were created for  "panel order CBC & Differential.  Procedure                               Abnormality         Status                     ---------                               -----------         ------                     CBC Auto Differential[[id number]]        Abnormal            Final result                 Please view results for these tests on the individual orders.    CBC Auto Differential [[id number]]  (Abnormal) Collected: 24    Specimen: Blood Updated: 24     WBC 15.56 10*3/mm3      RBC 4.03 10*6/mm3      Hemoglobin 12.2 g/dL      Hematocrit 38.0 %      MCV 94.3 fL      MCH 30.3 pg      MCHC 32.1 g/dL      RDW 13.4 %      RDW-SD 46.2 fl      MPV 10.1 fL      Platelets 394 10*3/mm3      Neutrophil % 79.5 %      Lymphocyte % 13.4 %      Monocyte % 4.9 %      Eosinophil % 1.2 %      Basophil % 0.4 %      Immature Grans % 0.6 %      Neutrophils, Absolute 12.38 10*3/mm3      Lymphocytes, Absolute 2.08 10*3/mm3      Monocytes, Absolute 0.76 10*3/mm3      Eosinophils, Absolute 0.18 10*3/mm3      Basophils, Absolute 0.07 10*3/mm3      Immature Grans, Absolute 0.09 10*3/mm3      nRBC 0.0 /100 WBC               Objective:     Vitals: /58 (BP Location: Right arm, Patient Position: Lying)   Pulse 57   Temp 97.8 °F (36.6 °C) (Oral)   Resp 16   Ht 160 cm (62.99\")   Wt 63.7 kg (140 lb 8 oz)   SpO2 97%   BMI 24.89 kg/m²    Intake/Output Summary (Last 24 hours) at 2024 0751  Last data filed at 2024 2327  Gross per 24 hour   Intake --   Output 1300 ml   Net -1300 ml    Temp (24hrs), Av.9 °F (36.6 °C), Min:97.5 °F (36.4 °C), Max:98.3 °F (36.8 °C)      Physical Exam  Vitals reviewed.   Constitutional:       Appearance: Normal appearance. She is not ill-appearing.   HENT:      Head: Normocephalic and atraumatic.   Eyes:      General:         Right eye: No discharge.         Left eye: No discharge.      Extraocular Movements: Extraocular movements intact.      Conjunctiva/sclera: Conjunctivae " normal.   Cardiovascular:      Rate and Rhythm: Normal rate and regular rhythm.      Pulses: Normal pulses.      Heart sounds: No murmur heard.  Pulmonary:      Effort: Pulmonary effort is normal. No respiratory distress.      Comments: Nasal cannula in place  Abdominal:      General: Abdomen is flat. Bowel sounds are normal. There is no distension.   Musculoskeletal:      Right lower leg: No edema.      Left lower leg: No edema.   Skin:     Capillary Refill: Capillary refill takes less than 2 seconds.   Neurological:      General: No focal deficit present.      Mental Status: She is alert.   Psychiatric:         Mood and Affect: Mood normal.         Behavior: Behavior normal.             Assessment and Plan:     Primary Problem:  Gait instability    [facility] Problem list:    Gait instability      PMH:  Past Medical History:   Diagnosis Date    CAD in native artery 7/29/2019    COPD (chronic obstructive pulmonary disease)     Essential hypertension 12/7/2021    GERD (gastroesophageal reflux disease)     Lung nodule        Treatment Plan:  Inferior pubic ramus fracture: Ortho consultation, appreciate recommendations.  - Weightbearing as tolerated, PT/OT.  Likely SNF.     Gait instability: resulting in problem #1 above. PT/OT consultations. She has declined SNF in the past. Lives alone currently. CM for placement options.     UTI: continue rocephin, follow Ucx.     Disposition: inpatient.     Reviewed treatment plans with the patient and/or family.   30 minutes spent in face to face interaction and coordination of care.     Electronically signed by Anupam Ledezma MD on 8/28/2024 at 07:51 CDT

## 2024-08-28 NOTE — PLAN OF CARE
Problem: Adult Inpatient Plan of Care  Goal: Plan of Care Review  Outcome: Ongoing, Progressing  Flowsheets (Taken 8/28/2024 0546)  Progress: no change  Plan of Care Reviewed With: patient  Outcome Evaluation: Pt having some confusion tonight and hallucinating some after phenergan given. purwick in use. Voiding. VSS. Safety maintained.

## 2024-08-29 LAB
ALBUMIN SERPL-MCNC: 3 G/DL (ref 3.5–5.2)
ALBUMIN/GLOB SERPL: 1.1 G/DL
ALP SERPL-CCNC: 85 U/L (ref 39–117)
ALT SERPL W P-5'-P-CCNC: 6 U/L (ref 1–33)
ANION GAP SERPL CALCULATED.3IONS-SCNC: 8 MMOL/L (ref 5–15)
AST SERPL-CCNC: 12 U/L (ref 1–32)
BASOPHILS # BLD AUTO: 0.05 10*3/MM3 (ref 0–0.2)
BASOPHILS NFR BLD AUTO: 0.6 % (ref 0–1.5)
BILIRUB SERPL-MCNC: 0.2 MG/DL (ref 0–1.2)
BILIRUB UR QL STRIP: NEGATIVE
BUN SERPL-MCNC: 14 MG/DL (ref 8–23)
BUN/CREAT SERPL: 13.1 (ref 7–25)
CALCIUM SPEC-SCNC: 8.7 MG/DL (ref 8.6–10.5)
CHLORIDE SERPL-SCNC: 108 MMOL/L (ref 98–107)
CLARITY UR: CLEAR
CO2 SERPL-SCNC: 26 MMOL/L (ref 22–29)
COLOR UR: YELLOW
CREAT SERPL-MCNC: 1.07 MG/DL (ref 0.57–1)
DEPRECATED RDW RBC AUTO: 45.1 FL (ref 37–54)
EGFRCR SERPLBLD CKD-EPI 2021: 52.3 ML/MIN/1.73
EOSINOPHIL # BLD AUTO: 0.4 10*3/MM3 (ref 0–0.4)
EOSINOPHIL NFR BLD AUTO: 4.4 % (ref 0.3–6.2)
ERYTHROCYTE [DISTWIDTH] IN BLOOD BY AUTOMATED COUNT: 13.2 % (ref 12.3–15.4)
GLOBULIN UR ELPH-MCNC: 2.7 GM/DL
GLUCOSE SERPL-MCNC: 90 MG/DL (ref 65–99)
GLUCOSE UR STRIP-MCNC: NEGATIVE MG/DL
HCT VFR BLD AUTO: 33.9 % (ref 34–46.6)
HGB BLD-MCNC: 10.9 G/DL (ref 12–15.9)
HGB UR QL STRIP.AUTO: NEGATIVE
IMM GRANULOCYTES # BLD AUTO: 0.03 10*3/MM3 (ref 0–0.05)
IMM GRANULOCYTES NFR BLD AUTO: 0.3 % (ref 0–0.5)
KETONES UR QL STRIP: NEGATIVE
LEUKOCYTE ESTERASE UR QL STRIP.AUTO: NEGATIVE
LYMPHOCYTES # BLD AUTO: 2.49 10*3/MM3 (ref 0.7–3.1)
LYMPHOCYTES NFR BLD AUTO: 27.6 % (ref 19.6–45.3)
MCH RBC QN AUTO: 30.3 PG (ref 26.6–33)
MCHC RBC AUTO-ENTMCNC: 32.2 G/DL (ref 31.5–35.7)
MCV RBC AUTO: 94.2 FL (ref 79–97)
MONOCYTES # BLD AUTO: 0.69 10*3/MM3 (ref 0.1–0.9)
MONOCYTES NFR BLD AUTO: 7.6 % (ref 5–12)
NEUTROPHILS NFR BLD AUTO: 5.37 10*3/MM3 (ref 1.7–7)
NEUTROPHILS NFR BLD AUTO: 59.5 % (ref 42.7–76)
NITRITE UR QL STRIP: NEGATIVE
NRBC BLD AUTO-RTO: 0 /100 WBC (ref 0–0.2)
PH UR STRIP.AUTO: 5.5 [PH] (ref 5–8)
PLATELET # BLD AUTO: 327 10*3/MM3 (ref 140–450)
PMV BLD AUTO: 10.9 FL (ref 6–12)
POTASSIUM SERPL-SCNC: 3.4 MMOL/L (ref 3.5–5.2)
PROT SERPL-MCNC: 5.7 G/DL (ref 6–8.5)
PROT UR QL STRIP: NEGATIVE
RBC # BLD AUTO: 3.6 10*6/MM3 (ref 3.77–5.28)
SODIUM SERPL-SCNC: 142 MMOL/L (ref 136–145)
SP GR UR STRIP: 1.02 (ref 1–1.03)
UROBILINOGEN UR QL STRIP: NORMAL
WBC NRBC COR # BLD AUTO: 9.03 10*3/MM3 (ref 3.4–10.8)

## 2024-08-29 PROCEDURE — 97530 THERAPEUTIC ACTIVITIES: CPT

## 2024-08-29 PROCEDURE — 25010000002 ENOXAPARIN PER 10 MG: Performed by: PHYSICIAN ASSISTANT

## 2024-08-29 PROCEDURE — 25810000003 SODIUM CHLORIDE 0.9 % SOLUTION: Performed by: PHYSICIAN ASSISTANT

## 2024-08-29 PROCEDURE — 85025 COMPLETE CBC W/AUTO DIFF WBC: CPT | Performed by: FAMILY MEDICINE

## 2024-08-29 PROCEDURE — 81003 URINALYSIS AUTO W/O SCOPE: CPT | Performed by: FAMILY MEDICINE

## 2024-08-29 PROCEDURE — 25010000002 CEFTRIAXONE PER 250 MG: Performed by: FAMILY MEDICINE

## 2024-08-29 PROCEDURE — 25010000002 ONDANSETRON PER 1 MG: Performed by: PHYSICIAN ASSISTANT

## 2024-08-29 PROCEDURE — 97535 SELF CARE MNGMENT TRAINING: CPT

## 2024-08-29 PROCEDURE — 80053 COMPREHEN METABOLIC PANEL: CPT | Performed by: FAMILY MEDICINE

## 2024-08-29 RX ADMIN — GABAPENTIN 400 MG: 400 CAPSULE ORAL at 21:29

## 2024-08-29 RX ADMIN — ACETAMINOPHEN 650 MG: 325 TABLET ORAL at 21:29

## 2024-08-29 RX ADMIN — ALPRAZOLAM 1 MG: 0.5 TABLET ORAL at 08:09

## 2024-08-29 RX ADMIN — DOCUSATE SODIUM 50 MG AND SENNOSIDES 8.6 MG 2 TABLET: 8.6; 5 TABLET, FILM COATED ORAL at 13:41

## 2024-08-29 RX ADMIN — Medication 10 ML: at 08:08

## 2024-08-29 RX ADMIN — TRAMADOL HYDROCHLORIDE 50 MG: 50 TABLET ORAL at 17:49

## 2024-08-29 RX ADMIN — IBUPROFEN 400 MG: 400 TABLET, FILM COATED ORAL at 08:12

## 2024-08-29 RX ADMIN — ONDANSETRON 4 MG: 2 INJECTION INTRAMUSCULAR; INTRAVENOUS at 21:32

## 2024-08-29 RX ADMIN — SODIUM CHLORIDE 75 ML/HR: 9 INJECTION, SOLUTION INTRAVENOUS at 10:21

## 2024-08-29 RX ADMIN — ENOXAPARIN SODIUM 40 MG: 100 INJECTION SUBCUTANEOUS at 08:07

## 2024-08-29 RX ADMIN — ASPIRIN 81 MG: 81 TABLET, COATED ORAL at 08:07

## 2024-08-29 RX ADMIN — BUMETANIDE 1 MG: 1 TABLET ORAL at 08:07

## 2024-08-29 RX ADMIN — CITALOPRAM 20 MG: 20 TABLET, FILM COATED ORAL at 08:07

## 2024-08-29 RX ADMIN — NYSTATIN: 100000 POWDER TOPICAL at 21:29

## 2024-08-29 RX ADMIN — NICOTINE 1 PATCH: 21 PATCH, EXTENDED RELEASE TRANSDERMAL at 08:09

## 2024-08-29 RX ADMIN — CITALOPRAM 20 MG: 20 TABLET, FILM COATED ORAL at 21:29

## 2024-08-29 RX ADMIN — ONDANSETRON 4 MG: 2 INJECTION INTRAMUSCULAR; INTRAVENOUS at 17:49

## 2024-08-29 RX ADMIN — SODIUM CHLORIDE 1000 MG: 900 INJECTION INTRAVENOUS at 11:26

## 2024-08-29 RX ADMIN — ALPRAZOLAM 1 MG: 0.5 TABLET ORAL at 21:29

## 2024-08-29 RX ADMIN — ACETAMINOPHEN 650 MG: 325 TABLET ORAL at 10:21

## 2024-08-29 RX ADMIN — LISINOPRIL 20 MG: 20 TABLET ORAL at 08:07

## 2024-08-29 RX ADMIN — NYSTATIN: 100000 POWDER TOPICAL at 08:08

## 2024-08-29 RX ADMIN — GABAPENTIN 400 MG: 400 CAPSULE ORAL at 08:07

## 2024-08-29 NOTE — DISCHARGE PLACEMENT REQUEST
"Pattie Solomon (79 y.o. Female)       Date of Birth   1945    Social Security Number       Address   [address] [place] [id number]    Home Phone   [phone]    MRN   [id number]       [facility]    Mar[facility] Status                               Admission Date   8/26/24    Admission Type   Emergency    Admitting Provider   Rafat Espinoza MD    Attending Provider   Anupam Ledezma MD    Department, Room/Bed   [facility] 3A, 349/1       Discharge Date       Discharge Disposition       Discharge Destination                                 Attending Provider: Anupam Ledezma MD    Allergies: Morphine, Codeine, Levofloxacin, Tramadol, Zanaflex  [Tizanidine Hcl], Albuterol    Isolation: None   Infection: None   Code Status: No CPR    Ht: 160 cm (62.99\")   Wt: 63.7 kg (140 lb 8 oz)    Admission Cmt: None   Principal Problem: Gait instability [R26.81]                   Active Insurance as of 8/26/2024       Primary Coverage       Payor Plan Insurance Group Employer/Plan Group    MEDICARE MEDICARE A & B        Payor Plan Address Payor Plan Phone Number Payor Plan Fax Number Effective Dates    [address] [phone]  3/1/2008 - None Entered    [facility] [id number]         Subscriber Name Subscriber Birth Date Member ID       PATTIE SOLOMON 1945 [id number]               Secondary Coverage       Payor Plan Insurance Group Employer/Plan Group    AARP MC SUP AARP HEALTH CARE OPTIONS        Payor Plan Address Payor Plan Phone Number Payor Plan Fax Number Effective Dates    [facility] [phone]  1/1/2020 - None Entered    [address]       [facility] [address]         Subscriber Name Subscriber Birth Date Member ID       PATTIE SOLOMON 1945 [id number]                     Emergency Contacts        (Rel.) Home Phone Work Phone Mobile Phone    hernandezAmee samayoa (Daughter) -- -- [phone]    loiseddie (Sister) [phone] -- --    " CANDIDO GUADALUPE (Daughter) [phone] -- --    Nav Rios (Son) -- -- [phone]              Insurance Information                  MEDICARE/MEDICARE A & B Phone: [phone]    Subscriber: Pattie Liu Subscriber#: [id number]    Group#: -- Precert#: --        [facility] HEALTH CARE OPTIONS Phone: [phone]    Subscriber: Pattie Liu Subscriber#: [id number]    Group#: -- Precert#: --             History & Physical        Anupam Ledezma MD at 08/27/24 1020              History and Physical    Patient:  Pattie Liu  MRN: [id number]    CHIEF COMPLAINT:  fall at home    History Obtained From: the patient   PCP: Rafat Espinoza MD    HISTORY OF PRESENT ILLNESS:   The patient is a 79 y.o. female who presents with right thigh pain after fall at home on 8/26/24. She tripped while at home, denies dizziness or lightheadedness prior to fall. Imaging in ED revealed minimal displaced acute fracture of right inferior pubic ramus. She lives at home alone currently and has had multiple falls recently.    REVIEW OF SYSTEMS:    Review of Systems   Constitutional:  Positive for fatigue. Negative for chills and fever.   Respiratory:  Positive for shortness of breath.    Gastrointestinal:  Negative for nausea and vomiting.   Genitourinary:  Positive for dysuria.   Neurological:  Positive for weakness.          Past Medical History:  Past Medical History:   Diagnosis Date    CAD in native artery 7/29/2019    COPD (chronic obstructive pulmonary disease)     Essential hypertension 12/7/2021    GERD (gastroesophageal reflux disease)     Lung nodule        Past Surgical History:  Past Surgical History:   Procedure Laterality Date    BREAST IMPLANT REMOVAL      BREAST TISSUE EXPANDER REMOVAL INSERTION OF IMPLANT      CARDIAC CATHETERIZATION N/A 6/21/2019    Procedure: Left Heart Cath;  Surgeon: Edi Armijo MD;  Location:  [facility] CATH INVASIVE LOCATION;  Service: Cardiology    CHOLECYSTECTOMY       HYSTERECTOMY      MASTECTOMY      BILATERAL    OOPHORECTOMY         Medications Prior to Admission:    Medications Prior to Admission   Medication Sig Dispense Refill Last Dose    ALPRAZolam (XANAX) 1 MG tablet Take 1 tablet by mouth 2 (Two) Times a Day.       amLODIPine (NORVASC) 10 MG tablet Take 1 tablet by mouth Daily. 90 tablet 0     aspirin 81 MG tablet Take 1 tablet by mouth Daily. 30 tablet 11     Budeson-Glycopyrrol-Formoterol (Breztri Aerosphere) 160-9-4.8 MCG/ACT aerosol inhaler Inhale 2 puffs 2 (Two) Times a Day.       bumetanide (BUMEX) 1 MG tablet Take 1 tablet by mouth Daily.       citalopram (CeleXA) 10 MG tablet Take 3 tablets by mouth Daily.       colestipol (COLESTID) 1 g tablet Take 1 tablet by mouth Daily.       dicyclomine (BENTYL) 20 MG tablet Take 1 tablet by mouth 3 (Three) Times a Day As Needed.       gabapentin (NEURONTIN) 800 MG tablet Take 2 tablets by mouth 2 (Two) Times a Day.       lisinopril (PRINIVIL,ZESTRIL) 20 MG tablet Take 1 tablet by mouth Daily.       loperamide (IMODIUM) 2 MG capsule Take 1 capsule by mouth 4 (Four) Times a Day As Needed for Diarrhea. 60 capsule 0     meloxicam (MOBIC) 15 MG tablet Take 1 tablet by mouth Daily.       Misc Natural Products (GINSENG-COMPLEX PO) Take 2 capsules by mouth 2 (Two) Times a Day.       O2 (OXYGEN) Inhale 2 L/min Continuous.       ondansetron (ZOFRAN) 8 MG tablet Take 1 tablet by mouth 4 (Four) Times a Day As Needed for Nausea or Vomiting.          Allergies:  Codeine, Hydrocodone-acetaminophen, Levofloxacin, Oxycodone-acetaminophen, Tramadol, Zanaflex  [tizanidine hcl], and Albuterol    Social History:   Social History     Socioeconomic History    Marital status:    Tobacco Use    Smoking status: Every Day     Current packs/day: 0.50     Average packs/day: 0.5 packs/day for 62.0 years (31.0 ttl pk-yrs)     Types: Cigarettes    Smokeless tobacco: Never    Tobacco comments:     states she has no plan to quit smoking   Vaping Use  "   Vaping status: Former   Substance and Sexual Activity    Alcohol use: No    Drug use: No    Sexual activity: Not Currently       Family History:   Family History   Problem Relation Age of Onset    Cancer Mother     Cancer Father            Physical Exam:    Vitals: /52 (BP Location: Right arm, Patient Position: Lying)   Pulse 52   Temp 98 °F (36.7 °C) (Oral)   Resp 16   Ht 160 cm (62.99\")   Wt 63.7 kg (140 lb 8 oz)   SpO2 95%   BMI 24.89 kg/m²   Physical Exam  Vitals reviewed.   Constitutional:       Appearance: Normal appearance. She is not ill-appearing.   HENT:      Head: Normocephalic and atraumatic.   Eyes:      General:         Right eye: No discharge.         Left eye: No discharge.      Extraocular Movements: Extraocular movements intact.      Conjunctiva/sclera: Conjunctivae normal.   Cardiovascular:      Rate and Rhythm: Normal rate and regular rhythm.      Pulses: Normal pulses.      Heart sounds: No murmur heard.  Pulmonary:      Effort: Pulmonary effort is normal. No respiratory distress.      Comments: Nasal cannula in place  Abdominal:      General: Abdomen is flat. Bowel sounds are normal. There is no distension.   Musculoskeletal:      Right lower leg: No edema.      Left lower leg: No edema.   Skin:     Capillary Refill: Capillary refill takes less than 2 seconds.   Neurological:      Mental Status: She is alert.   Psychiatric:         Mood and Affect: Mood normal.         Behavior: Behavior normal.           Lab Results (last 24 hours)       Procedure Component Value Units Date/Time    Comprehensive Metabolic Panel [[id number]]  (Abnormal) Collected: 08/26/24 2039    Specimen: Blood Updated: 08/26/24 2108     Glucose 102 mg/dL      BUN 19 mg/dL      Creatinine 1.36 mg/dL      Sodium 141 mmol/L      Potassium 4.0 mmol/L      Chloride 101 mmol/L      CO2 27.0 mmol/L      Calcium 10.0 mg/dL      Total Protein 7.2 g/dL      Albumin 3.9 g/dL      ALT (SGPT) 10 U/L      AST (SGOT) 17 U/L  "     Alkaline Phosphatase 110 U/L      Total Bilirubin 0.2 mg/dL      Globulin 3.3 gm/dL      A/G Ratio 1.2 g/dL      BUN/Creatinine Ratio 14.0     Anion Gap 13.0 mmol/L      eGFR 39.2 mL/min/1.73     Narrative:      GFR Normal >60  Chronic Kidney Disease <60  Kidney Failure <15    The GFR formula is only valid for adults with stable renal function between ages 18 and 70.    Urinalysis, Microscopic Only - Straight Cath [[id number]]  (Abnormal) Collected: 08/26/24 2031    Specimen: Urine from Straight Cath Updated: 08/26/24 2056     RBC, UA None Seen /HPF      WBC, UA 3-5 /HPF      Comment: Urine culture not indicated.        Bacteria, UA 4+ /HPF      Squamous Epithelial Cells, UA 3-6 /HPF      Hyaline Casts, UA 0-2 /LPF      Methodology Automated Microscopy    Urinalysis With Culture If Indicated - Straight Cath [[id number]]  (Abnormal) Collected: 08/26/24 2031    Specimen: Urine from Straight Cath Updated: 08/26/24 2056     Color, UA Yellow     Appearance, UA Clear     pH, UA 5.5     Specific Gravity, UA 1.012     Glucose, UA Negative     Ketones, UA Negative     Bilirubin, UA Negative     Blood, UA Negative     Protein, UA Negative     Leuk Esterase, UA Small (1+)     Nitrite, UA Negative     Urobilinogen, UA 0.2 E.U./dL    Narrative:      In absence of clinical symptoms, the presence of pyuria, bacteria, and/or nitrites on the urinalysis result does not correlate with infection.    CBC & Differential [[id number]]  (Abnormal) Collected: 08/26/24 2039    Specimen: Blood Updated: 08/26/24 2049    Narrative:      The following orders were created for panel order CBC & Differential.  Procedure                               Abnormality         Status                     ---------                               -----------         ------                     CBC Auto Differential[[id number]]        Abnormal            Final result                 Please view results for these tests on the individual orders.    CBC Auto  Differential [[id number]]  (Abnormal) Collected: 08/26/24 2039    Specimen: Blood Updated: 08/26/24 2049     WBC 15.56 10*3/mm3      RBC 4.03 10*6/mm3      Hemoglobin 12.2 g/dL      Hematocrit 38.0 %      MCV 94.3 fL      MCH 30.3 pg      MCHC 32.1 g/dL      RDW 13.4 %      RDW-SD 46.2 fl      MPV 10.1 fL      Platelets 394 10*3/mm3      Neutrophil % 79.5 %      Lymphocyte % 13.4 %      Monocyte % 4.9 %      Eosinophil % 1.2 %      Basophil % 0.4 %      Immature Grans % 0.6 %      Neutrophils, Absolute 12.38 10*3/mm3      Lymphocytes, Absolute 2.08 10*3/mm3      Monocytes, Absolute 0.76 10*3/mm3      Eosinophils, Absolute 0.18 10*3/mm3      Basophils, Absolute 0.07 10*3/mm3      Immature Grans, Absolute 0.09 10*3/mm3      nRBC 0.0 /100 WBC              -----------------------------------------------------------------  EKG:   Radiology:     XR Shoulder 1 View Left    Result Date: 8/27/2024  EXAMINATION: XR SHOULDER 1 VW LEFT-  8/27/2024 2:25 AM  HISTORY: shoulder pain, fall today; R26.81-Unsteadiness on feet; S32.591A-Other specified fracture of right pubis, initial encounter for closed fracture; R29.6-Repeated falls; N39.0-Urinary tract infection, site not specified  A single frontal projection of the left shoulder is obtained. There is no previous study for comparison.  There is no evidence of an acute fracture or dislocation.  Limited visualized and evaluated glenohumeral and acromioclavicular articulations are intact. Moderate arthropathy of both joints.  There is a linear calcification in the acromiohumeral space, parallel to the lateral aspect of the humeral head, representing supraspinatus calcific tendinopathy.  Acromiohumeral space is maintained.  The scapula is not optimally visualized or evaluated.  Limited visualized proximal left ribs are unremarkable. No displaced fracture.      1. No acute fracture or dislocation. 2. Other nonacute findings as above.   This report was signed and finalized on 8/27/2024  7:02 AM by Dr. Tong Orellana MD.      CT Pelvis Without Contrast    Result Date: 8/26/2024  EXAM/TECHNIQUE: CT pelvis without contrast  INDICATION: fall with right hip pain and low back pain  COMPARISON: 8/21/2019  DLP: 1673.11 mGy.cm. Automated exposure control was also utilized to decrease patient radiation dose.  FINDINGS:  Acute minimally displaced right inferior pubic ramus fracture. Right superior pubic rami and acetabulum are intact. No widening of the pubic symphysis. No other acute pelvic fracture. No sacral fracture.  No evidence of acute hip fracture or dislocation. Postoperative change of right hip arthroplasty without evidence of complication.  No free pelvic fluid. Sigmoid diverticulosis without diverticulitis. No abnormal bowel distention or active bowel inflammation in the pelvis. Prior hysterectomy. No focal urinary bladder abnormality. Atherosclerotic nonaneurysmal distal abdominal aorta. No pelvic lymphadenopathy. No large soft tissue hematoma.       1. Acute minimally displaced right inferior pubic ramus fracture. No other acute pelvic fracture.  2. No hip fracture or dislocation. Right hip arthroplasty without evidence of complication.  This report was signed and finalized on 8/26/2024 8:11 PM by Dr. Jose Luevano MD.      CT Lumbar Spine Without Contrast    Result Date: 8/26/2024  EXAM/TECHNIQUE: CT lumbar spine without contrast  INDICATION: fall with low back pain and right hip pain  COMPARISON: 12/20/2022  DLP: 1673.11 mGy.cm. Automated exposure control was also utilized to decrease patient radiation dose.  FINDINGS:  5 lumbar-type vertebral bodies. Lumbar lordosis is maintained. No subluxations. Vertebral body heights are maintained. No acute or chronic fracture. Mild multilevel lumbar spine degenerative change most prominently characterized by facet arthropathy. No area of high-grade central canal or neural foraminal stenosis. Nonaneurysmal abdominal aorta with heavy atherosclerotic  calcification. Paravertebral soft tissues are unremarkable.      1. No acute fracture or subluxation. 2. Mild multilevel lumbar spine degenerative change.   This report was signed and finalized on 8/26/2024 8:07 PM by Dr. Jose Luevano MD.      CT Cervical Spine Without Contrast    Result Date: 8/26/2024  EXAM/TECHNIQUE: CT cervical spine without contrast  INDICATION: fall, poor historian, AMS  COMPARISON: 12/20/2022  DLP: 1673.11 mGy.cm. Automated exposure control was also utilized to decrease patient radiation dose.  FINDINGS:  Craniocervical relationships are maintained. The odontoid process is intact. Cervical spine alignment is anatomic. Cervical vertebral body heights are maintained. No acute cervical fracture or subluxation. Age-indeterminate mild anterior compression deformity of T1 with 5% height loss. Mild multilevel cervical spine degenerative change without evidence of high-grade central canal or neural foraminal stenosis. Paravertebral soft tissues are unremarkable.       1. No acute cervical spine fracture or subluxation.  2. Age-indeterminate mild compression deformity of T1 in the partially imaged upper thoracic spine. Correlate with point tenderness.    This report was signed and finalized on 8/26/2024 8:04 PM by Dr. Jose Luevano MD.      CT Head Without Contrast    Result Date: 8/26/2024  EXAM/TECHNIQUE: CT head without contrast  INDICATION: fall, poor historian, AMS  COMPARISON: 5/22/2023  DLP: 1673.11 mGy.cm. Automated exposure control was also utilized to decrease patient radiation dose.  FINDINGS:  No evidence of intracranial hemorrhage. Gray-white differentiation is maintained. Global cerebral volume loss and presumed chronic microvascular ischemic white matter change. No midline shift or mass effect. Lateral ventricles are nondilated. Basilar cisterns are patent. No acute orbital finding. Mastoid air cells are clear. Visualized paranasal sinuses are clear. No acute osseous finding.       1. No acute intracranial findings. 2. Global cerebral volume loss and presumed chronic microvascular ischemic white matter change.   This report was signed and finalized on 8/26/2024 7:58 PM by Dr. Jose Luevano MD.      XR Femur 2 View Right    Result Date: 8/26/2024  EXAM/TECHNIQUE: XR FEMUR 2 VW RIGHT-  INDICATION: fall with right hip and femur pain  COMPARISON: None available.  FINDINGS:  Postoperative change of right hip arthroplasty. No evidence of hardware complication. No periprosthetic fracture. No fracture involving the right femur. No suspicious osseous lesion. No unexpected radiopaque foreign body or soft tissue gas.       No acute osseous findings. Postoperative change of right hip arthroplasty without evidence of complication.  This report was signed and finalized on 8/26/2024 7:41 PM by Dr. Jose Luevano MD.        Assessment and Plan     Primary Problem:  Gait instability    Hospital Problem list:    Gait instability      PMH:  Past Medical History:   Diagnosis Date    CAD in native artery 7/29/2019    COPD (chronic obstructive pulmonary disease)     Essential hypertension 12/7/2021    GERD (gastroesophageal reflux disease)     Lung nodule        Treatment Plan:  Inferior pubic ramus fracture: Ortho consultation, appreciate recommendations.    Gait instability: resulting in problem #1 above. PT/OT consultations. She has declined SNF in the past. Lives alone currently. CM for placement options.    UTI: continue rocephin, follow Ucx.    Disposition: inpatient.     Anupam Ledezma MD 8/27/2024 10:20 CDT      Electronically signed by Anupam Ledezma MD at 08/27/24 1028       KacieRafat MD at 08/29/24 0824            Daily Progress Note  Pattie Liu  MRN: [id number] LOS: 3    Admit Date: 8/26/2024 8/29/2024 08:24 CDT    Subjective:      Chief Complaint:  Chief Complaint   Patient presents with    Fall       Interval History:    Reviewed overnight events and nursing notes.    No acute events overnight.  Pain is better controlled.  To determine placement options.    Review of Systems   Constitutional:  Positive for activity change, appetite change and fatigue. Negative for fever.   Respiratory:  Positive for shortness of breath.    Gastrointestinal:  Negative for nausea and vomiting.   Genitourinary:  Positive for decreased urine volume and dysuria.   Musculoskeletal:  Positive for gait problem.   Neurological:  Positive for weakness.       DIET:  Diet: Regular/House; Fluid Consistency: Thin (IDDSI 0)    Medications:   sodium chloride, 75 mL/hr, Last Rate: 75 mL/hr (08/28/24 7343)      ALPRAZolam, 1 mg, Oral, BID  aspirin, 81 mg, Oral, Daily  bumetanide, 1 mg, Oral, Daily  cefTRIAXone, 1,000 mg, Intravenous, Q24H  citalopram, 20 mg, Oral, BID  enoxaparin, 40 mg, Subcutaneous, Daily  gabapentin, 400 mg, Oral, Q12H  lisinopril, 20 mg, Oral, Daily  nicotine, 1 patch, Transdermal, Q24H  nystatin, , Topical, Q12H  sodium chloride, 10 mL, Intravenous, Q12H        Data:     Code Status:   Code Status and Medical Interventions: No CPR (Do Not Attempt to Resuscitate); Full Support   Ordered at: 08/27/24 1032     Code Status (Patient has no pulse and is not breathing):    No CPR (Do Not Attempt to Resuscitate)     Medical Interventions (Patient has pulse or is breathing):    Full Support       Family History   Problem Relation Age of Onset    Cancer Mother     Cancer Father      Social History     Socioeconomic History    Marital status:    Tobacco Use    Smoking status: Every Day     Current packs/day: 0.50     Average packs/day: 0.5 packs/day for 62.0 years (31.0 ttl pk-yrs)     Types: Cigarettes    Smokeless tobacco: Never    Tobacco comments:     states she has no plan to quit smoking   Vaping Use    Vaping status: Former   Substance and Sexual Activity    Alcohol use: No    Drug use: No    Sexual activity: Not Currently       Labs:    Lab Results (last 72 hours)       Procedure  Component Value Units Date/Time    Comprehensive Metabolic Panel [[id number]]  (Abnormal) Collected: 08/28/24 0438    Specimen: Blood Updated: 08/28/24 0516     Glucose 88 mg/dL      BUN 14 mg/dL      Creatinine 1.05 mg/dL      Sodium 144 mmol/L      Potassium 4.0 mmol/L      Chloride 109 mmol/L      CO2 25.0 mmol/L      Calcium 9.4 mg/dL      Total Protein 6.6 g/dL      Albumin 3.4 g/dL      ALT (SGPT) 8 U/L      AST (SGOT) 16 U/L      Alkaline Phosphatase 101 U/L      Total Bilirubin 0.2 mg/dL      Globulin 3.2 gm/dL      A/G Ratio 1.1 g/dL      BUN/Creatinine Ratio 13.3     Anion Gap 10.0 mmol/L      eGFR 53.5 mL/min/1.73     Narrative:      GFR Normal >60  Chronic Kidney Disease <60  Kidney Failure <15    The GFR formula is only valid for adults with stable renal function between ages 18 and 70.    CBC Auto Differential [[id number]]  (Abnormal) Collected: 08/28/24 0438    Specimen: Blood Updated: 08/28/24 0455     WBC 10.81 10*3/mm3      RBC 4.08 10*6/mm3      Hemoglobin 12.1 g/dL      Hematocrit 39.1 %      MCV 95.8 fL      MCH 29.7 pg      MCHC 30.9 g/dL      RDW 13.1 %      RDW-SD 46.5 fl      MPV 10.4 fL      Platelets 394 10*3/mm3      Neutrophil % 66.0 %      Lymphocyte % 22.8 %      Monocyte % 6.8 %      Eosinophil % 3.3 %      Basophil % 0.7 %      Immature Grans % 0.4 %      Neutrophils, Absolute 7.14 10*3/mm3      Lymphocytes, Absolute 2.46 10*3/mm3      Monocytes, Absolute 0.73 10*3/mm3      Eosinophils, Absolute 0.36 10*3/mm3      Basophils, Absolute 0.08 10*3/mm3      Immature Grans, Absolute 0.04 10*3/mm3      nRBC 0.0 /100 WBC     Comprehensive Metabolic Panel [[id number]]  (Abnormal) Collected: 08/26/24 2039    Specimen: Blood Updated: 08/26/24 2108     Glucose 102 mg/dL      BUN 19 mg/dL      Creatinine 1.36 mg/dL      Sodium 141 mmol/L      Potassium 4.0 mmol/L      Chloride 101 mmol/L      CO2 27.0 mmol/L      Calcium 10.0 mg/dL      Total Protein 7.2 g/dL      Albumin 3.9 g/dL      ALT (SGPT) 10  U/L      AST (SGOT) 17 U/L      Alkaline Phosphatase 110 U/L      Total Bilirubin 0.2 mg/dL      Globulin 3.3 gm/dL      A/G Ratio 1.2 g/dL      BUN/Creatinine Ratio 14.0     Anion Gap 13.0 mmol/L      eGFR 39.2 mL/min/1.73     Narrative:      GFR Normal >60  Chronic Kidney Disease <60  Kidney Failure <15    The GFR formula is only valid for adults with stable renal function between ages 18 and 70.    Urinalysis, Microscopic Only - Straight Cath [[id number]]  (Abnormal) Collected: 08/26/24 2031    Specimen: Urine from Straight Cath Updated: 08/26/24 2056     RBC, UA None Seen /HPF      WBC, UA 3-5 /HPF      Comment: Urine culture not indicated.        Bacteria, UA 4+ /HPF      Squamous Epithelial Cells, UA 3-6 /HPF      Hyaline Casts, UA 0-2 /LPF      Methodology Automated Microscopy    Urinalysis With Culture If Indicated - Straight Cath [[id number]]  (Abnormal) Collected: 08/26/24 2031    Specimen: Urine from Straight Cath Updated: 08/26/24 2056     Color, UA Yellow     Appearance, UA Clear     pH, UA 5.5     Specific Gravity, UA 1.012     Glucose, UA Negative     Ketones, UA Negative     Bilirubin, UA Negative     Blood, UA Negative     Protein, UA Negative     Leuk Esterase, UA Small (1+)     Nitrite, UA Negative     Urobilinogen, UA 0.2 E.U./dL    Narrative:      In absence of clinical symptoms, the presence of pyuria, bacteria, and/or nitrites on the urinalysis result does not correlate with infection.    CBC & Differential [[id number]]  (Abnormal) Collected: 08/26/24 2039    Specimen: Blood Updated: 08/26/24 2049    Narrative:      The following orders were created for panel order CBC & Differential.  Procedure                               Abnormality         Status                     ---------                               -----------         ------                     CBC Auto Differential[[id number]]        Abnormal            Final result                 Please view results for these tests on the individual  "orders.    CBC Auto Differential [[id number]]  (Abnormal) Collected: 24    Specimen: Blood Updated: 24     WBC 15.56 10*3/mm3      RBC 4.03 10*6/mm3      Hemoglobin 12.2 g/dL      Hematocrit 38.0 %      MCV 94.3 fL      MCH 30.3 pg      MCHC 32.1 g/dL      RDW 13.4 %      RDW-SD 46.2 fl      MPV 10.1 fL      Platelets 394 10*3/mm3      Neutrophil % 79.5 %      Lymphocyte % 13.4 %      Monocyte % 4.9 %      Eosinophil % 1.2 %      Basophil % 0.4 %      Immature Grans % 0.6 %      Neutrophils, Absolute 12.38 10*3/mm3      Lymphocytes, Absolute 2.08 10*3/mm3      Monocytes, Absolute 0.76 10*3/mm3      Eosinophils, Absolute 0.18 10*3/mm3      Basophils, Absolute 0.07 10*3/mm3      Immature Grans, Absolute 0.09 10*3/mm3      nRBC 0.0 /100 WBC               Objective:     Vitals: /55 (BP Location: Right arm, Patient Position: Lying)   Pulse 53   Temp 98.4 °F (36.9 °C) (Oral)   Resp 16   Ht 160 cm (62.99\")   Wt 63.7 kg (140 lb 8 oz)   SpO2 92%   BMI 24.89 kg/m²    Intake/Output Summary (Last 24 hours) at 2024 08  Last data filed at 2024 0800  Gross per 24 hour   Intake 4330.44 ml   Output 900 ml   Net 3430.44 ml    Temp (24hrs), Av.2 °F (36.8 °C), Min:98 °F (36.7 °C), Max:98.4 °F (36.9 °C)      Physical Exam  Vitals reviewed.   Constitutional:       Appearance: Normal appearance. She is not ill-appearing.   HENT:      Head: Normocephalic and atraumatic.   Eyes:      General:         Right eye: No discharge.         Left eye: No discharge.      Extraocular Movements: Extraocular movements intact.      Conjunctiva/sclera: Conjunctivae normal.   Cardiovascular:      Rate and Rhythm: Normal rate and regular rhythm.      Pulses: Normal pulses.      Heart sounds: No murmur heard.  Pulmonary:      Effort: Pulmonary effort is normal. No respiratory distress.      Comments: Nasal cannula in place  Abdominal:      General: Abdomen is flat. Bowel sounds are normal. There is no " distension.   Musculoskeletal:      Right lower leg: No edema.      Left lower leg: No edema.   Skin:     Capillary Refill: Capillary refill takes less than 2 seconds.   Neurological:      General: No focal deficit present.      Mental Status: She is alert.   Psychiatric:         Mood and Affect: Mood normal.         Behavior: Behavior normal.             Assessment and Plan:     Primary Problem:  Gait instability    Hospital Problem list:    Gait instability      PMH:  Past Medical History:   Diagnosis Date    CAD in native artery 2019    COPD (chronic obstructive pulmonary disease)     Essential hypertension 2021    GERD (gastroesophageal reflux disease)     Lung nodule        Treatment Plan:  Inferior pubic ramus fracture: Ortho consultation, appreciate recommendations.  - Weightbearing as tolerated, PT/OT.  Likely SNF.     Gait instability: resulting in problem #1 above. PT/OT consultations. She has declined SNF in the past. Lives alone currently. CM for placement options.     UTI: continue rocephin, follow Ucx.     Disposition: inpatient.     Reviewed treatment plans with the patient and/or family.   30 minutes spent in face to face interaction and coordination of care.     Electronically signed by Rafat Espinoza MD on 2024 at 08:24 CDT      Electronically signed by Rafat Espinoza MD at 24 0824       Consult Notes (last 24 hours)  Notes from 24 1430 through 24 1430   No notes of this type exist for this encounter.          Physical Therapy Notes (last 24 hours)        Rashida Crockett, [facility] at 24 1446  Version 1 of 1         Acute Care - Physical Therapy Treatment Note  [facility]     Patient Name: Pattie Liu  : 1945  MRN: [id number]  Today's Date: 2024      Visit Dx:     ICD-10-CM ICD-9-CM   1. Gait instability  R26.81 781.2   2. Closed fracture of ramus of right pubis, initial encounter  S32.591A 808.2   3. Multiple falls  R29.6 V15.88   4.  Urinary tract infection without hematuria, site unspecified  N39.0 599.0   5. Impaired mobility [Z74.09]  Z74.09 799.89     Patient Active Problem List   Diagnosis    Frequent PVCs    Shortness of breath    SOB (shortness of breath)    CAD in native artery    PVD (peripheral vascular disease)    Pneumonia due to infectious organism    Chronic back pain    Essential hypertension    Fall, initial encounter    Traumatic rhabdomyolysis    Leukocytosis    Anemia    Degenerative disc disease, lumbar    Dehydration    Acute UTI    Chronic respiratory failure with hypoxia    Impaired mobility    UTI (urinary tract infection)    Gait instability     Past Medical History:   Diagnosis Date    CAD in native artery 7/29/2019    COPD (chronic obstructive pulmonary disease)     Essential hypertension 12/7/2021    GERD (gastroesophageal reflux disease)     Lung nodule      Past Surgical History:   Procedure Laterality Date    BREAST IMPLANT REMOVAL      BREAST TISSUE EXPANDER REMOVAL INSERTION OF IMPLANT      CARDIAC CATHETERIZATION N/A 6/21/2019    Procedure: Left Heart Cath;  Surgeon: Edi Armijo MD;  Location:  [facility] CATH INVASIVE LOCATION;  Service: Cardiology    CHOLECYSTECTOMY      HYSTERECTOMY      MASTECTOMY      BILATERAL    OOPHORECTOMY       PT Assessment (Last 12 Hours)       PT Evaluation and Treatment       Row Name 08/28/24 1357          Physical Therapy Time and Intention    Subjective Information complains of;weakness;fatigue;pain  -     Document Type therapy note (daily note)  -     Mode of Treatment physical therapy  -       Row Name 08/28/24 1359          General Information    Existing Precautions/Restrictions fall  WBAT BLE  -       Row Name 08/28/24 7746          Pain    Pain Intervention(s) Repositioned  -     Additional Documentation Pain Scale: Word Pre/Post-Treatment (Group)  -       Row Name 08/28/24 1356          Pain Scale: Word Pre/Post-Treatment    Pain: Word Scale,  Pretreatment 2 - mild pain  AT REST  -     Posttreatment Pain Rating 4 - moderate pain  with mobility  -     Pain Location - Side/Orientation Right  -     Pain Location lower  -     Pain Location - extremity  -       Row Name 08/28/24 1355          Bed Mobility    Bed Mobility supine-sit;sit-supine  -     Supine-Sit [place] (Bed Mobility) minimum assist (75% patient effort);verbal cues  -     Sit-Supine [place] (Bed Mobility) minimum assist (75% patient effort);moderate assist (50% patient effort);verbal cues  -       Row Name 08/28/24 1355          Transfers    Transfers sit-stand transfer;stand-sit transfer;toilet transfer  -       Row Name 08/28/24 1355          Sit-Stand Transfer    Sit-Stand [place] (Transfers) minimum assist (75% patient effort);moderate assist (50% patient effort);verbal cues  -       Row Name 08/28/24 1355          Stand-Sit Transfer    Stand-Sit [place] (Transfers) minimum assist (75% patient effort);verbal cues  -       Row Name 08/28/24 1355          Toilet Transfer    [place] Level (Toilet Transfer) minimum assist (75% patient effort);moderate assist (50% patient effort);verbal cues  -     Assistive Device (Toilet Transfer) commode, bedside without drop arms;walker, front-wheeled  -     Comment, (Toilet Transfer) steps bed-BSC-bed  -       Row Name             Wound 08/26/24 2326 Right anterior hip MASD (Moisture associated skin damage)    Wound - Properties Group Placement Date: 08/26/24  -TM Placement Time: 2326 -TM Present on Original Admission: Y  -TM Side: Right  -TM Orientation: anterior  -TM Location: hip  -TM Primary Wound Type: MASD  -TM    Retired Wound - Properties Group Placement Date: 08/26/24  - Placement Time: 2326 -TM Present on Original Admission: Y  -TM Side: Right  -TM Orientation: anterior  -TM Location: hip  -TM Primary Wound Type: MASD  -TM    Retired Wound - Properties Group Date first assessed: 08/26/24  -  Time first assessed: 2326 -TM Present on Original Admission: Y  -TM Side: Right  -TM Location: hip  -TM Primary Wound Type: MASD  -TM      Row Name             Wound 08/26/24 2330 Left proximal arm Skin Tear    Wound - Properties Group Placement Date: 08/26/24  -TM Placement Time: 2330  -TM Present on Original Admission: Y  -TM Side: Left  -TM Orientation: proximal  -TM Location: arm  -TM Primary Wound Type: Skin tear  -TM    Retired Wound - Properties Group Placement Date: 08/26/24  -TM Placement Time: 2330  -TM Present on Original Admission: Y  -TM Side: Left  -TM Orientation: proximal  -TM Location: arm  -TM Primary Wound Type: Skin tear  -TM    Retired Wound - Properties Group Date first assessed: 08/26/24  -TM Time first assessed: 2330 -TM Present on Original Admission: Y  -TM Side: Left  -TM Location: arm  -TM Primary Wound Type: Skin tear  -TM      Row Name 08/28/24 1355          Positioning and Restraints    Pre-Treatment Position in bed  -AH     Post Treatment Position bed  -AH     In Bed fowlers;call light within reach;encouraged to call for assist;exit alarm on;with family/caregiver;SCD pump applied  -AH               User Key  (r) = Recorded By, (t) = Taken By, (c) = Cosigned By      Initials Name Provider Type     Rashida Crockett PTA Physical Therapist Assistant     Natanael Knox, RN Registered Nurse                    Physical Therapy Education       Title: PT OT SLP Therapies (In Progress)       Topic: Physical Therapy (In Progress)       Point: Mobility training (In Progress)       Learning Progress Summary             Patient Acceptance, E, NR by MS at 8/28/2024 0986    Comment: role of PT in her care                         Point: Home exercise program (Not Started)       Learner Progress:  Not documented in this visit.              Point: Body mechanics (Not Started)       Learner Progress:  Not documented in this visit.              Point: Precautions (Not Started)       Learner Progress:   Not documented in this visit.                              User Key       Initials Effective Dates Name Provider Type Discipline    MS 07/11/23 -  Sue Garcia, PT, DPT, NCS Physical Therapist PT                  PT Recommendation and Plan             Time Calculation:    PT Charges       Row Name 08/28/24 1445 08/28/24 0806          Time Calculation    Start Time 1355  -AH 0806  -MS     Stop Time 1440  -AH 0850  -MS     Time Calculation (min) 45 min  -AH 44 min  -MS     PT Received On -- 08/28/24  -MS     PT Goal Re-Cert Due Date -- 09/07/24  -MS        Time Calculation- PT    Total Timed Code Minutes- PT 45 minute(s)  -AH --        Timed Charges    87255 - PT Therapeutic Activity Minutes 45  -AH --        Untimed Charges    PT Eval/Re-eval Minutes -- 44  -MS        Total Minutes    Timed Charges Total Minutes 45  -AH --     Untimed Charges Total Minutes -- 44  -MS      Total Minutes 45  -AH 44  -MS               User Key  (r) = Recorded By, (t) = Taken By, (c) = Cosigned By      Initials Name Provider Type     Rashida Crockett PTA Physical Therapist Assistant    MS Sue Garcia, PT, DPT, NCS Physical Therapist                  Therapy Charges for Today       Code Description Service Date Service Provider Modifiers Qty    00479802517 HC PT THERAPEUTIC ACT EA 15 MIN 8/28/2024 Rashida Crockett PTA GP 3            PT G-Codes  Outcome Measure Options: AM-PAC 6 Clicks Basic Mobility (PT)  AM-PAC 6 Clicks Score (PT): 10  AM-PAC 6 Clicks Score (OT): 17    Rashida Crockett PTA  8/28/2024      Electronically signed by Rashida Crockett PTA at 08/28/24 1446       Rashida Crockett PTA at 08/29/24 0848  Version 1 of 1         Goal Outcome Evaluation:  Plan of Care Reviewed With: patient        Progress: improving  Outcome Evaluation: pt trans to EOB min assist, BLE AROM, sit-stand min assist, took few steps bed-chair with rwx, pt with increased pain with RLE WB, trans to chair min assist, pt would benefit from  SNF                                 Electronically signed by Rashida Crockett, PTA at 24 0848       Rashida Crockett, PTA at 24 0849  Version 1 of 1         Acute Care - Physical Therapy Treatment Note  [facility]     Patient Name: Pattie Liu  : 1945  MRN: [id number]  Today's Date: 2024      Visit Dx:     ICD-10-CM ICD-9-CM   1. Gait instability  R26.81 781.2   2. Closed fracture of ramus of right pubis, initial encounter  S32.591A 808.2   3. Multiple falls  R29.6 V15.88   4. Urinary tract infection without hematuria, site unspecified  N39.0 599.0   5. Impaired mobility [Z74.09]  Z74.09 799.89     Patient Active Problem List   Diagnosis    Frequent PVCs    Shortness of breath    SOB (shortness of breath)    CAD in native artery    PVD (peripheral vascular disease)    Pneumonia due to infectious organism    Chronic back pain    Essential hypertension    Fall, initial encounter    Traumatic rhabdomyolysis    Leukocytosis    Anemia    Degenerative disc disease, lumbar    Dehydration    Acute UTI    Chronic respiratory failure with hypoxia    Impaired mobility    UTI (urinary tract infection)    Gait instability     Past Medical History:   Diagnosis Date    CAD in native artery 2019    COPD (chronic obstructive pulmonary disease)     Essential hypertension 2021    GERD (gastroesophageal reflux disease)     Lung nodule      Past Surgical History:   Procedure Laterality Date    BREAST IMPLANT REMOVAL      BREAST TISSUE EXPANDER REMOVAL INSERTION OF IMPLANT      CARDIAC CATHETERIZATION N/A 2019    Procedure: Left Heart Cath;  Surgeon: Edi Armijo MD;  Location: [facility] INVASIVE LOCATION;  Service: Cardiology    CHOLECYSTECTOMY      HYSTERECTOMY      MASTECTOMY      BILATERAL    OOPHORECTOMY       PT Assessment (Last 12 Hours)       PT Evaluation and Treatment       Row Name 24 0818 24 0756       Physical Therapy Time and Intention    Subjective  Information complains of;weakness;pain  - --  -    Document Type therapy note (daily note)  - --    Mode of Treatment physical therapy  - --    Session Not Performed -- other (see comments)  -    Comment, Session Not Performed -- sleeping, will check back  -[facility] Name 08/29/24 0818          General Information    Existing Precautions/Restrictions fall  WBAT BLE  -[facility] Name 08/29/24 0818          Pain    Pain Intervention(s) Medication (See MAR);Repositioned  -[facility] Name 08/29/24 0818          Pain Scale: Word Pre/Post-Treatment    Pain: Word Scale, Pretreatment 2 - mild pain  AT REST  -     Posttreatment Pain Rating 4 - moderate pain  with mobility  -     Pain Location - Side/Orientation Right  -     Pain Location lower  -     Pain Location - extremity  -[facility] Name 08/29/24 0818          Bed Mobility    Bed Mobility supine-sit;sit-supine  -     Supine-Sit [place] (Bed Mobility) minimum assist (75% patient effort);verbal cues  -     Sit-Supine [place] (Bed Mobility) --  chair  -[facility] Name 08/29/24 0818          Transfers    Transfers sit-stand transfer;stand-sit transfer;toilet transfer  -[facility] Name 08/29/24 0818          Bed-Chair Transfer    Bed-Chair [place] (Transfers) minimum assist (75% patient effort);verbal cues  -     Assistive Device (Bed-Chair Transfers) walker, front-wheeled  -     Comment, (Bed-Chair Transfer) steps bed-chair  -[facility] Name 08/29/24 0818          Sit-Stand Transfer    Sit-Stand [place] (Transfers) minimum assist (75% patient effort);moderate assist (50% patient effort);verbal cues  -[facility] Name 08/29/24 0818          Stand-Sit Transfer    Stand-Sit [place] (Transfers) minimum assist (75% patient effort);verbal cues  -       Row Name             Wound 08/26/24 2326 Right anterior hip MASD (Moisture associated skin damage)    Wound - Properties Group Placement Date: 08/26/24  -  Placement Time: 2326  -TM Present on Original Admission: Y  -TM Side: Right  -TM Orientation: anterior  -TM Location: hip  -TM Primary Wound Type: MASD  -TM    Retired Wound - Properties Group Placement Date: 08/26/24  -TM Placement Time: 2326  -TM Present on Original Admission: Y  -TM Side: Right  -TM Orientation: anterior  -TM Location: hip  -TM Primary Wound Type: MASD  -TM    Retired Wound - Properties Group Date first assessed: 08/26/24  -TM Time first assessed: 2326  -TM Present on Original Admission: Y  -TM Side: Right  -TM Location: hip  -TM Primary Wound Type: MASD  -TM      Row Name             Wound 08/26/24 2330 Left proximal arm Skin Tear    Wound - Properties Group Placement Date: 08/26/24  -TM Placement Time: 2330  -TM Present on Original Admission: Y  -TM Side: Left  -TM Orientation: proximal  -TM Location: arm  -TM Primary Wound Type: Skin tear  -TM    Retired Wound - Properties Group Placement Date: 08/26/24  -TM Placement Time: 2330  -TM Present on Original Admission: Y  -TM Side: Left  -TM Orientation: proximal  -TM Location: arm  -TM Primary Wound Type: Skin tear  -TM    Retired Wound - Properties Group Date first assessed: 08/26/24  -TM Time first assessed: 2330  -TM Present on Original Admission: Y  -TM Side: Left  -TM Location: arm  -TM Primary Wound Type: Skin tear  -TM      Row Name 08/29/24 0818          Plan of Care Review    Plan of Care Reviewed With patient  -     Progress improving  -     Outcome Evaluation pt trans to EOB min assist, BLE AROM, sit-stand min assist, took few steps bed-chair with rwx, pt with increased pain with RLE WB, trans to chair min assist, pt would benefit from SNF  -       Row Name 08/29/24 0818          Positioning and Restraints    Pre-Treatment Position in bed  -     Post Treatment Position chair  -     In Chair notified nsg;sitting;call light within reach;encouraged to call for assist;exit alarm on  -               User Key  (r) = Recorded By,  (t) = Taken By, (c) = Cosigned By      Initials Name Provider Type     Rashida Crockett, PTA Physical Therapist Assistant    Natanael Mckeon, RN Registered Nurse                    Physical Therapy Education       Title: PT OT SLP Therapies (In Progress)       Topic: Physical Therapy (In Progress)       Point: Mobility training (In Progress)       Learning Progress Summary             Patient Acceptance, E, NR by MS at 8/28/2024 0913    Comment: role of PT in her care                         Point: Home exercise program (Not Started)       Learner Progress:  Not documented in this visit.              Point: Body mechanics (Not Started)       Learner Progress:  Not documented in this visit.              Point: Precautions (Not Started)       Learner Progress:  Not documented in this visit.                              User Key       Initials Effective Dates Name Provider Type Discipline    MS 07/11/23 -  Sue Garcia, PT, DPT, NCS Physical Therapist PT                  PT Recommendation and Plan     Plan of Care Reviewed With: patient  Progress: improving  Outcome Evaluation: pt trans to EOB min assist, BLE AROM, sit-stand min assist, took few steps bed-chair with rwx, pt with increased pain with RLE WB, trans to chair min assist, pt would benefit from SNF   Outcome Measures       Row Name 08/29/24 0848             How much help from another person do you currently need...    Turning from your back to your side while in flat bed without using bedrails? 3  -AH      Moving from lying on back to sitting on the side of a flat bed without bedrails? 3  -AH      Moving to and from a bed to a chair (including a wheelchair)? 3  -AH      Standing up from a chair using your arms (e.g., wheelchair, bedside chair)? 3  -AH      Climbing 3-5 steps with a railing? 2  -AH      To walk in hospital room? 2  -AH      AM-PAC 6 Clicks Score (PT) 16  -AH      Highest Level of Mobility Goal 5 --> Static standing  -AH          Functional Assessment    Outcome Measure Options AM-PAC 6 Clicks Basic Mobility (PT)  -AH                User Key  (r) = Recorded By, (t) = Taken By, (c) = Cosigned By      Initials Name Provider Type    Rashida Shea PTA Physical Therapist Assistant                     Time Calculation:    PT Charges       Row Name 24 0849             Time Calculation    Start Time 0818  -AH      Stop Time 0849  -AH      Time Calculation (min) 31 min  -AH      PT Received On 24  -         Time Calculation- PT    Total Timed Code Minutes- PT 31 minute(s)  -AH         Timed Charges    73837 - PT Therapeutic Activity Minutes 31  -AH         Total Minutes    Timed Charges Total Minutes 31  -AH       Total Minutes 31  -AH                User Key  (r) = Recorded By, (t) = Taken By, (c) = Cosigned By      Initials Name Provider Type    Rashida Shea PTA Physical Therapist Assistant                  Therapy Charges for Today       Code Description Service Date Service Provider Modifiers Qty    91518372709 HC PT THERAPEUTIC ACT EA 15 MIN 2024 Rashida Crockett PTA GP 3    81027593392 HC PT THERAPEUTIC ACT EA 15 MIN 2024 Rashida Crockett PTA GP 2            PT G-Codes  Outcome Measure Options: AM-PAC 6 Clicks Basic Mobility (PT)  AM-PAC 6 Clicks Score (PT): 16  AM-PAC 6 Clicks Score (OT): 17    Rashida Crockett PTA  2024      Electronically signed by Rashida Crockett PTA at 24 0850       Rashida Crockett PTA at 24 1420  Version 1 of 1         [facility] - Physical Therapy Treatment Note  [facility]     Patient Name: Pattie Liu  : 1945  MRN: [id number]  Today's Date: 2024      Visit Dx:     ICD-10-CM ICD-9-CM   1. Gait instability  R26.81 781.2   2. Closed fracture of ramus of right pubis, initial encounter  S32.591A 808.2   3. Multiple falls  R29.6 V15.88   4. Urinary tract infection without hematuria, site unspecified  N39.0 599.0   5. Impaired mobility [Z74.09]  Z74.09  799.89     Patient Active Problem List   Diagnosis    Frequent PVCs    Shortness of breath    SOB (shortness of breath)    CAD in native artery    PVD (peripheral vascular disease)    Pneumonia due to infectious organism    Chronic back pain    Essential hypertension    Fall, initial encounter    Traumatic rhabdomyolysis    Leukocytosis    Anemia    Degenerative disc disease, lumbar    Dehydration    Acute UTI    Chronic respiratory failure with hypoxia    Impaired mobility    UTI (urinary tract infection)    Gait instability     Past Medical History:   Diagnosis Date    CAD in native artery 7/29/2019    COPD (chronic obstructive pulmonary disease)     Essential hypertension 12/7/2021    GERD (gastroesophageal reflux disease)     Lung nodule      Past Surgical History:   Procedure Laterality Date    BREAST IMPLANT REMOVAL      BREAST TISSUE EXPANDER REMOVAL INSERTION OF IMPLANT      CARDIAC CATHETERIZATION N/A 6/21/2019    Procedure: Left Heart Cath;  Surgeon: Edi Armijo MD;  Location:  [facility] CATH INVASIVE LOCATION;  Service: Cardiology    CHOLECYSTECTOMY      HYSTERECTOMY      MASTECTOMY      BILATERAL    OOPHORECTOMY       PT Assessment (Last 12 Hours)       PT Evaluation and Treatment       Row Name 08/29/24 1340 08/29/24 0818       Physical Therapy Time and Intention    Subjective Information complains of;weakness;fatigue;pain  - complains of;weakness;pain  -AH    Document Type therapy note (daily note)  - therapy note (daily note)  -    Mode of Treatment physical therapy  - physical therapy  -      Row Name 08/29/24 0756          Physical Therapy Time and Intention    Subjective Information --  -     Session Not Performed other (see comments)  -     Comment, Session Not Performed sleeping, will check back  -       Row Name 08/29/24 1340 08/29/24 0818       General Information    Existing Precautions/Restrictions fall  WBAT BLE  - fall  WBAT BLE  -      Row Name 08/29/24 1340  08/29/24 0818       Pain    Pain Intervention(s) Repositioned  - Medication (See MAR);Repositioned  -      Row Name 08/29/24 1340 08/29/24 0818       Pain Scale: Word Pre/Post-Treatment    Pain: Word Scale, Pretreatment 2 - mild pain  AT REST  - 2 - mild pain  AT REST  -    Posttreatment Pain Rating 6 - moderate-severe pain  with mobility  - 4 - moderate pain  with mobility  -    Pain Location - Side/Orientation Right  - Right  -    Pain Location -- lower  -    Pain Location - groin  - extremity  -      Row Name 08/29/24 1340 08/29/24 0818       Bed Mobility    Bed Mobility supine-sit;sit-supine  - supine-sit;sit-supine  -    Supine-Sit [place] (Bed Mobility) --  CHAIR  - minimum assist (75% patient effort);verbal cues  -    Sit-Supine [place] (Bed Mobility) minimum assist (75% patient effort);moderate assist (50% patient effort);verbal cues  - --  chair  -      Row Name 08/29/24 1340 08/29/24 0818       Transfers    Transfers sit-stand transfer;stand-sit transfer;toilet transfer  - sit-stand transfer;stand-sit transfer;toilet transfer  -    Comment, (Transfers) chair-BSC-bed  - --      Row Name 08/29/24 0818          Bed-Chair Transfer    Bed-Chair [place] (Transfers) minimum assist (75% patient effort);verbal cues  -     Assistive Device (Bed-Chair Transfers) walker, front-wheeled  -     Comment, (Bed-Chair Transfer) steps bed-chair  -       Row Name 08/29/24 1340 08/29/24 0818       Sit-Stand Transfer    Sit-Stand [place] (Transfers) minimum assist (75% patient effort);moderate assist (50% patient effort);verbal cues  - minimum assist (75% patient effort);moderate assist (50% patient effort);verbal cues  -      Row Name 08/29/24 1340 08/29/24 0818       Stand-Sit Transfer    Stand-Sit [place] (Transfers) minimum assist (75% patient effort);verbal cues  - minimum assist (75% patient effort);verbal cues  -      Row Name 08/29/24 1340           Toilet Transfer    [place] Level (Toilet Transfer) minimum assist (75% patient effort);moderate assist (50% patient effort);verbal cues  -     Assistive Device (Toilet Transfer) commode, bedside without drop arms;walker, front-wheeled  -       Row Name             Wound 08/26/24 2326 Right anterior hip MASD (Moisture associated skin damage)    Wound - Properties Group Placement Date: 08/26/24  -TM Placement Time: 2326  -TM Present on Original Admission: Y  -TM Side: Right  -TM Orientation: anterior  -TM Location: hip  -TM Primary Wound Type: MASD  -TM    Retired Wound - Properties Group Placement Date: 08/26/24  -TM Placement Time: 2326  -TM Present on Original Admission: Y  -TM Side: Right  -TM Orientation: anterior  -TM Location: hip  -TM Primary Wound Type: MASD  -TM    Retired Wound - Properties Group Date first assessed: 08/26/24  -TM Time first assessed: 2326  -TM Present on Original Admission: Y  -TM Side: Right  -TM Location: hip  -TM Primary Wound Type: MASD  -TM      Row Name             Wound 08/26/24 2330 Left proximal arm Skin Tear    Wound - Properties Group Placement Date: 08/26/24  -TM Placement Time: 2330  -TM Present on Original Admission: Y  -TM Side: Left  -TM Orientation: proximal  -TM Location: arm  -TM Primary Wound Type: Skin tear  -TM    Retired Wound - Properties Group Placement Date: 08/26/24  -TM Placement Time: 2330  -TM Present on Original Admission: Y  -TM Side: Left  -TM Orientation: proximal  -TM Location: arm  -TM Primary Wound Type: Skin tear  -TM    Retired Wound - Properties Group Date first assessed: 08/26/24  -TM Time first assessed: 2330  -TM Present on Original Admission: Y  -TM Side: Left  -TM Location: arm  -TM Primary Wound Type: Skin tear  -TM      Row Name 08/29/24 0818          Plan of Care Review    Plan of Care Reviewed With patient  -     Progress improving  [facility]     Outcome Evaluation pt trans to EOB min assist, BLE AROM, sit-stand min assist, took  few steps bed-chair with rwx, pt with increased pain with RLE WB, trans to chair min assist, pt would benefit from [facility]  -       Row Name 08/29/24 1340 08/29/24 0818       Positioning and Restraints    Pre-Treatment Position sitting in chair/recliner  -AH in bed  -AH    Post Treatment Position bed  -AH chair  -AH    In Bed fowlers;call light within reach;encouraged to call for assist;exit alarm on;SCD pump applied  -AH --    In Chair -- notified nsg;sitting;call light within reach;encouraged to call for assist;exit alarm on  -AH              User Key  (r) = Recorded By, (t) = Taken By, (c) = Cosigned By      Initials Name Provider Type     Rashida Crockett, PTA Physical Therapist Assistant    Natanael Mckeon, RN Registered Nurse                    Physical Therapy Education       Title: PT OT SLP Therapies (In Progress)       Topic: Physical Therapy (In Progress)       Point: Mobility training (In Progress)       Learning Progress Summary             Patient Acceptance, E, NR by MS at 8/28/2024 0909    Comment: role of PT in her care                         Point: Home exercise program (Not Started)       Learner Progress:  Not documented in this visit.              Point: Body mechanics (Not Started)       Learner Progress:  Not documented in this visit.              Point: Precautions (Not Started)       Learner Progress:  Not documented in this visit.                              User Key       Initials Effective Dates Name Provider Type Discipline    MS 07/11/23 -  Sue Garcia, PT, DPT, NCS Physical Therapist PT                  PT Recommendation and Plan     Plan of Care Reviewed With: patient  Progress: improving  Outcome Evaluation: pt trans to EOB min assist, BLE AROM, sit-stand min assist, took few steps bed-chair with rwx, pt with increased pain with RLE WB, trans to chair min assist, pt would benefit from SNF   Outcome Measures       Row Name 08/29/24 0848             How much help from another  person do you currently need...    Turning from your back to your side while in flat bed without using bedrails? 3  -AH      Moving from lying on back to sitting on the side of a flat bed without bedrails? 3  -AH      Moving to and from a bed to a chair (including a wheelchair)? 3  -AH      Standing up from a chair using your arms (e.g., wheelchair, bedside chair)? 3  -AH      Climbing 3-5 steps with a railing? 2  -AH      To walk in hospital room? 2  -AH      AM-PAC 6 Clicks Score (PT) 16  -      Highest Level of Mobility Goal 5 --> Static standing  -         Functional Assessment    Outcome Measure Options AM-PAC 6 Clicks Basic Mobility (PT)  -AH                User Key  (r) = Recorded By, (t) = Taken By, (c) = Cosigned By      Initials Name Provider Type    Rashida Shea PTA Physical Therapist Assistant                     Time Calculation:    PT Charges       Row Name 08/29/24 1419 08/29/24 0849          Time Calculation    Start Time 1340  - 0818  -     Stop Time 1419  - 0849  -     Time Calculation (min) 39 min  -AH 31 min  -     PT Received On -- 08/29/24  -        Time Calculation- PT    Total Timed Code Minutes- PT 39 minute(s)  -AH 31 minute(s)  -AH        Timed Charges    20807 - PT Therapeutic Activity Minutes 39  -AH 31  -AH        Total Minutes    Timed Charges Total Minutes 39  -AH 31  -AH      Total Minutes 39  -AH 31  -AH               User Key  (r) = Recorded By, (t) = Taken By, (c) = Cosigned By      Initials Name Provider Type    Rashida Shea PTA Physical Therapist Assistant                  Therapy Charges for Today       Code Description Service Date Service Provider Modifiers Qty    33502911097 HC PT THERAPEUTIC ACT EA 15 MIN 8/28/2024 Rashida Crockett PTA GP 3    47968132813 HC PT THERAPEUTIC ACT EA 15 MIN 8/29/2024 Rashida Crockett, TRISHA GP 2    38308196989 HC PT THERAPEUTIC ACT EA 15 MIN 8/29/2024 Rashida Crockett, TRISHA GP 3            PT G-Codes  Outcome Measure  Options: AM-PAC 6 Clicks Basic Mobility (PT)  AM-PAC 6 Clicks Score (PT): 16  AM-PAC 6 Clicks Score (OT): 17    Rashida Crockett PTA  2024      Electronically signed by Rashida Crockett PTA at 24 1420          Occupational Therapy Notes (last 48 hours)        Keya Lane COTA at 24 1015          Patient Name: Pattie Liu  : 1945    MRN: [id number]                              Today's Date: 2024       Admit Date: 2024    Visit Dx: Therapist utilized gait belt, applied non-slipped socks, provided fall risk education/prevention, & facilitated muscle strengthening PRN to reduce patient falls risk during this session.      ICD-10-CM ICD-9-CM   1. Gait instability  R26.81 781.2   2. Closed fracture of ramus of right pubis, initial encounter  S32.591A 808.2   3. Multiple falls  R29.6 V15.88   4. Urinary tract infection without hematuria, site unspecified  N39.0 599.0   5. Impaired mobility [Z74.09]  Z74.09 799.89     Patient Active Problem List   Diagnosis    Frequent PVCs    Shortness of breath    SOB (shortness of breath)    CAD in native artery    PVD (peripheral vascular disease)    Pneumonia due to infectious organism    Chronic back pain    Essential hypertension    Fall, initial encounter    Traumatic rhabdomyolysis    Leukocytosis    Anemia    Degenerative disc disease, lumbar    Dehydration    Acute UTI    Chronic respiratory failure with hypoxia    Impaired mobility    UTI (urinary tract infection)    Gait instability     Past Medical History:   Diagnosis Date    CAD in native artery 2019    COPD (chronic obstructive pulmonary disease)     Essential hypertension 2021    GERD (gastroesophageal reflux disease)     Lung nodule      Past Surgical History:   Procedure Laterality Date    BREAST IMPLANT REMOVAL      BREAST TISSUE EXPANDER REMOVAL INSERTION OF IMPLANT      CARDIAC CATHETERIZATION N/A 2019    Procedure: Left Heart Cath;  Surgeon: Edi Armijo  MD Danny;  Location:  [facility];  Service: Cardiology    CHOLECYSTECTOMY      HYSTERECTOMY      MASTECTOMY      BILATERAL    OOPHORECTOMY        General Information       Row Name 08/29/24 1015          OT Time and Intention    Document Type therapy note (daily note)  -MT     Mode of Treatment occupational therapy  -MT       Row Name 08/29/24 1015          General Information    Patient Profile Reviewed yes  -MT     Existing Precautions/Restrictions fall  WBAT  -MT       Row Name 08/29/24 1015          Safety Issues, Functional Mobility    Impairments Affecting Function (Mobility) balance;endurance/activity tolerance;strength;pain  -MT     Comment, Safety Issues/Impairments (Mobility) pt [place] and hearing aides not present  -MT               User Key  (r) = Recorded By, (t) = Taken By, (c) = Cosigned By      Initials Name Provider Type    MT Keya Lane COTA Occupational Therapist Assistant                     Mobility/ADL's       Row Name 08/29/24 1015          Bed Mobility    Comment, (Bed Mobility) in chair and remained in chair post OT tx  -MT       Row Name 08/29/24 1015          Transfers    Transfers sit-stand transfer;stand-sit transfer  -MT       Row Name 08/29/24 1015          Bed-Chair Transfer    Comment, (Bed-Chair Transfer) increased time for sit<>stand d/t increased pain reports RLE with WB.  -MT       Row Name 08/29/24 1015          Sit-Stand Transfer    Sit-Stand [place] (Transfers) minimum assist (75% patient effort);moderate assist (50% patient effort);verbal cues  -MT       Row Name 08/29/24 1015          Stand-Sit Transfer    Stand-Sit [place] (Transfers) minimum assist (75% patient effort);verbal cues  -MT       Row Name 08/29/24 1015          Activities of Daily Living    BADL Assessment/Intervention bathing;upper body dressing;lower body dressing;grooming  -MT       Row Name 08/29/24 1015          Upper Body Dressing Assessment/Training    Comment, (Upper Body  Dressing) pt SBA for gown don/doff including overhead  -MT       Row Name 08/29/24 1015          Lower Body Dressing Assessment/Training    [place] Level (Lower Body Dressing) lower body dressing skills;dependent (less than 25% patient effort)  -MT     Comment, (Lower Body Dressing) socks and brief management  -[facility] Name 08/29/24 1015          Bathing Assessment/Intervention    Comment, (Bathing) Pt initially requested shower task but with increased pain in sitting in chair with waffle cushion. Decided on sponge bathing d/t hard shower chair. Pt performed UB bathing s/u, LB MaxA d/t her pain reports with bending and any pelvis movement/weight shifting  -MT       Row Name 08/29/24 1015          Grooming Assessment/Training    Oral Care teeth brushed - regular toothbrush  -MT     Position (Grooming) supported sitting  -MT               User Key  (r) = Recorded By, (t) = Taken By, (c) = Cosigned By      Initials Name Provider Type    MT Keya Lane COTA Occupational Therapist Assistant                   Obj/Interventions    No documentation.                  Goals/Plan    No documentation.                  Clinical Impression       [facility] Name 08/29/24 1015          Pain Scale: FACES Pre/Post-Treatment    Pain: FACES Scale, Pretreatment 2-->hurts little bit  -MT     Posttreatment Pain Rating 6-->hurts even more  -MT     Pre/Posttreatment Pain Comment pelvis with weight shifting, WB or bending  -[facility] Name 08/29/24 1015          Therapy Plan Review/Discharge Plan (OT)    Anticipated Discharge Disposition (OT) skilled nursing facility  -[facility] Name 08/29/24 1015          Vital Signs    O2 Delivery Post Treatment supplemental O2  -MT       Row Name 08/29/24 1015          Positioning and Restraints    Pre-Treatment Position sitting in chair/recliner  -MT     Post Treatment Position chair  -MT     In Chair with nsg;sitting;call light within reach;encouraged to call for assist;exit alarm on;with  family/caregiver  PCT placing purewick  -MT               User Key  (r) = Recorded By, (t) = Taken By, (c) = Cosigned By      Initials Name Provider Type    MT Keya Lane COTA Occupational Therapist Assistant                   Outcome Measures       Row Name 08/29/24 1015          How much help from another is currently needed...    Putting on and taking off regular lower body clothing? 2  -MT     Bathing (including washing, rinsing, and drying) 2  -MT     Toileting (which includes using toilet bed pan or urinal) 2  -MT     Putting on and taking off regular upper body clothing 3  -MT     Taking care of personal grooming (such as brushing teeth) 4  -MT     Eating meals 4  -MT     AM-PAC 6 Clicks Score (OT) 17  -MT       Row Name 08/29/24 0848 08/29/24 0812       How much help from another person do you currently need...    Turning from your back to your side while in flat bed without using bedrails? 3  -AH 2  -TB    Moving from lying on back to sitting on the side of a flat bed without bedrails? 3  -AH 2  -TB    Moving to and from a bed to a chair (including a wheelchair)? 3  -AH 2  -TB    Standing up from a chair using your arms (e.g., wheelchair, bedside chair)? 3  -AH 2  -TB    Climbing 3-5 steps with a railing? 2  -AH 1  -TB    To walk in hospital room? 2  -AH 1  -TB    AM-PAC 6 Clicks Score (PT) 16  -AH 10  -TB    Highest Level of Mobility Goal 5 --> Static standing  -AH 4 --> Transfer to chair/commode  -TB      Row Name 08/29/24 0848          Functional Assessment    Outcome Measure Options AM-PAC 6 Clicks Basic Mobility (PT)  -AH               User Key  (r) = Recorded By, (t) = Taken By, (c) = Cosigned By      Initials Name Provider Type     Rashida Crockett PTA Physical Therapist Assistant    Soco Alexis RN Registered Nurse    Keya Edwadrs COTA Occupational Therapist Assistant                    Occupational Therapy Education       Title: PT OT SLP Therapies (In Progress)       Topic:  Occupational Therapy (In Progress)       Point: ADL training (Done)       Description:   Instruct learner(s) on proper safety adaptation and remediation techniques during self care or transfers.   Instruct in proper use of assistive devices.                  Learning Progress Summary             Patient Acceptance, E, VU,NR by  at 8/28/2024 0953                         Point: Home exercise program (Not Started)       Description:   Instruct learner(s) on appropriate technique for monitoring, assisting and/or progressing therapeutic exercises/activities.                  Learner Progress:  Not documented in this visit.              Point: Precautions (Done)       Description:   Instruct learner(s) on prescribed precautions during self-care and functional transfers.                  Learning Progress Summary             Patient Acceptance, E, VU,NR by  at 8/28/2024 0953                         Point: Body mechanics (Done)       Description:   Instruct learner(s) on proper positioning and spine alignment during self-care, functional mobility activities and/or exercises.                  Learning Progress Summary             Patient Acceptance, E, VU,NR by  at 8/28/2024 0953                                         User Key       Initials Effective Dates Name Provider Type Discipline    ASHWIN 06/20/22 -  Kala Silva, OTR/L Occupational Therapist OT                  OT Recommendation and Plan     Plan of Care Review  Plan of Care Reviewed With: patient  Progress: improving  Outcome Evaluation: Pt initially requested shower task but with increased pain in sitting in chair with waffle cushion. Decided on sponge bathing d/t hard shower chair. Pt performed UB bathing s/u, LB MaxA d/t her pain reports with bending and any pelvis movement/weight shifting. Pt depA for socks. Pt sit<>stand wth min-modA and able to take a couple steps back for proper sit in chair. Did require MaxA to scoot back into chair for comfort. Pt  completed oral care and hair care with shower cap s/u. Grand daughter present end of tx and supportive to pt, pt may benefit from SNF at d/c.     Time Calculation:         Time Calculation- OT       Row Name 08/29/24 1015             Time Calculation- OT    OT Start Time 1015  -MT      OT Stop Time 1115  -MT      OT Time Calculation (min) 60 min  -MT      Total Timed Code Minutes- OT 60 minute(s)  -MT      OT Received On 08/29/24  -MT         Timed Charges    99849 - OT Self Care/Mgmt Minutes 60  -MT         Total Minutes    Timed Charges Total Minutes 60  -MT       Total Minutes 60  -MT                User Key  (r) = Recorded By, (t) = Taken By, (c) = Cosigned By      Initials Name Provider Type    MT Keya Lane COTA Occupational Therapist Assistant                  Therapy Charges for Today       Code Description Service Date Service Provider Modifiers Qty    09956639662 HC OT SELF CARE/MGMT/TRAIN EA 15 MIN 8/29/2024 Keya Lane COTA GO 4                 DWAYNE Inman  8/29/2024    Electronically signed by Keya Lane COTA at 08/29/24 1335       Keya Lane COTA at 08/29/24 1015          Goal Outcome Evaluation:  Plan of Care Reviewed With: patient        Progress: improving  Outcome Evaluation: Pt initially requested shower task but with increased pain in sitting in chair with waffle cushion. Decided on sponge bathing d/t hard shower chair. Pt performed UB bathing s/u, LB MaxA d/t her pain reports with bending and any pelvis movement/weight shifting. Pt depA for socks. Pt sit<>stand wth min-modA and able to take a couple steps back for proper sit in chair. Did require MaxA to scoot back into chair for comfort. Pt completed oral care and hair care with shower cap s/u. Grand daughter present end of tx and supportive to pt, pt may benefit from SNF at d/c.      Anticipated Discharge Disposition (OT): skilled nursing facility                          Electronically signed by Domingo  Keya RICE at 24 1335       Kala Silva, OTR/L at 24 1001          Patient Name: Pattie Liu  : 1945    MRN: [id number]                              Today's Date: 2024       Admit Date: 2024    Visit Dx:     ICD-10-CM ICD-9-CM   1. Gait instability  R26.81 781.2   2. Closed fracture of ramus of right pubis, initial encounter  S32.591A 808.2   3. Multiple falls  R29.6 V15.88   4. Urinary tract infection without hematuria, site unspecified  N39.0 599.0     Patient Active Problem List   Diagnosis    Frequent PVCs    Shortness of breath    SOB (shortness of breath)    CAD in native artery    PVD (peripheral vascular disease)    Pneumonia due to infectious organism    Chronic back pain    Essential hypertension    Fall, initial encounter    Traumatic rhabdomyolysis    Leukocytosis    Anemia    Degenerative disc disease, lumbar    Dehydration    Acute UTI    Chronic respiratory failure with hypoxia    Impaired mobility    UTI (urinary tract infection)    Gait instability     Past Medical History:   Diagnosis Date    CAD in native artery 2019    COPD (chronic obstructive pulmonary disease)     Essential hypertension 2021    GERD (gastroesophageal reflux disease)     Lung nodule      Past Surgical History:   Procedure Laterality Date    BREAST IMPLANT REMOVAL      BREAST TISSUE EXPANDER REMOVAL INSERTION OF IMPLANT      CARDIAC CATHETERIZATION N/A 2019    Procedure: Left Heart Cath;  Surgeon: Edi Armijo MD;  Location:  [facility] C[facility];  Service: Cardiology    CHOLECYSTECTOMY      HYSTERECTOMY      MASTECTOMY      BILATERAL    OOPHORECTOMY        General Information       Row Name 24 0757          OT Time and Intention    Document Type evaluation  cc: right thigh pain after fall at home. Dx: minimal displaced acute fracture of right inferior pubic ramus  -LS     Mode of Treatment occupational therapy  -LS       Row Name 24 0757           General Information    Patient Profile Reviewed yes  -LS     Prior Level of Function independent:;ADL's;all household mobility;dependent:;home management;cooking;cleaning;driving;shopping  Utilized rwx some of the time  -LS     Existing Precautions/Restrictions fall;other (see comments)  WBAT RLE  -LS     Barriers to Rehab previous functional deficit  -LS       Row Name 08/28/24 0757          Occupational Profile    Environmental Supports and Barriers (Occupational Profile) Tub shower. Was sponge bathing only. Uses step stool to get in bed. Other AD/DME:rwx  -       Row Name 08/28/24 0757          Living Environment    People in Home alone  -       Row Name 08/28/24 [facility]7          Home Main Entrance    Number of Stairs, Main Entrance five  -LS     Stair Railings, Main Entrance railings on both sides of stairs  -       Row Name 08/28/24 0757          Stairs Within Home, Primary    Number of Stairs, Within Home, Primary other (see comments)  flight of stairs but Pt has access to all needs on main level  -LS       Row Name 08/28/24 0757          Cognition    Orientation Status (Cognition) oriented x 4;verbal cues/prompts needed for orientation  -       Row Name 08/28/24 [facility]7          Safety Issues, Functional Mobility    Safety Issues Affecting Function (Mobility) awareness of need for assistance;friction/shear risk;insight into deficits/self-awareness;positioning of assistive device;safety precaution awareness;safety precautions follow-through/compliance;sequencing abilities  -     Impairments Affecting Function (Mobility) balance;endurance/activity tolerance;strength;pain  -               User Key  (r) = Recorded By, (t) = Taken By, (c) = Cosigned By      Initials Name Provider Type    LS Kala Silva OTR/L Occupational Therapist                     Mobility/ADL's       Row Name 08/28/24 0757          Bed Mobility    Bed Mobility supine-sit  -LS     Supine-Sit [place] (Bed Mobility) moderate  assist (50% patient effort);2 person assist;verbal cues;nonverbal cues (demo/gesture)  -     Bed Mobility, Safety Issues decreased use of legs for bridging/pushing  -     Assistive Device (Bed Mobility) head of bed elevated;draw sheet;bed rails  -       Row Name 08/28/24 0757          Bed-Chair Transfer    Bed-Chair [place] (Transfers) minimum assist (75% patient effort);verbal cues;nonverbal cues (demo/gesture)  -LS     Assistive Device (Bed-Chair Transfers) walker, front-wheeled  -LS       Row Name 08/28/24 0757          Sit-Stand Transfer    Sit-Stand [place] (Transfers) minimum assist (75% patient effort);verbal cues;nonverbal cues (demo/gesture)  -     Assistive Device (Sit-Stand Transfers) walker, front-wheeled  -LS       Row Name 08/28/24 0757          Activities of Daily Living    BADL Assessment/Intervention upper body dressing;lower body dressing;toileting  -       Row Name 08/28/24 [facility]7          Upper Body Dressing Assessment/Training    [place] Level (Upper Body Dressing) upper body dressing skills;standby assist  -LS     Position (Upper Body Dressing) edge of bed sitting  -       Row Name 08/28/24 0757          Lower Body Dressing Assessment/Training    [place] Level (Lower Body Dressing) lower body dressing skills;maximum assist (25% patient effort)  -LS     Position (Lower Body Dressing) unsupported sitting;supported standing  -       Row Name 08/28/24 0757          Toileting Assessment/Training    [place] Level (Toileting) toileting skills;maximum assist (25% patient effort)  -LS     Position (Toileting) unsupported sitting;supported standing  -               User Key  (r) = Recorded By, (t) = Taken By, (c) = Cosigned By      Initials Name Provider Type     Kala Silva, OTR/L Occupational Therapist                   Obj/Interventions       Row Name 08/28/24 0757          Sensory Assessment (Somatosensory)    Sensory Assessment (Somatosensory) UE sensation  intact  -[facility] Name 08/28/24 0757          Vision Assessment/Intervention    Visual Impairment/Limitations WFL;corrective lenses for reading  -       Row Name 08/28/24 0757          Range of Motion Comprehensive    General Range of Motion bilateral upper extremity ROM WFL  -[facility] Name 08/28/24 0757          Strength Comprehensive (MMT)    General Manual Muscle Testing (MMT) Assessment upper extremity strength deficits identified  -LS     Comment, General Manual Muscle Testing (MMT) Assessment B shoulders 4-/5; BUE strength grossly 4+/5 at all other joints  -       Row Name 08/28/24 0757          Motor Skills    Motor Skills coordination  -     Coordination bilateral;finger to nose;WNL  -[facility] Name 08/28/24 0757          Balance    Balance Assessment sitting static balance;sitting dynamic balance;standing static balance;standing dynamic balance  -     Static Sitting Balance standby assist  -     Dynamic Sitting Balance contact guard  -LS     Position, Sitting Balance supported;unsupported;sitting edge of bed  -     Static Standing Balance contact guard  -LS     Dynamic Standing Balance contact guard;minimal assist;verbal cues;non-verbal cues (demo/gesture)  -LS     Position/Device Used, Standing Balance supported;walker, front-wheeled  -               User Key  (r) = Recorded By, (t) = Taken By, (c) = Cosigned By      Initials Name Provider Type    LS Kala Silva, OTR/L Occupational Therapist                   Goals/Plan       Row Name 08/28/24 0757          Transfer Goal 1 (OT)    Activity/Assistive Device (Transfer Goal 1, OT) commode, bedside without drop arms  -LS     [place] Level/Cues Needed (Transfer Goal 1, OT) standby assist  -LS     Time Frame (Transfer Goal 1, OT) long term goal (LTG);10 days  -     Progress/Outcome (Transfer Goal 1, OT) new goal  -[facility] Name 08/28/24 0757          Dressing Goal 1 (OT)    Activity/Device (Dressing Goal 1, OT) dressing  skills, all  -LS     [place]/Cues Needed (Dressing Goal 1, OT) minimum assist (75% or more patient effort)  -LS     Time Frame (Dressing Goal 1, OT) long term goal (LTG);10 days  -LS     Progress/Outcome (Dressing Goal 1, OT) new goal  -LS       Row Name 08/28/24 0757          Toileting Goal 1 (OT)    Activity/Device (Toileting Goal 1, OT) toileting skills, all  -LS     [place] Level/Cues Needed (Toileting Goal 1, OT) minimum assist (75% or more patient effort)  -LS     Time Frame (Toileting Goal 1, OT) long term goal (LTG);10 days  -LS     Progress/Outcome (Toileting Goal 1, OT) new goal  -LS       Row Name 08/28/24 [facility]7          Problem Specific Goal 1 (OT)    Problem Specific Goal 1 (OT) Pt will independently implement one pain management technique to decrease pain and improve functional performance.  -LS     Time Frame (Problem Specific Goal 1, OT) long term goal (LTG);by discharge  -LS     Progress/Outcome (Problem Specific Goal 1, OT) new goal  -LS       Row Name 08/28/24 0757          Therapy Assessment/Plan (OT)    Planned Therapy Interventions (OT) transfer/mobility retraining;strengthening exercise;patient/caregiver education/training;occupation/activity based interventions;functional balance retraining;activity tolerance training;BADL retraining;adaptive equipment training;ROM/therapeutic exercise  -               User Key  (r) = Recorded By, (t) = Taken By, (c) = Cosigned By      Initials Name Provider Type     Kala Silva OTR/L Occupational Therapist                   Clinical Impression       Row Name 08/28/24 0757          Pain Assessment    Pretreatment Pain Rating --  -LS       Row Name 08/28/24 [facility]          Pain Scale: FACES Pre/Post-Treatment    Pain: FACES Scale, Pretreatment 0-->no hurt  -     Posttreatment Pain Rating 4-->hurts little more  -LS       Row Name 08/28/24 [facility]          Plan of Care Review    Plan of Care Reviewed With patient;family  -     Progress no change   -     Outcome Evaluation OT eval completed. Pt in fowlers upon therapist arrival; A&Ox4 with verbal cues; c/o RLE pain with movement; 2 family members also present. Per Pt's son, the Pt was living home alone and performing BADLs at Mod I-I level, however, Pt was not performing BADLs well and was unable to take care of herself well. Today, Pt performed supine>sit utilizing bedrail with HOB elevated with Mod A x2 and verbal/visual/tactile cues for sequencing and body mechanics. Pt required Max A to lucretia B socks. Pt performed sit<>stand and took a few steps from bed>chair utilizing rwx with Min A and frequent verbal/visual/tactile cues for sequencing, positioning of AD, and body mechanics. Pt additionally demos some BUE weakness. Skilled OT intervention indicated in order to address deficits in fxl mobility, fxl activity tolerance, balance, strength, and use of adaptive techniques/equipment during performance of BADLs. Recommend SNF at discharge.  -       Row Name 08/28/24 0759          Therapy Assessment/Plan (OT)    Rehab Potential (OT) good, to achieve stated therapy goals  -     Criteria for Skilled Therapeutic Interventions Met (OT) yes;skilled treatment is necessary  -     Therapy Frequency (OT) 5 times/wk  -       Row Name 08/28/24 0755          Therapy Plan Review/Discharge Plan (OT)    Anticipated Discharge Disposition (OT) skilled nursing facility  -       Row Name 08/28/24 0754          Positioning and Restraints    Pre-Treatment Position in bed  -LS     Post Treatment Position chair  -LS     In Chair sitting;call light within reach;encouraged to call for assist;exit alarm on;with family/caregiver  -               User Key  (r) = Recorded By, (t) = Taken By, (c) = Cosigned By      Initials Name Provider Type    LS Kala Silva, OTR/L Occupational Therapist                   Outcome Measures       Row Name 08/28/24 1570          How much help from another is currently needed...    Putting on  and taking off regular lower body clothing? 2  -LS     Bathing (including washing, rinsing, and drying) 2  -LS     Toileting (which includes using toilet bed pan or urinal) 2  -LS     Putting on and taking off regular upper body clothing 3  -LS     Taking care of personal grooming (such as brushing teeth) 4  -LS     Eating meals 4  -LS     AM-PAC 6 Clicks Score (OT) 17  -LS       Row Name 08/28/24 0806          How much help from another person do you currently need...    Turning from your back to your side while in flat bed without using bedrails? 2  -MS     Moving from lying on back to sitting on the side of a flat bed without bedrails? 2  -MS     Moving to and from a bed to a chair (including a wheelchair)? 3  -MS     Standing up from a chair using your arms (e.g., wheelchair, bedside chair)? 3  -MS     Climbing 3-5 steps with a railing? 1  -MS     To walk in hospital room? 1  -MS     AM-PAC 6 Clicks Score (PT) 12  -MS     Highest Level of Mobility Goal 4 --> Transfer to chair/commode  -MS       Row Name 08/28/24 0806 08/28/24 0757       Functional Assessment    Outcome Measure Options AM-PAC 6 Clicks Basic Mobility (PT)  -MS AM-PAC 6 Clicks Daily Activity (OT)  -              User Key  (r) = Recorded By, (t) = Taken By, (c) = Cosigned By      Initials Name Provider Type    Sue Pearce R, PT, DPT, NCS Physical Therapist    Kala Longoria, OTR/L Occupational Therapist                    Occupational Therapy Education       Title: PT OT SLP Therapies (In Progress)       Topic: Occupational Therapy (In Progress)       Point: ADL training (Done)       Description:   Instruct learner(s) on proper safety adaptation and remediation techniques during self care or transfers.   Instruct in proper use of assistive devices.                  Learning Progress Summary             Patient Acceptance, E, VU,NR by  at 8/28/2024 9727                         Point: Home exercise program (Not Started)       Description:    Instruct learner(s) on appropriate technique for monitoring, assisting and/or progressing therapeutic exercises/activities.                  Learner Progress:  Not documented in this visit.              Point: Precautions (Done)       Description:   Instruct learner(s) on prescribed precautions during self-care and functional transfers.                  Learning Progress Summary             Patient Acceptance, E, VU,NR by  at 8/28/2024 0953                         Point: Body mechanics (Done)       Description:   Instruct learner(s) on proper positioning and spine alignment during self-care, functional mobility activities and/or exercises.                  Learning Progress Summary             Patient Acceptance, E, VU,NR by  at 8/28/2024 0953                                         User Key       Initials Effective Dates Name Provider Type Discipline     06/20/22 -  Kala Silva, OTR/L Occupational Therapist OT                  OT Recommendation and Plan  Planned Therapy Interventions (OT): transfer/mobility retraining, strengthening exercise, patient/caregiver education/training, occupation/activity based interventions, functional balance retraining, activity tolerance training, BADL retraining, adaptive equipment training, ROM/therapeutic exercise  Therapy Frequency (OT): 5 times/wk  Plan of Care Review  Plan of Care Reviewed With: patient, family  Progress: no change  Outcome Evaluation: OT eval completed. Pt in fowlers upon therapist arrival; A&Ox4 with verbal cues; c/o RLE pain with movement; 2 family members also present. Per Pt's son, the Pt was living home alone and performing BADLs at Mod I-I level, however, Pt was not performing BADLs well and was unable to take care of herself well. Today, Pt performed supine>sit utilizing bedrail with HOB elevated with Mod A x2 and verbal/visual/tactile cues for sequencing and body mechanics. Pt required Max A to lucretia B socks. Pt performed sit<>stand and took a  few steps from bed>chair utilizing rwx with Min A and frequent verbal/visual/tactile cues for sequencing, positioning of AD, and body mechanics. Pt additionally demos some BUE weakness. Skilled OT intervention indicated in order to address deficits in fxl mobility, fxl activity tolerance, balance, strength, and use of adaptive techniques/equipment during performance of BADLs. Recommend SNF at discharge.     Time Calculation:         Time Calculation- OT       Row Name 08/28/24 0757             Time Calculation- OT    OT Start Time 0757  +10 minutes chart review  -LS      OT Stop Time 0844  -LS      OT Time Calculation (min) 47 min  -LS      OT Non-Billable Time (min) 57 min  -LS      OT Received On 08/28/24  -      OT Goal Re-Cert Due Date 09/07/24  -                User Key  (r) = Recorded By, (t) = Taken By, (c) = Cosigned By      Initials Name Provider Type    LS Kala Silva OTR/L Occupational Therapist                  Therapy Charges for Today       Code Description Service Date Service Provider Modifiers Qty    78845755513 HC OT EVAL LOW COMPLEXITY 4 8/28/2024 Kala Silva OTR/L GO 1                 Kala Silva OTR/L  8/28/2024    Electronically signed by Kala Silva OTR/L at 08/28/24 1001       Kala Silva OTR/L at 08/28/24 0953          Goal Outcome Evaluation:  Plan of Care Reviewed With: patient, family        Progress: no change  Outcome Evaluation: OT eval completed. Pt in fowlers upon therapist arrival; A&Ox4 with verbal cues; c/o RLE pain with movement; 2 family members also present. Per Pt's son, the Pt was living home alone and performing BADLs at Mod I-I level, however, Pt was not performing BADLs well and was unable to take care of herself well. Today, Pt performed supine>sit utilizing bedrail with HOB elevated with Mod A x2 and verbal/visual/tactile cues for sequencing and body mechanics. Pt required Max A to lucretia B socks. Pt performed sit<>stand and took a few steps from  bed>chair utilizing rwx with Min A and frequent verbal/visual/tactile cues for sequencing, positioning of AD, and body mechanics. Pt additionally demos some BUE weakness. Skilled OT intervention indicated in order to address deficits in fxl mobility, fxl activity tolerance, balance, strength, and use of adaptive techniques/equipment during performance of BADLs. Recommend SNF at discharge.      Anticipated Discharge Disposition (OT): skilled nursing facility                          Electronically signed by Kala Silva OTR/L at 08/28/24 6090

## 2024-08-29 NOTE — NURSING NOTE
[facility]  INPATIENT WOUND & OSTOMY CARE    Today's Date: 08/29/24    Patient Name: Pattie Liu  MRN: [id number]  CSN: [id number]  PCP: Rafat Espinoza MD  Attending Provider: Anupam Ledezma MD  Length of Stay: 3    Rounded on patient due to low Dominick score. Patient is currently sitting up in her recliner. She has family at bedside.     Silicone foam border dressing per protocol to sacral spine/bilateral heels for protection.  Nursing to change dressing every 3 days and PRN if soiled. Nursing is to peel back dressing with every assessment to assess skin underneath dressing. No barrier cream under dressing. Patient currently has sacral and heel dressings in place.     Stood patient and added a waffle chair cushion for offloading. Patient and family educated on importance of turning and repositioning at frequent intervals to prevent skin breakdown. They voiced understanding. All questions answered.     All pressure ulcer prevention measures have been ordered. Nursing to reach out to wound care nurse with any issues or concerns.     This document has been electronically signed by Hayley Badillo RN on 8/29/2024 12:34 CDT

## 2024-08-29 NOTE — PLAN OF CARE
Goal Outcome Evaluation:  Plan of Care Reviewed With: patient        Progress: improving  Outcome Evaluation: Pt initially requested shower task but with increased pain in sitting in chair with waffle cushion. Decided on sponge bathing d/t hard shower chair. Pt performed UB bathing s/u, LB MaxA d/t her pain reports with bending and any pelvis movement/weight shifting. Pt depA for socks. Pt sit<>stand wth min-modA and able to take a couple steps back for proper sit in chair. Did require MaxA to scoot back into chair for comfort. Pt completed oral care and hair care with shower cap s/u. Grand daughter present end of tx and supportive to pt, pt may benefit from SNF at d/c.      Anticipated Discharge Disposition (OT): skilled nursing facility

## 2024-08-29 NOTE — PROGRESS NOTES
Daily Progress Note  Pattie Liu  MRN: [id number] LOS: 3    Admit Date: 8/26/2024 8/29/2024 08:24 CDT    Subjective:      Chief Complaint:  Chief Complaint   Patient presents with    Fall       Interval History:    Reviewed overnight events and nursing notes.   No acute events overnight.  Pain is better controlled.  To determine placement options.    Review of Systems   Constitutional:  Positive for activity change, appetite change and fatigue. Negative for fever.   Respiratory:  Positive for shortness of breath.    Gastrointestinal:  Negative for nausea and vomiting.   Genitourinary:  Positive for decreased urine volume and dysuria.   Musculoskeletal:  Positive for gait problem.   Neurological:  Positive for weakness.       DIET:  Diet: Regular/House; Fluid Consistency: Thin (IDDSI 0)    Medications:   sodium chloride, 75 mL/hr, Last Rate: 75 mL/hr (08/28/24 2539)      ALPRAZolam, 1 mg, Oral, BID  aspirin, 81 mg, Oral, Daily  bumetanide, 1 mg, Oral, Daily  cefTRIAXone, 1,000 mg, Intravenous, Q24H  citalopram, 20 mg, Oral, BID  enoxaparin, 40 mg, Subcutaneous, Daily  gabapentin, 400 mg, Oral, Q12H  lisinopril, 20 mg, Oral, Daily  nicotine, 1 patch, Transdermal, Q24H  nystatin, , Topical, Q12H  sodium chloride, 10 mL, Intravenous, Q12H        Data:     Code Status:   Code Status and Medical Interventions: No CPR (Do Not Attempt to Resuscitate); Full Support   Ordered at: 08/27/24 1032     Code Status (Patient has no pulse and is not breathing):    No CPR (Do Not Attempt to Resuscitate)     Medical Interventions (Patient has pulse or is breathing):    Full Support       Family History   Problem Relation Age of Onset    Cancer Mother     Cancer Father      Social History     Socioeconomic History    Marital status:    Tobacco Use    Smoking status: Every Day     Current packs/day: 0.50     Average packs/day: 0.5 packs/day for 62.0 years (31.0 ttl pk-yrs)     Types: Cigarettes    Smokeless tobacco: Never     Tobacco comments:     states she has no plan to quit smoking   Vaping Use    Vaping status: Former   Substance and Sexual Activity    Alcohol use: No    Drug use: No    Sexual activity: Not Currently       Labs:    Lab Results (last 72 hours)       Procedure Component Value Units Date/Time    Comprehensive Metabolic Panel [[id number]]  (Abnormal) Collected: 08/28/24 0438    Specimen: Blood Updated: 08/28/24 0516     Glucose 88 mg/dL      BUN 14 mg/dL      Creatinine 1.05 mg/dL      Sodium 144 mmol/L      Potassium 4.0 mmol/L      Chloride 109 mmol/L      CO2 25.0 mmol/L      Calcium 9.4 mg/dL      Total Protein 6.6 g/dL      Albumin 3.4 g/dL      ALT (SGPT) 8 U/L      AST (SGOT) 16 U/L      Alkaline Phosphatase 101 U/L      Total Bilirubin 0.2 mg/dL      Globulin 3.2 gm/dL      A/G Ratio 1.1 g/dL      BUN/Creatinine Ratio 13.3     Anion Gap 10.0 mmol/L      eGFR 53.5 mL/min/1.73     Narrative:      GFR Normal >60  Chronic Kidney Disease <60  Kidney Failure <15    The GFR formula is only valid for adults with stable renal function between ages 18 and 70.    CBC Auto Differential [[id number]]  (Abnormal) Collected: 08/28/24 0438    Specimen: Blood Updated: 08/28/24 0455     WBC 10.81 10*3/mm3      RBC 4.08 10*6/mm3      Hemoglobin 12.1 g/dL      Hematocrit 39.1 %      MCV 95.8 fL      MCH 29.7 pg      MCHC 30.9 g/dL      RDW 13.1 %      RDW-SD 46.5 fl      MPV 10.4 fL      Platelets 394 10*3/mm3      Neutrophil % 66.0 %      Lymphocyte % 22.8 %      Monocyte % 6.8 %      Eosinophil % 3.3 %      Basophil % 0.7 %      Immature Grans % 0.4 %      Neutrophils, Absolute 7.14 10*3/mm3      Lymphocytes, Absolute 2.46 10*3/mm3      Monocytes, Absolute 0.73 10*3/mm3      Eosinophils, Absolute 0.36 10*3/mm3      Basophils, Absolute 0.08 10*3/mm3      Immature Grans, Absolute 0.04 10*3/mm3      nRBC 0.0 /100 WBC     Comprehensive Metabolic Panel [[id number]]  (Abnormal) Collected: 08/26/24 2039    Specimen: Blood Updated:  08/26/24 2108     Glucose 102 mg/dL      BUN 19 mg/dL      Creatinine 1.36 mg/dL      Sodium 141 mmol/L      Potassium 4.0 mmol/L      Chloride 101 mmol/L      CO2 27.0 mmol/L      Calcium 10.0 mg/dL      Total Protein 7.2 g/dL      Albumin 3.9 g/dL      ALT (SGPT) 10 U/L      AST (SGOT) 17 U/L      Alkaline Phosphatase 110 U/L      Total Bilirubin 0.2 mg/dL      Globulin 3.3 gm/dL      A/G Ratio 1.2 g/dL      BUN/Creatinine Ratio 14.0     Anion Gap 13.0 mmol/L      eGFR 39.2 mL/min/1.73     Narrative:      GFR Normal >60  Chronic Kidney Disease <60  Kidney Failure <15    The GFR formula is only valid for adults with stable renal function between ages 18 and 70.    Urinalysis, Microscopic Only - Straight Cath [[id number]]  (Abnormal) Collected: 08/26/24 2031    Specimen: Urine from Straight Cath Updated: 08/26/24 2056     RBC, UA None Seen /HPF      WBC, UA 3-5 /HPF      Comment: Urine culture not indicated.        Bacteria, UA 4+ /HPF      Squamous Epithelial Cells, UA 3-6 /HPF      Hyaline Casts, UA 0-2 /LPF      Methodology Automated Microscopy    Urinalysis With Culture If Indicated - Straight Cath [[id number]]  (Abnormal) Collected: 08/26/24 2031    Specimen: Urine from Straight Cath Updated: 08/26/24 2056     Color, UA Yellow     Appearance, UA Clear     pH, UA 5.5     Specific Gravity, UA 1.012     Glucose, UA Negative     Ketones, UA Negative     Bilirubin, UA Negative     Blood, UA Negative     Protein, UA Negative     Leuk Esterase, UA Small (1+)     Nitrite, UA Negative     Urobilinogen, UA 0.2 E.U./dL    Narrative:      In absence of clinical symptoms, the presence of pyuria, bacteria, and/or nitrites on the urinalysis result does not correlate with infection.    CBC & Differential [[id number]]  (Abnormal) Collected: 08/26/24 2039    Specimen: Blood Updated: 08/26/24 2049    Narrative:      The following orders were created for panel order CBC & Differential.  Procedure                                "Abnormality         Status                     ---------                               -----------         ------                     CBC Auto Differential[[id number]]        Abnormal            Final result                 Please view results for these tests on the individual orders.    CBC Auto Differential [[id number]]  (Abnormal) Collected: 24    Specimen: Blood Updated: 24     WBC 15.56 10*3/mm3      RBC 4.03 10*6/mm3      Hemoglobin 12.2 g/dL      Hematocrit 38.0 %      MCV 94.3 fL      MCH 30.3 pg      MCHC 32.1 g/dL      RDW 13.4 %      RDW-SD 46.2 fl      MPV 10.1 fL      Platelets 394 10*3/mm3      Neutrophil % 79.5 %      Lymphocyte % 13.4 %      Monocyte % 4.9 %      Eosinophil % 1.2 %      Basophil % 0.4 %      Immature Grans % 0.6 %      Neutrophils, Absolute 12.38 10*3/mm3      Lymphocytes, Absolute 2.08 10*3/mm3      Monocytes, Absolute 0.76 10*3/mm3      Eosinophils, Absolute 0.18 10*3/mm3      Basophils, Absolute 0.07 10*3/mm3      Immature Grans, Absolute 0.09 10*3/mm3      nRBC 0.0 /100 WBC               Objective:     Vitals: /55 (BP Location: Right arm, Patient Position: Lying)   Pulse 53   Temp 98.4 °F (36.9 °C) (Oral)   Resp 16   Ht 160 cm (62.99\")   Wt 63.7 kg (140 lb 8 oz)   SpO2 92%   BMI 24.89 kg/m²    Intake/Output Summary (Last 24 hours) at 2024 0824  Last data filed at 2024 0800  Gross per 24 hour   Intake 4330.44 ml   Output 900 ml   Net 3430.44 ml    Temp (24hrs), Av.2 °F (36.8 °C), Min:98 °F (36.7 °C), Max:98.4 °F (36.9 °C)      Physical Exam  Vitals reviewed.   Constitutional:       Appearance: Normal appearance. She is not ill-appearing.   HENT:      Head: Normocephalic and atraumatic.   Eyes:      General:         Right eye: No discharge.         Left eye: No discharge.      Extraocular Movements: Extraocular movements intact.      Conjunctiva/sclera: Conjunctivae normal.   Cardiovascular:      Rate and Rhythm: Normal rate and " regular rhythm.      Pulses: Normal pulses.      Heart sounds: No murmur heard.  Pulmonary:      Effort: Pulmonary effort is normal. No respiratory distress.      Comments: Nasal cannula in place  Abdominal:      General: Abdomen is flat. Bowel sounds are normal. There is no distension.   Musculoskeletal:      Right lower leg: No edema.      Left lower leg: No edema.   Skin:     Capillary Refill: Capillary refill takes less than 2 seconds.   Neurological:      General: No focal deficit present.      Mental Status: She is alert.   Psychiatric:         Mood and Affect: Mood normal.         Behavior: Behavior normal.             Assessment and Plan:     Primary Problem:  Gait instability    Hospital Problem list:    Gait instability      PMH:  Past Medical History:   Diagnosis Date    CAD in native artery 7/29/2019    COPD (chronic obstructive pulmonary disease)     Essential hypertension 12/7/2021    GERD (gastroesophageal reflux disease)     Lung nodule        Treatment Plan:  Inferior pubic ramus fracture: Ortho consultation, appreciate recommendations.  - Weightbearing as tolerated, PT/OT.  Likely SNF.     Gait instability: resulting in problem #1 above. PT/OT consultations. She has declined SNF in the past. Lives alone currently. CM for placement options.     UTI: continue rocephin, follow Ucx.     Disposition: inpatient.     Reviewed treatment plans with the patient and/or family.   30 minutes spent in face to face interaction and coordination of care.     Electronically signed by Rafat Espinoza MD on 8/29/2024 at 08:24 CDT

## 2024-08-29 NOTE — PLAN OF CARE
Goal Outcome Evaluation:  Plan of Care Reviewed With: patient        Progress: improving  Outcome Evaluation: pt trans to EOB min assist, BLE AROM, sit-stand min assist, took few steps bed-chair with rwx, pt with increased pain with RLE WB, trans to chair min assist, pt would benefit from SNF

## 2024-08-29 NOTE — PLAN OF CARE
Goal Outcome Evaluation:           Progress: no change  Outcome Evaluation: Pt A&Ox4 but confused and having hallucinations throughout this shift. Family at bedside. 1.5L NC. Tele in place. Purewick in use. Bed alarm in use. Up with x1/2 to BSC/Chair with walker and gait belt. SCD's in use. IV antibiotics given per order. Nicotine patch placed this AM on Left shoulder. C/o pain, PRN meds given with relief. Call light within reach.

## 2024-08-29 NOTE — THERAPY TREATMENT NOTE
Acute Care - Physical Therapy Treatment Note  [facility]     Patient Name: Pattie Liu  : 1945  MRN: [id number]  Today's Date: 2024      Visit Dx:     ICD-10-CM ICD-9-CM   1. Gait instability  R26.81 781.2   2. Closed fracture of ramus of right pubis, initial encounter  S32.591A 808.2   3. Multiple falls  R29.6 V15.88   4. Urinary tract infection without hematuria, site unspecified  N39.0 599.0   5. Impaired mobility [Z74.09]  Z74.09 799.89     Patient Active Problem List   Diagnosis    Frequent PVCs    Shortness of breath    SOB (shortness of breath)    CAD in native artery    PVD (peripheral vascular disease)    Pneumonia due to infectious organism    Chronic back pain    Essential hypertension    Fall, initial encounter    Traumatic rhabdomyolysis    Leukocytosis    Anemia    Degenerative disc disease, lumbar    Dehydration    Acute UTI    Chronic respiratory failure with hypoxia    Impaired mobility    UTI (urinary tract infection)    Gait instability     Past Medical History:   Diagnosis Date    CAD in native artery 2019    COPD (chronic obstructive pulmonary disease)     Essential hypertension 2021    GERD (gastroesophageal reflux disease)     Lung nodule      Past Surgical History:   Procedure Laterality Date    BREAST IMPLANT REMOVAL      BREAST TISSUE EXPANDER REMOVAL INSERTION OF IMPLANT      CARDIAC CATHETERIZATION N/A 2019    Procedure: Left Heart Cath;  Surgeon: Edi Armijo MD;  Location: [facility] INVASIVE LOCATION;  Service: Cardiology    CHOLECYSTECTOMY      HYSTERECTOMY      MASTECTOMY      BILATERAL    OOPHORECTOMY       PT Assessment (Last 12 Hours)       PT Evaluation and Treatment       Row Name 24 1340 24 0818       Physical Therapy Time and Intention    Subjective Information complains of;weakness;fatigue;pain  -AH complains of;weakness;pain  -AH    Document Type therapy note (daily note)  - therapy note (daily note)  -AH    Mode of  Treatment physical therapy  - physical therapy  -      Row Name 08/29/24 0756          Physical Therapy Time and Intention    Subjective Information --  -     Session Not Performed other (see comments)  -     Comment, Session Not Performed sleeping, will check back  -       Row Name 08/29/24 1340 08/29/24 0818       General Information    Existing Precautions/Restrictions fall  WBAT BLE  - fall  WBAT BLE  -      Row Name 08/29/24 1340 08/29/24 0818       Pain    Pain Intervention(s) Repositioned  - Medication (See MAR);Repositioned  -      Row Name 08/29/24 1340 08/29/24 0818       Pain Scale: Word Pre/Post-Treatment    Pain: Word Scale, Pretreatment 2 - mild pain  AT REST  - 2 - mild pain  AT REST  -    Posttreatment Pain Rating 6 - moderate-severe pain  with mobility  - 4 - moderate pain  with mobility  -    Pain Location - Side/Orientation Right  - Right  -    Pain Location -- lower  -    Pain Location - groin  - extremity  -      Row Name 08/29/24 1340 08/29/24 0818       Bed Mobility    Bed Mobility supine-sit;sit-supine  - supine-sit;sit-supine  -    Supine-Sit [place] (Bed Mobility) --  CHAIR  - minimum assist (75% patient effort);verbal cues  -    Sit-Supine [place] (Bed Mobility) minimum assist (75% patient effort);moderate assist (50% patient effort);verbal cues  - --  chair  -      Row Name 08/29/24 1340 08/29/24 0818       Transfers    Transfers sit-stand transfer;stand-sit transfer;toilet transfer  - sit-stand transfer;stand-sit transfer;toilet transfer  -    Comment, (Transfers) chair-[facility]-bed  - --      Row Name 08/29/24 0818          Bed-Chair Transfer    Bed-Chair [place] (Transfers) minimum assist (75% patient effort);verbal cues  [facility]     Assistive Device (Bed-Chair Transfers) walker, front-wheeled  -     Comment, (Bed-Chair Transfer) steps bed-chair  -       Row Name 08/29/24 1340 08/29/24 0818       Sit-Stand Transfer     Sit-Stand [place] (Transfers) minimum assist (75% patient effort);moderate assist (50% patient effort);verbal cues  -AH minimum assist (75% patient effort);moderate assist (50% patient effort);verbal cues  -AH      Row Name 08/29/24 1340 08/29/24 0818       Stand-Sit Transfer    Stand-Sit [place] (Transfers) minimum assist (75% patient effort);verbal cues  -AH minimum assist (75% patient effort);verbal cues  -AH      Row Name 08/29/24 1340          Toilet Transfer    [place] Level (Toilet Transfer) minimum assist (75% patient effort);moderate assist (50% patient effort);verbal cues  -AH     Assistive Device (Toilet Transfer) commode, bedside without drop arms;walker, front-wheeled  -       Row Name             Wound 08/26/24 2326 Right anterior hip MASD (Moisture associated skin damage)    Wound - Properties Group Placement Date: 08/26/24  -TM Placement Time: 2326  -TM Present on Original Admission: Y  -TM Side: Right  -TM Orientation: anterior  -TM Location: hip  -TM Primary Wound Type: MASD  -TM    Retired Wound - Properties Group Placement Date: 08/26/24  -TM Placement Time: 2326  -TM Present on Original Admission: Y  -TM Side: Right  -TM Orientation: anterior  -TM Location: hip  -TM Primary Wound Type: MASD  -TM    Retired Wound - Properties Group Date first assessed: 08/26/24  -TM Time first assessed: 2326  -TM Present on Original Admission: Y  -TM Side: Right  -TM Location: hip  -TM Primary Wound Type: MASD  -TM      Row Name             Wound 08/26/24 2330 Left proximal arm Skin Tear    Wound - Properties Group Placement Date: 08/26/24  -TM Placement Time: 2330  -TM Present on Original Admission: Y  -TM Side: Left  -TM Orientation: proximal  -TM Location: arm  -TM Primary Wound Type: Skin tear  -TM    Retired Wound - Properties Group Placement Date: 08/26/24  -TM Placement Time: 2330  -TM Present on Original Admission: Y  -TM Side: Left  -TM Orientation: proximal  -TM Location: arm  -TM  Primary Wound Type: Skin tear  -TM    Retired Wound - Properties Group Date first assessed: 08/26/24  -TM Time first assessed: 2330  -TM Present on Original Admission: Y  -TM Side: Left  -TM Location: arm  -TM Primary Wound Type: Skin tear  -TM      Row Name 08/29/24 0818          Plan of Care Review    Plan of Care Reviewed With patient  -     Progress improving  -     Outcome Evaluation pt trans to EOB min assist, BLE AROM, sit-stand min assist, took few steps bed-chair with rwx, pt with increased pain with RLE WB, trans to chair min assist, pt would benefit from SNF  -AH       Row Name 08/29/24 1340 08/29/24 0818       Positioning and Restraints    Pre-Treatment Position sitting in chair/recliner  - in bed  -    Post Treatment Position bed  - chair  -    In Bed fowlers;call light within reach;encouraged to call for assist;exit alarm on;SCD pump applied  - --    In Chair -- notified nsg;sitting;call light within reach;encouraged to call for assist;exit alarm on  -              User Key  (r) = Recorded By, (t) = Taken By, (c) = Cosigned By      Initials Name Provider Type     Rashida Crockett PTA Physical Therapist Assistant     Natanael Knox, RN Registered Nurse                    Physical Therapy Education       Title: PT OT SLP Therapies (In Progress)       Topic: Physical Therapy (In Progress)       Point: Mobility training (In Progress)       Learning Progress Summary             Patient Acceptance, E, NR by MS at 8/28/2024 0913    Comment: role of PT in her care                         Point: Home exercise program (Not Started)       Learner Progress:  Not documented in this visit.              Point: Body mechanics (Not Started)       Learner Progress:  Not documented in this visit.              Point: Precautions (Not Started)       Learner Progress:  Not documented in this visit.                              User Key       Initials Effective Dates Name Provider Type Discipline    MS  07/11/23 -  Sue Garcia, PT, DPT, NCS Physical Therapist PT                  PT Recommendation and Plan     Plan of Care Reviewed With: patient  Progress: improving  Outcome Evaluation: pt trans to EOB min assist, BLE AROM, sit-stand min assist, took few steps bed-chair with rwx, pt with increased pain with RLE WB, trans to chair min assist, pt would benefit from SNF   Outcome Measures       Row Name 08/29/24 0848             How much help from another person do you currently need...    Turning from your back to your side while in flat bed without using bedrails? 3  -AH      Moving from lying on back to sitting on the side of a flat bed without bedrails? 3  -AH      Moving to and from a bed to a chair (including a wheelchair)? 3  -AH      Standing up from a chair using your arms (e.g., wheelchair, bedside chair)? 3  -AH      Climbing 3-5 steps with a railing? 2  -AH      To walk in hospital room? 2  -AH      AM-PAC 6 Clicks Score (PT) 16  -      Highest Level of Mobility Goal 5 --> Static standing  -         Functional Assessment    Outcome Measure Options AM-PAC 6 Clicks Basic Mobility (PT)  -                User Key  (r) = Recorded By, (t) = Taken By, (c) = Cosigned By      Initials Name Provider Type    Rashida Shea, PTA Physical Therapist Assistant                     Time Calculation:    PT Charges       Row Name 08/29/24 1419 08/29/24 0849          Time Calculation    Start Time 1340  - 0818  -     Stop Time 1419  - 0849  -     Time Calculation (min) 39 min  -AH 31 min  -     PT Received On -- 08/29/24  -        Time Calculation- PT    Total Timed Code Minutes- PT 39 minute(s)  -AH 31 minute(s)  -AH        Timed Charges    83255 - PT Therapeutic Activity Minutes 39  -AH 31  -AH        Total Minutes    Timed Charges Total Minutes 39  -AH 31  -AH      Total Minutes 39  -AH 31  -AH               User Key  (r) = Recorded By, (t) = Taken By, (c) = Cosigned By      Initials Name Provider  Type     Rashida Crockett PTA Physical Therapist Assistant                  Therapy Charges for Today       Code Description Service Date Service Provider Modifiers Qty    48309523260 HC PT THERAPEUTIC ACT EA 15 MIN 8/28/2024 Rashida Crockett, TRISHA GP 3    80817621753 HC PT THERAPEUTIC ACT EA 15 MIN 8/29/2024 Rashida Crockett, TRISHA GP 2    18354833811 HC PT THERAPEUTIC ACT EA 15 MIN 8/29/2024 Rashida Crockett, TRISHA GP 3            PT G-Codes  Outcome Measure Options: AM-PAC 6 Clicks Basic Mobility (PT)  AM-PAC 6 Clicks Score (PT): 16  AM-PAC 6 Clicks Score (OT): 17    Rashida Crockett PTA  8/29/2024

## 2024-08-29 NOTE — THERAPY TREATMENT NOTE
Acute Care - Physical Therapy Treatment Note  [facility]     Patient Name: Pattie Liu  : 1945  MRN: [id number]  Today's Date: 2024      Visit Dx:     ICD-10-CM ICD-9-CM   1. Gait instability  R26.81 781.2   2. Closed fracture of ramus of right pubis, initial encounter  S32.591A 808.2   3. Multiple falls  R29.6 V15.88   4. Urinary tract infection without hematuria, site unspecified  N39.0 599.0   5. Impaired mobility [Z74.09]  Z74.09 799.89     Patient Active Problem List   Diagnosis    Frequent PVCs    Shortness of breath    SOB (shortness of breath)    CAD in native artery    PVD (peripheral vascular disease)    Pneumonia due to infectious organism    Chronic back pain    Essential hypertension    Fall, initial encounter    Traumatic rhabdomyolysis    Leukocytosis    Anemia    Degenerative disc disease, lumbar    Dehydration    Acute UTI    Chronic respiratory failure with hypoxia    Impaired mobility    UTI (urinary tract infection)    Gait instability     Past Medical History:   Diagnosis Date    CAD in native artery 2019    COPD (chronic obstructive pulmonary disease)     Essential hypertension 2021    GERD (gastroesophageal reflux disease)     Lung nodule      Past Surgical History:   Procedure Laterality Date    BREAST IMPLANT REMOVAL      BREAST TISSUE EXPANDER REMOVAL INSERTION OF IMPLANT      CARDIAC CATHETERIZATION N/A 2019    Procedure: Left Heart Cath;  Surgeon: Edi Armijo MD;  Location: [facility] INVASIVE LOCATION;  Service: Cardiology    CHOLECYSTECTOMY      HYSTERECTOMY      MASTECTOMY      BILATERAL    OOPHORECTOMY       PT Assessment (Last 12 Hours)       PT Evaluation and Treatment       Row Name 24 0818 24 0756       Physical Therapy Time and Intention    Subjective Information complains of;weakness;pain  -AH --  -AH    Document Type therapy note (daily note)  -AH --    Mode of Treatment physical therapy  -AH --    Session Not Performed  Left a voice mail for the patient to call our office back. She last saw Dr Parikh on 9/2019 and was advised to follow up in 6 months.    Does look like she has upcoming future appointment at [facility] and not sure if she is follow up with diabetes and thyroid with them or not. Requested a call back to discuss.       If the patient wishes to continue with us she is due for a visit and fasting labs and a urine test now. Thanks.    -- other (see comments)  -    Comment, Session Not Performed -- sleeping, will check back  -[facility] Name 08/29/24 0818          General Information    Existing Precautions/Restrictions fall  WBAT BLE  -[facility] Name 08/29/24 0818          Pain    Pain Intervention(s) Medication (See MAR);Repositioned  -[facility] Name 08/29/24 0818          Pain Scale: Word Pre/Post-Treatment    Pain: Word Scale, Pretreatment 2 - mild pain  AT REST  -     Posttreatment Pain Rating 4 - moderate pain  with mobility  -     Pain Location - Side/Orientation Right  -     Pain Location lower  -     Pain Location - extremity  -[facility] Name 08/29/24 0818          Bed Mobility    Bed Mobility supine-sit;sit-supine  -     Supine-Sit [place] (Bed Mobility) minimum assist (75% patient effort);verbal cues  -     Sit-Supine [place] (Bed Mobility) --  chair  -[facility] Name 08/29/24 0818          Transfers    Transfers sit-stand transfer;stand-sit transfer;toilet transfer  -[facility] Name 08/29/24 0818          Bed-Chair Transfer    Bed-Chair [place] (Transfers) minimum assist (75% patient effort);verbal cues  -     Assistive Device (Bed-Chair Transfers) walker, front-wheeled  -     Comment, (Bed-Chair Transfer) steps bed-chair  -       Row Name 08/29/24 0818          Sit-Stand Transfer    Sit-Stand [place] (Transfers) minimum assist (75% patient effort);moderate assist (50% patient effort);verbal cues  -[facility] Name 08/29/24 0818          Stand-Sit Transfer    Stand-Sit [place] (Transfers) minimum assist (75% patient effort);verbal cues  -       Row Name             Wound 08/26/24 2326 Right anterior hip MASD (Moisture associated skin damage)    Wound - Properties Group Placement Date: 08/26/24  -TM Placement Time: 2326  -TM Present on Original Admission: Y  -TM Side: Right  -TM Orientation: anterior  -TM Location: hip  -TM Primary Wound Type: MASD  -TM    Retired Wound -  Properties Group Placement Date: 08/26/24  -TM Placement Time: 2326  -TM Present on Original Admission: Y  -TM Side: Right  -TM Orientation: anterior  -TM Location: hip  -TM Primary Wound Type: MASD  -TM    Retired Wound - Properties Group Date first assessed: 08/26/24  -TM Time first assessed: 2326  -TM Present on Original Admission: Y  -TM Side: Right  -TM Location: hip  -TM Primary Wound Type: MASD  -TM      Row Name             Wound 08/26/24 2330 Left proximal arm Skin Tear    Wound - Properties Group Placement Date: 08/26/24  -TM Placement Time: 2330  -TM Present on Original Admission: Y  -TM Side: Left  -TM Orientation: proximal  -TM Location: arm  -TM Primary Wound Type: Skin tear  -TM    Retired Wound - Properties Group Placement Date: 08/26/24  -TM Placement Time: 2330  -TM Present on Original Admission: Y  -TM Side: Left  -TM Orientation: proximal  -TM Location: arm  -TM Primary Wound Type: Skin tear  -TM    Retired Wound - Properties Group Date first assessed: 08/26/24  -TM Time first assessed: 2330  -TM Present on Original Admission: Y  -TM Side: Left  -TM Location: arm  -TM Primary Wound Type: Skin tear  -TM      Row Name 08/29/24 0818          Plan of Care Review    Plan of Care Reviewed With patient  -     Progress improving  -     Outcome Evaluation pt trans to EOB min assist, BLE AROM, sit-stand min assist, took few steps bed-chair with rwx, pt with increased pain with RLE WB, trans to chair min assist, pt would benefit from SNF  -       Row Name 08/29/24 0818          Positioning and Restraints    Pre-Treatment Position in bed  -     Post Treatment Position chair  -     In Chair notified nsg;sitting;call light within reach;encouraged to call for assist;exit alarm on  -               User Key  (r) = Recorded By, (t) = Taken By, (c) = Cosigned By      Initials Name Provider Type     Rashida Crockett PTA Physical Therapist Assistant     Natanael Knox, RN Registered Nurse                     Physical Therapy Education       Title: PT OT SLP Therapies (In Progress)       Topic: Physical Therapy (In Progress)       Point: Mobility training (In Progress)       Learning Progress Summary             Patient Acceptance, E, NR by MS at 8/28/2024 0978    Comment: role of PT in her care                         Point: Home exercise program (Not Started)       Learner Progress:  Not documented in this visit.              Point: Body mechanics (Not Started)       Learner Progress:  Not documented in this visit.              Point: Precautions (Not Started)       Learner Progress:  Not documented in this visit.                              User Key       Initials Effective Dates Name Provider Type Discipline    MS 07/11/23 -  Sue Garcia, PT, DPT, NCS Physical Therapist PT                  PT Recommendation and Plan     Plan of Care Reviewed With: patient  Progress: improving  Outcome Evaluation: pt trans to EOB min assist, BLE AROM, sit-stand min assist, took few steps bed-chair with rwx, pt with increased pain with RLE WB, trans to chair min assist, pt would benefit from SNF   Outcome Measures       Row Name 08/29/24 0848             How much help from another person do you currently need...    Turning from your back to your side while in flat bed without using bedrails? 3  -AH      Moving from lying on back to sitting on the side of a flat bed without bedrails? 3  -AH      Moving to and from a bed to a chair (including a wheelchair)? 3  -AH      Standing up from a chair using your arms (e.g., wheelchair, bedside chair)? 3  -AH      Climbing 3-5 steps with a railing? 2  -AH      To walk in hospital room? 2  -AH      AM-PAC 6 Clicks Score (PT) 16  -AH      Highest Level of Mobility Goal 5 --> Static standing  -AH         Functional Assessment    Outcome Measure Options AM-PAC 6 Clicks Basic Mobility (PT)  -AH                User Key  (r) = Recorded By, (t) = Taken By, (c) = Cosigned By      Initials Name  Provider Type    Rashida Shea PTA Physical Therapist Assistant                     Time Calculation:    PT Charges       Row Name 08/29/24 0849             Time Calculation    Start Time 0818  -      Stop Time 0849  -      Time Calculation (min) 31 min  -      PT Received On 08/29/24  -         Time Calculation- PT    Total Timed Code Minutes- PT 31 minute(s)  -         Timed Charges    69796 - PT Therapeutic Activity Minutes 31  -AH         Total Minutes    Timed Charges Total Minutes 31  -AH       Total Minutes 31  -AH                User Key  (r) = Recorded By, (t) = Taken By, (c) = Cosigned By      Initials Name Provider Type    Rashida Shea PTA Physical Therapist Assistant                  Therapy Charges for Today       Code Description Service Date Service Provider Modifiers Qty    97490484241 HC PT THERAPEUTIC ACT EA 15 MIN 8/28/2024 Rashida Crockett PTA GP 3    06601223251 HC PT THERAPEUTIC ACT EA 15 MIN 8/29/2024 Rashida Crockett PTA GP 2            PT G-Codes  Outcome Measure Options: AM-PAC 6 Clicks Basic Mobility (PT)  AM-PAC 6 Clicks Score (PT): 16  AM-PAC 6 Clicks Score (OT): 17    Rashida Crockett PTA  8/29/2024

## 2024-08-29 NOTE — THERAPY TREATMENT NOTE
Patient Name: Pattie Liu  : 1945    MRN: [id number]                              Today's Date: 2024       Admit Date: 2024    Visit Dx: Therapist utilized gait belt, applied non-slipped socks, provided fall risk education/prevention, & facilitated muscle strengthening PRN to reduce patient falls risk during this session.      ICD-10-CM ICD-9-CM   1. Gait instability  R26.81 781.2   2. Closed fracture of ramus of right pubis, initial encounter  S32.591A 808.2   3. Multiple falls  R29.6 V15.88   4. Urinary tract infection without hematuria, site unspecified  N39.0 599.0   5. Impaired mobility [Z74.09]  Z74.09 799.89     Patient Active Problem List   Diagnosis    Frequent PVCs    Shortness of breath    SOB (shortness of breath)    CAD in native artery    PVD (peripheral vascular disease)    Pneumonia due to infectious organism    Chronic back pain    Essential hypertension    Fall, initial encounter    Traumatic rhabdomyolysis    Leukocytosis    Anemia    Degenerative disc disease, lumbar    Dehydration    Acute UTI    Chronic respiratory failure with hypoxia    Impaired mobility    UTI (urinary tract infection)    Gait instability     Past Medical History:   Diagnosis Date    CAD in native artery 2019    COPD (chronic obstructive pulmonary disease)     Essential hypertension 2021    GERD (gastroesophageal reflux disease)     Lung nodule      Past Surgical History:   Procedure Laterality Date    BREAST IMPLANT REMOVAL      BREAST TISSUE EXPANDER REMOVAL INSERTION OF IMPLANT      CARDIAC CATHETERIZATION N/A 2019    Procedure: Left Heart Cath;  Surgeon: Edi Armijo MD;  Location:  [facility];  Service: Cardiology    CHOLECYSTECTOMY      HYSTERECTOMY      MASTECTOMY      BILATERAL    OOPHORECTOMY        General Information       Row Name 24 1015          OT Time and Intention    Document Type therapy note (daily note)  -MT     Mode of Treatment  occupational therapy  -[facility] Name 08/29/24 1015          General Information    Patient Profile Reviewed yes  -MT     Existing Precautions/Restrictions fall  WBAT  -[facility] Name 08/29/24 1015          Safety Issues, Functional Mobility    Impairments Affecting Function (Mobility) balance;endurance/activity tolerance;strength;pain  -MT     Comment, Safety Issues/Impairments (Mobility) pt [place] and hearing aides not present  -MT               User Key  (r) = Recorded By, (t) = Taken By, (c) = Cosigned By      Initials Name Provider Type    MT Keya Lane COTA Occupational Therapist Assistant                     Mobility/ADL's       [facility] 08/29/24 1015          Bed Mobility    Comment, (Bed Mobility) in chair and remained in chair post OT tx  -MT       Row Name 08/29/24 1015          Transfers    Transfers sit-stand transfer;stand-sit transfer  -MT       Row Name 08/29/24 1015          Bed-Chair Transfer    Comment, (Bed-Chair Transfer) increased time for sit<>stand d/t increased pain reports RLE with WB.  -MT       Row Name 08/29/24 1015          Sit-Stand Transfer    Sit-Stand [place] (Transfers) minimum assist (75% patient effort);moderate assist (50% patient effort);verbal cues  -MT       Row Name 08/29/24 1015          Stand-Sit Transfer    Stand-Sit [place] (Transfers) minimum assist (75% patient effort);verbal cues  -MT       Row Name 08/29/24 1015          Activities of Daily Living    BADL Assessment/Intervention bathing;upper body dressing;lower body dressing;grooming  -MT       Row Name 08/29/24 1015          Upper Body Dressing Assessment/Training    Comment, (Upper Body Dressing) pt SBA for gown don/doff including overhead  -MT       Row Name 08/29/24 1015          Lower Body Dressing Assessment/Training    [place] Level (Lower Body Dressing) lower body dressing skills;dependent (less than 25% patient effort)  -MT     Comment, (Lower Body Dressing) socks and brief  management  -MT       Row Name 08/29/24 1015          Bathing Assessment/Intervention    Comment, (Bathing) Pt initially requested shower task but with increased pain in sitting in chair with waffle cushion. Decided on sponge bathing d/t hard shower chair. Pt performed UB bathing s/u, LB MaxA d/t her pain reports with bending and any pelvis movement/weight shifting  -MT       Row Name 08/29/24 1015          Grooming Assessment/Training    Oral Care teeth brushed - regular toothbrush  -MT     Position (Grooming) supported sitting  -MT               User Key  (r) = Recorded By, (t) = Taken By, (c) = Cosigned By      Initials Name Provider Type    Keya Edwards COTA Occupational Therapist Assistant                   Obj/Interventions    No documentation.                  Goals/Plan    No documentation.                  Clinical Impression       Row Name 08/29/24 1015          Pain Scale: FACES Pre/Post-Treatment    Pain: FACES Scale, Pretreatment 2-->hurts little bit  -MT     Posttreatment Pain Rating 6-->hurts even more  -MT     Pre/Posttreatment Pain Comment pelvis with weight shifting, WB or bending  -MT       Row Name 08/29/24 1015          Therapy Plan Review/Discharge Plan (OT)    Anticipated Discharge Disposition (OT) skilled nursing facility  -MT       Row Name 08/29/24 1015          Vital Signs    O2 Delivery Post Treatment supplemental O2  -MT       Row Name 08/29/24 1015          Positioning and Restraints    Pre-Treatment Position sitting in chair/recliner  -MT     Post Treatment Position chair  -MT     In Chair with nsg;sitting;call light within reach;encouraged to call for assist;exit alarm on;with family/caregiver  PCT placing purewick  -MT               User Key  (r) = Recorded By, (t) = Taken By, (c) = Cosigned By      Initials Name Provider Type    Keya Edwards COTA Occupational Therapist Assistant                   Outcome Measures       Row Name 08/29/24 1015          How much help from  another is currently needed...    Putting on and taking off regular lower body clothing? 2  -MT     Bathing (including washing, rinsing, and drying) 2  -MT     Toileting (which includes using toilet bed pan or urinal) 2  -MT     Putting on and taking off regular upper body clothing 3  -MT     Taking care of personal grooming (such as brushing teeth) 4  -MT     Eating meals 4  -MT     AM-PAC 6 Clicks Score (OT) 17  -MT       Row Name 08/29/24 0848 08/29/24 0812       How much help from another person do you currently need...    Turning from your back to your side while in flat bed without using bedrails? 3  -AH 2  -TB    Moving from lying on back to sitting on the side of a flat bed without bedrails? 3  -AH 2  -TB    Moving to and from a bed to a chair (including a wheelchair)? 3  -AH 2  -TB    Standing up from a chair using your arms (e.g., wheelchair, bedside chair)? 3  -AH 2  -TB    Climbing 3-5 steps with a railing? 2  -AH 1  -TB    To walk in hospital room? 2  -AH 1  -TB    AM-PAC 6 Clicks Score (PT) 16  -AH 10  -TB    Highest Level of Mobility Goal 5 --> Static standing  -AH 4 --> Transfer to chair/commode  -TB      Row Name 08/29/24 0848          Functional Assessment    Outcome Measure Options AM-PAC 6 Clicks Basic Mobility (PT)  -               User Key  (r) = Recorded By, (t) = Taken By, (c) = Cosigned By      Initials Name Provider Type     Rashida Crockett PTA Physical Therapist Assistant    TB [place], RN Registered Nurse    Keya Edwards COTA Occupational Therapist Assistant                    Occupational Therapy Education       Title: PT OT SLP Therapies (In Progress)       Topic: Occupational Therapy (In Progress)       Point: ADL training (Done)       Description:   Instruct learner(s) on proper safety adaptation and remediation techniques during self care or transfers.   Instruct in proper use of assistive devices.                  Learning Progress Summary             Patient  Acceptance, E, VU,NR by  at 8/28/2024 0953                         Point: Home exercise program (Not Started)       Description:   Instruct learner(s) on appropriate technique for monitoring, assisting and/or progressing therapeutic exercises/activities.                  Learner Progress:  Not documented in this visit.              Point: Precautions (Done)       Description:   Instruct learner(s) on prescribed precautions during self-care and functional transfers.                  Learning Progress Summary             Patient Acceptance, E, VU,NR by  at 8/28/2024 0953                         Point: Body mechanics (Done)       Description:   Instruct learner(s) on proper positioning and spine alignment during self-care, functional mobility activities and/or exercises.                  Learning Progress Summary             Patient Acceptance, E, VU,NR by  at 8/28/2024 0953                                         User Key       Initials Effective Dates Name Provider Type Discipline     06/20/22 -  Kala Silva, OTR/L Occupational Therapist OT                  OT Recommendation and Plan     Plan of Care Review  Plan of Care Reviewed With: patient  Progress: improving  Outcome Evaluation: Pt initially requested shower task but with increased pain in sitting in chair with waffle cushion. Decided on sponge bathing d/t hard shower chair. Pt performed UB bathing s/u, LB MaxA d/t her pain reports with bending and any pelvis movement/weight shifting. Pt depA for socks. Pt sit<>stand wth min-modA and able to take a couple steps back for proper sit in chair. Did require MaxA to scoot back into chair for comfort. Pt completed oral care and hair care with shower cap s/u. Grand daughter present end of tx and supportive to pt, pt may benefit from SNF at d/c.     Time Calculation:         Time Calculation- OT       Row Name 08/29/24 1015             Time Calculation- OT    OT Start Time 1015  -MT      OT Stop Time 1115  -MT       OT Time Calculation (min) 60 min  -MT      Total Timed Code Minutes- OT 60 minute(s)  -MT      OT Received On 08/29/24  -MT         Timed Charges    95605 - OT Self Care/Mgmt Minutes 60  -MT         Total Minutes    Timed Charges Total Minutes 60  -MT       Total Minutes 60  -MT                User Key  (r) = Recorded By, (t) = Taken By, (c) = Cosigned By      Initials Name Provider Type    MT Keya Lane COTA Occupational Therapist Assistant                  Therapy Charges for Today       Code Description Service Date Service Provider Modifiers Qty    88776950915 HC OT SELF CARE/MGMT/TRAIN EA 15 MIN 8/29/2024 Keya Lane COTA GO 4                 DWAYNE Inman  8/29/2024

## 2024-08-29 NOTE — PLAN OF CARE
Goal Outcome Evaluation:  Plan of Care Reviewed With: patient           Outcome Evaluation: Pt confused to time, Up to chair most of shift,  Prn Tylenol and Ibuprofen given for pain w/ good results.  Purewick in place.  Prn Pericolace given w/ no results, last BM 8/26.  Dressing to left elbow changed per orders.  Family at bedside assisting w/ care.  Call light within reach.

## 2024-08-29 NOTE — CASE MANAGEMENT/SOCIAL WORK
Continued Stay Note   [place]     Patient Name: Pattie Liu  MRN: [id number]  Today's Date: 8/29/2024    Admit Date: 8/26/2024        Discharge Plan       Row Name 08/29/24 1427       Plan    Plan Comments SW met with pt/family in room regarding dc plans. Pt is agreeable to rehab placement and prefers [facility] as 1st choice. [facility] would be 2nd choice. Referrals will be sent to both. SW will update pt/family once answers received.                   Discharge Codes    No documentation.                       ERIC Matias

## 2024-08-29 NOTE — PLAN OF CARE
Problem: Adult Inpatient Plan of Care  Goal: Plan of Care Review  Outcome: Ongoing, Progressing  Flowsheets (Taken 8/29/2024 7222)  Progress: no change  Plan of Care Reviewed With: patient  Outcome Evaluation: Pt is A&Ox4. forgetful at times. PRN pain meds given with some relief noted. Purewick in use. Turns as family allows.  Upx2 VSS. Slept well during the shift. Safety maintained.

## 2024-08-30 VITALS
TEMPERATURE: 97.9 F | RESPIRATION RATE: 16 BRPM | HEIGHT: 63 IN | BODY MASS INDEX: 24.89 KG/M2 | WEIGHT: 140.5 LBS | OXYGEN SATURATION: 92 % | HEART RATE: 54 BPM | DIASTOLIC BLOOD PRESSURE: 56 MMHG | SYSTOLIC BLOOD PRESSURE: 152 MMHG

## 2024-08-30 LAB
ALBUMIN SERPL-MCNC: 2.9 G/DL (ref 3.5–5.2)
ALBUMIN/GLOB SERPL: 1.1 G/DL
ALP SERPL-CCNC: 77 U/L (ref 39–117)
ALT SERPL W P-5'-P-CCNC: 7 U/L (ref 1–33)
ANION GAP SERPL CALCULATED.3IONS-SCNC: 7 MMOL/L (ref 5–15)
AST SERPL-CCNC: 11 U/L (ref 1–32)
BASOPHILS # BLD AUTO: 0.07 10*3/MM3 (ref 0–0.2)
BASOPHILS NFR BLD AUTO: 0.8 % (ref 0–1.5)
BILIRUB SERPL-MCNC: <0.2 MG/DL (ref 0–1.2)
BUN SERPL-MCNC: 13 MG/DL (ref 8–23)
BUN/CREAT SERPL: 11.8 (ref 7–25)
CALCIUM SPEC-SCNC: 8.5 MG/DL (ref 8.6–10.5)
CHLORIDE SERPL-SCNC: 108 MMOL/L (ref 98–107)
CO2 SERPL-SCNC: 28 MMOL/L (ref 22–29)
CREAT SERPL-MCNC: 1.1 MG/DL (ref 0.57–1)
DEPRECATED RDW RBC AUTO: 45.1 FL (ref 37–54)
EGFRCR SERPLBLD CKD-EPI 2021: 50.6 ML/MIN/1.73
EOSINOPHIL # BLD AUTO: 0.5 10*3/MM3 (ref 0–0.4)
EOSINOPHIL NFR BLD AUTO: 5.9 % (ref 0.3–6.2)
ERYTHROCYTE [DISTWIDTH] IN BLOOD BY AUTOMATED COUNT: 13.2 % (ref 12.3–15.4)
GLOBULIN UR ELPH-MCNC: 2.6 GM/DL
GLUCOSE SERPL-MCNC: 84 MG/DL (ref 65–99)
HCT VFR BLD AUTO: 33.4 % (ref 34–46.6)
HGB BLD-MCNC: 10.7 G/DL (ref 12–15.9)
IMM GRANULOCYTES # BLD AUTO: 0.02 10*3/MM3 (ref 0–0.05)
IMM GRANULOCYTES NFR BLD AUTO: 0.2 % (ref 0–0.5)
LYMPHOCYTES # BLD AUTO: 2.66 10*3/MM3 (ref 0.7–3.1)
LYMPHOCYTES NFR BLD AUTO: 31.6 % (ref 19.6–45.3)
MCH RBC QN AUTO: 30.4 PG (ref 26.6–33)
MCHC RBC AUTO-ENTMCNC: 32 G/DL (ref 31.5–35.7)
MCV RBC AUTO: 94.9 FL (ref 79–97)
MONOCYTES # BLD AUTO: 0.67 10*3/MM3 (ref 0.1–0.9)
MONOCYTES NFR BLD AUTO: 8 % (ref 5–12)
NEUTROPHILS NFR BLD AUTO: 4.5 10*3/MM3 (ref 1.7–7)
NEUTROPHILS NFR BLD AUTO: 53.5 % (ref 42.7–76)
NRBC BLD AUTO-RTO: 0 /100 WBC (ref 0–0.2)
PLATELET # BLD AUTO: 317 10*3/MM3 (ref 140–450)
PMV BLD AUTO: 10.5 FL (ref 6–12)
POTASSIUM SERPL-SCNC: 3.6 MMOL/L (ref 3.5–5.2)
PROT SERPL-MCNC: 5.5 G/DL (ref 6–8.5)
RBC # BLD AUTO: 3.52 10*6/MM3 (ref 3.77–5.28)
SODIUM SERPL-SCNC: 143 MMOL/L (ref 136–145)
WBC NRBC COR # BLD AUTO: 8.42 10*3/MM3 (ref 3.4–10.8)

## 2024-08-30 PROCEDURE — 80053 COMPREHEN METABOLIC PANEL: CPT | Performed by: FAMILY MEDICINE

## 2024-08-30 PROCEDURE — 25810000003 SODIUM CHLORIDE 0.9 % SOLUTION: Performed by: PHYSICIAN ASSISTANT

## 2024-08-30 PROCEDURE — 85025 COMPLETE CBC W/AUTO DIFF WBC: CPT | Performed by: FAMILY MEDICINE

## 2024-08-30 PROCEDURE — 25010000002 ENOXAPARIN PER 10 MG: Performed by: PHYSICIAN ASSISTANT

## 2024-08-30 RX ORDER — GABAPENTIN 600 MG/1
600 TABLET ORAL 2 TIMES DAILY
Qty: 4 TABLET | Refills: 0 | Status: SHIPPED | OUTPATIENT
Start: 2024-08-30 | End: 2024-09-01

## 2024-08-30 RX ORDER — NITROFURANTOIN 25; 75 MG/1; MG/1
100 CAPSULE ORAL EVERY 12 HOURS SCHEDULED
Qty: 10 CAPSULE | Refills: 0 | Status: SHIPPED | OUTPATIENT
Start: 2024-08-30 | End: 2024-08-30

## 2024-08-30 RX ORDER — LOPERAMIDE HCL 2 MG
2 CAPSULE ORAL 4 TIMES DAILY PRN
Qty: 60 CAPSULE | Refills: 0 | Status: SHIPPED | OUTPATIENT
Start: 2024-08-30 | End: 2024-09-29

## 2024-08-30 RX ORDER — NITROFURANTOIN 25; 75 MG/1; MG/1
100 CAPSULE ORAL EVERY 12 HOURS SCHEDULED
Status: DISCONTINUED | OUTPATIENT
Start: 2024-08-30 | End: 2024-08-30 | Stop reason: HOSPADM

## 2024-08-30 RX ORDER — NICOTINE 21 MG/24HR
1 PATCH, TRANSDERMAL 24 HOURS TRANSDERMAL
Qty: 28 PATCH | Refills: 0 | Status: SHIPPED | OUTPATIENT
Start: 2024-08-30 | End: 2024-09-29

## 2024-08-30 RX ORDER — NICOTINE 21 MG/24HR
1 PATCH, TRANSDERMAL 24 HOURS TRANSDERMAL
Qty: 28 PATCH | Refills: 0 | Status: SHIPPED | OUTPATIENT
Start: 2024-08-30 | End: 2024-08-30

## 2024-08-30 RX ORDER — ALPRAZOLAM 1 MG
1 TABLET ORAL 2 TIMES DAILY
Qty: 6 TABLET | Refills: 0 | Status: SHIPPED | OUTPATIENT
Start: 2024-08-30 | End: 2024-09-02

## 2024-08-30 RX ORDER — NYSTATIN 100000 [USP'U]/G
POWDER TOPICAL EVERY 12 HOURS SCHEDULED
Qty: 60 G | Refills: 0 | Status: SHIPPED | OUTPATIENT
Start: 2024-08-30 | End: 2024-09-13

## 2024-08-30 RX ORDER — BUMETANIDE 1 MG/1
1 TABLET ORAL DAILY
Qty: 30 TABLET | Refills: 0 | Status: SHIPPED | OUTPATIENT
Start: 2024-08-30 | End: 2024-09-29

## 2024-08-30 RX ORDER — NITROFURANTOIN 25; 75 MG/1; MG/1
100 CAPSULE ORAL EVERY 12 HOURS SCHEDULED
Qty: 10 CAPSULE | Refills: 0 | Status: SHIPPED | OUTPATIENT
Start: 2024-08-30 | End: 2024-09-04

## 2024-08-30 RX ORDER — IBUPROFEN 400 MG/1
400 TABLET, FILM COATED ORAL EVERY 6 HOURS PRN
Qty: 60 TABLET | Refills: 0 | Status: SHIPPED | OUTPATIENT
Start: 2024-08-30 | End: 2024-09-29

## 2024-08-30 RX ORDER — ATORVASTATIN CALCIUM 20 MG/1
20 TABLET, FILM COATED ORAL DAILY
Qty: 30 TABLET | Refills: 0 | Status: SHIPPED | OUTPATIENT
Start: 2024-08-30 | End: 2024-09-29

## 2024-08-30 RX ORDER — ACETAMINOPHEN 325 MG/1
650 TABLET ORAL EVERY 6 HOURS PRN
Qty: 60 TABLET | Refills: 0 | Status: SHIPPED | OUTPATIENT
Start: 2024-08-30 | End: 2024-09-29

## 2024-08-30 RX ORDER — NYSTATIN 100000 [USP'U]/G
POWDER TOPICAL EVERY 12 HOURS SCHEDULED
Qty: 60 G | Refills: 0 | Status: SHIPPED | OUTPATIENT
Start: 2024-08-30 | End: 2024-08-30

## 2024-08-30 RX ORDER — DICYCLOMINE HCL 20 MG
20 TABLET ORAL 3 TIMES DAILY PRN
Qty: 30 TABLET | Refills: 0 | Status: SHIPPED | OUTPATIENT
Start: 2024-08-30 | End: 2024-09-29

## 2024-08-30 RX ORDER — CITALOPRAM HYDROBROMIDE 10 MG/1
30 TABLET ORAL DAILY
Qty: 90 TABLET | Refills: 0 | Status: SHIPPED | OUTPATIENT
Start: 2024-08-30 | End: 2024-09-29

## 2024-08-30 RX ORDER — ACETAMINOPHEN 325 MG/1
650 TABLET ORAL EVERY 6 HOURS PRN
Qty: 60 TABLET | Refills: 0 | Status: SHIPPED | OUTPATIENT
Start: 2024-08-30 | End: 2024-08-30

## 2024-08-30 RX ORDER — TRAMADOL HYDROCHLORIDE 50 MG/1
50 TABLET ORAL EVERY 6 HOURS PRN
Qty: 8 TABLET | Refills: 0 | Status: SHIPPED | OUTPATIENT
Start: 2024-08-30 | End: 2024-08-30

## 2024-08-30 RX ORDER — TRAMADOL HYDROCHLORIDE 50 MG/1
50 TABLET ORAL EVERY 6 HOURS PRN
Qty: 8 TABLET | Refills: 0 | Status: SHIPPED | OUTPATIENT
Start: 2024-08-30 | End: 2024-09-01

## 2024-08-30 RX ORDER — ASPIRIN 81 MG/1
81 TABLET ORAL DAILY
Qty: 30 TABLET | Refills: 0 | Status: SHIPPED | OUTPATIENT
Start: 2024-08-30 | End: 2024-09-29

## 2024-08-30 RX ORDER — BUDESONIDE, GLYCOPYRROLATE, AND FORMOTEROL FUMARATE 160; 9; 4.8 UG/1; UG/1; UG/1
2 AEROSOL, METERED RESPIRATORY (INHALATION) 2 TIMES DAILY
Qty: 240 EACH | Refills: 0 | Status: SHIPPED | OUTPATIENT
Start: 2024-08-30 | End: 2024-09-29

## 2024-08-30 RX ORDER — IBUPROFEN 400 MG/1
400 TABLET, FILM COATED ORAL EVERY 6 HOURS PRN
Qty: 60 TABLET | Refills: 0 | Status: SHIPPED | OUTPATIENT
Start: 2024-08-30 | End: 2024-08-30

## 2024-08-30 RX ORDER — LISINOPRIL 20 MG/1
20 TABLET ORAL DAILY
Qty: 30 TABLET | Refills: 0 | Status: SHIPPED | OUTPATIENT
Start: 2024-08-30 | End: 2024-09-29

## 2024-08-30 RX ADMIN — GABAPENTIN 400 MG: 400 CAPSULE ORAL at 09:18

## 2024-08-30 RX ADMIN — LISINOPRIL 20 MG: 20 TABLET ORAL at 09:18

## 2024-08-30 RX ADMIN — NYSTATIN: 100000 POWDER TOPICAL at 09:19

## 2024-08-30 RX ADMIN — Medication 10 ML: at 09:19

## 2024-08-30 RX ADMIN — NICOTINE 1 PATCH: 21 PATCH, EXTENDED RELEASE TRANSDERMAL at 09:18

## 2024-08-30 RX ADMIN — ASPIRIN 81 MG: 81 TABLET, COATED ORAL at 09:18

## 2024-08-30 RX ADMIN — CITALOPRAM 20 MG: 20 TABLET, FILM COATED ORAL at 09:18

## 2024-08-30 RX ADMIN — NITROFURANTOIN MONOHYDRATE/MACROCRYSTALLINE 100 MG: 25; 75 CAPSULE ORAL at 09:31

## 2024-08-30 RX ADMIN — BUMETANIDE 1 MG: 1 TABLET ORAL at 09:18

## 2024-08-30 RX ADMIN — SODIUM CHLORIDE 75 ML/HR: 9 INJECTION, SOLUTION INTRAVENOUS at 01:55

## 2024-08-30 RX ADMIN — ALPRAZOLAM 1 MG: 0.5 TABLET ORAL at 09:17

## 2024-08-30 RX ADMIN — ENOXAPARIN SODIUM 40 MG: 100 INJECTION SUBCUTANEOUS at 09:31

## 2024-08-30 NOTE — PLAN OF CARE
Goal Outcome Evaluation:      Patient laying in bed resting quietly. Family in room. Patient to be discharged to [facility] for rehab. Report called and spoke with nurse Tamir JEAN.  EMS called for transport. All safety maintained and call light in reach.         [facility] here to transport patient to [facility].        Patient leaving floor per EMS going to [place] with all personal belongings.

## 2024-08-30 NOTE — PLAN OF CARE
Goal Outcome Evaluation:  Plan of Care Reviewed With: patient        Progress: no change  Outcome Evaluation: AOx3, dioriented to time. Family walked patient in wheelchair early in the evening. C/o pain and n/v, PRN medications given with good relief provided. Patient rested well this shift. Voiding via PW. Plans to DC to rehab, referrals sent out, waiting for reseponses. Safety maintained with call light within reach.

## 2024-08-30 NOTE — DISCHARGE SUMMARY
Hospital Discharge Summary    Pattie Liu  :  1945  MRN:  [id number]    Admit date:  2024  Discharge date:  2024    Admitting Physician:    Rafat Espinoza MD    Discharge Diagnoses:      Gait instability  Inferior pubic ramus fracture: Ortho consultation, appreciate recommendations.  - Weightbearing as tolerated, PT/OT.   UTI:    Culture >100,000 CFU/mL Escherichia coli Abnormal            Colonization of the urinary tract without infection is common. Treatment is discouraged unless the patient is symptomatic, pregnant, or undergoing an invasive urologic procedure.           Past Medical History:   Diagnosis Date    CAD in native artery 2019    COPD (chronic obstructive pulmonary disease)     Essential hypertension 2021    GERD (gastroesophageal reflux disease)     Lung nodule        Hospital Course:   The patient is a 79 y.o. female who presents with right thigh pain after fall at home on 24. She tripped while at home, denies dizziness or lightheadedness prior to fall. Imaging in ED revealed minimal displaced acute fracture of right inferior pubic ramus. She lives at home alone currently and has had multiple falls recently. Noted to have UTI treated in house. Clinically stable but will need SNF to recover from fx and improve mobility.     The patient was admitted for the above noted medical/surgical issues. Please see daily progress note for further details concerning their stay. The patient improved throughout their stay and reached maximum medical improvement on the day of discharge. The patient/family agree with the treatment plan as outlined above. All questions concerning their stay were answered prior to discharge. They understand the importance of follow up concerning any abnormal test results.     Physical Exam      Discharge Medications:         Discharge Medications        New Medications        Instructions Start Date   acetaminophen 325 MG tablet  Commonly  known as: TYLENOL   650 mg, Oral, Every 6 Hours PRN      aspirin 81 MG EC tablet  Replaces: aspirin 81 MG tablet   81 mg, Oral, Daily      ibuprofen 400 MG tablet  Commonly known as: ADVIL,MOTRIN   400 mg, Oral, Every 6 Hours PRN      melatonin 5 MG tablet tablet   10 mg, Oral, Nightly PRN      naloxone 4 MG/0.1ML nasal spray  Commonly known as: NARCAN   Call 911. Don't prime. [place] in 1 nostril for overdose. Repeat in 2-3 minutes in other nostril if no or minimal breathing/responsiveness.      nicotine 21 MG/24HR patch  Commonly known as: NICODERM CQ   1 patch, Transdermal, Every 24 Hours Scheduled      nitrofurantoin (macrocrystal-monohydrate) 100 MG capsule  Commonly known as: MACROBID   100 mg, Oral, Every 12 Hours Scheduled      nystatin 065503 UNIT/GM powder  Commonly known as: MYCOSTATIN   Topical, Every 12 Hours Scheduled      traMADol 50 MG tablet  Commonly known as: ULTRAM   50 mg, Oral, Every 6 Hours PRN             Changes to Medications        Instructions Start Date   dicyclomine 20 MG tablet  Commonly known as: BENTYL  What changed: reasons to take this   20 mg, Oral, 3 Times Daily PRN             Continue These Medications        Instructions Start Date   ALPRAZolam 1 MG tablet  Commonly known as: XANAX   1 mg, Oral, 2 Times Daily      atorvastatin 20 MG tablet  Commonly known as: LIPITOR   20 mg, Oral, Daily      Breztri Aerosphere 160-9-4.8 MCG/ACT aerosol inhaler  Generic drug: Budeson-Glycopyrrol-Formoterol   2 puffs, Inhalation, 2 Times Daily      bumetanide 1 MG tablet  Commonly known as: BUMEX   1 mg, Oral, Daily      citalopram 10 MG tablet  Commonly known as: CeleXA   30 mg, Oral, Daily      gabapentin 600 MG tablet  Commonly known as: NEURONTIN   600 mg, Oral, 2 Times Daily      lisinopril 20 MG tablet  Commonly known as: PRINIVIL,ZESTRIL   20 mg, Oral, Daily      loperamide 2 MG capsule  Commonly known as: IMODIUM   2 mg, Oral, 4 Times Daily PRN      O2  Commonly known as: OXYGEN   2  L/min, Inhalation, Continuous             Stop These Medications      aspirin 81 MG tablet  Replaced by: aspirin 81 MG EC tablet     colestipol 1 g tablet  Commonly known as: COLESTID     GINSENG-COMPLEX PO                 Activity: up with assist    Diet: regular    Consults: ortho, Dr. Francisco Bolden    Significant Diagnostic Studies:    XR Shoulder 1 View Left    Result Date: 8/27/2024  1. No acute fracture or dislocation. 2. Other nonacute findings as above.   This report was signed and finalized on 8/27/2024 7:02 AM by Dr. Tong Orellana MD.      CT Pelvis Without Contrast    Result Date: 8/26/2024   1. Acute minimally displaced right inferior pubic ramus fracture. No other acute pelvic fracture.  2. No hip fracture or dislocation. Right hip arthroplasty without evidence of complication.  This report was signed and finalized on 8/26/2024 8:11 PM by Dr. Jose Luevano MD.      CT Lumbar Spine Without Contrast    Result Date: 8/26/2024  1. No acute fracture or subluxation. 2. Mild multilevel lumbar spine degenerative change.   This report was signed and finalized on 8/26/2024 8:07 PM by Dr. Jose Luevano MD.      CT Cervical Spine Without Contrast    Result Date: 8/26/2024   1. No acute cervical spine fracture or subluxation.  2. Age-indeterminate mild compression deformity of T1 in the partially imaged upper thoracic spine. Correlate with point tenderness.    This report was signed and finalized on 8/26/2024 8:04 PM by Dr. Jose Luevano MD.      CT Head Without Contrast    Result Date: 8/26/2024  1. No acute intracranial findings. 2. Global cerebral volume loss and presumed chronic microvascular ischemic white matter change.   This report was signed and finalized on 8/26/2024 7:58 PM by Dr. Jose Luevano MD.      XR Femur 2 View Right    Result Date: 8/26/2024   No acute osseous findings. Postoperative change of right hip arthroplasty without evidence of complication.  This report was signed and  finalized on 8/26/2024 7:41 PM by Dr. Jose Luevano MD.            Treatments:   as above    Disposition:   SNF    Time spent on discharge including discussion with patient/family, SW, and coordination of care.     Follow up with Rafat Espinoza MD in 1 week after DC from SNF.    Signed:  Rafat Espinoza MD   8/30/2024, 08:56 CDT

## 2024-08-30 NOTE — THERAPY DISCHARGE NOTE
Acute Care - Occupational Therapy Discharge Summary  [facility]     Patient Name: Pattie Liu  : 1945  MRN: [id number]    Today's Date: 2024                 Admit Date: 2024        OT Recommendation and Plan    Visit Dx:    ICD-10-CM ICD-9-CM   1. Gait instability  R26.81 781.2   2. Closed fracture of ramus of right pubis, initial encounter  S32.591A 808.2   3. Multiple falls  R29.6 V15.88   4. Urinary tract infection without hematuria, site unspecified  N39.0 599.0   5. Impaired mobility [Z74.09]  Z74.09 799.89   6. Chronic bilateral low back pain with bilateral sciatica  M54.42 724.2    M54.41 724.3    G89.29 338.29   7. SURI (generalized anxiety disorder)  F41.1 300.02                OT Rehab Goals       Row Name 24 1400             Transfer Goal 1 (OT)    Activity/Assistive Device (Transfer Goal 1, OT) commode, bedside without drop arms  -JJ      [place] Level/Cues Needed (Transfer Goal 1, OT) standby assist  -JJ      Time Frame (Transfer Goal 1, OT) long term goal (LTG);10 days  -JJ      Progress/Outcome (Transfer Goal 1, OT) goal not met;discharged from facility  -JJ         Dressing Goal 1 (OT)    Activity/Device (Dressing Goal 1, OT) dressing skills, all  -JJ      [place]/Cues Needed (Dressing Goal 1, OT) minimum assist (75% or more patient effort)  -JJ      Time Frame (Dressing Goal 1, OT) long term goal (LTG);10 days  -JJ      Progress/Outcome (Dressing Goal 1, OT) goal not met;discharged from facility  -JJ         Toileting Goal 1 (OT)    Activity/Device (Toileting Goal 1, OT) toileting skills, all  -JJ      [place] Level/Cues Needed (Toileting Goal 1, OT) minimum assist (75% or more patient effort)  -JJ      Time Frame (Toileting Goal 1, OT) long term goal (LTG);10 days  -JJ      Progress/Outcome (Toileting Goal 1, OT) goal not met;discharged from facility  -JJ         Problem Specific Goal 1 (OT)    Problem Specific Goal 1 (OT) Pt will independently implement  one pain management technique to decrease pain and improve functional performance.  -J      Time Frame (Problem Specific Goal 1, OT) long term goal (LTG);by discharge  -J      Progress/Outcome (Problem Specific Goal 1, OT) goal not met;discharged from facility  -                User Key  (r) = Recorded By, (t) = Taken By, (c) = Cosigned By      Initials Name Provider Type [facility]    Emilie Javed, OTR/L, CSRS Occupational Therapist OT                     Outcome Measures       Row Name 08/29/24 0848             How much help from another person do you currently need...    Turning from your back to your side while in flat bed without using bedrails? 3  -AH      Moving from lying on back to sitting on the side of a flat bed without bedrails? 3  -AH      Moving to and from a bed to a chair (including a wheelchair)? 3  -AH      Standing up from a chair using your arms (e.g., wheelchair, bedside chair)? 3  -AH      Climbing 3-5 steps with a railing? 2  -AH      To walk in hospital room? 2  -AH      AM-PAC 6 Clicks Score (PT) 16  -AH      Highest Level of Mobility Goal 5 --> Static standing  -         Functional Assessment    Outcome Measure Options AM-PAC 6 Clicks Basic Mobility (PT)  -                User Key  (r) = Recorded By, (t) = Taken By, (c) = Cosigned By      Initials Name Provider Type     Rashida Crockett, PTA Physical Therapist Assistant                    Timed Therapy Charges  Total Units: 4      Suggested Charges  Total Units: 4      Procedure Name Documented Minutes Units Code    HC OT SELF CARE/MGMT/TRAIN EA 15 MIN 60 4   64821 (CPT®)                 Documented Minutes  Total Minutes: 60      Therapy Provided Minutes    05227 - OT Self Care/Mgmt Minutes 60                        OT Discharge Summary  Anticipated Discharge Disposition (OT): skilled nursing facility  Reason for Discharge: Discharge from facility  Outcomes Achieved: Refer to plan of care for updates on goals  achieved  Discharge Destination: [facility]      Emilie Hinds, OTR/L, CSRS  8/30/2024

## 2024-08-30 NOTE — THERAPY DISCHARGE NOTE
Acute Care - Physical Therapy Discharge Summary  [facility]       Patient Name: Pattie Liu  : 1945  MRN: [id number]    Today's Date: 2024                 Admit Date: 2024      PT Recommendation and Plan    Visit Dx:    ICD-10-CM ICD-9-CM   1. Gait instability  R26.81 781.2   2. Closed fracture of ramus of right pubis, initial encounter  S32.591A 808.2   3. Multiple falls  R29.6 V15.88   4. Urinary tract infection without hematuria, site unspecified  N39.0 599.0   5. Impaired mobility [Z74.09]  Z74.09 799.89   6. Chronic bilateral low back pain with bilateral sciatica  M54.42 724.2    M54.41 724.3    G89.29 338.29   7. SURI (generalized anxiety disorder)  F41.1 300.02        Outcome Measures       Row Name 24 0848             How much help from another person do you currently need...    Turning from your back to your side while in flat bed without using bedrails? 3  -AH      Moving from lying on back to sitting on the side of a flat bed without bedrails? 3  -AH      Moving to and from a bed to a chair (including a wheelchair)? 3  -AH      Standing up from a chair using your arms (e.g., wheelchair, bedside chair)? 3  -AH      Climbing 3-5 steps with a railing? 2  -AH      To walk in hospital room? 2  -AH      AM-PAC 6 Clicks Score (PT) 16  -AH      Highest Level of Mobility Goal 5 --> Static standing  -         Functional Assessment    Outcome Measure Options AM-PAC 6 Clicks Basic Mobility (PT)  -                User Key  (r) = Recorded By, (t) = Taken By, (c) = Cosigned By      Initials Name Provider Type     Rashida Crockett, PTA Physical Therapist Assistant                         PT Rehab Goals       Row Name 24 1300             Bed Mobility Goal 1 (PT)    Activity/Assistive Device (Bed Mobility Goal 1, PT) bed mobility activities, all  -AB      [place] Level/Cues Needed (Bed Mobility Goal 1, PT) contact guard required;tactile cues required;verbal cues required  -AB       Time Frame (Bed Mobility Goal 1, PT) long term goal (LTG);by discharge  -AB      Progress/Outcomes (Bed Mobility Goal 1, PT) goal not met  -AB         Transfer Goal 1 (PT)    Activity/Assistive Device (Transfer Goal 1, PT) sit-to-stand/stand-to-sit;bed-to-chair/chair-to-bed;walker, rolling  -AB      [place] Level/Cues Needed (Transfer Goal 1, PT) contact guard required  -AB      Time Frame (Transfer Goal 1, PT) long term goal (LTG);by discharge  -AB      Progress/Outcome (Transfer Goal 1, PT) goal not met  -AB         Gait Training Goal 1 (PT)    Activity/Assistive Device (Gait Training Goal 1, PT) gait (walking locomotion);assistive device use;decrease fall risk;increase endurance/gait distance;improve balance and speed;walker, rolling  -AB      [place] Level (Gait Training Goal 1, PT) contact guard required  -AB      Distance (Gait Training Goal 1, PT) 50ft  -AB      Time Frame (Gait Training Goal 1, PT) long term goal (LTG);by discharge  -AB      Progress/Outcome (Gait Training Goal 1, PT) goal not met  -AB                User Key  (r) = Recorded By, (t) = Taken By, (c) = Cosigned By      Initials Name Provider Type Discipline    Desirae Busby PTA Physical Therapist Assistant PT                        PT Discharge Summary  Anticipated Discharge Disposition (PT): skilled nursing facility  Reason for Discharge: Discharge from facility  Outcomes Achieved: Refer to plan of care for updates on goals achieved  Discharge Destination: SNF      Desirae Beckwith PTA   8/30/2024

## 2024-08-30 NOTE — PROGRESS NOTES
Daily Progress Note  Pattie Liu  MRN: [id number] LOS: 4    Admit Date: 8/26/2024 8/30/2024 08:48 CDT    Subjective:      Chief Complaint:  Chief Complaint   Patient presents with    Fall       Interval History:    Reviewed overnight events and nursing notes.   No acute events overnight.  Pain is better controlled.  To determine placement options.    Review of Systems   Constitutional:  Positive for activity change, appetite change and fatigue. Negative for fever.   Respiratory:  Positive for shortness of breath.    Gastrointestinal:  Negative for nausea and vomiting.   Genitourinary:  Positive for decreased urine volume and dysuria.   Musculoskeletal:  Positive for gait problem.   Neurological:  Positive for weakness.       DIET:  Diet: Regular/House; Fluid Consistency: Thin (IDDSI 0)    Medications:   sodium chloride, 75 mL/hr, Last Rate: 75 mL/hr (08/30/24 0155)      ALPRAZolam, 1 mg, Oral, BID  aspirin, 81 mg, Oral, Daily  bumetanide, 1 mg, Oral, Daily  cefTRIAXone, 1,000 mg, Intravenous, Q24H  citalopram, 20 mg, Oral, BID  enoxaparin, 40 mg, Subcutaneous, Daily  gabapentin, 400 mg, Oral, Q12H  lisinopril, 20 mg, Oral, Daily  nicotine, 1 patch, Transdermal, Q24H  nystatin, , Topical, Q12H  sodium chloride, 10 mL, Intravenous, Q12H        Data:     Code Status:   Code Status and Medical Interventions: No CPR (Do Not Attempt to Resuscitate); Full Support   Ordered at: 08/27/24 1032     Code Status (Patient has no pulse and is not breathing):    No CPR (Do Not Attempt to Resuscitate)     Medical Interventions (Patient has pulse or is breathing):    Full Support       Family History   Problem Relation Age of Onset    Cancer Mother     Cancer Father      Social History     Socioeconomic History    Marital status:    Tobacco Use    Smoking status: Every Day     Current packs/day: 0.50     Average packs/day: 0.5 packs/day for 62.0 years (31.0 ttl pk-yrs)     Types: Cigarettes    Smokeless tobacco: Never     Tobacco comments:     states she has no plan to quit smoking   Vaping Use    Vaping status: Former   Substance and Sexual Activity    Alcohol use: No    Drug use: No    Sexual activity: Not Currently       Labs:    Lab Results (last 72 hours)       Procedure Component Value Units Date/Time    Comprehensive Metabolic Panel [[id number]]  (Abnormal) Collected: 08/28/24 0438    Specimen: Blood Updated: 08/28/24 0516     Glucose 88 mg/dL      BUN 14 mg/dL      Creatinine 1.05 mg/dL      Sodium 144 mmol/L      Potassium 4.0 mmol/L      Chloride 109 mmol/L      CO2 25.0 mmol/L      Calcium 9.4 mg/dL      Total Protein 6.6 g/dL      Albumin 3.4 g/dL      ALT (SGPT) 8 U/L      AST (SGOT) 16 U/L      Alkaline Phosphatase 101 U/L      Total Bilirubin 0.2 mg/dL      Globulin 3.2 gm/dL      A/G Ratio 1.1 g/dL      BUN/Creatinine Ratio 13.3     Anion Gap 10.0 mmol/L      eGFR 53.5 mL/min/1.73     Narrative:      GFR Normal >60  Chronic Kidney Disease <60  Kidney Failure <15    The GFR formula is only valid for adults with stable renal function between ages 18 and 70.    CBC Auto Differential [[id number]]  (Abnormal) Collected: 08/28/24 0438    Specimen: Blood Updated: 08/28/24 0455     WBC 10.81 10*3/mm3      RBC 4.08 10*6/mm3      Hemoglobin 12.1 g/dL      Hematocrit 39.1 %      MCV 95.8 fL      MCH 29.7 pg      MCHC 30.9 g/dL      RDW 13.1 %      RDW-SD 46.5 fl      MPV 10.4 fL      Platelets 394 10*3/mm3      Neutrophil % 66.0 %      Lymphocyte % 22.8 %      Monocyte % 6.8 %      Eosinophil % 3.3 %      Basophil % 0.7 %      Immature Grans % 0.4 %      Neutrophils, Absolute 7.14 10*3/mm3      Lymphocytes, Absolute 2.46 10*3/mm3      Monocytes, Absolute 0.73 10*3/mm3      Eosinophils, Absolute 0.36 10*3/mm3      Basophils, Absolute 0.08 10*3/mm3      Immature Grans, Absolute 0.04 10*3/mm3      nRBC 0.0 /100 WBC     Comprehensive Metabolic Panel [[id number]]  (Abnormal) Collected: 08/26/24 2039    Specimen: Blood Updated:  08/26/24 2108     Glucose 102 mg/dL      BUN 19 mg/dL      Creatinine 1.36 mg/dL      Sodium 141 mmol/L      Potassium 4.0 mmol/L      Chloride 101 mmol/L      CO2 27.0 mmol/L      Calcium 10.0 mg/dL      Total Protein 7.2 g/dL      Albumin 3.9 g/dL      ALT (SGPT) 10 U/L      AST (SGOT) 17 U/L      Alkaline Phosphatase 110 U/L      Total Bilirubin 0.2 mg/dL      Globulin 3.3 gm/dL      A/G Ratio 1.2 g/dL      BUN/Creatinine Ratio 14.0     Anion Gap 13.0 mmol/L      eGFR 39.2 mL/min/1.73     Narrative:      GFR Normal >60  Chronic Kidney Disease <60  Kidney Failure <15    The GFR formula is only valid for adults with stable renal function between ages 18 and 70.    Urinalysis, Microscopic Only - Straight Cath [[id number]]  (Abnormal) Collected: 08/26/24 2031    Specimen: Urine from Straight Cath Updated: 08/26/24 2056     RBC, UA None Seen /HPF      WBC, UA 3-5 /HPF      Comment: Urine culture not indicated.        Bacteria, UA 4+ /HPF      Squamous Epithelial Cells, UA 3-6 /HPF      Hyaline Casts, UA 0-2 /LPF      Methodology Automated Microscopy    Urinalysis With Culture If Indicated - Straight Cath [[id number]]  (Abnormal) Collected: 08/26/24 2031    Specimen: Urine from Straight Cath Updated: 08/26/24 2056     Color, UA Yellow     Appearance, UA Clear     pH, UA 5.5     Specific Gravity, UA 1.012     Glucose, UA Negative     Ketones, UA Negative     Bilirubin, UA Negative     Blood, UA Negative     Protein, UA Negative     Leuk Esterase, UA Small (1+)     Nitrite, UA Negative     Urobilinogen, UA 0.2 E.U./dL    Narrative:      In absence of clinical symptoms, the presence of pyuria, bacteria, and/or nitrites on the urinalysis result does not correlate with infection.    CBC & Differential [[id number]]  (Abnormal) Collected: 08/26/24 2039    Specimen: Blood Updated: 08/26/24 2049    Narrative:      The following orders were created for panel order CBC & Differential.  Procedure                                "Abnormality         Status                     ---------                               -----------         ------                     CBC Auto Differential[[id number]]        Abnormal            Final result                 Please view results for these tests on the individual orders.    CBC Auto Differential [[id number]]  (Abnormal) Collected: 24    Specimen: Blood Updated: 24     WBC 15.56 10*3/mm3      RBC 4.03 10*6/mm3      Hemoglobin 12.2 g/dL      Hematocrit 38.0 %      MCV 94.3 fL      MCH 30.3 pg      MCHC 32.1 g/dL      RDW 13.4 %      RDW-SD 46.2 fl      MPV 10.1 fL      Platelets 394 10*3/mm3      Neutrophil % 79.5 %      Lymphocyte % 13.4 %      Monocyte % 4.9 %      Eosinophil % 1.2 %      Basophil % 0.4 %      Immature Grans % 0.6 %      Neutrophils, Absolute 12.38 10*3/mm3      Lymphocytes, Absolute 2.08 10*3/mm3      Monocytes, Absolute 0.76 10*3/mm3      Eosinophils, Absolute 0.18 10*3/mm3      Basophils, Absolute 0.07 10*3/mm3      Immature Grans, Absolute 0.09 10*3/mm3      nRBC 0.0 /100 WBC               Objective:     Vitals: /56 (BP Location: Left arm, Patient Position: Lying)   Pulse 54   Temp 97.9 °F (36.6 °C) (Oral)   Resp 16   Ht 160 cm (62.99\")   Wt 63.7 kg (140 lb 8 oz)   SpO2 92%   BMI 24.89 kg/m²    Intake/Output Summary (Last 24 hours) at 2024 0848  Last data filed at 2024 0530  Gross per 24 hour   Intake 996.51 ml   Output 1750 ml   Net -753.49 ml    Temp (24hrs), Av.1 °F (36.7 °C), Min:97.8 °F (36.6 °C), Max:98.6 °F (37 °C)      Physical Exam  Vitals reviewed.   Constitutional:       Appearance: Normal appearance. She is not ill-appearing.   HENT:      Head: Normocephalic and atraumatic.   Eyes:      General:         Right eye: No discharge.         Left eye: No discharge.      Extraocular Movements: Extraocular movements intact.      Conjunctiva/sclera: Conjunctivae normal.   Cardiovascular:      Rate and Rhythm: Normal rate and regular " rhythm.      Pulses: Normal pulses.      Heart sounds: No murmur heard.  Pulmonary:      Effort: Pulmonary effort is normal. No respiratory distress.      Comments: Nasal cannula in place  Abdominal:      General: Abdomen is flat. Bowel sounds are normal. There is no distension.   Musculoskeletal:      Right lower leg: No edema.      Left lower leg: No edema.   Skin:     Capillary Refill: Capillary refill takes less than 2 seconds.   Neurological:      General: No focal deficit present.      Mental Status: She is alert.   Psychiatric:         Mood and Affect: Mood normal.         Behavior: Behavior normal.             Assessment and Plan:     Primary Problem:  Gait instability    Hospital Problem list:    Gait instability      PMH:  Past Medical History:   Diagnosis Date    CAD in native artery 7/29/2019    COPD (chronic obstructive pulmonary disease)     Essential hypertension 12/7/2021    GERD (gastroesophageal reflux disease)     Lung nodule        Treatment Plan:  Inferior pubic ramus fracture: Ortho consultation, appreciate recommendations.  - Weightbearing as tolerated, PT/OT.  Likely SNF.     Gait instability: resulting in problem #1 above. PT/OT consultations. She has declined SNF in the past. Lives alone currently. CM for placement options.     UTI:    Culture >100,000 CFU/mL Escherichia coli Abnormal          Colonization of the urinary tract without infection is common. Treatment is discouraged unless the patient is symptomatic, pregnant, or undergoing an invasive urologic procedure.          Resulting Agency: [facility]     Susceptibility     Escherichia coli    [place]    Ampicillin >=32 ug/ml Resistant    Ampicillin + Sulbactam 16 ug/ml Intermediate    Cefazolin <=4 ug/ml Susceptible    Cefepime <=1 ug/ml Susceptible    Ceftazidime <=1 ug/ml Susceptible    Ceftriaxone <=1 ug/ml Susceptible    Gentamicin >=16 ug/ml Resistant    Levofloxacin <=0.12 ug/ml Susceptible    Nitrofurantoin <=16 ug/ml Susceptible     Piperacillin + Tazobactam <=4 ug/ml Susceptible    Tobramycin 4 ug/ml Susceptible    Trimethoprim + Sulfamethoxazole >=320 ug/ml Resistant              Linear View         Specimen Collected: 05/04/23 20:05 CDT Last Resulted: 05/06/23 11:16 CDT               Disposition: inpatient.     Reviewed treatment plans with the patient and/or family.   30 minutes spent in face to face interaction and coordination of care.     Electronically signed by Rafat Espinoza MD on 8/30/2024 at 08:48 CDT

## 2024-08-30 NOTE — CASE MANAGEMENT/SOCIAL WORK
Continued Stay Note  [facility]     Patient Name: Pattie Liu  MRN: [id number]  Today's Date: 8/30/2024    Admit Date: 8/26/2024        Discharge Plan       Row Name 08/30/24 0922       Plan    Final Discharge Disposition Code 03 - skilled nursing facility (SNF)    Final Note Bed offered at [facility] and they can take pt today. Pharmacy updated in [facility] for [facility]. Call report number is [phone]. Fax number is [phone].                   Discharge Codes    No documentation.                 Expected Discharge Date and Time       Expected Discharge Date Expected Discharge Time    Aug 30, 2024               ERIC Matias

## 2024-09-11 ENCOUNTER — HOSPITAL ENCOUNTER (INPATIENT)
Facility: HOSPITAL | Age: 79
LOS: 12 days | Discharge: NURSING, REHAB OR LONG-TERM CARE | End: 2024-09-23
Attending: EMERGENCY MEDICINE | Admitting: FAMILY MEDICINE
Payer: MEDICARE

## 2024-09-11 ENCOUNTER — APPOINTMENT (OUTPATIENT)
Dept: CT IMAGING | Facility: HOSPITAL | Age: 79
End: 2024-09-11
Payer: MEDICARE

## 2024-09-11 ENCOUNTER — APPOINTMENT (OUTPATIENT)
Dept: GENERAL RADIOLOGY | Facility: HOSPITAL | Age: 79
End: 2024-09-11
Payer: MEDICARE

## 2024-09-11 DIAGNOSIS — Z74.09 IMPAIRED MOBILITY: ICD-10-CM

## 2024-09-11 DIAGNOSIS — R41.82 ALTERED MENTAL STATUS, UNSPECIFIED ALTERED MENTAL STATUS TYPE: ICD-10-CM

## 2024-09-11 DIAGNOSIS — F41.9 ANXIETY DISORDER, UNSPECIFIED TYPE: ICD-10-CM

## 2024-09-11 DIAGNOSIS — E86.0 DEHYDRATION: ICD-10-CM

## 2024-09-11 DIAGNOSIS — Z78.9 DECREASED ACTIVITIES OF DAILY LIVING (ADL): ICD-10-CM

## 2024-09-11 DIAGNOSIS — J18.9 PNEUMONIA OF RIGHT LUNG DUE TO INFECTIOUS ORGANISM, UNSPECIFIED PART OF LUNG: Primary | ICD-10-CM

## 2024-09-11 DIAGNOSIS — G89.29 CHRONIC LOW BACK PAIN, UNSPECIFIED BACK PAIN LATERALITY, UNSPECIFIED WHETHER SCIATICA PRESENT: ICD-10-CM

## 2024-09-11 DIAGNOSIS — M54.50 CHRONIC LOW BACK PAIN, UNSPECIFIED BACK PAIN LATERALITY, UNSPECIFIED WHETHER SCIATICA PRESENT: ICD-10-CM

## 2024-09-11 LAB
ALBUMIN SERPL-MCNC: 3.4 G/DL (ref 3.5–5.2)
ALBUMIN/GLOB SERPL: 1 G/DL
ALP SERPL-CCNC: 119 U/L (ref 39–117)
ALT SERPL W P-5'-P-CCNC: 11 U/L (ref 1–33)
AMMONIA BLD-SCNC: 15 UMOL/L (ref 11–51)
ANION GAP SERPL CALCULATED.3IONS-SCNC: 9 MMOL/L (ref 5–15)
AST SERPL-CCNC: 14 U/L (ref 1–32)
BASOPHILS # BLD AUTO: 0.04 10*3/MM3 (ref 0–0.2)
BASOPHILS NFR BLD AUTO: 0.4 % (ref 0–1.5)
BILIRUB SERPL-MCNC: 0.2 MG/DL (ref 0–1.2)
BILIRUB UR QL STRIP: NEGATIVE
BUN SERPL-MCNC: 26 MG/DL (ref 8–23)
BUN/CREAT SERPL: 21.1 (ref 7–25)
CALCIUM SPEC-SCNC: 9.6 MG/DL (ref 8.6–10.5)
CHLORIDE SERPL-SCNC: 101 MMOL/L (ref 98–107)
CLARITY UR: CLEAR
CO2 SERPL-SCNC: 32 MMOL/L (ref 22–29)
COLOR UR: YELLOW
CREAT SERPL-MCNC: 1.23 MG/DL (ref 0.57–1)
DEPRECATED RDW RBC AUTO: 46.7 FL (ref 37–54)
EGFRCR SERPLBLD CKD-EPI 2021: 44.2 ML/MIN/1.73
EOSINOPHIL # BLD AUTO: 0.41 10*3/MM3 (ref 0–0.4)
EOSINOPHIL NFR BLD AUTO: 3.7 % (ref 0.3–6.2)
ERYTHROCYTE [DISTWIDTH] IN BLOOD BY AUTOMATED COUNT: 13.5 % (ref 12.3–15.4)
GLOBULIN UR ELPH-MCNC: 3.3 GM/DL
GLUCOSE SERPL-MCNC: 102 MG/DL (ref 65–99)
GLUCOSE UR STRIP-MCNC: NEGATIVE MG/DL
HCT VFR BLD AUTO: 38.7 % (ref 34–46.6)
HGB BLD-MCNC: 12.4 G/DL (ref 12–15.9)
HGB UR QL STRIP.AUTO: NEGATIVE
IMM GRANULOCYTES # BLD AUTO: 0.06 10*3/MM3 (ref 0–0.05)
IMM GRANULOCYTES NFR BLD AUTO: 0.5 % (ref 0–0.5)
KETONES UR QL STRIP: NEGATIVE
LEUKOCYTE ESTERASE UR QL STRIP.AUTO: NEGATIVE
LYMPHOCYTES # BLD AUTO: 1.97 10*3/MM3 (ref 0.7–3.1)
LYMPHOCYTES NFR BLD AUTO: 18 % (ref 19.6–45.3)
MAGNESIUM SERPL-MCNC: 2.2 MG/DL (ref 1.6–2.4)
MCH RBC QN AUTO: 30.3 PG (ref 26.6–33)
MCHC RBC AUTO-ENTMCNC: 32 G/DL (ref 31.5–35.7)
MCV RBC AUTO: 94.6 FL (ref 79–97)
MONOCYTES # BLD AUTO: 0.83 10*3/MM3 (ref 0.1–0.9)
MONOCYTES NFR BLD AUTO: 7.6 % (ref 5–12)
MRSA DNA SPEC QL NAA+PROBE: NORMAL
NEUTROPHILS NFR BLD AUTO: 69.8 % (ref 42.7–76)
NEUTROPHILS NFR BLD AUTO: 7.64 10*3/MM3 (ref 1.7–7)
NITRITE UR QL STRIP: NEGATIVE
NRBC BLD AUTO-RTO: 0 /100 WBC (ref 0–0.2)
PH UR STRIP.AUTO: 5.5 [PH] (ref 5–8)
PLATELET # BLD AUTO: 357 10*3/MM3 (ref 140–450)
PMV BLD AUTO: 10.9 FL (ref 6–12)
POTASSIUM SERPL-SCNC: 3.2 MMOL/L (ref 3.5–5.2)
PROT SERPL-MCNC: 6.7 G/DL (ref 6–8.5)
PROT UR QL STRIP: NEGATIVE
RBC # BLD AUTO: 4.09 10*6/MM3 (ref 3.77–5.28)
SARS-COV-2 RNA RESP QL NAA+PROBE: NOT DETECTED
SODIUM SERPL-SCNC: 142 MMOL/L (ref 136–145)
SP GR UR STRIP: 1.01 (ref 1–1.03)
UROBILINOGEN UR QL STRIP: NORMAL
WBC NRBC COR # BLD AUTO: 10.95 10*3/MM3 (ref 3.4–10.8)

## 2024-09-11 PROCEDURE — 94799 UNLISTED PULMONARY SVC/PX: CPT

## 2024-09-11 PROCEDURE — 81003 URINALYSIS AUTO W/O SCOPE: CPT | Performed by: EMERGENCY MEDICINE

## 2024-09-11 PROCEDURE — 85025 COMPLETE CBC W/AUTO DIFF WBC: CPT | Performed by: EMERGENCY MEDICINE

## 2024-09-11 PROCEDURE — 25010000002 CEFEPIME PER 500 MG: Performed by: EMERGENCY MEDICINE

## 2024-09-11 PROCEDURE — 87635 SARS-COV-2 COVID-19 AMP PRB: CPT | Performed by: EMERGENCY MEDICINE

## 2024-09-11 PROCEDURE — 94664 DEMO&/EVAL PT USE INHALER: CPT

## 2024-09-11 PROCEDURE — 25810000003 SODIUM CHLORIDE 0.9 % SOLUTION: Performed by: FAMILY MEDICINE

## 2024-09-11 PROCEDURE — P9612 CATHETERIZE FOR URINE SPEC: HCPCS

## 2024-09-11 PROCEDURE — 82140 ASSAY OF AMMONIA: CPT | Performed by: EMERGENCY MEDICINE

## 2024-09-11 PROCEDURE — 87641 MR-STAPH DNA AMP PROBE: CPT | Performed by: FAMILY MEDICINE

## 2024-09-11 PROCEDURE — 25010000002 VANCOMYCIN 10 G RECONSTITUTED SOLUTION: Performed by: EMERGENCY MEDICINE

## 2024-09-11 PROCEDURE — 71045 X-RAY EXAM CHEST 1 VIEW: CPT

## 2024-09-11 PROCEDURE — 83735 ASSAY OF MAGNESIUM: CPT | Performed by: EMERGENCY MEDICINE

## 2024-09-11 PROCEDURE — 25810000003 SODIUM CHLORIDE 0.9 % SOLUTION: Performed by: EMERGENCY MEDICINE

## 2024-09-11 PROCEDURE — 99285 EMERGENCY DEPT VISIT HI MDM: CPT

## 2024-09-11 PROCEDURE — 36415 COLL VENOUS BLD VENIPUNCTURE: CPT

## 2024-09-11 PROCEDURE — 70450 CT HEAD/BRAIN W/O DYE: CPT

## 2024-09-11 PROCEDURE — 87040 BLOOD CULTURE FOR BACTERIA: CPT | Performed by: EMERGENCY MEDICINE

## 2024-09-11 PROCEDURE — 25010000002 ENOXAPARIN PER 10 MG: Performed by: FAMILY MEDICINE

## 2024-09-11 PROCEDURE — 80053 COMPREHEN METABOLIC PANEL: CPT | Performed by: EMERGENCY MEDICINE

## 2024-09-11 PROCEDURE — 94640 AIRWAY INHALATION TREATMENT: CPT

## 2024-09-11 RX ORDER — DOCUSATE SODIUM 100 MG/1
100 CAPSULE, LIQUID FILLED ORAL 2 TIMES DAILY PRN
COMMUNITY

## 2024-09-11 RX ORDER — PANTOPRAZOLE SODIUM 40 MG/1
40 TABLET, DELAYED RELEASE ORAL DAILY
COMMUNITY

## 2024-09-11 RX ORDER — ACETAMINOPHEN 325 MG/1
650 TABLET ORAL EVERY 6 HOURS PRN
Status: DISCONTINUED | OUTPATIENT
Start: 2024-09-11 | End: 2024-09-23 | Stop reason: HOSPADM

## 2024-09-11 RX ORDER — IPRATROPIUM BROMIDE AND ALBUTEROL SULFATE 2.5; .5 MG/3ML; MG/3ML
3 SOLUTION RESPIRATORY (INHALATION)
Status: DISCONTINUED | OUTPATIENT
Start: 2024-09-11 | End: 2024-09-11 | Stop reason: ALTCHOICE

## 2024-09-11 RX ORDER — LISINOPRIL 20 MG/1
20 TABLET ORAL DAILY
Status: DISCONTINUED | OUTPATIENT
Start: 2024-09-12 | End: 2024-09-23 | Stop reason: HOSPADM

## 2024-09-11 RX ORDER — ATORVASTATIN CALCIUM 10 MG/1
20 TABLET, FILM COATED ORAL DAILY
Status: DISCONTINUED | OUTPATIENT
Start: 2024-09-12 | End: 2024-09-23 | Stop reason: HOSPADM

## 2024-09-11 RX ORDER — SODIUM CHLORIDE 0.9 % (FLUSH) 0.9 %
10 SYRINGE (ML) INJECTION AS NEEDED
Status: DISCONTINUED | OUTPATIENT
Start: 2024-09-11 | End: 2024-09-20

## 2024-09-11 RX ORDER — ALPRAZOLAM 0.5 MG
1 TABLET ORAL 2 TIMES DAILY
Status: DISCONTINUED | OUTPATIENT
Start: 2024-09-11 | End: 2024-09-23 | Stop reason: HOSPADM

## 2024-09-11 RX ORDER — AMINO ACIDS/PROTEIN HYDROLYS 15G-100/30
30 LIQUID (ML) ORAL 2 TIMES DAILY
COMMUNITY

## 2024-09-11 RX ORDER — SODIUM CHLORIDE 0.9 % (FLUSH) 0.9 %
10 SYRINGE (ML) INJECTION AS NEEDED
Status: DISCONTINUED | OUTPATIENT
Start: 2024-09-11 | End: 2024-09-23 | Stop reason: HOSPADM

## 2024-09-11 RX ORDER — SODIUM CHLORIDE 9 MG/ML
75 INJECTION, SOLUTION INTRAVENOUS CONTINUOUS
Status: DISCONTINUED | OUTPATIENT
Start: 2024-09-11 | End: 2024-09-15

## 2024-09-11 RX ORDER — BUMETANIDE 1 MG/1
1 TABLET ORAL DAILY
Status: DISCONTINUED | OUTPATIENT
Start: 2024-09-12 | End: 2024-09-23 | Stop reason: HOSPADM

## 2024-09-11 RX ORDER — DOCUSATE SODIUM 100 MG/1
100 CAPSULE, LIQUID FILLED ORAL 2 TIMES DAILY PRN
Status: DISCONTINUED | OUTPATIENT
Start: 2024-09-11 | End: 2024-09-11 | Stop reason: ALTCHOICE

## 2024-09-11 RX ORDER — ALPRAZOLAM 1 MG
1 TABLET ORAL 2 TIMES DAILY
Status: ON HOLD | COMMUNITY
End: 2024-09-23

## 2024-09-11 RX ORDER — AMOXICILLIN 250 MG
2 CAPSULE ORAL 2 TIMES DAILY PRN
Status: DISCONTINUED | OUTPATIENT
Start: 2024-09-11 | End: 2024-09-23 | Stop reason: HOSPADM

## 2024-09-11 RX ORDER — GABAPENTIN 300 MG/1
300 CAPSULE ORAL 2 TIMES DAILY
Status: ON HOLD | COMMUNITY
End: 2024-09-23

## 2024-09-11 RX ORDER — PANTOPRAZOLE SODIUM 40 MG/1
40 TABLET, DELAYED RELEASE ORAL
Status: DISCONTINUED | OUTPATIENT
Start: 2024-09-12 | End: 2024-09-23 | Stop reason: HOSPADM

## 2024-09-11 RX ORDER — GABAPENTIN 300 MG/1
300 CAPSULE ORAL 2 TIMES DAILY
Status: DISCONTINUED | OUTPATIENT
Start: 2024-09-11 | End: 2024-09-23 | Stop reason: HOSPADM

## 2024-09-11 RX ORDER — ONDANSETRON 4 MG/1
4 TABLET, ORALLY DISINTEGRATING ORAL EVERY 8 HOURS PRN
Status: DISCONTINUED | OUTPATIENT
Start: 2024-09-11 | End: 2024-09-23 | Stop reason: HOSPADM

## 2024-09-11 RX ORDER — ONDANSETRON 4 MG/1
4 TABLET, FILM COATED ORAL EVERY 6 HOURS PRN
COMMUNITY

## 2024-09-11 RX ORDER — OXYCODONE AND ACETAMINOPHEN 5; 325 MG/1; MG/1
1 TABLET ORAL EVERY 6 HOURS PRN
Status: DISPENSED | OUTPATIENT
Start: 2024-09-11 | End: 2024-09-16

## 2024-09-11 RX ORDER — ASPIRIN 81 MG/1
81 TABLET ORAL DAILY
Status: DISCONTINUED | OUTPATIENT
Start: 2024-09-12 | End: 2024-09-23 | Stop reason: HOSPADM

## 2024-09-11 RX ORDER — NICOTINE 21 MG/24HR
1 PATCH, TRANSDERMAL 24 HOURS TRANSDERMAL EVERY 24 HOURS
Status: DISCONTINUED | OUTPATIENT
Start: 2024-09-11 | End: 2024-09-23 | Stop reason: HOSPADM

## 2024-09-11 RX ORDER — SODIUM CHLORIDE 9 MG/ML
40 INJECTION, SOLUTION INTRAVENOUS AS NEEDED
Status: DISCONTINUED | OUTPATIENT
Start: 2024-09-11 | End: 2024-09-23 | Stop reason: HOSPADM

## 2024-09-11 RX ORDER — SODIUM CHLORIDE 0.9 % (FLUSH) 0.9 %
10 SYRINGE (ML) INJECTION EVERY 12 HOURS SCHEDULED
Status: DISCONTINUED | OUTPATIENT
Start: 2024-09-11 | End: 2024-09-23 | Stop reason: HOSPADM

## 2024-09-11 RX ORDER — BISACODYL 5 MG/1
5 TABLET, DELAYED RELEASE ORAL DAILY PRN
Status: DISCONTINUED | OUTPATIENT
Start: 2024-09-11 | End: 2024-09-23 | Stop reason: HOSPADM

## 2024-09-11 RX ORDER — POLYETHYLENE GLYCOL 3350 17 G/17G
17 POWDER, FOR SOLUTION ORAL DAILY PRN
Status: DISCONTINUED | OUTPATIENT
Start: 2024-09-11 | End: 2024-09-23 | Stop reason: HOSPADM

## 2024-09-11 RX ORDER — FLUCONAZOLE 100 MG/1
100 TABLET ORAL DAILY
COMMUNITY
Start: 2024-09-10 | End: 2024-09-23 | Stop reason: HOSPADM

## 2024-09-11 RX ORDER — ENOXAPARIN SODIUM 100 MG/ML
40 INJECTION SUBCUTANEOUS EVERY 24 HOURS
Status: DISCONTINUED | OUTPATIENT
Start: 2024-09-11 | End: 2024-09-23 | Stop reason: HOSPADM

## 2024-09-11 RX ORDER — BISACODYL 10 MG
10 SUPPOSITORY, RECTAL RECTAL DAILY PRN
Status: DISCONTINUED | OUTPATIENT
Start: 2024-09-11 | End: 2024-09-23 | Stop reason: HOSPADM

## 2024-09-11 RX ADMIN — Medication 10 MG: at 21:06

## 2024-09-11 RX ADMIN — SODIUM CHLORIDE 1250 MG: 9 INJECTION, SOLUTION INTRAVENOUS at 13:55

## 2024-09-11 RX ADMIN — SODIUM CHLORIDE 75 ML/HR: 9 INJECTION, SOLUTION INTRAVENOUS at 16:59

## 2024-09-11 RX ADMIN — CEFEPIME 2000 MG: 2 INJECTION, POWDER, FOR SOLUTION INTRAVENOUS at 13:47

## 2024-09-11 RX ADMIN — ENOXAPARIN SODIUM 40 MG: 100 INJECTION SUBCUTANEOUS at 17:36

## 2024-09-11 RX ADMIN — GABAPENTIN 300 MG: 300 CAPSULE ORAL at 21:06

## 2024-09-11 RX ADMIN — IPRATROPIUM BROMIDE 0.5 MG: 0.5 SOLUTION RESPIRATORY (INHALATION) at 19:50

## 2024-09-11 RX ADMIN — Medication 10 ML: at 21:06

## 2024-09-11 RX ADMIN — ALPRAZOLAM 1 MG: 0.5 TABLET ORAL at 21:06

## 2024-09-11 NOTE — PROGRESS NOTES
"Pharmacy Dosing Service  Pharmacokinetics  Vancomycin Initial Evaluation  Assessment/Action/Plan:  Loading dose?: 1250 mg  Current Order: Vancomycin 1000 mg IVPB every 24 hours  Current end date:9/18  Levels: Trough scheduled prior to next dose on 9/12  Additional antimicrobial agent(s): Cefepime  MRSA Nasal PCR ordered: No     Vancomycin dosage initiated based on population pharmacokinetic parameters. Pharmacy will continue to follow daily and adjust dose accordingly.      Subjective:  Pattie Liu is a 79 y.o. female  day 1 of 7 of treatment.     AUC Model Data:  Regimen: 1000 mg IV every 24 hours.  Start time: 14:55 on 09/12/2024  Exposure target: AUC24 (range)400-600 mg/L.hr   AUC24,ss: 539 mg/L.hr  Probability of AUC24 > 400: 82 %  Ctrough,ss: 17.2 mg/L  Probability of Ctrough,ss > 20: 35 %  Probability of nephrotoxicity (Lodise MORALES 2009): 13 %        Objective:  Ht: 160 cm (63\"); Wt: 66.2 kg (145 lb 15.1 oz)  Estimated Creatinine Clearance: 32.8 mL/min (A) (by C-G formula based on SCr of 1.23 mg/dL (H)).           Creatinine   Date Value Ref Range Status   09/11/2024 1.23 (H) 0.57 - 1.00 mg/dL Final   08/30/2024 1.10 (H) 0.57 - 1.00 mg/dL Final   08/29/2024 1.07 (H) 0.57 - 1.00 mg/dL Final   08/21/2019 1.00 0.60 - 1.30 mg/dL Final       Comment:       Serial Number: [id number]:  [id number]   05/21/2019 0.9 0.5 - 0.9 mg/dL Final            Lab Results   Component Value Date     WBC 10.95 (H) 09/11/2024     WBC 8.42 08/30/2024     WBC 9.03 08/29/2024      Baseline culture results:  Microbiology Results (last 10 days)         Procedure Component Value - Date/Time     COVID-19,CEPHEID/LINA,COR/TAMMY/PAD/IVETTE/LAG/CECI IN-HOUSE,NP SWAB IN TRANSPORT MEDIA 1 HR TAT, RT-PCR - Swab, Nasopharynx [[id number]]  (Normal) Collected: 09/11/24 1303     Lab Status: Final result Specimen: Swab from Nasopharynx Updated: 09/11/24 1355       COVID19 Not Detected     Narrative:       Fact sheet for providers: " https://www.fda.gov/media/043875/download      Fact sheet for patients: https://www.fda.gov/media/393693/download  Fact sheet for providers: https://www.fda.gov/media/552963/download      Fact sheet for patients: https://www.fda.gov/media/256470/download                Maxi Hinojosa RPH  09/11/24 17:04 CDT

## 2024-09-11 NOTE — ED PROVIDER NOTES
Subjective   History of Present Illness  Patient is an 79-year-old female with a history of hypertension who presents to the ER with weakness and confusion.  Patient was recently admitted from August 26 to 30 for a pelvic fracture.  She was discharged to [facility] for rehab.  Staff states the patient has had generalized weakness, lethargy and confusion over the last few days.  Patient has also had a productive cough.  Patient denies any fever, chest pain, shortness of air, abdominal pain, nausea vomiting diarrhea, urinary changes, focal neurologic changes.      Review of Systems   Constitutional:  Positive for fatigue.   HENT: Negative.     Eyes: Negative.    Respiratory:  Positive for cough.    Cardiovascular: Negative.    Gastrointestinal: Negative.    Endocrine: Negative.    Genitourinary: Negative.    Musculoskeletal: Negative.    Skin: Negative.    Allergic/Immunologic: Negative.    Neurological:  Positive for weakness.   Hematological: Negative.    Psychiatric/Behavioral:  Positive for confusion.    All other systems reviewed and are negative.      Past Medical History:   Diagnosis Date    CAD in native artery 7/29/2019    COPD (chronic obstructive pulmonary disease)     Essential hypertension 12/7/2021    GERD (gastroesophageal reflux disease)     Lung nodule        Allergies   Allergen Reactions    Morphine Anaphylaxis    Codeine Other (See Comments)     Unknown.      Levofloxacin Other (See Comments)    Tramadol Other (See Comments)    Zanaflex  [Tizanidine Hcl] Other (See Comments)    Albuterol Arrhythmia       Past Surgical History:   Procedure Laterality Date    BREAST IMPLANT REMOVAL      BREAST TISSUE EXPANDER REMOVAL INSERTION OF IMPLANT      CARDIAC CATHETERIZATION N/A 6/21/2019    Procedure: Left Heart Cath;  Surgeon: Edi Armijo MD;  Location: [facility] CATH INVASIVE LOCATION;  Service: Cardiology    CHOLECYSTECTOMY      HYSTERECTOMY      MASTECTOMY      BILATERAL    OOPHORECTOMY          Family History   Problem Relation Age of Onset    Cancer Mother     Cancer Father        Social History     Socioeconomic History    Marital status:    Tobacco Use    Smoking status: Every Day     Current packs/day: 0.50     Average packs/day: 0.5 packs/day for 62.0 years (31.0 ttl pk-yrs)     Types: Cigarettes    Smokeless tobacco: Never    Tobacco comments:     states she has no plan to quit smoking   Vaping Use    Vaping status: Former   Substance and Sexual Activity    Alcohol use: No    Drug use: No    Sexual activity: Not Currently           Objective   Physical Exam  Vitals and nursing note reviewed.   Constitutional:       Appearance: She is well-developed.   HENT:      Head: Normocephalic and atraumatic.   Eyes:      Conjunctiva/sclera: Conjunctivae normal.      Pupils: Pupils are equal, round, and reactive to light.   Cardiovascular:      Rate and Rhythm: Normal rate and regular rhythm.      Heart sounds: Normal heart sounds.   Pulmonary:      Effort: Pulmonary effort is normal.      Breath sounds: Normal breath sounds.   Abdominal:      Palpations: Abdomen is soft.      Tenderness: There is no abdominal tenderness.   Musculoskeletal:         General: No deformity. Normal range of motion.      Cervical back: Normal range of motion.   Skin:     General: Skin is warm.   Neurological:      Mental Status: She is alert. She is confused.      Cranial Nerves: Cranial nerves 2-12 are intact.      Sensory: Sensation is intact.      Motor: Motor function is intact.      Coordination: Coordination is intact.   Psychiatric:         Behavior: Behavior normal.         Procedures           ED Course      Lab Results (last 24 hours)       Procedure Component Value Units Date/Time    CBC & Differential [[id number]]  (Abnormal) Collected: 09/11/24 1302    Specimen: Blood Updated: 09/11/24 1317    Narrative:      The following orders were created for panel order CBC & Differential.  Procedure                                Abnormality         Status                     ---------                               -----------         ------                     CBC Auto Differential[[id number]]        Abnormal            Final result                 Please view results for these tests on the individual orders.    Comprehensive Metabolic Panel [[id number]]  (Abnormal) Collected: 09/11/24 1302    Specimen: Blood Updated: 09/11/24 1336     Glucose 102 mg/dL      BUN 26 mg/dL      Creatinine 1.23 mg/dL      Sodium 142 mmol/L      Potassium 3.2 mmol/L      Chloride 101 mmol/L      CO2 32.0 mmol/L      Calcium 9.6 mg/dL      Total Protein 6.7 g/dL      Albumin 3.4 g/dL      ALT (SGPT) 11 U/L      AST (SGOT) 14 U/L      Alkaline Phosphatase 119 U/L      Total Bilirubin 0.2 mg/dL      Globulin 3.3 gm/dL      A/G Ratio 1.0 g/dL      BUN/Creatinine Ratio 21.1     Anion Gap 9.0 mmol/L      eGFR 44.2 mL/min/1.73     Narrative:      GFR Normal >60  Chronic Kidney Disease <60  Kidney Failure <15    The GFR formula is only valid for adults with stable renal function between ages 18 and 70.    Magnesium [[id number]]  (Normal) Collected: 09/11/24 1302    Specimen: Blood Updated: 09/11/24 1336     Magnesium 2.2 mg/dL     Blood Culture - Blood, Arm, Right [[id number]] Collected: 09/11/24 1302    Specimen: Blood from Arm, Right Updated: 09/11/24 1317    CBC Auto Differential [[id number]]  (Abnormal) Collected: 09/11/24 1302    Specimen: Blood Updated: 09/11/24 1317     WBC 10.95 10*3/mm3      RBC 4.09 10*6/mm3      Hemoglobin 12.4 g/dL      Hematocrit 38.7 %      MCV 94.6 fL      MCH 30.3 pg      MCHC 32.0 g/dL      RDW 13.5 %      RDW-SD 46.7 fl      MPV 10.9 fL      Platelets 357 10*3/mm3      Neutrophil % 69.8 %      Lymphocyte % 18.0 %      Monocyte % 7.6 %      Eosinophil % 3.7 %      Basophil % 0.4 %      Immature Grans % 0.5 %      Neutrophils, Absolute 7.64 10*3/mm3      Lymphocytes, Absolute 1.97 10*3/mm3      Monocytes, Absolute 0.83 10*3/mm3       Eosinophils, Absolute 0.41 10*3/mm3      Basophils, Absolute 0.04 10*3/mm3      Immature Grans, Absolute 0.06 10*3/mm3      nRBC 0.0 /100 WBC     Urinalysis With Culture If Indicated - Urine, Catheter [[id number]]  (Normal) Collected: 09/11/24 1303    Specimen: Urine, Catheter Updated: 09/11/24 1320     Color, UA Yellow     Appearance, UA Clear     pH, UA 5.5     Specific Gravity, UA 1.012     Glucose, UA Negative     Ketones, UA Negative     Bilirubin, UA Negative     Blood, UA Negative     Protein, UA Negative     Leuk Esterase, UA Negative     Nitrite, UA Negative     Urobilinogen, UA 0.2 E.U./dL    Narrative:      In absence of clinical symptoms, the presence of pyuria, bacteria, and/or nitrites on the urinalysis result does not correlate with infection.  Urine microscopic not indicated.    COVID-19,CEPHEID/LINA,COR/TAMMY/[facility]/IVETTE/LAG/[facility]CI IN-HOUSE,NP SWAB IN TRANSPORT MEDIA 1 HR TAT, RT-PCR - Swab, Nasopharynx [[id number]]  (Normal) Collected: 09/11/24 1303    Specimen: Swab from Nasopharynx Updated: 09/11/24 1355     COVID19 Not Detected    Narrative:      Fact sheet for providers: https://www.fda.gov/media/105538/download     Fact sheet for patients: https://www.fda.gov/media/570194/download  Fact sheet for providers: https://www.fda.gov/media/455972/download     Fact sheet for patients: https://www.fda.gov/media/217756/download    Ammonia [[id number]]  (Normal) Collected: 09/11/24 1346    Specimen: Blood Updated: 09/11/24 1424     Ammonia 15 umol/L     Blood Culture - Blood, Arm, Left [[id number]] Collected: 09/11/24 1346    Specimen: Blood from Arm, Left Updated: 09/11/24 1404           CT Head Without Contrast   Final Result   Impression:         No acute intracranial abnormality.       This report was signed and finalized on 9/11/2024 1:25 PM by Clem Corona.          XR Chest 1 View   Final Result   1. Right lung infiltrate may represent an acute inflammatory/infectious   process. Further follow-up may be  obtained.           This report was signed and finalized on 9/11/2024 12:27 PM by Dr. Tong Orellana MD.                                                  Medical Decision Making  Patient is an 79-year-old female with a history of hypertension who presents to the ER with weakness and confusion.  Patient was recently admitted from August 26 to 30 for a pelvic fracture.  She was discharged to [facility] for rehab.  Staff states the patient has had generalized weakness, lethargy and confusion over the last few days.  Patient has also had a productive cough.  Patient denies any fever, chest pain, shortness of air, abdominal pain, nausea vomiting diarrhea, urinary changes, focal neurologic changes.    Differential diagnosis: UTI, pneumonia, viral syndrome, electrolyte abnormality, CVA    Labs showed a mildly elevated BUN and creatinine as well as leukocytosis and mild hypokalemia.  Chest x-ray showed a right lung infiltrate consistent with pneumonia.  CT scan of the head showed no acute findings.  Cultures have been obtained.  Patient was given cefepime and vancomycin.  I discussed the case with Dr. Ledezma and patient was admitted for further workup and treatment.    Problems Addressed:  Altered mental status, unspecified altered mental status type: complicated acute illness or injury  Dehydration: complicated acute illness or injury  Pneumonia of right lung due to infectious organism, unspecified part of lung: complicated acute illness or injury    Amount and/or Complexity of Data Reviewed  Labs: ordered. Decision-making details documented in ED Course.  Radiology: ordered. Decision-making details documented in ED Course.  Discussion of management or test interpretation with external provider(s): Dr. Ledezma PCP    Risk  Prescription drug management.  Decision regarding hospitalization.        Final diagnoses:   Pneumonia of right lung due to infectious organism, unspecified part of lung   Dehydration   Altered  mental status, unspecified altered mental status type       ED Disposition  ED Disposition       ED Disposition   Decision to Admit    Condition   --    Comment   Level of Care: Telemetry [5]   Diagnosis: Pneumonia [875925]   Admitting Physician: NEAL ROWAN [[id number]]   Attending Physician: NEAL ROWAN [[id number]]   Certification: I Certify That Inpatient Hospital Services Are Medically Necessary For Greater Than 2 Midnights                 No follow-up provider specified.       Medication List      No changes were made to your prescriptions during this visit.            Zina Leslie MD  09/11/24 6242

## 2024-09-11 NOTE — PLAN OF CARE
Goal Outcome Evaluation:  Plan of Care Reviewed With: patient        Progress: no change  Outcome Evaluation: Pt admitted with PNA this shift. Pt oriented to self only. SB on tele Turns q2. Mepilex placed on coccyx for preventative measures. IVF infusing. Daughters at bedside.

## 2024-09-12 PROBLEM — S32.9XXA PELVIC FRACTURE: Status: ACTIVE | Noted: 2024-09-12

## 2024-09-12 LAB
ALBUMIN SERPL-MCNC: 2.6 G/DL (ref 3.5–5.2)
ALBUMIN/GLOB SERPL: 0.9 G/DL
ALP SERPL-CCNC: 98 U/L (ref 39–117)
ALT SERPL W P-5'-P-CCNC: 10 U/L (ref 1–33)
ANION GAP SERPL CALCULATED.3IONS-SCNC: 8 MMOL/L (ref 5–15)
AST SERPL-CCNC: 14 U/L (ref 1–32)
BASOPHILS # BLD AUTO: 0.04 10*3/MM3 (ref 0–0.2)
BASOPHILS NFR BLD AUTO: 0.4 % (ref 0–1.5)
BILIRUB SERPL-MCNC: <0.2 MG/DL (ref 0–1.2)
BUN SERPL-MCNC: 14 MG/DL (ref 8–23)
BUN/CREAT SERPL: 15.6 (ref 7–25)
CALCIUM SPEC-SCNC: 8.6 MG/DL (ref 8.6–10.5)
CHLORIDE SERPL-SCNC: 110 MMOL/L (ref 98–107)
CO2 SERPL-SCNC: 28 MMOL/L (ref 22–29)
CREAT SERPL-MCNC: 0.9 MG/DL (ref 0.57–1)
DEPRECATED RDW RBC AUTO: 46.5 FL (ref 37–54)
EGFRCR SERPLBLD CKD-EPI 2021: 64.4 ML/MIN/1.73
EOSINOPHIL # BLD AUTO: 0.42 10*3/MM3 (ref 0–0.4)
EOSINOPHIL NFR BLD AUTO: 4.4 % (ref 0.3–6.2)
ERYTHROCYTE [DISTWIDTH] IN BLOOD BY AUTOMATED COUNT: 13.4 % (ref 12.3–15.4)
GLOBULIN UR ELPH-MCNC: 2.8 GM/DL
GLUCOSE SERPL-MCNC: 90 MG/DL (ref 65–99)
HCT VFR BLD AUTO: 34.3 % (ref 34–46.6)
HGB BLD-MCNC: 10.8 G/DL (ref 12–15.9)
IMM GRANULOCYTES # BLD AUTO: 0.04 10*3/MM3 (ref 0–0.05)
IMM GRANULOCYTES NFR BLD AUTO: 0.4 % (ref 0–0.5)
LYMPHOCYTES # BLD AUTO: 1.83 10*3/MM3 (ref 0.7–3.1)
LYMPHOCYTES NFR BLD AUTO: 19.3 % (ref 19.6–45.3)
MCH RBC QN AUTO: 29.8 PG (ref 26.6–33)
MCHC RBC AUTO-ENTMCNC: 31.5 G/DL (ref 31.5–35.7)
MCV RBC AUTO: 94.5 FL (ref 79–97)
MONOCYTES # BLD AUTO: 0.77 10*3/MM3 (ref 0.1–0.9)
MONOCYTES NFR BLD AUTO: 8.1 % (ref 5–12)
NEUTROPHILS NFR BLD AUTO: 6.36 10*3/MM3 (ref 1.7–7)
NEUTROPHILS NFR BLD AUTO: 67.4 % (ref 42.7–76)
NRBC BLD AUTO-RTO: 0 /100 WBC (ref 0–0.2)
PLATELET # BLD AUTO: 333 10*3/MM3 (ref 140–450)
PMV BLD AUTO: 11.2 FL (ref 6–12)
POTASSIUM SERPL-SCNC: 3.2 MMOL/L (ref 3.5–5.2)
PROT SERPL-MCNC: 5.4 G/DL (ref 6–8.5)
RBC # BLD AUTO: 3.63 10*6/MM3 (ref 3.77–5.28)
SODIUM SERPL-SCNC: 146 MMOL/L (ref 136–145)
WBC NRBC COR # BLD AUTO: 9.46 10*3/MM3 (ref 3.4–10.8)

## 2024-09-12 PROCEDURE — 25010000002 AZITHROMYCIN PER 500 MG: Performed by: FAMILY MEDICINE

## 2024-09-12 PROCEDURE — 25010000002 CEFAZOLIN PER 500 MG: Performed by: FAMILY MEDICINE

## 2024-09-12 PROCEDURE — 97166 OT EVAL MOD COMPLEX 45 MIN: CPT | Performed by: OCCUPATIONAL THERAPIST

## 2024-09-12 PROCEDURE — 94664 DEMO&/EVAL PT USE INHALER: CPT

## 2024-09-12 PROCEDURE — 85025 COMPLETE CBC W/AUTO DIFF WBC: CPT | Performed by: FAMILY MEDICINE

## 2024-09-12 PROCEDURE — 25810000003 SODIUM CHLORIDE 0.9 % SOLUTION 250 ML FLEX CONT: Performed by: FAMILY MEDICINE

## 2024-09-12 PROCEDURE — 80053 COMPREHEN METABOLIC PANEL: CPT | Performed by: FAMILY MEDICINE

## 2024-09-12 PROCEDURE — 94799 UNLISTED PULMONARY SVC/PX: CPT

## 2024-09-12 PROCEDURE — 25010000002 CEFEPIME PER 500 MG: Performed by: FAMILY MEDICINE

## 2024-09-12 PROCEDURE — 94761 N-INVAS EAR/PLS OXIMETRY MLT: CPT

## 2024-09-12 PROCEDURE — 25010000002 ENOXAPARIN PER 10 MG: Performed by: FAMILY MEDICINE

## 2024-09-12 PROCEDURE — 25810000003 SODIUM CHLORIDE 0.9 % SOLUTION: Performed by: FAMILY MEDICINE

## 2024-09-12 PROCEDURE — 97162 PT EVAL MOD COMPLEX 30 MIN: CPT

## 2024-09-12 RX ORDER — CITALOPRAM 30 MG/1
30 CAPSULE ORAL DAILY
COMMUNITY

## 2024-09-12 RX ORDER — ASPIRIN 81 MG/1
81 TABLET, CHEWABLE ORAL DAILY
COMMUNITY

## 2024-09-12 RX ORDER — PHENOL 1.4 %
1 AEROSOL, SPRAY (ML) MUCOUS MEMBRANE NIGHTLY PRN
COMMUNITY

## 2024-09-12 RX ORDER — POLYETHYLENE GLYCOL 3350 17 G/17G
17 POWDER, FOR SOLUTION ORAL DAILY PRN
COMMUNITY

## 2024-09-12 RX ORDER — BUDESONIDE, GLYCOPYRROLATE, AND FORMOTEROL FUMARATE 160; 9; 4.8 UG/1; UG/1; UG/1
2 AEROSOL, METERED RESPIRATORY (INHALATION) 2 TIMES DAILY
COMMUNITY

## 2024-09-12 RX ADMIN — GABAPENTIN 300 MG: 300 CAPSULE ORAL at 08:06

## 2024-09-12 RX ADMIN — ALPRAZOLAM 1 MG: 0.5 TABLET ORAL at 08:06

## 2024-09-12 RX ADMIN — BUMETANIDE 1 MG: 1 TABLET ORAL at 08:06

## 2024-09-12 RX ADMIN — ACETAMINOPHEN 650 MG: 325 TABLET ORAL at 18:05

## 2024-09-12 RX ADMIN — ATORVASTATIN CALCIUM 20 MG: 10 TABLET, FILM COATED ORAL at 08:06

## 2024-09-12 RX ADMIN — NICOTINE 1 PATCH: 21 PATCH, EXTENDED RELEASE TRANSDERMAL at 17:28

## 2024-09-12 RX ADMIN — GABAPENTIN 300 MG: 300 CAPSULE ORAL at 20:39

## 2024-09-12 RX ADMIN — CEFEPIME 2000 MG: 2 INJECTION, POWDER, FOR SOLUTION INTRAVENOUS at 02:40

## 2024-09-12 RX ADMIN — IPRATROPIUM BROMIDE 0.5 MG: 0.5 SOLUTION RESPIRATORY (INHALATION) at 14:51

## 2024-09-12 RX ADMIN — ASPIRIN 81 MG: 81 TABLET, COATED ORAL at 08:06

## 2024-09-12 RX ADMIN — Medication 10 MG: at 20:39

## 2024-09-12 RX ADMIN — IPRATROPIUM BROMIDE 0.5 MG: 0.5 SOLUTION RESPIRATORY (INHALATION) at 20:16

## 2024-09-12 RX ADMIN — OXYCODONE HYDROCHLORIDE AND ACETAMINOPHEN 1 TABLET: 5; 325 TABLET ORAL at 08:06

## 2024-09-12 RX ADMIN — CITALOPRAM 30 MG: 20 TABLET, FILM COATED ORAL at 08:06

## 2024-09-12 RX ADMIN — CEFAZOLIN 2000 MG: 2 INJECTION, POWDER, FOR SOLUTION INTRAMUSCULAR; INTRAVENOUS at 11:05

## 2024-09-12 RX ADMIN — Medication 10 ML: at 08:07

## 2024-09-12 RX ADMIN — IPRATROPIUM BROMIDE 0.5 MG: 0.5 SOLUTION RESPIRATORY (INHALATION) at 10:23

## 2024-09-12 RX ADMIN — LISINOPRIL 20 MG: 20 TABLET ORAL at 08:06

## 2024-09-12 RX ADMIN — ALPRAZOLAM 1 MG: 0.5 TABLET ORAL at 20:39

## 2024-09-12 RX ADMIN — PANTOPRAZOLE SODIUM 40 MG: 40 TABLET, DELAYED RELEASE ORAL at 08:06

## 2024-09-12 RX ADMIN — SODIUM CHLORIDE 75 ML/HR: 9 INJECTION, SOLUTION INTRAVENOUS at 06:19

## 2024-09-12 RX ADMIN — CEFAZOLIN 2000 MG: 2 INJECTION, POWDER, FOR SOLUTION INTRAMUSCULAR; INTRAVENOUS at 18:07

## 2024-09-12 RX ADMIN — ENOXAPARIN SODIUM 40 MG: 100 INJECTION SUBCUTANEOUS at 17:28

## 2024-09-12 RX ADMIN — AZITHROMYCIN DIHYDRATE 500 MG: 500 INJECTION, POWDER, LYOPHILIZED, FOR SOLUTION INTRAVENOUS at 08:20

## 2024-09-12 RX ADMIN — IPRATROPIUM BROMIDE 0.5 MG: 0.5 SOLUTION RESPIRATORY (INHALATION) at 06:04

## 2024-09-12 RX ADMIN — Medication 10 ML: at 20:43

## 2024-09-12 NOTE — THERAPY EVALUATION
Patient Name: Pattie Liu  : 1945    MRN: [id number]                              Today's Date: 2024       Admit Date: 2024    Visit Dx:     ICD-10-CM ICD-9-CM   1. Pneumonia of right lung due to infectious organism, unspecified part of lung  J18.9 483.8   2. Dehydration  E86.0 276.51   3. Altered mental status, unspecified altered mental status type  R41.82 780.97   4. Impaired mobility [Z74.09]  Z74.09 799.89     Patient Active Problem List   Diagnosis    Frequent PVCs    Shortness of breath    SOB (shortness of breath)    CAD in native artery    PVD (peripheral vascular disease)    Pneumonia due to infectious organism    Chronic back pain    Essential hypertension    Fall, initial encounter    Traumatic rhabdomyolysis    Leukocytosis    Anemia    Degenerative disc disease, lumbar    Dehydration    Acute UTI    Chronic respiratory failure with hypoxia    Impaired mobility    UTI (urinary tract infection)    Gait instability    Pneumonia     Past Medical History:   Diagnosis Date    CAD in native artery 2019    COPD (chronic obstructive pulmonary disease)     Essential hypertension 2021    GERD (gastroesophageal reflux disease)     Lung nodule      Past Surgical History:   Procedure Laterality Date    BREAST IMPLANT REMOVAL      BREAST TISSUE EXPANDER REMOVAL INSERTION OF IMPLANT      CARDIAC CATHETERIZATION N/A 2019    Procedure: Left Heart Cath;  Surgeon: Edi Armijo MD;  Location:  [facility] CATH INVASIVE LOCATION;  Service: Cardiology    CHOLECYSTECTOMY      HYSTERECTOMY      MASTECTOMY      BILATERAL    OOPHORECTOMY        General Information       Row Name 24 0931          Physical Therapy Time and Intention    Document Type evaluation  Weakness, confusion, lethargy. Dx: PNA, dehydration. Recent bill w right nondisplaced inferior pubic rami fracture (WBAT per ortho)  -CHICA     Mode of Treatment physical therapy  -CHICA       Row Name 24 0931          General  Information    Patient Profile Reviewed yes  -CHICA     Prior Level of Function --  unsure due to her cognitive status at time of eval.  -CHICA     Existing Precautions/Restrictions fall;oxygen therapy device and L/min  -CHICA     Barriers to Rehab medically complex;previous functional deficit;cognitive status  -CHICA       Row Name 09/12/24 0931          Living Environment    People in Home facility resident  -CHICA       Row Name 09/12/24 0931          Cognition    Orientation Status (Cognition) oriented to;person  lethargic  -CHICA       Row Name 09/12/24 0931          Safety Issues, Functional Mobility    Safety Issues Affecting Function (Mobility) ability to follow commands;friction/shear risk;insight into deficits/self-awareness;judgment;problem-solving;safety precaution awareness;safety precautions follow-through/compliance  -CHICA     Impairments Affecting Function (Mobility) balance;endurance/activity tolerance;cognition;strength;pain;range of motion (ROM);postural/trunk control  -CHICA               User Key  (r) = Recorded By, (t) = Taken By, (c) = Cosigned By      Initials Name Provider Type    CHICA Feroz Schwab, PT DPT Physical Therapist                   Mobility       Row Name 09/12/24 0931          Bed Mobility    Bed Mobility supine-sit;sit-supine;scooting/bridging  -CHICA     Scooting/Bridging [place] (Bed Mobility) dependent (less than 25% patient effort);2 person assist  -CHICA     Supine-Sit [place] (Bed Mobility) maximum assist (25% patient effort);dependent (less than 25% patient effort);2 person assist  -CHICA     Sit-Supine [place] (Bed Mobility) maximum assist (25% patient effort);dependent (less than 25% patient effort);2 person assist  -CHICA     Assistive Device (Bed Mobility) head of bed elevated;bed rails;draw sheet  -CHICA     Comment, (Bed Mobility) Pt unable to maintain alertness for progression of activity. CGA-Mod A to maintain sitting balance at EOB.  -CHICA               User Key  (r) = Recorded By,  (t) = Taken By, (c) = Cosigned By      Initials Name Provider Type    Feroz Frias, PT DPT Physical Therapist                   Obj/Interventions       Row Name 09/12/24 0931          Range of Motion Comprehensive    Comment, General Range of Motion B ankle AROM impaired 50%. B hip/knee AROM impaired 50-75%  -CHICA       Row Name 09/12/24 0931          Strength Comprehensive (MMT)    Comment, General Manual Muscle Testing (MMT) Assessment B LE grossly 2-/5  -CHICA       Row Name 09/12/24 0931          Balance    Balance Assessment sitting static balance  -CHICA     Static Sitting Balance contact guard;minimal assist;moderate assist  -CHICA     Position, Sitting Balance supported;sitting edge of bed  -CHICA       Row Name 09/12/24 0931          Sensory Assessment (Somatosensory)    Sensory Assessment (Somatosensory) LE sensation intact  -CHICA               User Key  (r) = Recorded By, (t) = Taken By, (c) = Cosigned By      Initials Name Provider Type    Feroz Frias, PT DPT Physical Therapist                   Goals/Plan       Row Name 09/12/24 0931          Bed Mobility Goal 1 (PT)    Activity/Assistive Device (Bed Mobility Goal 1, PT) sit to supine/supine to sit  -CHICA     [place] Level/Cues Needed (Bed Mobility Goal 1, PT) moderate assist (50-74% patient effort)  -CHICA     Time Frame (Bed Mobility Goal 1, PT) long term goal (LTG);10 days  -CHICA     Progress/Outcomes (Bed Mobility Goal 1, PT) new goal  -CHICA       Row Name 09/12/24 0931          Transfer Goal 1 (PT)    Activity/Assistive Device (Transfer Goal 1, PT) sit-to-stand/stand-to-sit;bed-to-chair/chair-to-bed  -CHICA     [place] Level/Cues Needed (Transfer Goal 1, PT) moderate assist (50-74% patient effort)  -CHICA     Time Frame (Transfer Goal 1, PT) long term goal (LTG);10 days  -CHICA     Progress/Outcome (Transfer Goal 1, PT) new goal  -CHICA       Row Name 09/12/24 0931          Gait Training Goal 1 (PT)    Activity/Assistive Device (Gait Training Goal 1, PT)  gait (walking locomotion);assistive device use;decrease fall risk;improve balance and speed;increase endurance/gait distance  -CHICA     [place] Level (Gait Training Goal 1, PT) moderate assist (50-74% patient effort)  -CHICA     Distance (Gait Training Goal 1, PT) 15 ft  -CHICA     Time Frame (Gait Training Goal 1, PT) long term goal (LTG);10 days  -CHICA     Progress/Outcome (Gait Training Goal 1, PT) new goal  -CHICA       Row Name 09/12/24 0931          Therapy Assessment/Plan (PT)    Planned Therapy Interventions (PT) bed mobility training;transfer training;gait training;balance training;home exercise program;patient/family education;postural re-education;strengthening  -CHICA               User Key  (r) = Recorded By, (t) = Taken By, (c) = Cosigned By      Initials Name Provider Type    Feroz Frias, PT DPT Physical Therapist                   Clinical Impression       Row Name 09/12/24 0931          Pain    Pain Intervention(s) Repositioned;Ambulation/increased activity  -CHICA     Additional Documentation Pain Scale: FACES Pre/Post-Treatment (Group)  -CHICA       Row Name 09/12/24 0931          Pain Scale: FACES Pre/Post-Treatment    Pain: FACES Scale, Pretreatment 2-->hurts little bit  -CHICA     Pain Location generalized  -CHICA       Row Name 09/12/24 0931          Plan of Care Review    Plan of Care Reviewed With patient  -CHICA     Outcome Evaluation PT eval complete. She is lethargic and oriented to self. Pt was recently admitted for pelvic fx and was d/c'd to NH. Pt is WBAT for pelvic fx per previous therapy evals. Unsure of how pt has progressed with therapy since d/c due to her current cognitive status. Ms Liu was mx/dependent x 2 for supine <> Sit bed mobility. Sat at the bedside CGA-Mod A to maintain balance. Again, she remains fatigued and lethargic and unable to tolerate much activity at this time. She demos much weakness in B LE as well. PT Will cont with mobility, balance, activity tolerance and  strengthening. Recommend d.c back to [facility] for rehab.  -CHICA       Row Name 09/12/24 0931          Therapy Assessment/Plan (PT)    Patient/Family Therapy Goals Statement (PT) did not state  -CHICA     Rehab Potential (PT) fair, will monitor progress closely  -CHICA     Criteria for Skilled Interventions Met (PT) yes;meets criteria;skilled treatment is necessary  -CHICA     Therapy Frequency (PT) 2 times/day  -CHICA     Predicted Duration of Therapy Intervention (PT) until d/c  -CHICA       Row Name 09/12/24 0931          Positioning and Restraints    Pre-Treatment Position in bed  -CHICA     Post Treatment Position bed  -CHICA     In Bed notified nsg;fowlers;call light within reach;encouraged to call for assist;exit alarm on;legs elevated  -CHICA               User Key  (r) = Recorded By, (t) = Taken By, (c) = Cosigned By      Initials Name Provider Type    Feroz Frias, PT DPT Physical Therapist                   Outcome Measures       Row Name 09/12/24 0931 09/12/24 0748       How much help from another person do you currently need...    Turning from your back to your side while in flat bed without using bedrails? 2  -CHICA 2  -LW    Moving from lying on back to sitting on the side of a flat bed without bedrails? 2  -CHICA 2  -LW    Moving to and from a bed to a chair (including a wheelchair)? 1  -CHICA 1  -LW    Standing up from a chair using your arms (e.g., wheelchair, bedside chair)? 1  -CHICA 1  -LW    Climbing 3-5 steps with a railing? 1  -CHICA 1  -LW    To walk in hospital room? 1  -CHICA 1  -LW    AM-PAC 6 Clicks Score (PT) 8  -CHICA 8  -LW    Highest Level of Mobility Goal 3 --> Sit at edge of bed  -CHICA 3 --> Sit at edge of bed  -LW      Row Name 09/12/24 0931 09/12/24 0930       Functional Assessment    Outcome Measure Options AM-PAC 6 Clicks Basic Mobility (PT)  -CHICA AM-PAC 6 Clicks Daily Activity (OT)  -JJ              User Key  (r) = Recorded By, (t) = Taken By, (c) = Cosigned By      Initials Name Provider Type    Feroz Frias  ELEANOR, PT DPT Physical Therapist    Emilie Javed, OTR/L, CSRS Occupational Therapist    Courtney Coreas, RN Registered Nurse                                 Physical Therapy Education       Title: PT OT SLP Therapies (In Progress)       Topic: Physical Therapy (In Progress)       Point: Mobility training (In Progress)       Learning Progress Summary             Patient Acceptance, E, NR by CHICA at 9/12/2024 0918    Comment: benefits of activity, progression of PT                         Point: Home exercise program (Not Started)       Learner Progress:  Not documented in this visit.              Point: Body mechanics (Not Started)       Learner Progress:  Not documented in this visit.              Point: Precautions (Not Started)       Learner Progress:  Not documented in this visit.                              User Key       Initials Effective Dates Name Provider Type Discipline    CHICA 02/03/23 -  Feroz Schwab PT DPT Physical Therapist PT                  PT Recommendation and Plan  Planned Therapy Interventions (PT): bed mobility training, transfer training, gait training, balance training, home exercise program, patient/family education, postural re-education, strengthening  Plan of Care Reviewed With: patient  Outcome Evaluation: PT eval complete. She is lethargic and oriented to self. Pt was recently admitted for pelvic fx and was d/c'd to NH. Pt is WBAT for pelvic fx per previous therapy evals. Unsure of how pt has progressed with therapy since d/c due to her current cognitive status. Ms Liu was mx/dependent x 2 for supine <> Sit bed mobility. Sat at the bedside CGA-Mod A to maintain balance. Again, she remains fatigued and lethargic and unable to tolerate much activity at this time. She demos much weakness in B LE as well. PT Will cont with mobility, balance, activity tolerance and strengthening. Recommend d.c back to [facility] for rehab.     Time Calculation:         PT Charges       Row Name  09/12/24 1149             Time Calculation    Start Time 0930  -CHICA      Stop Time 1000  + 10 min w chart review. PT with 40 total minutes  -CHICA      Time Calculation (min) 30 min  -CHICA      PT Received On 09/12/24  -CHICA      PT Goal Re-Cert Due Date 09/22/24  -CHICA         Untimed Charges    PT Eval/Re-eval Minutes 40  -CHICA         Total Minutes    Untimed Charges Total Minutes 40  -CHICA       Total Minutes 40  -CHICA                User Key  (r) = Recorded By, (t) = Taken By, (c) = Cosigned By      Initials Name Provider Type    Feroz Frias, PT DPT Physical Therapist                  Therapy Charges for Today       Code Description Service Date Service Provider Modifiers Qty    [id number] HC PT EVAL MOD COMPLEXITY 3 9/12/2024 Feroz Schwab PT DPT GP 1            PT G-Codes  Outcome Measure Options: AM-PAC 6 Clicks Basic Mobility (PT)  AM-PAC 6 Clicks Score (PT): 8  AM-PAC 6 Clicks Score (OT): 10  PT Discharge Summary  Anticipated Discharge Disposition (PT): skilled nursing facility    Feroz Schwab, PT DPT  9/12/2024

## 2024-09-12 NOTE — PLAN OF CARE
Goal Outcome Evaluation:  Plan of Care Reviewed With: patient        Progress: no change  Outcome Evaluation: VSS, SB/S 51-66 Pt drowsy & slept most of the shift. She is arousable, did eat meals with assistance. purwick in place. Turns q2 Daughters at bedside Call light in reach. IV abx given

## 2024-09-12 NOTE — THERAPY EVALUATION
Patient Name: Pattie Liu  : 1945    MRN: [id number]                              Today's Date: 2024       Admit Date: 2024    Visit Dx:     ICD-10-CM ICD-9-CM   1. Pneumonia of right lung due to infectious organism, unspecified part of lung  J18.9 483.8   2. Dehydration  E86.0 276.51   3. Altered mental status, unspecified altered mental status type  R41.82 780.97   4. Impaired mobility [Z74.09]  Z74.09 799.89     Patient Active Problem List   Diagnosis    Frequent PVCs    Shortness of breath    SOB (shortness of breath)    CAD in native artery    PVD (peripheral vascular disease)    Pneumonia due to infectious organism    Chronic back pain    Essential hypertension    Fall, initial encounter    Traumatic rhabdomyolysis    Leukocytosis    Anemia    Degenerative disc disease, lumbar    Dehydration    Acute UTI    Chronic respiratory failure with hypoxia    Impaired mobility    UTI (urinary tract infection)    Gait instability    Pneumonia     Past Medical History:   Diagnosis Date    CAD in native artery 2019    COPD (chronic obstructive pulmonary disease)     Essential hypertension 2021    GERD (gastroesophageal reflux disease)     Lung nodule      Past Surgical History:   Procedure Laterality Date    BREAST IMPLANT REMOVAL      BREAST TISSUE EXPANDER REMOVAL INSERTION OF IMPLANT      CARDIAC CATHETERIZATION N/A 2019    Procedure: Left Heart Cath;  Surgeon: Edi Armijo MD;  Location: [facility] CATH INVASIVE LOCATION;  Service: Cardiology    CHOLECYSTECTOMY      HYSTERECTOMY      MASTECTOMY      BILATERAL    OOPHORECTOMY        General Information       Row Name 24 6541          OT Time and Intention    Document Type evaluation  ED from NH with SOA and weakness. Recent d/c from [facility] s/p hip fx. WBAT per ortho. Dx: shae.  PERRY     Mode of Treatment occupational therapy  -JJ       Row Name 24 4057          General Information    Patient Profile Reviewed yes   -JJ     Prior Level of Function --  unsure due to her cognitive status at time of eval.  -JJ     Existing Precautions/Restrictions fall;oxygen therapy device and L/min  -JJ     Barriers to Rehab medically complex  -J       Row Name 09/12/24 0930          Living Environment    People in Home alone  -       Row Name 09/12/24 0930          Cognition    Orientation Status (Cognition) oriented to;person  -       Row Name 09/12/24 0930          Safety Issues, Functional Mobility    Safety Issues Affecting Function (Mobility) ability to follow commands;friction/shear risk;insight into deficits/self-awareness;judgment;safety precaution awareness;safety precautions follow-through/compliance  -J     Impairments Affecting Function (Mobility) balance;endurance/activity tolerance;cognition;strength;pain;range of motion (ROM)  -J     Cognitive Impairments, Mobility Safety/Performance attention;insight into deficits/self-awareness;judgment;problem-solving/reasoning;safety precaution awareness;safety precaution follow-through  -JJ               User Key  (r) = Recorded By, (t) = Taken By, (c) = Cosigned By      Initials Name Provider Type    Emilie Javed, OTR/L, CSRS Occupational Therapist                     Mobility/ADL's       Row Name 09/12/24 0930          Bed Mobility    Bed Mobility supine-sit;sit-supine;scooting/bridging  -JJ     Scooting/Bridging [place] (Bed Mobility) dependent (less than 25% patient effort);2 person assist  -JJ     Supine-Sit [place] (Bed Mobility) maximum assist (25% patient effort);dependent (less than 25% patient effort);2 person assist  -JJ     Sit-Supine [place] (Bed Mobility) maximum assist (25% patient effort);dependent (less than 25% patient effort);2 person assist  -JJ     Bed Mobility, Safety Issues decreased use of legs for bridging/pushing  -JJ     Assistive Device (Bed Mobility) head of bed elevated;bed rails;draw sheet  -J     Comment, (Bed Mobility) Pt  unable to maintain alertness for progression of activity. CGA-Mod A to maintain sitting balance at EOB.  -JJ       Row Name 09/12/24 0930          Sit-Stand Transfer    Sit-Stand [place] (Transfers) not tested  -JJ       Row Name 09/12/24 0930          Functional Mobility    Functional Mobility- Ind. Level not appropriate to assess  -JJ       Row Name 09/12/24 0930          Activities of Daily Living    BADL Assessment/Intervention lower body dressing  -JJ       Row Name 09/12/24 0930          Lower Body Dressing Assessment/Training    [place] Level (Lower Body Dressing) don;socks;dependent (less than 25% patient effort)  -     Position (Lower Body Dressing) edge of bed sitting  -J               User Key  (r) = Recorded By, (t) = Taken By, (c) = Cosigned By      Initials Name Provider Type    Emilie Javed OTR/L, CSRS Occupational Therapist                   Obj/Interventions       [facility] Name 09/12/24 0930          Sensory Assessment (Somatosensory)    Sensory Assessment (Somatosensory) UE sensation intact  -JJ       Row Name 09/12/24 0930          Vision Assessment/Intervention    Visual Impairment/Limitations WFL  -JJ       Row Name 09/12/24 0930          Range of Motion Comprehensive    General Range of Motion bilateral upper extremity ROM L  -JJ       Row Name 09/12/24 0930          Strength Comprehensive (MMT)    Comment, General Manual Muscle Testing (MMT) Assessment B UE strength 4-/5  -JJ       Row Name 09/12/24 0930          Balance    Balance Assessment sitting static balance;sitting dynamic balance  -JJ     Static Sitting Balance contact guard  -JJ     Dynamic Sitting Balance moderate assist  -JJ     Position, Sitting Balance supported;sitting edge of bed  -JJ               User Key  (r) = Recorded By, (t) = Taken By, (c) = Cosigned By      Initials Name Provider Type    Emilie Javed OTR/L, CSRS Occupational Therapist                   Goals/Plan       Row Name 09/12/24  0930          Dressing Goal 1 (OT)    Activity/Device (Dressing Goal 1, OT) lower body dressing  -JJ     [place]/Cues Needed (Dressing Goal 1, OT) minimum assist (75% or more patient effort)  -JJ     Time Frame (Dressing Goal 1, OT) long term goal (LTG);by discharge  -JJ     Strategies/Barriers (Dressing Goal 1, OT) AE PRN  -JJ     Progress/Outcome (Dressing Goal 1, OT) new goal  -       Row Name 09/12/24 0930          Toileting Goal 1 (OT)    Activity/Device (Toileting Goal 1, OT) toileting skills, all  -JJ     [place] Level/Cues Needed (Toileting Goal 1, OT) minimum assist (75% or more patient effort)  -JJ     Time Frame (Toileting Goal 1, OT) long term goal (LTG);by discharge  -JJ     Progress/Outcome (Toileting Goal 1, OT) new goal  -       Row Name 09/12/24 0930          Strength Goal 1 (OT)    Strength Goal 1 (OT) Pt will increase B UE to strength to 4+/5 for improved independence with adls and functional t/fs.  -JJ     Time Frame (Strength Goal 1, OT) long term goal (LTG);by discharge  -JJ     Progress/Outcome (Strength Goal 1, OT) new goal  -       Row Name 09/12/24 0930          Therapy Assessment/Plan (OT)    Planned Therapy Interventions (OT) activity tolerance training;adaptive equipment training;BADL retraining;cognitive/visual perception retraining;functional balance retraining;transfer/mobility retraining;strengthening exercise;occupation/activity based interventions;patient/caregiver education/training  -               User Key  (r) = Recorded By, (t) = Taken By, (c) = Cosigned By      Initials Name Provider Type    JEmilie Harris, VENITA/L, CSRS Occupational Therapist                   Clinical Impression       Row Name 09/12/24 0930          Pain Assessment    Pain Intervention(s) Medication (See MAR);Repositioned;Ambulation/increased activity  -     Additional Documentation Pain Scale: FACES Pre/Post-Treatment (Group)  -       Row Name 09/12/24 0930          Pain Scale:  FACES Pre/Post-Treatment    Pain: FACES Scale, Pretreatment 2-->hurts little bit  -JJ     Posttreatment Pain Rating 4-->hurts little more  -J     Pre/Posttreatment Pain Comment pelvis  -J       Row Name 09/12/24 0930          Plan of Care Review    Plan of Care Reviewed With patient  -     Outcome Evaluation OT eval completed. Pt presents lethargic but oriented to self only. Pt was recently admitted for pelvic fx and was d/c'd to NH. Pt is WBAT for pelvic fx per previous therapy evals. Unsure of how pt has progressed with therapy since d/c due to her cognitive status. Today she was able to follow approx 50% of simple one step commands. She required Max-Dep A for all bed mobility. Upon sitting up able to maintain static sitting balance with CGA-Mod A.. Unsafe to attempt stance on this patient due to decreased level of alertness. She was returned back to [facility] in bed at end of session. She would benefit from skilled OT services to address these deficits. Recommend d/c back to [facility] for continued therapy services.  -       Row Name 09/12/24 0930          Therapy Assessment/Plan (OT)    Rehab Potential (OT) good, to achieve stated therapy goals  -     Criteria for Skilled Therapeutic Interventions Met (OT) yes;skilled treatment is necessary  -     Therapy Frequency (OT) 5 times/wk  -     Predicted Duration of Therapy Intervention (OT) 10 days  -       Row Name 09/12/24 0930          Therapy Plan Review/Discharge Plan (OT)    Anticipated Discharge Disposition (OT) [facility] nursing facility  -       Row Name 09/12/24 0930          Positioning and Restraints    Pre-Treatment Position in bed  -     Post Treatment Position bed  -     In Bed notified nsg;fowlers;call light within reach;encouraged to call for assist;exit alarm on;side rails up x3;heels elevated  -               User Key  (r) = Recorded By, (t) = Taken By, (c) = Cosigned By      Initials Name Provider Type    Emilie Javed  OTR/L, CSRS Occupational Therapist                   Outcome Measures       Row Name 09/12/24 0930          How much help from another is currently needed...    Putting on and taking off regular lower body clothing? 1  -JJ     Bathing (including washing, rinsing, and drying) 1  -JJ     Toileting (which includes using toilet bed pan or urinal) 1  -JJ     Putting on and taking off regular upper body clothing 2  -JJ     Taking care of personal grooming (such as brushing teeth) 2  -JJ     Eating meals 3  -JJ     AM-PAC 6 Clicks Score (OT) 10  -JJ       Row Name 09/12/24 0931 09/12/24 0748       How much help from another person do you currently need...    Turning from your back to your side while in flat bed without using bedrails? 2  -CHICA 2  -LW    Moving from lying on back to sitting on the side of a flat bed without bedrails? 2  -CHICA 2  -LW    Moving to and from a bed to a chair (including a wheelchair)? 1  -CHICA 1  -LW    Standing up from a chair using your arms (e.g., wheelchair, bedside chair)? 1  -CHICA 1  -LW    Climbing 3-5 steps with a railing? 1  -CHICA 1  -LW    To walk in hospital room? 1  -CHICA 1  -LW    AM-PAC 6 Clicks Score (PT) 8  -CHICA 8  -LW    Highest Level of Mobility Goal 3 --> Sit at edge of bed  -CHICA 3 --> Sit at edge of bed  -LW      Row Name 09/12/24 0931 09/12/24 0930       Functional Assessment    Outcome Measure Options AM-PAC 6 Clicks Basic Mobility (PT)  -CHICA AM-PAC 6 Clicks Daily Activity (OT)  -JJ              User Key  (r) = Recorded By, (t) = Taken By, (c) = Cosigned By      Initials Name Provider Type    CHICA Feroz Schwab, PT DPT Physical Therapist    Emilie Javed, OTR/L, JUANITAS Occupational Therapist    Courtney Coreas, RN Registered Nurse                    Occupational Therapy Education       Title: PT OT SLP Therapies (In Progress)       Topic: Occupational Therapy (In Progress)       Point: ADL training (In Progress)       Description:   Instruct learner(s) on proper safety adaptation  and remediation techniques during self care or transfers.   Instruct in proper use of assistive devices.                  Learning Progress Summary             Patient Acceptance, E, NL,NR by  at 9/12/2024 1006                         Point: Home exercise program (Not Started)       Description:   Instruct learner(s) on appropriate technique for monitoring, assisting and/or progressing therapeutic exercises/activities.                  Learner Progress:  Not documented in this visit.              Point: Precautions (In Progress)       Description:   Instruct learner(s) on prescribed precautions during self-care and functional transfers.                  Learning Progress Summary             Patient Acceptance, E, NL,NR by LUIS MIGUEL at 9/12/2024 1006                         Point: Body mechanics (Not Started)       Description:   Instruct learner(s) on proper positioning and spine alignment during self-care, functional mobility activities and/or exercises.                  Learner Progress:  Not documented in this visit.                              User Key       Initials Effective Dates Name Provider Type Discipline     07/11/23 -  Emilie Hinds, VENITA/L, JUANITAS Occupational Therapist OT                  OT Recommendation and Plan  Planned Therapy Interventions (OT): activity tolerance training, adaptive equipment training, BADL retraining, cognitive/visual perception retraining, functional balance retraining, transfer/mobility retraining, strengthening exercise, occupation/activity based interventions, patient/caregiver education/training  Therapy Frequency (OT): 5 times/wk  Plan of Care Review  Plan of Care Reviewed With: patient  Outcome Evaluation: OT eval completed. Pt presents lethargic but oriented to self only. Pt was recently admitted for pelvic fx and was d/c'd to NH. Pt is WBAT for pelvic fx per previous therapy evals. Unsure of how pt has progressed with therapy since d/c due to her cognitive status.  Today she was able to follow approx 50% of simple one step commands. She required Max-Dep A for all bed mobility. Upon sitting up able to maintain static sitting balance with CGA-Mod A.. Unsafe to attempt stance on this patient due to decreased level of alertness. She was returned back to [facility] in bed at end of session. She would benefit from skilled OT services to address these deficits. Recommend d/c back to [facility] for continued therapy services.     Time Calculation:         Time Calculation- OT       Row Name 09/12/24 0930             Time Calculation- OT    OT Start Time 0930  -      OT Stop Time 1008  -      OT Time Calculation (min) 38 min  -      OT Received On 09/12/24  -      OT Goal Re-Cert Due Date 09/22/24  -                User Key  (r) = Recorded By, (t) = Taken By, (c) = Cosigned By      Initials Name Provider Type    Emilie Javed OTR/L, CSRS Occupational Therapist                  Therapy Charges for Today       Code Description Service Date Service Provider Modifiers Qty    13917676290 HC OT EVAL MOD COMPLEXITY 3 9/12/2024 Emilei Hinds OTR/L, CSRS GO 1                 VENITA Crooks/L, CSRS  9/12/2024

## 2024-09-12 NOTE — PLAN OF CARE
Goal Outcome Evaluation:  Plan of Care Reviewed With: patient           Outcome Evaluation: OT eval completed. Pt presents lethargic but oriented to self only. Pt was recently admitted for pelvic fx and was d/c'd to NH. Pt is WBAT for pelvic fx per previous therapy evals. Unsure of how pt has progressed with therapy since d/c due to her cognitive status. Today she was able to follow approx 50% of simple one step commands. She required Max-Dep A for all bed mobility. Upon sitting up able to maintain static sitting balance with CGA-Mod A.. Unsafe to attempt stance on this patient due to decreased level of alertness. She was returned back to [facility] in bed at end of session. She would benefit from skilled OT services to address these deficits. Recommend d/c back to [facility] for continued therapy services.      Anticipated Discharge Disposition (OT): skilled nursing facility

## 2024-09-12 NOTE — PLAN OF CARE
Goal Outcome Evaluation:  Plan of Care Reviewed With: patient           Outcome Evaluation: PT eval complete. She is lethargic and oriented to self. Pt was recently admitted for pelvic fx and was d/c'd to NH. Pt is WBAT for pelvic fx per previous therapy evals. Unsure of how pt has progressed with therapy since d/c due to her current cognitive status. Ms Liu was mx/dependent x 2 for supine <> Sit bed mobility. Sat at the bedside CGA-Mod A to maintain balance. Again, she remains fatigued and lethargic and unable to tolerate much activity at this time. She demos much weakness in B LE as well. PT Will cont with mobility, balance, activity tolerance and strengthening. Recommend d.c back to [facility] for rehab.      Anticipated Discharge Disposition (PT): skilled nursing facility

## 2024-09-12 NOTE — H&P
History and Physical    Patient:  Pattie Liu  MRN: [id number]    CHIEF COMPLAINT:  cough    History Obtained From: the patient   PCP: Alvin Shah MD    HISTORY OF PRESENT ILLNESS:   The patient is a 79 y.o. female who presents with worsening shortness of breath, cough, fatigue. She had a recent hospitalization with right thigh pain after fall at home on 8/26/24. She tripped while at home, denies dizziness or lightheadedness prior to fall. She had a minimally displaced acute fracture of right inferior pubic ramus. She was sent to SNF for rehab as she lives alone and developed pneumonia that was seen on CXR this hospitalization in the ER. She was started on broad spectrum abx given healthcare associated pneumonia.     REVIEW OF SYSTEMS:    Review of Systems   Constitutional:  Positive for activity change, appetite change and fatigue. Negative for fever.   Respiratory:  Positive for shortness of breath.    Gastrointestinal:  Negative for nausea and vomiting.   Genitourinary:  Positive for decreased urine volume and dysuria.   Musculoskeletal:  Positive for gait problem.   Neurological:  Positive for weakness.          Past Medical History:  Past Medical History:   Diagnosis Date    CAD in native artery 7/29/2019    COPD (chronic obstructive pulmonary disease)     Essential hypertension 12/7/2021    GERD (gastroesophageal reflux disease)     Lung nodule        Past Surgical History:  Past Surgical History:   Procedure Laterality Date    BREAST IMPLANT REMOVAL      BREAST TISSUE EXPANDER REMOVAL INSERTION OF IMPLANT      CARDIAC CATHETERIZATION N/A 6/21/2019    Procedure: Left Heart Cath;  Surgeon: Edi Armijo MD;  Location: [facility] CATH INVASIVE LOCATION;  Service: Cardiology    CHOLECYSTECTOMY      HYSTERECTOMY      MASTECTOMY      BILATERAL    OOPHORECTOMY         Medications Prior to Admission:    Medications Prior to Admission   Medication Sig Dispense Refill Last Dose    ALPRAZolam (XANAX)  1 MG tablet Take 1 tablet by mouth 2 (Two) Times a Day.   9/11/2024    aspirin 81 MG chewable tablet Chew 1 tablet Daily.       atorvastatin (LIPITOR) 20 MG tablet Take 1 tablet by mouth Daily for 30 days. 30 tablet 0 9/11/2024    Budeson-Glycopyrrol-Formoterol (Breztri Aerosphere) 160-9-4.8 MCG/ACT aerosol inhaler Inhale 2 puffs 2 (Two) Times a Day.       bumetanide (BUMEX) 1 MG tablet Take 1 tablet by mouth Daily for 30 days. 30 tablet 0 9/11/2024    Citalopram Hydrobromide 30 MG capsule Take 30 mg by mouth Daily.       Cranberry-Vitamin C-Inulin (UTI-Stat) liquid Take 30 mL by mouth 2 (Two) Times a Day.       fluconazole (DIFLUCAN) 100 MG tablet Take 1 tablet by mouth Daily.   9/11/2024    gabapentin (NEURONTIN) 300 MG capsule Take 1 capsule by mouth 2 (Two) Times a Day.   9/11/2024    lisinopril (PRINIVIL,ZESTRIL) 20 MG tablet Take 1 tablet by mouth Daily for 30 days. 30 tablet 0 9/11/2024    nicotine (NICODERM CQ) 21 MG/24HR patch Place 1 patch on the skin as directed by provider Daily for 30 days. 28 patch 0 9/11/2024    nystatin (MYCOSTATIN) 197942 UNIT/GM powder Apply  topically to the appropriate area as directed Every 12 (Twelve) Hours for 14 days. 60 g 0 9/11/2024    pantoprazole (PROTONIX) 40 MG EC tablet Take 1 tablet by mouth Daily.   9/11/2024    acetaminophen (TYLENOL) 325 MG tablet Take 2 tablets by mouth Every 6 (Six) Hours As Needed for Mild Pain or Moderate Pain for up to 30 days. 60 tablet 0     dicyclomine (BENTYL) 20 MG tablet Take 1 tablet by mouth 3 (Three) Times a Day As Needed for Abdominal Cramping for up to 30 days. 30 tablet 0     docusate sodium (COLACE) 100 MG capsule Take 1 capsule by mouth 2 (Two) Times a Day As Needed for Constipation.       ibuprofen (ADVIL,MOTRIN) 400 MG tablet Take 1 tablet by mouth Every 6 (Six) Hours As Needed for Mild Pain or Moderate Pain (not relieved by Acetaminophen (2nd line therapy)) for up to 30 days. 60 tablet 0     loperamide (IMODIUM) 2 MG capsule  "Take 1 capsule by mouth 4 (Four) Times a Day As Needed for Diarrhea for up to 30 days. 60 capsule 0     Melatonin 10 MG tablet Take 1 tablet by mouth At Night As Needed (Insomnia).       ondansetron (ZOFRAN) 4 MG tablet Take 1 tablet by mouth Every 6 (Six) Hours As Needed for Nausea or Vomiting.       polyethylene glycol (MIRALAX) 17 GM/SCOOP powder Take 17 g by mouth Daily As Needed (Constipation).          Allergies:  Morphine, Codeine, Levofloxacin, Tramadol, Zanaflex  [tizanidine hcl], and Albuterol    Social History:   Social History     Socioeconomic History    Marital status:    Tobacco Use    Smoking status: Every Day     Current packs/day: 0.50     Average packs/day: 0.5 packs/day for 62.0 years (31.0 ttl pk-yrs)     Types: Cigarettes    Smokeless tobacco: Never    Tobacco comments:     states she has no plan to quit smoking   Vaping Use    Vaping status: Former   Substance and Sexual Activity    Alcohol use: No    Drug use: No    Sexual activity: Not Currently       Family History:   Family History   Problem Relation Age of Onset    Cancer Mother     Cancer Father            Physical Exam:    Vitals: /53 (BP Location: Left arm, Patient Position: Lying)   Pulse 55   Temp 98.7 °F (37.1 °C) (Oral)   Resp 16   Ht 160 cm (63\")   Wt 66.2 kg (145 lb 15.1 oz)   SpO2 90%   BMI 25.85 kg/m²   Physical Exam  Vitals reviewed.   Constitutional:       Appearance: Normal appearance. She is not ill-appearing.   HENT:      Head: Normocephalic and atraumatic.   Eyes:      General:         Right eye: No discharge.         Left eye: No discharge.      Conjunctiva/sclera: Conjunctivae normal.   Cardiovascular:      Rate and Rhythm: Normal rate and regular rhythm.      Pulses: Normal pulses.      Heart sounds: No murmur heard.  Pulmonary:      Effort: No respiratory distress.      Breath sounds: Wheezing and rhonchi present.      Comments: Nasal cannula in place  Abdominal:      General: Abdomen is flat. " Bowel sounds are normal. There is no distension.   Musculoskeletal:      Right lower leg: No edema.      Left lower leg: No edema.   Skin:     Capillary Refill: Capillary refill takes less than 2 seconds.   Neurological:      General: No focal deficit present.      Mental Status: She is alert.   Psychiatric:         Mood and Affect: Mood normal.         Behavior: Behavior normal.           Lab Results (last 24 hours)       Procedure Component Value Units Date/Time    Comprehensive Metabolic Panel [[id number]]  (Abnormal) Collected: 09/12/24 1240    Specimen: Blood Updated: 09/12/24 1425     Glucose 90 mg/dL      BUN 14 mg/dL      Creatinine 0.90 mg/dL      Sodium 146 mmol/L      Potassium 3.2 mmol/L      Chloride 110 mmol/L      CO2 28.0 mmol/L      Calcium 8.6 mg/dL      Total Protein 5.4 g/dL      Albumin 2.6 g/dL      ALT (SGPT) 10 U/L      AST (SGOT) 14 U/L      Alkaline Phosphatase 98 U/L      Total Bilirubin <0.2 mg/dL      Globulin 2.8 gm/dL      A/G Ratio 0.9 g/dL      BUN/Creatinine Ratio 15.6     Anion Gap 8.0 mmol/L      eGFR 64.4 mL/min/1.73     Narrative:      GFR Normal >60  Chronic Kidney Disease <60  Kidney Failure <15    The GFR formula is only valid for adults with stable renal function between ages 18 and 70.    Blood Culture - Blood, Arm, Left [[id number]]  (Normal) Collected: 09/11/24 1346    Specimen: Blood from Arm, Left Updated: 09/12/24 1415     Blood Culture No growth at 24 hours    CBC Auto Differential [[id number]]  (Abnormal) Collected: 09/12/24 1240    Specimen: Blood Updated: 09/12/24 1342     WBC 9.46 10*3/mm3      RBC 3.63 10*6/mm3      Hemoglobin 10.8 g/dL      Hematocrit 34.3 %      MCV 94.5 fL      MCH 29.8 pg      MCHC 31.5 g/dL      RDW 13.4 %      RDW-SD 46.5 fl      MPV 11.2 fL      Platelets 333 10*3/mm3      Neutrophil % 67.4 %      Lymphocyte % 19.3 %      Monocyte % 8.1 %      Eosinophil % 4.4 %      Basophil % 0.4 %      Immature Grans % 0.4 %      Neutrophils, Absolute 6.36  10*3/mm3      Lymphocytes, Absolute 1.83 10*3/mm3      Monocytes, Absolute 0.77 10*3/mm3      Eosinophils, Absolute 0.42 10*3/mm3      Basophils, Absolute 0.04 10*3/mm3      Immature Grans, Absolute 0.04 10*3/mm3      nRBC 0.0 /100 WBC     Blood Culture - Blood, Arm, Right [[id number]]  (Normal) Collected: 09/11/24 1302    Specimen: Blood from Arm, Right Updated: 09/12/24 1330     Blood Culture No growth at 24 hours    MRSA Screen, PCR (Inpatient) - Swab, Nares [[id number]]  (Normal) Collected: 09/11/24 1739    Specimen: Swab from Nares Updated: 09/11/24 1900     MRSA PCR No MRSA Detected    Narrative:      The negative predictive value of this diagnostic test is high and should only be used to consider de-escalating anti-MRSA therapy. A positive result may indicate colonization with MRSA and must be correlated clinically.             -----------------------------------------------------------------  EKG:   Radiology:     CT Head Without Contrast    Result Date: 9/11/2024  Exam: CT HEAD WO CONTRAST- 9/11/2024 12:10 PM  HISTORY: confusion   DOSE LENGTH PRODUCT: 666.77 mGy.cm mGy cm. Automated exposure control was also utilized to decrease patient radiation dose.  Technique: Helically acquired CT of the brain without IV contrast was performed. Sagittal and coronal reformations are also provided for review. Soft tissue and bone kernels are available for interpretation.  Comparison: 8/26/2024..  Findings:  Ventricles and extra-axial CSF spaces are normal in size.  No intraparenchymal or extra-axial hemorrhage.  Gray-white matter differentiation is preserved.  Orbits are grossly unremarkable. Small mucous retention cyst in the left maxillary sinus. Mastoid air cells are grossly clear.  No suspicious calvarial or extracranial soft tissue abnormality.  Other:None.      Impression:   No acute intracranial abnormality.  This report was signed and finalized on 9/11/2024 1:25 PM by Clem Corona.      XR Chest 1 View    Result  Date: 9/11/2024  EXAMINATION: XR CHEST 1 VW-  9/11/2024 11:19 AM  HISTORY: ams  A frontal projection of the chest is compared with the previous study dated 12/20/2022.  The lungs are moderately well-expanded.  Ill-defined diffuse opacification of the right lung was not noted in the previous study and may represent an evolving acute infiltrate.  Linear parenchymal density in the left lower lung are similar to the previous study and may represent scarring or discoid atelectasis.  There is no pleural effusion. No pneumothorax.  The heart size is not optimally evaluated due to the AP projection. Atheromatous change of thoracic aorta noted.  There is moderate diffuse osteopenia. There is no acute bony abnormality.      1. Right lung infiltrate may represent an acute inflammatory/infectious process. Further follow-up may be obtained.   This report was signed and finalized on 9/11/2024 12:27 PM by Dr. Tong Orellana MD.        Assessment and Plan     Primary Problem:  Pneumonia    Hospital Problem list:    Pneumonia      PMH:  Past Medical History:   Diagnosis Date    CAD in native artery 7/29/2019    COPD (chronic obstructive pulmonary disease)     Essential hypertension 12/7/2021    GERD (gastroesophageal reflux disease)     Lung nodule        Treatment Plan:  Pneumonia: continue abx and steroids, oxygen is at baseline, incentive spirometry, nebs    Pelvic fracture: prior hospitalization, was getting inadequate PT at SNF. PT/OT while inpatient, she will likely require SNF once pneumonia is treated.    Code status: DNR    DVT ppx: lovenox    Disposition: inpatient.     Anupam Ledezma MD 9/12/2024 16:29 CDT

## 2024-09-12 NOTE — CASE MANAGEMENT/SOCIAL WORK
Continued Stay Note  [facility]     Patient Name: Pattie Liu  MRN: [id number]  Today's Date: 9/12/2024    Admit Date: 9/11/2024    Plan: [facility]   Discharge Plan       Row Name 09/12/24 1048       Plan    Plan [facility]    Patient/Family in Agreement with Plan yes    Plan Comments Pt resides at [facility].  SW spoke to [facility] in admissions; pt does not have a bed hold but can return as long as they have a bed opening.  No precert. required.  SW will follow.    Final Discharge Disposition Code 03 - skilled nursing facility (SNF)                   Discharge Codes    No documentation.                       CATHERINE Ramirez

## 2024-09-12 NOTE — PLAN OF CARE
Goal Outcome Evaluation:              Outcome Evaluation: Pt was SB/S 53-60 PVC on tele. Pt slept well through the night. Daughter at bedside. antibiotics given per order. Pt only oriented to self. turned q2, family refused turns when she was sleeping and looked comfortable.

## 2024-09-13 LAB
ALBUMIN SERPL-MCNC: 2.6 G/DL (ref 3.5–5.2)
ALBUMIN/GLOB SERPL: 1 G/DL
ALP SERPL-CCNC: 101 U/L (ref 39–117)
ALT SERPL W P-5'-P-CCNC: 6 U/L (ref 1–33)
ANION GAP SERPL CALCULATED.3IONS-SCNC: 11 MMOL/L (ref 5–15)
AST SERPL-CCNC: 14 U/L (ref 1–32)
BASOPHILS # BLD AUTO: 0.06 10*3/MM3 (ref 0–0.2)
BASOPHILS NFR BLD AUTO: 0.5 % (ref 0–1.5)
BILIRUB SERPL-MCNC: <0.2 MG/DL (ref 0–1.2)
BUN SERPL-MCNC: 11 MG/DL (ref 8–23)
BUN/CREAT SERPL: 12.9 (ref 7–25)
CALCIUM SPEC-SCNC: 8.5 MG/DL (ref 8.6–10.5)
CHLORIDE SERPL-SCNC: 108 MMOL/L (ref 98–107)
CO2 SERPL-SCNC: 25 MMOL/L (ref 22–29)
CREAT SERPL-MCNC: 0.85 MG/DL (ref 0.57–1)
DEPRECATED RDW RBC AUTO: 43.8 FL (ref 37–54)
EGFRCR SERPLBLD CKD-EPI 2021: 68.9 ML/MIN/1.73
EOSINOPHIL # BLD AUTO: 0.44 10*3/MM3 (ref 0–0.4)
EOSINOPHIL NFR BLD AUTO: 3.8 % (ref 0.3–6.2)
ERYTHROCYTE [DISTWIDTH] IN BLOOD BY AUTOMATED COUNT: 13.2 % (ref 12.3–15.4)
GLOBULIN UR ELPH-MCNC: 2.6 GM/DL
GLUCOSE SERPL-MCNC: 101 MG/DL (ref 65–99)
HCT VFR BLD AUTO: 32.4 % (ref 34–46.6)
HGB BLD-MCNC: 10.8 G/DL (ref 12–15.9)
IMM GRANULOCYTES # BLD AUTO: 0.15 10*3/MM3 (ref 0–0.05)
IMM GRANULOCYTES NFR BLD AUTO: 1.3 % (ref 0–0.5)
LYMPHOCYTES # BLD AUTO: 2.24 10*3/MM3 (ref 0.7–3.1)
LYMPHOCYTES NFR BLD AUTO: 19.3 % (ref 19.6–45.3)
MCH RBC QN AUTO: 30.2 PG (ref 26.6–33)
MCHC RBC AUTO-ENTMCNC: 33.3 G/DL (ref 31.5–35.7)
MCV RBC AUTO: 90.5 FL (ref 79–97)
MONOCYTES # BLD AUTO: 0.86 10*3/MM3 (ref 0.1–0.9)
MONOCYTES NFR BLD AUTO: 7.4 % (ref 5–12)
NEUTROPHILS NFR BLD AUTO: 67.7 % (ref 42.7–76)
NEUTROPHILS NFR BLD AUTO: 7.83 10*3/MM3 (ref 1.7–7)
PLATELET # BLD AUTO: 304 10*3/MM3 (ref 140–450)
PMV BLD AUTO: 11.9 FL (ref 6–12)
POTASSIUM SERPL-SCNC: 3.3 MMOL/L (ref 3.5–5.2)
PROT SERPL-MCNC: 5.2 G/DL (ref 6–8.5)
RBC # BLD AUTO: 3.58 10*6/MM3 (ref 3.77–5.28)
SODIUM SERPL-SCNC: 144 MMOL/L (ref 136–145)
WBC NRBC COR # BLD AUTO: 11.58 10*3/MM3 (ref 3.4–10.8)

## 2024-09-13 PROCEDURE — 97530 THERAPEUTIC ACTIVITIES: CPT

## 2024-09-13 PROCEDURE — 80053 COMPREHEN METABOLIC PANEL: CPT | Performed by: FAMILY MEDICINE

## 2024-09-13 PROCEDURE — 94761 N-INVAS EAR/PLS OXIMETRY MLT: CPT

## 2024-09-13 PROCEDURE — 25810000003 SODIUM CHLORIDE 0.9 % SOLUTION: Performed by: FAMILY MEDICINE

## 2024-09-13 PROCEDURE — 94799 UNLISTED PULMONARY SVC/PX: CPT

## 2024-09-13 PROCEDURE — 25010000002 ENOXAPARIN PER 10 MG: Performed by: FAMILY MEDICINE

## 2024-09-13 PROCEDURE — 25010000002 CEFAZOLIN PER 500 MG: Performed by: FAMILY MEDICINE

## 2024-09-13 PROCEDURE — 85025 COMPLETE CBC W/AUTO DIFF WBC: CPT | Performed by: FAMILY MEDICINE

## 2024-09-13 PROCEDURE — 25010000002 AZITHROMYCIN PER 500 MG: Performed by: FAMILY MEDICINE

## 2024-09-13 PROCEDURE — 25810000003 SODIUM CHLORIDE 0.9 % SOLUTION 250 ML FLEX CONT: Performed by: FAMILY MEDICINE

## 2024-09-13 RX ORDER — FLUTICASONE PROPIONATE 50 UG/1
2 SPRAY, METERED NASAL 2 TIMES DAILY
Status: DISCONTINUED | OUTPATIENT
Start: 2024-09-13 | End: 2024-09-23 | Stop reason: HOSPADM

## 2024-09-13 RX ADMIN — NICOTINE 1 PATCH: 21 PATCH, EXTENDED RELEASE TRANSDERMAL at 18:01

## 2024-09-13 RX ADMIN — BUMETANIDE 1 MG: 1 TABLET ORAL at 08:46

## 2024-09-13 RX ADMIN — GABAPENTIN 300 MG: 300 CAPSULE ORAL at 20:31

## 2024-09-13 RX ADMIN — FLUTICASONE PROPIONATE 2 SPRAY: 50 SPRAY, METERED NASAL at 20:32

## 2024-09-13 RX ADMIN — Medication 10 ML: at 20:32

## 2024-09-13 RX ADMIN — SODIUM CHLORIDE 75 ML/HR: 9 INJECTION, SOLUTION INTRAVENOUS at 08:51

## 2024-09-13 RX ADMIN — IPRATROPIUM BROMIDE 0.5 MG: 0.5 SOLUTION RESPIRATORY (INHALATION) at 06:49

## 2024-09-13 RX ADMIN — IPRATROPIUM BROMIDE 0.5 MG: 0.5 SOLUTION RESPIRATORY (INHALATION) at 19:20

## 2024-09-13 RX ADMIN — CEFAZOLIN 2000 MG: 2 INJECTION, POWDER, FOR SOLUTION INTRAMUSCULAR; INTRAVENOUS at 18:01

## 2024-09-13 RX ADMIN — AZITHROMYCIN DIHYDRATE 500 MG: 500 INJECTION, POWDER, LYOPHILIZED, FOR SOLUTION INTRAVENOUS at 08:46

## 2024-09-13 RX ADMIN — GABAPENTIN 300 MG: 300 CAPSULE ORAL at 08:47

## 2024-09-13 RX ADMIN — FLUTICASONE PROPIONATE 2 SPRAY: 50 SPRAY, METERED NASAL at 13:55

## 2024-09-13 RX ADMIN — ALPRAZOLAM 1 MG: 0.5 TABLET ORAL at 20:31

## 2024-09-13 RX ADMIN — ENOXAPARIN SODIUM 40 MG: 100 INJECTION SUBCUTANEOUS at 18:01

## 2024-09-13 RX ADMIN — Medication 10 MG: at 20:31

## 2024-09-13 RX ADMIN — Medication 10 ML: at 08:47

## 2024-09-13 RX ADMIN — ATORVASTATIN CALCIUM 20 MG: 10 TABLET, FILM COATED ORAL at 08:46

## 2024-09-13 RX ADMIN — CEFAZOLIN 2000 MG: 2 INJECTION, POWDER, FOR SOLUTION INTRAMUSCULAR; INTRAVENOUS at 11:25

## 2024-09-13 RX ADMIN — CITALOPRAM 30 MG: 20 TABLET, FILM COATED ORAL at 08:47

## 2024-09-13 RX ADMIN — PANTOPRAZOLE SODIUM 40 MG: 40 TABLET, DELAYED RELEASE ORAL at 08:47

## 2024-09-13 RX ADMIN — ASPIRIN 81 MG: 81 TABLET, COATED ORAL at 08:46

## 2024-09-13 RX ADMIN — IPRATROPIUM BROMIDE 0.5 MG: 0.5 SOLUTION RESPIRATORY (INHALATION) at 10:34

## 2024-09-13 RX ADMIN — IPRATROPIUM BROMIDE 0.5 MG: 0.5 SOLUTION RESPIRATORY (INHALATION) at 14:26

## 2024-09-13 RX ADMIN — LISINOPRIL 20 MG: 20 TABLET ORAL at 08:46

## 2024-09-13 RX ADMIN — ACETAMINOPHEN 650 MG: 325 TABLET ORAL at 08:47

## 2024-09-13 RX ADMIN — OXYCODONE HYDROCHLORIDE AND ACETAMINOPHEN 1 TABLET: 5; 325 TABLET ORAL at 16:33

## 2024-09-13 RX ADMIN — CEFAZOLIN 2000 MG: 2 INJECTION, POWDER, FOR SOLUTION INTRAMUSCULAR; INTRAVENOUS at 03:34

## 2024-09-13 NOTE — PLAN OF CARE
Goal Outcome Evaluation:  Plan of Care Reviewed With: patient        Progress: no change  Outcome Evaluation: VSS, SB/S 53-68, 2L O2. Pt alseep most of the shift. Pt was oriented to self & place this am. Turns q2 as pt/family allows. IVF & abx infusing per order. Call light in reach

## 2024-09-13 NOTE — PROGRESS NOTES
Daily Progress Note  Pattie Liu  MRN: [id number] LOS: 2    Admit Date: 9/11/2024 9/13/2024 12:30 CDT    Subjective:      Chief Complaint:  Chief Complaint   Patient presents with    Altered Mental Status       Interval History:    Reviewed overnight events and nursing notes.   No acute events overnight. Rested better overnight. Still confused and drowsy during the day.     Review of Systems   Constitutional:  Positive for activity change and fatigue. Negative for fever.   Respiratory:  Positive for cough and shortness of breath.    Gastrointestinal:  Negative for nausea and vomiting.   Genitourinary:  Positive for decreased urine volume and dysuria.   Musculoskeletal:  Positive for gait problem.   Neurological:  Positive for weakness.       DIET:  Diet: Regular/House; Fluid Consistency: Thin (IDDSI 0)    Medications:   sodium chloride, 75 mL/hr, Last Rate: 75 mL/hr (09/13/24 0851)      ALPRAZolam, 1 mg, Oral, BID  aspirin, 81 mg, Oral, Daily  atorvastatin, 20 mg, Oral, Daily  azithromycin, 500 mg, Intravenous, Q24H  bumetanide, 1 mg, Oral, Daily  ceFAZolin, 2,000 mg, Intravenous, Q8H  citalopram, 30 mg, Oral, Daily  enoxaparin, 40 mg, Subcutaneous, Q24H  fluticasone, 2 spray, Each Nare, BID  gabapentin, 300 mg, Oral, BID  ipratropium, 0.5 mg, Nebulization, 4x Daily - RT  lisinopril, 20 mg, Oral, Daily  nicotine, 1 patch, Transdermal, Q24H  pantoprazole, 40 mg, Oral, QAM AC  sodium chloride, 10 mL, Intravenous, Q12H        Data:     Code Status:   Code Status and Medical Interventions: No CPR (Do Not Attempt to Resuscitate); Full Support   Ordered at: 09/11/24 1647     Code Status (Patient has no pulse and is not breathing):    No CPR (Do Not Attempt to Resuscitate)     Medical Interventions (Patient has pulse or is breathing):    Full Support       Family History   Problem Relation Age of Onset    Cancer Mother     Cancer Father      Social History     Socioeconomic History    Marital status:     Tobacco Use    Smoking status: Every Day     Current packs/day: 0.50     Average packs/day: 0.5 packs/day for 62.0 years (31.0 ttl pk-yrs)     Types: Cigarettes    Smokeless tobacco: Never    Tobacco comments:     states she has no plan to quit smoking   Vaping Use    Vaping status: Former   Substance and Sexual Activity    Alcohol use: No    Drug use: No    Sexual activity: Not Currently       Labs:    Lab Results (last 72 hours)       Procedure Component Value Units Date/Time    Comprehensive Metabolic Panel [[id number]]  (Abnormal) Collected: 09/12/24 1240    Specimen: Blood Updated: 09/12/24 1425     Glucose 90 mg/dL      BUN 14 mg/dL      Creatinine 0.90 mg/dL      Sodium 146 mmol/L      Potassium 3.2 mmol/L      Chloride 110 mmol/L      CO2 28.0 mmol/L      Calcium 8.6 mg/dL      Total Protein 5.4 g/dL      Albumin 2.6 g/dL      ALT (SGPT) 10 U/L      AST (SGOT) 14 U/L      Alkaline Phosphatase 98 U/L      Total Bilirubin <0.2 mg/dL      Globulin 2.8 gm/dL      A/G Ratio 0.9 g/dL      BUN/Creatinine Ratio 15.6     Anion Gap 8.0 mmol/L      eGFR 64.4 mL/min/1.73     Narrative:      GFR Normal >60  Chronic Kidney Disease <60  Kidney Failure <15    The GFR formula is only valid for adults with stable renal function between ages 18 and 70.    Blood Culture - Blood, Arm, Left [[id number]]  (Normal) Collected: 09/11/24 1346    Specimen: Blood from Arm, Left Updated: 09/12/24 1415     Blood Culture No growth at 24 hours    CBC Auto Differential [[id number]]  (Abnormal) Collected: 09/12/24 1240    Specimen: Blood Updated: 09/12/24 1342     WBC 9.46 10*3/mm3      RBC 3.63 10*6/mm3      Hemoglobin 10.8 g/dL      Hematocrit 34.3 %      MCV 94.5 fL      MCH 29.8 pg      MCHC 31.5 g/dL      RDW 13.4 %      RDW-SD 46.5 fl      MPV 11.2 fL      Platelets 333 10*3/mm3      Neutrophil % 67.4 %      Lymphocyte % 19.3 %      Monocyte % 8.1 %      Eosinophil % 4.4 %      Basophil % 0.4 %      Immature Grans % 0.4 %       Neutrophils, Absolute 6.36 10*3/mm3      Lymphocytes, Absolute 1.83 10*3/mm3      Monocytes, Absolute 0.77 10*3/mm3      Eosinophils, Absolute 0.42 10*3/mm3      Basophils, Absolute 0.04 10*3/mm3      Immature Grans, Absolute 0.04 10*3/mm3      nRBC 0.0 /100 WBC     Blood Culture - Blood, Arm, Right [[id number]]  (Normal) Collected: 09/11/24 1302    Specimen: Blood from Arm, Right Updated: 09/12/24 1330     Blood Culture No growth at 24 hours    MRSA Screen, PCR (Inpatient) - Swab, Nares [[id number]]  (Normal) Collected: 09/11/24 1739    Specimen: Swab from Nares Updated: 09/11/24 1900     MRSA PCR No MRSA Detected    Narrative:      The negative predictive value of this diagnostic test is high and should only be used to consider de-escalating anti-MRSA therapy. A positive result may indicate colonization with MRSA and must be correlated clinically.    Ammonia [[id number]]  (Normal) Collected: 09/11/24 1346    Specimen: Blood Updated: 09/11/24 1424     Ammonia 15 umol/L     COVID-19,CEPHEID/LINA,COR/TAMMY/PAD/IVETTE/LAG/CECI IN-HOUSE,NP SWAB IN TRANSPORT MEDIA 1 HR TAT, RT-PCR - Swab, Nasopharynx [[id number]]  (Normal) Collected: 09/11/24 1303    Specimen: Swab from Nasopharynx Updated: 09/11/24 1355     COVID19 Not Detected    Narrative:      Fact sheet for providers: https://www.fda.gov/media/542323/download     Fact sheet for patients: https://www.fda.gov/media/844947/download  Fact sheet for providers: https://www.fda.gov/media/895456/download     Fact sheet for patients: https://www.fda.gov/media/364855/download    Comprehensive Metabolic Panel [[id number]]  (Abnormal) Collected: 09/11/24 1302    Specimen: Blood Updated: 09/11/24 1336     Glucose 102 mg/dL      BUN 26 mg/dL      Creatinine 1.23 mg/dL      Sodium 142 mmol/L      Potassium 3.2 mmol/L      Chloride 101 mmol/L      CO2 32.0 mmol/L      Calcium 9.6 mg/dL      Total Protein 6.7 g/dL      Albumin 3.4 g/dL      ALT (SGPT) 11 U/L      AST (SGOT) 14 U/L       Alkaline Phosphatase 119 U/L      Total Bilirubin 0.2 mg/dL      Globulin 3.3 gm/dL      A/G Ratio 1.0 g/dL      BUN/Creatinine Ratio 21.1     Anion Gap 9.0 mmol/L      eGFR 44.2 mL/min/1.73     Narrative:      GFR Normal >60  Chronic Kidney Disease <60  Kidney Failure <15    The GFR formula is only valid for adults with stable renal function between ages 18 and 70.    Magnesium [[id number]]  (Normal) Collected: 09/11/24 1302    Specimen: Blood Updated: 09/11/24 1336     Magnesium 2.2 mg/dL     Urinalysis With Culture If Indicated - Urine, Catheter [[id number]]  (Normal) Collected: 09/11/24 1303    Specimen: Urine, Catheter Updated: 09/11/24 1320     Color, UA Yellow     Appearance, UA Clear     pH, UA 5.5     Specific Gravity, UA 1.012     Glucose, UA Negative     Ketones, UA Negative     Bilirubin, UA Negative     Blood, UA Negative     Protein, UA Negative     Leuk Esterase, UA Negative     Nitrite, UA Negative     Urobilinogen, UA 0.2 E.U./dL    Narrative:      In absence of clinical symptoms, the presence of pyuria, bacteria, and/or nitrites on the urinalysis result does not correlate with infection.  Urine microscopic not indicated.    CBC & Differential [[id number]]  (Abnormal) Collected: 09/11/24 1302    Specimen: Blood Updated: 09/11/24 1317    Narrative:      The following orders were created for panel order CBC & Differential.  Procedure                               Abnormality         Status                     ---------                               -----------         ------                     CBC Auto Differential[[id number]]        Abnormal            Final result                 Please view results for these tests on the individual orders.    CBC Auto Differential [[id number]]  (Abnormal) Collected: 09/11/24 1302    Specimen: Blood Updated: 09/11/24 1317     WBC 10.95 10*3/mm3      RBC 4.09 10*6/mm3      Hemoglobin 12.4 g/dL      Hematocrit 38.7 %      MCV 94.6 fL      MCH 30.3 pg      MCHC 32.0 g/dL   "    RDW 13.5 %      RDW-SD 46.7 fl      MPV 10.9 fL      Platelets 357 10*3/mm3      Neutrophil % 69.8 %      Lymphocyte % 18.0 %      Monocyte % 7.6 %      Eosinophil % 3.7 %      Basophil % 0.4 %      Immature Grans % 0.5 %      Neutrophils, Absolute 7.64 10*3/mm3      Lymphocytes, Absolute 1.97 10*3/mm3      Monocytes, Absolute 0.83 10*3/mm3      Eosinophils, Absolute 0.41 10*3/mm3      Basophils, Absolute 0.04 10*3/mm3      Immature Grans, Absolute 0.06 10*3/mm3      nRBC 0.0 /100 WBC               Objective:     Vitals: /57 (BP Location: Left arm, Patient Position: Lying)   Pulse 58   Temp 98.6 °F (37 °C) (Oral)   Resp 16   Ht 160 cm (63\")   Wt 66.2 kg (145 lb 15.1 oz)   SpO2 95%   BMI 25.85 kg/m²    Intake/Output Summary (Last 24 hours) at 2024 1230  Last data filed at 2024 1116  Gross per 24 hour   Intake 510 ml   Output 1850 ml   Net -1340 ml    Temp (24hrs), Av.3 °F (36.8 °C), Min:97.4 °F (36.3 °C), Max:98.7 °F (37.1 °C)      Physical Exam  Vitals reviewed.   Constitutional:       Appearance: Normal appearance. She is ill-appearing.   HENT:      Head: Normocephalic and atraumatic.   Eyes:      General:         Right eye: No discharge.         Left eye: No discharge.      Conjunctiva/sclera: Conjunctivae normal.   Cardiovascular:      Rate and Rhythm: Normal rate and regular rhythm.      Pulses: Normal pulses.      Heart sounds: No murmur heard.  Pulmonary:      Effort: Pulmonary effort is normal. No respiratory distress.      Comments: Nasal cannula in place  Abdominal:      General: Abdomen is flat. Bowel sounds are normal. There is no distension.   Musculoskeletal:      Right lower leg: No edema.      Left lower leg: No edema.   Skin:     Capillary Refill: Capillary refill takes less than 2 seconds.   Neurological:      General: No focal deficit present.      Mental Status: She is alert.   Psychiatric:         Mood and Affect: Mood normal.         Behavior: Behavior normal. "             Assessment and Plan:     Primary Problem:  Pneumonia    Hospital Problem list:    Pneumonia    Essential hypertension    Gait instability    Pelvic fracture      PMH:  Past Medical History:   Diagnosis Date    CAD in native artery 7/29/2019    COPD (chronic obstructive pulmonary disease)     Essential hypertension 12/7/2021    GERD (gastroesophageal reflux disease)     Lung nodule        Treatment Plan:  Pneumonia: continue abx and steroids, oxygen is at baseline, incentive spirometry, nebs     Pelvic fracture: prior hospitalization. PT/OT while inpatient, she will likely require SNF once pneumonia is treated however she would like to not return to [facility].      Code status: DNR     DVT ppx: lovenox     Disposition: inpatient.     Reviewed treatment plans with the patient and/or family.   30 minutes spent in face to face interaction and coordination of care.     Electronically signed by Anupam Ledezma MD on 9/13/2024 at 12:30 CDT

## 2024-09-13 NOTE — THERAPY TREATMENT NOTE
Acute Care - Physical Therapy Treatment Note  [facility]     Patient Name: Pattie Liu  : 1945  MRN: [id number]  Today's Date: 2024      Visit Dx:     ICD-10-CM ICD-9-CM   1. Pneumonia of right lung due to infectious organism, unspecified part of lung  J18.9 483.8   2. Dehydration  E86.0 276.51   3. Altered mental status, unspecified altered mental status type  R41.82 780.97   4. Impaired mobility [Z74.09]  Z74.09 799.89     Patient Active Problem List   Diagnosis    Frequent PVCs    Shortness of breath    SOB (shortness of breath)    CAD in native artery    PVD (peripheral vascular disease)    Pneumonia due to infectious organism    Chronic back pain    Essential hypertension    Fall, initial encounter    Traumatic rhabdomyolysis    Leukocytosis    Anemia    Degenerative disc disease, lumbar    Dehydration    Acute UTI    Chronic respiratory failure with hypoxia    Impaired mobility    UTI (urinary tract infection)    Gait instability    Pneumonia    Pelvic fracture     Past Medical History:   Diagnosis Date    CAD in native artery 2019    COPD (chronic obstructive pulmonary disease)     Essential hypertension 2021    GERD (gastroesophageal reflux disease)     Lung nodule      Past Surgical History:   Procedure Laterality Date    BREAST IMPLANT REMOVAL      BREAST TISSUE EXPANDER REMOVAL INSERTION OF IMPLANT      CARDIAC CATHETERIZATION N/A 2019    Procedure: Left Heart Cath;  Surgeon: Edi Armijo MD;  Location: [facility] INVASIVE LOCATION;  Service: Cardiology    CHOLECYSTECTOMY      HYSTERECTOMY      MASTECTOMY      BILATERAL    OOPHORECTOMY       PT Assessment (Last 12 Hours)       PT Evaluation and Treatment       Row Name 24 1554          Physical Therapy Time and Intention    Subjective Information complains of;pain  -MF     Document Type therapy note (daily note)  -     Mode of Treatment physical therapy  -       Row Name 24 1554          General  Information    Existing Precautions/Restrictions fall;oxygen therapy device and L/min  -[facility] Name 09/13/24 1600 09/13/24 1554       Pain    Pain Intervention(s) --  -MF Repositioned;Ambulation/increased activity  -      Row Name 09/13/24 1600 09/13/24 1554       Pain Scale: Word Pre/Post-Treatment    Pain: Word Scale, Pretreatment --  - 0 - no pain  -    Posttreatment Pain Rating --  - 6 - moderate-severe pain  -    Pain Location - Side/Orientation --  - Right  -    Pain Location --  - lower  -    Pain Location - --  - extremity  -[facility] Name 09/13/24 1600 09/13/24 1554       Bed Mobility    Supine-Sit [place] (Bed Mobility) --  -MF verbal cues;moderate assist (50% patient effort)  -    Sit-Supine [place] (Bed Mobility) --  -MF verbal cues;maximum assist (25% patient effort)  -[facility] Name 09/13/24 1600 09/13/24 1554       Transfers    Comment, (Transfers) --  -MF stood x 2 with RWX. took scooting side steps to HOB. Cues for posture.  -MF      Row Name 09/13/24 1600 09/13/24 1554       Sit-Stand Transfer    Sit-Stand [place] (Transfers) --  -MF verbal cues;minimum assist (75% patient effort);moderate assist (50% patient effort)  -[facility] Name 09/13/24 1600 09/13/24 1554       Stand-Sit Transfer    Stand-Sit [place] (Transfers) --  -MF verbal cues;minimum assist (75% patient effort)  -[facility] Name 09/13/24 1600 09/13/24 1554       Balance    Comment, Balance --  -MF posterior lean at times in sitting. CGA-MIN for sitting balance.  -[facility] Name 09/13/24 1600 09/13/24 1554       Hip (Therapeutic Exercise)    Hip (Therapeutic Exercise) --  -MF AROM (active range of motion)  -    Hip AROM (Therapeutic Exercise) --  -MF bilateral;flexion;sitting;10 repetitions  x 2  -      Row Name 09/13/24 1600 09/13/24 1554       Knee (Therapeutic Exercise)    Knee (Therapeutic Exercise) --  - AROM (active range of motion)  -    Knee AROM (Therapeutic  Exercise) --  -MF bilateral;LAQ (long arc quad);sitting;20 repititions  -      Row Name 09/13/24 1600 09/13/24 3341       Ankle (Therapeutic Exercise)    Ankle (Therapeutic Exercise) --  - AROM (active range of motion)  -    Ankle AROM (Therapeutic Exercise) --  -MF bilateral;dorsiflexion;sitting;20 repititions  -      Row Name 09/13/24 0379          Plan of Care Review    Plan of Care Reviewed With patient  -     Progress improving  -     Outcome Evaluation Pt. awake and alert this afternoon. Family reports that they held off from using pain medicine so that pt would be alert enough for therapy. Pt. had no pain at rest, but did increase with activity. She needed Mod-Max assist for bed mobility. Participated with LE exercises while sitting EOB. CGA-MIN for sitting balance. She was able to stand with MIN-MOD assist x 1 and took scooting side steps to HOB. Pt. did well overall, but is still in need of rehab following discharge.  -       Row Name 09/13/24 0518          Positioning and Restraints    Pre-Treatment Position in bed  -     Post Treatment Position bed  -MF     In Bed fowlers;call light within reach;encouraged to call for assist;with family/caregiver;side rails up x2;legs elevated  -               User Key  (r) = Recorded By, (t) = Taken By, (c) = Cosigned By      Initials Name Provider Type    Maria L Morrison, PTA Physical Therapist Assistant                    Physical Therapy Education       Title: PT OT SLP Therapies (In Progress)       Topic: Physical Therapy (In Progress)       Point: Mobility training (In Progress)       Learning Progress Summary             Patient Acceptance, E, NR by CHICA at 9/12/2024 0931    Comment: benefits of activity, progression of PT                         Point: Home exercise program (Not Started)       Learner Progress:  Not documented in this visit.              Point: Body mechanics (Not Started)       Learner Progress:  Not documented in this  visit.              Point: Precautions (Not Started)       Learner Progress:  Not documented in this visit.                              User Key       Initials Effective Dates Name Provider Type Discipline    CHICA 02/03/23 -  Feroz Schwab, PT DPT Physical Therapist PT                  PT Recommendation and Plan     Plan of Care Reviewed With: patient  Progress: improving  Outcome Evaluation: Pt. awake and alert this afternoon. Family reports that they held off from using pain medicine so that pt would be alert enough for therapy. Pt. had no pain at rest, but did increase with activity. She needed Mod-Max assist for bed mobility. Participated with LE exercises while sitting EOB. CGA-MIN for sitting balance. She was able to stand with MIN-MOD assist x 1 and took scooting side steps to HOB. Pt. did well overall, but is still in need of rehab following discharge.   Outcome Measures       Row Name 09/13/24 4059             How much help from another person do you currently need...    Turning from your back to your side while in flat bed without using bedrails? 2  -MF      Moving from lying on back to sitting on the side of a flat bed without bedrails? 2  -MF      Moving to and from a bed to a chair (including a wheelchair)? 2  -MF      Standing up from a chair using your arms (e.g., wheelchair, bedside chair)? 3  -MF      Climbing 3-5 steps with a railing? 1  -MF      To walk in hospital room? 1  -MF      AM-PAC 6 Clicks Score (PT) 11  -MF      Highest Level of Mobility Goal 4 --> Transfer to chair/commode  -MF         Functional Assessment    Outcome Measure Options AM-PAC 6 Clicks Basic Mobility (PT)  -MF                User Key  (r) = Recorded By, (t) = Taken By, (c) = Cosigned By      Initials Name Provider Type    Maria L Morrison PTA Physical Therapist Assistant                     Time Calculation:    PT Charges       Row Name 09/13/24 1648             Time Calculation    Start Time 1554  -MF      Stop  Time 1632  -MF      Time Calculation (min) 38 min  -MF      PT Received On 09/13/24  -MF         Time Calculation- PT    Total Timed Code Minutes- PT 38 minute(s)  -MF         Timed Charges    08025 - PT Therapeutic Activity Minutes 38  -MF         Total Minutes    Timed Charges Total Minutes 38  -MF       Total Minutes 38  -MF                User Key  (r) = Recorded By, (t) = Taken By, (c) = Cosigned By      Initials Name Provider Type    Maria L Morrison PTA Physical Therapist Assistant                  Therapy Charges for Today       Code Description Service Date Service Provider Modifiers Qty    03632126005  PT THERAPEUTIC ACT EA 15 MIN 9/13/2024 Maria L Sparks PTA GP 3            PT G-Codes  Outcome Measure Options: AM-PAC 6 Clicks Basic Mobility (PT)  AM-PAC 6 Clicks Score (PT): 11  AM-PAC 6 Clicks Score (OT): 10    Maria L Sparks PTA  9/13/2024

## 2024-09-13 NOTE — PLAN OF CARE
Goal Outcome Evaluation:           Progress: no change  Outcome Evaluation: Pt was SB 51-58 FPVC on tele. Pt slept well through the night. family at bedside. sats within normal limits most of the night on 1.5L, but at 4am had to up bump O2 up to 3L NC. Antibiotics given per order. no complaisn of pain.

## 2024-09-13 NOTE — PLAN OF CARE
Goal Outcome Evaluation:  Plan of Care Reviewed With: patient        Progress: improving  Outcome Evaluation: Pt. awake and alert this afternoon. Family reports that they held off from using pain medicine so that pt would be alert enough for therapy. Pt. had no pain at rest, but did increase with activity. She needed Mod-Max assist for bed mobility. Participated with LE exercises while sitting EOB. CGA-MIN for sitting balance. She was able to stand with MIN-MOD assist x 1 and took scooting side steps to HOB. Pt. did well overall, but is still in need of rehab following discharge.

## 2024-09-14 LAB
ALBUMIN SERPL-MCNC: 2.4 G/DL (ref 3.5–5.2)
ALBUMIN/GLOB SERPL: 0.9 G/DL
ALP SERPL-CCNC: 97 U/L (ref 39–117)
ALT SERPL W P-5'-P-CCNC: <5 U/L (ref 1–33)
ANION GAP SERPL CALCULATED.3IONS-SCNC: 9 MMOL/L (ref 5–15)
AST SERPL-CCNC: 13 U/L (ref 1–32)
BASOPHILS # BLD AUTO: 0.04 10*3/MM3 (ref 0–0.2)
BASOPHILS NFR BLD AUTO: 0.3 % (ref 0–1.5)
BILIRUB SERPL-MCNC: <0.2 MG/DL (ref 0–1.2)
BUN SERPL-MCNC: 10 MG/DL (ref 8–23)
BUN/CREAT SERPL: 13.5 (ref 7–25)
CALCIUM SPEC-SCNC: 8.6 MG/DL (ref 8.6–10.5)
CHLORIDE SERPL-SCNC: 109 MMOL/L (ref 98–107)
CO2 SERPL-SCNC: 26 MMOL/L (ref 22–29)
CREAT SERPL-MCNC: 0.74 MG/DL (ref 0.57–1)
DEPRECATED RDW RBC AUTO: 44.8 FL (ref 37–54)
EGFRCR SERPLBLD CKD-EPI 2021: 81.4 ML/MIN/1.73
EOSINOPHIL # BLD AUTO: 0.48 10*3/MM3 (ref 0–0.4)
EOSINOPHIL NFR BLD AUTO: 4 % (ref 0.3–6.2)
ERYTHROCYTE [DISTWIDTH] IN BLOOD BY AUTOMATED COUNT: 13 % (ref 12.3–15.4)
GLOBULIN UR ELPH-MCNC: 2.6 GM/DL
GLUCOSE SERPL-MCNC: 101 MG/DL (ref 65–99)
HCT VFR BLD AUTO: 33.2 % (ref 34–46.6)
HGB BLD-MCNC: 10.6 G/DL (ref 12–15.9)
IMM GRANULOCYTES # BLD AUTO: 0.07 10*3/MM3 (ref 0–0.05)
IMM GRANULOCYTES NFR BLD AUTO: 0.6 % (ref 0–0.5)
LYMPHOCYTES # BLD AUTO: 2.17 10*3/MM3 (ref 0.7–3.1)
LYMPHOCYTES NFR BLD AUTO: 18 % (ref 19.6–45.3)
MAGNESIUM SERPL-MCNC: 1.5 MG/DL (ref 1.6–2.4)
MCH RBC QN AUTO: 29.9 PG (ref 26.6–33)
MCHC RBC AUTO-ENTMCNC: 31.9 G/DL (ref 31.5–35.7)
MCV RBC AUTO: 93.8 FL (ref 79–97)
MONOCYTES # BLD AUTO: 0.73 10*3/MM3 (ref 0.1–0.9)
MONOCYTES NFR BLD AUTO: 6.1 % (ref 5–12)
NEUTROPHILS NFR BLD AUTO: 71 % (ref 42.7–76)
NEUTROPHILS NFR BLD AUTO: 8.55 10*3/MM3 (ref 1.7–7)
NRBC BLD AUTO-RTO: 0 /100 WBC (ref 0–0.2)
PLATELET # BLD AUTO: 338 10*3/MM3 (ref 140–450)
PMV BLD AUTO: 10.1 FL (ref 6–12)
POTASSIUM SERPL-SCNC: 2.9 MMOL/L (ref 3.5–5.2)
PROT SERPL-MCNC: 5 G/DL (ref 6–8.5)
RBC # BLD AUTO: 3.54 10*6/MM3 (ref 3.77–5.28)
SODIUM SERPL-SCNC: 144 MMOL/L (ref 136–145)
WBC NRBC COR # BLD AUTO: 12.04 10*3/MM3 (ref 3.4–10.8)

## 2024-09-14 PROCEDURE — 94799 UNLISTED PULMONARY SVC/PX: CPT

## 2024-09-14 PROCEDURE — 84132 ASSAY OF SERUM POTASSIUM: CPT | Performed by: FAMILY MEDICINE

## 2024-09-14 PROCEDURE — 94761 N-INVAS EAR/PLS OXIMETRY MLT: CPT

## 2024-09-14 PROCEDURE — 25810000003 SODIUM CHLORIDE 0.9 % SOLUTION 250 ML FLEX CONT: Performed by: FAMILY MEDICINE

## 2024-09-14 PROCEDURE — 63710000001 ONDANSETRON ODT 4 MG TABLET DISPERSIBLE: Performed by: FAMILY MEDICINE

## 2024-09-14 PROCEDURE — 94664 DEMO&/EVAL PT USE INHALER: CPT

## 2024-09-14 PROCEDURE — 83735 ASSAY OF MAGNESIUM: CPT | Performed by: FAMILY MEDICINE

## 2024-09-14 PROCEDURE — 25010000002 ENOXAPARIN PER 10 MG: Performed by: FAMILY MEDICINE

## 2024-09-14 PROCEDURE — 25010000002 AZITHROMYCIN PER 500 MG: Performed by: FAMILY MEDICINE

## 2024-09-14 PROCEDURE — 25010000002 CEFAZOLIN PER 500 MG: Performed by: FAMILY MEDICINE

## 2024-09-14 PROCEDURE — 25010000002 MAGNESIUM SULFATE 2 GM/50ML SOLUTION: Performed by: FAMILY MEDICINE

## 2024-09-14 PROCEDURE — 80053 COMPREHEN METABOLIC PANEL: CPT | Performed by: FAMILY MEDICINE

## 2024-09-14 PROCEDURE — 25810000003 SODIUM CHLORIDE 0.9 % SOLUTION: Performed by: FAMILY MEDICINE

## 2024-09-14 PROCEDURE — 97530 THERAPEUTIC ACTIVITIES: CPT

## 2024-09-14 PROCEDURE — 85025 COMPLETE CBC W/AUTO DIFF WBC: CPT | Performed by: FAMILY MEDICINE

## 2024-09-14 RX ORDER — MAGNESIUM SULFATE HEPTAHYDRATE 40 MG/ML
2 INJECTION, SOLUTION INTRAVENOUS
Status: COMPLETED | OUTPATIENT
Start: 2024-09-14 | End: 2024-09-14

## 2024-09-14 RX ORDER — POTASSIUM CHLORIDE 750 MG/1
40 CAPSULE, EXTENDED RELEASE ORAL EVERY 4 HOURS
Status: COMPLETED | OUTPATIENT
Start: 2024-09-14 | End: 2024-09-14

## 2024-09-14 RX ADMIN — AZITHROMYCIN DIHYDRATE 500 MG: 500 INJECTION, POWDER, LYOPHILIZED, FOR SOLUTION INTRAVENOUS at 10:36

## 2024-09-14 RX ADMIN — ASPIRIN 81 MG: 81 TABLET, COATED ORAL at 10:33

## 2024-09-14 RX ADMIN — MAGNESIUM SULFATE HEPTAHYDRATE 2 G: 2 INJECTION, SOLUTION INTRAVENOUS at 15:37

## 2024-09-14 RX ADMIN — ENOXAPARIN SODIUM 40 MG: 100 INJECTION SUBCUTANEOUS at 17:18

## 2024-09-14 RX ADMIN — OXYCODONE HYDROCHLORIDE AND ACETAMINOPHEN 1 TABLET: 5; 325 TABLET ORAL at 13:50

## 2024-09-14 RX ADMIN — IPRATROPIUM BROMIDE 0.5 MG: 0.5 SOLUTION RESPIRATORY (INHALATION) at 14:41

## 2024-09-14 RX ADMIN — POTASSIUM CHLORIDE 40 MEQ: 750 CAPSULE, EXTENDED RELEASE ORAL at 20:22

## 2024-09-14 RX ADMIN — POLYETHYLENE GLYCOL 3350 17 G: 17 POWDER, FOR SOLUTION ORAL at 11:51

## 2024-09-14 RX ADMIN — CEFAZOLIN 2000 MG: 2 INJECTION, POWDER, FOR SOLUTION INTRAMUSCULAR; INTRAVENOUS at 11:51

## 2024-09-14 RX ADMIN — BUMETANIDE 1 MG: 1 TABLET ORAL at 10:33

## 2024-09-14 RX ADMIN — PANTOPRAZOLE SODIUM 40 MG: 40 TABLET, DELAYED RELEASE ORAL at 10:33

## 2024-09-14 RX ADMIN — IPRATROPIUM BROMIDE 0.5 MG: 0.5 SOLUTION RESPIRATORY (INHALATION) at 10:25

## 2024-09-14 RX ADMIN — IPRATROPIUM BROMIDE 0.5 MG: 0.5 SOLUTION RESPIRATORY (INHALATION) at 18:49

## 2024-09-14 RX ADMIN — FLUTICASONE PROPIONATE 2 SPRAY: 50 SPRAY, METERED NASAL at 20:23

## 2024-09-14 RX ADMIN — OXYCODONE HYDROCHLORIDE AND ACETAMINOPHEN 1 TABLET: 5; 325 TABLET ORAL at 05:42

## 2024-09-14 RX ADMIN — NICOTINE 1 PATCH: 21 PATCH, EXTENDED RELEASE TRANSDERMAL at 17:17

## 2024-09-14 RX ADMIN — SODIUM CHLORIDE 75 ML/HR: 9 INJECTION, SOLUTION INTRAVENOUS at 01:13

## 2024-09-14 RX ADMIN — POTASSIUM CHLORIDE 40 MEQ: 750 CAPSULE, EXTENDED RELEASE ORAL at 15:37

## 2024-09-14 RX ADMIN — CEFAZOLIN 2000 MG: 2 INJECTION, POWDER, FOR SOLUTION INTRAMUSCULAR; INTRAVENOUS at 03:01

## 2024-09-14 RX ADMIN — GABAPENTIN 300 MG: 300 CAPSULE ORAL at 20:22

## 2024-09-14 RX ADMIN — LISINOPRIL 20 MG: 20 TABLET ORAL at 10:34

## 2024-09-14 RX ADMIN — CEFAZOLIN 2000 MG: 2 INJECTION, POWDER, FOR SOLUTION INTRAMUSCULAR; INTRAVENOUS at 19:27

## 2024-09-14 RX ADMIN — GABAPENTIN 300 MG: 300 CAPSULE ORAL at 10:33

## 2024-09-14 RX ADMIN — MAGNESIUM SULFATE HEPTAHYDRATE 2 G: 2 INJECTION, SOLUTION INTRAVENOUS at 13:51

## 2024-09-14 RX ADMIN — MAGNESIUM SULFATE HEPTAHYDRATE 2 G: 2 INJECTION, SOLUTION INTRAVENOUS at 18:01

## 2024-09-14 RX ADMIN — ATORVASTATIN CALCIUM 20 MG: 10 TABLET, FILM COATED ORAL at 10:34

## 2024-09-14 RX ADMIN — IPRATROPIUM BROMIDE 0.5 MG: 0.5 SOLUTION RESPIRATORY (INHALATION) at 05:47

## 2024-09-14 RX ADMIN — FLUTICASONE PROPIONATE 2 SPRAY: 50 SPRAY, METERED NASAL at 10:41

## 2024-09-14 RX ADMIN — Medication 10 ML: at 10:40

## 2024-09-14 RX ADMIN — ALPRAZOLAM 1 MG: 0.5 TABLET ORAL at 10:33

## 2024-09-14 RX ADMIN — CITALOPRAM 30 MG: 20 TABLET, FILM COATED ORAL at 10:34

## 2024-09-14 RX ADMIN — ALPRAZOLAM 1 MG: 0.5 TABLET ORAL at 20:22

## 2024-09-14 RX ADMIN — POTASSIUM CHLORIDE 40 MEQ: 750 CAPSULE, EXTENDED RELEASE ORAL at 11:51

## 2024-09-14 NOTE — PLAN OF CARE
Goal Outcome Evaluation:         Pt had a Lg BM today. C/o some pain, relief given with PRN pain meds. K and Mag replaced. NSR/NSB 54-60 down to 37 on tele. Family has been at bedside. Call light within reach, safety maintained.

## 2024-09-14 NOTE — PLAN OF CARE
Goal Outcome Evaluation:           Progress: improving  Outcome Evaluation: Pt was SB/S 45-60 PVC, Coup, TRIG Quad MF on tele. Pt slept wel through the night. antibiotics given per order. daughter at bedisde. Pt complained of no pain. Sats within normal limits on 2L NC.

## 2024-09-14 NOTE — PROGRESS NOTES
"Progress Note  Pattie Liu  9/14/2024 11:21 CDT  Subjective:   Admit Date:   9/11/2024      CC/ADMIT DX:       Interval History:   Reviewed overnight events and nursing notes.  She has no c/o chest pain. Her strength has improved some. No worsening SOA.   I have reviewed all labs/diagnostics from the last 24hrs.       ROS:   I have done a 10 point ROS and all are negative, except what is mentioned in the HPI.    Diet: Regular/House; Fluid Consistency: Thin (IDDSI 0)    Medications:   sodium chloride, 75 mL/hr, Last Rate: 75 mL/hr (09/14/24 0113)      ALPRAZolam, 1 mg, Oral, BID  aspirin, 81 mg, Oral, Daily  atorvastatin, 20 mg, Oral, Daily  azithromycin, 500 mg, Intravenous, Q24H  bumetanide, 1 mg, Oral, Daily  ceFAZolin, 2,000 mg, Intravenous, Q8H  citalopram, 30 mg, Oral, Daily  enoxaparin, 40 mg, Subcutaneous, Q24H  fluticasone, 2 spray, Each Nare, BID  gabapentin, 300 mg, Oral, BID  ipratropium, 0.5 mg, Nebulization, 4x Daily - RT  lisinopril, 20 mg, Oral, Daily  nicotine, 1 patch, Transdermal, Q24H  pantoprazole, 40 mg, Oral, QAM AC  sodium chloride, 10 mL, Intravenous, Q12H            Objective:   Vitals: /74 (BP Location: Left arm, Patient Position: Lying)   Pulse 66   Temp 97.5 °F (36.4 °C) (Oral)   Resp 16   Ht 160 cm (63\")   Wt 66.2 kg (145 lb 15.1 oz)   SpO2 98%   BMI 25.85 kg/m²    Intake/Output Summary (Last 24 hours) at 9/14/2024 1121  Last data filed at 9/14/2024 0925  Gross per 24 hour   Intake 600 ml   Output --   Net 600 ml     General appearance: alert and cooperative with exam  Lungs: coarse  Heart: RRR  Abdomen: soft, non-tender; bowel sounds normal; no masses,  no organomegaly  Extremities: extremities normal, atraumatic, no cyanosis or edema  Neurologic:  No obvious focal neurologic deficits.    Assessment and Plan:     Pneumonia    Essential hypertension    Gait instability    Pelvic fracture    Hypokalemia    Plan:   Continue present medication(s)    Replace K +   Follow " with supportive care   Continue PT/OT   Follow labs      Discharge planning:   her home     Reviewed treatment plans with the patient and/or family.             Electronically signed by Erlinda Woods MD on 9/14/2024 at 11:21 CDT

## 2024-09-14 NOTE — THERAPY TREATMENT NOTE
Acute Care - Physical Therapy Treatment Note  [facility]     Patient Name: Pattie Liu  : 1945  MRN: [id number]  Today's Date: 2024      Visit Dx:     ICD-10-CM ICD-9-CM   1. Pneumonia of right lung due to infectious organism, unspecified part of lung  J18.9 483.8   2. Dehydration  E86.0 276.51   3. Altered mental status, unspecified altered mental status type  R41.82 780.97   4. Impaired mobility [Z74.09]  Z74.09 799.89     Patient Active Problem List   Diagnosis    Frequent PVCs    Shortness of breath    SOB (shortness of breath)    CAD in native artery    PVD (peripheral vascular disease)    Pneumonia due to infectious organism    Chronic back pain    Essential hypertension    Fall, initial encounter    Traumatic rhabdomyolysis    Leukocytosis    Anemia    Degenerative disc disease, lumbar    Dehydration    Acute UTI    Chronic respiratory failure with hypoxia    Impaired mobility    UTI (urinary tract infection)    Gait instability    Pneumonia    Pelvic fracture     Past Medical History:   Diagnosis Date    CAD in native artery 2019    COPD (chronic obstructive pulmonary disease)     Essential hypertension 2021    GERD (gastroesophageal reflux disease)     Lung nodule      Past Surgical History:   Procedure Laterality Date    BREAST IMPLANT REMOVAL      BREAST TISSUE EXPANDER REMOVAL INSERTION OF IMPLANT      CARDIAC CATHETERIZATION N/A 2019    Procedure: Left Heart Cath;  Surgeon: Edi Armijo MD;  Location: [facility] INVASIVE LOCATION;  Service: Cardiology    CHOLECYSTECTOMY      HYSTERECTOMY      MASTECTOMY      BILATERAL    OOPHORECTOMY       PT Assessment (Last 12 Hours)       PT Evaluation and Treatment       Row Name 24 1556          Physical Therapy Time and Intention    Subjective Information complains of;pain;fatigue  -     Document Type therapy note (daily note)  -     Mode of Treatment physical therapy  -       Row Name 24 1552           General Information    Existing Precautions/Restrictions fall;oxygen therapy device and L/min  recent pelvic fx  -     Limitations/Impairments safety/cognitive  -[facility] Name 09/14/24 1556          Pain    Pain Intervention(s) Repositioned  -MF       Row Name 09/14/24 1556          Pain Scale: FACES Pre/Post-Treatment    Pain: FACES Scale, Pretreatment 0-->no hurt  -     Posttreatment Pain Rating 4-->hurts little more  -     Pain Location generalized  -     Pre/Posttreatment Pain Comment pelvis-right side  -MF       Row Name 09/14/24 1556          Bed Mobility    Scooting/Bridging [place] (Bed Mobility) dependent (less than 25% patient effort);2 person assist  -     Supine-Sit [place] (Bed Mobility) verbal cues;moderate assist (50% patient effort)  -     Sit-Supine [place] (Bed Mobility) verbal cues;maximum assist (25% patient effort)  -     Assistive Device (Bed Mobility) head of bed elevated;draw sheet;bed rails  -MF       Row Name 09/14/24 1556          Transfers    Comment, (Transfers) stood x 2, took scooting side steps towards HOB. daughter assisted with cleaning pt up while standing.  -MF       Row Name 09/14/24 1556          Sit-Stand Transfer    Sit-Stand [place] (Transfers) verbal cues;moderate assist (50% patient effort)  -     Assistive Device (Sit-Stand Transfers) walker, front-wheeled  -     Comment, (Sit-Stand Transfer) cues for posture  -MF       Row Name 09/14/24 1556          Stand-Sit Transfer    Stand-Sit [place] (Transfers) verbal cues;minimum assist (75% patient effort)  -MF       Row Name 09/14/24 1556          Hip (Therapeutic Exercise)    Hip (Therapeutic Exercise) AROM (active range of motion)  -     Hip AROM (Therapeutic Exercise) bilateral;flexion;sitting;10 repetitions  -MF       Row Name 09/14/24 1556          Knee (Therapeutic Exercise)    Knee (Therapeutic Exercise) AROM (active range of motion)  -     Knee AROM (Therapeutic  Exercise) bilateral;LAQ (long arc quad);sitting;15 repititions  -       Row Name 09/14/24 1556          Ankle (Therapeutic Exercise)    Ankle (Therapeutic Exercise) AROM (active range of motion)  -     Ankle AROM (Therapeutic Exercise) bilateral;dorsiflexion;sitting;10 repetitions  -       Row Name 09/14/24 1556          Positioning and Restraints    Pre-Treatment Position in bed  -     Post Treatment Position bed  -     In Bed fowlers;call light within reach;encouraged to call for assist;side rails up x2;RLE elevated;with family/caregiver  -               User Key  (r) = Recorded By, (t) = Taken By, (c) = Cosigned By      Initials Name Provider Type    Maria L Morrison, PTA Physical Therapist Assistant                    Physical Therapy Education       Title: PT OT SLP Therapies (In Progress)       Topic: Physical Therapy (In Progress)       Point: Mobility training (In Progress)       Learning Progress Summary             Patient Acceptance, E, NR by CHICA at 9/12/2024 0931    Comment: benefits of activity, progression of PT                         Point: Home exercise program (Not Started)       Learner Progress:  Not documented in this visit.              Point: Body mechanics (Not Started)       Learner Progress:  Not documented in this visit.              Point: Precautions (Not Started)       Learner Progress:  Not documented in this visit.                              User Key       Initials Effective Dates Name Provider Type Discipline    CHICA 02/03/23 -  Feroz Schwab, PT DPT Physical Therapist PT                  PT Recommendation and Plan     Plan of Care Reviewed With: patient  Progress: improving  Outcome Evaluation: Pt. awake and alert this afternoon. Family reports that they held off from using pain medicine so that pt would be alert enough for therapy. Pt. had no pain at rest, but did increase with activity. She needed Mod-Max assist for bed mobility. Participated with LE exercises  while sitting EOB. CGA-MIN for sitting balance. She was able to stand with MIN-MOD assist x 1 and took scooting side steps to HOB. Pt. did well overall, but is still in need of rehab following discharge.   Outcome Measures       Row Name 09/14/24 1556 09/13/24 1554          How much help from another person do you currently need...    Turning from your back to your side while in flat bed without using bedrails? 2  -MF 2  -MF     Moving from lying on back to sitting on the side of a flat bed without bedrails? 2  -MF 2  -MF     Moving to and from a bed to a chair (including a wheelchair)? 2  -MF 2  -MF     Standing up from a chair using your arms (e.g., wheelchair, bedside chair)? 2  -MF 3  -MF     Climbing 3-5 steps with a railing? 1  -MF 1  -MF     To walk in hospital room? 1  -MF 1  -MF     AM-PAC 6 Clicks Score (PT) 10  -MF 11  -MF     Highest Level of Mobility Goal 4 --> Transfer to chair/commode  -MF 4 --> Transfer to chair/commode  -MF        Functional Assessment    Outcome Measure Options AM-PAC 6 Clicks Basic Mobility (PT)  -MF AM-PAC 6 Clicks Basic Mobility (PT)  -MF               User Key  (r) = Recorded By, (t) = Taken By, (c) = Cosigned By      Initials Name Provider Type    Maria L Morrison PTA Physical Therapist Assistant                     Time Calculation:    PT Charges       Row Name 09/14/24 1641             Time Calculation    Start Time 1556  -      Stop Time 1635  -MF      Time Calculation (min) 39 min  -MF      PT Received On 09/14/24  -MF         Time Calculation- PT    Total Timed Code Minutes- PT 39 minute(s)  -MF         Timed Charges    92338 - PT Therapeutic Activity Minutes 39  -MF         Total Minutes    Timed Charges Total Minutes 39  -MF       Total Minutes 39  -MF                User Key  (r) = Recorded By, (t) = Taken By, (c) = Cosigned By      Initials Name Provider Type    Maria L Morrison PTA Physical Therapist Assistant                  Therapy Charges for  Today       Code Description Service Date Service Provider Modifiers Qty    39487418309 HC PT THERAPEUTIC ACT EA 15 MIN 9/13/2024 Maria L Sparks, TRISHA GP 3    90277080929 HC PT THERAPEUTIC ACT EA 15 MIN 9/14/2024 Maria L Sparks, TRISHA GP 3            PT G-Codes  Outcome Measure Options: AM-PAC 6 Clicks Basic Mobility (PT)  AM-PAC 6 Clicks Score (PT): 10  AM-PAC 6 Clicks Score (OT): 10    Maria L Sparks PTA  9/14/2024

## 2024-09-15 LAB
ALBUMIN SERPL-MCNC: 2.4 G/DL (ref 3.5–5.2)
ALBUMIN/GLOB SERPL: 0.9 G/DL
ALP SERPL-CCNC: 101 U/L (ref 39–117)
ALT SERPL W P-5'-P-CCNC: <5 U/L (ref 1–33)
ANION GAP SERPL CALCULATED.3IONS-SCNC: 6 MMOL/L (ref 5–15)
AST SERPL-CCNC: 17 U/L (ref 1–32)
BACTERIA UR QL AUTO: NORMAL /HPF
BASOPHILS # BLD AUTO: 0.05 10*3/MM3 (ref 0–0.2)
BASOPHILS NFR BLD AUTO: 0.5 % (ref 0–1.5)
BILIRUB SERPL-MCNC: <0.2 MG/DL (ref 0–1.2)
BILIRUB UR QL STRIP: NEGATIVE
BUN SERPL-MCNC: 8 MG/DL (ref 8–23)
BUN/CREAT SERPL: 12.3 (ref 7–25)
CALCIUM SPEC-SCNC: 8.6 MG/DL (ref 8.6–10.5)
CHLORIDE SERPL-SCNC: 112 MMOL/L (ref 98–107)
CLARITY UR: CLEAR
CO2 SERPL-SCNC: 24 MMOL/L (ref 22–29)
COLOR UR: ABNORMAL
CREAT SERPL-MCNC: 0.65 MG/DL (ref 0.57–1)
DEPRECATED RDW RBC AUTO: 46.2 FL (ref 37–54)
EGFRCR SERPLBLD CKD-EPI 2021: 88.6 ML/MIN/1.73
EOSINOPHIL # BLD AUTO: 0.46 10*3/MM3 (ref 0–0.4)
EOSINOPHIL NFR BLD AUTO: 4.3 % (ref 0.3–6.2)
ERYTHROCYTE [DISTWIDTH] IN BLOOD BY AUTOMATED COUNT: 13.4 % (ref 12.3–15.4)
GLOBULIN UR ELPH-MCNC: 2.6 GM/DL
GLUCOSE SERPL-MCNC: 102 MG/DL (ref 65–99)
GLUCOSE UR STRIP-MCNC: NEGATIVE MG/DL
HCT VFR BLD AUTO: 32.1 % (ref 34–46.6)
HGB BLD-MCNC: 10.1 G/DL (ref 12–15.9)
HGB UR QL STRIP.AUTO: NEGATIVE
HYALINE CASTS UR QL AUTO: NORMAL /LPF
IMM GRANULOCYTES # BLD AUTO: 0.05 10*3/MM3 (ref 0–0.05)
IMM GRANULOCYTES NFR BLD AUTO: 0.5 % (ref 0–0.5)
KETONES UR QL STRIP: NEGATIVE
LEUKOCYTE ESTERASE UR QL STRIP.AUTO: NEGATIVE
LYMPHOCYTES # BLD AUTO: 2.65 10*3/MM3 (ref 0.7–3.1)
LYMPHOCYTES NFR BLD AUTO: 25 % (ref 19.6–45.3)
MAGNESIUM SERPL-MCNC: 2.5 MG/DL (ref 1.6–2.4)
MCH RBC QN AUTO: 29.6 PG (ref 26.6–33)
MCHC RBC AUTO-ENTMCNC: 31.5 G/DL (ref 31.5–35.7)
MCV RBC AUTO: 94.1 FL (ref 79–97)
MONOCYTES # BLD AUTO: 0.78 10*3/MM3 (ref 0.1–0.9)
MONOCYTES NFR BLD AUTO: 7.4 % (ref 5–12)
NEUTROPHILS NFR BLD AUTO: 6.62 10*3/MM3 (ref 1.7–7)
NEUTROPHILS NFR BLD AUTO: 62.3 % (ref 42.7–76)
NITRITE UR QL STRIP: POSITIVE
NRBC BLD AUTO-RTO: 0 /100 WBC (ref 0–0.2)
PH UR STRIP.AUTO: 5.5 [PH] (ref 5–8)
PLATELET # BLD AUTO: 366 10*3/MM3 (ref 140–450)
PMV BLD AUTO: 10.5 FL (ref 6–12)
POTASSIUM SERPL-SCNC: 4.5 MMOL/L (ref 3.5–5.2)
POTASSIUM SERPL-SCNC: 4.9 MMOL/L (ref 3.5–5.2)
PROT SERPL-MCNC: 5 G/DL (ref 6–8.5)
PROT UR QL STRIP: NEGATIVE
RBC # BLD AUTO: 3.41 10*6/MM3 (ref 3.77–5.28)
RBC # UR STRIP: NORMAL /HPF
REF LAB TEST METHOD: NORMAL
SODIUM SERPL-SCNC: 142 MMOL/L (ref 136–145)
SP GR UR STRIP: 1.01 (ref 1–1.03)
SQUAMOUS #/AREA URNS HPF: NORMAL /HPF
UROBILINOGEN UR QL STRIP: ABNORMAL
WBC # UR STRIP: NORMAL /HPF
WBC NRBC COR # BLD AUTO: 10.61 10*3/MM3 (ref 3.4–10.8)

## 2024-09-15 PROCEDURE — 94799 UNLISTED PULMONARY SVC/PX: CPT

## 2024-09-15 PROCEDURE — 63710000001 ONDANSETRON ODT 4 MG TABLET DISPERSIBLE: Performed by: FAMILY MEDICINE

## 2024-09-15 PROCEDURE — 94664 DEMO&/EVAL PT USE INHALER: CPT

## 2024-09-15 PROCEDURE — 25010000002 ENOXAPARIN PER 10 MG: Performed by: FAMILY MEDICINE

## 2024-09-15 PROCEDURE — 94761 N-INVAS EAR/PLS OXIMETRY MLT: CPT

## 2024-09-15 PROCEDURE — 25810000003 SODIUM CHLORIDE 0.9 % SOLUTION: Performed by: FAMILY MEDICINE

## 2024-09-15 PROCEDURE — 85025 COMPLETE CBC W/AUTO DIFF WBC: CPT | Performed by: FAMILY MEDICINE

## 2024-09-15 PROCEDURE — 80053 COMPREHEN METABOLIC PANEL: CPT | Performed by: FAMILY MEDICINE

## 2024-09-15 PROCEDURE — 25010000002 CEFAZOLIN PER 500 MG: Performed by: FAMILY MEDICINE

## 2024-09-15 PROCEDURE — 81001 URINALYSIS AUTO W/SCOPE: CPT | Performed by: FAMILY MEDICINE

## 2024-09-15 PROCEDURE — 83735 ASSAY OF MAGNESIUM: CPT | Performed by: FAMILY MEDICINE

## 2024-09-15 RX ORDER — PHENAZOPYRIDINE HYDROCHLORIDE 100 MG/1
100 TABLET, FILM COATED ORAL
Status: DISPENSED | OUTPATIENT
Start: 2024-09-15 | End: 2024-09-17

## 2024-09-15 RX ADMIN — NICOTINE 1 PATCH: 21 PATCH, EXTENDED RELEASE TRANSDERMAL at 18:15

## 2024-09-15 RX ADMIN — FLUTICASONE PROPIONATE 2 SPRAY: 50 SPRAY, METERED NASAL at 21:03

## 2024-09-15 RX ADMIN — IPRATROPIUM BROMIDE 0.5 MG: 0.5 SOLUTION RESPIRATORY (INHALATION) at 10:27

## 2024-09-15 RX ADMIN — PANTOPRAZOLE SODIUM 40 MG: 40 TABLET, DELAYED RELEASE ORAL at 09:35

## 2024-09-15 RX ADMIN — IPRATROPIUM BROMIDE 0.5 MG: 0.5 SOLUTION RESPIRATORY (INHALATION) at 14:50

## 2024-09-15 RX ADMIN — PHENAZOPYRIDINE HYDROCHLORIDE 100 MG: 100 TABLET ORAL at 18:15

## 2024-09-15 RX ADMIN — SODIUM CHLORIDE 75 ML/HR: 9 INJECTION, SOLUTION INTRAVENOUS at 09:35

## 2024-09-15 RX ADMIN — PHENAZOPYRIDINE HYDROCHLORIDE 100 MG: 100 TABLET ORAL at 12:09

## 2024-09-15 RX ADMIN — Medication 10 ML: at 21:03

## 2024-09-15 RX ADMIN — CEFAZOLIN 2000 MG: 2 INJECTION, POWDER, FOR SOLUTION INTRAMUSCULAR; INTRAVENOUS at 03:30

## 2024-09-15 RX ADMIN — ENOXAPARIN SODIUM 40 MG: 100 INJECTION SUBCUTANEOUS at 18:15

## 2024-09-15 RX ADMIN — ALPRAZOLAM 1 MG: 0.5 TABLET ORAL at 09:35

## 2024-09-15 RX ADMIN — GABAPENTIN 300 MG: 300 CAPSULE ORAL at 21:02

## 2024-09-15 RX ADMIN — ASPIRIN 81 MG: 81 TABLET, COATED ORAL at 09:35

## 2024-09-15 RX ADMIN — BUMETANIDE 1 MG: 1 TABLET ORAL at 09:36

## 2024-09-15 RX ADMIN — ATORVASTATIN CALCIUM 20 MG: 10 TABLET, FILM COATED ORAL at 09:36

## 2024-09-15 RX ADMIN — ACETAMINOPHEN 650 MG: 325 TABLET ORAL at 09:39

## 2024-09-15 RX ADMIN — ONDANSETRON 4 MG: 4 TABLET, ORALLY DISINTEGRATING ORAL at 21:02

## 2024-09-15 RX ADMIN — IPRATROPIUM BROMIDE 0.5 MG: 0.5 SOLUTION RESPIRATORY (INHALATION) at 19:14

## 2024-09-15 RX ADMIN — Medication 10 ML: at 09:36

## 2024-09-15 RX ADMIN — OXYCODONE HYDROCHLORIDE AND ACETAMINOPHEN 1 TABLET: 5; 325 TABLET ORAL at 21:02

## 2024-09-15 RX ADMIN — ALPRAZOLAM 1 MG: 0.5 TABLET ORAL at 21:02

## 2024-09-15 RX ADMIN — FLUTICASONE PROPIONATE 2 SPRAY: 50 SPRAY, METERED NASAL at 09:36

## 2024-09-15 RX ADMIN — CEFAZOLIN 2000 MG: 2 INJECTION, POWDER, FOR SOLUTION INTRAMUSCULAR; INTRAVENOUS at 21:03

## 2024-09-15 RX ADMIN — CITALOPRAM 30 MG: 20 TABLET, FILM COATED ORAL at 09:36

## 2024-09-15 RX ADMIN — LISINOPRIL 20 MG: 20 TABLET ORAL at 09:36

## 2024-09-15 RX ADMIN — CEFAZOLIN 2000 MG: 2 INJECTION, POWDER, FOR SOLUTION INTRAMUSCULAR; INTRAVENOUS at 12:09

## 2024-09-15 RX ADMIN — IPRATROPIUM BROMIDE 0.5 MG: 0.5 SOLUTION RESPIRATORY (INHALATION) at 05:56

## 2024-09-15 RX ADMIN — GABAPENTIN 300 MG: 300 CAPSULE ORAL at 09:36

## 2024-09-15 NOTE — PLAN OF CARE
Goal Outcome Evaluation:  Plan of Care Reviewed With: patient     Problem: Adult Inpatient Plan of Care  Goal: Plan of Care Review  Outcome: Ongoing, Progressing  Flowsheets (Taken 9/15/2024 0601)  Progress: improving  Plan of Care Reviewed With: patient  Outcome Evaluation: Pt is pleasant and cooperative, AAOx4, VSS, breathing is unlabored on 2L. Pt has purewick, she is chairfast in SNF, daughter at bedside. She was washed and purewick changed, She likes to lay on the left side because of the pelvic fx. She has foam dressings on for preventative measures. SR/SB 54-63 with PVC, bigeminey, trigeminy, and couplets on tele. Pt rested well overnight with no c/o of pain. safety maintained, care plan ongoing.

## 2024-09-15 NOTE — CASE MANAGEMENT/SOCIAL WORK
Continued Stay Note  [facility]     Patient Name: Pattie Liu  MRN: [id number]  Today's Date: 9/15/2024    Admit Date: 9/11/2024    Plan: [facility]   Discharge Plan       Row Name 09/15/24 1321       Plan    Plan [facility]    Patient/Family in Agreement with Plan yes    Plan Comments Pt resides at [facility].  SW spoke to [facility] in admissions; pt does not have a bed hold but can return as long as they have a bed opening.  No precert. required.  SW will follow.                   Discharge Codes    No documentation.                       CATHERINE Emerson

## 2024-09-15 NOTE — PROGRESS NOTES
"Progress Note  Pattie Liu  9/15/2024 11:37 CDT  Subjective:   Admit Date:   9/11/2024      CC/ADMIT DX:       Interval History:   Reviewed overnight events and nursing notes.  She has some c/o dysuria.   I have reviewed all labs/diagnostics from the last 24hrs.       ROS:   I have done a 10 point ROS and all are negative, except what is mentioned in the HPI.    Diet: Regular/House; Fluid Consistency: Thin (IDDSI 0)    Medications:        ALPRAZolam, 1 mg, Oral, BID  aspirin, 81 mg, Oral, Daily  atorvastatin, 20 mg, Oral, Daily  bumetanide, 1 mg, Oral, Daily  ceFAZolin, 2,000 mg, Intravenous, Q8H  citalopram, 30 mg, Oral, Daily  enoxaparin, 40 mg, Subcutaneous, Q24H  fluticasone, 2 spray, Each Nare, BID  gabapentin, 300 mg, Oral, BID  ipratropium, 0.5 mg, Nebulization, 4x Daily - RT  lisinopril, 20 mg, Oral, Daily  nicotine, 1 patch, Transdermal, Q24H  pantoprazole, 40 mg, Oral, QAM AC  phenazopyridine, 100 mg, Oral, TID With Meals  sodium chloride, 10 mL, Intravenous, Q12H            Objective:   Vitals: /41 (BP Location: Left arm, Patient Position: Lying)   Pulse 64   Temp 98.5 °F (36.9 °C) (Oral)   Resp 16   Ht 160 cm (63\")   Wt 66.2 kg (145 lb 15.1 oz)   SpO2 94%   BMI 25.85 kg/m²    Intake/Output Summary (Last 24 hours) at 9/15/2024 1137  Last data filed at 9/15/2024 1136  Gross per 24 hour   Intake 1260 ml   Output 1800 ml   Net -540 ml     General appearance: alert and cooperative with exam  Lungs: coarse  Heart: RRR  Abdomen: soft, non-tender; bowel sounds normal; no masses,  no organomegaly  Extremities: extremities normal, atraumatic, no cyanosis or edema  Neurologic:  No obvious focal neurologic deficits.    Assessment and Plan:     Pneumonia    Essential hypertension    Gait instability    Pelvic fracture    Hypokalemia--resolved    Plan:   Continue present medication(s)    Follow with supportive care   Continue PT/OT   Follow labs   Collect UA, and use Pyridium for symptoms   D/C " planning      Discharge planning:   Rehab/SNF    Reviewed treatment plans with the patient and/or family.             Electronically signed by Erlinda Woods MD on 9/15/2024 at 11:37 CDT

## 2024-09-16 LAB
ALBUMIN SERPL-MCNC: 2.3 G/DL (ref 3.5–5.2)
ALBUMIN/GLOB SERPL: 0.9 G/DL
ALP SERPL-CCNC: 97 U/L (ref 39–117)
ALT SERPL W P-5'-P-CCNC: <5 U/L (ref 1–33)
ANION GAP SERPL CALCULATED.3IONS-SCNC: 6 MMOL/L (ref 5–15)
AST SERPL-CCNC: 17 U/L (ref 1–32)
BACTERIA SPEC AEROBE CULT: NORMAL
BACTERIA SPEC AEROBE CULT: NORMAL
BASOPHILS # BLD AUTO: 0.04 10*3/MM3 (ref 0–0.2)
BASOPHILS NFR BLD AUTO: 0.4 % (ref 0–1.5)
BILIRUB SERPL-MCNC: <0.2 MG/DL (ref 0–1.2)
BUN SERPL-MCNC: 7 MG/DL (ref 8–23)
BUN/CREAT SERPL: 8.8 (ref 7–25)
CALCIUM SPEC-SCNC: 8.8 MG/DL (ref 8.6–10.5)
CHLORIDE SERPL-SCNC: 110 MMOL/L (ref 98–107)
CO2 SERPL-SCNC: 26 MMOL/L (ref 22–29)
CREAT SERPL-MCNC: 0.8 MG/DL (ref 0.57–1)
DEPRECATED RDW RBC AUTO: 45.5 FL (ref 37–54)
EGFRCR SERPLBLD CKD-EPI 2021: 74.1 ML/MIN/1.73
EOSINOPHIL # BLD AUTO: 0.52 10*3/MM3 (ref 0–0.4)
EOSINOPHIL NFR BLD AUTO: 5.3 % (ref 0.3–6.2)
ERYTHROCYTE [DISTWIDTH] IN BLOOD BY AUTOMATED COUNT: 13.5 % (ref 12.3–15.4)
GLOBULIN UR ELPH-MCNC: 2.5 GM/DL
GLUCOSE SERPL-MCNC: 90 MG/DL (ref 65–99)
HCT VFR BLD AUTO: 31.9 % (ref 34–46.6)
HGB BLD-MCNC: 10.4 G/DL (ref 12–15.9)
IMM GRANULOCYTES # BLD AUTO: 0.06 10*3/MM3 (ref 0–0.05)
IMM GRANULOCYTES NFR BLD AUTO: 0.6 % (ref 0–0.5)
LYMPHOCYTES # BLD AUTO: 3.03 10*3/MM3 (ref 0.7–3.1)
LYMPHOCYTES NFR BLD AUTO: 31 % (ref 19.6–45.3)
MCH RBC QN AUTO: 30.1 PG (ref 26.6–33)
MCHC RBC AUTO-ENTMCNC: 32.6 G/DL (ref 31.5–35.7)
MCV RBC AUTO: 92.5 FL (ref 79–97)
MONOCYTES # BLD AUTO: 0.72 10*3/MM3 (ref 0.1–0.9)
MONOCYTES NFR BLD AUTO: 7.4 % (ref 5–12)
NEUTROPHILS NFR BLD AUTO: 5.39 10*3/MM3 (ref 1.7–7)
NEUTROPHILS NFR BLD AUTO: 55.3 % (ref 42.7–76)
NRBC BLD AUTO-RTO: 0 /100 WBC (ref 0–0.2)
PLATELET # BLD AUTO: 324 10*3/MM3 (ref 140–450)
PMV BLD AUTO: 10.5 FL (ref 6–12)
POTASSIUM SERPL-SCNC: 4.1 MMOL/L (ref 3.5–5.2)
PROT SERPL-MCNC: 4.8 G/DL (ref 6–8.5)
RBC # BLD AUTO: 3.45 10*6/MM3 (ref 3.77–5.28)
SODIUM SERPL-SCNC: 142 MMOL/L (ref 136–145)
WBC NRBC COR # BLD AUTO: 9.76 10*3/MM3 (ref 3.4–10.8)

## 2024-09-16 PROCEDURE — 94664 DEMO&/EVAL PT USE INHALER: CPT

## 2024-09-16 PROCEDURE — 80053 COMPREHEN METABOLIC PANEL: CPT | Performed by: FAMILY MEDICINE

## 2024-09-16 PROCEDURE — 85025 COMPLETE CBC W/AUTO DIFF WBC: CPT | Performed by: FAMILY MEDICINE

## 2024-09-16 PROCEDURE — 97110 THERAPEUTIC EXERCISES: CPT

## 2024-09-16 PROCEDURE — 94799 UNLISTED PULMONARY SVC/PX: CPT

## 2024-09-16 PROCEDURE — 97530 THERAPEUTIC ACTIVITIES: CPT

## 2024-09-16 PROCEDURE — 94761 N-INVAS EAR/PLS OXIMETRY MLT: CPT

## 2024-09-16 PROCEDURE — 25010000002 ENOXAPARIN PER 10 MG: Performed by: FAMILY MEDICINE

## 2024-09-16 PROCEDURE — 25010000002 CEFAZOLIN PER 500 MG: Performed by: FAMILY MEDICINE

## 2024-09-16 RX ADMIN — PHENAZOPYRIDINE HYDROCHLORIDE 100 MG: 100 TABLET ORAL at 17:25

## 2024-09-16 RX ADMIN — IPRATROPIUM BROMIDE 0.5 MG: 0.5 SOLUTION RESPIRATORY (INHALATION) at 10:14

## 2024-09-16 RX ADMIN — GABAPENTIN 300 MG: 300 CAPSULE ORAL at 08:44

## 2024-09-16 RX ADMIN — LISINOPRIL 20 MG: 20 TABLET ORAL at 08:44

## 2024-09-16 RX ADMIN — CEFAZOLIN 2000 MG: 2 INJECTION, POWDER, FOR SOLUTION INTRAMUSCULAR; INTRAVENOUS at 03:10

## 2024-09-16 RX ADMIN — IPRATROPIUM BROMIDE 0.5 MG: 0.5 SOLUTION RESPIRATORY (INHALATION) at 19:14

## 2024-09-16 RX ADMIN — CEFAZOLIN 2000 MG: 2 INJECTION, POWDER, FOR SOLUTION INTRAMUSCULAR; INTRAVENOUS at 11:21

## 2024-09-16 RX ADMIN — ALPRAZOLAM 1 MG: 0.5 TABLET ORAL at 08:44

## 2024-09-16 RX ADMIN — PANTOPRAZOLE SODIUM 40 MG: 40 TABLET, DELAYED RELEASE ORAL at 08:44

## 2024-09-16 RX ADMIN — IPRATROPIUM BROMIDE 0.5 MG: 0.5 SOLUTION RESPIRATORY (INHALATION) at 14:09

## 2024-09-16 RX ADMIN — ALPRAZOLAM 1 MG: 0.5 TABLET ORAL at 20:48

## 2024-09-16 RX ADMIN — BUMETANIDE 1 MG: 1 TABLET ORAL at 08:45

## 2024-09-16 RX ADMIN — Medication 10 ML: at 08:45

## 2024-09-16 RX ADMIN — CEFAZOLIN 2000 MG: 2 INJECTION, POWDER, FOR SOLUTION INTRAMUSCULAR; INTRAVENOUS at 20:48

## 2024-09-16 RX ADMIN — ENOXAPARIN SODIUM 40 MG: 100 INJECTION SUBCUTANEOUS at 17:25

## 2024-09-16 RX ADMIN — CITALOPRAM 30 MG: 20 TABLET, FILM COATED ORAL at 08:45

## 2024-09-16 RX ADMIN — FLUTICASONE PROPIONATE 2 SPRAY: 50 SPRAY, METERED NASAL at 20:54

## 2024-09-16 RX ADMIN — NICOTINE 1 PATCH: 21 PATCH, EXTENDED RELEASE TRANSDERMAL at 17:25

## 2024-09-16 RX ADMIN — ATORVASTATIN CALCIUM 20 MG: 10 TABLET, FILM COATED ORAL at 08:44

## 2024-09-16 RX ADMIN — IPRATROPIUM BROMIDE 0.5 MG: 0.5 SOLUTION RESPIRATORY (INHALATION) at 06:29

## 2024-09-16 RX ADMIN — FLUTICASONE PROPIONATE 2 SPRAY: 50 SPRAY, METERED NASAL at 08:45

## 2024-09-16 RX ADMIN — GABAPENTIN 300 MG: 300 CAPSULE ORAL at 20:48

## 2024-09-16 RX ADMIN — PHENAZOPYRIDINE HYDROCHLORIDE 100 MG: 100 TABLET ORAL at 08:44

## 2024-09-16 RX ADMIN — ASPIRIN 81 MG: 81 TABLET, COATED ORAL at 08:44

## 2024-09-16 NOTE — PLAN OF CARE
Goal Outcome Evaluation:  Plan of Care Reviewed With: patient        Progress: improving  Outcome Evaluation: Pt was in bed, agreed to therapy.  Able to transfer supine to sitting with min assist, with HOB elevated and using the bed rail.  Sitting EOB, maintained balance with CGA/min assist while performing LE exercises.  Transfered sit to stand with min/mod assist.  Pt stood with RWX and min assist, leaning posterior and to the left.  Pt was able to take a few side steps with RWX and mod assist.

## 2024-09-16 NOTE — PROGRESS NOTES
Daily Progress Note  Pattie Liu  MRN: [id number] LOS: 5    Admit Date: 9/11/2024 9/16/2024 10:18 CDT    Subjective:      Chief Complaint:  Chief Complaint   Patient presents with    Altered Mental Status       Interval History:    Reviewed overnight events and nursing notes.   No acute events overnight. Resting comfortably this AM.    Review of Systems   Constitutional:  Positive for activity change and fatigue. Negative for fever.   Respiratory:  Positive for cough and shortness of breath.    Gastrointestinal:  Negative for nausea and vomiting.   Genitourinary:  Positive for decreased urine volume and dysuria.   Musculoskeletal:  Positive for gait problem.   Neurological:  Positive for weakness.       DIET:  Diet: Regular/House; Fluid Consistency: Thin (IDDSI 0)    Medications:        ALPRAZolam, 1 mg, Oral, BID  aspirin, 81 mg, Oral, Daily  atorvastatin, 20 mg, Oral, Daily  bumetanide, 1 mg, Oral, Daily  ceFAZolin, 2,000 mg, Intravenous, Q8H  citalopram, 30 mg, Oral, Daily  enoxaparin, 40 mg, Subcutaneous, Q24H  fluticasone, 2 spray, Each Nare, BID  gabapentin, 300 mg, Oral, BID  ipratropium, 0.5 mg, Nebulization, 4x Daily - RT  lisinopril, 20 mg, Oral, Daily  nicotine, 1 patch, Transdermal, Q24H  pantoprazole, 40 mg, Oral, QAM AC  phenazopyridine, 100 mg, Oral, TID With Meals  sodium chloride, 10 mL, Intravenous, Q12H        Data:     Code Status:   Code Status and Medical Interventions: No CPR (Do Not Attempt to Resuscitate); Full Support   Ordered at: 09/11/24 1647     Code Status (Patient has no pulse and is not breathing):    No CPR (Do Not Attempt to Resuscitate)     Medical Interventions (Patient has pulse or is breathing):    Full Support       Family History   Problem Relation Age of Onset    Cancer Mother     Cancer Father      Social History     Socioeconomic History    Marital status:    Tobacco Use    Smoking status: Every Day     Current packs/day: 0.50     Average packs/day: 0.5  packs/day for 62.0 years (31.0 ttl pk-yrs)     Types: Cigarettes    Smokeless tobacco: Never    Tobacco comments:     states she has no plan to quit smoking   Vaping Use    Vaping status: Former   Substance and Sexual Activity    Alcohol use: No    Drug use: No    Sexual activity: Not Currently       Labs:    Lab Results (last 72 hours)       Procedure Component Value Units Date/Time    Comprehensive Metabolic Panel [[id number]]  (Abnormal) Collected: 09/16/24 0417    Specimen: Blood Updated: 09/16/24 0451     Glucose 90 mg/dL      BUN 7 mg/dL      Creatinine 0.80 mg/dL      Sodium 142 mmol/L      Potassium 4.1 mmol/L      Comment: Slight hemolysis detected by analyzer. Result may be falsely elevated.        Chloride 110 mmol/L      CO2 26.0 mmol/L      Calcium 8.8 mg/dL      Total Protein 4.8 g/dL      Albumin 2.3 g/dL      ALT (SGPT) <5 U/L      AST (SGOT) 17 U/L      Alkaline Phosphatase 97 U/L      Total Bilirubin <0.2 mg/dL      Globulin 2.5 gm/dL      A/G Ratio 0.9 g/dL      BUN/Creatinine Ratio 8.8     Anion Gap 6.0 mmol/L      eGFR 74.1 mL/min/1.73     Narrative:      GFR Normal >60  Chronic Kidney Disease <60  Kidney Failure <15    The GFR formula is only valid for adults with stable renal function between ages 18 and 70.    CBC Auto Differential [[id number]]  (Abnormal) Collected: 09/16/24 0417    Specimen: Blood Updated: 09/16/24 0435     WBC 9.76 10*3/mm3      RBC 3.45 10*6/mm3      Hemoglobin 10.4 g/dL      Hematocrit 31.9 %      MCV 92.5 fL      MCH 30.1 pg      MCHC 32.6 g/dL      RDW 13.5 %      RDW-SD 45.5 fl      MPV 10.5 fL      Platelets 324 10*3/mm3      Neutrophil % 55.3 %      Lymphocyte % 31.0 %      Monocyte % 7.4 %      Eosinophil % 5.3 %      Basophil % 0.4 %      Immature Grans % 0.6 %      Neutrophils, Absolute 5.39 10*3/mm3      Lymphocytes, Absolute 3.03 10*3/mm3      Monocytes, Absolute 0.72 10*3/mm3      Eosinophils, Absolute 0.52 10*3/mm3      Basophils, Absolute 0.04 10*3/mm3       Immature Grans, Absolute 0.06 10*3/mm3      nRBC 0.0 /100 WBC     Urinalysis With Culture If Indicated - Urine, Clean Catch [[id number]]  (Abnormal) Collected: 09/15/24 2120    Specimen: Urine, Clean Catch Updated: 09/15/24 2157     Color, UA [place]     Appearance, UA Clear     pH, UA 5.5     Specific Gravity, UA 1.013     Glucose, UA Negative     Ketones, UA Negative     Bilirubin, UA Negative     Blood, UA Negative     Protein, UA Negative     Leuk Esterase, UA Negative     Nitrite, UA Positive     Urobilinogen, UA 1.0 E.U./dL    Narrative:      Dipstick results may be inaccurate due to color interference.    Urinalysis, Microscopic Only - Urine, Clean Catch [[id number]] Collected: 09/15/24 2120    Specimen: Urine, Clean Catch Updated: 09/15/24 2157     RBC, UA 0-2 /HPF      WBC, UA 0-2 /HPF      Comment: Urine culture not indicated.        Bacteria, UA None Seen /HPF      Squamous Epithelial Cells, UA 0-2 /HPF      Hyaline Casts, UA 0-2 /LPF      Methodology Manual Light Microscopy    Blood Culture - Blood, Arm, Left [[id number]]  (Normal) Collected: 09/11/24 1346    Specimen: Blood from Arm, Left Updated: 09/15/24 1415     Blood Culture No growth at 4 days    Blood Culture - Blood, Arm, Right [[id number]]  (Normal) Collected: 09/11/24 1302    Specimen: Blood from Arm, Right Updated: 09/15/24 1330     Blood Culture No growth at 4 days    Magnesium [[id number]]  (Abnormal) Collected: 09/15/24 0251    Specimen: Blood Updated: 09/15/24 0355     Magnesium 2.5 mg/dL     Comprehensive Metabolic Panel [[id number]]  (Abnormal) Collected: 09/15/24 0251    Specimen: Blood Updated: 09/15/24 0341     Glucose 102 mg/dL      BUN 8 mg/dL      Creatinine 0.65 mg/dL      Sodium 142 mmol/L      Potassium 4.9 mmol/L      Chloride 112 mmol/L      CO2 24.0 mmol/L      Calcium 8.6 mg/dL      Total Protein 5.0 g/dL      Albumin 2.4 g/dL      ALT (SGPT) <5 U/L      AST (SGOT) 17 U/L      Alkaline Phosphatase 101 U/L      Total Bilirubin <0.2  mg/dL      Globulin 2.6 gm/dL      A/G Ratio 0.9 g/dL      BUN/Creatinine Ratio 12.3     Anion Gap 6.0 mmol/L      eGFR 88.6 mL/min/1.73     Narrative:      GFR Normal >60  Chronic Kidney Disease <60  Kidney Failure <15    The GFR formula is only valid for adults with stable renal function between ages 18 and 70.    CBC & Differential [[id number]]  (Abnormal) Collected: 09/15/24 0251    Specimen: Blood Updated: 09/15/24 0316    Narrative:      The following orders were created for panel order CBC & Differential.  Procedure                               Abnormality         Status                     ---------                               -----------         ------                     CBC Auto Differential[[id number]]        Abnormal            Final result                 Please view results for these tests on the individual orders.    CBC Auto Differential [[id number]]  (Abnormal) Collected: 09/15/24 0251    Specimen: Blood Updated: 09/15/24 0316     WBC 10.61 10*3/mm3      RBC 3.41 10*6/mm3      Hemoglobin 10.1 g/dL      Hematocrit 32.1 %      MCV 94.1 fL      MCH 29.6 pg      MCHC 31.5 g/dL      RDW 13.4 %      RDW-SD 46.2 fl      MPV 10.5 fL      Platelets 366 10*3/mm3      Neutrophil % 62.3 %      Lymphocyte % 25.0 %      Monocyte % 7.4 %      Eosinophil % 4.3 %      Basophil % 0.5 %      Immature Grans % 0.5 %      Neutrophils, Absolute 6.62 10*3/mm3      Lymphocytes, Absolute 2.65 10*3/mm3      Monocytes, Absolute 0.78 10*3/mm3      Eosinophils, Absolute 0.46 10*3/mm3      Basophils, Absolute 0.05 10*3/mm3      Immature Grans, Absolute 0.05 10*3/mm3      nRBC 0.0 /100 WBC     Potassium [[id number]]  (Normal) Collected: 09/14/24 2340    Specimen: Blood Updated: 09/15/24 0025     Potassium 4.5 mmol/L     Magnesium [[id number]]  (Abnormal) Collected: 09/14/24 0437    Specimen: Blood Updated: 09/14/24 1203     Magnesium 1.5 mg/dL     Comprehensive Metabolic Panel [[id number]]  (Abnormal) Collected: 09/14/24 0437     Specimen: Blood Updated: 09/14/24 0521     Glucose 101 mg/dL      BUN 10 mg/dL      Creatinine 0.74 mg/dL      Sodium 144 mmol/L      Potassium 2.9 mmol/L      Chloride 109 mmol/L      CO2 26.0 mmol/L      Calcium 8.6 mg/dL      Total Protein 5.0 g/dL      Albumin 2.4 g/dL      ALT (SGPT) <5 U/L      AST (SGOT) 13 U/L      Alkaline Phosphatase 97 U/L      Total Bilirubin <0.2 mg/dL      Globulin 2.6 gm/dL      A/G Ratio 0.9 g/dL      BUN/Creatinine Ratio 13.5     Anion Gap 9.0 mmol/L      eGFR 81.4 mL/min/1.73     Narrative:      GFR Normal >60  Chronic Kidney Disease <60  Kidney Failure <15    The GFR formula is only valid for adults with stable renal function between ages 18 and 70.    CBC Auto Differential [[id number]]  (Abnormal) Collected: 09/14/24 0437    Specimen: Blood Updated: 09/14/24 0453     WBC 12.04 10*3/mm3      RBC 3.54 10*6/mm3      Hemoglobin 10.6 g/dL      Hematocrit 33.2 %      MCV 93.8 fL      MCH 29.9 pg      MCHC 31.9 g/dL      RDW 13.0 %      RDW-SD 44.8 fl      MPV 10.1 fL      Platelets 338 10*3/mm3      Neutrophil % 71.0 %      Lymphocyte % 18.0 %      Monocyte % 6.1 %      Eosinophil % 4.0 %      Basophil % 0.3 %      Immature Grans % 0.6 %      Neutrophils, Absolute 8.55 10*3/mm3      Lymphocytes, Absolute 2.17 10*3/mm3      Monocytes, Absolute 0.73 10*3/mm3      Eosinophils, Absolute 0.48 10*3/mm3      Basophils, Absolute 0.04 10*3/mm3      Immature Grans, Absolute 0.07 10*3/mm3      nRBC 0.0 /100 WBC     Comprehensive Metabolic Panel [[id number]]  (Abnormal) Collected: 09/13/24 1215    Specimen: Blood Updated: 09/13/24 1332     Glucose 101 mg/dL      BUN 11 mg/dL      Creatinine 0.85 mg/dL      Sodium 144 mmol/L      Potassium 3.3 mmol/L      Comment: Slight hemolysis detected by analyzer. Result may be falsely elevated.        Chloride 108 mmol/L      CO2 25.0 mmol/L      Calcium 8.5 mg/dL      Total Protein 5.2 g/dL      Albumin 2.6 g/dL      ALT (SGPT) 6 U/L      AST (SGOT) 14 U/L      " Alkaline Phosphatase 101 U/L      Total Bilirubin <0.2 mg/dL      Globulin 2.6 gm/dL      A/G Ratio 1.0 g/dL      BUN/Creatinine Ratio 12.9     Anion Gap 11.0 mmol/L      eGFR 68.9 mL/min/1.73     Narrative:      GFR Normal >60  Chronic Kidney Disease <60  Kidney Failure <15    The GFR formula is only valid for adults with stable renal function between ages 18 and 70.    CBC Auto Differential [[id number]]  (Abnormal) Collected: 24 121    Specimen: Blood Updated: 24 1315     WBC 11.58 10*3/mm3      RBC 3.58 10*6/mm3      Hemoglobin 10.8 g/dL      Hematocrit 32.4 %      MCV 90.5 fL      MCH 30.2 pg      MCHC 33.3 g/dL      RDW 13.2 %      RDW-SD 43.8 fl      MPV 11.9 fL      Platelets 304 10*3/mm3      Neutrophil % 67.7 %      Lymphocyte % 19.3 %      Monocyte % 7.4 %      Eosinophil % 3.8 %      Basophil % 0.5 %      Immature Grans % 1.3 %      Neutrophils, Absolute 7.83 10*3/mm3      Lymphocytes, Absolute 2.24 10*3/mm3      Monocytes, Absolute 0.86 10*3/mm3      Eosinophils, Absolute 0.44 10*3/mm3      Basophils, Absolute 0.06 10*3/mm3      Immature Grans, Absolute 0.15 10*3/mm3               Objective:     Vitals: /66 (BP Location: Left arm, Patient Position: Lying)   Pulse (!) 47   Temp 98.6 °F (37 °C) (Oral)   Resp 16   Ht 160 cm (63\")   Wt 66.2 kg (145 lb 15.1 oz)   SpO2 93%   BMI 25.85 kg/m²    Intake/Output Summary (Last 24 hours) at 2024 1018  Last data filed at 2024 0700  Gross per 24 hour   Intake 360 ml   Output 1350 ml   Net -990 ml    Temp (24hrs), Av.6 °F (37 °C), Min:98 °F (36.7 °C), Max:99.1 °F (37.3 °C)      Physical Exam  Vitals reviewed.   Constitutional:       Appearance: Normal appearance. She is ill-appearing.   HENT:      Head: Normocephalic and atraumatic.   Eyes:      General:         Right eye: No discharge.         Left eye: No discharge.      Conjunctiva/sclera: Conjunctivae normal.   Cardiovascular:      Rate and Rhythm: Normal rate and regular " rhythm.      Pulses: Normal pulses.      Heart sounds: No murmur heard.  Pulmonary:      Effort: Pulmonary effort is normal. No respiratory distress.      Comments: Nasal cannula in place  Abdominal:      General: Abdomen is flat. Bowel sounds are normal. There is no distension.   Musculoskeletal:      Right lower leg: No edema.      Left lower leg: No edema.   Skin:     Capillary Refill: Capillary refill takes less than 2 seconds.   Neurological:      General: No focal deficit present.      Mental Status: She is alert.   Psychiatric:         Mood and Affect: Mood normal.         Behavior: Behavior normal.             Assessment and Plan:     Primary Problem:  Pneumonia    Hospital Problem list:    Pneumonia    Essential hypertension    Gait instability    Pelvic fracture      PMH:  Past Medical History:   Diagnosis Date    CAD in native artery 7/29/2019    COPD (chronic obstructive pulmonary disease)     Essential hypertension 12/7/2021    GERD (gastroesophageal reflux disease)     Lung nodule        Treatment Plan:  Pneumonia: continue abx and steroids, oxygen is at baseline, incentive spirometry, nebs     Pelvic fracture: prior hospitalization. PT/OT while inpatient, she will likely require SNF once pneumonia is treated however she would like to not return to [facility].      Code status: DNR     DVT ppx: lovenox     Disposition: inpatient, SNF (requesting different facility if possible)    Reviewed treatment plans with the patient and/or family.   30 minutes spent in face to face interaction and coordination of care.     Electronically signed by Anupam Ledezma MD on 9/16/2024 at 10:18 CDT
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
## 2024-09-16 NOTE — PLAN OF CARE
Problem: Fall Injury Risk  Goal: Absence of Fall and Fall-Related Injury  Outcome: Ongoing, Progressing  Intervention: Identify and Manage Contributors  Recent Flowsheet Documentation  Taken 9/15/2024 0939 by Sharri Woods RN  Medication Review/Management: medications reviewed  Intervention: Promote Injury-Free Environment  Recent Flowsheet Documentation  Taken 9/15/2024 1800 by Sharri Woods RN  Safety Promotion/Fall Prevention: safety round/check completed  Taken 9/15/2024 1700 by Sharri Woods RN  Safety Promotion/Fall Prevention: safety round/check completed  Taken 9/15/2024 1600 by Sharri Woods RN  Safety Promotion/Fall Prevention: safety round/check completed  Taken 9/15/2024 1500 by Sharri Woods RN  Safety Promotion/Fall Prevention: safety round/check completed  Taken 9/15/2024 1400 by Sharri Woods RN  Safety Promotion/Fall Prevention: safety round/check completed  Taken 9/15/2024 1300 by Sharri Woods RN  Safety Promotion/Fall Prevention: safety round/check completed  Taken 9/15/2024 1200 by Sharri Woods RN  Safety Promotion/Fall Prevention: safety round/check completed  Taken 9/15/2024 1100 by Sharri Woods RN  Safety Promotion/Fall Prevention: safety round/check completed  Taken 9/15/2024 1009 by Sharri Woods RN  Safety Promotion/Fall Prevention: safety round/check completed  Taken 9/15/2024 0939 by Sharri Woods RN  Safety Promotion/Fall Prevention: safety round/check completed  Taken 9/15/2024 0800 by Sharri Woods RN  Safety Promotion/Fall Prevention: safety round/check completed  Taken 9/15/2024 0700 by Sharri Woods RN  Safety Promotion/Fall Prevention: safety round/check completed     Problem: Adult Inpatient Plan of Care  Goal: Plan of Care Review  Outcome: Ongoing, Progressing  Goal: Patient-Specific Goal (Individualized)  Outcome: Ongoing, Progressing  Goal: Absence of Hospital-Acquired Illness or Injury  Outcome: Ongoing, Progressing  Intervention: Identify and Manage Fall Risk  Recent  Flowsheet Documentation  Taken 9/15/2024 1800 by Sharri Woods RN  Safety Promotion/Fall Prevention: safety round/check completed  Taken 9/15/2024 1700 by Sharri Woods RN  Safety Promotion/Fall Prevention: safety round/check completed  Taken 9/15/2024 1600 by Sharri Woods RN  Safety Promotion/Fall Prevention: safety round/check completed  Taken 9/15/2024 1500 by Sharri Woods RN  Safety Promotion/Fall Prevention: safety round/check completed  Taken 9/15/2024 1400 by Sharri Woods RN  Safety Promotion/Fall Prevention: safety round/check completed  Taken 9/15/2024 1300 by Sharri Woods RN  Safety Promotion/Fall Prevention: safety round/check completed  Taken 9/15/2024 1200 by Sharri Woods RN  Safety Promotion/Fall Prevention: safety round/check completed  Taken 9/15/2024 1100 by Sharri Woods RN  Safety Promotion/Fall Prevention: safety round/check completed  Taken 9/15/2024 1009 by Sharri Woods RN  Safety Promotion/Fall Prevention: safety round/check completed  Taken 9/15/2024 0939 by Sharri Woods RN  Safety Promotion/Fall Prevention: safety round/check completed  Taken 9/15/2024 0800 by Sharri Woods RN  Safety Promotion/Fall Prevention: safety round/check completed  Taken 9/15/2024 0700 by Sharri Woods RN  Safety Promotion/Fall Prevention: safety round/check completed  Intervention: Prevent Skin Injury  Recent Flowsheet Documentation  Taken 9/15/2024 1800 by Sharri Woods RN  Body Position: supine  Taken 9/15/2024 1600 by Sharri Woods RN  Body Position:   turned   left   patient/family refused  Taken 9/15/2024 1400 by Sharri Woods RN  Body Position:   turned   left  Taken 9/15/2024 1200 by Sharri Woods RN  Body Position:   patient/family refused   supine  Taken 9/15/2024 1009 by Sharri Woods RN  Body Position: supine  Taken 9/15/2024 0939 by Sharri Woods RN  Body Position:   turned   left  Skin Protection:   adhesive use limited   tubing/devices free from skin contact  Taken 9/15/2024 0800 by Chuck  MISTY Harrell  Body Position: supine  Intervention: Prevent and Manage VTE (Venous Thromboembolism) Risk  Recent Flowsheet Documentation  Taken 9/15/2024 0939 by Sharri Woods RN  Activity Management: activity minimized  VTE Prevention/Management: (see mar) other (see comments)  Intervention: Prevent Infection  Recent Flowsheet Documentation  Taken 9/15/2024 0939 by Sharri Woods RN  Infection Prevention:   rest/sleep promoted   single patient room provided  Goal: Optimal Comfort and Wellbeing  Outcome: Ongoing, Progressing  Intervention: Monitor Pain and Promote Comfort  Recent Flowsheet Documentation  Taken 9/15/2024 0939 by Sharri Woods RN  Pain Management Interventions: see MAR  Intervention: Provide Person-Centered Care  Recent Flowsheet Documentation  Taken 9/15/2024 0939 by Sharri Woods RN  Trust Relationship/Rapport: care explained  Goal: Readiness for Transition of Care  Outcome: Ongoing, Progressing     Problem: Skin Injury Risk Increased  Goal: Skin Health and Integrity  Outcome: Ongoing, Progressing  Intervention: Optimize Skin Protection  Recent Flowsheet Documentation  Taken 9/15/2024 1800 by Sharri Woods RN  Head of Bed (HOB) Positioning: HOB at 30-45 degrees  Taken 9/15/2024 1600 by Sharri Woods RN  Head of Bed (HOB) Positioning: HOB at 30-45 degrees  Taken 9/15/2024 1400 by Sharri Woods RN  Head of Bed (HOB) Positioning: HOB at 30-45 degrees  Taken 9/15/2024 1200 by Sharri Woods RN  Head of Bed (HOB) Positioning: HOB at 30-45 degrees  Taken 9/15/2024 1009 by Sharri Woods RN  Head of Bed (HOB) Positioning: HOB at 30-45 degrees  Taken 9/15/2024 0939 by Sharri Woods RN  Pressure Reduction Techniques:   frequent weight shift encouraged   weight shift assistance provided  Head of Bed (HOB) Positioning: HOB at 20-30 degrees  Skin Protection:   adhesive use limited   tubing/devices free from skin contact  Taken 9/15/2024 0800 by Sharri Woods RN  Head of Bed ([facility]) Positioning: HOB at 20-30 degrees      Problem: COPD (Chronic Obstructive Pulmonary Disease) Comorbidity  Goal: Maintenance of COPD Symptom Control  Outcome: Ongoing, Progressing  Intervention: Maintain COPD-Symptom Control  Recent Flowsheet Documentation  Taken 9/15/2024 0939 by Sharri Woods RN  Medication Review/Management: medications reviewed     Problem: Hypertension Comorbidity  Goal: Blood Pressure in Desired Range  Outcome: Ongoing, Progressing  Intervention: Maintain Blood Pressure Management  Recent Flowsheet Documentation  Taken 9/15/2024 0939 by Sharri Woods RN  Medication Review/Management: medications reviewed     Problem: Fluid Imbalance (Pneumonia)  Goal: Fluid Balance  Outcome: Ongoing, Progressing     Problem: Infection (Pneumonia)  Goal: Resolution of Infection Signs and Symptoms  Outcome: Ongoing, Progressing     Problem: Respiratory Compromise (Pneumonia)  Goal: Effective Oxygenation and Ventilation  Outcome: Ongoing, Progressing  Intervention: Promote Airway Secretion Clearance  Recent Flowsheet Documentation  Taken 9/15/2024 0939 by Sharri Woods RN  Cough And Deep Breathing: done independently per patient  Intervention: Optimize Oxygenation and Ventilation  Recent Flowsheet Documentation  Taken 9/15/2024 1800 by Sharri Woods RN  Head of Bed (HOB) Positioning: HOB at 30-45 degrees  Taken 9/15/2024 1600 by Sharri Woods RN  Head of Bed (HOB) Positioning: HOB at 30-45 degrees  Taken 9/15/2024 1400 by Sharri Woods RN  Head of Bed (HOB) Positioning: HOB at 30-45 degrees  Taken 9/15/2024 1200 by Sharri Woods RN  Head of Bed (HOB) Positioning: HOB at 30-45 degrees  Taken 9/15/2024 1009 by Sharri Woods RN  Head of Bed (HOB) Positioning: HOB at 30-45 degrees  Taken 9/15/2024 0939 by Sharri Woods RN  Head of Bed (HOB) Positioning: HOB at 20-30 degrees  Taken 9/15/2024 0800 by Sharri Woods RN  Head of Bed (HOB) Positioning: HOB at 20-30 degrees   Goal Outcome Evaluation:

## 2024-09-16 NOTE — THERAPY TREATMENT NOTE
Acute Care - Physical Therapy Treatment Note  [facility]     Patient Name: Pattie Liu  : 1945  MRN: [id number]  Today's Date: 2024      Visit Dx:     ICD-10-CM ICD-9-CM   1. Pneumonia of right lung due to infectious organism, unspecified part of lung  J18.9 483.8   2. Dehydration  E86.0 276.51   3. Altered mental status, unspecified altered mental status type  R41.82 780.97   4. Impaired mobility [Z74.09]  Z74.09 799.89     Patient Active Problem List   Diagnosis    Frequent PVCs    Shortness of breath    SOB (shortness of breath)    CAD in native artery    PVD (peripheral vascular disease)    Pneumonia due to infectious organism    Chronic back pain    Essential hypertension    Fall, initial encounter    Traumatic rhabdomyolysis    Leukocytosis    Anemia    Degenerative disc disease, lumbar    Dehydration    Acute UTI    Chronic respiratory failure with hypoxia    Impaired mobility    UTI (urinary tract infection)    Gait instability    Pneumonia    Pelvic fracture     Past Medical History:   Diagnosis Date    CAD in native artery 2019    COPD (chronic obstructive pulmonary disease)     Essential hypertension 2021    GERD (gastroesophageal reflux disease)     Lung nodule      Past Surgical History:   Procedure Laterality Date    BREAST IMPLANT REMOVAL      BREAST TISSUE EXPANDER REMOVAL INSERTION OF IMPLANT      CARDIAC CATHETERIZATION N/A 2019    Procedure: Left Heart Cath;  Surgeon: Edi Armijo MD;  Location: [facility] INVASIVE LOCATION;  Service: Cardiology    CHOLECYSTECTOMY      HYSTERECTOMY      MASTECTOMY      BILATERAL    OOPHORECTOMY       PT Assessment (Last 12 Hours)       PT Evaluation and Treatment       Row Name 24 0421          Physical Therapy Time and Intention    Subjective Information complains of;weakness;fatigue;pain  -ZACHARY     Document Type therapy note (daily note)  -ZACHARY     Mode of Treatment physical therapy  -ZACHARY       Row Name 24 7686           General Information    Existing Precautions/Restrictions fall;oxygen therapy device and L/min  recent pelvic fx, increase pain RLE  -ZACHARY     Limitations/Impairments safety/cognitive  -       Row Name 09/16/24 1350          Pain Scale: Word Pre/Post-Treatment    Pain: Word Scale, Pretreatment 2 - mild pain  -ZACHARY     Posttreatment Pain Rating 6 - moderate-severe pain  -ZACHARY     Pain Location - Side/Orientation Right  -ZACHARY     Pain Location lower  -ZACHARY     Pain Location - extremity  -       Row Name 09/16/24 1350          Bed Mobility    Supine-Sit Eagle (Bed Mobility) verbal cues;minimum assist (75% patient effort)  -     Sit-Supine [place] (Bed Mobility) verbal cues;moderate assist (50% patient effort)  -     Assistive Device (Bed Mobility) bed rails;head of bed elevated  -       Row Name 09/16/24 1350          Transfers    Comment, (Transfers) stood x 3, took a few steps to the side at EOB  -[facility] Name 09/16/24 1350          Sit-Stand Transfer    Sit-Stand [place] (Transfers) verbal cues;minimum assist (75% patient effort);moderate assist (50% patient effort)  -     Assistive Device (Sit-Stand Transfers) walker, front-wheeled  -       Row Name 09/16/24 1350          Stand-Sit Transfer    Stand-Sit [place] (Transfers) verbal cues;minimum assist (75% patient effort)  -     Assistive Device (Stand-Sit Transfers) walker, front-wheeled  -       Row Name 09/16/24 1350          Motor Skills    Comments, Therapeutic Exercise sitting AROM BLE X 10  -ZACHARY     Additional Documentation Comments, Therapeutic Exercise (Row)  -       Row Name 09/16/24 1350          Positioning and Restraints    Pre-Treatment Position in bed  -ZACHARY     Post Treatment Position bed  -ZACHARY     In Bed fowlers;call light within reach;encouraged to call for assist;exit alarm on;side rails up x2  -ZACHARY               User Key  (r) = Recorded By, (t) = Taken By, (c) = Cosigned By      Initials Name Provider Type     Chau Underwood, PTA Physical Therapist Assistant                    Physical Therapy Education       Title: PT OT SLP Therapies (In Progress)       Topic: Physical Therapy (In Progress)       Point: Mobility training (In Progress)       Learning Progress Summary             Patient Acceptance, E, NR by CHICA at 9/12/2024 0959    Comment: benefits of activity, progression of PT                         Point: Home exercise program (Not Started)       Learner Progress:  Not documented in this visit.              Point: Body mechanics (Not Started)       Learner Progress:  Not documented in this visit.              Point: Precautions (Not Started)       Learner Progress:  Not documented in this visit.                              User Key       Initials Effective Dates Name Provider Type Discipline    CHICA 02/03/23 -  Feroz Schwab, PT DPT Physical Therapist PT                  PT Recommendation and Plan     Plan of Care Reviewed With: patient  Progress: improving  Outcome Evaluation: Pt was in bed, agreed to therapy.  Able to transfer supine to sitting with min assist, with HOB elevated and using the bed rail.  Sitting EOB, maintained balance with CGA/min assist while performing LE exercises.  Transfered sit to stand with min/mod assist.  Pt stood with RWX and min assist, leaning posterior and to the left.  Pt was able to take a few side steps with RWX and mod assist.   Outcome Measures       Row Name 09/16/24 1350 09/14/24 9252          How much help from another person do you currently need...    Turning from your back to your side while in flat bed without using bedrails? 3  -ZACHARY 2  -MF     Moving from lying on back to sitting on the side of a flat bed without bedrails? 3  -ZACHARY 2  -MF     Moving to and from a bed to a chair (including a wheelchair)? 2  -ZACHARY 2  -MF     Standing up from a chair using your arms (e.g., wheelchair, bedside chair)? 2  -ZACHARY 2  -MF     Climbing 3-5 steps with a railing? 1  -ZACHARY 1  -MF      To walk in hospital room? 1  -ZACHARY 1  -MF     AM-PAC 6 Clicks Score (PT) 12  -ZACHARY 10  -     Highest Level of Mobility Goal 4 --> Transfer to chair/commode  -ZACHARY 4 --> Transfer to chair/commode  -        Functional Assessment    Outcome Measure Options AM-PAC 6 Clicks Basic Mobility (PT)  -ZACHARY AM-PAC 6 Clicks Basic Mobility (PT)  -               User Key  (r) = Recorded By, (t) = Taken By, (c) = Cosigned By      Initials Name Provider Type    Chau Underwood PTA Physical Therapist Assistant     Maria L Sparks PTA Physical Therapist Assistant                     Time Calculation:    PT Charges       Row Name 09/16/24 1350             Time Calculation    Start Time 1350  -ZACHARY      Stop Time 1429  -ZACHARY      Time Calculation (min) 39 min  -ZACHARY      PT Received On 09/16/24  -ZACHARY         Time Calculation- PT    Total Timed Code Minutes- PT 39 minute(s)  -ZACHARY         Timed Charges    86679 - PT Therapeutic Exercise Minutes 13  -ZACHARY      16742 - PT Therapeutic Activity Minutes 26  -ZACHARY         Total Minutes    Timed Charges Total Minutes 39  -ZACHARY       Total Minutes 39  -ZACHARY                User Key  (r) = Recorded By, (t) = Taken By, (c) = Cosigned By      Initials Name Provider Type    Chau Underwood PTA Physical Therapist Assistant                  Therapy Charges for Today       Code Description Service Date Service Provider Modifiers Qty    33862201295 HC PT THERAPEUTIC ACT EA 15 MIN 9/16/2024 Chau Almeida PTA GP 2    08350707684 HC PT THER PROC EA 15 MIN 9/16/2024 Chau Almeida PTA GP 1            PT G-Codes  Outcome Measure Options: AM-PAC 6 Clicks Basic Mobility (PT)  AM-PAC 6 Clicks Score (PT): 12  AM-PAC 6 Clicks Score (OT): 10    Chau Almeida PTA  9/16/2024

## 2024-09-16 NOTE — PLAN OF CARE
Goal Outcome Evaluation:  Plan of Care Reviewed With: patient     Problem: Adult Inpatient Plan of Care  Goal: Plan of Care Review  Outcome: Ongoing, Progressing  Flowsheets (Taken 9/16/2024 7272)  Progress: improving  Plan of Care Reviewed With: patient  Outcome Evaluation: AAOx4, VSS, pt states she is feeling somewhat better today.  She has good UO, sample sent to lab for culture of UA. Pt left arm IV occluded and removed.  Pt has new dressing on right forearm IV. Pt does not want to turn because it hurts her right side, so she tends to tilt to the left, but pillows are adjusted to keep with bony prominences. preventive foam placed, Pt had pain earlier with relief with meds. Pt has slept all night, aroused this am with bath. SR/SB 49-63 on tele. safety maintained, care plan ongoing

## 2024-09-17 LAB
ALBUMIN SERPL-MCNC: 3.1 G/DL (ref 3.5–5.2)
ALBUMIN/GLOB SERPL: 1 G/DL
ALP SERPL-CCNC: 124 U/L (ref 39–117)
ALT SERPL W P-5'-P-CCNC: <5 U/L (ref 1–33)
ANION GAP SERPL CALCULATED.3IONS-SCNC: 9 MMOL/L (ref 5–15)
AST SERPL-CCNC: 21 U/L (ref 1–32)
BASOPHILS # BLD AUTO: 0.06 10*3/MM3 (ref 0–0.2)
BASOPHILS NFR BLD AUTO: 0.4 % (ref 0–1.5)
BILIRUB SERPL-MCNC: 0.2 MG/DL (ref 0–1.2)
BUN SERPL-MCNC: 8 MG/DL (ref 8–23)
BUN/CREAT SERPL: 10.8 (ref 7–25)
CALCIUM SPEC-SCNC: 10 MG/DL (ref 8.6–10.5)
CHLORIDE SERPL-SCNC: 101 MMOL/L (ref 98–107)
CO2 SERPL-SCNC: 31 MMOL/L (ref 22–29)
CREAT SERPL-MCNC: 0.74 MG/DL (ref 0.57–1)
DEPRECATED RDW RBC AUTO: 45.3 FL (ref 37–54)
EGFRCR SERPLBLD CKD-EPI 2021: 81.4 ML/MIN/1.73
EOSINOPHIL # BLD AUTO: 0.51 10*3/MM3 (ref 0–0.4)
EOSINOPHIL NFR BLD AUTO: 3.7 % (ref 0.3–6.2)
ERYTHROCYTE [DISTWIDTH] IN BLOOD BY AUTOMATED COUNT: 13.2 % (ref 12.3–15.4)
GLOBULIN UR ELPH-MCNC: 3.2 GM/DL
GLUCOSE SERPL-MCNC: 106 MG/DL (ref 65–99)
HCT VFR BLD AUTO: 37.5 % (ref 34–46.6)
HGB BLD-MCNC: 12.1 G/DL (ref 12–15.9)
IMM GRANULOCYTES # BLD AUTO: 0.07 10*3/MM3 (ref 0–0.05)
IMM GRANULOCYTES NFR BLD AUTO: 0.5 % (ref 0–0.5)
LYMPHOCYTES # BLD AUTO: 2.51 10*3/MM3 (ref 0.7–3.1)
LYMPHOCYTES NFR BLD AUTO: 18.1 % (ref 19.6–45.3)
MCH RBC QN AUTO: 30 PG (ref 26.6–33)
MCHC RBC AUTO-ENTMCNC: 32.3 G/DL (ref 31.5–35.7)
MCV RBC AUTO: 93.1 FL (ref 79–97)
MONOCYTES # BLD AUTO: 0.8 10*3/MM3 (ref 0.1–0.9)
MONOCYTES NFR BLD AUTO: 5.8 % (ref 5–12)
NEUTROPHILS NFR BLD AUTO: 71.5 % (ref 42.7–76)
NEUTROPHILS NFR BLD AUTO: 9.9 10*3/MM3 (ref 1.7–7)
NRBC BLD AUTO-RTO: 0 /100 WBC (ref 0–0.2)
PLATELET # BLD AUTO: 447 10*3/MM3 (ref 140–450)
PMV BLD AUTO: 11 FL (ref 6–12)
POTASSIUM SERPL-SCNC: 3.7 MMOL/L (ref 3.5–5.2)
PROT SERPL-MCNC: 6.3 G/DL (ref 6–8.5)
RBC # BLD AUTO: 4.03 10*6/MM3 (ref 3.77–5.28)
SODIUM SERPL-SCNC: 141 MMOL/L (ref 136–145)
WBC NRBC COR # BLD AUTO: 13.85 10*3/MM3 (ref 3.4–10.8)

## 2024-09-17 PROCEDURE — 25010000002 ENOXAPARIN PER 10 MG: Performed by: FAMILY MEDICINE

## 2024-09-17 PROCEDURE — 63710000001 ONDANSETRON ODT 4 MG TABLET DISPERSIBLE: Performed by: FAMILY MEDICINE

## 2024-09-17 PROCEDURE — 80053 COMPREHEN METABOLIC PANEL: CPT | Performed by: FAMILY MEDICINE

## 2024-09-17 PROCEDURE — 85025 COMPLETE CBC W/AUTO DIFF WBC: CPT | Performed by: FAMILY MEDICINE

## 2024-09-17 PROCEDURE — 94664 DEMO&/EVAL PT USE INHALER: CPT

## 2024-09-17 PROCEDURE — 97530 THERAPEUTIC ACTIVITIES: CPT

## 2024-09-17 PROCEDURE — 97110 THERAPEUTIC EXERCISES: CPT

## 2024-09-17 PROCEDURE — 25010000002 CEFAZOLIN PER 500 MG: Performed by: FAMILY MEDICINE

## 2024-09-17 PROCEDURE — 94799 UNLISTED PULMONARY SVC/PX: CPT

## 2024-09-17 PROCEDURE — 94760 N-INVAS EAR/PLS OXIMETRY 1: CPT

## 2024-09-17 RX ORDER — OXYCODONE AND ACETAMINOPHEN 5; 325 MG/1; MG/1
1 TABLET ORAL EVERY 6 HOURS PRN
Status: DISPENSED | OUTPATIENT
Start: 2024-09-17 | End: 2024-09-22

## 2024-09-17 RX ADMIN — IPRATROPIUM BROMIDE 0.5 MG: 0.5 SOLUTION RESPIRATORY (INHALATION) at 06:25

## 2024-09-17 RX ADMIN — PANTOPRAZOLE SODIUM 40 MG: 40 TABLET, DELAYED RELEASE ORAL at 08:35

## 2024-09-17 RX ADMIN — Medication 10 ML: at 08:35

## 2024-09-17 RX ADMIN — CEFAZOLIN 2000 MG: 2 INJECTION, POWDER, FOR SOLUTION INTRAMUSCULAR; INTRAVENOUS at 02:38

## 2024-09-17 RX ADMIN — ACETAMINOPHEN 650 MG: 325 TABLET ORAL at 08:35

## 2024-09-17 RX ADMIN — ASPIRIN 81 MG: 81 TABLET, COATED ORAL at 08:35

## 2024-09-17 RX ADMIN — PHENAZOPYRIDINE HYDROCHLORIDE 100 MG: 100 TABLET ORAL at 08:35

## 2024-09-17 RX ADMIN — CEFAZOLIN 2000 MG: 2 INJECTION, POWDER, FOR SOLUTION INTRAMUSCULAR; INTRAVENOUS at 18:51

## 2024-09-17 RX ADMIN — NICOTINE 1 PATCH: 21 PATCH, EXTENDED RELEASE TRANSDERMAL at 16:19

## 2024-09-17 RX ADMIN — IPRATROPIUM BROMIDE 0.5 MG: 0.5 SOLUTION RESPIRATORY (INHALATION) at 18:57

## 2024-09-17 RX ADMIN — GABAPENTIN 300 MG: 300 CAPSULE ORAL at 08:35

## 2024-09-17 RX ADMIN — FLUTICASONE PROPIONATE 2 SPRAY: 50 SPRAY, METERED NASAL at 08:35

## 2024-09-17 RX ADMIN — BUMETANIDE 1 MG: 1 TABLET ORAL at 08:34

## 2024-09-17 RX ADMIN — ALPRAZOLAM 1 MG: 0.5 TABLET ORAL at 20:40

## 2024-09-17 RX ADMIN — OXYCODONE HYDROCHLORIDE AND ACETAMINOPHEN 1 TABLET: 5; 325 TABLET ORAL at 16:19

## 2024-09-17 RX ADMIN — CEFAZOLIN 2000 MG: 2 INJECTION, POWDER, FOR SOLUTION INTRAMUSCULAR; INTRAVENOUS at 10:52

## 2024-09-17 RX ADMIN — IPRATROPIUM BROMIDE 0.5 MG: 0.5 SOLUTION RESPIRATORY (INHALATION) at 14:30

## 2024-09-17 RX ADMIN — CITALOPRAM 30 MG: 20 TABLET, FILM COATED ORAL at 08:34

## 2024-09-17 RX ADMIN — ONDANSETRON 4 MG: 4 TABLET, ORALLY DISINTEGRATING ORAL at 10:52

## 2024-09-17 RX ADMIN — ALPRAZOLAM 1 MG: 0.5 TABLET ORAL at 08:34

## 2024-09-17 RX ADMIN — GABAPENTIN 300 MG: 300 CAPSULE ORAL at 20:40

## 2024-09-17 RX ADMIN — ATORVASTATIN CALCIUM 20 MG: 10 TABLET, FILM COATED ORAL at 08:34

## 2024-09-17 RX ADMIN — LISINOPRIL 20 MG: 20 TABLET ORAL at 08:35

## 2024-09-17 RX ADMIN — ENOXAPARIN SODIUM 40 MG: 100 INJECTION SUBCUTANEOUS at 16:18

## 2024-09-17 NOTE — PLAN OF CARE
Goal Outcome Evaluation:   Disoriented to time & place, on 3L n/c, oob to chair. C/o 8/10 right hip pain- PRN medication given. Hourly rounding. VSS. Lovenox VTE. IV antibx given. Fall risk- bed alarm on- safety maintained. Daughters at bedside. Will continue to monitor until change of shift.

## 2024-09-17 NOTE — PLAN OF CARE
Goal Outcome Evaluation:  Plan of Care Reviewed With: patient        Progress: no change  Outcome Evaluation: Pt was in bed, confused and disoriented, but able to follow commands.  Transfered supine to sitting with min/mod assist.  Transfered sit to stand with mod assist.  Performed stand pivot from bed to chair with RWX and mod assist.  Pt requires encouragement to continue to take steps.  Pt left in the chair with exit alarm, notified nsg.  Will continue to work with pt to increase strength and improve transfers.

## 2024-09-17 NOTE — PROGRESS NOTES
Daily Progress Note  Pattie Liu  MRN: [id number] LOS: 6    Admit Date: 9/11/2024 9/17/2024 07:17 CDT    Subjective:      Chief Complaint:  Chief Complaint   Patient presents with    Altered Mental Status       Interval History:    Reviewed overnight events and nursing notes.   No acute events overnight.  Sitting on side of the bed this morning slightly agitated and confused.    Review of Systems   Constitutional:  Positive for activity change and fatigue. Negative for fever.   Respiratory:  Positive for cough and shortness of breath.    Gastrointestinal:  Negative for nausea and vomiting.   Genitourinary:  Positive for decreased urine volume and dysuria.   Musculoskeletal:  Positive for gait problem.   Neurological:  Positive for weakness.       DIET:  Diet: Regular/House; Fluid Consistency: Thin (IDDSI 0)    Medications:        ALPRAZolam, 1 mg, Oral, BID  aspirin, 81 mg, Oral, Daily  atorvastatin, 20 mg, Oral, Daily  bumetanide, 1 mg, Oral, Daily  ceFAZolin, 2,000 mg, Intravenous, Q8H  citalopram, 30 mg, Oral, Daily  enoxaparin, 40 mg, Subcutaneous, Q24H  fluticasone, 2 spray, Each Nare, BID  gabapentin, 300 mg, Oral, BID  ipratropium, 0.5 mg, Nebulization, 4x Daily - RT  lisinopril, 20 mg, Oral, Daily  nicotine, 1 patch, Transdermal, Q24H  pantoprazole, 40 mg, Oral, QAM AC  phenazopyridine, 100 mg, Oral, TID With Meals  sodium chloride, 10 mL, Intravenous, Q12H        Data:     Code Status:   Code Status and Medical Interventions: No CPR (Do Not Attempt to Resuscitate); Full Support   Ordered at: 09/11/24 1647     Code Status (Patient has no pulse and is not breathing):    No CPR (Do Not Attempt to Resuscitate)     Medical Interventions (Patient has pulse or is breathing):    Full Support       Family History   Problem Relation Age of Onset    Cancer Mother     Cancer Father      Social History     Socioeconomic History    Marital status:    Tobacco Use    Smoking status: Every Day     Current  packs/day: 0.50     Average packs/day: 0.5 packs/day for 62.0 years (31.0 ttl pk-yrs)     Types: Cigarettes    Smokeless tobacco: Never    Tobacco comments:     states she has no plan to quit smoking   Vaping Use    Vaping status: Former   Substance and Sexual Activity    Alcohol use: No    Drug use: No    Sexual activity: Not Currently       Labs:  Lab Results (last 72 hours)       Procedure Component Value Units Date/Time    Blood Culture - Blood, Arm, Left [[id number]]  (Normal) Collected: 09/11/24 1346    Specimen: Blood from Arm, Left Updated: 09/16/24 1415     Blood Culture No growth at 5 days    Blood Culture - Blood, Arm, Right [[id number]]  (Normal) Collected: 09/11/24 1302    Specimen: Blood from Arm, Right Updated: 09/16/24 1330     Blood Culture No growth at 5 days    Comprehensive Metabolic Panel [[id number]]  (Abnormal) Collected: 09/16/24 0417    Specimen: Blood Updated: 09/16/24 0451     Glucose 90 mg/dL      BUN 7 mg/dL      Creatinine 0.80 mg/dL      Sodium 142 mmol/L      Potassium 4.1 mmol/L      Comment: Slight hemolysis detected by analyzer. Result may be falsely elevated.        Chloride 110 mmol/L      CO2 26.0 mmol/L      Calcium 8.8 mg/dL      Total Protein 4.8 g/dL      Albumin 2.3 g/dL      ALT (SGPT) <5 U/L      AST (SGOT) 17 U/L      Alkaline Phosphatase 97 U/L      Total Bilirubin <0.2 mg/dL      Globulin 2.5 gm/dL      A/G Ratio 0.9 g/dL      BUN/Creatinine Ratio 8.8     Anion Gap 6.0 mmol/L      eGFR 74.1 mL/min/1.73     Narrative:      GFR Normal >60  Chronic Kidney Disease <60  Kidney Failure <15    The GFR formula is only valid for adults with stable renal function between ages 18 and 70.    CBC Auto Differential [[id number]]  (Abnormal) Collected: 09/16/24 0417    Specimen: Blood Updated: 09/16/24 0435     WBC 9.76 10*3/mm3      RBC 3.45 10*6/mm3      Hemoglobin 10.4 g/dL      Hematocrit 31.9 %      MCV 92.5 fL      MCH 30.1 pg      MCHC 32.6 g/dL      RDW 13.5 %      RDW-SD 45.5 fl       MPV 10.5 fL      Platelets 324 10*3/mm3      Neutrophil % 55.3 %      Lymphocyte % 31.0 %      Monocyte % 7.4 %      Eosinophil % 5.3 %      Basophil % 0.4 %      Immature Grans % 0.6 %      Neutrophils, Absolute 5.39 10*3/mm3      Lymphocytes, Absolute 3.03 10*3/mm3      Monocytes, Absolute 0.72 10*3/mm3      Eosinophils, Absolute 0.52 10*3/mm3      Basophils, Absolute 0.04 10*3/mm3      Immature Grans, Absolute 0.06 10*3/mm3      nRBC 0.0 /100 WBC     Urinalysis With Culture If Indicated - Urine, Clean Catch [[id number]]  (Abnormal) Collected: 09/15/24 2120    Specimen: Urine, Clean Catch Updated: 09/15/24 2157     Color, UA [place]     Appearance, UA Clear     pH, UA 5.5     Specific Gravity, UA 1.013     Glucose, UA Negative     Ketones, UA Negative     Bilirubin, UA Negative     Blood, UA Negative     Protein, UA Negative     Leuk Esterase, UA Negative     Nitrite, UA Positive     Urobilinogen, UA 1.0 E.U./dL    Narrative:      Dipstick results may be inaccurate due to color interference.    Urinalysis, Microscopic Only - Urine, Clean Catch [[id number]] Collected: 09/15/24 2120    Specimen: Urine, Clean Catch Updated: 09/15/24 2157     RBC, UA 0-2 /HPF      WBC, UA 0-2 /HPF      Comment: Urine culture not indicated.        Bacteria, UA None Seen /HPF      Squamous Epithelial Cells, UA 0-2 /HPF      Hyaline Casts, UA 0-2 /LPF      Methodology Manual Light Microscopy    Magnesium [[id number]]  (Abnormal) Collected: 09/15/24 0251    Specimen: Blood Updated: 09/15/24 0355     Magnesium 2.5 mg/dL     Comprehensive Metabolic Panel [[id number]]  (Abnormal) Collected: 09/15/24 0251    Specimen: Blood Updated: 09/15/24 0341     Glucose 102 mg/dL      BUN 8 mg/dL      Creatinine 0.65 mg/dL      Sodium 142 mmol/L      Potassium 4.9 mmol/L      Chloride 112 mmol/L      CO2 24.0 mmol/L      Calcium 8.6 mg/dL      Total Protein 5.0 g/dL      Albumin 2.4 g/dL      ALT (SGPT) <5 U/L      AST (SGOT) 17 U/L      Alkaline  Phosphatase 101 U/L      Total Bilirubin <0.2 mg/dL      Globulin 2.6 gm/dL      A/G Ratio 0.9 g/dL      BUN/Creatinine Ratio 12.3     Anion Gap 6.0 mmol/L      eGFR 88.6 mL/min/1.73     Narrative:      GFR Normal >60  Chronic Kidney Disease <60  Kidney Failure <15    The GFR formula is only valid for adults with stable renal function between ages 18 and 70.    CBC & Differential [[id number]]  (Abnormal) Collected: 09/15/24 0251    Specimen: Blood Updated: 09/15/24 0316    Narrative:      The following orders were created for panel order CBC & Differential.  Procedure                               Abnormality         Status                     ---------                               -----------         ------                     CBC Auto Differential[[id number]]        Abnormal            Final result                 Please view results for these tests on the individual orders.    CBC Auto Differential [[id number]]  (Abnormal) Collected: 09/15/24 0251    Specimen: Blood Updated: 09/15/24 0316     WBC 10.61 10*3/mm3      RBC 3.41 10*6/mm3      Hemoglobin 10.1 g/dL      Hematocrit 32.1 %      MCV 94.1 fL      MCH 29.6 pg      MCHC 31.5 g/dL      RDW 13.4 %      RDW-SD 46.2 fl      MPV 10.5 fL      Platelets 366 10*3/mm3      Neutrophil % 62.3 %      Lymphocyte % 25.0 %      Monocyte % 7.4 %      Eosinophil % 4.3 %      Basophil % 0.5 %      Immature Grans % 0.5 %      Neutrophils, Absolute 6.62 10*3/mm3      Lymphocytes, Absolute 2.65 10*3/mm3      Monocytes, Absolute 0.78 10*3/mm3      Eosinophils, Absolute 0.46 10*3/mm3      Basophils, Absolute 0.05 10*3/mm3      Immature Grans, Absolute 0.05 10*3/mm3      nRBC 0.0 /100 WBC     Potassium [[id number]]  (Normal) Collected: 09/14/24 2340    Specimen: Blood Updated: 09/15/24 0025     Potassium 4.5 mmol/L     Magnesium [[id number]]  (Abnormal) Collected: 09/14/24 0437    Specimen: Blood Updated: 09/14/24 1203     Magnesium 1.5 mg/dL                   Objective:     Vitals: BP  "161/59 (BP Location: Left arm, Patient Position: Lying)   Pulse 74   Temp 97.6 °F (36.4 °C) (Oral)   Resp 18   Ht 160 cm (63\")   Wt 66.2 kg (145 lb 15.1 oz)   SpO2 94%   BMI 25.85 kg/m²    Intake/Output Summary (Last 24 hours) at 2024 0717  Last data filed at 2024 0417  Gross per 24 hour   Intake 630 ml   Output 1950 ml   Net -1320 ml    Temp (24hrs), Av.2 °F (36.8 °C), Min:97.6 °F (36.4 °C), Max:98.6 °F (37 °C)      Physical Exam  Vitals reviewed.   Constitutional:       Appearance: Normal appearance. She is ill-appearing.   HENT:      Head: Normocephalic and atraumatic.   Eyes:      General:         Right eye: No discharge.         Left eye: No discharge.      Conjunctiva/sclera: Conjunctivae normal.   Cardiovascular:      Rate and Rhythm: Normal rate and regular rhythm.      Pulses: Normal pulses.      Heart sounds: No murmur heard.  Pulmonary:      Effort: Pulmonary effort is normal. No respiratory distress.      Comments: Nasal cannula in place  Abdominal:      General: Abdomen is flat. Bowel sounds are normal. There is no distension.   Musculoskeletal:      Right lower leg: No edema.      Left lower leg: No edema.   Skin:     Capillary Refill: Capillary refill takes less than 2 seconds.   Neurological:      General: No focal deficit present.      Mental Status: She is alert.   Psychiatric:         Mood and Affect: Mood normal.         Behavior: Behavior normal.             Assessment and Plan:     Primary Problem:  Pneumonia    Hospital Problem list:    Pneumonia    Essential hypertension    Gait instability    Pelvic fracture      PMH:  Past Medical History:   Diagnosis Date    CAD in native artery 2019    COPD (chronic obstructive pulmonary disease)     Essential hypertension 2021    GERD (gastroesophageal reflux disease)     Lung nodule        Treatment Plan:  Pneumonia: Improved with abx and steroids, to finish antibiotics 2024, oxygen is at baseline, incentive " spirometry, nebs     Pelvic fracture: prior hospitalization. PT/OT while inpatient, she will still require SNF however [facility]      Code status: DNR     DVT ppx: lovenox     Disposition: inpatient, SNF (requesting different facility if possible)    Reviewed treatment plans with the patient and/or family.   30 minutes spent in face to face interaction and coordination of care.     Electronically signed by Anupam Ledezma MD on 9/17/2024 at 07:17 CDT

## 2024-09-17 NOTE — THERAPY TREATMENT NOTE
Acute Care - Physical Therapy Treatment Note  [facility]     Patient Name: Pattie Liu  : 1945  MRN: [id number]  Today's Date: 2024      Visit Dx:     ICD-10-CM ICD-9-CM   1. Pneumonia of right lung due to infectious organism, unspecified part of lung  J18.9 483.8   2. Dehydration  E86.0 276.51   3. Altered mental status, unspecified altered mental status type  R41.82 780.97   4. Impaired mobility [Z74.09]  Z74.09 799.89     Patient Active Problem List   Diagnosis    Frequent PVCs    Shortness of breath    SOB (shortness of breath)    CAD in native artery    PVD (peripheral vascular disease)    Pneumonia due to infectious organism    Chronic back pain    Essential hypertension    Fall, initial encounter    Traumatic rhabdomyolysis    Leukocytosis    Anemia    Degenerative disc disease, lumbar    Dehydration    Acute UTI    Chronic respiratory failure with hypoxia    Impaired mobility    UTI (urinary tract infection)    Gait instability    Pneumonia    Pelvic fracture     Past Medical History:   Diagnosis Date    CAD in native artery 2019    COPD (chronic obstructive pulmonary disease)     Essential hypertension 2021    GERD (gastroesophageal reflux disease)     Lung nodule      Past Surgical History:   Procedure Laterality Date    BREAST IMPLANT REMOVAL      BREAST TISSUE EXPANDER REMOVAL INSERTION OF IMPLANT      CARDIAC CATHETERIZATION N/A 2019    Procedure: Left Heart Cath;  Surgeon: Edi Armijo MD;  Location: [facility] INVASIVE LOCATION;  Service: Cardiology    CHOLECYSTECTOMY      HYSTERECTOMY      MASTECTOMY      BILATERAL    OOPHORECTOMY       PT Assessment (Last 12 Hours)       PT Evaluation and Treatment       Row Name 24 1001          Physical Therapy Time and Intention    Subjective Information complains of;weakness;fatigue;pain;nausea/vomiting  -ZACHARY     Document Type therapy note (daily note)  -ZACHARY     Mode of Treatment physical therapy  -ZACHARY       Row  Name 09/17/24 1001          General Information    Existing Precautions/Restrictions fall;oxygen therapy device and L/min  -ZACHARY     Limitations/Impairments safety/cognitive  -ZACHARY       Row Name 09/17/24 1001          Pain Scale: Word Pre/Post-Treatment    Pain: Word Scale, Pretreatment 2 - mild pain  -ZACHARY     Posttreatment Pain Rating 4 - moderate pain  -ZACHARY     Pain Location - Side/Orientation Right  -ZACHARY     Pain Location lower  -ZACHARY     Pain Location - extremity  -ZACHARY       Row Name 09/17/24 1001          Bed Mobility    Supine-Sit [place] (Bed Mobility) verbal cues;minimum assist (75% patient effort);moderate assist (50% patient effort)  -ZACHARY       Row Name 09/17/24 1001          Transfers    Transfers bed-chair transfer  -ZACHARY     Comment, (Transfers) stand pivot to chair, then sit to stand x 4  -ZACHARY       Row Name 09/17/24 1001          Bed-Chair Transfer    Bed-Chair [place] (Transfers) verbal cues;moderate assist (50% patient effort)  -ZACHARY     Assistive Device (Bed-Chair Transfers) walker, front-wheeled  -ZACHARY       Row Name 09/17/24 1001          Sit-Stand Transfer    Sit-Stand [place] (Transfers) verbal cues;minimum assist (75% patient effort)  -     Assistive Device (Sit-Stand Transfers) walker, front-wheeled  -ZACHARY       Row Name 09/17/24 1001          Stand-Sit Transfer    Stand-Sit [place] (Transfers) verbal cues;minimum assist (75% patient effort)  -     Assistive Device (Stand-Sit Transfers) walker, front-wheeled  -ZACHARY       Row Name 09/17/24 1001          Motor Skills    Comments, Therapeutic Exercise sitting AROM BLE X 15  -ZACHARY       Row Name 09/17/24 1001          Positioning and Restraints    Pre-Treatment Position in bed  -ZACHARY     Post Treatment Position chair  -ZACHARY     In Chair reclined;call light within reach;encouraged to call for assist;exit alarm on;notified [facility]  -ZACHARY               User Key  (r) = Recorded By, (t) = Taken By, (c) = Cosigned By      Initials Name Provider Type    ZACHARY  Chau Almeida, PTA Physical Therapist Assistant                    Physical Therapy Education       Title: PT OT SLP Therapies (In Progress)       Topic: Physical Therapy (In Progress)       Point: Mobility training (In Progress)       Learning Progress Summary             Patient Acceptance, E, NR by CHICA at 9/12/2024 0995    Comment: benefits of activity, progression of PT                         Point: Home exercise program (Not Started)       Learner Progress:  Not documented in this visit.              Point: Body mechanics (Not Started)       Learner Progress:  Not documented in this visit.              Point: Precautions (Not Started)       Learner Progress:  Not documented in this visit.                              User Key       Initials Effective Dates Name Provider Type Discipline    CHICA 02/03/23 -  Feroz Scwhab, PT DPT Physical Therapist PT                  PT Recommendation and Plan     Plan of Care Reviewed With: patient  Progress: no change  Outcome Evaluation: Pt was in bed, confused and disoriented, but able to follow commands.  Transfered supine to sitting with min/mod assist.  Transfered sit to stand with mod assist.  Performed stand pivot from bed to chair with RWX and mod assist.  Pt requires encouragement to continue to take steps.  Pt left in the chair with exit alarm, notified nsg.  Will continue to work with pt to increase strength and improve transfers.   Outcome Measures       Row Name 09/17/24 1001 09/16/24 1350 09/14/24 1556       How much help from another person do you currently need...    Turning from your back to your side while in flat bed without using bedrails? 3  -ZACHARY 3  -ZACHARY 2  -MF    Moving from lying on back to sitting on the side of a flat bed without bedrails? 3  -ZACHARY 3  -ZACHARY 2  -MF    Moving to and from a bed to a chair (including a wheelchair)? 2  -ZACHARY 2  -ZACHARY 2  -MF    Standing up from a chair using your arms (e.g., wheelchair, bedside chair)? 2  -ZACHARY 2  -ZACHARY 2  -MF     Climbing 3-5 steps with a railing? 1  -ZACHARY 1  -ZACHARY 1  -MF    To walk in hospital room? 1  -ZACHARY 1  -ZACHARY 1  -MF    AM-PAC 6 Clicks Score (PT) 12  -ZACHARY 12  -ZACHARY 10  -MF    Highest Level of Mobility Goal 4 --> Transfer to chair/commode  -ZACHARY 4 --> Transfer to chair/commode  -ZACHARY 4 --> Transfer to chair/commode  -MF       Functional Assessment    Outcome Measure Options AM-PAC 6 Clicks Basic Mobility (PT)  -ZACHARY AM-PAC 6 Clicks Basic Mobility (PT)  -ZACHARY AM-PAC 6 Clicks Basic Mobility (PT)  -MF              User Key  (r) = Recorded By, (t) = Taken By, (c) = Cosigned By      Initials Name Provider Type    Chau Underwood PTA Physical Therapist Assistant     Maria L Sparks PTA Physical Therapist Assistant                     Time Calculation:    PT Charges       Row Name 09/17/24 1001             Time Calculation    Start Time 1001  -ZACHARY      Stop Time 1041  -ZACHARY      Time Calculation (min) 40 min  -ZACHARY      PT Received On 09/17/24  -ZACHARY         Time Calculation- PT    Total Timed Code Minutes- PT 40 minute(s)  -ZACHARY         Timed Charges    04348 - PT Therapeutic Exercise Minutes 16  -ZACHARY      81785 - PT Therapeutic Activity Minutes 24  -ZACHARY         Total Minutes    Timed Charges Total Minutes 40  -ZACHARY       Total Minutes 40  -ZACHARY                User Key  (r) = Recorded By, (t) = Taken By, (c) = Cosigned By      Initials Name Provider Type    Chau Underwood PTA Physical Therapist Assistant                  Therapy Charges for Today       Code Description Service Date Service Provider Modifiers Qty    03575601678 HC PT THERAPEUTIC ACT EA 15 MIN 9/16/2024 Chau Almeida, PTA GP 2    21799213158 HC PT THER PROC EA 15 MIN 9/16/2024 Chau Almeida, PTA GP 1    17884342671 HC PT THERAPEUTIC ACT EA 15 MIN 9/17/2024 Chau Almeida, PTA GP 2    38913403627 HC PT THER PROC EA 15 MIN 9/17/2024 Chau Almeida, PTA GP 1            PT G-Codes  Outcome Measure Options: AM-PAC 6 Clicks Basic Mobility (PT)  AM-PAC 6 Clicks  Score (PT): 12  AM-PAC 6 Clicks Score (OT): 10    Chau Almeida, PTA  9/17/2024

## 2024-09-18 LAB
ALBUMIN SERPL-MCNC: 2.5 G/DL (ref 3.5–5.2)
ALBUMIN/GLOB SERPL: 0.9 G/DL
ALP SERPL-CCNC: 97 U/L (ref 39–117)
ALT SERPL W P-5'-P-CCNC: <5 U/L (ref 1–33)
ANION GAP SERPL CALCULATED.3IONS-SCNC: 5 MMOL/L (ref 5–15)
AST SERPL-CCNC: 16 U/L (ref 1–32)
BASOPHILS # BLD AUTO: 0.05 10*3/MM3 (ref 0–0.2)
BASOPHILS NFR BLD AUTO: 0.5 % (ref 0–1.5)
BILIRUB SERPL-MCNC: <0.2 MG/DL (ref 0–1.2)
BUN SERPL-MCNC: 6 MG/DL (ref 8–23)
BUN/CREAT SERPL: 9.4 (ref 7–25)
CALCIUM SPEC-SCNC: 9.1 MG/DL (ref 8.6–10.5)
CHLORIDE SERPL-SCNC: 106 MMOL/L (ref 98–107)
CO2 SERPL-SCNC: 32 MMOL/L (ref 22–29)
CREAT SERPL-MCNC: 0.64 MG/DL (ref 0.57–1)
DEPRECATED RDW RBC AUTO: 47.7 FL (ref 37–54)
EGFRCR SERPLBLD CKD-EPI 2021: 88.9 ML/MIN/1.73
EOSINOPHIL # BLD AUTO: 0.43 10*3/MM3 (ref 0–0.4)
EOSINOPHIL NFR BLD AUTO: 4.1 % (ref 0.3–6.2)
ERYTHROCYTE [DISTWIDTH] IN BLOOD BY AUTOMATED COUNT: 13.5 % (ref 12.3–15.4)
GLOBULIN UR ELPH-MCNC: 2.7 GM/DL
GLUCOSE SERPL-MCNC: 84 MG/DL (ref 65–99)
HCT VFR BLD AUTO: 34.8 % (ref 34–46.6)
HGB BLD-MCNC: 10.7 G/DL (ref 12–15.9)
IMM GRANULOCYTES # BLD AUTO: 0.04 10*3/MM3 (ref 0–0.05)
IMM GRANULOCYTES NFR BLD AUTO: 0.4 % (ref 0–0.5)
LYMPHOCYTES # BLD AUTO: 3.04 10*3/MM3 (ref 0.7–3.1)
LYMPHOCYTES NFR BLD AUTO: 29 % (ref 19.6–45.3)
MCH RBC QN AUTO: 29.6 PG (ref 26.6–33)
MCHC RBC AUTO-ENTMCNC: 30.7 G/DL (ref 31.5–35.7)
MCV RBC AUTO: 96.1 FL (ref 79–97)
MONOCYTES # BLD AUTO: 0.72 10*3/MM3 (ref 0.1–0.9)
MONOCYTES NFR BLD AUTO: 6.9 % (ref 5–12)
NEUTROPHILS NFR BLD AUTO: 59.1 % (ref 42.7–76)
NEUTROPHILS NFR BLD AUTO: 6.22 10*3/MM3 (ref 1.7–7)
NRBC BLD AUTO-RTO: 0 /100 WBC (ref 0–0.2)
PLATELET # BLD AUTO: 395 10*3/MM3 (ref 140–450)
PMV BLD AUTO: 11 FL (ref 6–12)
POTASSIUM SERPL-SCNC: 3.7 MMOL/L (ref 3.5–5.2)
PROT SERPL-MCNC: 5.2 G/DL (ref 6–8.5)
RBC # BLD AUTO: 3.62 10*6/MM3 (ref 3.77–5.28)
SODIUM SERPL-SCNC: 143 MMOL/L (ref 136–145)
WBC NRBC COR # BLD AUTO: 10.5 10*3/MM3 (ref 3.4–10.8)

## 2024-09-18 PROCEDURE — 97110 THERAPEUTIC EXERCISES: CPT

## 2024-09-18 PROCEDURE — 94799 UNLISTED PULMONARY SVC/PX: CPT

## 2024-09-18 PROCEDURE — 85025 COMPLETE CBC W/AUTO DIFF WBC: CPT | Performed by: FAMILY MEDICINE

## 2024-09-18 PROCEDURE — 94760 N-INVAS EAR/PLS OXIMETRY 1: CPT

## 2024-09-18 PROCEDURE — 94664 DEMO&/EVAL PT USE INHALER: CPT

## 2024-09-18 PROCEDURE — 97530 THERAPEUTIC ACTIVITIES: CPT

## 2024-09-18 PROCEDURE — 97535 SELF CARE MNGMENT TRAINING: CPT | Performed by: OCCUPATIONAL THERAPIST

## 2024-09-18 PROCEDURE — 25010000002 CEFAZOLIN PER 500 MG: Performed by: FAMILY MEDICINE

## 2024-09-18 PROCEDURE — 80053 COMPREHEN METABOLIC PANEL: CPT | Performed by: FAMILY MEDICINE

## 2024-09-18 PROCEDURE — 25010000002 ENOXAPARIN PER 10 MG: Performed by: FAMILY MEDICINE

## 2024-09-18 RX ADMIN — ALPRAZOLAM 1 MG: 0.5 TABLET ORAL at 20:33

## 2024-09-18 RX ADMIN — FLUTICASONE PROPIONATE 2 SPRAY: 50 SPRAY, METERED NASAL at 11:22

## 2024-09-18 RX ADMIN — LISINOPRIL 20 MG: 20 TABLET ORAL at 11:21

## 2024-09-18 RX ADMIN — FLUTICASONE PROPIONATE 2 SPRAY: 50 SPRAY, METERED NASAL at 20:33

## 2024-09-18 RX ADMIN — CITALOPRAM 30 MG: 20 TABLET, FILM COATED ORAL at 11:21

## 2024-09-18 RX ADMIN — IPRATROPIUM BROMIDE 0.5 MG: 0.5 SOLUTION RESPIRATORY (INHALATION) at 14:32

## 2024-09-18 RX ADMIN — ENOXAPARIN SODIUM 40 MG: 100 INJECTION SUBCUTANEOUS at 18:36

## 2024-09-18 RX ADMIN — BUMETANIDE 1 MG: 1 TABLET ORAL at 11:21

## 2024-09-18 RX ADMIN — GABAPENTIN 300 MG: 300 CAPSULE ORAL at 11:21

## 2024-09-18 RX ADMIN — IPRATROPIUM BROMIDE 0.5 MG: 0.5 SOLUTION RESPIRATORY (INHALATION) at 19:22

## 2024-09-18 RX ADMIN — IPRATROPIUM BROMIDE 0.5 MG: 0.5 SOLUTION RESPIRATORY (INHALATION) at 06:10

## 2024-09-18 RX ADMIN — IPRATROPIUM BROMIDE 0.5 MG: 0.5 SOLUTION RESPIRATORY (INHALATION) at 10:30

## 2024-09-18 RX ADMIN — ATORVASTATIN CALCIUM 20 MG: 10 TABLET, FILM COATED ORAL at 11:21

## 2024-09-18 RX ADMIN — OXYCODONE HYDROCHLORIDE AND ACETAMINOPHEN 1 TABLET: 5; 325 TABLET ORAL at 13:58

## 2024-09-18 RX ADMIN — Medication 10 MG: at 20:33

## 2024-09-18 RX ADMIN — CEFAZOLIN 2000 MG: 2 INJECTION, POWDER, FOR SOLUTION INTRAMUSCULAR; INTRAVENOUS at 11:20

## 2024-09-18 RX ADMIN — GABAPENTIN 300 MG: 300 CAPSULE ORAL at 20:33

## 2024-09-18 RX ADMIN — CEFAZOLIN 2000 MG: 2 INJECTION, POWDER, FOR SOLUTION INTRAMUSCULAR; INTRAVENOUS at 18:36

## 2024-09-18 RX ADMIN — ASPIRIN 81 MG: 81 TABLET, COATED ORAL at 11:21

## 2024-09-18 RX ADMIN — Medication 10 ML: at 20:33

## 2024-09-18 RX ADMIN — NICOTINE 1 PATCH: 21 PATCH, EXTENDED RELEASE TRANSDERMAL at 18:36

## 2024-09-18 RX ADMIN — PANTOPRAZOLE SODIUM 40 MG: 40 TABLET, DELAYED RELEASE ORAL at 11:21

## 2024-09-18 RX ADMIN — CEFAZOLIN 2000 MG: 2 INJECTION, POWDER, FOR SOLUTION INTRAMUSCULAR; INTRAVENOUS at 03:05

## 2024-09-18 RX ADMIN — Medication 10 ML: at 11:22

## 2024-09-18 RX ADMIN — ALPRAZOLAM 1 MG: 0.5 TABLET ORAL at 11:22

## 2024-09-18 NOTE — PLAN OF CARE
Goal Outcome Evaluation:   Disoriented & lethargy noted- daughter w/ patient at bedside, on 3L n/c, resting in bed. Oob to chair w/ therapy. Unable to successfully bring patient back to bed x2 assist- excessive weakness & unable to follow directions- brought bed close to chair & pivoted patient. Weight shift provided. IV antibx given. Hourly rounding. VSS. C/o 10/10 right hip pain- PRN pain medication given. Fall risk- bed alarm on- safety maintained. Will continue to monitor until change of shift.

## 2024-09-18 NOTE — PLAN OF CARE
Goal Outcome Evaluation:  Plan of Care Reviewed With: patient, daughter        Progress: no change  Outcome Evaluation: OT tx complete.  Pt. appears fatigue, reports nausea, & pain at R side of pelvis.  Ms. Liu voices frustration with her lack of independence but also requires encouragement and edu to put forth effort participate in therapy.  OTR took much time encouraging and educating pt in benefits of activity & negative ramifications of prolonged immobility.  Pt agreeable and performed supine to sit at CGA.  Noteable improvement if pt is given extra time.  Ms Liu then demo'd good dynamic sitting balance during her attempt to LBD.  Mod A required likely d/t high profile preventative bandages at heels impeding her ability to ANDRES socks up past heel.  She was able to bring LE onto contralateral knee in order to assist with ANDRES with no LOB & minor pain.  Min A/CGA provided for t/fs and to take steps to chair.  Pt.'s DTR appreciative and understands benefits of activity. Cont OT tx.      Anticipated Discharge Disposition (OT): skilled nursing facility

## 2024-09-18 NOTE — PLAN OF CARE
Goal Outcome Evaluation:  Plan of Care Reviewed With: daughter           Outcome Evaluation: Nutrition assessment completed per LOS. Pt is in the room with her daughter. Pt's PMH includes a hip fracture and a stay in a SNF where she developed pneumonia. Pt was unable to answer questions, so her daughter was the historian. Appetite is reported to be low while inpatient, and her current intake is 25% (4 meals, three days). Daughter mentioned that pt has never been a big eater but snacks a lot. Due to low PO intake, the dietetic graduate recommended ONS, and the daughter wants to try Magic Cup for additional nutrition. Pt's daughter was also advised on snacks available on the unit and encouraged to have pt eat during windows of higher appetite. Daughter denied any vomiting, but pt was nauseated yesterday. Pt’s daughter was encouraged to follow up with the dietetic graduate if she had any questions. Will monitor.

## 2024-09-18 NOTE — PLAN OF CARE
Goal Outcome Evaluation:  Plan of Care Reviewed With: patient        Progress: improving  Outcome Evaluation: Pt was in bed, agreed to therapy.  Able to transfer supine to sitting with CGA/min assist.  Sitting EOB, performed LE exercises AROM.  Transfered sit to stand with min assist.  Pt was able to take side steps with RWX and CGA/min assist.  Will continue to work with pt to increase strength and progress with transfers and amb as pt is able.

## 2024-09-18 NOTE — THERAPY TREATMENT NOTE
Patient Name: Pattie Liu  : 1945    MRN: [id number]                              Today's Date: 2024       Admit Date: 2024    Visit Dx:     ICD-10-CM ICD-9-CM   1. Pneumonia of right lung due to infectious organism, unspecified part of lung  J18.9 483.8   2. Dehydration  E86.0 276.51   3. Altered mental status, unspecified altered mental status type  R41.82 780.97   4. Impaired mobility [Z74.09]  Z74.09 799.89     Patient Active Problem List   Diagnosis    Frequent PVCs    Shortness of breath    SOB (shortness of breath)    CAD in native artery    PVD (peripheral vascular disease)    Pneumonia due to infectious organism    Chronic back pain    Essential hypertension    Fall, initial encounter    Traumatic rhabdomyolysis    Leukocytosis    Anemia    Degenerative disc disease, lumbar    Dehydration    Acute UTI    Chronic respiratory failure with hypoxia    Impaired mobility    UTI (urinary tract infection)    Gait instability    Pneumonia    Pelvic fracture     Past Medical History:   Diagnosis Date    CAD in native artery 2019    COPD (chronic obstructive pulmonary disease)     Essential hypertension 2021    GERD (gastroesophageal reflux disease)     Lung nodule      Past Surgical History:   Procedure Laterality Date    BREAST IMPLANT REMOVAL      BREAST TISSUE EXPANDER REMOVAL INSERTION OF IMPLANT      CARDIAC CATHETERIZATION N/A 2019    Procedure: Left Heart Cath;  Surgeon: Edi Armijo MD;  Location:  [facility];  Service: Cardiology    CHOLECYSTECTOMY      HYSTERECTOMY      MASTECTOMY      BILATERAL    OOPHORECTOMY        General Information       Row Name 24 1030          OT Time and Intention    Document Type therapy note (daily note)  -CH     Mode of Treatment occupational therapy  -       Row Name 24 1030          General Information    Patient Profile Reviewed yes  -CH     Existing Precautions/Restrictions fall;oxygen therapy  device and L/min  -[facility] Name 09/18/24 1030          Cognition    Orientation Status (Cognition) oriented to;person;place  -[facility] Name 09/18/24 1030          Safety Issues, Functional Mobility    Safety Issues Affecting Function (Mobility) at risk behavior observed;friction/shear risk;judgment;sequencing abilities  -     Impairments Affecting Function (Mobility) balance;endurance/activity tolerance;cognition;strength;pain;range of motion (ROM);postural/trunk control  -               User Key  (r) = Recorded By, (t) = Taken By, (c) = Cosigned By      Initials Name Provider Type     Cecily Raymond, OTR/L Occupational Therapist                     Mobility/ADL's       Row Name 09/18/24 1030          Bed Mobility    Bed Mobility supine-sit  -     Supine-Sit [place] (Bed Mobility) contact guard;minimum assist (75% patient effort);verbal cues;nonverbal cues (demo/gesture)  -[facility] Name 09/18/24 1030          Transfers    Transfers sit-stand transfer;stand-sit transfer  -[facility] Name 09/18/24 1030          Sit-Stand Transfer    Sit-Stand [place] (Transfers) contact guard;verbal cues;nonverbal cues (demo/gesture)  -     Assistive Device (Sit-Stand Transfers) walker, front-wheeled  -[facility] Name 09/18/24 1030          Stand-Sit Transfer    Stand-Sit [place] (Transfers) verbal cues;minimum assist (75% patient effort);nonverbal cues (demo/gesture)  -     Assistive Device (Stand-Sit Transfers) walker, front-wheeled  -[facility] Name 09/18/24 1030          Functional Mobility    Functional Mobility- Ind. Level contact guard assist;verbal cues required;nonverbal cues required (demo/gesture)  -     Functional Mobility- Device walker, front-wheeled  [facility]     Functional Mobility- Comment 4-5 steps to chair  -[facility] Name 09/18/24 1030          Activities of Daily Living    BADL Assessment/Intervention lower body dressing  -[facility] Name 09/18/24 1030          Lower  Body Dressing Assessment/Training    [place] Level (Lower Body Dressing) moderate assist (50% patient effort);don;socks  -     Position (Lower Body Dressing) edge of bed sitting  -               User Key  (r) = Recorded By, (t) = Taken By, (c) = Cosigned By      Initials Name Provider Type    Cecily Morrow, OTR/L Occupational Therapist                   Obj/Interventions       Row Name 09/18/24 1030          Balance    Balance Interventions sitting;standing;sit to stand;supported;static;dynamic;occupation based/functional task  -               User Key  (r) = Recorded By, (t) = Taken By, (c) = Cosigned By      Initials Name Provider Type    Cecily Morrow, OTR/L Occupational Therapist                   Goals/Plan    No documentation.                  Clinical Impression       Row Name 09/18/24 1030          Pain Scale: FACES Pre/Post-Treatment    Pain: FACES Scale, Pretreatment 0-->no hurt  -     Posttreatment Pain Rating 4-->hurts little more  -     Pain Location - Side/Orientation Right  -     Pain Location - hip  -       Row Name 09/18/24 1030          Plan of Care Review    Plan of Care Reviewed With patient;daughter  -     Progress no change  -     Outcome Evaluation OT tx complete.  Pt. appears fatigue, reports nausea, & pain at R side of pelvis.  Ms. Liu voices frustration with her lack of independence but also requires encouragement and edu to put forth effort participate in therapy.  OTR took much time encouraging and educating pt in benefits of activity & negative ramifications of prolonged immobility.  Pt agreeable and performed supine to sit at CGA.  Noteable improvement if pt is given extra time.  Ms Liu then demo'd good dynamic sitting balance during her attempt to LBD.  Mod A required likely d/t high profile preventative bandages at heels impeding her ability to ANDRES socks up past heel.  She was able to bring LE onto contralateral knee in order to assist with ANDRES  "with no LOB & minor pain.  Min A/CGA provided for t/fs and to take steps to chair.  Pt.'s DTR appreciative and understands benefits of activity. Cont OT tx.  -       Row Name 09/18/24 1030          Therapy Assessment/Plan (OT)    Patient/Family Therapy Goal Statement (OT) \"I'm tired of not being able to do what I want to do.\"  -     Rehab Potential (OT) good, to achieve stated therapy goals  -     Criteria for Skilled Therapeutic Interventions Met (OT) yes;skilled treatment is necessary  -     Therapy Frequency (OT) 5 times/wk  -       Row Name 09/18/24 1030          Therapy Plan Review/Discharge Plan (OT)    Anticipated Discharge Disposition (OT) skilled nursing facility  -       Row Name 09/18/24 1030          Positioning and Restraints    Pre-Treatment Position in bed  -     Post Treatment Position chair  -     In Chair sitting;call light within reach;reclined;encouraged to call for assist;with family/caregiver  -               User Key  (r) = Recorded By, (t) = Taken By, (c) = Cosigned By      Initials Name Provider Type    Cecily Morrow, OTR/L Occupational Therapist                   Outcome Measures       Row Name 09/18/24 1030          How much help from another is currently needed...    Putting on and taking off regular lower body clothing? 2  -CH     Bathing (including washing, rinsing, and drying) 2  -CH     Toileting (which includes using toilet bed pan or urinal) 2  -CH     Putting on and taking off regular upper body clothing 2  -CH     Taking care of personal grooming (such as brushing teeth) 3  -CH     Eating meals 3  -CH     AM-PAC 6 Clicks Score (OT) 14  -       Row Name 09/18/24 1030          Functional Assessment    Outcome Measure Options AM-PAC 6 Clicks Daily Activity (OT)  -               User Key  (r) = Recorded By, (t) = Taken By, (c) = Cosigned By      Initials Name Provider Type    Cecily Morrow, OTR/L Occupational Therapist                    Occupational " Therapy Education       Title: PT OT SLP Therapies (In Progress)       Topic: Occupational Therapy (In Progress)       Point: ADL training (Done)       Description:   Instruct learner(s) on proper safety adaptation and remediation techniques during self care or transfers.   Instruct in proper use of assistive devices.                  Learning Progress Summary             Patient Acceptance, E,D, VU by  at 9/18/2024 1206    Acceptance, E, NL,NR by  at 9/12/2024 1006   Family Acceptance, E,D, VU by  at 9/18/2024 1206                         Point: Home exercise program (Not Started)       Description:   Instruct learner(s) on appropriate technique for monitoring, assisting and/or progressing therapeutic exercises/activities.                  Learner Progress:  Not documented in this visit.              Point: Precautions (Done)       Description:   Instruct learner(s) on prescribed precautions during self-care and functional transfers.                  Learning Progress Summary             Patient Acceptance, E,D, VU by  at 9/18/2024 1206    Acceptance, E, NL,NR by  at 9/12/2024 1006   Family Acceptance, E,D, VU by  at 9/18/2024 1206                         Point: Body mechanics (Done)       Description:   Instruct learner(s) on proper positioning and spine alignment during self-care, functional mobility activities and/or exercises.                  Learning Progress Summary             Patient Acceptance, E,D, VU by  at 9/18/2024 1206   Family Acceptance, E,D, VU by  at 9/18/2024 1206                                         User Key       Initials Effective Dates Name Provider Type Discipline     07/11/23 -  Cecily Raymond OTR/L Occupational Therapist OT     07/11/23 -  Emilie Hinds OTR/L, CSRS Occupational Therapist OT                  OT Recommendation and Plan  Therapy Frequency (OT): 5 times/wk  Plan of Care Review  Plan of Care Reviewed With: patient, daughter  Progress: no  change  Outcome Evaluation: OT tx complete.  Pt. appears fatigue, reports nausea, & pain at R side of pelvis.  Ms. Liu voices frustration with her lack of independence but also requires encouragement and edu to put forth effort participate in therapy.  OTR took much time encouraging and educating pt in benefits of activity & negative ramifications of prolonged immobility.  Pt agreeable and performed supine to sit at CGA.  Noteable improvement if pt is given extra time.  Ms Liu then demo'd good dynamic sitting balance during her attempt to LBD.  Mod A required likely d/t high profile preventative bandages at heels impeding her ability to ANDRES socks up past heel.  She was able to bring LE onto contralateral knee in order to assist with ANDRES with no LOB & minor pain.  Min A/CGA provided for t/fs and to take steps to chair.  Pt.'s DTR appreciative and understands benefits of activity. Cont OT tx.     Time Calculation:         Time Calculation- OT       Row Name 09/18/24 1030             Time Calculation- OT    OT Start Time 1030  -CH      OT Stop Time 1111  -CH      OT Time Calculation (min) 41 min  -CH      Total Timed Code Minutes- OT 41 minute(s)  -CH      OT Received On 09/18/24  -CH         Timed Charges    22672 - OT Self Care/Mgmt Minutes 41  -CH         Total Minutes    Timed Charges Total Minutes 41  -CH       Total Minutes 41  -CH                User Key  (r) = Recorded By, (t) = Taken By, (c) = Cosigned By      Initials Name Provider Type     Cecily Raymond OTR/L Occupational Therapist                  Therapy Charges for Today       Code Description Service Date Service Provider Modifiers Qty    [id number]  OT SELF CARE/MGMT/TRAIN EA 15 MIN 9/18/2024 Cecily Raymond OTR/ABHISHEK GO 3                 VENITA Schmitz/ABHISHEK  9/18/2024

## 2024-09-18 NOTE — PROGRESS NOTES
Daily Progress Note  Pattie Liu  MRN: [id number] LOS: 7    Admit Date: 9/11/2024 9/18/2024 07:29 CDT    Subjective:      Chief Complaint:  Chief Complaint   Patient presents with    Altered Mental Status       Interval History:    Reviewed overnight events and nursing notes.   No acute events overnight.  Resting comfortably this morning.  Long discussion at the bedside with family about goals.  Plan is for her to go to skilled nursing facility for rehab and likely end up going to assisted living when she is able.    Review of Systems   Constitutional:  Positive for activity change and fatigue. Negative for fever.   Respiratory:  Positive for cough and shortness of breath.    Gastrointestinal:  Negative for nausea and vomiting.   Genitourinary:  Positive for decreased urine volume and dysuria.   Musculoskeletal:  Positive for gait problem.   Neurological:  Positive for weakness.       DIET:  Diet: Regular/House; Fluid Consistency: Thin (IDDSI 0)    Medications:        ALPRAZolam, 1 mg, Oral, BID  aspirin, 81 mg, Oral, Daily  atorvastatin, 20 mg, Oral, Daily  bumetanide, 1 mg, Oral, Daily  ceFAZolin, 2,000 mg, Intravenous, Q8H  citalopram, 30 mg, Oral, Daily  enoxaparin, 40 mg, Subcutaneous, Q24H  fluticasone, 2 spray, Each Nare, BID  gabapentin, 300 mg, Oral, BID  ipratropium, 0.5 mg, Nebulization, 4x Daily - RT  lisinopril, 20 mg, Oral, Daily  nicotine, 1 patch, Transdermal, Q24H  pantoprazole, 40 mg, Oral, QAM AC  sodium chloride, 10 mL, Intravenous, Q12H        Data:     Code Status:   Code Status and Medical Interventions: No CPR (Do Not Attempt to Resuscitate); Full Support   Ordered at: 09/11/24 1647     Code Status (Patient has no pulse and is not breathing):    No CPR (Do Not Attempt to Resuscitate)     Medical Interventions (Patient has pulse or is breathing):    Full Support       Family History   Problem Relation Age of Onset    Cancer Mother     Cancer Father      Social History     Socioeconomic  History    Marital status:    Tobacco Use    Smoking status: Every Day     Current packs/day: 0.50     Average packs/day: 0.5 packs/day for 62.0 years (31.0 ttl pk-yrs)     Types: Cigarettes    Smokeless tobacco: Never    Tobacco comments:     states she has no plan to quit smoking   Vaping Use    Vaping status: Former   Substance and Sexual Activity    Alcohol use: No    Drug use: No    Sexual activity: Not Currently       Labs:  Lab Results (last 72 hours)       Procedure Component Value Units Date/Time    CBC Auto Differential [[id number]]  (Abnormal) Collected: 09/18/24 0408    Specimen: Blood Updated: 09/18/24 0550     WBC 10.50 10*3/mm3      RBC 3.62 10*6/mm3      Hemoglobin 10.7 g/dL      Hematocrit 34.8 %      MCV 96.1 fL      MCH 29.6 pg      MCHC 30.7 g/dL      RDW 13.5 %      RDW-SD 47.7 fl      MPV 11.0 fL      Platelets 395 10*3/mm3      Neutrophil % 59.1 %      Lymphocyte % 29.0 %      Monocyte % 6.9 %      Eosinophil % 4.1 %      Basophil % 0.5 %      Immature Grans % 0.4 %      Neutrophils, Absolute 6.22 10*3/mm3      Lymphocytes, Absolute 3.04 10*3/mm3      Monocytes, Absolute 0.72 10*3/mm3      Eosinophils, Absolute 0.43 10*3/mm3      Basophils, Absolute 0.05 10*3/mm3      Immature Grans, Absolute 0.04 10*3/mm3      nRBC 0.0 /100 WBC     Comprehensive Metabolic Panel [[id number]]  (Abnormal) Collected: 09/18/24 0408    Specimen: Blood Updated: 09/18/24 0532     Glucose 84 mg/dL      BUN 6 mg/dL      Creatinine 0.64 mg/dL      Sodium 143 mmol/L      Potassium 3.7 mmol/L      Chloride 106 mmol/L      CO2 32.0 mmol/L      Calcium 9.1 mg/dL      Total Protein 5.2 g/dL      Albumin 2.5 g/dL      ALT (SGPT) <5 U/L      AST (SGOT) 16 U/L      Alkaline Phosphatase 97 U/L      Total Bilirubin <0.2 mg/dL      Globulin 2.7 gm/dL      A/G Ratio 0.9 g/dL      BUN/Creatinine Ratio 9.4     Anion Gap 5.0 mmol/L      eGFR 88.9 mL/min/1.73     Narrative:      GFR Normal >60  Chronic Kidney Disease <60  Kidney  Failure <15    The GFR formula is only valid for adults with stable renal function between ages 18 and 70.    Comprehensive Metabolic Panel [[id number]]  (Abnormal) Collected: 09/17/24 0610    Specimen: Blood Updated: 09/17/24 0756     Glucose 106 mg/dL      BUN 8 mg/dL      Creatinine 0.74 mg/dL      Sodium 141 mmol/L      Potassium 3.7 mmol/L      Chloride 101 mmol/L      CO2 31.0 mmol/L      Calcium 10.0 mg/dL      Total Protein 6.3 g/dL      Albumin 3.1 g/dL      ALT (SGPT) <5 U/L      AST (SGOT) 21 U/L      Alkaline Phosphatase 124 U/L      Total Bilirubin 0.2 mg/dL      Globulin 3.2 gm/dL      A/G Ratio 1.0 g/dL      BUN/Creatinine Ratio 10.8     Anion Gap 9.0 mmol/L      eGFR 81.4 mL/min/1.73     Narrative:      GFR Normal >60  Chronic Kidney Disease <60  Kidney Failure <15    The GFR formula is only valid for adults with stable renal function between ages 18 and 70.    CBC Auto Differential [[id number]]  (Abnormal) Collected: 09/17/24 0610    Specimen: Blood Updated: 09/17/24 0733     WBC 13.85 10*3/mm3      RBC 4.03 10*6/mm3      Hemoglobin 12.1 g/dL      Hematocrit 37.5 %      MCV 93.1 fL      MCH 30.0 pg      MCHC 32.3 g/dL      RDW 13.2 %      RDW-SD 45.3 fl      MPV 11.0 fL      Platelets 447 10*3/mm3      Neutrophil % 71.5 %      Lymphocyte % 18.1 %      Monocyte % 5.8 %      Eosinophil % 3.7 %      Basophil % 0.4 %      Immature Grans % 0.5 %      Neutrophils, Absolute 9.90 10*3/mm3      Lymphocytes, Absolute 2.51 10*3/mm3      Monocytes, Absolute 0.80 10*3/mm3      Eosinophils, Absolute 0.51 10*3/mm3      Basophils, Absolute 0.06 10*3/mm3      Immature Grans, Absolute 0.07 10*3/mm3      nRBC 0.0 /100 WBC     Blood Culture - Blood, Arm, Left [[id number]]  (Normal) Collected: 09/11/24 1346    Specimen: Blood from Arm, Left Updated: 09/16/24 1415     Blood Culture No growth at 5 days    Blood Culture - Blood, Arm, Right [[id number]]  (Normal) Collected: 09/11/24 1302    Specimen: Blood from Arm, Right  Updated: 09/16/24 1330     Blood Culture No growth at 5 days    Comprehensive Metabolic Panel [[id number]]  (Abnormal) Collected: 09/16/24 0417    Specimen: Blood Updated: 09/16/24 0451     Glucose 90 mg/dL      BUN 7 mg/dL      Creatinine 0.80 mg/dL      Sodium 142 mmol/L      Potassium 4.1 mmol/L      Comment: Slight hemolysis detected by analyzer. Result may be falsely elevated.        Chloride 110 mmol/L      CO2 26.0 mmol/L      Calcium 8.8 mg/dL      Total Protein 4.8 g/dL      Albumin 2.3 g/dL      ALT (SGPT) <5 U/L      AST (SGOT) 17 U/L      Alkaline Phosphatase 97 U/L      Total Bilirubin <0.2 mg/dL      Globulin 2.5 gm/dL      A/G Ratio 0.9 g/dL      BUN/Creatinine Ratio 8.8     Anion Gap 6.0 mmol/L      eGFR 74.1 mL/min/1.73     Narrative:      GFR Normal >60  Chronic Kidney Disease <60  Kidney Failure <15    The GFR formula is only valid for adults with stable renal function between ages 18 and 70.    CBC Auto Differential [[id number]]  (Abnormal) Collected: 09/16/24 0417    Specimen: Blood Updated: 09/16/24 0435     WBC 9.76 10*3/mm3      RBC 3.45 10*6/mm3      Hemoglobin 10.4 g/dL      Hematocrit 31.9 %      MCV 92.5 fL      MCH 30.1 pg      MCHC 32.6 g/dL      RDW 13.5 %      RDW-SD 45.5 fl      MPV 10.5 fL      Platelets 324 10*3/mm3      Neutrophil % 55.3 %      Lymphocyte % 31.0 %      Monocyte % 7.4 %      Eosinophil % 5.3 %      Basophil % 0.4 %      Immature Grans % 0.6 %      Neutrophils, Absolute 5.39 10*3/mm3      Lymphocytes, Absolute 3.03 10*3/mm3      Monocytes, Absolute 0.72 10*3/mm3      Eosinophils, Absolute 0.52 10*3/mm3      Basophils, Absolute 0.04 10*3/mm3      Immature Grans, Absolute 0.06 10*3/mm3      nRBC 0.0 /100 WBC     Urinalysis With Culture If Indicated - Urine, Clean Catch [[id number]]  (Abnormal) Collected: 09/15/24 2120    Specimen: Urine, Clean Catch Updated: 09/15/24 2157     Color, UA [place]     Appearance, UA Clear     pH, UA 5.5     Specific [place], UA 1.013      "Glucose, UA Negative     Ketones, UA Negative     Bilirubin, UA Negative     Blood, UA Negative     Protein, UA Negative     Leuk Esterase, UA Negative     Nitrite, UA Positive     Urobilinogen, UA 1.0 E.U./dL    Narrative:      Dipstick results may be inaccurate due to color interference.    Urinalysis, Microscopic Only - Urine, Clean Catch [[id number]] Collected: 09/15/24 2120    Specimen: Urine, Clean Catch Updated: 09/15/24 2157     RBC, UA 0-2 /HPF      WBC, UA 0-2 /HPF      Comment: Urine culture not indicated.        Bacteria, UA None Seen /HPF      Squamous Epithelial Cells, UA 0-2 /HPF      Hyaline Casts, UA 0-2 /LPF      Methodology Manual Light Microscopy                Objective:     Vitals: /46 (BP Location: Left arm, Patient Position: Lying)   Pulse 64   Temp 98.8 °F (37.1 °C) (Axillary)   Resp 18   Ht 160 cm (63\")   Wt 66.2 kg (145 lb 15.1 oz)   SpO2 92%   BMI 25.85 kg/m²    Intake/Output Summary (Last 24 hours) at 2024 0729  Last data filed at 2024 1700  Gross per 24 hour   Intake 180 ml   Output 500 ml   Net -320 ml    Temp (24hrs), Av.4 °F (36.9 °C), Min:97.8 °F (36.6 °C), Max:98.9 °F (37.2 °C)      Physical Exam  Vitals reviewed.   Constitutional:       Appearance: Normal appearance. She is ill-appearing.   HENT:      Head: Normocephalic and atraumatic.   Eyes:      General:         Right eye: No discharge.         Left eye: No discharge.      Conjunctiva/sclera: Conjunctivae normal.   Cardiovascular:      Rate and Rhythm: Normal rate and regular rhythm.      Pulses: Normal pulses.      Heart sounds: No murmur heard.  Pulmonary:      Effort: Pulmonary effort is normal. No respiratory distress.      Comments: Nasal cannula in place  Abdominal:      General: Abdomen is flat. Bowel sounds are normal. There is no distension.   Musculoskeletal:      Right lower leg: No edema.      Left lower leg: No edema.   Skin:     Capillary Refill: Capillary refill takes less than 2 " seconds.   Neurological:      General: No focal deficit present.      Mental Status: She is alert.   Psychiatric:         Mood and Affect: Mood normal.         Behavior: Behavior normal.             Assessment and Plan:     Primary Problem:  Pneumonia    Hospital Problem list:    Pneumonia    Essential hypertension    Gait instability    Pelvic fracture      PMH:  Past Medical History:   Diagnosis Date    CAD in native artery 7/29/2019    COPD (chronic obstructive pulmonary disease)     Essential hypertension 12/7/2021    GERD (gastroesophageal reflux disease)     Lung nodule        Treatment Plan:  Pneumonia: Improved with abx and steroids, to finish antibiotics 9/19/2024, oxygen is at baseline, incentive spirometry, nebs     Pelvic fracture: prior hospitalization. PT/OT while inpatient, she will still require SNF however she would like to not [facility].     Long discussion at the bedside with daughter about goals.  Plan is for her to go to skilled nursing facility for rehab and likely end up going to assisted living when she is able.     Code status: DNR     DVT ppx: lovenox     Disposition: inpatient, SNF (requesting different facility if possible)    Reviewed treatment plans with the patient and/or family.   30 minutes spent in face to face interaction and coordination of care.     Electronically signed by Anupam Ledezma MD on 9/18/2024 at 07:29 CDT

## 2024-09-18 NOTE — THERAPY TREATMENT NOTE
Acute Care - Physical Therapy Treatment Note  [facility]     Patient Name: Pattie Liu  : 1945  MRN: [id number]  Today's Date: 2024      Visit Dx:     ICD-10-CM ICD-9-CM   1. Pneumonia of right lung due to infectious organism, unspecified part of lung  J18.9 483.8   2. Dehydration  E86.0 276.51   3. Altered mental status, unspecified altered mental status type  R41.82 780.97   4. Impaired mobility [Z74.09]  Z74.09 799.89     Patient Active Problem List   Diagnosis    Frequent PVCs    Shortness of breath    SOB (shortness of breath)    CAD in native artery    PVD (peripheral vascular disease)    Pneumonia due to infectious organism    Chronic back pain    Essential hypertension    Fall, initial encounter    Traumatic rhabdomyolysis    Leukocytosis    Anemia    Degenerative disc disease, lumbar    Dehydration    Acute UTI    Chronic respiratory failure with hypoxia    Impaired mobility    UTI (urinary tract infection)    Gait instability    Pneumonia    Pelvic fracture     Past Medical History:   Diagnosis Date    CAD in native artery 2019    COPD (chronic obstructive pulmonary disease)     Essential hypertension 2021    GERD (gastroesophageal reflux disease)     Lung nodule      Past Surgical History:   Procedure Laterality Date    BREAST IMPLANT REMOVAL      BREAST TISSUE EXPANDER REMOVAL INSERTION OF IMPLANT      CARDIAC CATHETERIZATION N/A 2019    Procedure: Left Heart Cath;  Surgeon: Edi Armijo MD;  Location: [facility] INVASIVE LOCATION;  Service: Cardiology    CHOLECYSTECTOMY      HYSTERECTOMY      MASTECTOMY      BILATERAL    OOPHORECTOMY       PT Assessment (Last 12 Hours)       PT Evaluation and Treatment       Row Name 24 1453 24 1057       Physical Therapy Time and Intention    Subjective Information complains of;weakness;pain  -ZACHARY --    Document Type therapy note (daily note)  -ZACHARY therapy note (daily note)  -ZACHARY    Mode of Treatment physical therapy   -ZACHARY physical therapy  -ZACHARY    Session Not Performed -- patient unavailable for treatment  -ZACHARY    Comment, Session Not Performed -- with OT  -ZACHARY      Row Name 09/18/24 1453          General Information    Patient/Family/Caregiver Comments/Observations family present  -ZACHARY     Existing Precautions/Restrictions fall;oxygen therapy device and L/min  recent pelvic fx  -ZACHARY       Row Name 09/18/24 1453          Pain    Pretreatment Pain Rating 6/10  -ZACHARY     Posttreatment Pain Rating 6/10  -ZACHARY     Pain Location - Side/Orientation Right  -ZACHARY     Pain Location lower  -ZACHARY     Pain Location - extremity  -ZACHARY     Pain Intervention(s) Repositioned  -ZACHARY       Row Name 09/18/24 1453          Bed Mobility    Supine-Sit [place] (Bed Mobility) verbal cues;contact guard;minimum assist (75% patient effort)  -ZACHARY     Sit-Supine [place] (Bed Mobility) verbal cues;minimum assist (75% patient effort)  -     Assistive Device (Bed Mobility) bed rails;draw sheet;head of bed elevated  -ZACHARY       Row Name 09/18/24 1453          Transfers    Comment, (Transfers) stood x 4  -ZACHARY       Row Name 09/18/24 1453          Sit-Stand Transfer    Sit-Stand [place] (Transfers) verbal cues;minimum assist (75% patient effort)  -     Assistive Device (Sit-Stand Transfers) walker, front-wheeled  -ZACHARY       Row Name 09/18/24 1453          Stand-Sit Transfer    Stand-Sit [place] (Transfers) verbal cues;minimum assist (75% patient effort)  -     Assistive Device (Stand-Sit Transfers) walker, front-wheeled  -ZACHARY       Row Name 09/18/24 1453          Gait/Stairs (Locomotion)    [place] Level (Gait) verbal cues;minimum assist (75% patient effort)  -     Assistive Device (Gait) walker, front-wheeled  -     Distance in Feet (Gait) --  pt was able to take side steps at EOB, pt then was able to march in place x 5 BLE  -ZACHARY       Row Name 09/18/24 1453          Motor Skills    Comments, Therapeutic Exercise sitting AROM BLE X 20  -ZACHARY       Row  Name 09/18/24 1453          Positioning and Restraints    Pre-Treatment Position in bed  -ZACHARY     Post Treatment Position bed  -ZACHARY     In Bed side lying right;call light within reach;encouraged to call for assist;exit alarm on;with family/caregiver;side rails up x2  -ZACHARY               User Key  (r) = Recorded By, (t) = Taken By, (c) = Cosigned By      Initials Name Provider Type    Chau Underwood PTA Physical Therapist Assistant                    Physical Therapy Education       Title: PT OT SLP Therapies (In Progress)       Topic: Physical Therapy (In Progress)       Point: Mobility training (In Progress)       Learning Progress Summary             Patient Acceptance, E, NR by CHICA at 9/12/2024 0931    Comment: benefits of activity, progression of PT                         Point: Home exercise program (Not Started)       Learner Progress:  Not documented in this visit.              Point: Body mechanics (Not Started)       Learner Progress:  Not documented in this visit.              Point: Precautions (Not Started)       Learner Progress:  Not documented in this visit.                              User Key       Initials Effective Dates Name Provider Type Discipline    CHICA 02/03/23 -  Feroz Schwab PT DPT Physical Therapist PT                  PT Recommendation and Plan     Plan of Care Reviewed With: patient  Progress: improving  Outcome Evaluation: Pt was in bed, agreed to therapy.  Able to transfer supine to sitting with CGA/min assist.  Sitting EOB, performed LE exercises AROM.  Transfered sit to stand with min assist.  Pt was able to take side steps with RWX and CGA/min assist.  Will continue to work with pt to increase strength and progress with transfers and amb as pt is able.   Outcome Measures       Row Name 09/18/24 1453 09/17/24 1001 09/16/24 1350       How much help from another person do you currently need...    Turning from your back to your side while in flat bed without using bedrails? 3   -ZACHARY 3  -ZACHARY 3  -ZACHARY    Moving from lying on back to sitting on the side of a flat bed without bedrails? 3  -ZACHARY 3  -ZACHARY 3  -ZACHARY    Moving to and from a bed to a chair (including a wheelchair)? 2  -ZACHARY 2  -ZACHARY 2  -ZACHARY    Standing up from a chair using your arms (e.g., wheelchair, bedside chair)? 2  -ZACHARY 2  -ZACHARY 2  -ZACHARY    Climbing 3-5 steps with a railing? 1  -ZACHARY 1  -ZACHARY 1  -ZACHARY    To walk in hospital room? 1  -ZACHARY 1  -ZACHARY 1  -ZACHARY    AM-PAC 6 Clicks Score (PT) 12  -ZACHARY 12  -ZACHARY 12  -ZACHARY    Highest Level of Mobility Goal 4 --> Transfer to chair/commode  -ZACHARY 4 --> Transfer to chair/commode  -ZACHARY 4 --> Transfer to chair/commode  -ZACHARY       Functional Assessment    Outcome Measure Options AM-PAC 6 Clicks Basic Mobility (PT)  -ZACHARY AM-PAC 6 Clicks Basic Mobility (PT)  -ZACHARY AM-PAC 6 Clicks Basic Mobility (PT)  -ZACHARY              User Key  (r) = Recorded By, (t) = Taken By, (c) = Cosigned By      Initials Name Provider Type    Chau Underwood PTA Physical Therapist Assistant                     Time Calculation:    PT Charges       Row Name 09/18/24 1453             Time Calculation    Start Time 1453  -ZACHARY      Stop Time 1534  -ZACHARY      Time Calculation (min) 41 min  -ZACHARY      PT Received On 09/18/24  -ZACHARY         Time Calculation- PT    Total Timed Code Minutes- PT 41 minute(s)  -ZACHARY         Timed Charges    84344 - PT Therapeutic Exercise Minutes 15  -ZACHARY      36447 - PT Therapeutic Activity Minutes 26  -ZACHARY         Total Minutes    Timed Charges Total Minutes 41  -ZACHARY       Total Minutes 41  -ZACHARY                User Key  (r) = Recorded By, (t) = Taken By, (c) = Cosigned By      Initials Name Provider Type    Chau Underwood PTA Physical Therapist Assistant                  Therapy Charges for Today       Code Description Service Date Service Provider Modifiers Qty    85210391557 HC PT THERAPEUTIC ACT EA 15 MIN 9/17/2024 Chau Almeida, PTA GP 2    85682638864 HC PT THER PROC EA 15 MIN 9/17/2024 Chau Almeida, PTA GP 1    45136278173 HC  PT THERAPEUTIC ACT EA 15 MIN 9/18/2024 Chau Almeida, PTA GP 2    19370508693 HC PT THER PROC EA 15 MIN 9/18/2024 Chau Almeida, TRISHA GP 1            PT G-Codes  Outcome Measure Options: AM-PAC 6 Clicks Basic Mobility (PT)  AM-PAC 6 Clicks Score (PT): 12  AM-PAC 6 Clicks Score (OT): 14    Chau Almeida PTA  9/18/2024

## 2024-09-19 LAB
ALBUMIN SERPL-MCNC: 2.5 G/DL (ref 3.5–5.2)
ALBUMIN/GLOB SERPL: 1 G/DL
ALP SERPL-CCNC: 95 U/L (ref 39–117)
ALT SERPL W P-5'-P-CCNC: <5 U/L (ref 1–33)
ANION GAP SERPL CALCULATED.3IONS-SCNC: 6 MMOL/L (ref 5–15)
AST SERPL-CCNC: 14 U/L (ref 1–32)
BASOPHILS # BLD AUTO: 0.04 10*3/MM3 (ref 0–0.2)
BASOPHILS NFR BLD AUTO: 0.3 % (ref 0–1.5)
BILIRUB SERPL-MCNC: <0.2 MG/DL (ref 0–1.2)
BUN SERPL-MCNC: 11 MG/DL (ref 8–23)
BUN/CREAT SERPL: 12.9 (ref 7–25)
CALCIUM SPEC-SCNC: 8.9 MG/DL (ref 8.6–10.5)
CHLORIDE SERPL-SCNC: 105 MMOL/L (ref 98–107)
CO2 SERPL-SCNC: 33 MMOL/L (ref 22–29)
CREAT SERPL-MCNC: 0.85 MG/DL (ref 0.57–1)
DEPRECATED RDW RBC AUTO: 48.9 FL (ref 37–54)
EGFRCR SERPLBLD CKD-EPI 2021: 68.9 ML/MIN/1.73
EOSINOPHIL # BLD AUTO: 0.36 10*3/MM3 (ref 0–0.4)
EOSINOPHIL NFR BLD AUTO: 2.6 % (ref 0.3–6.2)
ERYTHROCYTE [DISTWIDTH] IN BLOOD BY AUTOMATED COUNT: 13.8 % (ref 12.3–15.4)
GLOBULIN UR ELPH-MCNC: 2.6 GM/DL
GLUCOSE SERPL-MCNC: 100 MG/DL (ref 65–99)
HCT VFR BLD AUTO: 33.1 % (ref 34–46.6)
HGB BLD-MCNC: 10.3 G/DL (ref 12–15.9)
IMM GRANULOCYTES # BLD AUTO: 0.08 10*3/MM3 (ref 0–0.05)
IMM GRANULOCYTES NFR BLD AUTO: 0.6 % (ref 0–0.5)
LYMPHOCYTES # BLD AUTO: 2.78 10*3/MM3 (ref 0.7–3.1)
LYMPHOCYTES NFR BLD AUTO: 19.9 % (ref 19.6–45.3)
MCH RBC QN AUTO: 30 PG (ref 26.6–33)
MCHC RBC AUTO-ENTMCNC: 31.1 G/DL (ref 31.5–35.7)
MCV RBC AUTO: 96.5 FL (ref 79–97)
MONOCYTES # BLD AUTO: 0.76 10*3/MM3 (ref 0.1–0.9)
MONOCYTES NFR BLD AUTO: 5.4 % (ref 5–12)
NEUTROPHILS NFR BLD AUTO: 71.2 % (ref 42.7–76)
NEUTROPHILS NFR BLD AUTO: 9.93 10*3/MM3 (ref 1.7–7)
NRBC BLD AUTO-RTO: 0 /100 WBC (ref 0–0.2)
PLATELET # BLD AUTO: 394 10*3/MM3 (ref 140–450)
PMV BLD AUTO: 10.7 FL (ref 6–12)
POTASSIUM SERPL-SCNC: 3.5 MMOL/L (ref 3.5–5.2)
POTASSIUM SERPL-SCNC: 4.3 MMOL/L (ref 3.5–5.2)
PROT SERPL-MCNC: 5.1 G/DL (ref 6–8.5)
RBC # BLD AUTO: 3.43 10*6/MM3 (ref 3.77–5.28)
SODIUM SERPL-SCNC: 144 MMOL/L (ref 136–145)
WBC NRBC COR # BLD AUTO: 13.95 10*3/MM3 (ref 3.4–10.8)

## 2024-09-19 PROCEDURE — 94664 DEMO&/EVAL PT USE INHALER: CPT

## 2024-09-19 PROCEDURE — 85025 COMPLETE CBC W/AUTO DIFF WBC: CPT | Performed by: FAMILY MEDICINE

## 2024-09-19 PROCEDURE — 97530 THERAPEUTIC ACTIVITIES: CPT

## 2024-09-19 PROCEDURE — 25010000002 ENOXAPARIN PER 10 MG: Performed by: FAMILY MEDICINE

## 2024-09-19 PROCEDURE — 84132 ASSAY OF SERUM POTASSIUM: CPT | Performed by: FAMILY MEDICINE

## 2024-09-19 PROCEDURE — 94761 N-INVAS EAR/PLS OXIMETRY MLT: CPT

## 2024-09-19 PROCEDURE — 97116 GAIT TRAINING THERAPY: CPT

## 2024-09-19 PROCEDURE — 97110 THERAPEUTIC EXERCISES: CPT

## 2024-09-19 PROCEDURE — 80053 COMPREHEN METABOLIC PANEL: CPT | Performed by: FAMILY MEDICINE

## 2024-09-19 PROCEDURE — 94799 UNLISTED PULMONARY SVC/PX: CPT

## 2024-09-19 RX ORDER — POTASSIUM CHLORIDE 750 MG/1
40 CAPSULE, EXTENDED RELEASE ORAL EVERY 4 HOURS
Status: COMPLETED | OUTPATIENT
Start: 2024-09-19 | End: 2024-09-19

## 2024-09-19 RX ADMIN — PANTOPRAZOLE SODIUM 40 MG: 40 TABLET, DELAYED RELEASE ORAL at 08:33

## 2024-09-19 RX ADMIN — POTASSIUM CHLORIDE 40 MEQ: 750 CAPSULE, EXTENDED RELEASE ORAL at 08:32

## 2024-09-19 RX ADMIN — FLUTICASONE PROPIONATE 2 SPRAY: 50 SPRAY, METERED NASAL at 08:46

## 2024-09-19 RX ADMIN — IPRATROPIUM BROMIDE 0.5 MG: 0.5 SOLUTION RESPIRATORY (INHALATION) at 10:18

## 2024-09-19 RX ADMIN — Medication 10 ML: at 20:50

## 2024-09-19 RX ADMIN — ATORVASTATIN CALCIUM 20 MG: 10 TABLET, FILM COATED ORAL at 08:33

## 2024-09-19 RX ADMIN — ENOXAPARIN SODIUM 40 MG: 100 INJECTION SUBCUTANEOUS at 17:50

## 2024-09-19 RX ADMIN — CITALOPRAM 30 MG: 20 TABLET, FILM COATED ORAL at 11:30

## 2024-09-19 RX ADMIN — IPRATROPIUM BROMIDE 0.5 MG: 0.5 SOLUTION RESPIRATORY (INHALATION) at 18:45

## 2024-09-19 RX ADMIN — NICOTINE 1 PATCH: 21 PATCH, EXTENDED RELEASE TRANSDERMAL at 19:23

## 2024-09-19 RX ADMIN — BUMETANIDE 1 MG: 1 TABLET ORAL at 11:30

## 2024-09-19 RX ADMIN — ALPRAZOLAM 1 MG: 0.5 TABLET ORAL at 08:33

## 2024-09-19 RX ADMIN — POLYETHYLENE GLYCOL 3350 17 G: 17 POWDER, FOR SOLUTION ORAL at 17:50

## 2024-09-19 RX ADMIN — GABAPENTIN 300 MG: 300 CAPSULE ORAL at 20:50

## 2024-09-19 RX ADMIN — ALPRAZOLAM 1 MG: 0.5 TABLET ORAL at 20:50

## 2024-09-19 RX ADMIN — ASPIRIN 81 MG: 81 TABLET, COATED ORAL at 08:33

## 2024-09-19 RX ADMIN — Medication 10 ML: at 08:47

## 2024-09-19 RX ADMIN — GABAPENTIN 300 MG: 300 CAPSULE ORAL at 08:34

## 2024-09-19 RX ADMIN — OXYCODONE HYDROCHLORIDE AND ACETAMINOPHEN 1 TABLET: 5; 325 TABLET ORAL at 08:33

## 2024-09-19 RX ADMIN — OXYCODONE HYDROCHLORIDE AND ACETAMINOPHEN 1 TABLET: 5; 325 TABLET ORAL at 17:50

## 2024-09-19 RX ADMIN — LISINOPRIL 20 MG: 20 TABLET ORAL at 11:30

## 2024-09-19 RX ADMIN — FLUTICASONE PROPIONATE 2 SPRAY: 50 SPRAY, METERED NASAL at 20:50

## 2024-09-19 RX ADMIN — POTASSIUM CHLORIDE 40 MEQ: 750 CAPSULE, EXTENDED RELEASE ORAL at 11:30

## 2024-09-19 RX ADMIN — IPRATROPIUM BROMIDE 0.5 MG: 0.5 SOLUTION RESPIRATORY (INHALATION) at 14:27

## 2024-09-19 RX ADMIN — IPRATROPIUM BROMIDE 0.5 MG: 0.5 SOLUTION RESPIRATORY (INHALATION) at 06:24

## 2024-09-19 NOTE — THERAPY TREATMENT NOTE
Acute Care - Physical Therapy Treatment Note  [facility]     Patient Name: Pattie Liu  : 1945  MRN: [id number]  Today's Date: 2024      Visit Dx:     ICD-10-CM ICD-9-CM   1. Pneumonia of right lung due to infectious organism, unspecified part of lung  J18.9 483.8   2. Dehydration  E86.0 276.51   3. Altered mental status, unspecified altered mental status type  R41.82 780.97   4. Impaired mobility [Z74.09]  Z74.09 799.89     Patient Active Problem List   Diagnosis    Frequent PVCs    Shortness of breath    SOB (shortness of breath)    CAD in native artery    PVD (peripheral vascular disease)    Pneumonia due to infectious organism    Chronic back pain    Essential hypertension    Fall, initial encounter    Traumatic rhabdomyolysis    Leukocytosis    Anemia    Degenerative disc disease, lumbar    Dehydration    Acute UTI    Chronic respiratory failure with hypoxia    Impaired mobility    UTI (urinary tract infection)    Gait instability    Pneumonia    Pelvic fracture     Past Medical History:   Diagnosis Date    CAD in native artery 2019    COPD (chronic obstructive pulmonary disease)     Essential hypertension 2021    GERD (gastroesophageal reflux disease)     Lung nodule      Past Surgical History:   Procedure Laterality Date    BREAST IMPLANT REMOVAL      BREAST TISSUE EXPANDER REMOVAL INSERTION OF IMPLANT      CARDIAC CATHETERIZATION N/A 2019    Procedure: Left Heart Cath;  Surgeon: Edi Armijo MD;  Location: [facility] INVASIVE LOCATION;  Service: Cardiology    CHOLECYSTECTOMY      HYSTERECTOMY      MASTECTOMY      BILATERAL    OOPHORECTOMY       PT Assessment (Last 12 Hours)       PT Evaluation and Treatment       Row Name 24 1052          Physical Therapy Time and Intention    Subjective Information complains of;weakness;fatigue;pain  -ZACHARY     Document Type therapy note (daily note)  -ZACHARY     Mode of Treatment physical therapy  -ZACHARY       Row Name 24 1054           General Information    Patient/Family/Caregiver Comments/Observations family present  -ZACHARY     Existing Precautions/Restrictions fall;oxygen therapy device and L/min  recent pelvic fx  -ZACHARY       Row Name 09/19/24 1058          Pain    Pretreatment Pain Rating 4/10  -ZACHARY     Posttreatment Pain Rating 4/10  -ZACHARY     Pain Location - Side/Orientation Right  -ZACHARY     Pain Location lower  -ZACHARY     Pain Location - extremity  -ZACHARY     Pain Intervention(s) Medication (See MAR);Repositioned  -ZACHARY       Row Name 09/19/24 1058          Bed Mobility    Supine-Sit [place] (Bed Mobility) verbal cues;minimum assist (75% patient effort)  -ZACHARY     Sit-Supine [place] (Bed Mobility) verbal cues;minimum assist (75% patient effort)  -ZACHARY       Row Name 09/19/24 1058          Sit-Stand Transfer    Sit-Stand [place] (Transfers) verbal cues;minimum assist (75% patient effort)  -ZACHARY     Assistive Device (Sit-Stand Transfers) walker, front-wheeled  -ZACHARY       Row Name 09/19/24 1058          Stand-Sit Transfer    Stand-Sit [place] (Transfers) verbal cues;minimum assist (75% patient effort)  -ZACHRAY     Assistive Device (Stand-Sit Transfers) walker, front-wheeled  -ZACHARY       Row Name 09/19/24 1058          Gait/Stairs (Locomotion)    [place] Level (Gait) verbal cues;minimum assist (75% patient effort)  -ZACHARY     Assistive Device (Gait) walker, front-wheeled  -ZACHARY     Distance in Feet (Gait) 5  x 2 with sitting rest  -ZACHARY     Pattern (Gait) step-to  -ZACHARY     Deviations/Abnormal Patterns (Gait) antalgic;gait speed decreased;stride length decreased;weight shifting decreased  -ZACHARY     Bilateral Gait Deviations forward flexed posture  -ZACHARY       Row Name 09/19/24 1058          Motor Skills    Comments, Therapeutic Exercise sitting AROM BLE X 15  -ZACHARY       Row Name 09/19/24 1058          Positioning and Restraints    Pre-Treatment Position in bed  -ZACHARY     Post Treatment Position bed  -ZACHARY     In Bed fowlers;call light within  reach;encouraged to call for assist;with family/caregiver;with nsg;side rails up x2  -ZACHARY               User Key  (r) = Recorded By, (t) = Taken By, (c) = Cosigned By      Initials Name Provider Type    Chau Underwood, PTA Physical Therapist Assistant                    Physical Therapy Education       Title: PT OT SLP Therapies (In Progress)       Topic: Physical Therapy (In Progress)       Point: Mobility training (In Progress)       Learning Progress Summary             Patient Acceptance, E, NR by CHICA at 9/12/2024 0931    Comment: benefits of activity, progression of PT                         Point: Home exercise program (Not Started)       Learner Progress:  Not documented in this visit.              Point: Body mechanics (Not Started)       Learner Progress:  Not documented in this visit.              Point: Precautions (Not Started)       Learner Progress:  Not documented in this visit.                              User Key       Initials Effective Dates Name Provider Type Lola COTO 02/03/23 -  Feroz Schwab, PT DPT Physical Therapist PT                  PT Recommendation and Plan     Plan of Care Reviewed With: patient  Progress: improving  Outcome Evaluation: Pt was in bed.  Able to transfer supine to sitting with min assist.  Transfered sit to stand with min assist.  Amb 5' x 2 with sitting rest, with RWX and min assist.  Pt continues to improve.  Would benefit from continued therapy to increase strength and progress amb.   Outcome Measures       Row Name 09/19/24 1058 09/18/24 1453 09/17/24 1001       How much help from another person do you currently need...    Turning from your back to your side while in flat bed without using bedrails? 3  -ZACHARY 3  -ZACHARY 3  -ZACHARY    Moving from lying on back to sitting on the side of a flat bed without bedrails? 3  -ZACHARY 3  -ZACHARY 3  -ZACHARY    Moving to and from a bed to a chair (including a wheelchair)? 3  -ZACHARY 2  -ZACHARY 2  -ZACHARY    Standing up from a chair using your arms  (e.g., wheelchair, bedside chair)? 3  -ZACHARY 2  -ZACHARY 2  -ZACHARY    Climbing 3-5 steps with a railing? 2  -ZACHARY 1  -ZACHARY 1  -ZACHARY    To walk in hospital room? 3  -ZACHARY 1  -ZACHARY 1  -ZACHARY    AM-PAC 6 Clicks Score (PT) 17  -ZACHARY 12  -ZACHARY 12  -ZACHARY    Highest Level of Mobility Goal 5 --> Static standing  -ZACHARY 4 --> Transfer to chair/commode  -ZACHARY 4 --> Transfer to chair/commode  -ZACHARY       Functional Assessment    Outcome Measure Options AM-PAC 6 Clicks Basic Mobility (PT)  -ZACHARY AM-PAC 6 Clicks Basic Mobility (PT)  -ZACHARY AM-PAC 6 Clicks Basic Mobility (PT)  -ZACHARY              User Key  (r) = Recorded By, (t) = Taken By, (c) = Cosigned By      Initials Name Provider Type    Chau Underwood PTA Physical Therapist Assistant                     Time Calculation:    PT Charges       Row Name 09/19/24 1058             Time Calculation    Start Time 1058  -ZACHARY      Stop Time 1137  -ZACHARY      Time Calculation (min) 39 min  -ZACHARY      PT Received On 09/19/24  -ZACHARY         Time Calculation- PT    Total Timed Code Minutes- PT 39 minute(s)  -ZACHARY         Timed Charges    12571 - PT Therapeutic Exercise Minutes 15  -ZACHARY      29793 - Gait Training Minutes  24  -ZACHARY         Total Minutes    Timed Charges Total Minutes 39  -ZACHARY       Total Minutes 39  -ZACHARY                User Key  (r) = Recorded By, (t) = Taken By, (c) = Cosigned By      Initials Name Provider Type    Chau Underwood PTA Physical Therapist Assistant                  Therapy Charges for Today       Code Description Service Date Service Provider Modifiers Qty    78616063588 HC PT THERAPEUTIC ACT EA 15 MIN 9/18/2024 Chau Almeida, PTA GP 2    31652439873 HC PT THER PROC EA 15 MIN 9/18/2024 Chau Almeida, PTA GP 1    23456317015 HC GAIT TRAINING EA 15 MIN 9/19/2024 Chau Almeida, PTA GP 2    39266443681 HC PT THER PROC EA 15 MIN 9/19/2024 Chau Almeida, PTA GP 1            PT G-Codes  Outcome Measure Options: AM-PAC 6 Clicks Basic Mobility (PT)  AM-PAC 6 Clicks Score (PT): 17  AM-PAC 6  Clicks Score (OT): 14    Chau Almeida, PTA  9/19/2024

## 2024-09-19 NOTE — CASE MANAGEMENT/SOCIAL WORK
Continued Stay Note   [facility]     Patient Name: Pattie Liu  MRN: [id number]  Today's Date: 9/19/2024    Admit Date: 9/11/2024    Plan: SNF   Discharge Plan       Row Name 09/19/24 0945       Plan    Plan SNF    Patient/Family in Agreement with Plan yes    Provided Post Acute Provider List? Yes    Post Acute Provider List Nursing Home    Delivered To Patient;Support Person    Method of Delivery In person    Plan Comments Pt and family do not want to return to [facility].  SW went over CMS comparion with family and pt and they have requested a referral to [facility].  SW has faxed and will await bed offer/denials.                   Discharge Codes    No documentation.                 Expected Discharge Date and Time       Expected Discharge Date Expected Discharge Time    Sep 17, 2024               LIONEL RamirezW

## 2024-09-19 NOTE — PLAN OF CARE
Goal Outcome Evaluation:  Plan of Care Reviewed With: patient        Progress: improving  Outcome Evaluation: Ot tx completed on this date. Pt in bed on 3L/NC with daughter present . Pt agreeable to therapy. Pt completed bed mobility supine> EOB req min A, max A for donning shoes min A to complete sit to stands and functional moblity with RW from EOB <>BR req min A, did not sit on commode due to difficultty with standing from lower levels pt req'd max v'c's for posture and appropriate utilization of RW. Pt required mod A for bed mobility EOB to supine and left right sidelying. OT POC to continue.

## 2024-09-19 NOTE — DISCHARGE PLACEMENT REQUEST
"From:  Denise Ashby, BSW  [phone]    Pattie Solomon (79 y.o. Female)       Date of Birth   1945    Social Security Number       Address   [address]  [facility] [place] [address]    Home Phone   [phone]    MRN   [id number]       [facility]    Marital Status                               Admission Date   9/11/24    Admission Type   Emergency    Admitting Provider   Anupam Ledezma MD    Attending Provider   Anupam Ledezma MD    Department, Room/Bed   [facility] 4B, 447/1       Discharge Date       Discharge Disposition       Discharge Destination                                 Attending Provider: Anupam Ledezma MD    Allergies: Morphine, Codeine, Levofloxacin, Tramadol, Zanaflex  [Tizanidine Hcl], Albuterol    Isolation: None   Infection: None   Code Status: No CPR    Ht: 160 cm (63\")   Wt: 66.2 kg (145 lb 15.1 oz)    Admission Cmt: None   Principal Problem: Pneumonia [J18.9]                   Active Insurance as of 9/11/2024       Primary Coverage       Payor Plan Insurance Group Employer/Plan Group    MEDICARE MEDICARE A & B        Payor Plan Address Payor Plan Phone Number Payor Plan Fax Number Effective Dates    [address] [phone]  3/1/2008 - None Entered    [facility] [id number]         Subscriber Name Subscriber Birth Date Member ID       PATTIE SOLOMON 1945 [id number]               Secondary Coverage       Payor Plan Insurance Group Employer/Plan Group    [facility]        Payor Plan Address Payor Plan Phone Number Payor Plan Fax Number Effective Dates    [facility] [phone]  1/1/2020 - None Entered    [address]       [facility] [id number]         Subscriber Name Subscriber Birth Date Member ID       PATTIE SOLOMON 1945 [id number]                     Emergency Contacts        (Rel.) Home Phone Work Phone Mobile Phone    CANDIDO GUADALUPE (Daughter) [phone] " -- --    Amee hernandez (Daughter) -- -- [phone]    Nav Rios (Son) -- -- [phone]                 History & Physical        Anupam Ledezma MD at 09/12/24 1986              History and Physical    Patient:  Pattie Liu  MRN: [id number]    CHIEF COMPLAINT:  cough    History Obtained From: the patient   PCP: Alvin Shah MD    HISTORY OF PRESENT ILLNESS:   The patient is a 79 y.o. female who presents with worsening shortness of breath, cough, fatigue. She had a recent hospitalization with right thigh pain after fall at home on 8/26/24. She tripped while at home, denies dizziness or lightheadedness prior to fall. She had a minimally displaced acute fracture of right inferior pubic ramus. She was sent to SNF for rehab as she lives alone and developed pneumonia that was seen on CXR this hospitalization in the ER. She was started on broad spectrum abx given healthcare associated pneumonia.     REVIEW OF SYSTEMS:    Review of Systems   Constitutional:  Positive for activity change, appetite change and fatigue. Negative for fever.   Respiratory:  Positive for shortness of breath.    Gastrointestinal:  Negative for nausea and vomiting.   Genitourinary:  Positive for decreased urine volume and dysuria.   Musculoskeletal:  Positive for gait problem.   Neurological:  Positive for weakness.          Past Medical History:  Past Medical History:   Diagnosis Date    CAD in native artery 7/29/2019    COPD (chronic obstructive pulmonary disease)     Essential hypertension 12/7/2021    GERD (gastroesophageal reflux disease)     Lung nodule        Past Surgical History:  Past Surgical History:   Procedure Laterality Date    BREAST IMPLANT REMOVAL      BREAST TISSUE EXPANDER REMOVAL INSERTION OF IMPLANT      CARDIAC CATHETERIZATION N/A 6/21/2019    Procedure: Left Heart Cath;  Surgeon: Edi Armijo MD;  Location:  [facility];  Service: Cardiology    CHOLECYSTECTOMY      HYSTERECTOMY       MASTECTOMY      BILATERAL    OOPHORECTOMY         Medications Prior to Admission:    Medications Prior to Admission   Medication Sig Dispense Refill Last Dose    ALPRAZolam (XANAX) 1 MG tablet Take 1 tablet by mouth 2 (Two) Times a Day.   9/11/2024    aspirin 81 MG chewable tablet Chew 1 tablet Daily.       atorvastatin (LIPITOR) 20 MG tablet Take 1 tablet by mouth Daily for 30 days. 30 tablet 0 9/11/2024    Budeson-Glycopyrrol-Formoterol (Breztri Aerosphere) 160-9-4.8 MCG/ACT aerosol inhaler Inhale 2 puffs 2 (Two) Times a Day.       bumetanide (BUMEX) 1 MG tablet Take 1 tablet by mouth Daily for 30 days. 30 tablet 0 9/11/2024    Citalopram Hydrobromide 30 MG capsule Take 30 mg by mouth Daily.       Cranberry-Vitamin C-Inulin (UTI-Stat) liquid Take 30 mL by mouth 2 (Two) Times a Day.       fluconazole (DIFLUCAN) 100 MG tablet Take 1 tablet by mouth Daily.   9/11/2024    gabapentin (NEURONTIN) 300 MG capsule Take 1 capsule by mouth 2 (Two) Times a Day.   9/11/2024    lisinopril (PRINIVIL,ZESTRIL) 20 MG tablet Take 1 tablet by mouth Daily for 30 days. 30 tablet 0 9/11/2024    nicotine (NICODERM CQ) 21 MG/24HR patch Place 1 patch on the skin as directed by provider Daily for 30 days. 28 patch 0 9/11/2024    nystatin (MYCOSTATIN) 670158 UNIT/GM powder Apply  topically to the appropriate area as directed Every 12 (Twelve) Hours for 14 days. 60 g 0 9/11/2024    pantoprazole (PROTONIX) 40 MG EC tablet Take 1 tablet by mouth Daily.   9/11/2024    acetaminophen (TYLENOL) 325 MG tablet Take 2 tablets by mouth Every 6 (Six) Hours As Needed for Mild Pain or Moderate Pain for up to 30 days. 60 tablet 0     dicyclomine (BENTYL) 20 MG tablet Take 1 tablet by mouth 3 (Three) Times a Day As Needed for Abdominal Cramping for up to 30 days. 30 tablet 0     docusate sodium (COLACE) 100 MG capsule Take 1 capsule by mouth 2 (Two) Times a Day As Needed for Constipation.       ibuprofen (ADVIL,MOTRIN) 400 MG tablet Take 1 tablet by  "mouth Every 6 (Six) Hours As Needed for Mild Pain or Moderate Pain (not relieved by Acetaminophen (2nd line therapy)) for up to 30 days. 60 tablet 0     loperamide (IMODIUM) 2 MG capsule Take 1 capsule by mouth 4 (Four) Times a Day As Needed for Diarrhea for up to 30 days. 60 capsule 0     Melatonin 10 MG tablet Take 1 tablet by mouth At Night As Needed (Insomnia).       ondansetron (ZOFRAN) 4 MG tablet Take 1 tablet by mouth Every 6 (Six) Hours As Needed for Nausea or Vomiting.       polyethylene glycol (MIRALAX) 17 GM/SCOOP powder Take 17 g by mouth Daily As Needed (Constipation).          Allergies:  Morphine, Codeine, Levofloxacin, Tramadol, Zanaflex  [tizanidine hcl], and Albuterol    Social History:   Social History     Socioeconomic History    Marital status:    Tobacco Use    Smoking status: Every Day     Current packs/day: 0.50     Average packs/day: 0.5 packs/day for 62.0 years (31.0 ttl pk-yrs)     Types: Cigarettes    Smokeless tobacco: Never    Tobacco comments:     states she has no plan to quit smoking   Vaping Use    Vaping status: Former   Substance and Sexual Activity    Alcohol use: No    Drug use: No    Sexual activity: Not Currently       Family History:   Family History   Problem Relation Age of Onset    Cancer Mother     Cancer Father            Physical Exam:    Vitals: /53 (BP Location: Left arm, Patient Position: Lying)   Pulse 55   Temp 98.7 °F (37.1 °C) (Oral)   Resp 16   Ht 160 cm (63\")   Wt 66.2 kg (145 lb 15.1 oz)   SpO2 90%   BMI 25.85 kg/m²   Physical Exam  Vitals reviewed.   Constitutional:       Appearance: Normal appearance. She is not ill-appearing.   HENT:      Head: Normocephalic and atraumatic.   Eyes:      General:         Right eye: No discharge.         Left eye: No discharge.      Conjunctiva/sclera: Conjunctivae normal.   Cardiovascular:      Rate and Rhythm: Normal rate and regular rhythm.      Pulses: Normal pulses.      Heart sounds: No murmur " heard.  Pulmonary:      Effort: No respiratory distress.      Breath sounds: Wheezing and rhonchi present.      Comments: Nasal cannula in place  Abdominal:      General: Abdomen is flat. Bowel sounds are normal. There is no distension.   Musculoskeletal:      Right lower leg: No edema.      Left lower leg: No edema.   Skin:     Capillary Refill: Capillary refill takes less than 2 seconds.   Neurological:      General: No focal deficit present.      Mental Status: She is alert.   Psychiatric:         Mood and Affect: Mood normal.         Behavior: Behavior normal.           Lab Results (last 24 hours)       Procedure Component Value Units Date/Time    Comprehensive Metabolic Panel [[id number]]  (Abnormal) Collected: 09/12/24 1240    Specimen: Blood Updated: 09/12/24 1425     Glucose 90 mg/dL      BUN 14 mg/dL      Creatinine 0.90 mg/dL      Sodium 146 mmol/L      Potassium 3.2 mmol/L      Chloride 110 mmol/L      CO2 28.0 mmol/L      Calcium 8.6 mg/dL      Total Protein 5.4 g/dL      Albumin 2.6 g/dL      ALT (SGPT) 10 U/L      AST (SGOT) 14 U/L      Alkaline Phosphatase 98 U/L      Total Bilirubin <0.2 mg/dL      Globulin 2.8 gm/dL      A/G Ratio 0.9 g/dL      BUN/Creatinine Ratio 15.6     Anion Gap 8.0 mmol/L      eGFR 64.4 mL/min/1.73     Narrative:      GFR Normal >60  Chronic Kidney Disease <60  Kidney Failure <15    The GFR formula is only valid for adults with stable renal function between ages 18 and 70.    Blood Culture - Blood, Arm, Left [[id number]]  (Normal) Collected: 09/11/24 1346    Specimen: Blood from Arm, Left Updated: 09/12/24 1415     Blood Culture No growth at 24 hours    CBC Auto Differential [[id number]]  (Abnormal) Collected: 09/12/24 1240    Specimen: Blood Updated: 09/12/24 1342     WBC 9.46 10*3/mm3      RBC 3.63 10*6/mm3      Hemoglobin 10.8 g/dL      Hematocrit 34.3 %      MCV 94.5 fL      MCH 29.8 pg      MCHC 31.5 g/dL      RDW 13.4 %      RDW-SD 46.5 fl      MPV 11.2 fL      Platelets  333 10*3/mm3      Neutrophil % 67.4 %      Lymphocyte % 19.3 %      Monocyte % 8.1 %      Eosinophil % 4.4 %      Basophil % 0.4 %      Immature Grans % 0.4 %      Neutrophils, Absolute 6.36 10*3/mm3      Lymphocytes, Absolute 1.83 10*3/mm3      Monocytes, Absolute 0.77 10*3/mm3      Eosinophils, Absolute 0.42 10*3/mm3      Basophils, Absolute 0.04 10*3/mm3      Immature Grans, Absolute 0.04 10*3/mm3      nRBC 0.0 /100 WBC     Blood Culture - Blood, Arm, Right [[id number]]  (Normal) Collected: 09/11/24 1302    Specimen: Blood from Arm, Right Updated: 09/12/24 1330     Blood Culture No growth at 24 hours    MRSA Screen, PCR (Inpatient) - Swab, Nares [[id number]]  (Normal) Collected: 09/11/24 1739    Specimen: Swab from Nares Updated: 09/11/24 1900     MRSA PCR No MRSA Detected    Narrative:      The negative predictive value of this diagnostic test is high and should only be used to consider de-escalating anti-MRSA therapy. A positive result may indicate colonization with MRSA and must be correlated clinically.             -----------------------------------------------------------------  EKG:   Radiology:     CT Head Without Contrast    Result Date: 9/11/2024  Exam: CT HEAD WO CONTRAST- 9/11/2024 12:10 PM  HISTORY: confusion   DOSE LENGTH PRODUCT: 666.77 mGy.cm mGy cm. Automated exposure control was also utilized to decrease patient radiation dose.  Technique: Helically acquired CT of the brain without IV contrast was performed. Sagittal and coronal reformations are also provided for review. Soft tissue and bone kernels are available for interpretation.  Comparison: 8/26/2024..  Findings:  Ventricles and extra-axial CSF spaces are normal in size.  No intraparenchymal or extra-axial hemorrhage.  Gray-white matter differentiation is preserved.  Orbits are grossly unremarkable. Small mucous retention cyst in the left maxillary sinus. Mastoid air cells are grossly clear.  No suspicious calvarial or extracranial soft  tissue abnormality.  Other:None.      Impression:   No acute intracranial abnormality.  This report was signed and finalized on 9/11/2024 1:25 PM by Clem Corona.      XR Chest 1 View    Result Date: 9/11/2024  EXAMINATION: XR CHEST 1 VW-  9/11/2024 11:19 AM  HISTORY: ams  A frontal projection of the chest is compared with the previous study dated 12/20/2022.  The lungs are moderately well-expanded.  Ill-defined diffuse opacification of the right lung was not noted in the previous study and may represent an evolving acute infiltrate.  Linear parenchymal density in the left lower lung are similar to the previous study and may represent scarring or discoid atelectasis.  There is no pleural effusion. No pneumothorax.  The heart size is not optimally evaluated due to the AP projection. Atheromatous change of thoracic aorta noted.  There is moderate diffuse osteopenia. There is no acute bony abnormality.      1. Right lung infiltrate may represent an acute inflammatory/infectious process. Further follow-up may be obtained.   This report was signed and finalized on 9/11/2024 12:27 PM by Dr. Tong Orellana MD.        Assessment and Plan     Primary Problem:  Pneumonia    Hospital Problem list:    Pneumonia      PMH:  Past Medical History:   Diagnosis Date    CAD in native artery 7/29/2019    COPD (chronic obstructive pulmonary disease)     Essential hypertension 12/7/2021    GERD (gastroesophageal reflux disease)     Lung nodule        Treatment Plan:  Pneumonia: continue abx and steroids, oxygen is at baseline, incentive spirometry, nebs    Pelvic fracture: prior hospitalization, was getting inadequate PT at SNF. PT/OT while inpatient, she will likely require SNF once pneumonia is treated.    Code status: DNR    DVT ppx: lovenox    Disposition: inpatient.     Anupam Ledezma MD 9/12/2024 16:29 CDT      Electronically signed by Anupam Ledezma MD at 09/12/24 5831       Prior to Admission Medications        Prescriptions Last Dose Informant Patient Reported? Taking?    ALPRAZolam (XANAX) 1 MG tablet 9/11/2024 [facility] Yes Yes    Take 1 tablet by mouth 2 (Two) Times a Day.    aspirin 81 MG chewable tablet   Yes Yes    Chew 1 tablet Daily.    atorvastatin (LIPITOR) 20 MG tablet 9/11/2024 [facility] No Yes    Take 1 tablet by mouth Daily for 30 days.    Budeson-Glycopyrrol-Formoterol (Breztri Aerosphere) 160-9-4.8 MCG/ACT aerosol inhaler  Nursing Home Yes Yes    Inhale 2 puffs 2 (Two) Times a Day.    bumetanide (BUMEX) 1 MG tablet 9/11/2024 [facility] No Yes    Take 1 tablet by mouth Daily for 30 days.    Citalopram Hydrobromide 30 MG capsule   Yes Yes    Take 30 mg by mouth Daily.    Cranberry-Vitamin C-Inulin (UTI-Stat) liquid  Nursing Home Yes Yes    Take 30 mL by mouth 2 (Two) Times a Day.    fluconazole (DIFLUCAN) 100 MG tablet 9/11/2024 [facility] Yes Yes    Take 1 tablet by mouth Daily.    gabapentin (NEURONTIN) 300 MG capsule 9/11/2024 [facility] Yes Yes    Take 1 capsule by mouth 2 (Two) Times a Day.    lisinopril (PRINIVIL,ZESTRIL) 20 MG tablet 9/11/2024 [facility] No Yes    Take 1 tablet by mouth Daily for 30 days.    nicotine (NICODERM CQ) 21 MG/24HR patch 9/11/2024 [facility] No Yes    Place 1 patch on the skin as directed by provider Daily for 30 days.    nystatin (MYCOSTATIN) 941171 UNIT/GM powder 9/11/2024 [facility] No Yes    Apply  topically to the appropriate area as directed Every 12 (Twelve) Hours for 14 days.    pantoprazole (PROTONIX) 40 MG EC tablet 9/11/2024 [facility] Yes Yes    Take 1 tablet by mouth Daily.    acetaminophen (TYLENOL) 325 MG tablet  Nursing Home No No    Take 2 tablets by mouth Every 6 (Six) Hours As Needed for Mild Pain or Moderate Pain for up to 30 days.    dicyclomine (BENTYL) 20 MG tablet  Nursing Home No No    Take 1 tablet by mouth 3 (Three) Times a Day As Needed for Abdominal Cramping for up to 30 days.    docusate sodium (COLACE) 100 MG capsule   Nursing Home Yes No    Take 1 capsule by mouth 2 (Two) Times a Day As Needed for Constipation.    ibuprofen (ADVIL,MOTRIN) 400 MG tablet  Nursing Home No No    Take 1 tablet by mouth Every 6 (Six) Hours As Needed for Mild Pain or Moderate Pain (not relieved by Acetaminophen (2nd line therapy)) for up to 30 days.    loperamide (IMODIUM) 2 MG capsule  Nursing Home No No    Take 1 capsule by mouth 4 (Four) Times a Day As Needed for Diarrhea for up to 30 days.    Melatonin 10 MG tablet  Nursing Home Yes No    Take 1 tablet by mouth At Night As Needed (Insomnia).    ondansetron (ZOFRAN) 4 MG tablet  Nursing Home Yes No    Take 1 tablet by mouth Every 6 (Six) Hours As Needed for Nausea or Vomiting.    polyethylene glycol (MIRALAX) 17 GM/SCOOP powder  Nursing Home Yes No    Take 17 g by mouth Daily As Needed (Constipation).          Current Facility-Administered Medications   Medication Dose Route Frequency Provider Last Rate Last Admin    acetaminophen (TYLENOL) tablet 650 mg  650 mg Oral Q6H PRN Anupam Ledezma MD   650 mg at 09/17/24 0835    ALPRAZolam (XANAX) tablet 1 mg  1 mg Oral BID Anupam Ledezma MD   1 mg at 09/19/24 0833    aspirin EC tablet 81 mg  81 mg Oral Daily Anupam Ledezma MD   81 mg at 09/19/24 0833    atorvastatin (LIPITOR) tablet 20 mg  20 mg Oral Daily Anupam Ledezma MD   20 mg at 09/19/24 0833    sennosides-docusate (PERICOLACE) 8.6-50 MG per tablet 2 tablet  2 tablet Oral BID PRN Anupam Ledezma MD        And    polyethylene glycol (MIRALAX) packet 17 g  17 g Oral Daily PRN Anupam Ledezma MD   17 g at 09/14/24 1151    And    bisacodyl (DULCOLAX) EC tablet 5 mg  5 mg Oral Daily PRN Anupam Ledezma MD        And    bisacodyl (DULCOLAX) suppository 10 mg  10 mg Rectal Daily PRN Anupam Ledzema MD        bumetanide (BUMEX) tablet 1 mg  1 mg Oral Daily Anupam Ledezma MD   1 mg at 09/18/24 1121    Calcium Replacement - Follow Nurse / BPA Driven Protocol   Does not apply PRN  Erlinda Woods MD        citalopram (CeleXA) tablet 30 mg  30 mg Oral Daily Anupam Ledezma MD   30 mg at 09/18/24 1121    Enoxaparin Sodium (LOVENOX) syringe 40 mg  40 mg Subcutaneous Q24H Anupam Ledezma MD   40 mg at 09/18/24 1836    fluticasone (FLONASE) 50 MCG/ACT nasal spray 2 spray  2 spray Each Nare BID Anupam Ledezma MD   2 spray at 09/19/24 0846    gabapentin (NEURONTIN) capsule 300 mg  300 mg Oral BID Anupam Ledezma MD   300 mg at 09/19/24 0834    ipratropium (ATROVENT) nebulizer solution 0.5 mg  0.5 mg Nebulization 4x Daily - RT Anupam Ledezma MD   0.5 mg at 09/19/24 0624    lisinopril (PRINIVIL,ZESTRIL) tablet 20 mg  20 mg Oral Daily Anupam Ledezma MD   20 mg at 09/18/24 1121    Magnesium Standard Dose Replacement - Follow Nurse / BPA Driven Protocol   Does not apply PRN Erlinda Woods MD        melatonin tablet 10 mg  10 mg Oral Nightly PRN Anupam Ledezma MD   10 mg at 09/18/24 2033    nicotine (NICODERM CQ) 21 MG/24HR patch 1 patch  1 patch Transdermal Q24H Anupam Ledezma MD   1 patch at 09/18/24 1836    nicotine polacrilex (NICORETTE) gum 4 mg  4 mg Mouth/Throat Q1H PRN Anupam Ledezma MD        ondansetron ODT (ZOFRAN-ODT) disintegrating tablet 4 mg  4 mg Translingual Q8H PRN Anupam Ledezma MD   4 mg at 09/17/24 1052    oxyCODONE-acetaminophen (PERCOCET) 5-325 MG per tablet 1 tablet  1 tablet Oral Q6H PRN Anupam Ledezma MD   1 tablet at 09/19/24 0833    pantoprazole (PROTONIX) EC tablet 40 mg  40 mg Oral QAM AC Anupam Ledezma MD   40 mg at 09/19/24 0833    Phosphorus Replacement - Follow Nurse / BPA Driven Protocol   Does not apply PRN Erlinda Woods MD        potassium chloride (MICRO-K/KLOR-CON) CR capsule  40 mEq Oral Q4H Anupam Ledezma MD   40 mEq at 09/19/24 0832    Potassium Replacement - Follow Nurse / BPA Driven Protocol   Does not apply PRN Erlinda Woods MD        promethazine (PHENERGAN) 12.5 mg in sodium  chloride 0.9 % 50 mL  12.5 mg Intravenous Q6H PRN Anupam Ledezma MD        sodium chloride 0.9 % flush 10 mL  10 mL Intravenous PRN Anupam Ledezma MD        sodium chloride 0.9 % flush 10 mL  10 mL Intravenous Q12H Anupam Ledezma MD   10 mL at 09/19/24 0847    sodium chloride 0.9 % flush 10 mL  10 mL Intravenous PRN Anupam Ledezma MD        sodium chloride 0.9 % infusion 40 mL  40 mL Intravenous PRN Anupam Ledezma MD            Physician Progress Notes (most recent note)        Anupam Ledezma MD at 09/19/24 0726            Daily Progress Note  Pattie Liu  MRN: [id number] LOS: 8    Admit Date: 9/11/2024 9/19/2024 07:27 CDT    Subjective:      Chief Complaint:  Chief Complaint   Patient presents with    Altered Mental Status       Interval History:    Reviewed overnight events and nursing notes.   No acute events overnight.  Resting comfortably this morning.   Plan is for her to go to skilled nursing facility for rehab and likely end up going to assisted living when she is able.    Review of Systems   Constitutional:  Positive for activity change and fatigue. Negative for fever.   Respiratory:  Positive for cough and shortness of breath.    Gastrointestinal:  Negative for nausea and vomiting.   Genitourinary:  Positive for decreased urine volume and dysuria.   Musculoskeletal:  Positive for gait problem.   Neurological:  Positive for weakness.       DIET:  Diet: Regular/House; Fluid Consistency: Thin (IDDSI 0)    Medications:        ALPRAZolam, 1 mg, Oral, BID  aspirin, 81 mg, Oral, Daily  atorvastatin, 20 mg, Oral, Daily  bumetanide, 1 mg, Oral, Daily  citalopram, 30 mg, Oral, Daily  enoxaparin, 40 mg, Subcutaneous, Q24H  fluticasone, 2 spray, Each Nare, BID  gabapentin, 300 mg, Oral, BID  ipratropium, 0.5 mg, Nebulization, 4x Daily - RT  lisinopril, 20 mg, Oral, Daily  nicotine, 1 patch, Transdermal, Q24H  pantoprazole, 40 mg, Oral, QAM AC  potassium chloride ER, 40 mEq, Oral,  Q4H  sodium chloride, 10 mL, Intravenous, Q12H        Data:     Code Status:   Code Status and Medical Interventions: No CPR (Do Not Attempt to Resuscitate); Full Support   Ordered at: 09/11/24 1647     Code Status (Patient has no pulse and is not breathing):    No CPR (Do Not Attempt to Resuscitate)     Medical Interventions (Patient has pulse or is breathing):    Full Support       Family History   Problem Relation Age of Onset    Cancer Mother     Cancer Father      Social History     Socioeconomic History    Marital status:    Tobacco Use    Smoking status: Every Day     Current packs/day: 0.50     Average packs/day: 0.5 packs/day for 62.0 years (31.0 ttl pk-yrs)     Types: Cigarettes    Smokeless tobacco: Never    Tobacco comments:     states she has no plan to quit smoking   Vaping Use    Vaping status: Former   Substance and Sexual Activity    Alcohol use: No    Drug use: No    Sexual activity: Not Currently       Labs:  Lab Results (last 72 hours)       Procedure Component Value Units Date/Time    Comprehensive Metabolic Panel [[id number]]  (Abnormal) Collected: 09/19/24 0348    Specimen: Blood Updated: 09/19/24 0509     Glucose 100 mg/dL      BUN 11 mg/dL      Creatinine 0.85 mg/dL      Sodium 144 mmol/L      Potassium 3.5 mmol/L      Chloride 105 mmol/L      CO2 33.0 mmol/L      Calcium 8.9 mg/dL      Total Protein 5.1 g/dL      Albumin 2.5 g/dL      ALT (SGPT) <5 U/L      AST (SGOT) 14 U/L      Alkaline Phosphatase 95 U/L      Total Bilirubin <0.2 mg/dL      Globulin 2.6 gm/dL      A/G Ratio 1.0 g/dL      BUN/Creatinine Ratio 12.9     Anion Gap 6.0 mmol/L      eGFR 68.9 mL/min/1.73     Narrative:      GFR Normal >60  Chronic Kidney Disease <60  Kidney Failure <15    The GFR formula is only valid for adults with stable renal function between ages 18 and 70.    CBC Auto Differential [[id number]]  (Abnormal) Collected: 09/19/24 0348    Specimen: Blood Updated: 09/19/24 0420     WBC 13.95 10*3/mm3       RBC 3.43 10*6/mm3      Hemoglobin 10.3 g/dL      Hematocrit 33.1 %      MCV 96.5 fL      MCH 30.0 pg      MCHC 31.1 g/dL      RDW 13.8 %      RDW-SD 48.9 fl      MPV 10.7 fL      Platelets 394 10*3/mm3      Neutrophil % 71.2 %      Lymphocyte % 19.9 %      Monocyte % 5.4 %      Eosinophil % 2.6 %      Basophil % 0.3 %      Immature Grans % 0.6 %      Neutrophils, Absolute 9.93 10*3/mm3      Lymphocytes, Absolute 2.78 10*3/mm3      Monocytes, Absolute 0.76 10*3/mm3      Eosinophils, Absolute 0.36 10*3/mm3      Basophils, Absolute 0.04 10*3/mm3      Immature Grans, Absolute 0.08 10*3/mm3      nRBC 0.0 /100 WBC     CBC Auto Differential [[id number]]  (Abnormal) Collected: 09/18/24 0408    Specimen: Blood Updated: 09/18/24 0550     WBC 10.50 10*3/mm3      RBC 3.62 10*6/mm3      Hemoglobin 10.7 g/dL      Hematocrit 34.8 %      MCV 96.1 fL      MCH 29.6 pg      MCHC 30.7 g/dL      RDW 13.5 %      RDW-SD 47.7 fl      MPV 11.0 fL      Platelets 395 10*3/mm3      Neutrophil % 59.1 %      Lymphocyte % 29.0 %      Monocyte % 6.9 %      Eosinophil % 4.1 %      Basophil % 0.5 %      Immature Grans % 0.4 %      Neutrophils, Absolute 6.22 10*3/mm3      Lymphocytes, Absolute 3.04 10*3/mm3      Monocytes, Absolute 0.72 10*3/mm3      Eosinophils, Absolute 0.43 10*3/mm3      Basophils, Absolute 0.05 10*3/mm3      Immature Grans, Absolute 0.04 10*3/mm3      nRBC 0.0 /100 WBC     Comprehensive Metabolic Panel [[id number]]  (Abnormal) Collected: 09/18/24 0408    Specimen: Blood Updated: 09/18/24 0532     Glucose 84 mg/dL      BUN 6 mg/dL      Creatinine 0.64 mg/dL      Sodium 143 mmol/L      Potassium 3.7 mmol/L      Chloride 106 mmol/L      CO2 32.0 mmol/L      Calcium 9.1 mg/dL      Total Protein 5.2 g/dL      Albumin 2.5 g/dL      ALT (SGPT) <5 U/L      AST (SGOT) 16 U/L      Alkaline Phosphatase 97 U/L      Total Bilirubin <0.2 mg/dL      Globulin 2.7 gm/dL      A/G Ratio 0.9 g/dL      BUN/Creatinine Ratio 9.4     Anion Gap 5.0 mmol/L       eGFR 88.9 mL/min/1.73     Narrative:      GFR Normal >60  Chronic Kidney Disease <60  Kidney Failure <15    The GFR formula is only valid for adults with stable renal function between ages 18 and 70.    Comprehensive Metabolic Panel [[id number]]  (Abnormal) Collected: 09/17/24 0610    Specimen: Blood Updated: 09/17/24 0756     Glucose 106 mg/dL      BUN 8 mg/dL      Creatinine 0.74 mg/dL      Sodium 141 mmol/L      Potassium 3.7 mmol/L      Chloride 101 mmol/L      CO2 31.0 mmol/L      Calcium 10.0 mg/dL      Total Protein 6.3 g/dL      Albumin 3.1 g/dL      ALT (SGPT) <5 U/L      AST (SGOT) 21 U/L      Alkaline Phosphatase 124 U/L      Total Bilirubin 0.2 mg/dL      Globulin 3.2 gm/dL      A/G Ratio 1.0 g/dL      BUN/Creatinine Ratio 10.8     Anion Gap 9.0 mmol/L      eGFR 81.4 mL/min/1.73     Narrative:      GFR Normal >60  Chronic Kidney Disease <60  Kidney Failure <15    The GFR formula is only valid for adults with stable renal function between ages 18 and 70.    CBC Auto Differential [[id number]]  (Abnormal) Collected: 09/17/24 0610    Specimen: Blood Updated: 09/17/24 0733     WBC 13.85 10*3/mm3      RBC 4.03 10*6/mm3      Hemoglobin 12.1 g/dL      Hematocrit 37.5 %      MCV 93.1 fL      MCH 30.0 pg      MCHC 32.3 g/dL      RDW 13.2 %      RDW-SD 45.3 fl      MPV 11.0 fL      Platelets 447 10*3/mm3      Neutrophil % 71.5 %      Lymphocyte % 18.1 %      Monocyte % 5.8 %      Eosinophil % 3.7 %      Basophil % 0.4 %      Immature Grans % 0.5 %      Neutrophils, Absolute 9.90 10*3/mm3      Lymphocytes, Absolute 2.51 10*3/mm3      Monocytes, Absolute 0.80 10*3/mm3      Eosinophils, Absolute 0.51 10*3/mm3      Basophils, Absolute 0.06 10*3/mm3      Immature Grans, Absolute 0.07 10*3/mm3      nRBC 0.0 /100 WBC     Blood Culture - Blood, Arm, Left [[id number]]  (Normal) Collected: 09/11/24 1346    Specimen: Blood from Arm, Left Updated: 09/16/24 1415     Blood Culture No growth at 5 days    Blood Culture - Blood,  "Arm, Right [[id number]]  (Normal) Collected: 24 1302    Specimen: Blood from Arm, Right Updated: 24 1330     Blood Culture No growth at 5 days            Objective:     Vitals: /66 (BP Location: Left arm, Patient Position: Lying)   Pulse 60   Temp 98.3 °F (36.8 °C) (Oral)   Resp 18   Ht 160 cm (63\")   Wt 66.2 kg (145 lb 15.1 oz)   SpO2 95%   BMI 25.85 kg/m²    Intake/Output Summary (Last 24 hours) at 2024 07  Last data filed at 2024 1809  Gross per 24 hour   Intake --   Output 900 ml   Net -900 ml    Temp (24hrs), Av.2 °F (36.8 °C), Min:97.9 °F (36.6 °C), Max:98.6 °F (37 °C)      Physical Exam  Vitals reviewed.   Constitutional:       Appearance: Normal appearance. She is ill-appearing.   HENT:      Head: Normocephalic and atraumatic.   Eyes:      General:         Right eye: No discharge.         Left eye: No discharge.      Conjunctiva/sclera: Conjunctivae normal.   Cardiovascular:      Rate and Rhythm: Normal rate and regular rhythm.      Pulses: Normal pulses.      Heart sounds: No murmur heard.  Pulmonary:      Effort: Pulmonary effort is normal. No respiratory distress.      Comments: Nasal cannula in place  Abdominal:      General: Abdomen is flat. Bowel sounds are normal. There is no distension.   Musculoskeletal:      Right lower leg: No edema.      Left lower leg: No edema.   Skin:     Capillary Refill: Capillary refill takes less than 2 seconds.   Neurological:      General: No focal deficit present.      Mental Status: She is alert.   Psychiatric:         Mood and Affect: Mood normal.         Behavior: Behavior normal.             Assessment and Plan:     Primary Problem:  Pneumonia    Hospital Problem list:    Pneumonia    Essential hypertension    Gait instability    Pelvic fracture      PMH:  Past Medical History:   Diagnosis Date    CAD in native artery 2019    COPD (chronic obstructive pulmonary disease)     Essential hypertension 2021    GERD " (gastroesophageal reflux disease)     Lung nodule        Treatment Plan:  Pneumonia: Improved with abx and steroids, to finish antibiotics 2024, oxygen is at baseline, incentive spirometry, nebs     Pelvic fracture: prior hospitalization. PT/OT while inpatient, she will still require SNF however she would like to not return to [facility].     Long discussion at the bedside with daughter about goals.  Plan is for her to go to skilled nursing facility for rehab and likely end up going to assisted living when she is able.     Code status: DNR     DVT ppx: lovenox     Disposition: inpatient, SNF (requesting different facility if possible)    Reviewed treatment plans with the patient and/or family.   20 minutes spent in face to face interaction and coordination of care.     Electronically signed by Anupam Ledezma MD on 2024 at 07:27 CDT      Electronically signed by Anupam Ledezma MD at 24 0727       Consult Notes (most recent note)    No notes of this type exist for this encounter.          Physical Therapy Notes (most recent note)        Chau Almeida PTA at 24 1539  Version 1 of 1         Acute Care - Physical Therapy Treatment Note  [facility]     Patient Name: Pattie Liu  : 1945  MRN: [id number]  Today's Date: 2024      Visit Dx:     ICD-10-CM ICD-9-CM   1. Pneumonia of right lung due to infectious organism, unspecified part of lung  J18.9 483.8   2. Dehydration  E86.0 276.51   3. Altered mental status, unspecified altered mental status type  R41.82 780.97   4. Impaired mobility [Z74.09]  Z74.09 799.89     Patient Active Problem List   Diagnosis    Frequent PVCs    Shortness of breath    SOB (shortness of breath)    CAD in native artery    PVD (peripheral vascular disease)    Pneumonia due to infectious organism    Chronic back pain    Essential hypertension    Fall, initial encounter    Traumatic rhabdomyolysis    Leukocytosis    Anemia    Degenerative disc  disease, lumbar    Dehydration    Acute UTI    Chronic respiratory failure with hypoxia    Impaired mobility    UTI (urinary tract infection)    Gait instability    Pneumonia    Pelvic fracture     Past Medical History:   Diagnosis Date    CAD in native artery 7/29/2019    COPD (chronic obstructive pulmonary disease)     Essential hypertension 12/7/2021    GERD (gastroesophageal reflux disease)     Lung nodule      Past Surgical History:   Procedure Laterality Date    BREAST IMPLANT REMOVAL      BREAST TISSUE EXPANDER REMOVAL INSERTION OF IMPLANT      CARDIAC CATHETERIZATION N/A 6/21/2019    Procedure: Left Heart Cath;  Surgeon: Edi Armijo MD;  Location:  [facility]ATH [facility];  Service: Cardiology    CHOLECYSTECTOMY      HYSTERECTOMY      MASTECTOMY      BILATERAL    OOPHORECTOMY       PT Assessment (Last 12 Hours)       PT Evaluation and Treatment       Row Name 09/18/24 1453 09/18/24 1057       Physical Therapy Time and Intention    Subjective Information complains of;weakness;pain  -ZACHARY --    Document Type therapy note (daily note)  -ZACHARY therapy note (daily note)  -ZACHARY    Mode of Treatment physical therapy  -ZACHARY physical therapy  -ZACHARY    Session Not Performed -- patient unavailable for treatment  -ZACHARY    Comment, Session Not Performed -- with OT  -ZACHARY      Row Name 09/18/24 1453          General Information    Patient/Family/Caregiver Comments/Observations family present  -ZACHARY     Existing Precautions/Restrictions fall;oxygen therapy device and L/min  recent pelvic fx  -ZACHARY       Row Name 09/18/24 1453          Pain    Pretreatment Pain Rating 6/10  -ZACHARY     Posttreatment Pain Rating 6/10  -ZACHARY     Pain Location - Side/Orientation Right  -ZACHARY     Pain Location lower  -ZACHARY     Pain Location - extremity  -ZACHARY     Pain Intervention(s) Repositioned  -ZACHARY       Row Name 09/18/24 1453          Bed Mobility    Supine-Sit [place] (Bed Mobility) verbal cues;contact guard;minimum assist (75% patient effort)  -ZACHARY      Sit-Supine [place] (Bed Mobility) verbal cues;minimum assist (75% patient effort)  -ZACHARY     Assistive Device (Bed Mobility) bed rails;draw sheet;head of bed elevated  -ZACHARY       Row Name 09/18/24 1453          Transfers    Comment, (Transfers) stood x 4  -ZACHARY       Row Name 09/18/24 1453          Sit-Stand Transfer    Sit-Stand [place] (Transfers) verbal cues;minimum assist (75% patient effort)  -ZACHARY     Assistive Device (Sit-Stand Transfers) walker, front-wheeled  -ZACHARY       Row Name 09/18/24 1453          Stand-Sit Transfer    Stand-Sit [place] (Transfers) verbal cues;minimum assist (75% patient effort)  -ZACHARY     Assistive Device (Stand-Sit Transfers) walker, front-wheeled  -ZACHARY       Row Name 09/18/24 1453          Gait/Stairs (Locomotion)    [place] Level (Gait) verbal cues;minimum assist (75% patient effort)  -ZACHARY     Assistive Device (Gait) walker, front-wheeled  -ZACHARY     Distance in Feet (Gait) --  pt was able to take side steps at EOB, pt then was able to march in place x 5 BLE  -ZACHARY       Row Name 09/18/24 1453          Motor Skills    Comments, Therapeutic Exercise sitting AROM BLE X 20  -ZACHARY       Row Name 09/18/24 1453          Positioning and Restraints    Pre-Treatment Position in bed  -ZACHARY     Post Treatment Position bed  -ZACHARY     In Bed side lying right;call light within reach;encouraged to call for assist;exit alarm on;with family/caregiver;side rails up x2  -ZACHARY               User Key  (r) = Recorded By, (t) = Taken By, (c) = Cosigned By      Initials Name Provider Type    Chau Underwood, PTA Physical Therapist Assistant                    Physical Therapy Education       Title: PT OT SLP Therapies (In Progress)       Topic: Physical Therapy (In Progress)       Point: Mobility training (In Progress)       Learning Progress Summary             Patient Acceptance, E, NR by CHICA at 9/12/2024 0931    Comment: benefits of activity, progression of PT                         Point: Home  exercise program (Not Started)       Learner Progress:  Not documented in this visit.              Point: Body mechanics (Not Started)       Learner Progress:  Not documented in this visit.              Point: Precautions (Not Started)       Learner Progress:  Not documented in this visit.                              User Key       Initials Effective Dates Name Provider Type Discipline    CHICA 02/03/23 -  Feroz Schwab, PT DPT Physical Therapist PT                  PT Recommendation and Plan     Plan of Care Reviewed With: patient  Progress: improving  Outcome Evaluation: Pt was in bed, agreed to therapy.  Able to transfer supine to sitting with CGA/min assist.  Sitting EOB, performed LE exercises AROM.  Transfered sit to stand with min assist.  Pt was able to take side steps with RWX and CGA/min assist.  Will continue to work with pt to increase strength and progress with transfers and amb as pt is able.   Outcome Measures       Row Name 09/18/24 1453 09/17/24 1001 09/16/24 1350       How much help from another person do you currently need...    Turning from your back to your side while in flat bed without using bedrails? 3  -ZACHARY 3  -ZACHARY 3  -ZACHARY    Moving from lying on back to sitting on the side of a flat bed without bedrails? 3  -ZACHARY 3  -ZACHARY 3  -ZACHARY    Moving to and from a bed to a chair (including a wheelchair)? 2  -ZACHARY 2  -ZACHARY 2  -ZACHARY    Standing up from a chair using your arms (e.g., wheelchair, bedside chair)? 2  -ZACHARY 2  -ZACHARY 2  -ZACHARY    Climbing 3-5 steps with a railing? 1  -ZACHARY 1  -ZACHARY 1  -ZACHARY    To walk in hospital room? 1  -ZACHARY 1  -ZACHARY 1  -ZACHARY    AM-PAC 6 Clicks Score (PT) 12  -ZACHARY 12  -ZACHARY 12  -ZACHARY    Highest Level of Mobility Goal 4 --> Transfer to chair/commode  -ZACHARY 4 --> Transfer to chair/commode  -ZACHARY 4 --> Transfer to chair/commode  -ZACHARY       Functional Assessment    Outcome Measure Options AM-PAC 6 Clicks Basic Mobility (PT)  -ZACHARY AM-PAC 6 Clicks Basic Mobility (PT)  -ZACHARY AM-PAC 6 Clicks Basic Mobility (PT)  -ZACHARY               User Key  (r) = Recorded By, (t) = Taken By, (c) = Cosigned By      Initials Name Provider Type    Chau Underwood PTA Physical Therapist Assistant                     Time Calculation:    PT Charges       Row Name 24 1453             Time Calculation    Start Time 1453  -ZACHARY      Stop Time 1534  -ZACHARY      Time Calculation (min) 41 min  -ZACHARY      PT Received On 24  -ZACHARY         Time Calculation- PT    Total Timed Code Minutes- PT 41 minute(s)  -ZACHARY         Timed Charges    63319 - PT Therapeutic Exercise Minutes 15  -ZACHARY      28632 - PT Therapeutic Activity Minutes 26  -ZACHARY         Total Minutes    Timed Charges Total Minutes 41  -ZACHARY       Total Minutes 41  -ZACHARY                User Key  (r) = Recorded By, (t) = Taken By, (c) = Cosigned By      Initials Name Provider Type    Chau Underwood PTA Physical Therapist Assistant                  Therapy Charges for Today       Code Description Service Date Service Provider Modifiers Qty    54729264597 HC PT THERAPEUTIC ACT EA 15 MIN 2024 Chau Almeida, PTA GP 2    78844177769 HC PT THER PROC EA 15 MIN 2024 Chau Almeida, PTA GP 1    75697251616 HC PT THERAPEUTIC ACT EA 15 MIN 2024 Chau Almeida, PTA GP 2    66933500909 HC PT THER PROC EA 15 MIN 2024 Chau Almeida, PTA GP 1            PT G-Codes  Outcome Measure Options: AM-PAC 6 Clicks Basic Mobility (PT)  AM-PAC 6 Clicks Score (PT): 12  AM-PAC 6 Clicks Score (OT): 14    Chau Almeida PTA  2024      Electronically signed by Chau Almeida PTA at 24 1539          Occupational Therapy Notes (most recent note)        Cecily Raymond, OTR/L at 24 1207          Patient Name: Pattie Liu  : 1945    MRN: [id number]                              Today's Date: 2024       Admit Date: 2024    Visit Dx:     ICD-10-CM ICD-9-CM   1. Pneumonia of right lung due to infectious organism, unspecified part of lung  J18.9 483.8   2.  Dehydration  E86.0 276.51   3. Altered mental status, unspecified altered mental status type  R41.82 780.97   4. Impaired mobility [Z74.09]  Z74.09 799.89     Patient Active Problem List   Diagnosis    Frequent PVCs    Shortness of breath    SOB (shortness of breath)    CAD in native artery    PVD (peripheral vascular disease)    Pneumonia due to infectious organism    Chronic back pain    Essential hypertension    Fall, initial encounter    Traumatic rhabdomyolysis    Leukocytosis    Anemia    Degenerative disc disease, lumbar    Dehydration    Acute UTI    Chronic respiratory failure with hypoxia    Impaired mobility    UTI (urinary tract infection)    Gait instability    Pneumonia    Pelvic fracture     Past Medical History:   Diagnosis Date    CAD in native artery 7/29/2019    COPD (chronic obstructive pulmonary disease)     Essential hypertension 12/7/2021    GERD (gastroesophageal reflux disease)     Lung nodule      Past Surgical History:   Procedure Laterality Date    BREAST IMPLANT REMOVAL      BREAST TISSUE EXPANDER REMOVAL INSERTION OF IMPLANT      CARDIAC CATHETERIZATION N/A 6/21/2019    Procedure: Left Heart Cath;  Surgeon: Edi Armijo MD;  Location:  [facility];  Service: Cardiology    CHOLECYSTECTOMY      HYSTERECTOMY      MASTECTOMY      BILATERAL    OOPHORECTOMY        General Information       Row Name 09/18/24 1030          OT Time and Intention    Document Type therapy note (daily note)  -     Mode of Treatment occupational therapy  -       Row Name 09/18/24 1030          General Information    Patient Profile Reviewed yes  -     Existing Precautions/Restrictions fall;oxygen therapy device and L/min  -       Row Name 09/18/24 1030          Cognition    Orientation Status (Cognition) oriented to;person;place  -       Row Name 09/18/24 1030          Safety Issues, Functional Mobility    Safety Issues Affecting Function (Mobility) at risk behavior  observed;friction/shear risk;judgment;sequencing abilities  -     Impairments Affecting Function (Mobility) balance;endurance/activity tolerance;cognition;strength;pain;range of motion (ROM);postural/trunk control  -               User Key  (r) = Recorded By, (t) = Taken By, (c) = Cosigned By      Initials Name Provider Type     Cecily Raymond, OTR/L Occupational Therapist                     Mobility/ADL's       Row Name 09/18/24 1030          Bed Mobility    Bed Mobility supine-sit  -     Supine-Sit [place] (Bed Mobility) contact guard;minimum assist (75% patient effort);verbal cues;nonverbal cues (demo/gesture)  -[facility] Name 09/18/24 1030          Transfers    Transfers sit-stand transfer;stand-sit transfer  -CH       Row Name 09/18/24 1030          Sit-Stand Transfer    Sit-Stand [place] (Transfers) contact guard;verbal cues;nonverbal cues (demo/gesture)  -     Assistive Device (Sit-Stand Transfers) walker, front-wheeled  -[facility] Name 09/18/24 1030          Stand-Sit Transfer    Stand-Sit [place] (Transfers) verbal cues;minimum assist (75% patient effort);nonverbal cues (demo/gesture)  -     Assistive Device (Stand-Sit Transfers) walker, front-wheeled  -[facility] Name 09/18/24 1030          Functional Mobility    Functional Mobility- Ind. Level contact guard assist;verbal cues required;nonverbal cues required (demo/gesture)  -     Functional Mobility- Device walker, front-wheeled  -     Functional Mobility- Comment 4-5 steps to chair  -CH       Row Name 09/18/24 1030          Activities of Daily Living    BADL Assessment/Intervention lower body dressing  -CH       Row Name 09/18/24 1030          Lower Body Dressing Assessment/Training    [place] Level (Lower Body Dressing) moderate assist (50% patient effort);don;socks  -     Position (Lower Body Dressing) edge of bed sitting  -               User Key  (r) = Recorded By, (t) = Taken By, (c) = Cosigned By       Initials Name Provider Type     Cecily Raymond, OTR/L Occupational Therapist                   Obj/Interventions       Row Name 09/18/24 1030          Balance    Balance Interventions sitting;standing;sit to stand;supported;static;dynamic;occupation based/functional task  -               User Key  (r) = Recorded By, (t) = Taken By, (c) = Cosigned By      Initials Name Provider Type     Cecily Raymond, OTR/L Occupational Therapist                   Goals/Plan    No documentation.                  Clinical Impression       Row Name 09/18/24 1030          Pain Scale: FACES Pre/Post-Treatment    Pain: FACES Scale, Pretreatment 0-->no hurt  -     Posttreatment Pain Rating 4-->hurts little more  -     Pain Location - Side/Orientation Right  -     Pain Location - hip  -       Row Name 09/18/24 1030          Plan of Care Review    Plan of Care Reviewed With patient;daughter  -     Progress no change  -     Outcome Evaluation OT tx complete.  Pt. appears fatigue, reports nausea, & pain at R side of pelvis.  Ms. Liu voices frustration with her lack of independence but also requires encouragement and edu to put forth effort participate in therapy.  OTR took much time encouraging and educating pt in benefits of activity & negative ramifications of prolonged immobility.  Pt agreeable and performed supine to sit at CGA.  Noteable improvement if pt is given extra time.  Ms Liu then demo'd good dynamic sitting balance during her attempt to LBD.  Mod A required likely d/t high profile preventative bandages at heels impeding her ability to ANDRES socks up past heel.  She was able to bring LE onto contralateral knee in order to assist with ANDRES with no LOB & minor pain.  Min A/CGA provided for t/fs and to take steps to chair.  Pt.'s DTR appreciative and understands benefits of activity. Cont OT tx.  -CH       Row Name 09/18/24 1030          Therapy Assessment/Plan (OT)    Patient/Family Therapy Goal  "Statement (OT) \"I'm tired of not being able to do what I want to do.\"  -     Rehab Potential (OT) good, to achieve stated therapy goals  -     Criteria for Skilled Therapeutic Interventions Met (OT) yes;skilled treatment is necessary  -     Therapy Frequency (OT) 5 times/wk  -       Row Name 09/18/24 1030          Therapy Plan Review/Discharge Plan (OT)    Anticipated Discharge Disposition (OT) skilled nursing facility  -       Row Name 09/18/24 1030          Positioning and Restraints    Pre-Treatment Position in bed  -     Post Treatment Position chair  -     In Chair sitting;call light within reach;reclined;encouraged to call for assist;with family/caregiver  -               User Key  (r) = Recorded By, (t) = Taken By, (c) = Cosigned By      Initials Name Provider Type    Cecily Mrorow, JENR/L Occupational Therapist                   Outcome Measures       Row Name 09/18/24 1030          How much help from another is currently needed...    Putting on and taking off regular lower body clothing? 2  -CH     Bathing (including washing, rinsing, and drying) 2  -CH     Toileting (which includes using toilet bed pan or urinal) 2  -CH     Putting on and taking off regular upper body clothing 2  -CH     Taking care of personal grooming (such as brushing teeth) 3  -CH     Eating meals 3  -CH     AM-PAC 6 Clicks Score (OT) 14  -       Row Name 09/18/24 1030          Functional Assessment    Outcome Measure Options AM-PAC 6 Clicks Daily Activity (OT)  -               User Key  (r) = Recorded By, (t) = Taken By, (c) = Cosigned By      Initials Name Provider Type    Cecily Morrow OTR/L Occupational Therapist                    Occupational Therapy Education       Title: PT OT SLP Therapies (In Progress)       Topic: Occupational Therapy (In Progress)       Point: ADL training (Done)       Description:   Instruct learner(s) on proper safety adaptation and remediation techniques during self care or " transfers.   Instruct in proper use of assistive devices.                  Learning Progress Summary             Patient Acceptance, E,D, VU by  at 9/18/2024 1206    Acceptance, E, NL,NR by CHERI at 9/12/2024 1006   Family Acceptance, E,D, VU by  at 9/18/2024 1206                         Point: Home exercise program (Not Started)       Description:   Instruct learner(s) on appropriate technique for monitoring, assisting and/or progressing therapeutic exercises/activities.                  Learner Progress:  Not documented in this visit.              Point: Precautions (Done)       Description:   Instruct learner(s) on prescribed precautions during self-care and functional transfers.                  Learning Progress Summary             Patient Acceptance, E,D, VU by  at 9/18/2024 1206    Acceptance, E, NL,NR by CHERI at 9/12/2024 1006   Family Acceptance, E,D, VU by  at 9/18/2024 1206                         Point: Body mechanics (Done)       Description:   Instruct learner(s) on proper positioning and spine alignment during self-care, functional mobility activities and/or exercises.                  Learning Progress Summary             Patient Acceptance, E,D, VU by  at 9/18/2024 1206   Family Acceptance, E,D, VU by  at 9/18/2024 1206                                         User Key       Initials Effective Dates Name Provider Type Discipline     07/11/23 -  Cecily Raymond, OTR/L Occupational Therapist OT     07/11/23 -  Emilie Hinds OTR/L, CSRS Occupational Therapist OT                  OT Recommendation and Plan  Therapy Frequency (OT): 5 times/wk  Plan of Care Review  Plan of Care Reviewed With: patient, daughter  Progress: no change  Outcome Evaluation: OT tx complete.  Pt. appears fatigue, reports nausea, & pain at R side of pelvis.  Ms. Liu voices frustration with her lack of independence but also requires encouragement and edu to put forth effort participate in therapy.  OTR took much  time encouraging and educating pt in benefits of activity & negative ramifications of prolonged immobility.  Pt agreeable and performed supine to sit at CGA.  Noteable improvement if pt is given extra time.  Ms Liu then demo'd good dynamic sitting balance during her attempt to LBD.  Mod A required likely d/t high profile preventative bandages at heels impeding her ability to ANDRES socks up past heel.  She was able to bring LE onto contralateral knee in order to assist with ANDRES with no LOB & minor pain.  Min A/CGA provided for t/fs and to take steps to chair.  Pt.'s DTR appreciative and understands benefits of activity. Cont OT tx.     Time Calculation:         Time Calculation- OT       Row Name 09/18/24 1030             Time Calculation- OT    OT Start Time 1030  -CH      OT Stop Time 1111  -CH      OT Time Calculation (min) 41 min  -CH      Total Timed Code Minutes- OT 41 minute(s)  -CH      OT Received On 09/18/24  -CH         Timed Charges    30340 - OT Self Care/Mgmt Minutes 41  -CH         Total Minutes    Timed Charges Total Minutes 41  -CH       Total Minutes 41  -CH                User Key  (r) = Recorded By, (t) = Taken By, (c) = Cosigned By      Initials Name Provider Type    CH Cecily Raymond OTR/L Occupational Therapist                  Therapy Charges for Today       Code Description Service Date Service Provider Modifiers Qty    [id number]  OT SELF CARE/MGMT/TRAIN EA 15 MIN 9/18/2024 Cecily Raymond OTR/ABHISHEK GO 3                 VENITA Schmitz/L  9/18/2024    Electronically signed by Cecily Raymond OTR/L at 09/18/24 4050

## 2024-09-19 NOTE — PLAN OF CARE
Goal Outcome Evaluation:  Plan of Care Reviewed With: patient, daughter           Outcome Evaluation: Pt A&Ox4 with daughter at bedside. VSS on 3L NC. No complaints of pain. Care ongoing.

## 2024-09-19 NOTE — THERAPY TREATMENT NOTE
Acute Care - Occupational Therapy Treatment Note  [facility]     Patient Name: Pattie Liu  : 1945  MRN: [id number]  Today's Date: 2024             Admit Date: 2024       ICD-10-CM ICD-9-CM   1. Pneumonia of right lung due to infectious organism, unspecified part of lung  J18.9 483.8   2. Dehydration  E86.0 276.51   3. Altered mental status, unspecified altered mental status type  R41.82 780.97   4. Impaired mobility [Z74.09]  Z74.09 799.89   5. Decreased activities of daily living (ADL)  Z78.9 V49.89     Patient Active Problem List   Diagnosis    Frequent PVCs    Shortness of breath    SOB (shortness of breath)    CAD in native artery    PVD (peripheral vascular disease)    Pneumonia due to infectious organism    Chronic back pain    Essential hypertension    Fall, initial encounter    Traumatic rhabdomyolysis    Leukocytosis    Anemia    Degenerative disc disease, lumbar    Dehydration    Acute UTI    Chronic respiratory failure with hypoxia    Impaired mobility    UTI (urinary tract infection)    Gait instability    Pneumonia    Pelvic fracture     Past Medical History:   Diagnosis Date    CAD in native artery 2019    COPD (chronic obstructive pulmonary disease)     Essential hypertension 2021    GERD (gastroesophageal reflux disease)     Lung nodule      Past Surgical History:   Procedure Laterality Date    BREAST IMPLANT REMOVAL      BREAST TISSUE EXPANDER REMOVAL INSERTION OF IMPLANT      CARDIAC CATHETERIZATION N/A 2019    Procedure: Left Heart Cath;  Surgeon: Edi Armijo MD;  Location: [facility] INVASIVE LOCATION;  Service: Cardiology    CHOLECYSTECTOMY      HYSTERECTOMY      MASTECTOMY      BILATERAL    OOPHORECTOMY           OT ASSESSMENT FLOWSHEET (Last 12 Hours)       OT Evaluation and Treatment       Row Name 24 1453                   OT Time and Intention    Subjective Information complains of;pain;weakness  -LS        Document Type therapy note  (daily note)  -LS        Mode of Treatment occupational therapy  -LS        Patient Effort good  -LS           General Information    Patient Profile Reviewed yes  -LS        Barriers to Rehab medically complex  -LS           Pain Assessment    Pretreatment Pain Rating 7/10  -LS        Posttreatment Pain Rating 9/10  -LS        Pain Location - Side/Orientation Right  -LS        Pain Location lower  -LS        Pain Location - extremity  -LS           Cognition    Orientation Status (Cognition) oriented to;person;place  -LS           Plan of Care Review    Plan of Care Reviewed With patient  -LS        Progress improving  -LS        Outcome Evaluation Ot tx completed on this date. Pt in bed on 3L/NC with daughter present . Pt agreeable to therapy. Pt completed bed mobility supine> EOB req min A, max A for donning shoes min A to complete sit to stands and functional moblity with RW from EOB <>BR req min A, did not sit on commode due to difficultty with standing from lower levels pt req'd max v'c's for posture and appropriate utilization of RW. Pt required mod A for bed mobility EOB to supine and left right sidelying. OT POC to continue.  -                  User Key  (r) = Recorded By, (t) = Taken By, (c) = Cosigned By      Initials Name Effective Dates    LS Brooklyn Alba COTA 09/22/22 -                      Occupational Therapy Education       Title: PT OT SLP Therapies (In Progress)       Topic: Occupational Therapy (In Progress)       Point: ADL training (Done)       Description:   Instruct learner(s) on proper safety adaptation and remediation techniques during self care or transfers.   Instruct in proper use of assistive devices.                  Learning Progress Summary             Patient Acceptance, E, VU by  at 9/19/2024 1558    Acceptance, E,D, VU by  at 9/18/2024 1206    Acceptance, E, NL,NR by J at 9/12/2024 1006   Family Acceptance, E,D, VU by  at 9/18/2024 1206                         Point:  Home exercise program (Not Started)       Description:   Instruct learner(s) on appropriate technique for monitoring, assisting and/or progressing therapeutic exercises/activities.                  Learner Progress:  Not documented in this visit.              Point: Precautions (Done)       Description:   Instruct learner(s) on prescribed precautions during self-care and functional transfers.                  Learning Progress Summary             Patient Acceptance, E,D, VU by  at 9/18/2024 1206    Acceptance, E, NL,NR by  at 9/12/2024 1006   Family Acceptance, E,D, VU by  at 9/18/2024 1206                         Point: Body mechanics (Done)       Description:   Instruct learner(s) on proper positioning and spine alignment during self-care, functional mobility activities and/or exercises.                  Learning Progress Summary             Patient Acceptance, E,D, VU by  at 9/18/2024 1206   Family Acceptance, E,D, VU by  at 9/18/2024 1206                                         User Key       Initials Effective Dates Name Provider Type Discipline     07/11/23 -  Cecily Raymond, OTR/L Occupational Therapist OT    JJ 07/11/23 -  Emilie Hinds OTR/L, JUANITAS Occupational Therapist OT     09/22/22 -  Brooklyn Alba COTA Occupational Therapist Assistant THERAPIES                      OT Recommendation and Plan     Plan of Care Review  Plan of Care Reviewed With: patient  Progress: improving  Outcome Evaluation: Ot tx completed on this date. Pt in bed on 3L/NC with daughter present . Pt agreeable to therapy. Pt completed bed mobility supine> EOB req min A, max A for donning shoes min A to complete sit to stands and functional moblity with RW from EOB <>BR req min A, did not sit on commode due to difficultty with standing from lower levels pt req'd max v'c's for posture and appropriate utilization of RW. Pt required mod A for bed mobility EOB to supine and left right sidelying. OT POC to continue.  Plan  of Care Reviewed With: patient  Outcome Evaluation: Ot tx completed on this date. Pt in bed on 3L/NC with daughter present . Pt agreeable to therapy. Pt completed bed mobility supine> EOB req min A, max A for donning shoes min A to complete sit to stands and functional moblity with RW from EOB <>BR req min A, did not sit on commode due to difficultty with standing from lower levels pt req'd max v'c's for posture and appropriate utilization of RW. Pt required mod A for bed mobility EOB to supine and left right sidelying. OT POC to continue.     Outcome Measures       Row Name 09/19/24 1500 09/19/24 1058 09/18/24 7293       How much help from another person do you currently need...    Turning from your back to your side while in flat bed without using bedrails? -- 3  -ZACHARY 3  -ZACHARY    Moving from lying on back to sitting on the side of a flat bed without bedrails? -- 3  -ZACHARY 3  -ZACHARY    Moving to and from a bed to a chair (including a wheelchair)? -- 3  -ZACHARY 2  -ZACHARY    Standing up from a chair using your arms (e.g., wheelchair, bedside chair)? -- 3  -ZACHARY 2  -ZACHARY    Climbing 3-5 steps with a railing? -- 2  -ZACHARY 1  -ZACHARY    To walk in hospital room? -- 3  -ZACHARY 1  -ZACHARY    AM-PAC 6 Clicks Score (PT) -- 17  -ZACHARY 12  -ZACHARY    Highest Level of Mobility Goal -- 5 --> Static standing  -ZACHARY 4 --> Transfer to chair/commode  -ZACHARY       How much help from another is currently needed...    Putting on and taking off regular lower body clothing? 2  -LS -- --    Bathing (including washing, rinsing, and drying) 2  -LS -- --    Toileting (which includes using toilet bed pan or urinal) 2  -LS -- --    Putting on and taking off regular upper body clothing 2  -LS -- --    Taking care of personal grooming (such as brushing teeth) 3  -LS -- --    Eating meals 3  -LS -- --    AM-PAC 6 Clicks Score (OT) 14  -LS -- --       Functional Assessment    Outcome Measure Options AM-PAC 6 Clicks Daily Activity (OT)  -LS AM-PAC 6 Clicks Basic Mobility (PT)  -ZACHARY AM-PAC 6  Clicks Basic Mobility (PT)  -ZACHARY      Row Name 09/17/24 1001             How much help from another person do you currently need...    Turning from your back to your side while in flat bed without using bedrails? 3  -ZACHARY      Moving from lying on back to sitting on the side of a flat bed without bedrails? 3  -ZACHARY      Moving to and from a bed to a chair (including a wheelchair)? 2  -ZACHARY      Standing up from a chair using your arms (e.g., wheelchair, bedside chair)? 2  -ZACHARY      Climbing 3-5 steps with a railing? 1  -ZACHARY      To walk in hospital room? 1  -ZACHARY      AM-PAC 6 Clicks Score (PT) 12  -ZACHARY      Highest Level of Mobility Goal 4 --> Transfer to chair/commode  -ZACHARY         Functional Assessment    Outcome Measure Options AM-PAC 6 Clicks Basic Mobility (PT)  -ZACHARY                User Key  (r) = Recorded By, (t) = Taken By, (c) = Cosigned By      Initials Name Provider Type    Chau Underwood PTA Physical Therapist Assistant    Brooklyn Lagos COTA Occupational Therapist Assistant                    Time Calculation:    Time Calculation- OT       Row Name 09/19/24 1559 09/19/24 1058          Time Calculation- OT    OT Start Time 1453  -LS --     OT Stop Time 1535  -LS --     OT Time Calculation (min) 42 min  -LS --     Total Timed Code Minutes- OT 42 minute(s)  -LS --     OT Received On 09/19/24  - --        Timed Charges    14134 - Gait Training Minutes  -- 24  -ZACHARY        Total Minutes    Timed Charges Total Minutes -- 24  -ZACHARY      Total Minutes -- 24  -ZACHARY               User Key  (r) = Recorded By, (t) = Taken By, (c) = Cosigned By      Initials Name Provider Type    Chau Underwood PTA Physical Therapist Assistant    Brooklyn Lagos COTA Occupational Therapist Assistant                  Therapy Charges for Today       Code Description Service Date Service Provider Modifiers Qty    [id number]  OT THERAPEUTIC ACT EA 15 MIN 9/19/2024 Brooklyn Alba COTA GO 3                 DWAYNE Azar  9/19/2024

## 2024-09-19 NOTE — PLAN OF CARE
Goal Outcome Evaluation:  Plan of Care Reviewed With: patient        Progress: improving  Outcome Evaluation: Pt was in bed.  Able to transfer supine to sitting with min assist.  Transfered sit to stand with min assist.  Amb 5' x 2 with sitting rest, with RWX and min assist.  Pt continues to improve.  Would benefit from continued therapy to increase strength and progress amb.

## 2024-09-19 NOTE — PROGRESS NOTES
Daily Progress Note  Pattie Liu  MRN: [id number] LOS: 8    Admit Date: 9/11/2024 9/19/2024 07:27 CDT    Subjective:      Chief Complaint:  Chief Complaint   Patient presents with    Altered Mental Status       Interval History:    Reviewed overnight events and nursing notes.   No acute events overnight.  Resting comfortably this morning.   Plan is for her to go to skilled nursing facility for rehab and likely end up going to assisted living when she is able.    Review of Systems   Constitutional:  Positive for activity change and fatigue. Negative for fever.   Respiratory:  Positive for cough and shortness of breath.    Gastrointestinal:  Negative for nausea and vomiting.   Genitourinary:  Positive for decreased urine volume and dysuria.   Musculoskeletal:  Positive for gait problem.   Neurological:  Positive for weakness.       DIET:  Diet: Regular/House; Fluid Consistency: Thin (IDDSI 0)    Medications:        ALPRAZolam, 1 mg, Oral, BID  aspirin, 81 mg, Oral, Daily  atorvastatin, 20 mg, Oral, Daily  bumetanide, 1 mg, Oral, Daily  citalopram, 30 mg, Oral, Daily  enoxaparin, 40 mg, Subcutaneous, Q24H  fluticasone, 2 spray, Each Nare, BID  gabapentin, 300 mg, Oral, BID  ipratropium, 0.5 mg, Nebulization, 4x Daily - RT  lisinopril, 20 mg, Oral, Daily  nicotine, 1 patch, Transdermal, Q24H  pantoprazole, 40 mg, Oral, QAM AC  potassium chloride ER, 40 mEq, Oral, Q4H  sodium chloride, 10 mL, Intravenous, Q12H        Data:     Code Status:   Code Status and Medical Interventions: No CPR (Do Not Attempt to Resuscitate); Full Support   Ordered at: 09/11/24 1647     Code Status (Patient has no pulse and is not breathing):    No CPR (Do Not Attempt to Resuscitate)     Medical Interventions (Patient has pulse or is breathing):    Full Support       Family History   Problem Relation Age of Onset    Cancer Mother     Cancer Father      Social History     Socioeconomic History    Marital status:    Tobacco Use     Smoking status: Every Day     Current packs/day: 0.50     Average packs/day: 0.5 packs/day for 62.0 years (31.0 ttl pk-yrs)     Types: Cigarettes    Smokeless tobacco: Never    Tobacco comments:     states she has no plan to quit smoking   Vaping Use    Vaping status: Former   Substance and Sexual Activity    Alcohol use: No    Drug use: No    Sexual activity: Not Currently       Labs:  Lab Results (last 72 hours)       Procedure Component Value Units Date/Time    Comprehensive Metabolic Panel [[id number]]  (Abnormal) Collected: 09/19/24 0348    Specimen: Blood Updated: 09/19/24 0509     Glucose 100 mg/dL      BUN 11 mg/dL      Creatinine 0.85 mg/dL      Sodium 144 mmol/L      Potassium 3.5 mmol/L      Chloride 105 mmol/L      CO2 33.0 mmol/L      Calcium 8.9 mg/dL      Total Protein 5.1 g/dL      Albumin 2.5 g/dL      ALT (SGPT) <5 U/L      AST (SGOT) 14 U/L      Alkaline Phosphatase 95 U/L      Total Bilirubin <0.2 mg/dL      Globulin 2.6 gm/dL      A/G Ratio 1.0 g/dL      BUN/Creatinine Ratio 12.9     Anion Gap 6.0 mmol/L      eGFR 68.9 mL/min/1.73     Narrative:      GFR Normal >60  Chronic Kidney Disease <60  Kidney Failure <15    The GFR formula is only valid for adults with stable renal function between ages 18 and 70.    CBC Auto Differential [[id number]]  (Abnormal) Collected: 09/19/24 0348    Specimen: Blood Updated: 09/19/24 0420     WBC 13.95 10*3/mm3      RBC 3.43 10*6/mm3      Hemoglobin 10.3 g/dL      Hematocrit 33.1 %      MCV 96.5 fL      MCH 30.0 pg      MCHC 31.1 g/dL      RDW 13.8 %      RDW-SD 48.9 fl      MPV 10.7 fL      Platelets 394 10*3/mm3      Neutrophil % 71.2 %      Lymphocyte % 19.9 %      Monocyte % 5.4 %      Eosinophil % 2.6 %      Basophil % 0.3 %      Immature Grans % 0.6 %      Neutrophils, Absolute 9.93 10*3/mm3      Lymphocytes, Absolute 2.78 10*3/mm3      Monocytes, Absolute 0.76 10*3/mm3      Eosinophils, Absolute 0.36 10*3/mm3      Basophils, Absolute 0.04 10*3/mm3       Immature Grans, Absolute 0.08 10*3/mm3      nRBC 0.0 /100 WBC     CBC Auto Differential [[id number]]  (Abnormal) Collected: 09/18/24 0408    Specimen: Blood Updated: 09/18/24 0550     WBC 10.50 10*3/mm3      RBC 3.62 10*6/mm3      Hemoglobin 10.7 g/dL      Hematocrit 34.8 %      MCV 96.1 fL      MCH 29.6 pg      MCHC 30.7 g/dL      RDW 13.5 %      RDW-SD 47.7 fl      MPV 11.0 fL      Platelets 395 10*3/mm3      Neutrophil % 59.1 %      Lymphocyte % 29.0 %      Monocyte % 6.9 %      Eosinophil % 4.1 %      Basophil % 0.5 %      Immature Grans % 0.4 %      Neutrophils, Absolute 6.22 10*3/mm3      Lymphocytes, Absolute 3.04 10*3/mm3      Monocytes, Absolute 0.72 10*3/mm3      Eosinophils, Absolute 0.43 10*3/mm3      Basophils, Absolute 0.05 10*3/mm3      Immature Grans, Absolute 0.04 10*3/mm3      nRBC 0.0 /100 WBC     Comprehensive Metabolic Panel [[id number]]  (Abnormal) Collected: 09/18/24 0408    Specimen: Blood Updated: 09/18/24 0532     Glucose 84 mg/dL      BUN 6 mg/dL      Creatinine 0.64 mg/dL      Sodium 143 mmol/L      Potassium 3.7 mmol/L      Chloride 106 mmol/L      CO2 32.0 mmol/L      Calcium 9.1 mg/dL      Total Protein 5.2 g/dL      Albumin 2.5 g/dL      ALT (SGPT) <5 U/L      AST (SGOT) 16 U/L      Alkaline Phosphatase 97 U/L      Total Bilirubin <0.2 mg/dL      Globulin 2.7 gm/dL      A/G Ratio 0.9 g/dL      BUN/Creatinine Ratio 9.4     Anion Gap 5.0 mmol/L      eGFR 88.9 mL/min/1.73     Narrative:      GFR Normal >60  Chronic Kidney Disease <60  Kidney Failure <15    The GFR formula is only valid for adults with stable renal function between ages 18 and 70.    Comprehensive Metabolic Panel [[id number]]  (Abnormal) Collected: 09/17/24 0610    Specimen: Blood Updated: 09/17/24 0756     Glucose 106 mg/dL      BUN 8 mg/dL      Creatinine 0.74 mg/dL      Sodium 141 mmol/L      Potassium 3.7 mmol/L      Chloride 101 mmol/L      CO2 31.0 mmol/L      Calcium 10.0 mg/dL      Total Protein 6.3 g/dL      Albumin  "3.1 g/dL      ALT (SGPT) <5 U/L      AST (SGOT) 21 U/L      Alkaline Phosphatase 124 U/L      Total Bilirubin 0.2 mg/dL      Globulin 3.2 gm/dL      A/G Ratio 1.0 g/dL      BUN/Creatinine Ratio 10.8     Anion Gap 9.0 mmol/L      eGFR 81.4 mL/min/1.73     Narrative:      GFR Normal >60  Chronic Kidney Disease <60  Kidney Failure <15    The GFR formula is only valid for adults with stable renal function between ages 18 and 70.    CBC Auto Differential [[id number]]  (Abnormal) Collected: 09/17/24 0610    Specimen: Blood Updated: 09/17/24 0733     WBC 13.85 10*3/mm3      RBC 4.03 10*6/mm3      Hemoglobin 12.1 g/dL      Hematocrit 37.5 %      MCV 93.1 fL      MCH 30.0 pg      MCHC 32.3 g/dL      RDW 13.2 %      RDW-SD 45.3 fl      MPV 11.0 fL      Platelets 447 10*3/mm3      Neutrophil % 71.5 %      Lymphocyte % 18.1 %      Monocyte % 5.8 %      Eosinophil % 3.7 %      Basophil % 0.4 %      Immature Grans % 0.5 %      Neutrophils, Absolute 9.90 10*3/mm3      Lymphocytes, Absolute 2.51 10*3/mm3      Monocytes, Absolute 0.80 10*3/mm3      Eosinophils, Absolute 0.51 10*3/mm3      Basophils, Absolute 0.06 10*3/mm3      Immature Grans, Absolute 0.07 10*3/mm3      nRBC 0.0 /100 WBC     Blood Culture - Blood, Arm, Left [[id number]]  (Normal) Collected: 09/11/24 1346    Specimen: Blood from Arm, Left Updated: 09/16/24 1415     Blood Culture No growth at 5 days    Blood Culture - Blood, Arm, Right [[id number]]  (Normal) Collected: 09/11/24 1302    Specimen: Blood from Arm, Right Updated: 09/16/24 1330     Blood Culture No growth at 5 days            Objective:     Vitals: /66 (BP Location: Left arm, Patient Position: Lying)   Pulse 60   Temp 98.3 °F (36.8 °C) (Oral)   Resp 18   Ht 160 cm (63\")   Wt 66.2 kg (145 lb 15.1 oz)   SpO2 95%   BMI 25.85 kg/m²    Intake/Output Summary (Last 24 hours) at 9/19/2024 0727  Last data filed at 9/18/2024 1808  Gross per 24 hour   Intake --   Output 900 ml   Net -900 ml    Temp (24hrs), " Av.2 °F (36.8 °C), Min:97.9 °F (36.6 °C), Max:98.6 °F (37 °C)      Physical Exam  Vitals reviewed.   Constitutional:       Appearance: Normal appearance. She is ill-appearing.   HENT:      Head: Normocephalic and atraumatic.   Eyes:      General:         Right eye: No discharge.         Left eye: No discharge.      Conjunctiva/sclera: Conjunctivae normal.   Cardiovascular:      Rate and Rhythm: Normal rate and regular rhythm.      Pulses: Normal pulses.      Heart sounds: No murmur heard.  Pulmonary:      Effort: Pulmonary effort is normal. No respiratory distress.      Comments: Nasal cannula in place  Abdominal:      General: Abdomen is flat. Bowel sounds are normal. There is no distension.   Musculoskeletal:      Right lower leg: No edema.      Left lower leg: No edema.   Skin:     Capillary Refill: Capillary refill takes less than 2 seconds.   Neurological:      General: No focal deficit present.      Mental Status: She is alert.   Psychiatric:         Mood and Affect: Mood normal.         Behavior: Behavior normal.             Assessment and Plan:     Primary Problem:  Pneumonia    Hospital Problem list:    Pneumonia    Essential hypertension    Gait instability    Pelvic fracture      PMH:  Past Medical History:   Diagnosis Date    CAD in native artery 2019    COPD (chronic obstructive pulmonary disease)     Essential hypertension 2021    GERD (gastroesophageal reflux disease)     Lung nodule        Treatment Plan:  Pneumonia: Improved with abx and steroids, to finish antibiotics 2024, oxygen is at baseline, incentive spirometry, nebs     Pelvic fracture: prior hospitalization. PT/OT while inpatient, she will still require SNF however she would like to not return to [facility].     Long discussion at the bedside with daughter about goals.  Plan is for her to go to skilled nursing facility for rehab and likely end up going to assisted living when she is able.     Code status: DNR     DVT ppx:  lovenox     Disposition: inpatient, SNF (requesting different facility if possible)    Reviewed treatment plans with the patient and/or family.   20 minutes spent in face to face interaction and coordination of care.     Electronically signed by Anupam Ledezma MD on 9/19/2024 at 07:27 CDT

## 2024-09-20 LAB
ALBUMIN SERPL-MCNC: 2.6 G/DL (ref 3.5–5.2)
ALBUMIN/GLOB SERPL: 1 G/DL
ALP SERPL-CCNC: 92 U/L (ref 39–117)
ALT SERPL W P-5'-P-CCNC: <5 U/L (ref 1–33)
ANION GAP SERPL CALCULATED.3IONS-SCNC: 6 MMOL/L (ref 5–15)
AST SERPL-CCNC: 12 U/L (ref 1–32)
BASOPHILS # BLD AUTO: 0.06 10*3/MM3 (ref 0–0.2)
BASOPHILS NFR BLD AUTO: 0.6 % (ref 0–1.5)
BILIRUB SERPL-MCNC: <0.2 MG/DL (ref 0–1.2)
BUN SERPL-MCNC: 12 MG/DL (ref 8–23)
BUN/CREAT SERPL: 17.6 (ref 7–25)
CALCIUM SPEC-SCNC: 9.2 MG/DL (ref 8.6–10.5)
CHLORIDE SERPL-SCNC: 104 MMOL/L (ref 98–107)
CO2 SERPL-SCNC: 31 MMOL/L (ref 22–29)
CREAT SERPL-MCNC: 0.68 MG/DL (ref 0.57–1)
DEPRECATED RDW RBC AUTO: 46.7 FL (ref 37–54)
EGFRCR SERPLBLD CKD-EPI 2021: 87.6 ML/MIN/1.73
EOSINOPHIL # BLD AUTO: 0.33 10*3/MM3 (ref 0–0.4)
EOSINOPHIL NFR BLD AUTO: 3.3 % (ref 0.3–6.2)
ERYTHROCYTE [DISTWIDTH] IN BLOOD BY AUTOMATED COUNT: 13.9 % (ref 12.3–15.4)
GLOBULIN UR ELPH-MCNC: 2.7 GM/DL
GLUCOSE SERPL-MCNC: 111 MG/DL (ref 65–99)
HCT VFR BLD AUTO: 32.5 % (ref 34–46.6)
HGB BLD-MCNC: 10.3 G/DL (ref 12–15.9)
IMM GRANULOCYTES # BLD AUTO: 0.07 10*3/MM3 (ref 0–0.05)
IMM GRANULOCYTES NFR BLD AUTO: 0.7 % (ref 0–0.5)
LYMPHOCYTES # BLD AUTO: 2.91 10*3/MM3 (ref 0.7–3.1)
LYMPHOCYTES NFR BLD AUTO: 28.8 % (ref 19.6–45.3)
MCH RBC QN AUTO: 29.8 PG (ref 26.6–33)
MCHC RBC AUTO-ENTMCNC: 31.7 G/DL (ref 31.5–35.7)
MCV RBC AUTO: 93.9 FL (ref 79–97)
MONOCYTES # BLD AUTO: 0.68 10*3/MM3 (ref 0.1–0.9)
MONOCYTES NFR BLD AUTO: 6.7 % (ref 5–12)
NEUTROPHILS NFR BLD AUTO: 59.9 % (ref 42.7–76)
NEUTROPHILS NFR BLD AUTO: 6.06 10*3/MM3 (ref 1.7–7)
NRBC BLD AUTO-RTO: 0 /100 WBC (ref 0–0.2)
PLATELET # BLD AUTO: 368 10*3/MM3 (ref 140–450)
PMV BLD AUTO: 10.5 FL (ref 6–12)
POTASSIUM SERPL-SCNC: 4.5 MMOL/L (ref 3.5–5.2)
PROT SERPL-MCNC: 5.3 G/DL (ref 6–8.5)
RBC # BLD AUTO: 3.46 10*6/MM3 (ref 3.77–5.28)
SODIUM SERPL-SCNC: 141 MMOL/L (ref 136–145)
WBC NRBC COR # BLD AUTO: 10.11 10*3/MM3 (ref 3.4–10.8)

## 2024-09-20 PROCEDURE — 94799 UNLISTED PULMONARY SVC/PX: CPT

## 2024-09-20 PROCEDURE — 80053 COMPREHEN METABOLIC PANEL: CPT | Performed by: FAMILY MEDICINE

## 2024-09-20 PROCEDURE — 97110 THERAPEUTIC EXERCISES: CPT

## 2024-09-20 PROCEDURE — 25010000002 ENOXAPARIN PER 10 MG: Performed by: FAMILY MEDICINE

## 2024-09-20 PROCEDURE — 97116 GAIT TRAINING THERAPY: CPT

## 2024-09-20 PROCEDURE — 94760 N-INVAS EAR/PLS OXIMETRY 1: CPT

## 2024-09-20 PROCEDURE — 97530 THERAPEUTIC ACTIVITIES: CPT

## 2024-09-20 PROCEDURE — 85025 COMPLETE CBC W/AUTO DIFF WBC: CPT | Performed by: FAMILY MEDICINE

## 2024-09-20 RX ADMIN — IPRATROPIUM BROMIDE 0.5 MG: 0.5 SOLUTION RESPIRATORY (INHALATION) at 19:11

## 2024-09-20 RX ADMIN — FLUTICASONE PROPIONATE 2 SPRAY: 50 SPRAY, METERED NASAL at 21:00

## 2024-09-20 RX ADMIN — GABAPENTIN 300 MG: 300 CAPSULE ORAL at 21:00

## 2024-09-20 RX ADMIN — ASPIRIN 81 MG: 81 TABLET, COATED ORAL at 09:46

## 2024-09-20 RX ADMIN — Medication 10 ML: at 09:48

## 2024-09-20 RX ADMIN — NICOTINE 1 PATCH: 21 PATCH, EXTENDED RELEASE TRANSDERMAL at 18:38

## 2024-09-20 RX ADMIN — Medication 10 MG: at 21:00

## 2024-09-20 RX ADMIN — BUMETANIDE 1 MG: 1 TABLET ORAL at 09:48

## 2024-09-20 RX ADMIN — ATORVASTATIN CALCIUM 20 MG: 10 TABLET, FILM COATED ORAL at 09:46

## 2024-09-20 RX ADMIN — FLUTICASONE PROPIONATE 2 SPRAY: 50 SPRAY, METERED NASAL at 09:48

## 2024-09-20 RX ADMIN — IPRATROPIUM BROMIDE 0.5 MG: 0.5 SOLUTION RESPIRATORY (INHALATION) at 07:20

## 2024-09-20 RX ADMIN — ALPRAZOLAM 1 MG: 0.5 TABLET ORAL at 21:00

## 2024-09-20 RX ADMIN — LISINOPRIL 20 MG: 20 TABLET ORAL at 09:47

## 2024-09-20 RX ADMIN — GABAPENTIN 300 MG: 300 CAPSULE ORAL at 09:47

## 2024-09-20 RX ADMIN — IPRATROPIUM BROMIDE 0.5 MG: 0.5 SOLUTION RESPIRATORY (INHALATION) at 10:28

## 2024-09-20 RX ADMIN — IPRATROPIUM BROMIDE 0.5 MG: 0.5 SOLUTION RESPIRATORY (INHALATION) at 14:17

## 2024-09-20 RX ADMIN — Medication 10 ML: at 21:01

## 2024-09-20 RX ADMIN — CITALOPRAM 30 MG: 20 TABLET, FILM COATED ORAL at 09:48

## 2024-09-20 RX ADMIN — ENOXAPARIN SODIUM 40 MG: 100 INJECTION SUBCUTANEOUS at 18:39

## 2024-09-20 RX ADMIN — ALPRAZOLAM 1 MG: 0.5 TABLET ORAL at 09:47

## 2024-09-20 RX ADMIN — PANTOPRAZOLE SODIUM 40 MG: 40 TABLET, DELAYED RELEASE ORAL at 09:47

## 2024-09-20 RX ADMIN — OXYCODONE HYDROCHLORIDE AND ACETAMINOPHEN 1 TABLET: 5; 325 TABLET ORAL at 10:02

## 2024-09-20 NOTE — PLAN OF CARE
Goal Outcome Evaluation:         Patient requires min assist for supine to sit with HOB elevated. Patient stood and ambulated 123' with Rwx and Min assist. Very small steps, decreased speed. Patient was able to work thru multiple LE exercises in supine and sitting. Patient is weak with little endurance. Patient will benefit from strengthening and gait training.

## 2024-09-20 NOTE — THERAPY TREATMENT NOTE
Acute Care - Physical Therapy Treatment Note  [facility]     Patient Name: Pattie Liu  : 1945  MRN: [id number]  Today's Date: 2024      Visit Dx:     ICD-10-CM ICD-9-CM   1. Pneumonia of right lung due to infectious organism, unspecified part of lung  J18.9 483.8   2. Dehydration  E86.0 276.51   3. Altered mental status, unspecified altered mental status type  R41.82 780.97   4. Impaired mobility [Z74.09]  Z74.09 799.89   5. Decreased activities of daily living (ADL)  Z78.9 V49.89     Patient Active Problem List   Diagnosis    Frequent PVCs    Shortness of breath    SOB (shortness of breath)    CAD in native artery    PVD (peripheral vascular disease)    Pneumonia due to infectious organism    Chronic back pain    Essential hypertension    Fall, initial encounter    Traumatic rhabdomyolysis    Leukocytosis    Anemia    Degenerative disc disease, lumbar    Dehydration    Acute UTI    Chronic respiratory failure with hypoxia    Impaired mobility    UTI (urinary tract infection)    Gait instability    Pneumonia    Pelvic fracture     Past Medical History:   Diagnosis Date    CAD in native artery 2019    COPD (chronic obstructive pulmonary disease)     Essential hypertension 2021    GERD (gastroesophageal reflux disease)     Lung nodule      Past Surgical History:   Procedure Laterality Date    BREAST IMPLANT REMOVAL      BREAST TISSUE EXPANDER REMOVAL INSERTION OF IMPLANT      CARDIAC CATHETERIZATION N/A 2019    Procedure: Left Heart Cath;  Surgeon: Edi Armijo MD;  Location: [facility] INVASIVE LOCATION;  Service: Cardiology    CHOLECYSTECTOMY      HYSTERECTOMY      MASTECTOMY      BILATERAL    OOPHORECTOMY       PT Assessment (Last 12 Hours)       PT Evaluation and Treatment       Row Name 24 1040          Physical Therapy Time and Intention    Subjective Information complains of;pain  -BARBARA     Document Type therapy note (daily note)  -BARBARA     Mode of Treatment  physical therapy  -       Row Name 09/20/24 1040          General Information    Existing Precautions/Restrictions fall;oxygen therapy device and L/min  -     Limitations/Impairments safety/cognitive  -       Row Name 09/20/24 1040          Bed Mobility    Supine-Sit [place] (Bed Mobility) verbal cues;minimum assist (75% patient effort)  -     Assistive Device (Bed Mobility) head of bed elevated  -       Row Name 09/20/24 1040          Sit-Stand Transfer    Sit-Stand [place] (Transfers) verbal cues;minimum assist (75% patient effort)  -       Row Name 09/20/24 1040          Stand-Sit Transfer    Stand-Sit [place] (Transfers) verbal cues;contact guard  -       Row Name 09/20/24 1040          Gait/Stairs (Locomotion)    [place] Level (Gait) verbal cues;minimum assist (75% patient effort)  -     Assistive Device (Gait) walker, front-wheeled  -     Distance in Feet (Gait) 12  -BARBARA     Pattern (Gait) step-to  -     Deviations/Abnormal Patterns (Gait) antalgic;gait speed decreased;stride length decreased;weight shifting decreased  -     Bilateral Gait Deviations --  forward head  -       Row Name 09/20/24 1040          Hip (Therapeutic Exercise)    Hip AROM (Therapeutic Exercise) bilateral;aBduction;aDduction;external rotation;internal rotation;sitting;supine;15 repititions  -       Row Name 09/20/24 1040          Knee (Therapeutic Exercise)    Knee AROM (Therapeutic Exercise) bilateral;LAQ (long arc quad);heel slides;sitting;supine;15 repititions  -       Row Name 09/20/24 1040          Ankle (Therapeutic Exercise)    Ankle AROM (Therapeutic Exercise) bilateral;dorsiflexion;plantarflexion;sitting;supine;15 repititions;2 sets  -       Row Name 09/20/24 1028          Positioning and Restraints    Pre-Treatment Position in bed  -     Post Treatment Position chair  -     In Chair sitting;call light within reach;encouraged to call for assist;with family/caregiver  -                User Key  (r) = Recorded By, (t) = Taken By, (c) = Cosigned By      Initials Name Provider Type    BARBARA Kate Leach, TRISHA Physical Therapist Assistant                    Physical Therapy Education       Title: PT OT SLP Therapies (In Progress)       Topic: Physical Therapy (In Progress)       Point: Mobility training (Done)       Learning Progress Summary             Patient Acceptance, E, VU by BARBARA at 9/20/2024 1146    Comment: bed transfer    Acceptance, E, NR by CHICA at 9/12/2024 0931    Comment: benefits of activity, progression of PT                         Point: Home exercise program (Not Started)       Learner Progress:  Not documented in this visit.              Point: Body mechanics (Not Started)       Learner Progress:  Not documented in this visit.              Point: Precautions (Not Started)       Learner Progress:  Not documented in this visit.                              User Key       Initials Effective Dates Name Provider Type Discipline    CHICA 02/03/23 -  Feroz Schwab, PT DPT Physical Therapist PT    BARBARA 02/03/23 -  Kate Leach PTA Physical Therapist Assistant PT                  PT Recommendation and Plan         Outcome Measures       Row Name 09/20/24 1100 09/19/24 1500 09/19/24 1058       How much help from another person do you currently need...    Turning from your back to your side while in flat bed without using bedrails? 3  -BARBARA -- 3  -ZACHARY    Moving from lying on back to sitting on the side of a flat bed without bedrails? 3  -BARBARA -- 3  -ZACHARY    Moving to and from a bed to a chair (including a wheelchair)? 3  -BARBARA -- 3  -ZACHARY    Standing up from a chair using your arms (e.g., wheelchair, bedside chair)? 3  -BARBARA -- 3  -ZACHARY    Climbing 3-5 steps with a railing? 2  -BARBARA -- 2  -ZACHARY    To walk in hospital room? 3  -BARBARA -- 3  -ZACHARY    AM-PAC 6 Clicks Score (PT) 17  -BARBARA -- 17  -ZACHARY    Highest Level of Mobility Goal 5 --> Static standing  -BARBARA -- 5 --> Static standing  -ZACHARY       How much help  from another is currently needed...    Putting on and taking off regular lower body clothing? -- 2  -LS --    Bathing (including washing, rinsing, and drying) -- 2  -LS --    Toileting (which includes using toilet bed pan or urinal) -- 2  -LS --    Putting on and taking off regular upper body clothing -- 2  -LS --    Taking care of personal grooming (such as brushing teeth) -- 3  -LS --    Eating meals -- 3  -LS --    AM-PAC 6 Clicks Score (OT) -- 14  -LS --       Functional Assessment    Outcome Measure Options -- AM-PAC 6 Clicks Daily Activity (OT)  -LS AM-PAC 6 Clicks Basic Mobility (PT)  -ZACHARY      Row Name 09/18/24 8596             How much help from another person do you currently need...    Turning from your back to your side while in flat bed without using bedrails? 3  -ZACHARY      Moving from lying on back to sitting on the side of a flat bed without bedrails? 3  -ZACHARY      Moving to and from a bed to a chair (including a wheelchair)? 2  -ZACHARY      Standing up from a chair using your arms (e.g., wheelchair, bedside chair)? 2  -ZACHARY      Climbing 3-5 steps with a railing? 1  -ZACHARY      To walk in hospital room? 1  -ZACHARY      AM-PAC 6 Clicks Score (PT) 12  -ZACHARY      Highest Level of Mobility Goal 4 --> Transfer to chair/commode  -ZACHARY         Functional Assessment    Outcome Measure Options AM-PAC 6 Clicks Basic Mobility (PT)  -ZACHARY                User Key  (r) = Recorded By, (t) = Taken By, (c) = Cosigned By      Initials Name Provider Type    Kate Lezama, TRISHA Physical Therapist Assistant    Chau Underwood PTA Physical Therapist Assistant    Brooklyn Lagos COTA Occupational Therapist Assistant                     Time Calculation:    PT Charges       Row Name 09/20/24 1148             Time Calculation    Start Time 1040  -BARBARA      Stop Time 1105  -BARBARA      Time Calculation (min) 25 min  -BARBARA         Timed Charges    17339 - PT Therapeutic Exercise Minutes 13  -BARBARA      13398 - Gait Training Minutes  12  -BARBARA          Total Minutes    Timed Charges Total Minutes 25  -BARBARA       Total Minutes 25  -BARBARA                User Key  (r) = Recorded By, (t) = Taken By, (c) = Cosigned By      Initials Name Provider Type    Kate Lezama PTA Physical Therapist Assistant                  Therapy Charges for Today       Code Description Service Date Service Provider Modifiers Qty    78695705854  PT THER PROC EA 15 MIN 9/20/2024 Kate Leach, TRISHA GP 1    97287802498 HC GAIT TRAINING EA 15 MIN 9/20/2024 Kate Leach PTA GP 1            PT G-Codes  Outcome Measure Options: AM-PAC 6 Clicks Daily Activity (OT)  AM-PAC 6 Clicks Score (PT): 17  AM-PAC 6 Clicks Score (OT): 14    Kate Leach PTA  9/20/2024

## 2024-09-20 NOTE — THERAPY TREATMENT NOTE
Acute Care - Occupational Therapy Treatment Note  [facility]     Patient Name: Pattie Liu  : 1945  MRN: [id number]  Today's Date: 2024             Admit Date: 2024       ICD-10-CM ICD-9-CM   1. Pneumonia of right lung due to infectious organism, unspecified part of lung  J18.9 483.8   2. Dehydration  E86.0 276.51   3. Altered mental status, unspecified altered mental status type  R41.82 780.97   4. Impaired mobility [Z74.09]  Z74.09 799.89   5. Decreased activities of daily living (ADL)  Z78.9 V49.89     Patient Active Problem List   Diagnosis    Frequent PVCs    Shortness of breath    SOB (shortness of breath)    CAD in native artery    PVD (peripheral vascular disease)    Pneumonia due to infectious organism    Chronic back pain    Essential hypertension    Fall, initial encounter    Traumatic rhabdomyolysis    Leukocytosis    Anemia    Degenerative disc disease, lumbar    Dehydration    Acute UTI    Chronic respiratory failure with hypoxia    Impaired mobility    UTI (urinary tract infection)    Gait instability    Pneumonia    Pelvic fracture     Past Medical History:   Diagnosis Date    CAD in native artery 2019    COPD (chronic obstructive pulmonary disease)     Essential hypertension 2021    GERD (gastroesophageal reflux disease)     Lung nodule      Past Surgical History:   Procedure Laterality Date    BREAST IMPLANT REMOVAL      BREAST TISSUE EXPANDER REMOVAL INSERTION OF IMPLANT      CARDIAC CATHETERIZATION N/A 2019    Procedure: Left Heart Cath;  Surgeon: Edi Armijo MD;  Location: [facility] INVASIVE LOCATION;  Service: Cardiology    CHOLECYSTECTOMY      HYSTERECTOMY      MASTECTOMY      BILATERAL    OOPHORECTOMY           OT ASSESSMENT FLOWSHEET (Last 12 Hours)       OT Evaluation and Treatment       Row Name 24 1320                   OT Time and Intention    Subjective Information complains of;weakness;pain  -LS        Document Type therapy note  (daily note)  -LS        Mode of Treatment occupational therapy  -LS        Patient Effort good  -LS           General Information    Patient Profile Reviewed yes  -LS        Existing Precautions/Restrictions fall;oxygen therapy device and L/min  -LS        Barriers to Rehab medically complex  -LS           Pain Assessment    Pretreatment Pain Rating 8/10  -LS        Posttreatment Pain Rating 8/10  -LS        Pain Location - Side/Orientation Right  -LS        Pain Location lower  -LS        Pain Location - hip  -LS        Pain Intervention(s) Repositioned  -LS           Cognition    Orientation Status (Cognition) oriented to;person;place  -LS           Plan of Care Review    Plan of Care Reviewed With patient;daughter  -LS        Progress improving  -LS        Outcome Evaluation OT tx completed on this date. Pt supine in bed agreeable to therapy. Pt completed bed mobility fowlers to EOB requiring CGA, pt demos forward flexed posture of hips and neck focused on posture during EOB sitting and sit to stands to increase posture in standing and standing tolerance requiring CGA and mod v/cs. Pt tolerated approx 1 minute of standing x4 intervals. Pt completed side steps at EOB with RW and bed mobiltiy EOB to supine requiring CGA and min v/cs for technique. OT POC to continue.  -LS           Positioning and Restraints    Pre-Treatment Position in bed  -LS        Post Treatment Position bed  -LS        In Bed fowlers;call light within reach;encouraged to call for assist;with family/caregiver  -LS           Therapy Plan Review/Discharge Plan (OT)    Anticipated Discharge Disposition (OT) skilled nursing facility  -                  User Key  (r) = Recorded By, (t) = Taken By, (c) = Cosigned By      Initials Name Effective Dates    LS ParthBrooklynRICAA 09/22/22 -                      Occupational Therapy Education       Title: PT OT SLP Therapies (In Progress)       Topic: Occupational Therapy (In Progress)       Point: ADL  training (Done)       Description:   Instruct learner(s) on proper safety adaptation and remediation techniques during self care or transfers.   Instruct in proper use of assistive devices.                  Learning Progress Summary             Patient Acceptance, E, VU by  at 9/20/2024 1405    Acceptance, E, VU by  at 9/19/2024 1558    Acceptance, E,D, VU by  at 9/18/2024 1206    Acceptance, E, NL,NR by J at 9/12/2024 1006   Family Acceptance, E,D, VU by  at 9/18/2024 1206                         Point: Home exercise program (Not Started)       Description:   Instruct learner(s) on appropriate technique for monitoring, assisting and/or progressing therapeutic exercises/activities.                  Learner Progress:  Not documented in this visit.              Point: Precautions (Done)       Description:   Instruct learner(s) on prescribed precautions during self-care and functional transfers.                  Learning Progress Summary             Patient Acceptance, E,D, VU by  at 9/18/2024 1206    Acceptance, E, NL,NR by LUIS MIGUEL at 9/12/2024 1006   Family Acceptance, E,D, VU by  at 9/18/2024 1206                         Point: Body mechanics (Done)       Description:   Instruct learner(s) on proper positioning and spine alignment during self-care, functional mobility activities and/or exercises.                  Learning Progress Summary             Patient Acceptance, E,D, VU by  at 9/18/2024 1206   Family Acceptance, E,D, VU by  at 9/18/2024 1206                                         User Key       Initials Effective Dates Name Provider Type Discipline     07/11/23 -  Cecily Raymond, OTR/L Occupational Therapist OT    JJ 07/11/23 -  Emilie Hinds OTR/L, JUANITAS Occupational Therapist OT     09/22/22 -  Brooklyn Alba COTA Occupational Therapist Assistant THERAPIES                      OT Recommendation and Plan     Plan of Care Review  Plan of Care Reviewed With: patient, daughter  Progress:  improving  Outcome Evaluation: OT tx completed on this date. Pt supine in bed agreeable to therapy. Pt completed bed mobility fowlers to EOB requiring CGA, pt demos forward flexed posture of hips and neck focused on posture during EOB sitting and sit to stands to increase posture in standing and standing tolerance requiring CGA and mod v/cs. Pt tolerated approx 1 minute of standing x4 intervals. Pt completed side steps at EOB with RW and bed mobiltiy EOB to supine requiring CGA and min v/cs for technique. OT POC to continue.  Plan of Care Reviewed With: patient, daughter  Outcome Evaluation: OT tx completed on this date. Pt supine in bed agreeable to therapy. Pt completed bed mobility fowlers to EOB requiring CGA, pt demos forward flexed posture of hips and neck focused on posture during EOB sitting and sit to stands to increase posture in standing and standing tolerance requiring CGA and mod v/cs. Pt tolerated approx 1 minute of standing x4 intervals. Pt completed side steps at EOB with RW and bed mobiltiy EOB to supine requiring CGA and min v/cs for technique. OT POC to continue.     Outcome Measures       Row Name 09/20/24 1400 09/20/24 1100 09/19/24 1500       How much help from another person do you currently need...    Turning from your back to your side while in flat bed without using bedrails? -- 3  -BARBARA --    Moving from lying on back to sitting on the side of a flat bed without bedrails? -- 3  -BARBARA --    Moving to and from a bed to a chair (including a wheelchair)? -- 3  -BARBARA --    Standing up from a chair using your arms (e.g., wheelchair, bedside chair)? -- 3  -BARBARA --    Climbing 3-5 steps with a railing? -- 2  -BARBARA --    To walk in hospital room? -- 3  -BARBARA --    AM-PAC 6 Clicks Score (PT) -- 17  -BARBARA --    Highest Level of Mobility Goal -- 5 --> Static standing  -BARBARA --       How much help from another is currently needed...    Putting on and taking off regular lower body clothing? 2  -LS -- 2  -LS    Bathing  (including washing, rinsing, and drying) 2  -LS -- 2  -LS    Toileting (which includes using toilet bed pan or urinal) 2  -LS -- 2  -LS    Putting on and taking off regular upper body clothing 2  -LS -- 2  -LS    Taking care of personal grooming (such as brushing teeth) 3  -LS -- 3  -LS    Eating meals 4  -LS -- 3  -LS    AM-PAC 6 Clicks Score (OT) 15  -LS -- 14  -LS       Functional Assessment    Outcome Measure Options AM-PAC 6 Clicks Daily Activity (OT)  -LS -- AM-PAC 6 Clicks Daily Activity (OT)  -LS      Row Name 09/19/24 1058 09/18/24 1453          How much help from another person do you currently need...    Turning from your back to your side while in flat bed without using bedrails? 3  -ZACHARY 3  -ZACHARY     Moving from lying on back to sitting on the side of a flat bed without bedrails? 3  -ZACHARY 3  -ZACHARY     Moving to and from a bed to a chair (including a wheelchair)? 3  -ZACHARY 2  -ZACHARY     Standing up from a chair using your arms (e.g., wheelchair, bedside chair)? 3  -ZACHARY 2  -ZACHARY     Climbing 3-5 steps with a railing? 2  -ZACHARY 1  -ZACHARY     To walk in hospital room? 3  -ZACHARY 1  -ZACHARY     AM-PAC 6 Clicks Score (PT) 17  -ZACHARY 12  -ZACHARY     Highest Level of Mobility Goal 5 --> Static standing  -ZACHARY 4 --> Transfer to chair/commode  -ZACHARY        Functional Assessment    Outcome Measure Options AM-PAC 6 Clicks Basic Mobility (PT)  -ZACHARY AM-PAC 6 Clicks Basic Mobility (PT)  -ZACHARY               User Key  (r) = Recorded By, (t) = Taken By, (c) = Cosigned By      Initials Name Provider Type    Kate Lezama PTA Physical Therapist Assistant    Chua Underwood PTA Physical Therapist Assistant    Bro[place] Lagos COTA Occupational Therapist Assistant                    Time Calculation:    Time Calculation- OT       Row Name 09/20/24 1148             Timed Charges    52824 - Gait Training Minutes  12  -BARBARA         Total Minutes    Timed Charges Total Minutes 12  -BARBARA       Total Minutes 12  -BARBARA                User Key  (r) = Recorded By, (t) =  Taken By, (c) = Cosigned By      Initials Name Provider Type    Kate Lezama, PTA Physical Therapist Assistant                  Therapy Charges for Today       Code Description Service Date Service Provider Modifiers Qty    49608551772  OT THERAPEUTIC ACT EA 15 MIN 9/19/2024 Brooklyn Alba COTA GO 3                 DWAYNE Azar  9/20/2024

## 2024-09-20 NOTE — PLAN OF CARE
Goal Outcome Evaluation:  Plan of Care Reviewed With: patient, daughter        Progress: improving  Outcome Evaluation: OT tx completed on this date. Pt supine in bed agreeable to therapy. Pt completed bed mobility fowlers to EOB requiring CGA, pt demos forward flexed posture of hips and neck focused on posture during EOB sitting and sit to stands to increase posture in standing and standing tolerance requiring CGA and mod v/cs. Pt tolerated approx 1 minute of standing x4 intervals. Pt completed side steps at EOB with RW and bed mobiltiy EOB to supine requiring CGA and min v/cs for technique. OT POC to continue.      Anticipated Discharge Disposition (OT): skilled nursing facility

## 2024-09-20 NOTE — PLAN OF CARE
Goal Outcome Evaluation:  Plan of Care Reviewed With: patient           Outcome Evaluation: Pt alert and oriented x 4. Pleasant and cooperative. Daughter at bedside. no c/o pain or discomfort. Pt appeared to sleep well. Call light within reach. Will continue to monitor.

## 2024-09-20 NOTE — CASE MANAGEMENT/SOCIAL WORK
Continued Stay Note   [facility]     Patient Name: Pattie Liu  MRN: [id number]  Today's Date: 9/20/2024    Admit Date: 9/11/2024    Plan: SNF   Discharge Plan       Row Name 09/20/24 1030       Plan    Plan SNF    Patient/Family in Agreement with Plan yes    Plan Comments [facility] is full.  Noemi at [facility] is reviewing referral.                   Discharge Codes    No documentation.                 Expected Discharge Date and Time       Expected Discharge Date Expected Discharge Time    Sep 17, 2024               LIONEL RamirezW

## 2024-09-20 NOTE — PROGRESS NOTES
Daily Progress Note  Pattie Liu  MRN: [id number] LOS: 9    Admit Date: 9/11/2024 9/20/2024 07:34 CDT    Subjective:      Chief Complaint:  Chief Complaint   Patient presents with    Altered Mental Status       Interval History:    Reviewed overnight events and nursing notes.   No acute events overnight.  Resting comfortably this morning.   Plan is for her to go to skilled nursing facility for rehab and likely end up going to assisted living when she is able.    Review of Systems   Constitutional:  Positive for activity change and fatigue. Negative for fever.   Respiratory:  Positive for cough and shortness of breath.    Gastrointestinal:  Negative for nausea and vomiting.   Genitourinary:  Positive for decreased urine volume and dysuria.   Musculoskeletal:  Positive for gait problem.   Neurological:  Positive for weakness.       DIET:  Diet: Regular/House; Fluid Consistency: Thin (IDDSI 0)    Medications:        ALPRAZolam, 1 mg, Oral, BID  aspirin, 81 mg, Oral, Daily  atorvastatin, 20 mg, Oral, Daily  bumetanide, 1 mg, Oral, Daily  citalopram, 30 mg, Oral, Daily  enoxaparin, 40 mg, Subcutaneous, Q24H  fluticasone, 2 spray, Each Nare, BID  gabapentin, 300 mg, Oral, BID  ipratropium, 0.5 mg, Nebulization, 4x Daily - RT  lisinopril, 20 mg, Oral, Daily  nicotine, 1 patch, Transdermal, Q24H  pantoprazole, 40 mg, Oral, QAM AC  sodium chloride, 10 mL, Intravenous, Q12H        Data:     Code Status:   Code Status and Medical Interventions: No CPR (Do Not Attempt to Resuscitate); Full Support   Ordered at: 09/11/24 1647     Code Status (Patient has no pulse and is not breathing):    No CPR (Do Not Attempt to Resuscitate)     Medical Interventions (Patient has pulse or is breathing):    Full Support       Family History   Problem Relation Age of Onset    Cancer Mother     Cancer Father      Social History     Socioeconomic History    Marital status:    Tobacco Use    Smoking status: Every Day     Current  packs/day: 0.50     Average packs/day: 0.5 packs/day for 62.0 years (31.0 ttl pk-yrs)     Types: Cigarettes    Smokeless tobacco: Never    Tobacco comments:     states she has no plan to quit smoking   Vaping Use    Vaping status: Former   Substance and Sexual Activity    Alcohol use: No    Drug use: No    Sexual activity: Not Currently       Labs:  Lab Results (last 72 hours)       Procedure Component Value Units Date/Time    Comprehensive Metabolic Panel [[id number]]  (Abnormal) Collected: 09/20/24 0422    Specimen: Blood Updated: 09/20/24 0503     Glucose 111 mg/dL      BUN 12 mg/dL      Creatinine 0.68 mg/dL      Sodium 141 mmol/L      Potassium 4.5 mmol/L      Chloride 104 mmol/L      CO2 31.0 mmol/L      Calcium 9.2 mg/dL      Total Protein 5.3 g/dL      Albumin 2.6 g/dL      ALT (SGPT) <5 U/L      AST (SGOT) 12 U/L      Alkaline Phosphatase 92 U/L      Total Bilirubin <0.2 mg/dL      Globulin 2.7 gm/dL      A/G Ratio 1.0 g/dL      BUN/Creatinine Ratio 17.6     Anion Gap 6.0 mmol/L      eGFR 87.6 mL/min/1.73     Narrative:      GFR Normal >60  Chronic Kidney Disease <60  Kidney Failure <15    The GFR formula is only valid for adults with stable renal function between ages 18 and 70.    CBC Auto Differential [[id number]]  (Abnormal) Collected: 09/20/24 0422    Specimen: Blood Updated: 09/20/24 0445     WBC 10.11 10*3/mm3      RBC 3.46 10*6/mm3      Hemoglobin 10.3 g/dL      Hematocrit 32.5 %      MCV 93.9 fL      MCH 29.8 pg      MCHC 31.7 g/dL      RDW 13.9 %      RDW-SD 46.7 fl      MPV 10.5 fL      Platelets 368 10*3/mm3      Neutrophil % 59.9 %      Lymphocyte % 28.8 %      Monocyte % 6.7 %      Eosinophil % 3.3 %      Basophil % 0.6 %      Immature Grans % 0.7 %      Neutrophils, Absolute 6.06 10*3/mm3      Lymphocytes, Absolute 2.91 10*3/mm3      Monocytes, Absolute 0.68 10*3/mm3      Eosinophils, Absolute 0.33 10*3/mm3      Basophils, Absolute 0.06 10*3/mm3      Immature Grans, Absolute 0.07 10*3/mm3       nRBC 0.0 /100 WBC     Potassium [[id number]]  (Normal) Collected: 09/19/24 1625    Specimen: Blood Updated: 09/19/24 1700     Potassium 4.3 mmol/L     Comprehensive Metabolic Panel [[id number]]  (Abnormal) Collected: 09/19/24 0348    Specimen: Blood Updated: 09/19/24 0509     Glucose 100 mg/dL      BUN 11 mg/dL      Creatinine 0.85 mg/dL      Sodium 144 mmol/L      Potassium 3.5 mmol/L      Chloride 105 mmol/L      CO2 33.0 mmol/L      Calcium 8.9 mg/dL      Total Protein 5.1 g/dL      Albumin 2.5 g/dL      ALT (SGPT) <5 U/L      AST (SGOT) 14 U/L      Alkaline Phosphatase 95 U/L      Total Bilirubin <0.2 mg/dL      Globulin 2.6 gm/dL      A/G Ratio 1.0 g/dL      BUN/Creatinine Ratio 12.9     Anion Gap 6.0 mmol/L      eGFR 68.9 mL/min/1.73     Narrative:      GFR Normal >60  Chronic Kidney Disease <60  Kidney Failure <15    The GFR formula is only valid for adults with stable renal function between ages 18 and 70.    CBC Auto Differential [[id number]]  (Abnormal) Collected: 09/19/24 0348    Specimen: Blood Updated: 09/19/24 0420     WBC 13.95 10*3/mm3      RBC 3.43 10*6/mm3      Hemoglobin 10.3 g/dL      Hematocrit 33.1 %      MCV 96.5 fL      MCH 30.0 pg      MCHC 31.1 g/dL      RDW 13.8 %      RDW-SD 48.9 fl      MPV 10.7 fL      Platelets 394 10*3/mm3      Neutrophil % 71.2 %      Lymphocyte % 19.9 %      Monocyte % 5.4 %      Eosinophil % 2.6 %      Basophil % 0.3 %      Immature Grans % 0.6 %      Neutrophils, Absolute 9.93 10*3/mm3      Lymphocytes, Absolute 2.78 10*3/mm3      Monocytes, Absolute 0.76 10*3/mm3      Eosinophils, Absolute 0.36 10*3/mm3      Basophils, Absolute 0.04 10*3/mm3      Immature Grans, Absolute 0.08 10*3/mm3      nRBC 0.0 /100 WBC     CBC Auto Differential [[id number]]  (Abnormal) Collected: 09/18/24 0408    Specimen: Blood Updated: 09/18/24 0550     WBC 10.50 10*3/mm3      RBC 3.62 10*6/mm3      Hemoglobin 10.7 g/dL      Hematocrit 34.8 %      MCV 96.1 fL      MCH 29.6 pg      MCHC 30.7  g/dL      RDW 13.5 %      RDW-SD 47.7 fl      MPV 11.0 fL      Platelets 395 10*3/mm3      Neutrophil % 59.1 %      Lymphocyte % 29.0 %      Monocyte % 6.9 %      Eosinophil % 4.1 %      Basophil % 0.5 %      Immature Grans % 0.4 %      Neutrophils, Absolute 6.22 10*3/mm3      Lymphocytes, Absolute 3.04 10*3/mm3      Monocytes, Absolute 0.72 10*3/mm3      Eosinophils, Absolute 0.43 10*3/mm3      Basophils, Absolute 0.05 10*3/mm3      Immature Grans, Absolute 0.04 10*3/mm3      nRBC 0.0 /100 WBC     Comprehensive Metabolic Panel [[id number]]  (Abnormal) Collected: 09/18/24 0408    Specimen: Blood Updated: 09/18/24 0532     Glucose 84 mg/dL      BUN 6 mg/dL      Creatinine 0.64 mg/dL      Sodium 143 mmol/L      Potassium 3.7 mmol/L      Chloride 106 mmol/L      CO2 32.0 mmol/L      Calcium 9.1 mg/dL      Total Protein 5.2 g/dL      Albumin 2.5 g/dL      ALT (SGPT) <5 U/L      AST (SGOT) 16 U/L      Alkaline Phosphatase 97 U/L      Total Bilirubin <0.2 mg/dL      Globulin 2.7 gm/dL      A/G Ratio 0.9 g/dL      BUN/Creatinine Ratio 9.4     Anion Gap 5.0 mmol/L      eGFR 88.9 mL/min/1.73     Narrative:      GFR Normal >60  Chronic Kidney Disease <60  Kidney Failure <15    The GFR formula is only valid for adults with stable renal function between ages 18 and 70.    Comprehensive Metabolic Panel [[id number]]  (Abnormal) Collected: 09/17/24 0610    Specimen: Blood Updated: 09/17/24 0756     Glucose 106 mg/dL      BUN 8 mg/dL      Creatinine 0.74 mg/dL      Sodium 141 mmol/L      Potassium 3.7 mmol/L      Chloride 101 mmol/L      CO2 31.0 mmol/L      Calcium 10.0 mg/dL      Total Protein 6.3 g/dL      Albumin 3.1 g/dL      ALT (SGPT) <5 U/L      AST (SGOT) 21 U/L      Alkaline Phosphatase 124 U/L      Total Bilirubin 0.2 mg/dL      Globulin 3.2 gm/dL      A/G Ratio 1.0 g/dL      BUN/Creatinine Ratio 10.8     Anion Gap 9.0 mmol/L      eGFR 81.4 mL/min/1.73     Narrative:      GFR Normal >60  Chronic Kidney Disease <60  Kidney  "Failure <15    The GFR formula is only valid for adults with stable renal function between ages 18 and 70.                Objective:     Vitals: /66 (BP Location: Left arm, Patient Position: Lying)   Pulse (!) 46   Temp 97.9 °F (36.6 °C) (Oral)   Resp 16   Ht 160 cm (63\")   Wt 66.2 kg (145 lb 15.1 oz)   SpO2 95%   BMI 25.85 kg/m²  No intake or output data in the 24 hours ending 24 0734   Temp (24hrs), Av.1 °F (36.7 °C), Min:97.9 °F (36.6 °C), Max:98.4 °F (36.9 °C)      Physical Exam  Vitals reviewed.   Constitutional:       Appearance: Normal appearance. She is ill-appearing.   HENT:      Head: Normocephalic and atraumatic.   Eyes:      General:         Right eye: No discharge.         Left eye: No discharge.      Conjunctiva/sclera: Conjunctivae normal.   Cardiovascular:      Rate and Rhythm: Normal rate and regular rhythm.      Pulses: Normal pulses.      Heart sounds: No murmur heard.  Pulmonary:      Effort: Pulmonary effort is normal. No respiratory distress.      Comments: Nasal cannula in place  Abdominal:      General: Abdomen is flat. Bowel sounds are normal. There is no distension.   Musculoskeletal:      Right lower leg: No edema.      Left lower leg: No edema.   Skin:     Capillary Refill: Capillary refill takes less than 2 seconds.   Neurological:      General: No focal deficit present.      Mental Status: She is alert.   Psychiatric:         Mood and Affect: Mood normal.         Behavior: Behavior normal.             Assessment and Plan:     Primary Problem:  Pneumonia    Hospital Problem list:    Pneumonia    Essential hypertension    Gait instability    Pelvic fracture      PMH:  Past Medical History:   Diagnosis Date    CAD in native artery 2019    COPD (chronic obstructive pulmonary disease)     Essential hypertension 2021    GERD (gastroesophageal reflux disease)     Lung nodule        Treatment Plan:  Pneumonia: Improved with abx and steroids, finished antibiotics " 9/19/2024, oxygen is at baseline, incentive spirometry, nebs     Pelvic fracture: prior hospitalization. PT/OT while inpatient, she will still require SNF however she would like to not return to [facility].     Long discussion at the bedside with daughter about goals.  Plan is for her to go to skilled nursing facility for rehab and likely end up going to assisted living when she is able.     Code status: DNR     DVT ppx: lovenox     Disposition: inpatient, SNF (requesting different facility if possible)    Reviewed treatment plans with the patient and/or family.   20 minutes spent in face to face interaction and coordination of care.     Electronically signed by Anupam Ledezma MD on 9/20/2024 at 07:34 CDT

## 2024-09-21 LAB
ALBUMIN SERPL-MCNC: 3 G/DL (ref 3.5–5.2)
ALBUMIN/GLOB SERPL: 1.2 G/DL
ALP SERPL-CCNC: 89 U/L (ref 39–117)
ALT SERPL W P-5'-P-CCNC: <5 U/L (ref 1–33)
ANION GAP SERPL CALCULATED.3IONS-SCNC: 8 MMOL/L (ref 5–15)
AST SERPL-CCNC: 11 U/L (ref 1–32)
BASOPHILS # BLD AUTO: 0.07 10*3/MM3 (ref 0–0.2)
BASOPHILS NFR BLD AUTO: 0.6 % (ref 0–1.5)
BILIRUB SERPL-MCNC: <0.2 MG/DL (ref 0–1.2)
BUN SERPL-MCNC: 13 MG/DL (ref 8–23)
BUN/CREAT SERPL: 18.6 (ref 7–25)
CALCIUM SPEC-SCNC: 9.5 MG/DL (ref 8.6–10.5)
CHLORIDE SERPL-SCNC: 103 MMOL/L (ref 98–107)
CO2 SERPL-SCNC: 31 MMOL/L (ref 22–29)
CREAT SERPL-MCNC: 0.7 MG/DL (ref 0.57–1)
DEPRECATED RDW RBC AUTO: 49 FL (ref 37–54)
EGFRCR SERPLBLD CKD-EPI 2021: 87 ML/MIN/1.73
EOSINOPHIL # BLD AUTO: 0.33 10*3/MM3 (ref 0–0.4)
EOSINOPHIL NFR BLD AUTO: 2.8 % (ref 0.3–6.2)
ERYTHROCYTE [DISTWIDTH] IN BLOOD BY AUTOMATED COUNT: 14.1 % (ref 12.3–15.4)
GLOBULIN UR ELPH-MCNC: 2.5 GM/DL
GLUCOSE SERPL-MCNC: 113 MG/DL (ref 65–99)
HCT VFR BLD AUTO: 33.3 % (ref 34–46.6)
HGB BLD-MCNC: 10.4 G/DL (ref 12–15.9)
IMM GRANULOCYTES # BLD AUTO: 0.04 10*3/MM3 (ref 0–0.05)
IMM GRANULOCYTES NFR BLD AUTO: 0.3 % (ref 0–0.5)
LYMPHOCYTES # BLD AUTO: 2.74 10*3/MM3 (ref 0.7–3.1)
LYMPHOCYTES NFR BLD AUTO: 22.9 % (ref 19.6–45.3)
MCH RBC QN AUTO: 29.9 PG (ref 26.6–33)
MCHC RBC AUTO-ENTMCNC: 31.2 G/DL (ref 31.5–35.7)
MCV RBC AUTO: 95.7 FL (ref 79–97)
MONOCYTES # BLD AUTO: 0.69 10*3/MM3 (ref 0.1–0.9)
MONOCYTES NFR BLD AUTO: 5.8 % (ref 5–12)
NEUTROPHILS NFR BLD AUTO: 67.6 % (ref 42.7–76)
NEUTROPHILS NFR BLD AUTO: 8.11 10*3/MM3 (ref 1.7–7)
NRBC BLD AUTO-RTO: 0 /100 WBC (ref 0–0.2)
PLATELET # BLD AUTO: 415 10*3/MM3 (ref 140–450)
PMV BLD AUTO: 10.3 FL (ref 6–12)
POTASSIUM SERPL-SCNC: 4.1 MMOL/L (ref 3.5–5.2)
PROT SERPL-MCNC: 5.5 G/DL (ref 6–8.5)
RBC # BLD AUTO: 3.48 10*6/MM3 (ref 3.77–5.28)
SODIUM SERPL-SCNC: 142 MMOL/L (ref 136–145)
WBC NRBC COR # BLD AUTO: 11.98 10*3/MM3 (ref 3.4–10.8)

## 2024-09-21 PROCEDURE — 25010000002 ENOXAPARIN PER 10 MG: Performed by: FAMILY MEDICINE

## 2024-09-21 PROCEDURE — 80053 COMPREHEN METABOLIC PANEL: CPT | Performed by: FAMILY MEDICINE

## 2024-09-21 PROCEDURE — 94760 N-INVAS EAR/PLS OXIMETRY 1: CPT

## 2024-09-21 PROCEDURE — 85025 COMPLETE CBC W/AUTO DIFF WBC: CPT | Performed by: FAMILY MEDICINE

## 2024-09-21 PROCEDURE — 94799 UNLISTED PULMONARY SVC/PX: CPT

## 2024-09-21 RX ADMIN — GABAPENTIN 300 MG: 300 CAPSULE ORAL at 20:26

## 2024-09-21 RX ADMIN — ASPIRIN 81 MG: 81 TABLET, COATED ORAL at 08:37

## 2024-09-21 RX ADMIN — FLUTICASONE PROPIONATE 2 SPRAY: 50 SPRAY, METERED NASAL at 08:37

## 2024-09-21 RX ADMIN — IPRATROPIUM BROMIDE 0.5 MG: 0.5 SOLUTION RESPIRATORY (INHALATION) at 06:00

## 2024-09-21 RX ADMIN — POLYETHYLENE GLYCOL 3350 17 G: 17 POWDER, FOR SOLUTION ORAL at 17:42

## 2024-09-21 RX ADMIN — BUMETANIDE 1 MG: 1 TABLET ORAL at 08:37

## 2024-09-21 RX ADMIN — FLUTICASONE PROPIONATE 2 SPRAY: 50 SPRAY, METERED NASAL at 20:26

## 2024-09-21 RX ADMIN — PANTOPRAZOLE SODIUM 40 MG: 40 TABLET, DELAYED RELEASE ORAL at 08:36

## 2024-09-21 RX ADMIN — ATORVASTATIN CALCIUM 20 MG: 10 TABLET, FILM COATED ORAL at 08:37

## 2024-09-21 RX ADMIN — ENOXAPARIN SODIUM 40 MG: 100 INJECTION SUBCUTANEOUS at 17:36

## 2024-09-21 RX ADMIN — OXYCODONE HYDROCHLORIDE AND ACETAMINOPHEN 1 TABLET: 5; 325 TABLET ORAL at 14:24

## 2024-09-21 RX ADMIN — Medication 10 ML: at 20:26

## 2024-09-21 RX ADMIN — CITALOPRAM 30 MG: 20 TABLET, FILM COATED ORAL at 08:37

## 2024-09-21 RX ADMIN — LISINOPRIL 20 MG: 20 TABLET ORAL at 08:36

## 2024-09-21 RX ADMIN — DOCUSATE SODIUM 50 MG AND SENNOSIDES 8.6 MG 2 TABLET: 8.6; 5 TABLET, FILM COATED ORAL at 17:42

## 2024-09-21 RX ADMIN — IPRATROPIUM BROMIDE 0.5 MG: 0.5 SOLUTION RESPIRATORY (INHALATION) at 18:57

## 2024-09-21 RX ADMIN — GABAPENTIN 300 MG: 300 CAPSULE ORAL at 08:37

## 2024-09-21 RX ADMIN — ALPRAZOLAM 1 MG: 0.5 TABLET ORAL at 08:37

## 2024-09-21 RX ADMIN — NICOTINE 1 PATCH: 21 PATCH, EXTENDED RELEASE TRANSDERMAL at 17:38

## 2024-09-21 RX ADMIN — IPRATROPIUM BROMIDE 0.5 MG: 0.5 SOLUTION RESPIRATORY (INHALATION) at 10:01

## 2024-09-21 RX ADMIN — ALPRAZOLAM 1 MG: 0.5 TABLET ORAL at 20:26

## 2024-09-21 RX ADMIN — Medication 10 ML: at 08:43

## 2024-09-21 RX ADMIN — IPRATROPIUM BROMIDE 0.5 MG: 0.5 SOLUTION RESPIRATORY (INHALATION) at 14:17

## 2024-09-21 NOTE — PROGRESS NOTES
"    Daily Progress Note  Pattie Liu  MRN: [id number] LOS: 10    Admit Date: 2024 07:10 CDT    Subjective:         Interval History:    Reviewed overnight events and nursing notes.     Doing well this morning.  No new complaints.  Oxygen requirement stable.    ROS:  Review of Systems   Constitutional:  Negative for chills and fever.   Respiratory:  Negative for cough, chest tightness and shortness of breath.    Cardiovascular:  Negative for chest pain.   Gastrointestinal:  Negative for abdominal pain, diarrhea, nausea and vomiting.       DIET:  Diet: Regular/House; Fluid Consistency: Thin (IDDSI 0)    Medications:      ALPRAZolam, 1 mg, Oral, BID  aspirin, 81 mg, Oral, Daily  atorvastatin, 20 mg, Oral, Daily  bumetanide, 1 mg, Oral, Daily  citalopram, 30 mg, Oral, Daily  enoxaparin, 40 mg, Subcutaneous, Q24H  fluticasone, 2 spray, Each Nare, BID  gabapentin, 300 mg, Oral, BID  ipratropium, 0.5 mg, Nebulization, 4x Daily - RT  lisinopril, 20 mg, Oral, Daily  nicotine, 1 patch, Transdermal, Q24H  pantoprazole, 40 mg, Oral, QAM AC  sodium chloride, 10 mL, Intravenous, Q12H          Objective:     Vitals: /62 (BP Location: Left arm, Patient Position: Lying)   Pulse 72   Temp 98.1 °F (36.7 °C) (Oral)   Resp 16   Ht 160 cm (63\")   Wt 66.2 kg (145 lb 15.1 oz)   SpO2 91%   BMI 25.85 kg/m²    Intake/Output Summary (Last 24 hours) at 2024 0710  Last data filed at 2024 0435  Gross per 24 hour   Intake 600 ml   Output 950 ml   Net -350 ml    Temp (24hrs), Av °F (36.7 °C), Min:97.7 °F (36.5 °C), Max:98.2 °F (36.8 °C)    Glucose:  No results found for: \"POCGLU\"  Physical Examination:   Physical Exam  Constitutional:       General: She is not in acute distress.     Appearance: Normal appearance. She is not ill-appearing or toxic-appearing.   Cardiovascular:      Rate and Rhythm: Normal rate and regular rhythm.      Pulses: Normal pulses.      Heart sounds: Normal heart sounds. No " murmur heard.  Pulmonary:      Effort: Pulmonary effort is normal. No respiratory distress.      Breath sounds: Normal breath sounds. No stridor. No wheezing or rales.   Abdominal:      General: Abdomen is flat. Bowel sounds are normal. There is no distension.      Palpations: Abdomen is soft.      Tenderness: There is no abdominal tenderness.   Neurological:      Mental Status: She is alert.         Labs:  Lab Results (last 24 hours)       Procedure Component Value Units Date/Time    Comprehensive Metabolic Panel [[id number]]  (Abnormal) Collected: 09/21/24 0548    Specimen: Blood Updated: 09/21/24 0624     Glucose 113 mg/dL      BUN 13 mg/dL      Creatinine 0.70 mg/dL      Sodium 142 mmol/L      Potassium 4.1 mmol/L      Chloride 103 mmol/L      CO2 31.0 mmol/L      Calcium 9.5 mg/dL      Total Protein 5.5 g/dL      Albumin 3.0 g/dL      ALT (SGPT) <5 U/L      AST (SGOT) 11 U/L      Alkaline Phosphatase 89 U/L      Total Bilirubin <0.2 mg/dL      Globulin 2.5 gm/dL      A/G Ratio 1.2 g/dL      BUN/Creatinine Ratio 18.6     Anion Gap 8.0 mmol/L      eGFR 87.0 mL/min/1.73     Narrative:      GFR Normal >60  Chronic Kidney Disease <60  Kidney Failure <15    The GFR formula is only valid for adults with stable renal function between ages 18 and 70.    CBC Auto Differential [[id number]]  (Abnormal) Collected: 09/21/24 0548    Specimen: Blood Updated: 09/21/24 0603     WBC 11.98 10*3/mm3      RBC 3.48 10*6/mm3      Hemoglobin 10.4 g/dL      Hematocrit 33.3 %      MCV 95.7 fL      MCH 29.9 pg      MCHC 31.2 g/dL      RDW 14.1 %      RDW-SD 49.0 fl      MPV 10.3 fL      Platelets 415 10*3/mm3      Neutrophil % 67.6 %      Lymphocyte % 22.9 %      Monocyte % 5.8 %      Eosinophil % 2.8 %      Basophil % 0.6 %      Immature Grans % 0.3 %      Neutrophils, Absolute 8.11 10*3/mm3      Lymphocytes, Absolute 2.74 10*3/mm3      Monocytes, Absolute 0.69 10*3/mm3      Eosinophils, Absolute 0.33 10*3/mm3      Basophils, Absolute 0.07  10*3/mm3      Immature Grans, Absolute 0.04 10*3/mm3      nRBC 0.0 /100 WBC              Imaging:  No radiology results from the last 24 hrs       Assessment and Plan:     Primary Problem:  Pneumonia    Hospital Problem list:    Pneumonia    Essential hypertension    Gait instability    Pelvic fracture    Pneumonia: Improved with abx and steroids, finished antibiotics 9/19/2024, oxygen is at baseline, incentive spirometry, nebs     Pelvic fracture: prior hospitalization. PT/OT while inpatient, she will still require SNF however she would like to not return to [facility].      Well this morning, no new issues.  Will stop lab draws and work on skilled nursing facility placement.  [facility] is reviewing referral.     Code status: DNR     DVT ppx: lovenox     Disposition: inpatient, SNF (requesting different facility if possible)      Reviewed treatment plans with the patient and/or family.     Code Status:   Code Status and Medical Interventions: No CPR (Do Not Attempt to Resuscitate); Full Support   Ordered at: 09/11/24 7907     Code Status (Patient has no pulse and is not breathing):    No CPR (Do Not Attempt to Resuscitate)     Medical Interventions (Patient has pulse or is breathing):    Full Support       Electronically signed by Williams Galvez MD on 9/21/2024 at 07:10 CDT

## 2024-09-21 NOTE — PLAN OF CARE
Goal Outcome Evaluation:  Plan of Care Reviewed With: patient        Progress: improving  Outcome Evaluation: Pt is alert and oriented x 4. Daughters at bedside. Pt vital signs stable. no c/o pain or discomfort. call light within reach, will continue to monitor.

## 2024-09-22 PROCEDURE — 97110 THERAPEUTIC EXERCISES: CPT

## 2024-09-22 PROCEDURE — 94664 DEMO&/EVAL PT USE INHALER: CPT

## 2024-09-22 PROCEDURE — 94799 UNLISTED PULMONARY SVC/PX: CPT

## 2024-09-22 PROCEDURE — 97116 GAIT TRAINING THERAPY: CPT

## 2024-09-22 PROCEDURE — 25010000002 ENOXAPARIN PER 10 MG: Performed by: FAMILY MEDICINE

## 2024-09-22 PROCEDURE — 63710000001 ONDANSETRON ODT 4 MG TABLET DISPERSIBLE: Performed by: FAMILY MEDICINE

## 2024-09-22 RX ADMIN — BUMETANIDE 1 MG: 1 TABLET ORAL at 09:21

## 2024-09-22 RX ADMIN — BISACODYL 5 MG: 5 TABLET, COATED ORAL at 18:40

## 2024-09-22 RX ADMIN — PANTOPRAZOLE SODIUM 40 MG: 40 TABLET, DELAYED RELEASE ORAL at 09:21

## 2024-09-22 RX ADMIN — ALPRAZOLAM 1 MG: 0.5 TABLET ORAL at 09:21

## 2024-09-22 RX ADMIN — Medication 10 ML: at 20:44

## 2024-09-22 RX ADMIN — FLUTICASONE PROPIONATE 2 SPRAY: 50 SPRAY, METERED NASAL at 09:22

## 2024-09-22 RX ADMIN — ALPRAZOLAM 1 MG: 0.5 TABLET ORAL at 20:43

## 2024-09-22 RX ADMIN — ASPIRIN 81 MG: 81 TABLET, COATED ORAL at 09:21

## 2024-09-22 RX ADMIN — ATORVASTATIN CALCIUM 20 MG: 10 TABLET, FILM COATED ORAL at 09:21

## 2024-09-22 RX ADMIN — ACETAMINOPHEN 650 MG: 325 TABLET ORAL at 20:43

## 2024-09-22 RX ADMIN — IPRATROPIUM BROMIDE 0.5 MG: 0.5 SOLUTION RESPIRATORY (INHALATION) at 19:40

## 2024-09-22 RX ADMIN — LISINOPRIL 20 MG: 20 TABLET ORAL at 09:21

## 2024-09-22 RX ADMIN — ONDANSETRON 4 MG: 4 TABLET, ORALLY DISINTEGRATING ORAL at 17:04

## 2024-09-22 RX ADMIN — ENOXAPARIN SODIUM 40 MG: 100 INJECTION SUBCUTANEOUS at 17:10

## 2024-09-22 RX ADMIN — CITALOPRAM 30 MG: 20 TABLET, FILM COATED ORAL at 09:21

## 2024-09-22 RX ADMIN — Medication 10 ML: at 09:22

## 2024-09-22 RX ADMIN — IPRATROPIUM BROMIDE 0.5 MG: 0.5 SOLUTION RESPIRATORY (INHALATION) at 14:45

## 2024-09-22 RX ADMIN — IPRATROPIUM BROMIDE 0.5 MG: 0.5 SOLUTION RESPIRATORY (INHALATION) at 10:27

## 2024-09-22 RX ADMIN — IPRATROPIUM BROMIDE 0.5 MG: 0.5 SOLUTION RESPIRATORY (INHALATION) at 06:57

## 2024-09-22 RX ADMIN — DOCUSATE SODIUM 50 MG AND SENNOSIDES 8.6 MG 2 TABLET: 8.6; 5 TABLET, FILM COATED ORAL at 09:29

## 2024-09-22 RX ADMIN — NICOTINE 1 PATCH: 21 PATCH, EXTENDED RELEASE TRANSDERMAL at 17:06

## 2024-09-22 RX ADMIN — GABAPENTIN 300 MG: 300 CAPSULE ORAL at 09:20

## 2024-09-22 RX ADMIN — GABAPENTIN 300 MG: 300 CAPSULE ORAL at 20:44

## 2024-09-22 NOTE — PLAN OF CARE
Goal Outcome Evaluation:      Patient requires Min assist for supine to sit with HOB elevated. Patient had good sitting balance. Patient was able to work slowly thru LE exercises in supine and sitting. Patient stood with MIN assist. Patient slowly ambulated 24' with Rwx and Min assist. Patient has a painful R hip/LE which is limiting mobility. Patient sat with cues and CGA. Patient is weak and will benefit from SNF at discharge.

## 2024-09-22 NOTE — PROGRESS NOTES
"    Daily Progress Note  Pattie Liu  MRN: [id number] LOS: 11    Admit Date: 2024 06:47 CDT    Subjective:         Interval History:    Reviewed overnight events and nursing notes.     Doing well, no new specific complaints    ROS:  Review of Systems   Constitutional:  Negative for chills and fever.   Respiratory:  Negative for cough, chest tightness and shortness of breath.    Cardiovascular:  Negative for chest pain.   Gastrointestinal:  Negative for abdominal pain, diarrhea, nausea and vomiting.       DIET:  Diet: Regular/House; Fluid Consistency: Thin (IDDSI 0)    Medications:      ALPRAZolam, 1 mg, Oral, BID  aspirin, 81 mg, Oral, Daily  atorvastatin, 20 mg, Oral, Daily  bumetanide, 1 mg, Oral, Daily  citalopram, 30 mg, Oral, Daily  enoxaparin, 40 mg, Subcutaneous, Q24H  fluticasone, 2 spray, Each Nare, BID  gabapentin, 300 mg, Oral, BID  ipratropium, 0.5 mg, Nebulization, 4x Daily - RT  lisinopril, 20 mg, Oral, Daily  nicotine, 1 patch, Transdermal, Q24H  pantoprazole, 40 mg, Oral, QAM AC  sodium chloride, 10 mL, Intravenous, Q12H          Objective:     Vitals: /53 (BP Location: Left arm, Patient Position: Lying)   Pulse 66   Temp 98.1 °F (36.7 °C) (Oral)   Resp 18   Ht 160 cm (63\")   Wt 66.2 kg (145 lb 15.1 oz)   SpO2 90%   BMI 25.85 kg/m²    Intake/Output Summary (Last 24 hours) at 2024 0647  Last data filed at 2024 0331  Gross per 24 hour   Intake 840 ml   Output 950 ml   Net -110 ml    Temp (24hrs), Av.9 °F (36.6 °C), Min:97.5 °F (36.4 °C), Max:98.2 °F (36.8 °C)    Glucose:  No results found for: \"POCGLU\"  Physical Examination:   Physical Exam  Constitutional:       General: She is not in acute distress.     Appearance: Normal appearance. She is not ill-appearing or toxic-appearing.   Cardiovascular:      Rate and Rhythm: Normal rate and regular rhythm.      Pulses: Normal pulses.      Heart sounds: Normal heart sounds. No murmur heard.  Pulmonary:      " Effort: Pulmonary effort is normal. No respiratory distress.      Breath sounds: Normal breath sounds. No stridor. No wheezing or rales.   Abdominal:      General: Abdomen is flat. Bowel sounds are normal. There is no distension.      Palpations: Abdomen is soft.      Tenderness: There is no abdominal tenderness.   Neurological:      Mental Status: She is alert.         Labs:  Lab Results (last 24 hours)       ** No results found for the last 24 hours. **             Imaging:  No radiology results from the last 24 hrs       Assessment and Plan:     Primary Problem:  Pneumonia    Hospital Problem list:    Pneumonia    Essential hypertension    Gait instability    Pelvic fracture      Pneumonia: Improved with abx and steroids, finished antibiotics 9/19/2024, oxygen is at baseline, incentive spirometry, nebs     Pelvic fracture: prior hospitalization. PT/OT while inpatient, she will still require SNF however she would like to not return to [facility].      Doing well this morning, no new issues.  Hoping to discharge to [facility], request sent Friday, she is pending acceptance and subsequent precertification.     Code status: DNR     DVT ppx: lovenox     Disposition: inpatient, SNF (requesting different facility if possible)        Reviewed treatment plans with the patient and/or family.     Code Status:   Code Status and Medical Interventions: No CPR (Do Not Attempt to Resuscitate); Full Support   Ordered at: 09/11/24 1647     Code Status (Patient has no pulse and is not breathing):    No CPR (Do Not Attempt to Resuscitate)     Medical Interventions (Patient has pulse or is breathing):    Full Support       Electronically signed by Williams Galvez MD on 9/22/2024 at 06:47 CDT

## 2024-09-22 NOTE — THERAPY TREATMENT NOTE
Acute Care - Physical Therapy Treatment Note  [facility]     Patient Name: Pattie Liu  : 1945  MRN: [id number]  Today's Date: 2024      Visit Dx:     ICD-10-CM ICD-9-CM   1. Pneumonia of right lung due to infectious organism, unspecified part of lung  J18.9 483.8   2. Dehydration  E86.0 276.51   3. Altered mental status, unspecified altered mental status type  R41.82 780.97   4. Impaired mobility [Z74.09]  Z74.09 799.89   5. Decreased activities of daily living (ADL)  Z78.9 V49.89     Patient Active Problem List   Diagnosis    Frequent PVCs    Shortness of breath    SOB (shortness of breath)    CAD in native artery    PVD (peripheral vascular disease)    Pneumonia due to infectious organism    Chronic back pain    Essential hypertension    Fall, initial encounter    Traumatic rhabdomyolysis    Leukocytosis    Anemia    Degenerative disc disease, lumbar    Dehydration    Acute UTI    Chronic respiratory failure with hypoxia    Impaired mobility    UTI (urinary tract infection)    Gait instability    Pneumonia    Pelvic fracture     Past Medical History:   Diagnosis Date    CAD in native artery 2019    COPD (chronic obstructive pulmonary disease)     Essential hypertension 2021    GERD (gastroesophageal reflux disease)     Lung nodule      Past Surgical History:   Procedure Laterality Date    BREAST IMPLANT REMOVAL      BREAST TISSUE EXPANDER REMOVAL INSERTION OF IMPLANT      CARDIAC CATHETERIZATION N/A 2019    Procedure: Left Heart Cath;  Surgeon: Edi Armijo MD;  Location: [facility] INVASIVE LOCATION;  Service: Cardiology    CHOLECYSTECTOMY      HYSTERECTOMY      MASTECTOMY      BILATERAL    OOPHORECTOMY       PT Assessment (Last 12 Hours)       PT Evaluation and Treatment       Row Name 24 0989          Physical Therapy Time and Intention    Subjective Information complains of;weakness;fatigue;nausea/vomiting  -BARBARA     Document Type therapy note (daily note)  -BARBARA      Mode of Treatment physical therapy  -[facility] Name 09/22/24 0955          General Information    Existing Precautions/Restrictions fall;oxygen therapy device and L/min  -     Limitations/Impairments safety/cognitive  -[facility] Name 09/22/24 0955          Pain Scale: Word Pre/Post-Treatment    Pain: Word Scale, Pretreatment 4 - moderate pain  -BARBARA     Posttreatment Pain Rating 6 - moderate-severe pain  -     Pain Location - Side/Orientation Right  -     Pain Location lower  -     Pain Location - extremity  -BARBARA       Row Name 09/22/24 0955          Bed Mobility    Supine-Sit [place] (Bed Mobility) verbal cues;minimum assist (75% patient effort)  -[facility] Name 09/22/24 0955          Transfers    Comment, (Transfers) stood x3  -BARBARA       Row Name 09/22/24 0955          Sit-Stand Transfer    Sit-Stand [place] (Transfers) verbal cues;minimum assist (75% patient effort)  -BARBARA       Row Name 09/22/24 0955          Stand-Sit Transfer    Stand-Sit [place] (Transfers) verbal cues;contact guard  -BARBARA       Row Name 09/22/24 0955          Gait/Stairs (Locomotion)    [place] Level (Gait) verbal cues;minimum assist (75% patient effort)  -     Assistive Device (Gait) walker, front-wheeled  -     Distance in Feet (Gait) 24  -     Pattern (Gait) step-to  -     Deviations/Abnormal Patterns (Gait) antalgic;gait speed decreased  -     Bilateral Gait Deviations --  forward head posture  -[facility] Name 09/22/24 0955          Hip (Therapeutic Exercise)    Hip AROM (Therapeutic Exercise) bilateral;aBduction;aDduction;external rotation;internal rotation;sitting;supine;15 repititions  -BARBARA       Row Name 09/22/24 0955          Knee (Therapeutic Exercise)    Knee (Therapeutic Exercise) AROM (active range of motion)  -     Knee AROM (Therapeutic Exercise) bilateral;LAQ (long arc quad)  -[facility] Name 09/22/24 0955          Ankle (Therapeutic Exercise)    Ankle AROM (Therapeutic  Exercise) bilateral;dorsiflexion;plantarflexion  -BARBARA               User Key  (r) = Recorded By, (t) = Taken By, (c) = Cosigned By      Initials Name Provider Type    Kate Lezama PTA Physical Therapist Assistant                    Physical Therapy Education       Title: PT OT SLP Therapies (In Progress)       Topic: Physical Therapy (In Progress)       Point: Mobility training (In Progress)       Learning Progress Summary             Patient Acceptance, E,D, NR by BARBARA at 9/22/2024 1131    Comment: gait training    Acceptance, E, VU by BARBARA at 9/20/2024 1146    Comment: bed transfer    Acceptance, E, NR by CHICA at 9/12/2024 0931    Comment: benefits of activity, progression of PT                         Point: Home exercise program (Not Started)       Learner Progress:  Not documented in this visit.              Point: Body mechanics (Not Started)       Learner Progress:  Not documented in this visit.              Point: Precautions (Not Started)       Learner Progress:  Not documented in this visit.                              User Key       Initials Effective Dates Name Provider Type Discipline    CHICA 02/03/23 -  Feroz Schwab, PT DPT Physical Therapist PT    BARBARA 02/03/23 -  Kate Leach PTA Physical Therapist Assistant PT                  PT Recommendation and Plan         Outcome Measures       Row Name 09/22/24 1100 09/20/24 1400 09/20/24 1100       How much help from another person do you currently need...    Turning from your back to your side while in flat bed without using bedrails? 3  -BARBARA -- 3  -BARBARA    Moving from lying on back to sitting on the side of a flat bed without bedrails? 3  -BARBARA -- 3  -BARBARA    Moving to and from a bed to a chair (including a wheelchair)? 3  -BARBARA -- 3  -BARBARA    Standing up from a chair using your arms (e.g., wheelchair, bedside chair)? 3  -BARBARA -- 3  -BARBARA    Climbing 3-5 steps with a railing? 1  -BARBARA -- 2  -BARBARA    To walk in hospital room? 2  -BARBARA -- 3  -BARBARA    AM-PAC 6 Clicks  Score (PT) 15  -BARBARA -- 17  -BARBARA    Highest Level of Mobility Goal 4 --> Transfer to chair/commode  -BARBARA -- 5 --> Static standing  -BARBARA       How much help from another is currently needed...    Putting on and taking off regular lower body clothing? -- 2  -LS --    Bathing (including washing, rinsing, and drying) -- 2  -LS --    Toileting (which includes using toilet bed pan or urinal) -- 2  -LS --    Putting on and taking off regular upper body clothing -- 2  -LS --    Taking care of personal grooming (such as brushing teeth) -- 3  -LS --    Eating meals -- 4  -LS --    AM-PAC 6 Clicks Score (OT) -- 15  -LS --       Functional Assessment    Outcome Measure Options -- AM-PAC 6 Clicks Daily Activity (OT)  -LS --      Row Name 09/19/24 1500             How much help from another is currently needed...    Putting on and taking off regular lower body clothing? 2  -LS      Bathing (including washing, rinsing, and drying) 2  -LS      Toileting (which includes using toilet bed pan or urinal) 2  -LS      Putting on and taking off regular upper body clothing 2  -LS      Taking care of personal grooming (such as brushing teeth) 3  -LS      Eating meals 3  -LS      AM-PAC 6 Clicks Score (OT) 14  -LS         Functional Assessment    Outcome Measure Options AM-PAC 6 Clicks Daily Activity (OT)  -LS                User Key  (r) = Recorded By, (t) = Taken By, (c) = Cosigned By      Initials Name Provider Type    Kate Lezama PTA Physical Therapist Assistant    [place] Lagos COTA Occupational Therapist Assistant                     Time Calculation:    PT Charges       Row Name 09/22/24 1134             Time Calculation    Start Time 0955  -BARBARA      Stop Time 1020  -BARBARA      Time Calculation (min) 25 min  -BARBARA         Timed Charges    13017 - PT Therapeutic Exercise Minutes 13  -BARBARA      08059 - Gait Training Minutes  12  -BARBARA         Total Minutes    Timed Charges Total Minutes 25  -BARBARA       Total Minutes 25  -BARBARA                 User Key  (r) = Recorded By, (t) = Taken By, (c) = Cosigned By      Initials Name Provider Type    Kate Lezama, TRISHA Physical Therapist Assistant                  Therapy Charges for Today       Code Description Service Date Service Provider Modifiers Qty    40650258353  PT THER PROC EA 15 MIN 9/22/2024 Kate Leach, TRISHA GP 1    02801440290 HC GAIT TRAINING EA 15 MIN 9/22/2024 Kate Leach, TRISHA GP 1            PT G-Codes  Outcome Measure Options: AM-PAC 6 Clicks Daily Activity (OT)  AM-PAC 6 Clicks Score (PT): 15  AM-PAC 6 Clicks Score (OT): 15    Kate Leach PTA  9/22/2024

## 2024-09-22 NOTE — PLAN OF CARE
Problem: Fall Injury Risk  Goal: Absence of Fall and Fall-Related Injury  Outcome: Ongoing, Progressing  Intervention: Promote Injury-Free Environment  Recent Flowsheet Documentation  Taken 9/22/2024 0400 by Cole Velarde RN  Safety Promotion/Fall Prevention: safety round/check completed  Taken 9/22/2024 0200 by Cole Velarde RN  Safety Promotion/Fall Prevention: safety round/check completed  Taken 9/22/2024 0000 by Cole Velarde RN  Safety Promotion/Fall Prevention: safety round/check completed  Taken 9/21/2024 2200 by Cole Velarde RN  Safety Promotion/Fall Prevention: safety round/check completed  Taken 9/21/2024 2100 by Cole Velarde RN  Safety Promotion/Fall Prevention: safety round/check completed  Taken 9/21/2024 2000 by Cole Velarde RN  Safety Promotion/Fall Prevention: safety round/check completed  Taken 9/21/2024 1900 by Cole Velarde RN  Safety Promotion/Fall Prevention: safety round/check completed     Problem: Adult Inpatient Plan of Care  Goal: Plan of Care Review  Outcome: Ongoing, Progressing  Goal: Patient-Specific Goal (Individualized)  Outcome: Ongoing, Progressing  Goal: Absence of Hospital-Acquired Illness or Injury  Outcome: Ongoing, Progressing  Intervention: Identify and Manage Fall Risk  Recent Flowsheet Documentation  Taken 9/22/2024 0400 by Cole Velarde RN  Safety Promotion/Fall Prevention: safety round/check completed  Taken 9/22/2024 0200 by Cole Velarde RN  Safety Promotion/Fall Prevention: safety round/check completed  Taken 9/22/2024 0000 by Cole Velarde RN  Safety Promotion/Fall Prevention: safety round/check completed  Taken 9/21/2024 2200 by Cole Velarde RN  Safety Promotion/Fall Prevention: safety round/check completed  Taken 9/21/2024 2100 by Cole Velarde RN  Safety Promotion/Fall Prevention: safety round/check completed  Taken 9/21/2024 2000 by Coel Velarde RN  Safety Promotion/Fall Prevention: safety round/check completed  Taken 9/21/2024 1900 by Cole Velarde RN  Safety Promotion/Fall  Prevention: safety round/check completed  Goal: Optimal Comfort and Wellbeing  Outcome: Ongoing, Progressing  Intervention: Provide Person-Centered Care  Recent Flowsheet Documentation  Taken 9/21/2024 2033 by Cole Velarde RN  Trust Relationship/Rapport:   care explained   choices provided   emotional support provided   empathic listening provided   questions answered   questions encouraged  Goal: Readiness for Transition of Care  Outcome: Ongoing, Progressing     Problem: Skin Injury Risk Increased  Goal: Skin Health and Integrity  Outcome: Ongoing, Progressing     Problem: COPD (Chronic Obstructive Pulmonary Disease) Comorbidity  Goal: Maintenance of COPD Symptom Control  Outcome: Ongoing, Progressing     Problem: Hypertension Comorbidity  Goal: Blood Pressure in Desired Range  Outcome: Ongoing, Progressing     Problem: Fluid Imbalance (Pneumonia)  Goal: Fluid Balance  Outcome: Ongoing, Progressing     Problem: Infection (Pneumonia)  Goal: Resolution of Infection Signs and Symptoms  Outcome: Ongoing, Progressing     Problem: Respiratory Compromise (Pneumonia)  Goal: Effective Oxygenation and Ventilation  Outcome: Ongoing, Progressing  Intervention: Promote Airway Secretion Clearance  Recent Flowsheet Documentation  Taken 9/21/2024 2033 by Cole Velarde RN  Cough And Deep Breathing: done independently per patient   Goal Outcome Evaluation:            Patient is a/o x 4. Patient has slept most of the night. Patient has not had any complaint's of pain. Patient has not had any adverse events occur.

## 2024-09-23 VITALS
WEIGHT: 145.94 LBS | OXYGEN SATURATION: 96 % | HEIGHT: 63 IN | SYSTOLIC BLOOD PRESSURE: 147 MMHG | HEART RATE: 56 BPM | RESPIRATION RATE: 16 BRPM | TEMPERATURE: 97.6 F | DIASTOLIC BLOOD PRESSURE: 69 MMHG | BODY MASS INDEX: 25.86 KG/M2

## 2024-09-23 PROBLEM — J18.9 PNEUMONIA, UNSPECIFIED ORGANISM: Status: ACTIVE | Noted: 2024-09-23

## 2024-09-23 PROCEDURE — 94760 N-INVAS EAR/PLS OXIMETRY 1: CPT

## 2024-09-23 PROCEDURE — 97530 THERAPEUTIC ACTIVITIES: CPT

## 2024-09-23 PROCEDURE — 94799 UNLISTED PULMONARY SVC/PX: CPT

## 2024-09-23 RX ORDER — TRAMADOL HYDROCHLORIDE 50 MG/1
50 TABLET ORAL EVERY 8 HOURS PRN
Qty: 90 TABLET | Refills: 0 | Status: SHIPPED | OUTPATIENT
Start: 2024-09-23 | End: 2024-09-23

## 2024-09-23 RX ORDER — ALPRAZOLAM 1 MG
1 TABLET ORAL 2 TIMES DAILY
Qty: 60 TABLET | Refills: 0 | Status: SHIPPED | OUTPATIENT
Start: 2024-09-23

## 2024-09-23 RX ORDER — ALPRAZOLAM 1 MG
1 TABLET ORAL 2 TIMES DAILY
Qty: 60 TABLET | Refills: 0 | Status: SHIPPED | OUTPATIENT
Start: 2024-09-23 | End: 2024-09-23

## 2024-09-23 RX ORDER — GABAPENTIN 300 MG/1
300 CAPSULE ORAL 2 TIMES DAILY
Qty: 60 CAPSULE | Refills: 0 | Status: SHIPPED | OUTPATIENT
Start: 2024-09-23 | End: 2024-09-23

## 2024-09-23 RX ORDER — GABAPENTIN 300 MG/1
300 CAPSULE ORAL 2 TIMES DAILY
Qty: 60 CAPSULE | Refills: 0 | Status: SHIPPED | OUTPATIENT
Start: 2024-09-23

## 2024-09-23 RX ORDER — TRAMADOL HYDROCHLORIDE 50 MG/1
50 TABLET ORAL EVERY 8 HOURS PRN
Qty: 90 TABLET | Refills: 0 | Status: SHIPPED | OUTPATIENT
Start: 2024-09-23

## 2024-09-23 RX ADMIN — GABAPENTIN 300 MG: 300 CAPSULE ORAL at 08:44

## 2024-09-23 RX ADMIN — ATORVASTATIN CALCIUM 20 MG: 10 TABLET, FILM COATED ORAL at 08:43

## 2024-09-23 RX ADMIN — Medication 10 ML: at 08:46

## 2024-09-23 RX ADMIN — IPRATROPIUM BROMIDE 0.5 MG: 0.5 SOLUTION RESPIRATORY (INHALATION) at 14:13

## 2024-09-23 RX ADMIN — IPRATROPIUM BROMIDE 0.5 MG: 0.5 SOLUTION RESPIRATORY (INHALATION) at 06:34

## 2024-09-23 RX ADMIN — CITALOPRAM 30 MG: 20 TABLET, FILM COATED ORAL at 08:44

## 2024-09-23 RX ADMIN — PANTOPRAZOLE SODIUM 40 MG: 40 TABLET, DELAYED RELEASE ORAL at 08:43

## 2024-09-23 RX ADMIN — BUMETANIDE 1 MG: 1 TABLET ORAL at 08:44

## 2024-09-23 RX ADMIN — ALPRAZOLAM 1 MG: 0.5 TABLET ORAL at 08:44

## 2024-09-23 RX ADMIN — ASPIRIN 81 MG: 81 TABLET, COATED ORAL at 08:44

## 2024-09-23 RX ADMIN — LISINOPRIL 20 MG: 20 TABLET ORAL at 08:44

## 2024-09-23 NOTE — THERAPY DISCHARGE NOTE
Acute Care - Occupational Therapy Discharge Summary  [facility]     Patient Name: Pattie Liu  : 1945  MRN: [id number]    Today's Date: 2024                 Admit Date: 2024        OT Recommendation and Plan    Visit Dx:    ICD-10-CM ICD-9-CM   1. Pneumonia of right lung due to infectious organism, unspecified part of lung  J18.9 483.8   2. Dehydration  E86.0 276.51   3. Altered mental status, unspecified altered mental status type  R41.82 780.97   4. Impaired mobility [Z74.09]  Z74.09 799.89   5. Decreased activities of daily living (ADL)  Z78.9 V49.89   6. Anxiety disorder, unspecified type  F41.9 300.00   7. Chronic low back pain, unspecified back pain laterality, unspecified whether sciatica present  M54.50 724.2    G89.29 338.29                OT Rehab Goals       Row Name 24 1300             Dressing Goal 1 (OT)    Activity/Device (Dressing Goal 1, OT) lower body dressing  -JJ      [place]/Cues Needed (Dressing Goal 1, OT) minimum assist (75% or more patient effort)  -JJ      Time Frame (Dressing Goal 1, OT) long term goal (LTG);by discharge  -JJ      Strategies/Barriers (Dressing Goal 1, OT) AE PRN  -JJ      Progress/Outcome (Dressing Goal 1, OT) goal not met;discharged from facility  -JJ         Toileting Goal 1 (OT)    Activity/Device (Toileting Goal 1, OT) toileting skills, all  -JJ      [place] Level/Cues Needed (Toileting Goal 1, OT) minimum assist (75% or more patient effort)  -JJ      Time Frame (Toileting Goal 1, OT) long term goal (LTG);by discharge  -JJ      Progress/Outcome (Toileting Goal 1, OT) goal not met;discharged from facility  -JJ         Strength Goal 1 (OT)    Strength Goal 1 (OT) Pt will increase B UE to strength to 4+/5 for improved independence with adls and functional t/fs.  -JJ      Time Frame (Strength Goal 1, OT) long term goal (LTG);by discharge  -JJ      Progress/Outcome (Strength Goal 1, OT) goal not met;discharged from facility  -JJ                 User Key  (r) = Recorded By, (t) = Taken By, (c) = Cosigned By      Initials Name Provider Type Discipline    Emilie Javed, OTR/L, CSRS Occupational Therapist OT                     Outcome Measures       Row Name 09/22/24 1100 09/20/24 1400          How much help from another person do you currently need...    Turning from your back to your side while in flat bed without using bedrails? 3  -BARBARA --     Moving from lying on back to sitting on the side of a flat bed without bedrails? 3  -BARBARA --     Moving to and from a bed to a chair (including a wheelchair)? 3  -BARBARA --     Standing up from a chair using your arms (e.g., wheelchair, bedside chair)? 3  -BARBARA --     Climbing 3-5 steps with a railing? 1  -BARBARA --     To walk in hospital room? 2  -BARBARA --     AM-PAC 6 Clicks Score (PT) 15  -BARBARA --     Highest Level of Mobility Goal 4 --> Transfer to chair/commode  -BARBARA --        How much help from another is currently needed...    Putting on and taking off regular lower body clothing? -- 2  -LS     Bathing (including washing, rinsing, and drying) -- 2  -LS     Toileting (which includes using toilet bed pan or urinal) -- 2  -LS     Putting on and taking off regular upper body clothing -- 2  -LS     Taking care of personal grooming (such as brushing teeth) -- 3  -LS     Eating meals -- 4  -LS     AM-PAC 6 Clicks Score (OT) -- 15  -LS        Functional Assessment    Outcome Measure Options -- AM-PAC 6 Clicks Daily Activity (OT)  -LS               User Key  (r) = Recorded By, (t) = Taken By, (c) = Cosigned By      Initials Name Provider Type    BARBARA Kate Leach PTA Physical Therapist Assistant    [place] COTA Occupational Therapist Assistant                    Timed Therapy Charges  Total Units: 3      Suggested Charges  Total Units: 3      Procedure Name Documented Minutes Units Code    HC OT SELF CARE/MGMT/TRAIN EA 15 MIN 41 3   27786 (CPT®)                 Documented Minutes  Total Minutes: 41       Therapy Provided Minutes    02785 - OT Self Care/Mgmt Minutes 41                        OT Discharge Summary  Anticipated Discharge Disposition (OT): skilled nursing facility  Reason for Discharge: Discharge from facility  Outcomes Achieved: Refer to plan of care for updates on goals achieved  Discharge Destination: SNF      Emilie Hinds OTR/L, CSRS  9/23/2024

## 2024-09-23 NOTE — PROGRESS NOTES
Daily Progress Note  Pattie Liu  MRN: [id number] LOS: 12    Admit Date: 9/11/2024 9/23/2024 09:53 CDT    Subjective:      Chief Complaint:  Chief Complaint   Patient presents with    Altered Mental Status       Interval History:    Reviewed overnight events and nursing notes.   No acute events overnight.  Resting comfortably this morning.   Plan is for her to go to skilled nursing facility for rehab and likely end up going to assisted living when she is able.    Review of Systems   Constitutional:  Positive for activity change and fatigue. Negative for fever.   Respiratory:  Positive for cough and shortness of breath.    Gastrointestinal:  Negative for nausea and vomiting.   Genitourinary:  Positive for decreased urine volume and dysuria.   Musculoskeletal:  Positive for gait problem.   Neurological:  Positive for weakness.       DIET:  Diet: Regular/House; Fluid Consistency: Thin (IDDSI 0)    Medications:        ALPRAZolam, 1 mg, Oral, BID  aspirin, 81 mg, Oral, Daily  atorvastatin, 20 mg, Oral, Daily  bumetanide, 1 mg, Oral, Daily  citalopram, 30 mg, Oral, Daily  enoxaparin, 40 mg, Subcutaneous, Q24H  fluticasone, 2 spray, Each Nare, BID  gabapentin, 300 mg, Oral, BID  ipratropium, 0.5 mg, Nebulization, 4x Daily - RT  lisinopril, 20 mg, Oral, Daily  nicotine, 1 patch, Transdermal, Q24H  pantoprazole, 40 mg, Oral, QAM AC  sodium chloride, 10 mL, Intravenous, Q12H        Data:     Code Status:   Code Status and Medical Interventions: No CPR (Do Not Attempt to Resuscitate); Full Support   Ordered at: 09/11/24 1647     Code Status (Patient has no pulse and is not breathing):    No CPR (Do Not Attempt to Resuscitate)     Medical Interventions (Patient has pulse or is breathing):    Full Support       Family History   Problem Relation Age of Onset    Cancer Mother     Cancer Father      Social History     Socioeconomic History    Marital status:    Tobacco Use    Smoking status: Every Day     Current  packs/day: 0.50     Average packs/day: 0.5 packs/day for 62.0 years (31.0 ttl pk-yrs)     Types: Cigarettes    Smokeless tobacco: Never    Tobacco comments:     states she has no plan to quit smoking   Vaping Use    Vaping status: Former   Substance and Sexual Activity    Alcohol use: No    Drug use: No    Sexual activity: Not Currently       Labs:    Lab Results (last 72 hours)       Procedure Component Value Units Date/Time    Comprehensive Metabolic Panel [[id number]]  (Abnormal) Collected: 09/21/24 0548    Specimen: Blood Updated: 09/21/24 0624     Glucose 113 mg/dL      BUN 13 mg/dL      Creatinine 0.70 mg/dL      Sodium 142 mmol/L      Potassium 4.1 mmol/L      Chloride 103 mmol/L      CO2 31.0 mmol/L      Calcium 9.5 mg/dL      Total Protein 5.5 g/dL      Albumin 3.0 g/dL      ALT (SGPT) <5 U/L      AST (SGOT) 11 U/L      Alkaline Phosphatase 89 U/L      Total Bilirubin <0.2 mg/dL      Globulin 2.5 gm/dL      A/G Ratio 1.2 g/dL      BUN/Creatinine Ratio 18.6     Anion Gap 8.0 mmol/L      eGFR 87.0 mL/min/1.73     Narrative:      GFR Normal >60  Chronic Kidney Disease <60  Kidney Failure <15    The GFR formula is only valid for adults with stable renal function between ages 18 and 70.    CBC Auto Differential [[id number]]  (Abnormal) Collected: 09/21/24 0548    Specimen: Blood Updated: 09/21/24 0603     WBC 11.98 10*3/mm3      RBC 3.48 10*6/mm3      Hemoglobin 10.4 g/dL      Hematocrit 33.3 %      MCV 95.7 fL      MCH 29.9 pg      MCHC 31.2 g/dL      RDW 14.1 %      RDW-SD 49.0 fl      MPV 10.3 fL      Platelets 415 10*3/mm3      Neutrophil % 67.6 %      Lymphocyte % 22.9 %      Monocyte % 5.8 %      Eosinophil % 2.8 %      Basophil % 0.6 %      Immature Grans % 0.3 %      Neutrophils, Absolute 8.11 10*3/mm3      Lymphocytes, Absolute 2.74 10*3/mm3      Monocytes, Absolute 0.69 10*3/mm3      Eosinophils, Absolute 0.33 10*3/mm3      Basophils, Absolute 0.07 10*3/mm3      Immature Grans, Absolute 0.04 10*3/mm3      " nRBC 0.0 /100 WBC                     Objective:     Vitals: /52 (BP Location: Left arm, Patient Position: Lying)   Pulse 56   Temp 97.2 °F (36.2 °C) (Oral)   Resp 18   Ht 160 cm (63\")   Wt 66.2 kg (145 lb 15.1 oz)   SpO2 97%   BMI 25.85 kg/m²    Intake/Output Summary (Last 24 hours) at 2024 0953  Last data filed at 2024 0444  Gross per 24 hour   Intake 360 ml   Output 350 ml   Net 10 ml      Temp (24hrs), Av.7 °F (36.5 °C), Min:97.2 °F (36.2 °C), Max:98.1 °F (36.7 °C)      Physical Exam  Vitals reviewed.   Constitutional:       Appearance: Normal appearance. She is ill-appearing.   HENT:      Head: Normocephalic and atraumatic.   Eyes:      General:         Right eye: No discharge.         Left eye: No discharge.      Conjunctiva/sclera: Conjunctivae normal.   Cardiovascular:      Rate and Rhythm: Normal rate and regular rhythm.      Pulses: Normal pulses.      Heart sounds: No murmur heard.  Pulmonary:      Effort: Pulmonary effort is normal. No respiratory distress.      Comments: Nasal cannula in place  Abdominal:      General: Abdomen is flat. Bowel sounds are normal. There is no distension.   Musculoskeletal:      Right lower leg: No edema.      Left lower leg: No edema.   Skin:     Capillary Refill: Capillary refill takes less than 2 seconds.   Neurological:      General: No focal deficit present.      Mental Status: She is alert.   Psychiatric:         Mood and Affect: Mood normal.         Behavior: Behavior normal.             Assessment and Plan:     Primary Problem:  Pneumonia    Hospital Problem list:    Pneumonia    Essential hypertension    Gait instability    Pelvic fracture      PMH:  Past Medical History:   Diagnosis Date    CAD in native artery 2019    COPD (chronic obstructive pulmonary disease)     Essential hypertension 2021    GERD (gastroesophageal reflux disease)     Lung nodule        Treatment Plan:  Pneumonia: Improved with abx and steroids, finished " antibiotics 9/19/2024, oxygen is at baseline, incentive spirometry, nebs     Pelvic fracture: prior hospitalization. PT/OT while inpatient, she will still require SNF however she would like to not return to [facility].   -referral to [facility] is pending    Long discussion at the bedside with daughter about goals.  Plan is for her to go to skilled nursing facility for rehab and likely end up going to assisted living when she is able.     Code status: DNR     DVT ppx: lovenox     Disposition: inpatient, SNF (requesting different facility if possible)    Reviewed treatment plans with the patient and/or family.   20 minutes spent in face to face interaction and coordination of care.     Electronically signed by Anupam Ledezma MD on 9/23/2024 at 09:53 CDT

## 2024-09-23 NOTE — THERAPY TREATMENT NOTE
Acute Care - Physical Therapy Treatment Note  [facility]     Patient Name: Pattie Liu  : 1945  MRN: [id number]  Today's Date: 2024      Visit Dx:     ICD-10-CM ICD-9-CM   1. Pneumonia of right lung due to infectious organism, unspecified part of lung  J18.9 483.8   2. Dehydration  E86.0 276.51   3. Altered mental status, unspecified altered mental status type  R41.82 780.97   4. Impaired mobility [Z74.09]  Z74.09 799.89   5. Decreased activities of daily living (ADL)  Z78.9 V49.89   6. Anxiety disorder, unspecified type  F41.9 300.00   7. Chronic low back pain, unspecified back pain laterality, unspecified whether sciatica present  M54.50 724.2    G89.29 338.29     Patient Active Problem List   Diagnosis    Frequent PVCs    Shortness of breath    SOB (shortness of breath)    CAD in native artery    PVD (peripheral vascular disease)    Pneumonia due to infectious organism    Chronic back pain    Essential hypertension    Fall, initial encounter    Traumatic rhabdomyolysis    Leukocytosis    Anemia    Degenerative disc disease, lumbar    Dehydration    Acute UTI    Chronic respiratory failure with hypoxia    Impaired mobility    UTI (urinary tract infection)    Gait instability    Pneumonia    Pelvic fracture    Pneumonia, unspecified organism     Past Medical History:   Diagnosis Date    CAD in native artery 2019    COPD (chronic obstructive pulmonary disease)     Essential hypertension 2021    GERD (gastroesophageal reflux disease)     Lung nodule      Past Surgical History:   Procedure Laterality Date    BREAST IMPLANT REMOVAL      BREAST TISSUE EXPANDER REMOVAL INSERTION OF IMPLANT      CARDIAC CATHETERIZATION N/A 2019    Procedure: Left Heart Cath;  Surgeon: Edi Armijo MD;  Location: [facility] INVASIVE LOCATION;  Service: Cardiology    CHOLECYSTECTOMY      HYSTERECTOMY      MASTECTOMY      BILATERAL    OOPHORECTOMY       PT Assessment (Last 12 Hours)       PT  Evaluation and Treatment       Row Name 09/23/24 0840          Physical Therapy Time and Intention    Subjective Information complains of;weakness;fatigue  -LY     Document Type therapy note (daily note)  -LY     Mode of Treatment physical therapy  -LY       Row Name 09/23/24 0840          General Information    Existing Precautions/Restrictions fall;oxygen therapy device and L/min  -LY       Row Name 09/23/24 0840          Pain    Pretreatment Pain Rating 6/10  -LY     Posttreatment Pain Rating 6/10  -LY     Pain Location - Side/Orientation Right  -LY     Pain Location - hip  -LY     Pain Intervention(s) Medication (See MAR);Ambulation/increased activity  -LY       Row Name 09/23/24 0840          Bed Mobility    Supine-Sit [place] (Bed Mobility) verbal cues;contact guard  -LY     Assistive Device (Bed Mobility) head of bed elevated  -LY       Row Name 09/23/24 0840          Sit-Stand Transfer    Sit-Stand [place] (Transfers) verbal cues;minimum assist (75% patient effort)  -LY     Assistive Device (Sit-Stand Transfers) walker, front-wheeled  -LY     Comment, (Sit-Stand Transfer) cues for hand placement, increased time for upright posture  -LY       Row Name 09/23/24 0840          Stand-Sit Transfer    Stand-Sit [place] (Transfers) verbal cues;contact guard  -LY     Assistive Device (Stand-Sit Transfers) walker, front-wheeled  -LY       Row Name 09/23/24 0840          Gait/Stairs (Locomotion)    [place] Level (Gait) verbal cues;minimum assist (75% patient effort)  -LY     Assistive Device (Gait) walker, front-wheeled  -LY     Distance in Feet (Gait) 5  to chair  -LY     Pattern (Gait) step-to  -LY     Deviations/Abnormal Patterns (Gait) antalgic;gait speed decreased  -LY     Bilateral Gait Deviations forward flexed posture  -LY       Row Name 09/23/24 0840          Plan of Care Review    Plan of Care Reviewed With patient  -LY     Progress improving  -LY       Row Name 09/23/24 0840           Positioning and Restraints    Pre-Treatment Position in bed  -LY     Post Treatment Position chair  -LY     In Chair notified nsg;sitting;call light within reach;encouraged to call for assist;with family/caregiver  -LY               User Key  (r) = Recorded By, (t) = Taken By, (c) = Cosigned By      Initials Name Provider Type    LY Kala Ashby, PTA Physical Therapist Assistant                    Physical Therapy Education       Title: PT OT SLP Therapies (In Progress)       Topic: Physical Therapy (In Progress)       Point: Mobility training (In Progress)       Learning Progress Summary             Patient Acceptance, E,D, NR by BARBARA at 9/22/2024 1131    Comment: gait training    Acceptance, E, VU by BARBARA at 9/20/2024 1146    Comment: bed transfer    Acceptance, E, NR by CHICA at 9/12/2024 0931    Comment: benefits of activity, progression of PT                         Point: Home exercise program (Not Started)       Learner Progress:  Not documented in this visit.              Point: Body mechanics (Not Started)       Learner Progress:  Not documented in this visit.              Point: Precautions (Not Started)       Learner Progress:  Not documented in this visit.                              User Key       Initials Effective Dates Name Provider Type Discipline    CHICA 02/03/23 -  Feroz Schwab, PT DPT Physical Therapist PT    BARBARA 02/03/23 -  Kate Leach PTA Physical Therapist Assistant PT                  PT Recommendation and Plan  Anticipated Discharge Disposition (PT): skilled nursing facility  Plan of Care Reviewed With: patient  Progress: improving   Outcome Measures       Row Name 09/22/24 1100 09/20/24 1400          How much help from another person do you currently need...    Turning from your back to your side while in flat bed without using bedrails? 3  -BARBARA --     Moving from lying on back to sitting on the side of a flat bed without bedrails? 3  -BARBARA --     Moving to and from a bed to a chair  (including a wheelchair)? 3  -BARBARA --     Standing up from a chair using your arms (e.g., wheelchair, bedside chair)? 3  -BARBARA --     Climbing 3-5 steps with a railing? 1  -BARBARA --     To walk in hospital room? 2  -BARBARA --     AM-PAC 6 Clicks Score (PT) 15  -BARBARA --     Highest Level of Mobility Goal 4 --> Transfer to chair/commode  -BARBARA --        How much help from another is currently needed...    Putting on and taking off regular lower body clothing? -- 2  -LS     Bathing (including washing, rinsing, and drying) -- 2  -LS     Toileting (which includes using toilet bed pan or urinal) -- 2  -LS     Putting on and taking off regular upper body clothing -- 2  -LS     Taking care of personal grooming (such as brushing teeth) -- 3  -LS     Eating meals -- 4  -LS     AM-PAC 6 Clicks Score (OT) -- 15  -LS        Functional Assessment    Outcome Measure Options -- AM-PAC 6 Clicks Daily Activity (OT)  -LS               User Key  (r) = Recorded By, (t) = Taken By, (c) = Cosigned By      Initials Name Provider Type    BARBARA Kate Leach PTA Physical Therapist Assistant    [place] COTA Occupational Therapist Assistant                     Time Calculation:    PT Charges       Row Name 09/23/24 1147             Time Calculation    Start Time 0840  -LY      Stop Time 0910  -LY      Time Calculation (min) 30 min  -LY      PT Received On 09/23/24  -LY         Time Calculation- PT    Total Timed Code Minutes- PT 30 minute(s)  -LY         Timed Charges    76128 - PT Therapeutic Activity Minutes 30  -LY         Total Minutes    Timed Charges Total Minutes 30  -LY       Total Minutes 30  -LY                User Key  (r) = Recorded By, (t) = Taken By, (c) = Cosigned By      Initials Name Provider Type    LY Kala Ashby, PTA Physical Therapist Assistant                  Therapy Charges for Today       Code Description Service Date Service Provider Modifiers Qty    [id number]  PT THERAPEUTIC ACT EA 15 MIN 9/23/2024 Kala Ashby,  PTA GP 2            PT G-Codes  Outcome Measure Options: AM-PAC 6 Clicks Daily Activity (OT)  AM-PAC 6 Clicks Score (PT): 15  AM-PAC 6 Clicks Score (OT): 15    Kala Ashby, TRISHA  9/23/2024

## 2024-09-23 NOTE — PLAN OF CARE
Goal Outcome Evaluation:  Plan of Care Reviewed With: patient           Outcome Evaluation: Pt AOX4, Daughter is at bedside, HH, incontinent and has a Purewick.  Pt had a very large BM this shift and states she had not had a BM since 9/14.  Safety maintained and will cont to monitor.

## 2024-09-23 NOTE — DISCHARGE SUMMARY
Hospital Discharge Summary    Pattie Liu  :  1945  MRN:  [id number]    Admit date:  2024  Discharge date:  2024    Admitting Physician:    Anupam Ledezma MD    Discharge Diagnoses:      Pneumonia    Essential hypertension    Gait instability    Pelvic fracture    Pneumonia, unspecified organism      Hospital Course:   The patient is a 79 y.o. female who presents with worsening shortness of breath, cough, fatigue. She had a recent hospitalization with right thigh pain after fall at home on 24. She tripped while at home, denies dizziness or lightheadedness prior to fall. She had a minimally displaced acute fracture of right inferior pubic ramus. She was sent to SNF for rehab as she lives alone and developed pneumonia that was seen on CXR this hospitalization in the ER. She was started on broad spectrum abx given healthcare associated pneumonia. Her oxygen returned to baseline and she completed antibiotic therapy. She still requires SNF for the pelvic fracture, but requested a different facility. She was approved for [facility] and discharged there in stable condition.     The patient was admitted for the above noted medical/surgical issues. Please see daily progress note for further details concerning their stay. The patient improved throughout their stay and reached maximum medical improvement on the day of discharge. The patient/family agree with the treatment plan as outlined above. All questions concerning their stay were answered prior to discharge. They understand the importance of follow up concerning any abnormal test results.     Physical Exam  See progress note on same date.    Discharge Medications:         Discharge Medications        New Medications        Instructions Start Date   traMADol 50 MG tablet  Commonly known as: ULTRAM   50 mg, Oral, Every 8 Hours PRN             Continue These Medications        Instructions Start Date   acetaminophen 325 MG tablet  Commonly  known as: TYLENOL   650 mg, Oral, Every 6 Hours PRN      ALPRAZolam 1 MG tablet  Commonly known as: XANAX   1 mg, Oral, 2 Times Daily      aspirin 81 MG chewable tablet   81 mg, Oral, Daily      atorvastatin 20 MG tablet  Commonly known as: LIPITOR   20 mg, Oral, Daily      Breztri Aerosphere 160-9-4.8 MCG/ACT aerosol inhaler  Generic drug: Budeson-Glycopyrrol-Formoterol   2 puffs, Inhalation, 2 Times Daily      bumetanide 1 MG tablet  Commonly known as: BUMEX   1 mg, Oral, Daily      Citalopram Hydrobromide 30 MG capsule   30 mg, Oral, Daily      dicyclomine 20 MG tablet  Commonly known as: BENTYL   20 mg, Oral, 3 Times Daily PRN      docusate sodium 100 MG capsule  Commonly known as: COLACE   100 mg, Oral, 2 Times Daily PRN      gabapentin 300 MG capsule  Commonly known as: NEURONTIN   300 mg, Oral, 2 Times Daily      ibuprofen 400 MG tablet  Commonly known as: ADVIL,MOTRIN   400 mg, Oral, Every 6 Hours PRN      lisinopril 20 MG tablet  Commonly known as: PRINIVIL,ZESTRIL   20 mg, Oral, Daily      Melatonin 10 MG tablet   1 tablet, Oral, Nightly PRN      nicotine 21 MG/24HR patch  Commonly known as: NICODERM CQ   1 patch, Transdermal, Every 24 Hours Scheduled      ondansetron 4 MG tablet  Commonly known as: ZOFRAN   4 mg, Oral, Every 6 Hours PRN      pantoprazole 40 MG EC tablet  Commonly known as: PROTONIX   40 mg, Oral, Daily      polyethylene glycol 17 GM/SCOOP powder  Commonly known as: MIRALAX   17 g, Oral, Daily PRN      UTI-Stat liquid   30 mL, Oral, 2 Times Daily             Stop These Medications      fluconazole 100 MG tablet  Commonly known as: DIFLUCAN     loperamide 2 MG capsule  Commonly known as: IMODIUM     nystatin 055299 UNIT/GM powder  Commonly known as: MYCOSTATIN              Activity: as tolerated with pelvic fracture    Diet: regular    Consults: none    Significant Diagnostic Studies:    No radiology results for the last 7 days         Treatments:   as above    Disposition:   discharge to  SNF    Time spent on discharge including discussion with patient/family, SW, and coordination of care.     Follow up with PCP in 1 weeks.    Signed:  Anupam Ledezma MD   9/23/2024, 11:14 CDT

## 2024-09-23 NOTE — CASE MANAGEMENT/SOCIAL WORK
Continued Stay Note   [place]     Patient Name: Pattie Liu  MRN: [id number]  Today's Date: 9/23/2024    Admit Date: 9/11/2024    Plan: [facility]   Discharge Plan       Row Name 09/23/24 1051       Plan    Plan [facility]    Patient/Family in Agreement with Plan yes    Plan Comments Pt has been accepted at [facility].  SW spoke to pt's daughter via phone and she has accepted bed offer.  SW spoke to pt and informed that she cannot smoke at SNF; she agreed.  Phone:  [phone] Fax: [phone].    Final Discharge Disposition Code 03 - skilled nursing facility (SNF)                   Discharge Codes    No documentation.                 Expected Discharge Date and Time       Expected Discharge Date Expected Discharge Time    Sep 21, 2024               CATHERINE Ramirez

## 2024-09-24 NOTE — THERAPY DISCHARGE NOTE
Acute Care - Physical Therapy Discharge Summary  [facility]       Patient Name: Pattie Liu  : 1945  MRN: [id number]    Today's Date: 2024                 Admit Date: 2024      PT Recommendation and Plan    Visit Dx:    ICD-10-CM ICD-9-CM   1. Pneumonia of right lung due to infectious organism, unspecified part of lung  J18.9 483.8   2. Dehydration  E86.0 276.51   3. Altered mental status, unspecified altered mental status type  R41.82 780.97   4. Impaired mobility [Z74.09]  Z74.09 799.89   5. Decreased activities of daily living (ADL)  Z78.9 V49.89   6. Anxiety disorder, unspecified type  F41.9 300.00   7. Chronic low back pain, unspecified back pain laterality, unspecified whether sciatica present  M54.50 724.2    G89.29 338.29        Outcome Measures       Row Name 24 1100             How much help from another person do you currently need...    Turning from your back to your side while in flat bed without using bedrails? 3  -BARBARA      Moving from lying on back to sitting on the side of a flat bed without bedrails? 3  -BARBARA      Moving to and from a bed to a chair (including a wheelchair)? 3  -BARBARA      Standing up from a chair using your arms (e.g., wheelchair, bedside chair)? 3  -BARBARA      Climbing 3-5 steps with a railing? 1  -BARBARA      To walk in hospital room? 2  -BARBARA      AM-PAC 6 Clicks Score (PT) 15  -BARBARA      Highest Level of Mobility Goal 4 --> Transfer to chair/commode  -BARBARA                User Key  (r) = Recorded By, (t) = Taken By, (c) = Cosigned By      Initials Name Provider Type    Kate Lezama PTA Physical Therapist Assistant                         PT Rehab Goals       Row Name 24 1546             Bed Mobility Goal 1 (PT)    Activity/Assistive Device (Bed Mobility Goal 1, PT) sit to supine/supine to sit  -NW      [place] Level/Cues Needed (Bed Mobility Goal 1, PT) moderate assist (50-74% patient effort)  -NW      Time Frame (Bed Mobility Goal 1, PT) long term  goal (LTG);10 days  -NW      Progress/Outcomes (Bed Mobility Goal 1, PT) goal met  -NW         Transfer Goal 1 (PT)    Activity/Assistive Device (Transfer Goal 1, PT) sit-to-stand/stand-to-sit;bed-to-chair/chair-to-bed  -NW      [place] Level/Cues Needed (Transfer Goal 1, PT) moderate assist (50-74% patient effort)  -NW      Time Frame (Transfer Goal 1, PT) long term goal (LTG);10 days  -NW      Progress/Outcome (Transfer Goal 1, PT) goal met  -NW         Gait Training Goal 1 (PT)    Activity/Assistive Device (Gait Training Goal 1, PT) gait (walking locomotion);assistive device use;decrease fall risk;improve balance and speed;increase endurance/gait distance  -NW      [place] Level (Gait Training Goal 1, PT) moderate assist (50-74% patient effort)  -NW      Distance (Gait Training Goal 1, PT) 15 ft  -NW      Time Frame (Gait Training Goal 1, PT) long term goal (LTG);10 days  -NW      Progress/Outcome (Gait Training Goal 1, PT) goal not met  -NW                User Key  (r) = Recorded By, (t) = Taken By, (c) = Cosigned By      Initials Name Provider Type Discipline    NW Idania Hurst PTA Physical Therapist Assistant PT                        PT Discharge Summary  Anticipated Discharge Disposition (PT): skilled nursing facility  Reason for Discharge: Discharge from facility  Outcomes Achieved: Refer to plan of care for updates on goals achieved  Discharge Destination: SNF      Idania Hurst PTA   9/24/2024

## 2024-09-25 ENCOUNTER — LAB REQUISITION (OUTPATIENT)
Dept: LAB | Facility: HOSPITAL | Age: 79
End: 2024-09-25
Payer: MEDICARE

## 2024-09-25 DIAGNOSIS — I10 ESSENTIAL (PRIMARY) HYPERTENSION: ICD-10-CM

## 2024-09-25 DIAGNOSIS — E78.5 HYPERLIPIDEMIA, UNSPECIFIED: ICD-10-CM

## 2024-09-25 DIAGNOSIS — I25.10 ATHEROSCLEROTIC HEART DISEASE OF NATIVE CORONARY ARTERY WITHOUT ANGINA PECTORIS: ICD-10-CM

## 2024-09-25 LAB
ALBUMIN SERPL-MCNC: 3.2 G/DL (ref 3.5–5.2)
ALBUMIN/GLOB SERPL: 1.2 G/DL
ALP SERPL-CCNC: 90 U/L (ref 39–117)
ALT SERPL W P-5'-P-CCNC: 5 U/L (ref 1–33)
ANION GAP SERPL CALCULATED.3IONS-SCNC: 9 MMOL/L (ref 5–15)
AST SERPL-CCNC: 15 U/L (ref 1–32)
BASOPHILS # BLD AUTO: 0.07 10*3/MM3 (ref 0–0.2)
BASOPHILS NFR BLD AUTO: 0.7 % (ref 0–1.5)
BILIRUB SERPL-MCNC: 0.2 MG/DL (ref 0–1.2)
BUN SERPL-MCNC: 10 MG/DL (ref 8–23)
BUN/CREAT SERPL: 11.4 (ref 7–25)
CALCIUM SPEC-SCNC: 9.4 MG/DL (ref 8.6–10.5)
CHLORIDE SERPL-SCNC: 104 MMOL/L (ref 98–107)
CHOLEST SERPL-MCNC: 113 MG/DL (ref 0–200)
CO2 SERPL-SCNC: 30 MMOL/L (ref 22–29)
CREAT SERPL-MCNC: 0.88 MG/DL (ref 0.57–1)
DEPRECATED RDW RBC AUTO: 50 FL (ref 37–54)
EGFRCR SERPLBLD CKD-EPI 2021: 66.1 ML/MIN/1.73
EOSINOPHIL # BLD AUTO: 0.45 10*3/MM3 (ref 0–0.4)
EOSINOPHIL NFR BLD AUTO: 4.6 % (ref 0.3–6.2)
ERYTHROCYTE [DISTWIDTH] IN BLOOD BY AUTOMATED COUNT: 14.2 % (ref 12.3–15.4)
GLOBULIN UR ELPH-MCNC: 2.7 GM/DL
GLUCOSE SERPL-MCNC: 91 MG/DL (ref 65–99)
HCT VFR BLD AUTO: 33.4 % (ref 34–46.6)
HDLC SERPL-MCNC: 28 MG/DL (ref 40–60)
HGB BLD-MCNC: 10.6 G/DL (ref 12–15.9)
IMM GRANULOCYTES # BLD AUTO: 0.04 10*3/MM3 (ref 0–0.05)
IMM GRANULOCYTES NFR BLD AUTO: 0.4 % (ref 0–0.5)
LDLC SERPL CALC-MCNC: 57 MG/DL (ref 0–100)
LDLC/HDLC SERPL: 1.85 {RATIO}
LYMPHOCYTES # BLD AUTO: 3.04 10*3/MM3 (ref 0.7–3.1)
LYMPHOCYTES NFR BLD AUTO: 31.3 % (ref 19.6–45.3)
MCH RBC QN AUTO: 30.5 PG (ref 26.6–33)
MCHC RBC AUTO-ENTMCNC: 31.7 G/DL (ref 31.5–35.7)
MCV RBC AUTO: 96 FL (ref 79–97)
MONOCYTES # BLD AUTO: 0.75 10*3/MM3 (ref 0.1–0.9)
MONOCYTES NFR BLD AUTO: 7.7 % (ref 5–12)
NEUTROPHILS NFR BLD AUTO: 5.35 10*3/MM3 (ref 1.7–7)
NEUTROPHILS NFR BLD AUTO: 55.3 % (ref 42.7–76)
NRBC BLD AUTO-RTO: 0 /100 WBC (ref 0–0.2)
PLATELET # BLD AUTO: 431 10*3/MM3 (ref 140–450)
PMV BLD AUTO: 11.3 FL (ref 6–12)
POTASSIUM SERPL-SCNC: 4 MMOL/L (ref 3.5–5.2)
PROT SERPL-MCNC: 5.9 G/DL (ref 6–8.5)
RBC # BLD AUTO: 3.48 10*6/MM3 (ref 3.77–5.28)
SODIUM SERPL-SCNC: 143 MMOL/L (ref 136–145)
TRIGL SERPL-MCNC: 166 MG/DL (ref 0–150)
VLDLC SERPL-MCNC: 28 MG/DL (ref 5–40)
WBC NRBC COR # BLD AUTO: 9.7 10*3/MM3 (ref 3.4–10.8)

## 2024-09-25 PROCEDURE — 80053 COMPREHEN METABOLIC PANEL: CPT | Performed by: FAMILY MEDICINE

## 2024-09-25 PROCEDURE — 85025 COMPLETE CBC W/AUTO DIFF WBC: CPT | Performed by: FAMILY MEDICINE

## 2024-09-25 PROCEDURE — 36415 COLL VENOUS BLD VENIPUNCTURE: CPT | Performed by: FAMILY MEDICINE

## 2024-09-25 PROCEDURE — 80061 LIPID PANEL: CPT | Performed by: FAMILY MEDICINE

## 2024-10-04 ENCOUNTER — LAB REQUISITION (OUTPATIENT)
Dept: LAB | Facility: HOSPITAL | Age: 79
End: 2024-10-04
Payer: MEDICARE

## 2024-10-04 DIAGNOSIS — I25.10 ATHEROSCLEROTIC HEART DISEASE OF NATIVE CORONARY ARTERY WITHOUT ANGINA PECTORIS: ICD-10-CM

## 2024-10-04 DIAGNOSIS — R41.0 DISORIENTATION, UNSPECIFIED: ICD-10-CM

## 2024-10-04 DIAGNOSIS — I10 ESSENTIAL (PRIMARY) HYPERTENSION: ICD-10-CM

## 2024-10-04 LAB
ANION GAP SERPL CALCULATED.3IONS-SCNC: 10 MMOL/L (ref 5–15)
BASOPHILS # BLD AUTO: 0.06 10*3/MM3 (ref 0–0.2)
BASOPHILS NFR BLD AUTO: 0.6 % (ref 0–1.5)
BUN SERPL-MCNC: 17 MG/DL (ref 8–23)
BUN/CREAT SERPL: 18.5 (ref 7–25)
CALCIUM SPEC-SCNC: 9.7 MG/DL (ref 8.6–10.5)
CHLORIDE SERPL-SCNC: 104 MMOL/L (ref 98–107)
CO2 SERPL-SCNC: 28 MMOL/L (ref 22–29)
CREAT SERPL-MCNC: 0.92 MG/DL (ref 0.57–1)
DEPRECATED RDW RBC AUTO: 47.8 FL (ref 37–54)
EGFRCR SERPLBLD CKD-EPI 2021: 62.7 ML/MIN/1.73
EOSINOPHIL # BLD AUTO: 0.51 10*3/MM3 (ref 0–0.4)
EOSINOPHIL NFR BLD AUTO: 4.7 % (ref 0.3–6.2)
ERYTHROCYTE [DISTWIDTH] IN BLOOD BY AUTOMATED COUNT: 13.6 % (ref 12.3–15.4)
GLUCOSE SERPL-MCNC: 92 MG/DL (ref 65–99)
HCT VFR BLD AUTO: 35.6 % (ref 34–46.6)
HGB BLD-MCNC: 11.1 G/DL (ref 12–15.9)
IMM GRANULOCYTES # BLD AUTO: 0.03 10*3/MM3 (ref 0–0.05)
IMM GRANULOCYTES NFR BLD AUTO: 0.3 % (ref 0–0.5)
LYMPHOCYTES # BLD AUTO: 3.3 10*3/MM3 (ref 0.7–3.1)
LYMPHOCYTES NFR BLD AUTO: 30.7 % (ref 19.6–45.3)
MCH RBC QN AUTO: 29.8 PG (ref 26.6–33)
MCHC RBC AUTO-ENTMCNC: 31.2 G/DL (ref 31.5–35.7)
MCV RBC AUTO: 95.4 FL (ref 79–97)
MONOCYTES # BLD AUTO: 0.7 10*3/MM3 (ref 0.1–0.9)
MONOCYTES NFR BLD AUTO: 6.5 % (ref 5–12)
NEUTROPHILS NFR BLD AUTO: 57.2 % (ref 42.7–76)
NEUTROPHILS NFR BLD AUTO: 6.16 10*3/MM3 (ref 1.7–7)
NRBC BLD AUTO-RTO: 0 /100 WBC (ref 0–0.2)
PLATELET # BLD AUTO: 353 10*3/MM3 (ref 140–450)
PMV BLD AUTO: 11.3 FL (ref 6–12)
POTASSIUM SERPL-SCNC: 3.6 MMOL/L (ref 3.5–5.2)
RBC # BLD AUTO: 3.73 10*6/MM3 (ref 3.77–5.28)
SODIUM SERPL-SCNC: 142 MMOL/L (ref 136–145)
WBC NRBC COR # BLD AUTO: 10.76 10*3/MM3 (ref 3.4–10.8)

## 2024-10-04 PROCEDURE — 80048 BASIC METABOLIC PNL TOTAL CA: CPT | Performed by: NURSE PRACTITIONER

## 2024-10-04 PROCEDURE — 36415 COLL VENOUS BLD VENIPUNCTURE: CPT | Performed by: NURSE PRACTITIONER

## 2024-10-04 PROCEDURE — 85025 COMPLETE CBC W/AUTO DIFF WBC: CPT | Performed by: NURSE PRACTITIONER

## 2024-10-10 PROCEDURE — 81001 URINALYSIS AUTO W/SCOPE: CPT | Performed by: NURSE PRACTITIONER

## 2024-10-10 PROCEDURE — 36415 COLL VENOUS BLD VENIPUNCTURE: CPT | Performed by: NURSE PRACTITIONER

## 2024-10-11 ENCOUNTER — LAB REQUISITION (OUTPATIENT)
Dept: LAB | Facility: HOSPITAL | Age: 79
End: 2024-10-11
Payer: MEDICARE

## 2024-10-11 DIAGNOSIS — N39.0 URINARY TRACT INFECTION, SITE NOT SPECIFIED: ICD-10-CM

## 2024-10-11 LAB
BACTERIA UR QL AUTO: ABNORMAL /HPF
BILIRUB UR QL STRIP: NEGATIVE
CLARITY UR: CLEAR
COLOR UR: YELLOW
GLUCOSE UR STRIP-MCNC: NEGATIVE MG/DL
HGB UR QL STRIP.AUTO: NEGATIVE
HYALINE CASTS UR QL AUTO: ABNORMAL /LPF
KETONES UR QL STRIP: NEGATIVE
LEUKOCYTE ESTERASE UR QL STRIP.AUTO: ABNORMAL
NITRITE UR QL STRIP: NEGATIVE
PH UR STRIP.AUTO: 5.5 [PH] (ref 5–8)
PROT UR QL STRIP: NEGATIVE
RBC # UR STRIP: ABNORMAL /HPF
REF LAB TEST METHOD: ABNORMAL
SP GR UR STRIP: 1.01 (ref 1–1.03)
SQUAMOUS #/AREA URNS HPF: ABNORMAL /HPF
UROBILINOGEN UR QL STRIP: ABNORMAL
WBC # UR STRIP: ABNORMAL /HPF

## 2024-10-16 ENCOUNTER — LAB REQUISITION (OUTPATIENT)
Dept: LAB | Facility: HOSPITAL | Age: 79
End: 2024-10-16
Payer: MEDICARE

## 2024-10-16 DIAGNOSIS — R30.0 DYSURIA: ICD-10-CM

## 2024-10-16 DIAGNOSIS — J44.9 CHRONIC OBSTRUCTIVE PULMONARY DISEASE, UNSPECIFIED: ICD-10-CM

## 2024-10-16 DIAGNOSIS — I10 ESSENTIAL (PRIMARY) HYPERTENSION: ICD-10-CM

## 2024-10-16 LAB
25(OH)D3 SERPL-MCNC: 21.9 NG/ML (ref 30–100)
ANION GAP SERPL CALCULATED.3IONS-SCNC: 10 MMOL/L (ref 5–15)
BACTERIA UR QL AUTO: ABNORMAL /HPF
BASOPHILS # BLD AUTO: 0.05 10*3/MM3 (ref 0–0.2)
BASOPHILS NFR BLD AUTO: 0.5 % (ref 0–1.5)
BILIRUB UR QL STRIP: NEGATIVE
BUN SERPL-MCNC: 15 MG/DL (ref 8–23)
BUN/CREAT SERPL: 16 (ref 7–25)
CALCIUM SPEC-SCNC: 9.3 MG/DL (ref 8.6–10.5)
CHLORIDE SERPL-SCNC: 104 MMOL/L (ref 98–107)
CLARITY UR: CLEAR
CO2 SERPL-SCNC: 31 MMOL/L (ref 22–29)
COD CRY URNS QL: ABNORMAL /HPF
COLOR UR: YELLOW
CREAT SERPL-MCNC: 0.94 MG/DL (ref 0.57–1)
DEPRECATED RDW RBC AUTO: 47.8 FL (ref 37–54)
EGFRCR SERPLBLD CKD-EPI 2021: 61.1 ML/MIN/1.73
EOSINOPHIL # BLD AUTO: 0.42 10*3/MM3 (ref 0–0.4)
EOSINOPHIL NFR BLD AUTO: 3.9 % (ref 0.3–6.2)
ERYTHROCYTE [DISTWIDTH] IN BLOOD BY AUTOMATED COUNT: 13.7 % (ref 12.3–15.4)
GLUCOSE SERPL-MCNC: 87 MG/DL (ref 65–99)
GLUCOSE UR STRIP-MCNC: NEGATIVE MG/DL
HCT VFR BLD AUTO: 33.6 % (ref 34–46.6)
HGB BLD-MCNC: 10.6 G/DL (ref 12–15.9)
HGB UR QL STRIP.AUTO: NEGATIVE
HYALINE CASTS UR QL AUTO: ABNORMAL /LPF
IMM GRANULOCYTES # BLD AUTO: 0.03 10*3/MM3 (ref 0–0.05)
IMM GRANULOCYTES NFR BLD AUTO: 0.3 % (ref 0–0.5)
KETONES UR QL STRIP: NEGATIVE
LEUKOCYTE ESTERASE UR QL STRIP.AUTO: ABNORMAL
LYMPHOCYTES # BLD AUTO: 3.18 10*3/MM3 (ref 0.7–3.1)
LYMPHOCYTES NFR BLD AUTO: 29.4 % (ref 19.6–45.3)
MCH RBC QN AUTO: 29.9 PG (ref 26.6–33)
MCHC RBC AUTO-ENTMCNC: 31.5 G/DL (ref 31.5–35.7)
MCV RBC AUTO: 94.6 FL (ref 79–97)
MONOCYTES # BLD AUTO: 0.72 10*3/MM3 (ref 0.1–0.9)
MONOCYTES NFR BLD AUTO: 6.7 % (ref 5–12)
NEUTROPHILS NFR BLD AUTO: 59.2 % (ref 42.7–76)
NEUTROPHILS NFR BLD AUTO: 6.4 10*3/MM3 (ref 1.7–7)
NITRITE UR QL STRIP: NEGATIVE
NRBC BLD AUTO-RTO: 0 /100 WBC (ref 0–0.2)
PH UR STRIP.AUTO: 5.5 [PH] (ref 5–8)
PLATELET # BLD AUTO: 294 10*3/MM3 (ref 140–450)
PMV BLD AUTO: 10.9 FL (ref 6–12)
POTASSIUM SERPL-SCNC: 3.1 MMOL/L (ref 3.5–5.2)
PROT UR QL STRIP: NEGATIVE
RBC # BLD AUTO: 3.55 10*6/MM3 (ref 3.77–5.28)
RBC # UR STRIP: ABNORMAL /HPF
REF LAB TEST METHOD: ABNORMAL
SODIUM SERPL-SCNC: 145 MMOL/L (ref 136–145)
SP GR UR STRIP: 1.01 (ref 1–1.03)
SQUAMOUS #/AREA URNS HPF: ABNORMAL /HPF
TSH SERPL DL<=0.05 MIU/L-ACNC: 1.36 UIU/ML (ref 0.27–4.2)
UROBILINOGEN UR QL STRIP: ABNORMAL
VIT B12 BLD-MCNC: 320 PG/ML (ref 211–946)
WBC # UR STRIP: ABNORMAL /HPF
WBC NRBC COR # BLD AUTO: 10.8 10*3/MM3 (ref 3.4–10.8)

## 2024-10-16 PROCEDURE — 85025 COMPLETE CBC W/AUTO DIFF WBC: CPT | Performed by: NURSE PRACTITIONER

## 2024-10-16 PROCEDURE — 82607 VITAMIN B-12: CPT | Performed by: NURSE PRACTITIONER

## 2024-10-16 PROCEDURE — 80048 BASIC METABOLIC PNL TOTAL CA: CPT | Performed by: NURSE PRACTITIONER

## 2024-10-16 PROCEDURE — 81001 URINALYSIS AUTO W/SCOPE: CPT | Performed by: NURSE PRACTITIONER

## 2024-10-16 PROCEDURE — 36415 COLL VENOUS BLD VENIPUNCTURE: CPT | Performed by: NURSE PRACTITIONER

## 2024-10-16 PROCEDURE — 82306 VITAMIN D 25 HYDROXY: CPT | Performed by: NURSE PRACTITIONER

## 2024-10-16 PROCEDURE — 84443 ASSAY THYROID STIM HORMONE: CPT | Performed by: NURSE PRACTITIONER

## 2024-10-21 DIAGNOSIS — F41.9 ANXIETY: Primary | ICD-10-CM

## 2024-10-21 DIAGNOSIS — G62.9 NEUROPATHY: ICD-10-CM

## 2024-10-21 RX ORDER — GABAPENTIN 300 MG/1
CAPSULE ORAL
Qty: 60 CAPSULE | Refills: 5 | Status: SHIPPED | OUTPATIENT
Start: 2024-10-21 | End: 2025-04-19

## 2024-10-21 RX ORDER — ALPRAZOLAM 1 MG/1
TABLET ORAL
Qty: 60 TABLET | Refills: 5 | Status: SHIPPED | OUTPATIENT
Start: 2024-10-21 | End: 2025-04-19

## 2024-10-23 ENCOUNTER — APPOINTMENT (OUTPATIENT)
Dept: CT IMAGING | Facility: HOSPITAL | Age: 79
End: 2024-10-23
Payer: MEDICARE

## 2024-10-23 ENCOUNTER — APPOINTMENT (OUTPATIENT)
Dept: GENERAL RADIOLOGY | Facility: HOSPITAL | Age: 79
End: 2024-10-23
Payer: MEDICARE

## 2024-10-23 ENCOUNTER — HOSPITAL ENCOUNTER (EMERGENCY)
Facility: HOSPITAL | Age: 79
Discharge: HOME | End: 2024-10-23
Attending: FAMILY MEDICINE | Admitting: FAMILY MEDICINE
Payer: MEDICARE

## 2024-10-23 VITALS
DIASTOLIC BLOOD PRESSURE: 66 MMHG | OXYGEN SATURATION: 96 % | SYSTOLIC BLOOD PRESSURE: 172 MMHG | RESPIRATION RATE: 15 BRPM | WEIGHT: 144.7 LBS | HEIGHT: 63 IN | HEART RATE: 60 BPM | TEMPERATURE: 98.3 F | BODY MASS INDEX: 25.64 KG/M2

## 2024-10-23 DIAGNOSIS — R06.02 SHORTNESS OF BREATH: Primary | ICD-10-CM

## 2024-10-23 DIAGNOSIS — R00.1 BRADYCARDIA: ICD-10-CM

## 2024-10-23 LAB
ANION GAP SERPL CALCULATED.3IONS-SCNC: 11 MMOL/L (ref 5–15)
B PARAPERT DNA SPEC QL NAA+PROBE: NOT DETECTED
B PERT DNA SPEC QL NAA+PROBE: NOT DETECTED
BASOPHILS # BLD AUTO: 0.06 10*3/MM3 (ref 0–0.2)
BASOPHILS NFR BLD AUTO: 0.4 % (ref 0–1.5)
BILIRUB UR QL STRIP: NEGATIVE
BUN SERPL-MCNC: 10 MG/DL (ref 8–23)
BUN/CREAT SERPL: 15.6 (ref 7–25)
C PNEUM DNA NPH QL NAA+NON-PROBE: NOT DETECTED
CALCIUM SPEC-SCNC: 9.3 MG/DL (ref 8.6–10.5)
CHLORIDE SERPL-SCNC: 103 MMOL/L (ref 98–107)
CLARITY UR: CLEAR
CO2 SERPL-SCNC: 31 MMOL/L (ref 22–29)
COLOR UR: YELLOW
CREAT SERPL-MCNC: 0.64 MG/DL (ref 0.57–1)
D DIMER PPP FEU-MCNC: 0.9 MCGFEU/ML (ref 0–0.81)
DEPRECATED RDW RBC AUTO: 45.6 FL (ref 37–54)
EGFRCR SERPLBLD CKD-EPI 2021: 88.9 ML/MIN/1.73
EOSINOPHIL # BLD AUTO: 0.22 10*3/MM3 (ref 0–0.4)
EOSINOPHIL NFR BLD AUTO: 1.6 % (ref 0.3–6.2)
ERYTHROCYTE [DISTWIDTH] IN BLOOD BY AUTOMATED COUNT: 13.4 % (ref 12.3–15.4)
FLUAV SUBTYP SPEC NAA+PROBE: NOT DETECTED
FLUBV RNA ISLT QL NAA+PROBE: NOT DETECTED
GEN 5 2HR TROPONIN T REFLEX: 35 NG/L
GLUCOSE SERPL-MCNC: 109 MG/DL (ref 65–99)
GLUCOSE UR STRIP-MCNC: NEGATIVE MG/DL
HADV DNA SPEC NAA+PROBE: NOT DETECTED
HCOV 229E RNA SPEC QL NAA+PROBE: NOT DETECTED
HCOV HKU1 RNA SPEC QL NAA+PROBE: NOT DETECTED
HCOV NL63 RNA SPEC QL NAA+PROBE: NOT DETECTED
HCOV OC43 RNA SPEC QL NAA+PROBE: NOT DETECTED
HCT VFR BLD AUTO: 35.9 % (ref 34–46.6)
HGB BLD-MCNC: 11.6 G/DL (ref 12–15.9)
HGB UR QL STRIP.AUTO: NEGATIVE
HMPV RNA NPH QL NAA+NON-PROBE: NOT DETECTED
HOLD SPECIMEN: NORMAL
HPIV1 RNA ISLT QL NAA+PROBE: NOT DETECTED
HPIV2 RNA SPEC QL NAA+PROBE: NOT DETECTED
HPIV3 RNA NPH QL NAA+PROBE: NOT DETECTED
HPIV4 P GENE NPH QL NAA+PROBE: NOT DETECTED
IMM GRANULOCYTES # BLD AUTO: 0.05 10*3/MM3 (ref 0–0.05)
IMM GRANULOCYTES NFR BLD AUTO: 0.4 % (ref 0–0.5)
KETONES UR QL STRIP: NEGATIVE
LEUKOCYTE ESTERASE UR QL STRIP.AUTO: NEGATIVE
LYMPHOCYTES # BLD AUTO: 2.47 10*3/MM3 (ref 0.7–3.1)
LYMPHOCYTES NFR BLD AUTO: 17.8 % (ref 19.6–45.3)
M PNEUMO IGG SER IA-ACNC: NOT DETECTED
MAGNESIUM SERPL-MCNC: 1.6 MG/DL (ref 1.6–2.4)
MCH RBC QN AUTO: 29.7 PG (ref 26.6–33)
MCHC RBC AUTO-ENTMCNC: 32.3 G/DL (ref 31.5–35.7)
MCV RBC AUTO: 92.1 FL (ref 79–97)
MONOCYTES # BLD AUTO: 0.73 10*3/MM3 (ref 0.1–0.9)
MONOCYTES NFR BLD AUTO: 5.2 % (ref 5–12)
NEUTROPHILS NFR BLD AUTO: 10.38 10*3/MM3 (ref 1.7–7)
NEUTROPHILS NFR BLD AUTO: 74.6 % (ref 42.7–76)
NITRITE UR QL STRIP: NEGATIVE
NRBC BLD AUTO-RTO: 0 /100 WBC (ref 0–0.2)
NT-PROBNP SERPL-MCNC: 1583 PG/ML (ref 0–1800)
PH UR STRIP.AUTO: 7 [PH] (ref 5–8)
PLATELET # BLD AUTO: 334 10*3/MM3 (ref 140–450)
PMV BLD AUTO: 10.9 FL (ref 6–12)
POTASSIUM SERPL-SCNC: 3.1 MMOL/L (ref 3.5–5.2)
PROT UR QL STRIP: NEGATIVE
RBC # BLD AUTO: 3.9 10*6/MM3 (ref 3.77–5.28)
RHINOVIRUS RNA SPEC NAA+PROBE: NOT DETECTED
RSV RNA NPH QL NAA+NON-PROBE: NOT DETECTED
SARS-COV-2 RNA NPH QL NAA+NON-PROBE: NOT DETECTED
SODIUM SERPL-SCNC: 145 MMOL/L (ref 136–145)
SP GR UR STRIP: 1.01 (ref 1–1.03)
TROPONIN T DELTA: -2 NG/L
TROPONIN T SERPL HS-MCNC: 37 NG/L
UROBILINOGEN UR QL STRIP: NORMAL
WBC NRBC COR # BLD AUTO: 13.91 10*3/MM3 (ref 3.4–10.8)
WHOLE BLOOD HOLD COAG: NORMAL
WHOLE BLOOD HOLD SPECIMEN: NORMAL

## 2024-10-23 PROCEDURE — 83735 ASSAY OF MAGNESIUM: CPT | Performed by: FAMILY MEDICINE

## 2024-10-23 PROCEDURE — P9612 CATHETERIZE FOR URINE SPEC: HCPCS

## 2024-10-23 PROCEDURE — 96374 THER/PROPH/DIAG INJ IV PUSH: CPT

## 2024-10-23 PROCEDURE — 81003 URINALYSIS AUTO W/O SCOPE: CPT | Performed by: FAMILY MEDICINE

## 2024-10-23 PROCEDURE — 71045 X-RAY EXAM CHEST 1 VIEW: CPT

## 2024-10-23 PROCEDURE — 85379 FIBRIN DEGRADATION QUANT: CPT | Performed by: FAMILY MEDICINE

## 2024-10-23 PROCEDURE — 25510000001 IOPAMIDOL PER 1 ML: Performed by: FAMILY MEDICINE

## 2024-10-23 PROCEDURE — 25010000002 LABETALOL 5 MG/ML SOLUTION: Performed by: FAMILY MEDICINE

## 2024-10-23 PROCEDURE — 83880 ASSAY OF NATRIURETIC PEPTIDE: CPT | Performed by: FAMILY MEDICINE

## 2024-10-23 PROCEDURE — 36415 COLL VENOUS BLD VENIPUNCTURE: CPT

## 2024-10-23 PROCEDURE — 71275 CT ANGIOGRAPHY CHEST: CPT

## 2024-10-23 PROCEDURE — 93010 ELECTROCARDIOGRAM REPORT: CPT | Performed by: HOSPITALIST

## 2024-10-23 PROCEDURE — 80048 BASIC METABOLIC PNL TOTAL CA: CPT | Performed by: FAMILY MEDICINE

## 2024-10-23 PROCEDURE — 99285 EMERGENCY DEPT VISIT HI MDM: CPT

## 2024-10-23 PROCEDURE — 84484 ASSAY OF TROPONIN QUANT: CPT | Performed by: FAMILY MEDICINE

## 2024-10-23 PROCEDURE — 0202U NFCT DS 22 TRGT SARS-COV-2: CPT | Performed by: FAMILY MEDICINE

## 2024-10-23 PROCEDURE — 85025 COMPLETE CBC W/AUTO DIFF WBC: CPT | Performed by: FAMILY MEDICINE

## 2024-10-23 PROCEDURE — 93005 ELECTROCARDIOGRAM TRACING: CPT | Performed by: FAMILY MEDICINE

## 2024-10-23 RX ORDER — SODIUM CHLORIDE 0.9 % (FLUSH) 0.9 %
10 SYRINGE (ML) INJECTION AS NEEDED
Status: DISCONTINUED | OUTPATIENT
Start: 2024-10-23 | End: 2024-10-23 | Stop reason: HOSPADM

## 2024-10-23 RX ORDER — LABETALOL HYDROCHLORIDE 5 MG/ML
20 INJECTION, SOLUTION INTRAVENOUS ONCE
Status: COMPLETED | OUTPATIENT
Start: 2024-10-23 | End: 2024-10-23

## 2024-10-23 RX ORDER — IOPAMIDOL 755 MG/ML
100 INJECTION, SOLUTION INTRAVASCULAR
Status: COMPLETED | OUTPATIENT
Start: 2024-10-23 | End: 2024-10-23

## 2024-10-23 RX ADMIN — LABETALOL HYDROCHLORIDE 20 MG: 5 INJECTION, SOLUTION INTRAVENOUS at 14:33

## 2024-10-23 RX ADMIN — IOPAMIDOL 100 ML: 755 INJECTION, SOLUTION INTRAVENOUS at 16:12

## 2024-10-23 NOTE — ED PROVIDER NOTES
Subjective   History of Present Illness  79-year-old female nursing home resident.  Was sent here from the nursing home for having oxygen levels in the 30s.  The history is provided by the daughter as the patient has dementia.  When nursing home called us however they had stated that the patient's heart rate was in the 30s.  EMS had the patient and they have stated that the heart rate was stable for them.      Review of Systems   All other systems reviewed and are negative.      Past Medical History:   Diagnosis Date    CAD in native artery 7/29/2019    COPD (chronic obstructive pulmonary disease)     Essential hypertension 12/7/2021    GERD (gastroesophageal reflux disease)     Lung nodule        Allergies   Allergen Reactions    Morphine Anaphylaxis    Codeine Other (See Comments)     Unknown.      Levofloxacin Other (See Comments)    Tramadol Other (See Comments)    Zanaflex  [Tizanidine Hcl] Other (See Comments)    Albuterol Arrhythmia       Past Surgical History:   Procedure Laterality Date    BREAST IMPLANT REMOVAL      BREAST TISSUE EXPANDER REMOVAL INSERTION OF IMPLANT      CARDIAC CATHETERIZATION N/A 6/21/2019    Procedure: Left Heart Cath;  Surgeon: Edi Armijo MD;  Location: [facility] CATH INVASIVE LOCATION;  Service: Cardiology    CHOLECYSTECTOMY      HYSTERECTOMY      MASTECTOMY      BILATERAL    OOPHORECTOMY         Family History   Problem Relation Age of Onset    Cancer Mother     Cancer Father        Social History     Socioeconomic History    Marital status:    Tobacco Use    Smoking status: Every Day     Current packs/day: 0.50     Average packs/day: 0.5 packs/day for 62.0 years (31.0 ttl pk-yrs)     Types: Cigarettes    Smokeless tobacco: Never    Tobacco comments:     states she has no plan to quit smoking   Vaping Use    Vaping status: Former   Substance and Sexual Activity    Alcohol use: No    Drug use: No    Sexual activity: Not Currently           Objective   Physical  Exam  Vitals and nursing note reviewed.   Constitutional:       General: She is not in acute distress.     Appearance: Normal appearance. She is not toxic-appearing.   HENT:      Head: Normocephalic and atraumatic.      Mouth/Throat:      Mouth: Mucous membranes are moist.   Eyes:      Extraocular Movements: Extraocular movements intact.      Pupils: Pupils are equal, round, and reactive to light.   Cardiovascular:      Rate and Rhythm: Normal rate and regular rhythm.      Heart sounds: Normal heart sounds.   Pulmonary:      Effort: Pulmonary effort is normal.      Breath sounds: Normal breath sounds.   Abdominal:      General: Bowel sounds are normal.      Palpations: Abdomen is soft.      Tenderness: There is no abdominal tenderness.   Skin:     General: Skin is warm and dry.   Neurological:      General: No focal deficit present.      Mental Status: She is alert and oriented to person, place, and time.   Psychiatric:         Mood and Affect: Mood normal.         Behavior: Behavior normal.         Procedures           ED Course  ED Course as of 10/23/24 1813   Wed Oct 23, 2024   1401 EKG rate 67  Normal sinus rhythm    PVCs [RP]      ED Course User Index  [RP] Nemesio Emerson MD                                             Lab Results (last 24 hours)       Procedure Component Value Units Date/Time    CBC & Differential [[id number]]  (Abnormal) Collected: 10/23/24 1219    Specimen: Blood Updated: 10/23/24 1238    Narrative:      The following orders were created for panel order CBC & Differential.  Procedure                               Abnormality         Status                     ---------                               -----------         ------                     CBC Auto Differential[[id number]]        Abnormal            Final result                 Please view results for these tests on the individual orders.    Basic Metabolic Panel [[id number]]  (Abnormal) Collected: 10/23/24 1219    Specimen: Blood Updated:  10/23/24 1252     Glucose 109 mg/dL      BUN 10 mg/dL      Creatinine 0.64 mg/dL      Sodium 145 mmol/L      Potassium 3.1 mmol/L      Chloride 103 mmol/L      CO2 31.0 mmol/L      Calcium 9.3 mg/dL      BUN/Creatinine Ratio 15.6     Anion Gap 11.0 mmol/L      eGFR 88.9 mL/min/1.73     Narrative:      GFR Normal >60  Chronic Kidney Disease <60  Kidney Failure <15    The GFR formula is only valid for adults with stable renal function between ages 18 and 70.    BNP [[id number]]  (Normal) Collected: 10/23/24 1219    Specimen: Blood Updated: 10/23/24 1250     proBNP 1,583.0 pg/mL     Narrative:      This assay is used as an aid in the diagnosis of individuals suspected of having heart failure. It can be used as an aid in the diagnosis of acute decompensated heart failure (ADHF) in patients presenting with signs and symptoms of ADHF to the emergency department (ED). In addition, NT-proBNP of <300 pg/mL indicates ADHF is not likely.    Age Range Result Interpretation  NT-proBNP Concentration (pg/mL:      <50             Positive            >450                   Gray                 300-450                    Negative             <300    50-75           Positive            >900                  Gray                300-900                  Negative            <300      >75             Positive            >1800                  Gray                300-1800                  Negative            <300    D-dimer, Quantitative [[id number]]  (Abnormal) Collected: 10/23/24 1219    Specimen: Blood Updated: 10/23/24 1251     D-Dimer, Quantitative 0.90 MCGFEU/mL     Narrative:      According to the assay 's published package insert, a normal (<0.50 MCGFEU/mL) D-dimer result in conjunction with a non-high clinical probability assessment, excludes deep vein thrombosis (DVT) and pulmonary embolism (PE) with high sensitivity.    D-dimer values increase with age and this can make VTE exclusion of an older population difficult.  "To address this, the [employer], based on best available evidence and recent guidelines, recommends that clinicians use age-adjusted D-dimer thresholds in patients greater than 50 years of age with: a) a low probability of PE who do not meet all Pulmonary Embolism Rule Out Criteria, or b) in those with intermediate probability of PE.   The formula for an age-adjusted D-dimer cut-off is \"age/100\".  For example, a 60 year old patient would have an age-adjusted cut-off of 0.60 MCGFEU/mL and an 80 year old 0.80 MCGFEU/mL.    High Sensitivity Troponin T [[id number]]  (Abnormal) Collected: 10/23/24 1219    Specimen: Blood Updated: 10/23/24 1249     HS Troponin T 37 ng/L     Narrative:      High Sensitive Troponin T Reference Range:  <14.0 ng/L- Negative Female for AMI  <22.0 ng/L- Negative Male for AMI  >=14 - Abnormal Female indicating possible myocardial injury.  >=22 - Abnormal Male indicating possible myocardial injury.   Clinicians would have to utilize clinical acumen, EKG, Troponin, and serial changes to determine if it is an Acute Myocardial Infarction or myocardial injury due to an underlying chronic condition.         Magnesium [[id number]]  (Normal) Collected: 10/23/24 1219    Specimen: Blood Updated: 10/23/24 1252     Magnesium 1.6 mg/dL     CBC Auto Differential [[id number]]  (Abnormal) Collected: 10/23/24 1219    Specimen: Blood Updated: 10/23/24 1238     WBC 13.91 10*3/mm3      RBC 3.90 10*6/mm3      Hemoglobin 11.6 g/dL      Hematocrit 35.9 %      MCV 92.1 fL      MCH 29.7 pg      MCHC 32.3 g/dL      RDW 13.4 %      RDW-SD 45.6 fl      MPV 10.9 fL      Platelets 334 10*3/mm3      Neutrophil % 74.6 %      Lymphocyte % 17.8 %      Monocyte % 5.2 %      Eosinophil % 1.6 %      Basophil % 0.4 %      Immature Grans % 0.4 %      Neutrophils, Absolute 10.38 10*3/mm3      Lymphocytes, Absolute 2.47 10*3/mm3      Monocytes, Absolute 0.73 10*3/mm3      Eosinophils, Absolute 0.22 10*3/mm3      " Basophils, Absolute 0.06 10*3/mm3      Immature Grans, Absolute 0.05 10*3/mm3      nRBC 0.0 /100 WBC     Respiratory Panel PCR w/COVID-19(SARS-CoV-2) [facility] In-House, NP Swab in UTM/VTM, 2 HR TAT - Swab, Nasopharynx [[id number]]  (Normal) Collected: 10/23/24 1222    Specimen: Swab from Nasopharynx Updated: 10/23/24 1318     ADENOVIRUS, PCR Not Detected     Coronavirus 229E Not Detected     Coronavirus HKU1 Not Detected     Coronavirus NL63 Not Detected     Coronavirus OC43 Not Detected     COVID19 Not Detected     Human Metapneumovirus Not Detected     Human Rhinovirus/Enterovirus Not Detected     Influenza A PCR Not Detected     Influenza B PCR Not Detected     Parainfluenza Virus 1 Not Detected     Parainfluenza Virus 2 Not Detected     Parainfluenza Virus 3 Not Detected     Parainfluenza Virus 4 Not Detected     RSV, PCR Not Detected     Bordetella pertussis pcr Not Detected     Bordetella parapertussis PCR Not Detected     Chlamydophila pneumoniae PCR Not Detected     Mycoplasma pneumo by PCR Not Detected    Narrative:      In the setting of a positive respiratory panel with a viral infection PLUS a negative procalcitonin without other underlying concern for bacterial infection, consider observing off antibiotics or discontinuation of antibiotics and continue supportive care. If the respiratory panel is positive for atypical bacterial infection (Bordetella pertussis, Chlamydophila pneumoniae, or Mycoplasma pneumoniae), consider antibiotic de-escalation to target atypical bacterial infection.    High Sensitivity Troponin T 2Hr [[id number]]  (Abnormal) Collected: 10/23/24 1432    Specimen: Blood Updated: 10/23/24 1501     HS Troponin T 35 ng/L      Troponin T Delta -2 ng/L     Narrative:      High Sensitive Troponin T Reference Range:  <14.0 ng/L- Negative Female for AMI  <22.0 ng/L- Negative Male for AMI  >=14 - Abnormal Female indicating possible myocardial injury.  >=22 - Abnormal Male indicating  possible myocardial injury.   Clinicians would have to utilize clinical acumen, EKG, Troponin, and serial changes to determine if it is an Acute Myocardial Infarction or myocardial injury due to an underlying chronic condition.         Urinalysis With Culture If Indicated - Urine, Catheter [[id number]]  (Normal) Collected: 10/23/24 1703    Specimen: Urine, Catheter Updated: 10/23/24 1714     Color, UA Yellow     Appearance, UA Clear     pH, UA 7.0     Specific Gravity, UA 1.007     Glucose, UA Negative     Ketones, UA Negative     Bilirubin, UA Negative     Blood, UA Negative     Protein, UA Negative     Leuk Esterase, UA Negative     Nitrite, UA Negative     Urobilinogen, UA 0.2 E.U./dL    Narrative:      In absence of clinical symptoms, the presence of pyuria, bacteria, and/or nitrites on the urinalysis result does not correlate with infection.  Urine microscopic not indicated.          CT Angiogram Chest   Final Result       No pulmonary emboli. Atherosclerosis with no thoracic aortic aneurysm or   dissection. Moderate to advanced emphysema. No lobar consolidation.   Questionable superimposed interstitial edema on chronic interstitial   lung change, consider CHF. 7 mm left upper lobe pulmonary nodule is   stable compared to 2021 supporting a benign process. No pneumothorax.   Stable cardiomegaly. No pleural effusions.       This report was signed and finalized on 10/23/2024 4:33 PM by Dr. Emilie Crabtree MD.          XR Chest 1 View   Final Result   1. Chronic interstitial lung change with cardiomegaly. No new focus of   consolidation. Thoracic aortic calcification.                   This report was signed and finalized on 10/23/2024 12:38 PM by Dr. Emilie Crabtree MD.            Medications   sodium chloride 0.9 % flush 10 mL (has no administration in time range)   labetalol (NORMODYNE,TRANDATE) injection 20 mg (20 mg Intravenous Given 10/23/24 1433)   iopamidol (ISOVUE-370) 76 % injection 100 mL (100 mL  Intravenous Given 10/23/24 1612)       Medical Decision Making  I had a discussion with the patient/family regarding diagnosis, diagnostic results, treatment plan, and medications.  The patient/family indicated understanding of these instructions.  I spent adequate time at the bedside prior to discharge necessary to discuss the aftercare instructions, giving patient education, providing explanations of the results of our evaluations/findings, and my decision making to assure that the patient/family understand the plan of care.  Time was allotted to answer questions at that time and throughout the ED course.  Emphasis was placed on timely follow-up after discharge.  I also discussed the potential for the development of an acute emergent condition requiring further evaluation, return to the ER, admission, or even surgical intervention. I discussed that we found nothing during the visit today indicating the need for further ER workup at this time, admission to the hospital, or the presence of an acute unstable medical condition.  I encouraged the patient to return to the emergency department immediately for ANY concerns, worsening, new complaints, or if symptoms persist and unable to seek follow-up in a timely fashion.  The patient/family expressed understanding and agreement with this plan.      Problems Addressed:  Bradycardia: complicated acute illness or injury  Shortness of breath: complicated acute illness or injury    Amount and/or Complexity of Data Reviewed  Labs: ordered. Decision-making details documented in ED Course.  Radiology: ordered. Decision-making details documented in ED Course.  ECG/medicine tests: ordered. Decision-making details documented in ED Course.    Risk  Prescription drug management.        Final diagnoses:   Shortness of breath   Bradycardia       ED Disposition  ED Disposition       ED Disposition   Discharge    Condition   Stable    Comment   --               Bud Dwyer MD  [address]  [facility]  [place] [address]  [phone]    Schedule an appointment as soon as possible for a visit            Medication List      No changes were made to your prescriptions during this visit.            Nemesio Emerson MD  10/23/24 8230

## 2024-10-24 LAB
QT INTERVAL: 506 MS
QTC INTERVAL: 534 MS

## 2024-10-30 ENCOUNTER — LAB REQUISITION (OUTPATIENT)
Dept: LAB | Facility: HOSPITAL | Age: 79
End: 2024-10-30
Payer: MEDICARE

## 2024-10-30 DIAGNOSIS — I25.10 ATHEROSCLEROTIC HEART DISEASE OF NATIVE CORONARY ARTERY WITHOUT ANGINA PECTORIS: ICD-10-CM

## 2024-10-30 DIAGNOSIS — R60.9 EDEMA, UNSPECIFIED: ICD-10-CM

## 2024-10-30 LAB
BASOPHILS # BLD AUTO: 0.05 10*3/MM3 (ref 0–0.2)
BASOPHILS NFR BLD AUTO: 0.4 % (ref 0–1.5)
DEPRECATED RDW RBC AUTO: 46.8 FL (ref 37–54)
EOSINOPHIL # BLD AUTO: 0.39 10*3/MM3 (ref 0–0.4)
EOSINOPHIL NFR BLD AUTO: 3.4 % (ref 0.3–6.2)
ERYTHROCYTE [DISTWIDTH] IN BLOOD BY AUTOMATED COUNT: 13.3 % (ref 12.3–15.4)
HCT VFR BLD AUTO: 33.4 % (ref 34–46.6)
HGB BLD-MCNC: 10.7 G/DL (ref 12–15.9)
IMM GRANULOCYTES # BLD AUTO: 0.05 10*3/MM3 (ref 0–0.05)
IMM GRANULOCYTES NFR BLD AUTO: 0.4 % (ref 0–0.5)
LYMPHOCYTES # BLD AUTO: 2.83 10*3/MM3 (ref 0.7–3.1)
LYMPHOCYTES NFR BLD AUTO: 24.6 % (ref 19.6–45.3)
MCH RBC QN AUTO: 30.2 PG (ref 26.6–33)
MCHC RBC AUTO-ENTMCNC: 32 G/DL (ref 31.5–35.7)
MCV RBC AUTO: 94.4 FL (ref 79–97)
MONOCYTES # BLD AUTO: 0.8 10*3/MM3 (ref 0.1–0.9)
MONOCYTES NFR BLD AUTO: 7 % (ref 5–12)
NEUTROPHILS NFR BLD AUTO: 64.2 % (ref 42.7–76)
NEUTROPHILS NFR BLD AUTO: 7.38 10*3/MM3 (ref 1.7–7)
NRBC BLD AUTO-RTO: 0 /100 WBC (ref 0–0.2)
PLATELET # BLD AUTO: 347 10*3/MM3 (ref 140–450)
PMV BLD AUTO: 11.2 FL (ref 6–12)
RBC # BLD AUTO: 3.54 10*6/MM3 (ref 3.77–5.28)
WBC NRBC COR # BLD AUTO: 11.5 10*3/MM3 (ref 3.4–10.8)

## 2024-10-30 PROCEDURE — 36415 COLL VENOUS BLD VENIPUNCTURE: CPT | Performed by: FAMILY MEDICINE

## 2024-10-30 PROCEDURE — 85025 COMPLETE CBC W/AUTO DIFF WBC: CPT | Performed by: FAMILY MEDICINE

## 2024-11-06 PROCEDURE — 36415 COLL VENOUS BLD VENIPUNCTURE: CPT | Performed by: FAMILY MEDICINE

## 2024-11-06 PROCEDURE — 80048 BASIC METABOLIC PNL TOTAL CA: CPT | Performed by: FAMILY MEDICINE

## 2024-11-07 ENCOUNTER — LAB REQUISITION (OUTPATIENT)
Dept: LAB | Facility: HOSPITAL | Age: 79
End: 2024-11-07
Payer: MEDICARE

## 2024-11-07 DIAGNOSIS — I25.10 ATHEROSCLEROTIC HEART DISEASE OF NATIVE CORONARY ARTERY WITHOUT ANGINA PECTORIS: ICD-10-CM

## 2024-11-07 DIAGNOSIS — R60.9 EDEMA, UNSPECIFIED: ICD-10-CM

## 2024-11-07 LAB
ANION GAP SERPL CALCULATED.3IONS-SCNC: 9 MMOL/L (ref 5–15)
BUN SERPL-MCNC: 17 MG/DL (ref 8–23)
BUN/CREAT SERPL: 17.5 (ref 7–25)
CALCIUM SPEC-SCNC: 9.6 MG/DL (ref 8.6–10.5)
CHLORIDE SERPL-SCNC: 106 MMOL/L (ref 98–107)
CO2 SERPL-SCNC: 29 MMOL/L (ref 22–29)
CREAT SERPL-MCNC: 0.97 MG/DL (ref 0.57–1)
EGFRCR SERPLBLD CKD-EPI 2021: 58.8 ML/MIN/1.73
GLUCOSE SERPL-MCNC: 109 MG/DL (ref 65–99)
POTASSIUM SERPL-SCNC: 3.7 MMOL/L (ref 3.5–5.2)
SODIUM SERPL-SCNC: 144 MMOL/L (ref 136–145)

## 2024-11-13 ENCOUNTER — LAB REQUISITION (OUTPATIENT)
Dept: LAB | Facility: HOSPITAL | Age: 79
End: 2024-11-13
Payer: MEDICARE

## 2024-11-13 DIAGNOSIS — J44.9 CHRONIC OBSTRUCTIVE PULMONARY DISEASE, UNSPECIFIED: ICD-10-CM

## 2024-11-13 DIAGNOSIS — E78.5 HYPERLIPIDEMIA, UNSPECIFIED: ICD-10-CM

## 2024-11-13 DIAGNOSIS — R30.0 DYSURIA: ICD-10-CM

## 2024-11-13 DIAGNOSIS — I25.10 ATHEROSCLEROTIC HEART DISEASE OF NATIVE CORONARY ARTERY WITHOUT ANGINA PECTORIS: ICD-10-CM

## 2024-11-13 DIAGNOSIS — I10 ESSENTIAL (PRIMARY) HYPERTENSION: ICD-10-CM

## 2024-11-13 LAB
ANION GAP SERPL CALCULATED.3IONS-SCNC: 12 MMOL/L (ref 5–15)
BACTERIA UR QL AUTO: ABNORMAL /HPF
BASOPHILS # BLD AUTO: 0.07 10*3/MM3 (ref 0–0.2)
BASOPHILS NFR BLD AUTO: 0.6 % (ref 0–1.5)
BILIRUB UR QL STRIP: NEGATIVE
BUN SERPL-MCNC: 20 MG/DL (ref 8–23)
BUN/CREAT SERPL: 20.6 (ref 7–25)
CALCIUM SPEC-SCNC: 10.1 MG/DL (ref 8.6–10.5)
CHLORIDE SERPL-SCNC: 101 MMOL/L (ref 98–107)
CLARITY UR: CLEAR
CO2 SERPL-SCNC: 28 MMOL/L (ref 22–29)
COLOR UR: YELLOW
CREAT SERPL-MCNC: 0.97 MG/DL (ref 0.57–1)
DEPRECATED RDW RBC AUTO: 45.2 FL (ref 37–54)
EGFRCR SERPLBLD CKD-EPI 2021: 58.8 ML/MIN/1.73
EOSINOPHIL # BLD AUTO: 0.38 10*3/MM3 (ref 0–0.4)
EOSINOPHIL NFR BLD AUTO: 3.1 % (ref 0.3–6.2)
ERYTHROCYTE [DISTWIDTH] IN BLOOD BY AUTOMATED COUNT: 13.2 % (ref 12.3–15.4)
GLUCOSE SERPL-MCNC: 83 MG/DL (ref 65–99)
GLUCOSE UR STRIP-MCNC: NEGATIVE MG/DL
HCT VFR BLD AUTO: 38.3 % (ref 34–46.6)
HGB BLD-MCNC: 12 G/DL (ref 12–15.9)
HGB UR QL STRIP.AUTO: NEGATIVE
HYALINE CASTS UR QL AUTO: ABNORMAL /LPF
IMM GRANULOCYTES # BLD AUTO: 0.04 10*3/MM3 (ref 0–0.05)
IMM GRANULOCYTES NFR BLD AUTO: 0.3 % (ref 0–0.5)
KETONES UR QL STRIP: NEGATIVE
LEUKOCYTE ESTERASE UR QL STRIP.AUTO: ABNORMAL
LYMPHOCYTES # BLD AUTO: 3.45 10*3/MM3 (ref 0.7–3.1)
LYMPHOCYTES NFR BLD AUTO: 28.6 % (ref 19.6–45.3)
MCH RBC QN AUTO: 29.3 PG (ref 26.6–33)
MCHC RBC AUTO-ENTMCNC: 31.3 G/DL (ref 31.5–35.7)
MCV RBC AUTO: 93.6 FL (ref 79–97)
MONOCYTES # BLD AUTO: 0.76 10*3/MM3 (ref 0.1–0.9)
MONOCYTES NFR BLD AUTO: 6.3 % (ref 5–12)
NEUTROPHILS NFR BLD AUTO: 61.1 % (ref 42.7–76)
NEUTROPHILS NFR BLD AUTO: 7.38 10*3/MM3 (ref 1.7–7)
NITRITE UR QL STRIP: NEGATIVE
NRBC BLD AUTO-RTO: 0 /100 WBC (ref 0–0.2)
PH UR STRIP.AUTO: 5.5 [PH] (ref 5–8)
PLATELET # BLD AUTO: 379 10*3/MM3 (ref 140–450)
PMV BLD AUTO: 11.3 FL (ref 6–12)
POTASSIUM SERPL-SCNC: 4.1 MMOL/L (ref 3.5–5.2)
PROT UR QL STRIP: NEGATIVE
RBC # BLD AUTO: 4.09 10*6/MM3 (ref 3.77–5.28)
RBC # UR STRIP: ABNORMAL /HPF
REF LAB TEST METHOD: ABNORMAL
SODIUM SERPL-SCNC: 141 MMOL/L (ref 136–145)
SP GR UR STRIP: 1.01 (ref 1–1.03)
SQUAMOUS #/AREA URNS HPF: ABNORMAL /HPF
UROBILINOGEN UR QL STRIP: ABNORMAL
WBC # UR STRIP: ABNORMAL /HPF
WBC NRBC COR # BLD AUTO: 12.08 10*3/MM3 (ref 3.4–10.8)

## 2024-11-13 PROCEDURE — 80048 BASIC METABOLIC PNL TOTAL CA: CPT | Performed by: FAMILY MEDICINE

## 2024-11-13 PROCEDURE — 87088 URINE BACTERIA CULTURE: CPT | Performed by: FAMILY MEDICINE

## 2024-11-13 PROCEDURE — 36415 COLL VENOUS BLD VENIPUNCTURE: CPT | Performed by: FAMILY MEDICINE

## 2024-11-13 PROCEDURE — 87086 URINE CULTURE/COLONY COUNT: CPT | Performed by: FAMILY MEDICINE

## 2024-11-13 PROCEDURE — 85025 COMPLETE CBC W/AUTO DIFF WBC: CPT | Performed by: FAMILY MEDICINE

## 2024-11-13 PROCEDURE — 81001 URINALYSIS AUTO W/SCOPE: CPT | Performed by: FAMILY MEDICINE

## 2024-11-13 PROCEDURE — 87186 SC STD MICRODIL/AGAR DIL: CPT | Performed by: FAMILY MEDICINE

## 2024-11-15 LAB — BACTERIA SPEC AEROBE CULT: ABNORMAL

## 2024-11-25 ENCOUNTER — LAB REQUISITION (OUTPATIENT)
Dept: LAB | Facility: HOSPITAL | Age: 79
End: 2024-11-25
Payer: COMMERCIAL

## 2024-11-25 DIAGNOSIS — I25.10 ATHEROSCLEROTIC HEART DISEASE OF NATIVE CORONARY ARTERY WITHOUT ANGINA PECTORIS: ICD-10-CM

## 2024-11-25 DIAGNOSIS — J44.9 CHRONIC OBSTRUCTIVE PULMONARY DISEASE, UNSPECIFIED: ICD-10-CM

## 2024-11-25 LAB
ANION GAP SERPL CALCULATED.3IONS-SCNC: 12 MMOL/L (ref 5–15)
BUN SERPL-MCNC: 29 MG/DL (ref 8–23)
BUN/CREAT SERPL: 28.7 (ref 7–25)
CALCIUM SPEC-SCNC: 9.9 MG/DL (ref 8.6–10.5)
CHLORIDE SERPL-SCNC: 106 MMOL/L (ref 98–107)
CO2 SERPL-SCNC: 23 MMOL/L (ref 22–29)
CREAT SERPL-MCNC: 1.01 MG/DL (ref 0.57–1)
DEPRECATED RDW RBC AUTO: 44.9 FL (ref 37–54)
EGFRCR SERPLBLD CKD-EPI 2021: 56 ML/MIN/1.73
ERYTHROCYTE [DISTWIDTH] IN BLOOD BY AUTOMATED COUNT: 13.1 % (ref 12.3–15.4)
GLUCOSE SERPL-MCNC: 96 MG/DL (ref 65–99)
HCT VFR BLD AUTO: 35.7 % (ref 34–46.6)
HGB BLD-MCNC: 11.1 G/DL (ref 12–15.9)
MCH RBC QN AUTO: 29.1 PG (ref 26.6–33)
MCHC RBC AUTO-ENTMCNC: 31.1 G/DL (ref 31.5–35.7)
MCV RBC AUTO: 93.7 FL (ref 79–97)
PLATELET # BLD AUTO: 310 10*3/MM3 (ref 140–450)
PMV BLD AUTO: 11.4 FL (ref 6–12)
POTASSIUM SERPL-SCNC: 4.3 MMOL/L (ref 3.5–5.2)
RBC # BLD AUTO: 3.81 10*6/MM3 (ref 3.77–5.28)
SODIUM SERPL-SCNC: 141 MMOL/L (ref 136–145)
WBC NRBC COR # BLD AUTO: 10.06 10*3/MM3 (ref 3.4–10.8)

## 2024-11-25 PROCEDURE — 80048 BASIC METABOLIC PNL TOTAL CA: CPT | Performed by: FAMILY MEDICINE

## 2024-11-25 PROCEDURE — 36415 COLL VENOUS BLD VENIPUNCTURE: CPT | Performed by: FAMILY MEDICINE

## 2024-11-25 PROCEDURE — 85027 COMPLETE CBC AUTOMATED: CPT | Performed by: FAMILY MEDICINE

## 2024-12-29 ENCOUNTER — HOSPITAL ENCOUNTER (EMERGENCY)
Facility: HOSPITAL | Age: 79
Discharge: HOME | End: 2024-12-29
Attending: EMERGENCY MEDICINE | Admitting: EMERGENCY MEDICINE
Payer: MEDICARE

## 2024-12-29 ENCOUNTER — APPOINTMENT (OUTPATIENT)
Dept: CT IMAGING | Facility: HOSPITAL | Age: 79
End: 2024-12-29
Payer: MEDICARE

## 2024-12-29 ENCOUNTER — APPOINTMENT (OUTPATIENT)
Dept: GENERAL RADIOLOGY | Facility: HOSPITAL | Age: 79
End: 2024-12-29
Payer: MEDICARE

## 2024-12-29 VITALS
OXYGEN SATURATION: 94 % | BODY MASS INDEX: 25.62 KG/M2 | SYSTOLIC BLOOD PRESSURE: 114 MMHG | WEIGHT: 144.62 LBS | HEART RATE: 68 BPM | TEMPERATURE: 98 F | DIASTOLIC BLOOD PRESSURE: 68 MMHG | HEIGHT: 63 IN | RESPIRATION RATE: 18 BRPM
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
## 2024-12-29 LAB
B PARAPERT DNA SPEC QL NAA+PROBE: NOT DETECTED
B PERT DNA SPEC QL NAA+PROBE: NOT DETECTED
BACTERIA UR QL AUTO: ABNORMAL /HPF
BILIRUB UR QL STRIP: NEGATIVE
C PNEUM DNA NPH QL NAA+NON-PROBE: NOT DETECTED
CLARITY UR: CLEAR
COLOR UR: YELLOW
FLUAV SUBTYP SPEC NAA+PROBE: NOT DETECTED
FLUBV RNA ISLT QL NAA+PROBE: NOT DETECTED
GLUCOSE UR STRIP-MCNC: NEGATIVE MG/DL
HADV DNA SPEC NAA+PROBE: NOT DETECTED
HCOV 229E RNA SPEC QL NAA+PROBE: NOT DETECTED
HCOV HKU1 RNA SPEC QL NAA+PROBE: NOT DETECTED
HCOV NL63 RNA SPEC QL NAA+PROBE: NOT DETECTED
HCOV OC43 RNA SPEC QL NAA+PROBE: NOT DETECTED
HGB UR QL STRIP.AUTO: NEGATIVE
HMPV RNA NPH QL NAA+NON-PROBE: NOT DETECTED
HPIV1 RNA ISLT QL NAA+PROBE: NOT DETECTED
HPIV2 RNA SPEC QL NAA+PROBE: NOT DETECTED
HPIV3 RNA NPH QL NAA+PROBE: NOT DETECTED
HPIV4 P GENE NPH QL NAA+PROBE: NOT DETECTED
HYALINE CASTS UR QL AUTO: ABNORMAL /LPF
KETONES UR QL STRIP: NEGATIVE
LEUKOCYTE ESTERASE UR QL STRIP.AUTO: ABNORMAL
M PNEUMO IGG SER IA-ACNC: NOT DETECTED
NITRITE UR QL STRIP: NEGATIVE
PH UR STRIP.AUTO: <=5 [PH] (ref 5–8)
PROT UR QL STRIP: NEGATIVE
RBC # UR STRIP: ABNORMAL /HPF
REF LAB TEST METHOD: ABNORMAL
RHINOVIRUS RNA SPEC NAA+PROBE: NOT DETECTED
RSV RNA NPH QL NAA+NON-PROBE: NOT DETECTED
SARS-COV-2 RNA RESP QL NAA+PROBE: NOT DETECTED
SP GR UR STRIP: 1.01 (ref 1–1.03)
SQUAMOUS #/AREA URNS HPF: ABNORMAL /HPF
UROBILINOGEN UR QL STRIP: ABNORMAL
WBC # UR STRIP: ABNORMAL /HPF

## 2024-12-29 PROCEDURE — 70450 CT HEAD/BRAIN W/O DYE: CPT

## 2024-12-29 PROCEDURE — 99284 EMERGENCY DEPT VISIT MOD MDM: CPT

## 2024-12-29 PROCEDURE — 71045 X-RAY EXAM CHEST 1 VIEW: CPT

## 2024-12-29 PROCEDURE — 0202U NFCT DS 22 TRGT SARS-COV-2: CPT | Performed by: EMERGENCY MEDICINE

## 2024-12-29 PROCEDURE — 81001 URINALYSIS AUTO W/SCOPE: CPT | Performed by: EMERGENCY MEDICINE

## 2024-12-29 NOTE — ED PROVIDER NOTES
Subjective   History of Present Illness  79-year-old female presents to the ED for evaluation after falling to the ground.  She lives at an assisted living facility, she and her family stated his facility policy to have the patient come to the ED for evaluation after a fall.  Patient smokes, states she went walked outside to her porch area to smoke and tripped over the threshold.  She was outside in the cold for about 20 minutes before she pressed her life alert.  She states she did not press initially because she felt her family would be upset with her.  Staff came to assist the patient and called 911.  When paramedics got there they had a hard time getting her temperature.  They states she was cold and shivering, was wearing wet clothes.  She does not feel she hit her head.  No complaints of neck pain, chest pain, abdominal pain, nausea or vomiting.  No complaints of pelvis pain, upper or lower extremity pain.    History provided by:  Patient      Review of Systems   All other systems reviewed and are negative.      Past Medical History:   Diagnosis Date    CAD in native artery 7/29/2019    COPD (chronic obstructive pulmonary disease)     Essential hypertension 12/7/2021    GERD (gastroesophageal reflux disease)     Lung nodule        Allergies   Allergen Reactions    Morphine Anaphylaxis    Codeine Other (See Comments)     Unknown.      Levofloxacin Other (See Comments)    Tramadol Other (See Comments)    Zanaflex  [Tizanidine Hcl] Other (See Comments)    Albuterol Arrhythmia       Past Surgical History:   Procedure Laterality Date    BREAST IMPLANT REMOVAL      BREAST TISSUE EXPANDER REMOVAL INSERTION OF IMPLANT      CARDIAC CATHETERIZATION N/A 6/21/2019    Procedure: Left Heart Cath;  Surgeon: Edi Armijo MD;  Location:  [facility] CATH INVASIVE LOCATION;  Service: Cardiology    CHOLECYSTECTOMY      HYSTERECTOMY      MASTECTOMY      BILATERAL    OOPHORECTOMY         Family History   Problem Relation Age  of Onset    Cancer Mother     Cancer Father        Social History     Socioeconomic History    Marital status:    Tobacco Use    Smoking status: Every Day     Current packs/day: 0.50     Average packs/day: 0.5 packs/day for 62.0 years (31.0 ttl pk-yrs)     Types: Cigarettes    Smokeless tobacco: Never    Tobacco comments:     states she has no plan to quit smoking   Vaping Use    Vaping status: Former   Substance and Sexual Activity    Alcohol use: No    Drug use: No    Sexual activity: Not Currently           Objective   Physical Exam  Vitals and nursing note reviewed.   Constitutional:       Appearance: Normal appearance. She is normal weight.   HENT:      Head: Normocephalic and atraumatic.      Nose: Nose normal. No congestion or rhinorrhea.   Eyes:      Extraocular Movements: Extraocular movements intact.      Pupils: Pupils are equal, round, and reactive to light.   Cardiovascular:      Rate and Rhythm: Normal rate and regular rhythm.      Heart sounds: Normal heart sounds. No murmur heard.  Pulmonary:      Effort: Pulmonary effort is normal.      Breath sounds: Normal breath sounds. No wheezing, rhonchi or rales.   Abdominal:      General: Abdomen is flat. Bowel sounds are normal.      Palpations: Abdomen is soft.   Musculoskeletal:         General: Tenderness present. No swelling.      Comments: Mild tenderness to the distal aspect of right third finger   Skin:     General: Skin is warm and dry.      Capillary Refill: Capillary refill takes less than 2 seconds.   Neurological:      General: No focal deficit present.      Mental Status: She is alert and oriented to person, place, and time. Mental status is at baseline.         Procedures         Lab Results (last 24 hours)       Procedure Component Value Units Date/Time    Respiratory Panel PCR w/COVID-19(SARS-CoV-2) [facility] In-House, NP Swab in UTM/VTM, 2 HR TAT - Swab, Nasopharynx [[id number]]  (Normal) Collected: 12/29/24 1103     Specimen: Swab from Nasopharynx Updated: 12/29/24 1155     ADENOVIRUS, PCR Not Detected     Coronavirus 229E Not Detected     Coronavirus HKU1 Not Detected     Coronavirus NL63 Not Detected     Coronavirus OC43 Not Detected     COVID19 Not Detected     Human Metapneumovirus Not Detected     Human Rhinovirus/Enterovirus Not Detected     Influenza A PCR Not Detected     Influenza B PCR Not Detected     Parainfluenza Virus 1 Not Detected     Parainfluenza Virus 2 Not Detected     Parainfluenza Virus 3 Not Detected     Parainfluenza Virus 4 Not Detected     RSV, PCR Not Detected     Bordetella pertussis pcr Not Detected     Bordetella parapertussis PCR Not Detected     Chlamydophila pneumoniae PCR Not Detected     Mycoplasma pneumo by PCR Not Detected    Narrative:      In the setting of a positive respiratory panel with a viral infection PLUS a negative procalcitonin without other underlying concern for bacterial infection, consider observing off antibiotics or discontinuation of antibiotics and continue supportive care. If the respiratory panel is positive for atypical bacterial infection (Bordetella pertussis, Chlamydophila pneumoniae, or Mycoplasma pneumoniae), consider antibiotic de-escalation to target atypical bacterial infection.    Urinalysis With Culture If Indicated - Urine, Clean Catch [[id number]]  (Abnormal) Collected: 12/29/24 1304    Specimen: Urine, Clean Catch Updated: 12/29/24 1333     Color, UA Yellow     Appearance, UA Clear     pH, UA <=5.0     Specific Gravity, UA 1.009     Glucose, UA Negative     Ketones, UA Negative     Bilirubin, UA Negative     Blood, UA Negative     Protein, UA Negative     Leuk Esterase, UA Small (1+)     Nitrite, UA Negative     Urobilinogen, UA 0.2 E.U./dL    Narrative:      In absence of clinical symptoms, the presence of pyuria, bacteria, and/or nitrites on the urinalysis result does not correlate with infection.    Urinalysis, Microscopic Only - Urine, Clean Catch  [[id number]]  (Abnormal) Collected: 12/29/24 1304    Specimen: Urine, Clean Catch Updated: 12/29/24 1349     RBC, UA 0-2 /HPF      WBC, UA 3-5 /HPF      Comment: Urine culture not indicated.        Bacteria, UA None Seen /HPF      Squamous Epithelial Cells, UA 0-2 /HPF      Hyaline Casts, UA 0-2 /LPF      Methodology Manual Light Microscopy         XR Chest 1 View    Result Date: 12/29/2024  EXAM: XR CHEST 1 VW- 12/29/2024 10:19 AM  HISTORY: cough, dyspnea   COMPARISON: 10/23/2024.  TECHNIQUE: Single frontal radiograph of the chest was obtained.  FINDINGS:  Support Devices: None.  Cardiac and Mediastinal Silhouettes: Normal.  Lungs/Pleura: No focal consolidation. No sizable pleural effusion. No visible pneumothorax.  Osseous structures: No acute osseous finding.  Other: None.       No acute cardiopulmonary abnormality.    This report was signed and finalized on 12/29/2024 12:03 PM by Clem Corona.      CT Head Without Contrast    Result Date: 12/29/2024  Exam: CT HEAD WO CONTRAST- 12/29/2024 10:30 AM  HISTORY: fall, headache   DOSE LENGTH PRODUCT: 1754.21 mGy.cm mGy cm. Automated exposure control was also utilized to decrease patient radiation dose.  Technique: Helically acquired CT of the brain without IV contrast was performed. Sagittal and coronal reformations are also provided for review. Soft tissue and bone kernels are available for interpretation.  Comparison: 9/11/2024.  Findings:  Ventricles and extra-axial CSF spaces are normal in size.  No intraparenchymal or extra-axial hemorrhage.  Gray-white matter differentiation is preserved.  Orbits are grossly unremarkable. Paranasal sinuses are grossly clear. Mastoid air cells are grossly clear.  No suspicious calvarial or extracranial soft tissue abnormality.      Impression:   No acute intracranial abnormality.  This report was signed and finalized on 12/29/2024 11:42 AM by Clem Corona.      ED Course  ED Course as of 12/29/24 1548   Sun Dec 29, 2024   1546  79-year-old female who presented to the ED for evaluation following mechanical fall.  She tripped over her threshold at the facility where she resides in assisted living.  There was concern about possible hypothermia as patient was found outside in wet clothing.  She was outside for 20 to 30 minutes before pressing her life alert.  Temp 97.2.  Sats are been normal since arrival to the ED on home oxygen dose.  She had no significant plaints but daughter states respiratory viruses have been prevalent at the facility.  Checked respiratory panel, this was negative.  UA negative for UTI. [AW]      ED Course User Index  [AW] Anupam Barger MD                                                       Medical Decision Making      Final diagnoses:   Fall, initial encounter       ED Disposition  ED Disposition       ED Disposition   Discharge    Condition   Stable    Comment   --               Bud Dwyer MD  [address] [place] [address] [address] [facility] [id number] [phone]    Schedule an appointment as soon as possible for a visit   As needed         Medication List      No changes were made to your prescriptions during this visit.            Anupam Barger MD  12/29/24 0143

## 2025-02-16 ENCOUNTER — APPOINTMENT (OUTPATIENT)
Dept: GENERAL RADIOLOGY | Facility: HOSPITAL | Age: 80
End: 2025-02-16
Payer: MEDICARE

## 2025-02-16 ENCOUNTER — APPOINTMENT (OUTPATIENT)
Dept: CT IMAGING | Facility: HOSPITAL | Age: 80
End: 2025-02-16
Payer: MEDICARE

## 2025-02-16 ENCOUNTER — HOSPITAL ENCOUNTER (INPATIENT)
Facility: HOSPITAL | Age: 80
LOS: 2 days | Discharge: HOME | End: 2025-02-18
Attending: EMERGENCY MEDICINE | Admitting: FAMILY MEDICINE
Payer: MEDICARE

## 2025-02-16 DIAGNOSIS — R41.82 ALTERED MENTAL STATUS, UNSPECIFIED ALTERED MENTAL STATUS TYPE: Primary | ICD-10-CM

## 2025-02-16 DIAGNOSIS — Z74.09 IMPAIRED MOBILITY: ICD-10-CM

## 2025-02-16 DIAGNOSIS — Z79.899 POLYPHARMACY: ICD-10-CM

## 2025-02-16 DIAGNOSIS — F41.1 GAD (GENERALIZED ANXIETY DISORDER): ICD-10-CM

## 2025-02-16 DIAGNOSIS — M51.360 DEGENERATION OF INTERVERTEBRAL DISC OF LUMBAR REGION WITH DISCOGENIC BACK PAIN: ICD-10-CM
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
## 2025-02-16 LAB
ALBUMIN SERPL-MCNC: 3.8 G/DL (ref 3.5–5.2)
ALBUMIN/GLOB SERPL: 1.1 G/DL
ALP SERPL-CCNC: 103 U/L (ref 39–117)
ALT SERPL W P-5'-P-CCNC: 10 U/L (ref 1–33)
ANION GAP SERPL CALCULATED.3IONS-SCNC: 10 MMOL/L (ref 5–15)
AST SERPL-CCNC: 19 U/L (ref 1–32)
BASOPHILS # BLD AUTO: 0.05 10*3/MM3 (ref 0–0.2)
BASOPHILS NFR BLD AUTO: 0.3 % (ref 0–1.5)
BILIRUB SERPL-MCNC: 0.3 MG/DL (ref 0–1.2)
BILIRUB UR QL STRIP: NEGATIVE
BUN SERPL-MCNC: 15 MG/DL (ref 8–23)
BUN/CREAT SERPL: 14.2 (ref 7–25)
CALCIUM SPEC-SCNC: 10.3 MG/DL (ref 8.6–10.5)
CHLORIDE SERPL-SCNC: 108 MMOL/L (ref 98–107)
CLARITY UR: CLEAR
CO2 SERPL-SCNC: 25 MMOL/L (ref 22–29)
COLOR UR: YELLOW
CREAT SERPL-MCNC: 1.06 MG/DL (ref 0.57–1)
DEPRECATED RDW RBC AUTO: 48.1 FL (ref 37–54)
EGFRCR SERPLBLD CKD-EPI 2021: 52.9 ML/MIN/1.73
EOSINOPHIL # BLD AUTO: 0.38 10*3/MM3 (ref 0–0.4)
EOSINOPHIL NFR BLD AUTO: 2.6 % (ref 0.3–6.2)
ERYTHROCYTE [DISTWIDTH] IN BLOOD BY AUTOMATED COUNT: 14 % (ref 12.3–15.4)
GLOBULIN UR ELPH-MCNC: 3.5 GM/DL
GLUCOSE BLDC GLUCOMTR-MCNC: 73 MG/DL (ref 70–130)
GLUCOSE SERPL-MCNC: 86 MG/DL (ref 65–99)
GLUCOSE UR STRIP-MCNC: NEGATIVE MG/DL
HCT VFR BLD AUTO: 39 % (ref 34–46.6)
HGB BLD-MCNC: 12 G/DL (ref 12–15.9)
HGB UR QL STRIP.AUTO: NEGATIVE
IMM GRANULOCYTES # BLD AUTO: 0.06 10*3/MM3 (ref 0–0.05)
IMM GRANULOCYTES NFR BLD AUTO: 0.4 % (ref 0–0.5)
KETONES UR QL STRIP: NEGATIVE
LEUKOCYTE ESTERASE UR QL STRIP.AUTO: NEGATIVE
LYMPHOCYTES # BLD AUTO: 2.69 10*3/MM3 (ref 0.7–3.1)
LYMPHOCYTES NFR BLD AUTO: 18.4 % (ref 19.6–45.3)
MCH RBC QN AUTO: 28.9 PG (ref 26.6–33)
MCHC RBC AUTO-ENTMCNC: 30.8 G/DL (ref 31.5–35.7)
MCV RBC AUTO: 94 FL (ref 79–97)
MONOCYTES # BLD AUTO: 1.03 10*3/MM3 (ref 0.1–0.9)
MONOCYTES NFR BLD AUTO: 7.1 % (ref 5–12)
NEUTROPHILS NFR BLD AUTO: 10.38 10*3/MM3 (ref 1.7–7)
NEUTROPHILS NFR BLD AUTO: 71.2 % (ref 42.7–76)
NITRITE UR QL STRIP: NEGATIVE
NRBC BLD AUTO-RTO: 0 /100 WBC (ref 0–0.2)
PH UR STRIP.AUTO: <=5 [PH] (ref 5–8)
PLATELET # BLD AUTO: 430 10*3/MM3 (ref 140–450)
PMV BLD AUTO: 11.1 FL (ref 6–12)
POTASSIUM SERPL-SCNC: 5.2 MMOL/L (ref 3.5–5.2)
PROT SERPL-MCNC: 7.3 G/DL (ref 6–8.5)
PROT UR QL STRIP: NEGATIVE
RBC # BLD AUTO: 4.15 10*6/MM3 (ref 3.77–5.28)
SODIUM SERPL-SCNC: 143 MMOL/L (ref 136–145)
SP GR UR STRIP: 1.02 (ref 1–1.03)
UROBILINOGEN UR QL STRIP: NORMAL
WBC NRBC COR # BLD AUTO: 14.59 10*3/MM3 (ref 3.4–10.8)

## 2025-02-16 PROCEDURE — 72170 X-RAY EXAM OF PELVIS: CPT

## 2025-02-16 PROCEDURE — 93005 ELECTROCARDIOGRAM TRACING: CPT

## 2025-02-16 PROCEDURE — 82948 REAGENT STRIP/BLOOD GLUCOSE: CPT

## 2025-02-16 PROCEDURE — 72125 CT NECK SPINE W/O DYE: CPT

## 2025-02-16 PROCEDURE — 94640 AIRWAY INHALATION TREATMENT: CPT

## 2025-02-16 PROCEDURE — 36415 COLL VENOUS BLD VENIPUNCTURE: CPT

## 2025-02-16 PROCEDURE — 70450 CT HEAD/BRAIN W/O DYE: CPT

## 2025-02-16 PROCEDURE — 80053 COMPREHEN METABOLIC PANEL: CPT | Performed by: EMERGENCY MEDICINE

## 2025-02-16 PROCEDURE — 99285 EMERGENCY DEPT VISIT HI MDM: CPT

## 2025-02-16 PROCEDURE — 73060 X-RAY EXAM OF HUMERUS: CPT

## 2025-02-16 PROCEDURE — 94760 N-INVAS EAR/PLS OXIMETRY 1: CPT

## 2025-02-16 PROCEDURE — 25010000002 ENOXAPARIN PER 10 MG: Performed by: FAMILY MEDICINE

## 2025-02-16 PROCEDURE — 72131 CT LUMBAR SPINE W/O DYE: CPT

## 2025-02-16 PROCEDURE — 93010 ELECTROCARDIOGRAM REPORT: CPT | Performed by: EMERGENCY MEDICINE

## 2025-02-16 PROCEDURE — 93005 ELECTROCARDIOGRAM TRACING: CPT | Performed by: EMERGENCY MEDICINE

## 2025-02-16 PROCEDURE — 81003 URINALYSIS AUTO W/O SCOPE: CPT | Performed by: EMERGENCY MEDICINE

## 2025-02-16 PROCEDURE — 85025 COMPLETE CBC W/AUTO DIFF WBC: CPT | Performed by: EMERGENCY MEDICINE

## 2025-02-16 PROCEDURE — P9612 CATHETERIZE FOR URINE SPEC: HCPCS

## 2025-02-16 PROCEDURE — 94799 UNLISTED PULMONARY SVC/PX: CPT

## 2025-02-16 RX ORDER — ASPIRIN 81 MG/1
81 TABLET, CHEWABLE ORAL DAILY
Status: DISCONTINUED | OUTPATIENT
Start: 2025-02-17 | End: 2025-02-18 | Stop reason: HOSPADM

## 2025-02-16 RX ORDER — GABAPENTIN 300 MG/1
300 CAPSULE ORAL 2 TIMES DAILY
Status: DISCONTINUED | OUTPATIENT
Start: 2025-02-16 | End: 2025-02-17

## 2025-02-16 RX ORDER — LISINOPRIL 20 MG/1
20 TABLET ORAL DAILY
Status: DISCONTINUED | OUTPATIENT
Start: 2025-02-16 | End: 2025-02-18 | Stop reason: HOSPADM

## 2025-02-16 RX ORDER — SODIUM CHLORIDE 0.9 % (FLUSH) 0.9 %
10 SYRINGE (ML) INJECTION EVERY 12 HOURS SCHEDULED
Status: DISCONTINUED | OUTPATIENT
Start: 2025-02-16 | End: 2025-02-18 | Stop reason: HOSPADM

## 2025-02-16 RX ORDER — ONDANSETRON 4 MG/1
4 TABLET, ORALLY DISINTEGRATING ORAL EVERY 8 HOURS PRN
Status: DISCONTINUED | OUTPATIENT
Start: 2025-02-16 | End: 2025-02-18 | Stop reason: HOSPADM

## 2025-02-16 RX ORDER — SODIUM CHLORIDE 9 MG/ML
40 INJECTION, SOLUTION INTRAVENOUS AS NEEDED
Status: DISCONTINUED | OUTPATIENT
Start: 2025-02-16 | End: 2025-02-18 | Stop reason: HOSPADM

## 2025-02-16 RX ORDER — BUMETANIDE 1 MG/1
1 TABLET ORAL DAILY
Status: DISCONTINUED | OUTPATIENT
Start: 2025-02-16 | End: 2025-02-18 | Stop reason: HOSPADM

## 2025-02-16 RX ORDER — SODIUM CHLORIDE 0.9 % (FLUSH) 0.9 %
10 SYRINGE (ML) INJECTION AS NEEDED
Status: DISCONTINUED | OUTPATIENT
Start: 2025-02-16 | End: 2025-02-18 | Stop reason: HOSPADM

## 2025-02-16 RX ORDER — BUDESONIDE AND FORMOTEROL FUMARATE DIHYDRATE 160; 4.5 UG/1; UG/1
2 AEROSOL RESPIRATORY (INHALATION)
Status: DISCONTINUED | OUTPATIENT
Start: 2025-02-16 | End: 2025-02-18 | Stop reason: HOSPADM

## 2025-02-16 RX ORDER — TRAMADOL HYDROCHLORIDE 50 MG/1
50 TABLET ORAL EVERY 8 HOURS PRN
Status: DISCONTINUED | OUTPATIENT
Start: 2025-02-16 | End: 2025-02-18 | Stop reason: HOSPADM

## 2025-02-16 RX ORDER — ALPRAZOLAM 0.5 MG
1 TABLET ORAL 2 TIMES DAILY
Status: DISCONTINUED | OUTPATIENT
Start: 2025-02-16 | End: 2025-02-17

## 2025-02-16 RX ORDER — DOCUSATE SODIUM 100 MG/1
100 CAPSULE, LIQUID FILLED ORAL 2 TIMES DAILY PRN
Status: DISCONTINUED | OUTPATIENT
Start: 2025-02-16 | End: 2025-02-18 | Stop reason: HOSPADM

## 2025-02-16 RX ORDER — ENOXAPARIN SODIUM 100 MG/ML
40 INJECTION SUBCUTANEOUS DAILY
Status: DISCONTINUED | OUTPATIENT
Start: 2025-02-16 | End: 2025-02-18 | Stop reason: HOSPADM

## 2025-02-16 RX ORDER — PANTOPRAZOLE SODIUM 40 MG/1
40 TABLET, DELAYED RELEASE ORAL DAILY
Status: DISCONTINUED | OUTPATIENT
Start: 2025-02-17 | End: 2025-02-18 | Stop reason: HOSPADM

## 2025-02-16 RX ORDER — ATORVASTATIN CALCIUM 20 MG/1
20 TABLET, FILM COATED ORAL NIGHTLY
COMMUNITY

## 2025-02-16 RX ORDER — NICOTINE 21 MG/24HR
1 PATCH, TRANSDERMAL 24 HOURS TRANSDERMAL
Status: DISCONTINUED | OUTPATIENT
Start: 2025-02-16 | End: 2025-02-18 | Stop reason: HOSPADM

## 2025-02-16 RX ORDER — POLYETHYLENE GLYCOL 3350 17 G/17G
1 POWDER, FOR SOLUTION ORAL DAILY PRN
Status: DISCONTINUED | OUTPATIENT
Start: 2025-02-16 | End: 2025-02-18 | Stop reason: HOSPADM

## 2025-02-16 RX ADMIN — ALPRAZOLAM 1 MG: 0.5 TABLET ORAL at 20:32

## 2025-02-16 RX ADMIN — NICOTINE 1 PATCH: 21 PATCH, EXTENDED RELEASE TRANSDERMAL at 20:39

## 2025-02-16 RX ADMIN — IPRATROPIUM BROMIDE 0.5 MG: 0.5 SOLUTION RESPIRATORY (INHALATION) at 15:58

## 2025-02-16 RX ADMIN — CITALOPRAM HYDROBROMIDE 30 MG: 20 TABLET ORAL at 20:23

## 2025-02-16 RX ADMIN — ENOXAPARIN SODIUM 40 MG: 100 INJECTION SUBCUTANEOUS at 18:15

## 2025-02-16 RX ADMIN — GABAPENTIN 300 MG: 300 CAPSULE ORAL at 20:23

## 2025-02-16 RX ADMIN — IPRATROPIUM BROMIDE 0.5 MG: 0.5 SOLUTION RESPIRATORY (INHALATION) at 19:40

## 2025-02-16 RX ADMIN — Medication 10 ML: at 20:26

## 2025-02-16 RX ADMIN — Medication 10 MG: at 20:23

## 2025-02-16 NOTE — PLAN OF CARE
Goal Outcome Evaluation: Patient admitted from the ER with complaints of altered mental status. Patient lives in an assisted living facility and was found in the floor this morning. It is unsure how long she laid in the floor. Daughters have been at bedside. No complaints of pain at this time. Patient safety to be maintained this shift, continue to monitor and report abnormal to provider.

## 2025-02-16 NOTE — ED NOTES
Bedside dysphagia unable to be performed due to pts weakness in her face. There is no asymmetry noted but pt is not able to remain alert for screening.

## 2025-02-16 NOTE — ED NOTES
"Nursing report ED to floor  Pattie Liu  79 y.o.  female    HPI:   Chief Complaint   Patient presents with    Altered Mental Status    Fall    Neck Pain    Back Pain       Admitting doctor:   Rafat Espinoza MD    Consulting provider(s):  Consults       No orders found from 1/18/2025 to 2/17/2025.             Admitting diagnosis:   The primary encounter diagnosis was Altered mental status, unspecified altered mental status type. Diagnoses of Polypharmacy and Fall, initial encounter were also pertinent to this visit.    Code status:   Current Code Status       Date Active Code Status Order ID Comments User Context       Prior            Allergies:   Morphine, Codeine, Levofloxacin, Tramadol, Zanaflex  [tizanidine hcl], and Albuterol    Intake and Output  No intake or output data in the 24 hours ending 02/16/25 1448    Weight:       02/16/25  1207   Weight: 60.9 kg (134 lb 3 oz)       Most recent vitals:   Vitals:    02/16/25 1207 02/16/25 1209 02/16/25 1214 02/16/25 1330   BP:   160/62 154/57   BP Location:   Left arm    Patient Position:   Lying    Pulse:  86  75   Resp: 12      Temp: 97.9 °F (36.6 °C)      TempSrc: Oral      SpO2:   100% 97%   Weight: 60.9 kg (134 lb 3 oz)      Height: 160 cm (63\")        Oxygen Therapy: .    Active LDAs/IV Access:   Lines, Drains & Airways       Active LDAs       Name Placement date Placement time Site Days    Peripheral IV 02/16/25 1217 Right Antecubital 02/16/25  1217  Antecubital  less than 1                    Labs (abnormal labs have a star):   Labs Reviewed   COMPREHENSIVE METABOLIC PANEL - Abnormal; Notable for the following components:       Result Value    Creatinine 1.06 (*)     Chloride 108 (*)     eGFR 52.9 (*)     All other components within normal limits    Narrative:     GFR Categories in Chronic Kidney Disease (CKD)      GFR Category          GFR (mL/min/1.73)    Interpretation  G1                     90 or greater         Normal or high (1)  G2              "         60-89                Mild decrease (1)  G3a                   45-59                Mild to moderate decrease  G3b                   30-44                Moderate to severe decrease  G4                    15-29                Severe decrease  G5                    14 or less           Kidney failure          (1)In the absence of evidence of kidney disease, neither GFR category G1 or G2 fulfill the criteria for CKD.    eGFR calculation 2021 CKD-EPI creatinine equation, which does not include race as a factor   CBC WITH AUTO DIFFERENTIAL - Abnormal; Notable for the following components:    WBC 14.59 (*)     MCHC 30.8 (*)     Lymphocyte % 18.4 (*)     Neutrophils, Absolute 10.38 (*)     Monocytes, Absolute 1.03 (*)     Immature Grans, Absolute 0.06 (*)     All other components within normal limits   URINALYSIS W/ CULTURE IF INDICATED - Normal    Narrative:     In absence of clinical symptoms, the presence of pyuria, bacteria, and/or nitrites on the urinalysis result does not correlate with infection.  Urine microscopic not indicated.   POCT GLUCOSE FINGERSTICK - Normal   CBC AND DIFFERENTIAL    Narrative:     The following orders were created for panel order CBC & Differential.  Procedure                               Abnormality         Status                     ---------                               -----------         ------                     CBC Auto Differential[[id number]]        Abnormal            Final result                 Please view results for these tests on the individual orders.       Meds given in ED:   Medications   aspirin chewable tablet 81 mg (has no administration in time range)   budesonide-formoterol (SYMBICORT) 160-4.5 MCG/ACT inhaler 2 puff (has no administration in time range)     And   ipratropium (ATROVENT) nebulizer solution 0.5 mg (has no administration in time range)   bumetanide (BUMEX) tablet 1 mg (has no administration in time range)   citalopram (CeleXA) tablet 30 mg (has  no administration in time range)   docusate sodium (COLACE) capsule 100 mg (has no administration in time range)   gabapentin (NEURONTIN) capsule 300 mg (has no administration in time range)   lisinopril (PRINIVIL,ZESTRIL) tablet 20 mg (has no administration in time range)   melatonin tablet 10 mg (has no administration in time range)   ondansetron ODT (ZOFRAN-ODT) disintegrating tablet 4 mg (has no administration in time range)   pantoprazole (PROTONIX) EC tablet 40 mg (has no administration in time range)   polyethylene glycol (MIRALAX) powder 1 bottle (has no administration in time range)   traMADol (ULTRAM) tablet 50 mg (has no administration in time range)   sodium chloride 0.9 % flush 10 mL (has no administration in time range)   sodium chloride 0.9 % flush 10 mL (has no administration in time range)   sodium chloride 0.9 % infusion 40 mL (has no administration in time range)   Enoxaparin Sodium (LOVENOX) syringe 40 mg (has no administration in time range)           NIH Stroke Scale:       Isolation/Infection(s):  No active isolations   No active infections     COVID Testing  Collected .  Resulted .    Nursing report ED to floor:  Mental status: .ax0 to person and   Ambulatory status: .wheelchair  Precautions: .none    ED nurse phone extentsion- ..   [id number]  Report given to MISTY Velazquez

## 2025-02-16 NOTE — ED PROVIDER NOTES
Subjective   History of Present Illness  Patient is 79 years old who came to the ED with complaints of fall.  The patient was in a nursing home and fell had some altered mental status.  Complaining of lower back pain and headache and neck pain.    Altered Mental Status  Presenting symptoms: confusion and disorientation    Severity:  Moderate  Most recent episode:  Today  Episode history:  Single  Timing:  Constant  Context: dementia    Context: not recent change in medication, not recent illness and not recent infection    Associated symptoms: normal movement, no agitation, no bladder incontinence, no hallucinations, no headaches, no palpitations, no slurred speech and no visual change    Fall  Associated symptoms: back pain and neck pain    Associated symptoms: no headaches    Neck Pain  Associated symptoms: no bladder incontinence, no headaches and no visual change    Back Pain  Associated symptoms: no bladder incontinence and no headaches        Review of Systems   Unable to perform ROS: Mental status change   Cardiovascular:  Negative for palpitations.   Genitourinary:  Negative for bladder incontinence.   Musculoskeletal:  Positive for back pain and neck pain.   Neurological:  Negative for headaches.   Psychiatric/Behavioral:  Positive for confusion. Negative for agitation and hallucinations.        Past Medical History:   Diagnosis Date    CAD in native artery 7/29/2019    COPD (chronic obstructive pulmonary disease)     Essential hypertension 12/7/2021    GERD (gastroesophageal reflux disease)     Lung nodule        Allergies   Allergen Reactions    Morphine Anaphylaxis    Codeine Other (See Comments)     Unknown.      Levofloxacin Other (See Comments)    Tramadol Other (See Comments)    Zanaflex  [Tizanidine Hcl] Other (See Comments)    Albuterol Arrhythmia       Past Surgical History:   Procedure Laterality Date    BREAST IMPLANT REMOVAL      BREAST TISSUE EXPANDER REMOVAL INSERTION OF IMPLANT      CARDIAC  CATHETERIZATION N/A 6/21/2019    Procedure: Left Heart Cath;  Surgeon: Edi Armijo MD;  Location:  [facility];  Service: Cardiology    CHOLECYSTECTOMY      HYSTERECTOMY      MASTECTOMY      BILATERAL    OOPHORECTOMY         Family History   Problem Relation Age of Onset    Cancer Mother     Cancer Father        Social History     Socioeconomic History    Marital status:    Tobacco Use    Smoking status: Every Day     Current packs/day: 0.50     Average packs/day: 0.5 packs/day for 62.0 years (31.0 ttl pk-yrs)     Types: Cigarettes    Smokeless tobacco: Never    Tobacco comments:     states she has no plan to quit smoking   Vaping Use    Vaping status: Former   Substance and Sexual Activity    Alcohol use: No    Drug use: No    Sexual activity: Not Currently           Objective   Physical Exam  Vitals and nursing note reviewed. Exam conducted with a chaperone present.   Constitutional:       General: She is awake. She is not in acute distress.     Appearance: Normal appearance. She is well-developed. She is ill-appearing. She is not toxic-appearing or diaphoretic.   HENT:      Head: Normocephalic and atraumatic.      Mouth/Throat:      Mouth: Mucous membranes are moist.      Pharynx: Oropharynx is clear.   Eyes:      General: Lids are normal. Lids are everted, no foreign bodies appreciated.      Pupils: Pupils are equal, round, and reactive to light.   Neck:      Thyroid: No thyromegaly.      Vascular: Normal carotid pulses. No carotid bruit or JVD.      Trachea: Trachea and phonation normal. No tracheal tenderness or tracheal deviation.      Meningeal: Brudzinski's sign and Kernig's sign absent.   Cardiovascular:      Rate and Rhythm: Normal rate and regular rhythm.      Pulses: Normal pulses.      Heart sounds: Normal heart sounds.   Pulmonary:      Effort: Pulmonary effort is normal. No tachypnea or respiratory distress.      Breath sounds: Normal breath sounds. No stridor.    Abdominal:      General: Abdomen is flat. Bowel sounds are normal. There is no distension.      Palpations: Abdomen is soft. There is no mass.      Tenderness: There is no abdominal tenderness. There is no guarding.   Musculoskeletal:         General: Normal range of motion.      Cervical back: Full passive range of motion without pain, normal range of motion and neck supple. Tenderness present. No deformity, erythema, signs of trauma, lacerations, rigidity, spasms or torticollis.      Lumbar back: Tenderness present. No swelling or deformity.      Comments: Long bone examination upper and lower extremity bilateral symmetrical without any deformities.  Joint examination negative.   Skin:     General: Skin is warm and dry.      Capillary Refill: Capillary refill takes less than 2 seconds.      Coloration: Skin is not pale.      Nails: There is no clubbing.   Neurological:      General: No focal deficit present.      Mental Status: She is alert. She is disoriented and confused.      GCS: GCS eye subscore is 4. GCS verbal subscore is 5. GCS motor subscore is 6.      Cranial Nerves: Cranial nerves 2-12 are intact. No cranial nerve deficit.      Sensory: Sensation is intact. No sensory deficit.      Motor: Motor function is intact. No abnormal muscle tone.      Deep Tendon Reflexes: Reflexes are normal and symmetric. Reflexes normal. Babinski sign absent on the right side. Babinski sign absent on the left side.      Reflex Scores:       Bicep reflexes are 2+ on the right side and 2+ on the left side.       Patellar reflexes are 2+ on the right side and 2+ on the left side.  Psychiatric:         Behavior: Behavior is cooperative.         Procedures           ED Course  ED Course as of 02/16/25 1428   Sun Feb 16, 2025   1216  Patient is 18 or older, presenting with minor blunt head trauma. Head CT (including cosigned orders) was ordered  by an emergency care clinician for trauma because patient is 65 or older.         [TS]   1421 Patient moving all extremities I do not see any obvious evidence of any injury she refused to get up out of the bed.  At this time has some altered mental status and confusion which probably is multifactorial and also related to the polypharmacy.  Will admit to the medicine service. [TS]   1426 Patient will be admitted to the medicine service for further evaluation assessment [TS]      ED Course User Index  [TS] Julian Lacy MD                                                       Medical Decision Making  Labs reviewed differential Diagnosis:  I considered toxic-metabolic etiology, hypoglycemia, hyperglycemia, diabetic ketoacidosis, drug overdose, ethanol intoxication, thiamine deficiency, hypothermia, hyponatremia, hypernatremia, organ failure, liver failure, kidney failure, thyroid failure, adrenal failure, hypoxia, hypercarbia, ischemic stroke, intracranial bleed, subarachnoid hemorrhage, closed head injury, subdural hematoma, seizure activity, syncopal episode, infectious etiology, hypertensive encephalopathy, vasculitis, thrombotic thrombocytopenic purpura and disseminated intravascular coagulation as a possible cause of altered mental status in this patient. This is a partial list of diagnoses considered.             Problems Addressed:  Altered mental status, unspecified altered mental status type: complicated acute illness or injury  Fall, initial encounter: complicated acute illness or injury  Polypharmacy: complicated acute illness or injury    Amount and/or Complexity of Data Reviewed  Labs: ordered.     Details: Labs reviewed  Radiology: ordered.     Details: Scans reviewed  ECG/medicine tests: ordered.     Details: EKG with PVCs baseline no new changes.  Discussion of management or test interpretation with external provider(s): Discussed with Dr. Craig    Risk  Decision regarding hospitalization.  Risk Details: Patient came in with altered mental status and confusion.  With status post  fall lab workup is negative she is on multiple medications that could cause altered mental status is going be admitted to the medicine service.        Final diagnoses:   Altered mental status, unspecified altered mental status type   Polypharmacy   Fall, initial encounter       ED Disposition  ED Disposition       ED Disposition   Decision to Admit    Condition   --    Comment   Level of Care: Telemetry [5]   Diagnosis: Altered mental status [780.97.ICD-9-CM]   Admitting Physician: LOGAN DIEZ [[id number]]   Attending Physician: LOGAN DIEZ [[id number]]   Certification: I Certify That Inpatient Hospital Services Are Medically Necessary For Greater Than 2 Midnights                 No follow-up provider specified.       Medication List      No changes were made to your prescriptions during this visit.            Julian Lacy MD  02/16/25 1211       Julian Lacy MD  02/16/25 1427       Julian Lacy MD  02/16/25 1428

## 2025-02-17 ENCOUNTER — APPOINTMENT (OUTPATIENT)
Dept: NEUROLOGY | Facility: HOSPITAL | Age: 80
End: 2025-02-17
Payer: MEDICARE

## 2025-02-17 LAB
ADV 40+41 DNA STL QL NAA+NON-PROBE: NOT DETECTED
ALBUMIN SERPL-MCNC: 3.3 G/DL (ref 3.5–5.2)
ALBUMIN/GLOB SERPL: 1.1 G/DL
ALP SERPL-CCNC: 88 U/L (ref 39–117)
ALT SERPL W P-5'-P-CCNC: 8 U/L (ref 1–33)
ANION GAP SERPL CALCULATED.3IONS-SCNC: 9 MMOL/L (ref 5–15)
AST SERPL-CCNC: 12 U/L (ref 1–32)
ASTRO TYP 1-8 RNA STL QL NAA+NON-PROBE: NOT DETECTED
BASOPHILS # BLD AUTO: 0.04 10*3/MM3 (ref 0–0.2)
BASOPHILS NFR BLD AUTO: 0.5 % (ref 0–1.5)
BILIRUB SERPL-MCNC: 0.2 MG/DL (ref 0–1.2)
BUN SERPL-MCNC: 12 MG/DL (ref 8–23)
BUN/CREAT SERPL: 13.2 (ref 7–25)
C CAYETANENSIS DNA STL QL NAA+NON-PROBE: NOT DETECTED
C COLI+JEJ+UPSA DNA STL QL NAA+NON-PROBE: NOT DETECTED
CALCIUM SPEC-SCNC: 9.4 MG/DL (ref 8.6–10.5)
CHLORIDE SERPL-SCNC: 104 MMOL/L (ref 98–107)
CO2 SERPL-SCNC: 25 MMOL/L (ref 22–29)
CREAT SERPL-MCNC: 0.91 MG/DL (ref 0.57–1)
CRYPTOSP DNA STL QL NAA+NON-PROBE: NOT DETECTED
DEPRECATED RDW RBC AUTO: 47.5 FL (ref 37–54)
E HISTOLYT DNA STL QL NAA+NON-PROBE: NOT DETECTED
EAEC PAA PLAS AGGR+AATA ST NAA+NON-PRB: NOT DETECTED
EC STX1+STX2 GENES STL QL NAA+NON-PROBE: NOT DETECTED
EGFRCR SERPLBLD CKD-EPI 2021: 63.5 ML/MIN/1.73
EOSINOPHIL # BLD AUTO: 0.19 10*3/MM3 (ref 0–0.4)
EOSINOPHIL NFR BLD AUTO: 2.4 % (ref 0.3–6.2)
EPEC EAE GENE STL QL NAA+NON-PROBE: NOT DETECTED
ERYTHROCYTE [DISTWIDTH] IN BLOOD BY AUTOMATED COUNT: 13.8 % (ref 12.3–15.4)
ETEC LTA+ST1A+ST1B TOX ST NAA+NON-PROBE: NOT DETECTED
G LAMBLIA DNA STL QL NAA+NON-PROBE: NOT DETECTED
GLOBULIN UR ELPH-MCNC: 3 GM/DL
GLUCOSE SERPL-MCNC: 80 MG/DL (ref 65–99)
HCT VFR BLD AUTO: 34.2 % (ref 34–46.6)
HGB BLD-MCNC: 10.4 G/DL (ref 12–15.9)
IMM GRANULOCYTES # BLD AUTO: 0.03 10*3/MM3 (ref 0–0.05)
IMM GRANULOCYTES NFR BLD AUTO: 0.4 % (ref 0–0.5)
LYMPHOCYTES # BLD AUTO: 1 10*3/MM3 (ref 0.7–3.1)
LYMPHOCYTES NFR BLD AUTO: 12.9 % (ref 19.6–45.3)
MCH RBC QN AUTO: 28.4 PG (ref 26.6–33)
MCHC RBC AUTO-ENTMCNC: 30.4 G/DL (ref 31.5–35.7)
MCV RBC AUTO: 93.4 FL (ref 79–97)
MONOCYTES # BLD AUTO: 0.77 10*3/MM3 (ref 0.1–0.9)
MONOCYTES NFR BLD AUTO: 9.9 % (ref 5–12)
NEUTROPHILS NFR BLD AUTO: 5.73 10*3/MM3 (ref 1.7–7)
NEUTROPHILS NFR BLD AUTO: 73.9 % (ref 42.7–76)
NOROVIRUS GI+II RNA STL QL NAA+NON-PROBE: NOT DETECTED
NRBC BLD AUTO-RTO: 0 /100 WBC (ref 0–0.2)
P SHIGELLOIDES DNA STL QL NAA+NON-PROBE: NOT DETECTED
PLATELET # BLD AUTO: 329 10*3/MM3 (ref 140–450)
PMV BLD AUTO: 10.4 FL (ref 6–12)
POTASSIUM SERPL-SCNC: 4.7 MMOL/L (ref 3.5–5.2)
PROT SERPL-MCNC: 6.3 G/DL (ref 6–8.5)
QT INTERVAL: 482 MS
QT INTERVAL: 490 MS
QTC INTERVAL: 505 MS
QTC INTERVAL: 525 MS
RBC # BLD AUTO: 3.66 10*6/MM3 (ref 3.77–5.28)
RVA RNA STL QL NAA+NON-PROBE: NOT DETECTED
S ENT+BONG DNA STL QL NAA+NON-PROBE: NOT DETECTED
SAPO I+II+IV+V RNA STL QL NAA+NON-PROBE: NOT DETECTED
SHIGELLA SP+EIEC IPAH ST NAA+NON-PROBE: NOT DETECTED
SODIUM SERPL-SCNC: 138 MMOL/L (ref 136–145)
V CHOL+PARA+VUL DNA STL QL NAA+NON-PROBE: NOT DETECTED
V CHOLERAE DNA STL QL NAA+NON-PROBE: NOT DETECTED
WBC NRBC COR # BLD AUTO: 7.76 10*3/MM3 (ref 3.4–10.8)
Y ENTEROCOL DNA STL QL NAA+NON-PROBE: NOT DETECTED

## 2025-02-17 PROCEDURE — 80053 COMPREHEN METABOLIC PANEL: CPT | Performed by: FAMILY MEDICINE

## 2025-02-17 PROCEDURE — 94799 UNLISTED PULMONARY SVC/PX: CPT

## 2025-02-17 PROCEDURE — 97165 OT EVAL LOW COMPLEX 30 MIN: CPT | Performed by: OCCUPATIONAL THERAPIST

## 2025-02-17 PROCEDURE — 25010000002 ENOXAPARIN PER 10 MG: Performed by: FAMILY MEDICINE

## 2025-02-17 PROCEDURE — 97161 PT EVAL LOW COMPLEX 20 MIN: CPT

## 2025-02-17 PROCEDURE — 36415 COLL VENOUS BLD VENIPUNCTURE: CPT | Performed by: FAMILY MEDICINE

## 2025-02-17 PROCEDURE — 85025 COMPLETE CBC W/AUTO DIFF WBC: CPT | Performed by: FAMILY MEDICINE

## 2025-02-17 PROCEDURE — 95819 EEG AWAKE AND ASLEEP: CPT

## 2025-02-17 PROCEDURE — 25010000002 KETOROLAC TROMETHAMINE PER 15 MG: Performed by: FAMILY MEDICINE

## 2025-02-17 PROCEDURE — 87507 IADNA-DNA/RNA PROBE TQ 12-25: CPT | Performed by: FAMILY MEDICINE

## 2025-02-17 PROCEDURE — 99221 1ST HOSP IP/OBS SF/LOW 40: CPT | Performed by: CLINICAL NURSE SPECIALIST

## 2025-02-17 PROCEDURE — 95819 EEG AWAKE AND ASLEEP: CPT | Performed by: PSYCHIATRY & NEUROLOGY

## 2025-02-17 RX ORDER — BUMETANIDE 1 MG/1
0.5 TABLET ORAL DAILY
Status: CANCELLED | OUTPATIENT
Start: 2025-02-18

## 2025-02-17 RX ORDER — POTASSIUM CHLORIDE 1500 MG/1
20 TABLET, EXTENDED RELEASE ORAL DAILY
COMMUNITY

## 2025-02-17 RX ORDER — CITALOPRAM HYDROBROMIDE 20 MG/1
20 TABLET ORAL DAILY
Status: DISCONTINUED | OUTPATIENT
Start: 2025-02-18 | End: 2025-02-18 | Stop reason: HOSPADM

## 2025-02-17 RX ORDER — LOPERAMIDE HYDROCHLORIDE 2 MG/1
2 CAPSULE ORAL 4 TIMES DAILY PRN
Status: DISCONTINUED | OUTPATIENT
Start: 2025-02-17 | End: 2025-02-18 | Stop reason: HOSPADM

## 2025-02-17 RX ORDER — KETOROLAC TROMETHAMINE 30 MG/ML
15 INJECTION, SOLUTION INTRAMUSCULAR; INTRAVENOUS EVERY 6 HOURS PRN
Status: DISCONTINUED | OUTPATIENT
Start: 2025-02-17 | End: 2025-02-18 | Stop reason: HOSPADM

## 2025-02-17 RX ORDER — BUMETANIDE 0.5 MG/1
0.5 TABLET ORAL DAILY
COMMUNITY

## 2025-02-17 RX ORDER — ATORVASTATIN CALCIUM 10 MG/1
20 TABLET, FILM COATED ORAL NIGHTLY
Status: CANCELLED | OUTPATIENT
Start: 2025-02-17

## 2025-02-17 RX ORDER — LISINOPRIL 20 MG/1
20 TABLET ORAL DAILY
COMMUNITY

## 2025-02-17 RX ORDER — CITALOPRAM HYDROBROMIDE 40 MG/1
40 TABLET ORAL DAILY
COMMUNITY
End: 2025-02-18 | Stop reason: HOSPADM

## 2025-02-17 RX ORDER — GABAPENTIN 100 MG/1
100 CAPSULE ORAL 2 TIMES DAILY
Status: DISCONTINUED | OUTPATIENT
Start: 2025-02-17 | End: 2025-02-18 | Stop reason: HOSPADM

## 2025-02-17 RX ORDER — ALPRAZOLAM 0.5 MG
0.5 TABLET ORAL 2 TIMES DAILY
Status: DISCONTINUED | OUTPATIENT
Start: 2025-02-17 | End: 2025-02-18 | Stop reason: HOSPADM

## 2025-02-17 RX ADMIN — PANTOPRAZOLE SODIUM 40 MG: 40 TABLET, DELAYED RELEASE ORAL at 09:05

## 2025-02-17 RX ADMIN — LOPERAMIDE HYDROCHLORIDE 2 MG: 2 CAPSULE ORAL at 20:03

## 2025-02-17 RX ADMIN — ENOXAPARIN SODIUM 40 MG: 100 INJECTION SUBCUTANEOUS at 09:04

## 2025-02-17 RX ADMIN — IPRATROPIUM BROMIDE 0.5 MG: 0.5 SOLUTION RESPIRATORY (INHALATION) at 05:33

## 2025-02-17 RX ADMIN — LISINOPRIL 20 MG: 20 TABLET ORAL at 09:05

## 2025-02-17 RX ADMIN — ALPRAZOLAM 0.5 MG: 0.5 TABLET ORAL at 20:03

## 2025-02-17 RX ADMIN — KETOROLAC TROMETHAMINE 15 MG: 30 INJECTION, SOLUTION INTRAMUSCULAR; INTRAVENOUS at 09:31

## 2025-02-17 RX ADMIN — Medication 10 ML: at 09:06

## 2025-02-17 RX ADMIN — Medication 10 MG: at 20:03

## 2025-02-17 RX ADMIN — BUDESONIDE AND FORMOTEROL FUMARATE DIHYDRATE 2 PUFF: 160; 4.5 AEROSOL RESPIRATORY (INHALATION) at 05:33

## 2025-02-17 RX ADMIN — CITALOPRAM HYDROBROMIDE 30 MG: 20 TABLET ORAL at 09:05

## 2025-02-17 RX ADMIN — BUMETANIDE 1 MG: 1 TABLET ORAL at 09:06

## 2025-02-17 RX ADMIN — GABAPENTIN 300 MG: 300 CAPSULE ORAL at 09:06

## 2025-02-17 RX ADMIN — ALPRAZOLAM 1 MG: 0.5 TABLET ORAL at 09:06

## 2025-02-17 RX ADMIN — ASPIRIN 81 MG: 81 TABLET, CHEWABLE ORAL at 09:05

## 2025-02-17 RX ADMIN — IPRATROPIUM BROMIDE 0.5 MG: 0.5 SOLUTION RESPIRATORY (INHALATION) at 09:25

## 2025-02-17 RX ADMIN — Medication 10 ML: at 20:03

## 2025-02-17 RX ADMIN — GABAPENTIN 100 MG: 100 CAPSULE ORAL at 20:03

## 2025-02-17 NOTE — THERAPY EVALUATION
Patient Name: Pattie Liu  : 1945    MRN: [id number]                              Today's Date: 2025       Admit Date: 2025    Visit Dx:     ICD-10-CM ICD-9-CM   1. Altered mental status, unspecified altered mental status type  R41.82 780.97   2. Polypharmacy  Z79.899 V58.69   3. Fall, initial encounter  W19.XXXA E888.9   4. Impaired mobility [Z74.09]  Z74.09 799.89     Patient Active Problem List   Diagnosis    Frequent PVCs    Shortness of breath    SOB (shortness of breath)    CAD in native artery    PVD (peripheral vascular disease)    Pneumonia due to infectious organism    Chronic back pain    Essential hypertension    Fall, initial encounter    Traumatic rhabdomyolysis    Leukocytosis    Anemia    Degenerative disc disease, lumbar    Dehydration    Acute UTI    Chronic respiratory failure with hypoxia    Impaired mobility    UTI (urinary tract infection)    Gait instability    Pneumonia    Pelvic fracture    Pneumonia, unspecified organism    Altered mental status     Past Medical History:   Diagnosis Date    CAD in native artery 2019    COPD (chronic obstructive pulmonary disease)     Essential hypertension 2021    GERD (gastroesophageal reflux disease)     Lung nodule      Past Surgical History:   Procedure Laterality Date    BREAST IMPLANT REMOVAL      BREAST TISSUE EXPANDER REMOVAL INSERTION OF IMPLANT      CARDIAC CATHETERIZATION N/A 2019    Procedure: Left Heart Cath;  Surgeon: Edi Armijo MD;  Location:  [facility];  Service: Cardiology    CHOLECYSTECTOMY      HYSTERECTOMY      MASTECTOMY      BILATERAL    OOPHORECTOMY        General Information       Row Name 25 0945          Physical Therapy Time and Intention    Document Type evaluation  Fall, AMS  -CHICA     Mode of Treatment physical therapy  -CHICA       Row Name 25 0945          General Information    Patient Profile Reviewed yes  -CHICA     Prior Level of Function  independent:;bed mobility;w/c or scooter;transfer  Was able to independently transfer to her w/c. Daughter reports she ambulates with assistance and with therapy  -CHICA     Existing Precautions/Restrictions fall;oxygen therapy device and L/min  -CHICA     Barriers to Rehab medically complex;cognitive status;physical barrier  -CHICA       Row Name 02/17/25 0945          Living Environment    People in Home facility resident  -CHICA       Row Name 02/17/25 0945          Home Main Entrance    Number of Stairs, Main Entrance none  -CHICA       Row Name 02/17/25 0945          Stairs Within Home, Primary    Number of Stairs, Within Home, Primary none  -CHICA       Row Name 02/17/25 0945          Cognition    Orientation Status (Cognition) oriented to;person;verbal cues/prompts needed for orientation;place;situation  Confused but participates and follows commands  -CHICA       [facility] Name 02/17/25 0945          Safety Issues/Impairments Affecting Functional Mobility    Safety Issues Affecting Function (Mobility) friction/shear risk;insight into deficits/self-awareness;judgment;problem-solving  -CHICA     Impairments Affecting Function (Mobility) balance;endurance/activity tolerance;strength;shortness of breath  -CHICA               User Key  (r) = Recorded By, (t) = Taken By, (c) = Cosigned By      Initials Name Provider Type    Feroz Frias, PT DPT Physical Therapist                   Mobility       Row Name 02/17/25 0945          Bed Mobility    Bed Mobility supine-sit  -CHICA     Supine-Sit [place] (Bed Mobility) minimum assist (75% patient effort);2 person assist  -CHICA     Assistive Device (Bed Mobility) head of bed elevated;bed rails  -CHICA       Row Name 02/17/25 0945          Bed-Chair Transfer    Bed-Chair [place] (Transfers) contact guard;minimum assist (75% patient effort)  -CHIAC     Assistive Device (Bed-Chair Transfers) walker, front-wheeled  -CHICA       Row Name 02/17/25 0945          Sit-Stand Transfer    Sit-Stand [place]  (Transfers) minimum assist (75% patient effort);2 person assist  -CHICA     Assistive Device (Sit-Stand Transfers) walker, front-wheeled  -CHICA               User Key  (r) = Recorded By, (t) = Taken By, (c) = Cosigned By      Initials Name Provider Type    Feroz Frias PT DPT Physical Therapist                   Obj/Interventions       Row Name 02/17/25 0945          Range of Motion Comprehensive    Comment, General Range of Motion B LE AROM impaired 25%  -CHICA       Row Name 02/17/25 0945          Strength Comprehensive (MMT)    Comment, General Manual Muscle Testing (MMT) Assessment B LE grossly 3+/5  -CHICA       Row Name 02/17/25 0945          Balance    Balance Assessment sitting dynamic balance;standing dynamic balance  -CHICA     Dynamic Sitting Balance standby assist  -CHICA     Position, Sitting Balance unsupported;sitting in chair;sitting edge of bed  -CHICA     Dynamic Standing Balance minimal assist  -CHICA     Position/Device Used, Standing Balance supported;walker, rolling  -CHICA       Row Name 02/17/25 0945          Sensory Assessment (Somatosensory)    Sensory Assessment (Somatosensory) LE sensation intact  -CHICA               User Key  (r) = Recorded By, (t) = Taken By, (c) = Cosigned By      Initials Name Provider Type    Feroz Frias PT DPT Physical Therapist                   Goals/Plan       Row Name 02/17/25 0945          Bed Mobility Goal 1 (PT)    Activity/Assistive Device (Bed Mobility Goal 1, PT) sit to supine/supine to sit  -CHICA     [place]ayer Level/Cues Needed (Bed Mobility Goal 1, PT) standby assist  -CHICA     Time Frame (Bed Mobility Goal 1, PT) long term goal (LTG);10 days  -CHICA     Progress/Outcomes (Bed Mobility Goal 1, PT) new goal  -CHICA       Row Name 02/17/25 0945          Transfer Goal 1 (PT)    Activity/Assistive Device (Transfer Goal 1, PT) sit-to-stand/stand-to-sit;bed-to-chair/chair-to-bed;walker, rolling  -CHICA     [place] Level/Cues Needed (Transfer Goal 1, PT) standby assist  -CHICA      Time Frame (Transfer Goal 1, PT) long term goal (LTG);10 days  -CHICA     Progress/Outcome (Transfer Goal 1, PT) new goal  -CHICA       Row Name 02/17/25 0945          Gait Training Goal 1 (PT)    Activity/Assistive Device (Gait Training Goal 1, PT) gait (walking locomotion);assistive device use;decrease fall risk;improve balance and speed;increase endurance/gait distance;walker, rolling  -CHICA     [place] Level (Gait Training Goal 1, PT) standby assist  -CHICA     Distance (Gait Training Goal 1, PT) 50 ft  -CHICA     Time Frame (Gait Training Goal 1, PT) long term goal (LTG);10 days  -CHICA     Progress/Outcome (Gait Training Goal 1, PT) new goal  -CHICA       Row Name 02/17/25 0945          Therapy Assessment/Plan (PT)    Planned Therapy Interventions (PT) bed mobility training;transfer training;gait training;balance training;home exercise program;patient/family education;postural re-education;strengthening;wheelchair management/propulsion training  -CHICA               User Key  (r) = Recorded By, (t) = Taken By, (c) = Cosigned By      Initials Name Provider Type    Feroz Frias, PT DPT Physical Therapist                   Clinical Impression       Row Name 02/17/25 0945          Pain    Pretreatment Pain Rating 0/10 - no pain  -CHICA       Row Name 02/17/25 0945          Plan of Care Review    Plan of Care Reviewed With patient  -CHICA     Outcome Evaluation PT marlonal complete. She was found sleeping but did awaken and was alert and oriented to self. She is confused but pleasant and follows commands/participates with therapy. Family member in room and reports that Ms. Liu lives at an [facility] where she has been independent in her transfers to her w/c. She is able to ambulate with assistance from family/therapy w a RW. Today she needs min assist x 1-2 for bed mobility, stance and t/f to the bedside chair./ She ambulates at the bedside to the chair with a flexed posture. She is weak and remains a fall risk. PT will cont with  mobility training, balance training and strengthening. [place] [facility] with continued HH therapy vs placement for rehab, pending progress and needs at d.c.  -CHICA       Row Name 02/17/25 0945          Therapy Assessment/Plan (PT)    Patient/Family Therapy Goals Statement (PT) did not state  -CHICA     Rehab Potential (PT) fair  -CHICA     Criteria for Skilled Interventions Met (PT) yes;meets criteria;skilled treatment is necessary  -CHICA     Therapy Frequency (PT) 2 times/day  -CHICA     Predicted Duration of Therapy Intervention (PT) until d.c  -CHICA       Row Name 02/17/25 0945          Positioning and Restraints    Pre-Treatment Position in bed  -CHICA     Post Treatment Position chair  -CHICA     In Chair notified nsg;sitting;call light within reach;encouraged to call for assist;exit alarm on;with family/caregiver;with nsg;with other staff  Neuro APRN in room w nurse aid  -CHICA               User Key  (r) = Recorded By, (t) = Taken By, (c) = Cosigned By      Initials Name Provider Type    CHICA Feroz Schwab, PT DPT Physical Therapist                   Outcome Measures       Row Name 02/17/25 0945 02/17/25 0852       How much help from another person do you currently need...    Turning from your back to your side while in flat bed without using bedrails? 3  -CHICA 3  -AT    Moving from lying on back to sitting on the side of a flat bed without bedrails? 3  -CHICA 3  -AT    Moving to and from a bed to a chair (including a wheelchair)? 3  -CHICA 3  -AT    Standing up from a chair using your arms (e.g., wheelchair, bedside chair)? 3  -CHICA 4  -AT    Climbing 3-5 steps with a railing? 2  -CHICA 3  -AT    To walk in hospital room? 3  -CHICA 2  -AT    AM-PAC 6 Clicks Score (PT) 17  -CHICA 18  -AT    Highest Level of Mobility Goal 5 --> Static standing  -CHICA 6 --> Walk 10 steps or more  -AT      Row Name 02/17/25 0945          Functional Assessment    Outcome Measure Options AM-PAC 6 Clicks Basic Mobility (PT)  -CHICA               User Key  (r) =  Recorded By, (t) = Taken By, (c) = Cosigned By      Initials Name Provider Type    Feroz Frias, PT DPT Physical Therapist    [facility] RN Registered Nurse                                 Physical Therapy Education       Title: PT OT SLP Therapies (In Progress)       Topic: Physical Therapy (In Progress)       Point: Mobility training (Done)       Learning Progress Summary            Patient Acceptance, E, VU,NR by CHICA at 2/17/2025 0945    Comment: benefits of activity, progression of PT   Family Acceptance, E, VU,NR by CHICA at 2/17/2025 0945    Comment: benefits of activity, progression of PT                      Point: Home exercise program (Not Started)       Learner Progress:  Not documented in this visit.              Point: Body mechanics (Not Started)       Learner Progress:  Not documented in this visit.              Point: Precautions (Not Started)       Learner Progress:  Not documented in this visit.                              User Key       Initials Effective Dates Name Provider Type Discipline    CHICA 02/03/23 -  Feroz Schwab PT DPT Physical Therapist PT                  PT Recommendation and Plan  Planned Therapy Interventions (PT): bed mobility training, transfer training, gait training, balance training, home exercise program, patient/family education, postural re-education, strengthening, wheelchair management/propulsion training  Outcome Evaluation: PT eval complete. She was found sleeping but did awaken and was alert and oriented to self. She is confused but pleasant and follows commands/participates with therapy. Family member in room and reports that Ms. Liu lives at an [facility] where she has been independent in her transfers to her w/c. She is able to ambulate with assistance from family/therapy w a RW. Today she needs min assist x 1-2 for bed mobility, stance and t/f to the bedside chair./ She ambulates at the bedside to the chair with a flexed posture. She is weak and remains  a fall risk. PT will cont with mobility training, balance training and strengthening. [place] [facility] with continued HH therapy vs placement for rehab, pending progress and needs at d.c.     Time Calculation:         PT Charges       Row Name 02/17/25 1058             Time Calculation    Start Time 0945  -CHICA      Stop Time 1039  -CHICA      Time Calculation (min) 54 min  -CHICA      PT Received On 02/17/25  -CHICA      PT Goal Re-Cert Due Date 02/27/25  -CHICA                User Key  (r) = Recorded By, (t) = Taken By, (c) = Cosigned By      Initials Name Provider Type    Feroz Frias, PT DPT Physical Therapist                  Therapy Charges for Today       Code Description Service Date Service Provider Modifiers Qty    [id number] HC PT EVAL LOW COMPLEXITY 4 2/17/2025 Feroz Schwab PT DPT GP 1            PT G-Codes  Outcome Measure Options: AM-PAC 6 Clicks Basic Mobility (PT)  AM-PAC 6 Clicks Score (PT): 17  PT Discharge Summary  Anticipated Discharge Disposition (PT): assisted living, home with home health, sub acute care setting, skilled nursing facility    Feroz Schwab, PT DPT  2/17/2025

## 2025-02-17 NOTE — PLAN OF CARE
Goal Outcome Evaluation:  Plan of Care Reviewed With: patient, family           Outcome Evaluation: This morning patient was alert and oriented with daughter at bedside, she is on 2L of oxygen via nasal canula with an oxygen saturation of %. She had some complaints of back pain and after treated by prescribed meds only complaints of generalized soreness following a fall. She has been up to the recliner with PT with minimum assistance x2. Patient remained in the recliner until after lunch and requested to go back to bed.                              [Dear  ___] : Dear  [unfilled], [Courtesy Letter:] : I had the pleasure of seeing your patient, [unfilled], in my office today. [Please see my note below.] : Please see my note below. [Consult Closing:] : Thank you very much for allowing me to participate in the care of this patient.  If you have any questions, please do not hesitate to contact me. [Sincerely,] : Sincerely, [FreeTextEntry3] : Jostin Perez MD\par  for Academic Affairs, Department of Pediatrics\par Chief, Division of Allergy/Immunology\par [facility]\par \par Brant Duran Professor of Pediatrics, Professor of Molecular Medicine\par [employer] [facility]\par \par

## 2025-02-17 NOTE — CONSULTS
Neurology Consult Note    Consult Date: 2025  Referring MD: Rafat Espinoza MD  Reason for Consult: acute deliurium [facility] baseline dementia     Patient: Pattie Liu (79 y.o. female)  MRN: [id number]  : 1945    History of Present Illness:   Pattie Liu is a 79 y.o. female with PMH tobacco use, dementia, lung nodule, HTN, anxiety and neuropathy. Patient lives in assisted living. History obtained by daughter who is at the bedside.  Daughter tells me patient was started on Xanax some time ago for anxiety and then Celexa was started when patient moved to assisted living as she was having significant crying episodes. Patient had taken Gabapentin 900 mg BID and over time PCP had decreased the dose to 300 mg BID. Tramadol listed but this was temporary after patient had a fall and fractured her pelvix. She also takes melatonin for sleep. Daughter tells me patient medications are set up in pill packs and staff observe patient taking her medications. This daughter stays with patient on weekends and another daughter stays with her at night. Per daughter, no reports of owning.     Yesterday patient was seen by staff about 0730 and then checked on her later for lunch and found her laying on the floor for uncertain amount of time. Her wheelchair or walker was not in the bathroom with her. Patient uncertain as to why she fell or how she ended on the floor. She does not recall losing consciousness. After the fall patient was more confused than usual and was having hallucinations. Per daughter, patient typically will hallucinate when she has UTI. At any rate patient remained confused and presented to [facility] ED for evaluation. CT head showed no acute process. CT CS no acute fracture.   Currently, patient is sitting up in chair. She is oriented to person and place but states the month as April but able to tell me her birthday is next month which is correct. Daughter states early this AM, patient stated Kailash  was president and the year 1945.     Patient seen along with Dr. Jackson, tele neurology.         Medical History:   Past Medical/Surgical Hx:  Past Medical History:   Diagnosis Date    CAD in native artery 7/29/2019    COPD (chronic obstructive pulmonary disease)     Essential hypertension 12/7/2021    GERD (gastroesophageal reflux disease)     Lung nodule      Past Surgical History:   Procedure Laterality Date    BREAST IMPLANT REMOVAL      BREAST TISSUE EXPANDER REMOVAL INSERTION OF IMPLANT      CARDIAC CATHETERIZATION N/A 6/21/2019    Procedure: Left Heart Cath;  Surgeon: Edi Armijo MD;  Location:  [facility] CATH INVASIVE LOCATION;  Service: Cardiology    CHOLECYSTECTOMY      HYSTERECTOMY      MASTECTOMY      BILATERAL    OOPHORECTOMY         Medications On Admission:  Medications Prior to Admission   Medication Sig Dispense Refill Last Dose/Taking    ALPRAZolam (XANAX) 1 MG tablet Take 1 tablet by mouth 2 (Two) Times a Day. 60 tablet 0 Taking    aspirin 81 MG chewable tablet Chew 1 tablet Daily.   Taking    atorvastatin (LIPITOR) 20 MG tablet Take 1 tablet by mouth Every Night.   Taking    Budeson-Glycopyrrol-Formoterol (Breztri Aerosphere) 160-9-4.8 MCG/ACT aerosol inhaler Inhale 2 puffs 2 (Two) Times a Day.   Taking    bumetanide (BUMEX) 0.5 MG tablet Take 1 tablet by mouth Daily.   Taking    citalopram (CeleXA) 40 MG tablet Take 1 tablet by mouth Daily.   Taking    gabapentin (NEURONTIN) 300 MG capsule Take 1 capsule by mouth 2 (Two) Times a Day. 60 capsule 0 Taking    lisinopril (PRINIVIL,ZESTRIL) 20 MG tablet Take 1 tablet by mouth Daily.   Taking    pantoprazole (PROTONIX) 40 MG EC tablet Take 1 tablet by mouth Daily.   Taking    potassium chloride (KLOR-CON M20) 20 MEQ CR tablet Take 1 tablet by mouth Daily.   Taking    Melatonin 10 MG tablet Take 1 tablet by mouth At Night As Needed (Insomnia).       ondansetron (ZOFRAN) 4 MG tablet Take 1 tablet by mouth Daily As Needed for Nausea or  Vomiting.   Unknown    traMADol (ULTRAM) 50 MG tablet Take 1 tablet by mouth Every 8 (Eight) Hours As Needed for Moderate Pain. 90 tablet 0 Unknown       Current Medications:    Current Facility-Administered Medications:     ALPRAZolam (XANAX) tablet 1 mg, 1 mg, Oral, BID, Fitz Yoo MD, 1 mg at 02/17/25 0906    aspirin chewable tablet 81 mg, 81 mg, Oral, Daily, Fitz Yoo MD, 81 mg at 02/17/25 0905    budesonide-formoterol (SYMBICORT) 160-4.5 MCG/ACT inhaler 2 puff, 2 puff, Inhalation, BID - RT, 2 puff at 02/17/25 0533 **AND** ipratropium (ATROVENT) nebulizer solution 0.5 mg, 0.5 mg, Nebulization, 4x Daily - RT, Fitz Yoo MD, 0.5 mg at 02/17/25 0925    bumetanide (BUMEX) tablet 1 mg, 1 mg, Oral, Daily, Fitz Yoo MD, 1 mg at 02/17/25 0906    citalopram (CeleXA) tablet 30 mg, 30 mg, Oral, Daily, Fitz Yoo MD, 30 mg at 02/17/25 0905    docusate sodium (COLACE) capsule 100 mg, 100 mg, Oral, BID PRN, Fitz Yoo MD    Enoxaparin Sodium (LOVENOX) syringe 40 mg, 40 mg, Subcutaneous, Daily, Fitz Yoo MD, 40 mg at 02/17/25 0904    gabapentin (NEURONTIN) capsule 300 mg, 300 mg, Oral, BID, Fitz Yoo MD, 300 mg at 02/17/25 0906    ketorolac (TORADOL) injection 15 mg, 15 mg, Intravenous, Q6H PRN, Rafat Espinoza MD, 15 mg at 02/17/25 0931    lisinopril (PRINIVIL,ZESTRIL) tablet 20 mg, 20 mg, Oral, Daily, Fitz Yoo MD, 20 mg at 02/17/25 0905    melatonin tablet 10 mg, 10 mg, Oral, Nightly PRN, Fitz Yoo MD, 10 mg at 02/16/25 2023    nicotine (NICODERM CQ) 21 MG/24HR patch 1 patch, 1 patch, Transdermal, Q24H, Fitz Yoo MD, 1 patch at 02/16/25 2039    ondansetron ODT (ZOFRAN-ODT) disintegrating tablet 4 mg, 4 mg, Translingual, Q8H PRN, Fitz Yoo MD    pantoprazole (PROTONIX) EC tablet 40 mg, 40 mg, Oral, Daily, Fitz Yoo MD, 40 mg at 02/17/25 0905    polyethylene glycol  (MIRALAX) powder 1 bottle, 1 bottle, Oral, Daily PRN, Fitz Yoo MD    sodium chloride 0.9 % flush 10 mL, 10 mL, Intravenous, Q12H, Fitz Yoo MD, 10 mL at 02/17/25 0906    sodium chloride 0.9 % flush 10 mL, 10 mL, Intravenous, PRN, Fitz Yoo MD    sodium chloride 0.9 % infusion 40 mL, 40 mL, Intravenous, PRN, Fitz Yoo MD    traMADol (ULTRAM) tablet 50 mg, 50 mg, Oral, Q8H PRN, Fitz Yoo MD     Allergies:  Allergies   Allergen Reactions    Morphine Anaphylaxis    Codeine Other (See Comments)     Unknown.      Levofloxacin Other (See Comments)    Tramadol Other (See Comments)    Zanaflex  [Tizanidine Hcl] Other (See Comments)    Albuterol Arrhythmia       Social Hx:  Social History     Socioeconomic History    Marital status:    Tobacco Use    Smoking status: Every Day     Current packs/day: 0.50     Average packs/day: 0.5 packs/day for 62.0 years (31.0 ttl pk-yrs)     Types: Cigarettes    Smokeless tobacco: Never    Tobacco comments:     states she has no plan to quit smoking   Vaping Use    Vaping status: Former   Substance and Sexual Activity    Alcohol use: No    Drug use: No    Sexual activity: Not Currently       Family Hx:  Family History   Problem Relation Age of Onset    Cancer Mother     Cancer Father      Physical Examination:   Vital Signs:  Vitals:    02/17/25 0700 02/17/25 0925 02/17/25 0931 02/17/25 1032   BP: 115/89   160/82   BP Location: Right arm   Right arm   Patient Position: Lying   Lying   Pulse:  62 62 65   Resp: 18 18 18 18   Temp: 98 °F (36.7 °C)   97.6 °F (36.4 °C)   TempSrc: Oral   Oral   SpO2: 95% 95% 95% 95%   Weight:       Height:             General Exam:  Head:  Normocephalic, atraumatic  HEENT:  Neck supple  CVS:  Regular rate and rhythm.  No murmurs  Carotid Examination:  No bruits  Lungs:  Clear to auscultation  Abdomen:  Nontender, Nondistended  Extremities:  No signs of peripheral edema  Skin:  No  candy    Neurologic Exam:    Mental Status:    -Awake, Alert, Oriented X person, place.  -No word finding difficulties  -No aphasia  -No dysarthria  -Follows simple and complex commands    CN II:  Visual fields full.  Pupils equally reactive to light  CN III, IV, VI:  Extraocular Muscles full with no signs of nystagmus  CN V:  Facial sensory is symmetric with no asymmetries.  CN VII:  Facial motor symmetric  CN VIII:  Gross hearing intact bilaterally  CN IX:  Palate elevates symmetrically  CN X:  Palate elevates symmetrically  CN XI:  Shoulder shrug symmetric  CN XII:  Tongue is midline on protrusion    Motor: (strength out of 5:  1= minimal movement, 2 = movement in plane of gravity, 3 = movement against gravity, 4 = movement against some resistance, 5 = full strength)    -Right Upper Ext: Proximal: 5 Distal: 5  -Left Upper Ext: Proximal: 5 Distal: 5    -Right Lower Ext: Proximal: 5 Distal: 5  -Left Lower Ext: Proximal: 5 Distal: 5    DTR:  -Right   Biceps: 2+ Triceps: 2+ Brachioradialis: 2+   Patella: 2+ Ankle: 2+ Neg Babinski  -Left   Biceps: 2+ Triceps: 2+ Brachioradialis: 2+   Patella: 2+ Ankle: 2+ Neg Babinski    Sensory:  -Intact to light touch, pinprick, temperature, pain, and proprioception    Coordination:  -Finger to nose intact  -Heel to shin intact  -No ataxia    Gait  Not tested for safety reasons. At baseline, patient scoots in her wheelchair and sometimes will use a walker.     Recent Diagnostics:   Laboratory Results:   - Reviewed in EMR  Lab Results   Component Value Date    GLUCOSE 80 02/17/2025    CALCIUM 9.4 02/17/2025     02/17/2025    K 4.7 02/17/2025    CO2 25.0 02/17/2025     02/17/2025    BUN 12 02/17/2025    CREATININE 0.91 02/17/2025    EGFRIFNONA 90 12/10/2021    BCR 13.2 02/17/2025    ANIONGAP 9.0 02/17/2025     Lab Results   Component Value Date    WBC 7.76 02/17/2025    HGB 10.4 (L) 02/17/2025    HCT 34.2 02/17/2025    MCV 93.4 02/17/2025     02/17/2025     Lab  "Results   Component Value Date    PTT 37.1 (H) 12/20/2022    INR 1.01 12/20/2022     Lab Results   Component Value Date    TRIG 166 (H) 09/25/2024    HDL 28 (L) 09/25/2024    LDL 57 09/25/2024     No results found for: \"HGBA1C\"    Imaging Results:  Imaging Results (Last 24 Hours)       Procedure Component Value Units Date/Time    CT Lumbar Spine Without Contrast [[id number]] Collected: 02/16/25 1307     Updated: 02/16/25 1313    Narrative:      EXAMINATION: CT LUMBAR SPINE WO CONTRAST- 2/16/2025 1:07 PM     HISTORY: fall, low back pain     TOTAL DOSE: 1680.25 mGy.cm (Automatic exposure control technique was  implemented in an effort to keep the radiation dose as low as possible  without compromising image quality)     REPORT: Spiral CT of the lumbar spine was performed without contrast,  reconstructed coronal and sagittal images were also reviewed.     COMPARISON: CT lumbar spine without contrast 8/26/2024.     There is mild dextroscoliosis, sagittal images demonstrate no evidence  of spondylolisthesis. No acute fracture is identified. Mild disc space  narrowing is present L5-S1, with a partial vacuum disc. Small endplate  spurs are present at each lumbar level. There is facet overgrowth at  multiple levels, greatest at L4-5 and L5-S1. Review of soft tissue  windows shows no obvious disc herniation. No significant spinal canal  stenosis. Mild bilateral neural foraminal narrowing L5-S1. Moderately  heavy calcified plaque is present within the abdominal aorta and its  branches. No evidence of aortic aneurysm.       Impression:      1. No fracture, no acute osseous lumbar spine abnormality. Degenerative  changes as described above. No significant spinal canal stenosis nor  high-grade neural foraminal narrowing.           This report was signed and finalized on 2/16/2025 1:10 PM by Dr. Rafat Vaca MD.       CT Cervical Spine Without Contrast [[id number]] Collected: 02/16/25 1304     Updated: 02/16/25 1310    " Narrative:      EXAMINATION: CT CERVICAL SPINE WO CONTRAST- 2/16/2025 1:04 PM     HISTORY: Fall, neck pain     TOTAL DOSE: 1680.25 mGy.cm (Automatic exposure control technique was  implemented in an effort to keep the radiation dose as low as possible  without compromising image quality)     REPORT:    TOTAL DOSE: 1680.25 mGy.cm     Spiral CT of the cervical spine was performed without contrast,  reconstructed coronal and sagittal images are also reviewed.     COMPARISON: CT cervical spine without contrast 8/26/2024.     Sagittal images demonstrate mild motion artifact. Vertebral body  alignment is normal. No fracture is identified. The disc spaces are  relatively well-preserved. The prevertebral soft tissues are within  normal limits. Mild multilevel facet degeneration appears stable. No  osseous spinal canal stenosis is identified. Review of soft tissue  windows demonstrates no gross evidence of traumatic cervical disc  herniation, given the limitations of noncontrast CT  . The airway is patent. The visualized upper lungs are clear.  Emphysematous changes are present.       Impression:      No fracture, no acute osseous cervical spine abnormality.                 This report was signed and finalized on 2/16/2025 1:06 PM by Dr. Rafat Vaca MD.       CT Head Without Contrast [[id number]] Collected: 02/16/25 1300     Updated: 02/16/25 1306    Narrative:      EXAMINATION: CT HEAD WO CONTRAST- 2/16/2025 1:00 PM     HISTORY: Altered mental status. Head trauma     DOSE: 864 mGy-cm (Automatic exposure control technique was implemented  in an effort to keep the radiation dose as low as possible without  compromising image quality)     REPORT: Spiral CT of the head was performed without contrast,  reconstructed coronal and sagittal images were also reviewed.     COMPARISON: CT head without contrast 12/29/2024.     No intracranial hemorrhage, mass or mass effect is identified. There is  moderate diffuse cerebral atrophy.  Small chronic lacunar infarct is  noted in the right cerebral hemisphere, this is stable. Decreased  attenuation in the periventricular white matter tracts is stable and  compatible with chronic small vessel white matter ischemic disease. The  ventricles and basal cisterns are within normal limits. Bone windows  show no acute fractures. There is a dependent fluid level within the  left maxillary sinus as well as mucosal thickening. Mucosal thickening  is present within the ethmoid, left sphenoid and left frontal sinuses.  This probably represents chronic sinusitis. There is normal aeration of  the mastoid air cells.       Impression:      1. No acute intracranial abnormality. Diffuse atrophy and mild chronic  small vessel white matter ischemic disease. Small stable chronic lacunar  infarct in the right cerebellar hemisphere.  2. Acute on chronic left maxillary sinusitis, as well as chronic  ethmoid, left sphenoid and left frontal sinusitis        This report was signed and finalized on 2/16/2025 1:03 PM by Dr. Rafat Vaca MD.       XR Pelvis 1 or 2 View [[id number]] Collected: 02/16/25 1239     Updated: 02/16/25 1243    Narrative:      EXAMINATION: XR PELVIS 1 OR 2 VW- 2/16/2025 12:39 PM     HISTORY: pain.     REPORT: An AP view of the pelvis was obtained.     COMPARISON: X-rays of the right femur 8/26/2024.     There is diffuse osteopenia, previous right total hip arthroplasty, the  prosthesis is well seated and normally aligned. There appear to be  healed fractures of the right pubic ring. Normal appearance of the left  hip other than mild narrowing of the joint space.       Impression:      No acute fracture, no dislocation. The right hip prosthesis  appears well seated and normally aligned. Probable healed fractures of  the right pubic ring. Diffuse osteopenia.     This report was signed and finalized on 2/16/2025 12:40 PM by Dr. Rafat Vaca MD.       XR Humerus Right [[id number]] Collected: 02/16/25  1237     Updated: 02/16/25 1241    Narrative:      EXAMINATION: XR HUMERUS RIGHT- 2/16/2025 12:37 PM     HISTORY: fall/ pain.     REPORT: 2 views of the right humerus were obtained.     COMPARISON: There are no correlative imaging studies for comparison.     Osseous alignment is normal, no fracture is identified. The joint spaces  are preserved, except for mild arthrosis at the AC joint on the right.  The soft tissues appear within normal limits.       Impression:      No fracture, no acute osseous abnormality.     This report was signed and finalized on 2/16/2025 12:38 PM by Dr. Rafat Vaca MD.       XR Humerus Left [[id number]] Collected: 02/16/25 1237     Updated: 02/16/25 1240    Narrative:      EXAMINATION: XR HUMERUS LEFT- 2/16/2025 12:37 PM     HISTORY: fall/ pain.     REPORT: 2 views of the left humerus were obtained.     COMPARISON: X-rays of the left shoulder 8/27/2024.     Osseous alignment is normal, no fracture is identified. The joint spaces  are preserved. Small degenerative spurring of the inferior glenoid  process and inferomedial humeral head. The soft tissues appear within  normal limits.       Impression:      No fracture, no acute osseous abnormality.     This report was signed and finalized on 2/16/2025 12:37 PM by Dr. Rafat Vaca MD.                      Assessment & Plan:   Patient is a 79 y.o. female with PMH tobacco use, dementia, lung nodule, HTN, anxiety and neuropathy. Patient lives in assisted living. History obtained by daughter who is at the bedside.  Daughter tells me patient was started on Xanax some time ago for anxiety and then Celexa was started when patient moved to assisted living as she was having significant crying episodes. Patient had taken Gabapentin 900 mg BID and over time PCP had decreased the dose to 300 mg BID. Tramadol listed but this was temporary after patient had a fall and fractured her pelvix. She also takes melatonin for sleep. Daughter tells me  patient medications are set up in pill packs and staff observe patient taking her medications. This daughter stays with patient on weekends and another daughter stays with her at night. Per daughter, no reports of sundowning.     Yesterday patient was seen by staff about 0730 and then checked on her later for lunch and found her laying on the floor for uncertain amount of time. Her wheelchair or walker was not in the bathroom with her. Patient uncertain as to why she fell or how she ended on the floor. She does not recall losing consciousness. After the fall patient was more confused than usual and was having hallucinations. Per daughter, patient typically will hallucinate when she has UTI. At any rate patient remained confused and presented to [facility] ED for evaluation. CT head showed no acute process. CT CS no acute fracture.       Active Hospital Problems    Diagnosis  POA    **Altered mental status [R41.82]  Yes      Impression:  Fall with worsening confusion that has seemed to resolved and now back to baseline  History of dementia  Tobacco use.  Polypharmacy. Some medications may be contributing to patient unsteadiness and confusion.       Plan:   Will get EEG today.  OT/PT/ST  Will defer slowly reducing or d/c medications per PCP.  If EEG negative no further neurologic testing and can d/c back to assisted living.  Neurology will wait for EEG report and if negative will sign off. F/u PRN.     Shazia Ascencio, APRN  02/17/25  11:58 CST    Medical Decision Making    Number/Complexity of Problems  Moderate  1 undiagnosed new problem with uncertain prognosis -   1 acute illness with systemic symptoms -   High  1 acute or chronic illness that poses a threat to life/body function -   high     MDM Data  Moderate - 1/3 categories  Extensive - 2/3 categories    Category 1: 3 of the following  Review of external notes  Review of results  Ordering of each unique test  Independent historian  Category 2:  Independent  interpretation of test (ex: imaging)  Category 3:  Discussion of management with another provider    extensive     Treatment Plan  Moderate - Prescription Drug management  High  Drug therapy requiring intensive monitoring for toxicity  Decision regarding hospitalization or escalation of care  De-escalate care/DNR decisions  high

## 2025-02-17 NOTE — PLAN OF CARE
Problem: Adult Inpatient Plan of Care  Goal: Plan of Care Review  Recent Flowsheet Documentation  Taken 2/17/2025 0945 by Feroz Schwab, PT DPT     Goal Outcome Evaluation:  Plan of Care Reviewed With: patient           Outcome Evaluation: PT holden complete. She was found sleeping but did awaken and was alert and oriented to self. She is confused but pleasant and follows commands/participates with therapy. Family member in room and reports that Ms. Liu lives at an [facility] where she has been independent in her transfers to her w/c. She is able to ambulate with assistance from family/therapy w a RW. Today she needs min assist x 1-2 for bed mobility, stance and t/f to the bedside chair./ She ambulates at the bedside to the chair with a flexed posture. She is weak and remains a fall risk. PT will cont with mobility training, balance training and strengthening. Juniee d.c back to [facility] with continued HH therapy vs placement for rehab, pending progress and needs at d.c.    Anticipated Discharge Disposition (PT): assisted living, home with home health, sub acute care setting, skilled nursing facility

## 2025-02-17 NOTE — PLAN OF CARE
Problem: Violence Risk or Actual  Goal: Anger and Impulse Control  Outcome: Progressing  Intervention: Minimize Safety Risk  Recent Flowsheet Documentation  Taken 2/16/2025 2100 by Jessy Limon RN  Enhanced Safety Measures:   bed alarm set   family to remain at bedside     Problem: Adult Inpatient Plan of Care  Goal: Plan of Care Review  Outcome: Progressing  Flowsheets (Taken 2/17/2025 0457)  Progress: no change  Outcome Evaluation: Patient slept through night with family at bedside. Alert to self only but acknowledges that people keep telling her she is at the hospital. She was very anxious and teary eyed at the beginning of the shift claiming she 'didn't meant to break into this house and doesn't want to be arrested.' Sb/S 54-78  per tele. VSS.  Plan of Care Reviewed With: patient  Goal: Patient-Specific Goal (Individualized)  Outcome: Progressing  Goal: Absence of Hospital-Acquired Illness or Injury  Outcome: Progressing  Intervention: Identify and Manage Fall Risk  Recent Flowsheet Documentation  Taken 2/16/2025 2200 by Jessy Limon RN  Safety Promotion/Fall Prevention: safety round/check completed  Taken 2/16/2025 2100 by Jessy Limon RN  Safety Promotion/Fall Prevention: safety round/check completed  Taken 2/16/2025 2000 by Jessy Limon RN  Safety Promotion/Fall Prevention: safety round/check completed  Intervention: Prevent Skin Injury  Recent Flowsheet Documentation  Taken 2/17/2025 0200 by Jessy Limon RN  Body Position: position changed independently  Taken 2/17/2025 0000 by Jessy Limon RN  Body Position: position changed independently  Taken 2/16/2025 2200 by Jessy Limon RN  Body Position: position changed independently  Taken 2/16/2025 2100 by Jessy Limon RN  Body Position: position changed independently  Taken 2/16/2025 2000 by Jessy Limon RN  Body Position: position changed independently  Goal: Optimal Comfort and Wellbeing  Outcome: Progressing  Intervention: Provide Person-Centered  Care  Recent Flowsheet Documentation  Taken 2/16/2025 2100 by Jessy Limon RN  Trust Relationship/Rapport:   care explained   choices provided   emotional support provided   empathic listening provided   thoughts/feelings acknowledged   reassurance provided  Goal: Readiness for Transition of Care  Outcome: Progressing     Problem: Fall Injury Risk  Goal: Absence of Fall and Fall-Related Injury  Outcome: Progressing  Intervention: Promote Injury-Free Environment  Recent Flowsheet Documentation  Taken 2/16/2025 2200 by Jessy Limon RN  Safety Promotion/Fall Prevention: safety round/check completed  Taken 2/16/2025 2100 by Jessy Limon RN  Safety Promotion/Fall Prevention: safety round/check completed  Taken 2/16/2025 2000 by Jessy Limon RN  Safety Promotion/Fall Prevention: safety round/check completed     Problem: Confusion Acute  Goal: Optimal Cognitive Function  Outcome: Progressing   Goal Outcome Evaluation:  Plan of Care Reviewed With: patient        Progress: no change  Outcome Evaluation: Patient slept through night with family at bedside. Alert to self only but acknowledges that people keep telling her she is at the hospital. She was very anxious and teary eyed at the beginning of the shift claiming she 'didn't meant to break into this house and doesn't want to be arrested.' Sb/S 54-78  per tele. VSS.

## 2025-02-17 NOTE — CASE MANAGEMENT/SOCIAL WORK
Discharge Planning Assessment  [facility]     Patient Name: Pattie Liu  MRN: [id number]  Today's Date: 2/17/2025    Admit Date: 2/16/2025        Discharge Needs Assessment       Row Name 02/17/25 1325       Living Environment    People in Home facility resident    Current Living Arrangements assisted living facility    Quality of Family Relationships supportive    Able to Return to Prior Arrangements yes       Resource/Environmental Concerns    Resource/Environmental Concerns none       Transition Planning    Transportation Anticipated family or friend will provide       Discharge Needs Assessment    Current Outpatient/Agency/Support Group homecare agency;assisted living facility    Equipment Currently Used at Home hospital bed;oxygen;walker, rolling;wheelchair    Discharge Facility/Level of Care Needs assisted living facility    Current Discharge Risk chronically ill    Discharge Coordination/Progress Spoke with daughter at bedside. Patient lives at [facility]. Has dme needed as well as home health for PT / OT /ST. Daughter thinks from [facility] but not sure. Plans to return to [facility] at discharge.                   Discharge Plan    No documentation.                 Continued Care and Services - Admitted Since 2/16/2025    No active coordination exists for this encounter.          Demographic Summary    No documentation.                  Functional Status    No documentation.                  Psychosocial    No documentation.                  Abuse/Neglect    No documentation.                  Legal    No documentation.                  Substance Abuse    No documentation.                  Patient Forms    No documentation.                     Merlina A Fletcher, RN

## 2025-02-17 NOTE — PLAN OF CARE
Goal Outcome Evaluation:  Plan of Care Reviewed With: patient, daughter           Outcome Evaluation: OT eval completed. Pt presents asleep upon therapist arrival but easily awoken for session. She is oriented to self and place only but was able to recall PLOF. Daughter reports she has been working with therapy at [facility] but mainly uses a w/c to navigate and Min A for most adls. Today she was able to bring self to sitting at EOB with Min A. Did required Max A to don socks. Min Ax2 for sit <> stand t/f from EOB and to take steps from bed to chair with use of rwx. She has a forward flexed posture in standing that requires vcs to correct. She was left sitting up in chair with exit alarm on. She would benefit from skilled OT services to address these deficits and assist with return to PLOF. Recommend d/c back to [facility].    Anticipated Discharge Disposition (OT): assisted living

## 2025-02-17 NOTE — THERAPY EVALUATION
Patient Name: Pattie Liu  : 1945    MRN: [id number]                              Today's Date: 2025       Admit Date: 2025    Visit Dx:     ICD-10-CM ICD-9-CM   1. Altered mental status, unspecified altered mental status type  R41.82 780.97   2. Polypharmacy  Z79.899 V58.69   3. Fall, initial encounter  W19.XXXA E888.9   4. Impaired mobility [Z74.09]  Z74.09 799.89     Patient Active Problem List   Diagnosis    Frequent PVCs    Shortness of breath    SOB (shortness of breath)    CAD in native artery    PVD (peripheral vascular disease)    Pneumonia due to infectious organism    Chronic back pain    Essential hypertension    Fall, initial encounter    Traumatic rhabdomyolysis    Leukocytosis    Anemia    Degenerative disc disease, lumbar    Dehydration    Acute UTI    Chronic respiratory failure with hypoxia    Impaired mobility    UTI (urinary tract infection)    Gait instability    Pneumonia    Pelvic fracture    Pneumonia, unspecified organism    Altered mental status     Past Medical History:   Diagnosis Date    CAD in native artery 2019    COPD (chronic obstructive pulmonary disease)     Essential hypertension 2021    GERD (gastroesophageal reflux disease)     Lung nodule      Past Surgical History:   Procedure Laterality Date    BREAST IMPLANT REMOVAL      BREAST TISSUE EXPANDER REMOVAL INSERTION OF IMPLANT      CARDIAC CATHETERIZATION N/A 2019    Procedure: Left Heart Cath;  Surgeon: Edi Armijo MD;  Location:  [facility]ATH [facility];  Service: Cardiology    CHOLECYSTECTOMY      HYSTERECTOMY      MASTECTOMY      BILATERAL    OOPHORECTOMY        General Information       Row Name 25 1000          OT Time and Intention    Document Type evaluation  -JW     Mode of Treatment occupational therapy  -JW     Patient Effort good  -JW       Row Name 25 1000          General Information    Patient Profile Reviewed yes  -JW     Prior Level of Function  independent:;transfer;bed mobility;w/c or scooter;min assist:;ADL's  -     Existing Precautions/Restrictions fall;oxygen therapy device and L/min  -     Barriers to Rehab medically complex;previous functional deficit  -[facility] Name 02/17/25 1000          Living Environment    People in Home facility resident  -[facility] Name 02/17/25 1000          Cognition    Orientation Status (Cognition) oriented to;person;place;disoriented to;situation;time  -[facility] Name 02/17/25 1000          Safety Issues/Impairments Affecting Functional Mobility    Impairments Affecting Function (Mobility) strength;balance;endurance/activity tolerance;pain;cognition  -               User Key  (r) = Recorded By, (t) = Taken By, (c) = Cosigned By      Initials Name Provider Type    Emilie Musa, OTRICO/L, CSRS Occupational Therapist                     Mobility/ADL's       [facility] Name 02/17/25 1000          Bed Mobility    Bed Mobility supine-sit  -     Supine-Sit [place] (Bed Mobility) minimum assist (75% patient effort)  -     Assistive Device (Bed Mobility) head of bed elevated;bed rails  -[facility] Name 02/17/25 1000          Transfers    Transfers sit-stand transfer;stand-sit transfer  -[facility] Name 02/17/25 1000          Sit-Stand Transfer    Sit-Stand [place] (Transfers) 2 person assist;minimum assist (75% patient effort)  -[facility] Name 02/17/25 1000          Stand-Sit Transfer    Stand-Sit [place] (Transfers) minimum assist (75% patient effort);2 person assist  -[facility] Name 02/17/25 1000          Functional Mobility    Functional Mobility- Ind. Level contact guard assist;minimum assist (75% patient effort)  -     Functional Mobility- Device walker, front-wheeled  -     Functional Mobility- Safety Issues other (see comments);supplemental O2  -     Functional Mobility- Comment amb short distance from bed to chair.  -       Row Name 02/17/25 1000          Activities of  Daily Living    BADL Assessment/Intervention lower body dressing  -JW       Row Name 02/17/25 1000          Lower Body Dressing Assessment/Training    [place] Level (Lower Body Dressing) don;socks;maximum assist (25% patient effort)  -     Position (Lower Body Dressing) edge of bed sitting  -               User Key  (r) = Recorded By, (t) = Taken By, (c) = Cosigned By      Initials Name Provider Type     Emilie Martin OTR/L, CSRS Occupational Therapist                   Obj/Interventions       Row Name 02/17/25 1000          Sensory Assessment (Somatosensory)    Sensory Assessment (Somatosensory) UE sensation intact  -[facility] 02/17/25 1000          Vision Assessment/Intervention    Visual Impairment/Limitations WFL  -JW       Row Name 02/17/25 1000          Range of Motion Comprehensive    General Range of Motion bilateral upper extremity ROM L  -JW       Row Name 02/17/25 1000          Strength Comprehensive (MMT)    Comment, General Manual Muscle Testing (MMT) Assessment B UE strength grossly 4-/5  -JW       Row Name 02/17/25 1000          Balance    Balance Assessment sitting static balance;sitting dynamic balance;standing static balance;standing dynamic balance  -     Static Sitting Balance standby assist  -     Dynamic Sitting Balance standby assist  -     Position, Sitting Balance unsupported;sitting in chair;sitting edge of bed  -     Static Standing Balance contact guard  -     Dynamic Standing Balance minimal assist  -     Position/Device Used, Standing Balance supported;walker, front-wheeled  -               User Key  (r) = Recorded By, (t) = Taken By, (c) = Cosigned By      Initials Name Provider Type     Emilie Martin OTR/L, JUANITAS Occupational Therapist                   Goals/Plan       Row Name 02/17/25 1000          Transfer Goal 1 (OT)    Activity/Assistive Device (Transfer Goal 1, OT) toilet;shower chair  -     [place] Level/Cues Needed  (Transfer Goal 1, OT) standby assist  -     Time Frame (Transfer Goal 1, OT) long term goal (LTG);by discharge  -     Progress/Outcome (Transfer Goal 1, OT) new [facility]  -       Row Name 02/17/25 1000          Dressing Goal 1 (OT)    Activity/Device (Dressing Goal 1, OT) dressing skills, all  -JW     [place]/Cues Needed (Dressing Goal 1, OT) standby assist  -JW     Time Frame (Dressing Goal 1, OT) long term goal (LTG);by discharge  -     Progress/Outcome (Dressing Goal 1, OT) new [facility]       Row Name 02/17/25 1000          Toileting Goal 1 (OT)    Activity/Device (Toileting Goal 1, OT) toileting skills, all  -     [place] Level/Cues Needed (Toileting Goal 1, OT) standby assist  -     Time Frame (Toileting Goal 1, OT) long term goal (LTG);by discharge  -     Progress/Outcome (Toileting Goal 1, OT) new [facility]       Row Name 02/17/25 1000          Therapy Assessment/Plan (OT)    Planned Therapy Interventions (OT) activity tolerance training;adaptive equipment training;BADL retraining;functional balance retraining;transfer/mobility retraining;strengthening exercise;occupation/activity based interventions;cognitive/visual perception retraining;patient/caregiver education/training  -               User Key  (r) = Recorded By, (t) = Taken By, (c) = Cosigned By      Initials Name Provider Type     Emilie Martin, OTR/L, CSRS Occupational Therapist                   Clinical Impression       Row Name 02/17/25 1000          Pain Assessment    Pretreatment Pain Rating 0/10 - no pain  -     Posttreatment Pain Rating 0/10 - no pain  -JW       Row Name 02/17/25 1000          Plan of Care Review    Plan of Care Reviewed With patient;daughter  -     Outcome Evaluation OT eval completed. Pt presents asleep upon therapist arrival but easily awoken for session. She is oriented to self and place only but was able to recall PLOF. Daughter reports she has been working with therapy at [facility] but  mainly uses a w/c to navigate and Min A for most adls. Today she was able to bring self to sitting at EOB with Min A. Did required Max A to don socks. Min Ax2 for sit <> stand t/f from EOB and to take steps from bed to chair with use of rwx. She has a forward flexed posture in standing that requires vcs to correct. She was left sitting up in chair with exit alarm on. She would benefit from skilled OT services to address these deficits and assist with return to OF. Recommend d/c back to [facility].  -MARITZA       Row Name 02/17/25 1000          Therapy Assessment/Plan (OT)    Criteria for Skilled Therapeutic Interventions Met (OT) yes;skilled treatment is necessary  -     Therapy Frequency (OT) 5 times/wk  -     Predicted Duration of Therapy Intervention (OT) 10 days  -       Row Name 02/17/25 1000          Therapy Plan Review/Discharge Plan (OT)    Anticipated Discharge Disposition (OT) assisted living  -       Row Name 02/17/25 1000          Positioning and Restraints    Pre-Treatment Position in bed  -     Post Treatment Position chair  -JW     In Chair notified nsg;reclined;call light within reach;encouraged to call for assist;exit alarm on;with family/caregiver;patient within staff view  -               User Key  (r) = Recorded By, (t) = Taken By, (c) = Cosigned By      Initials Name Provider Type    Emilie Musa, OTR/L, CSRS Occupational Therapist                   Outcome Measures       Row Name 02/17/25 1000          How much help from another is currently needed...    Putting on and taking off regular lower body clothing? 2  -JW     Bathing (including washing, rinsing, and drying) 2  -JW     Toileting (which includes using toilet bed pan or urinal) 2  -JW     Putting on and taking off regular upper body clothing 3  -JW     Taking care of personal grooming (such as brushing teeth) 3  -JW     Eating meals 4  -JW     AM-PAC 6 Clicks Score (OT) 16  -       Row Name 02/17/25 0945 02/17/25 0841        How much help from another person do you currently need...    Turning from your back to your side while in flat bed without using bedrails? 3  -CHICA 3  -AT    Moving from lying on back to sitting on the side of a flat bed without bedrails? 3  -CHICA 3  -AT    Moving to and from a bed to a chair (including a wheelchair)? 3  -CHICA 3  -AT    Standing up from a chair using your arms (e.g., wheelchair, bedside chair)? 3  -CHICA 4  -AT    Climbing 3-5 steps with a railing? 2  -CHICA 3  -AT    To walk in hospital room? 3  -CHICA 2  -AT    AM-PAC 6 Clicks Score (PT) 17  -CHICA 18  -AT    Highest Level of Mobility Goal 5 --> Static standing  -CHICA 6 --> Walk 10 steps or more  -AT      Row Name 02/17/25 1000 02/17/25 0945       Functional Assessment    Outcome Measure Options AM-PAC 6 Clicks Daily Activity (OT)  - AM-PAC 6 Clicks Basic Mobility (PT)  -CHICA              User Key  (r) = Recorded By, (t) = Taken By, (c) = Cosigned By      Initials Name Provider Type    Feroz Frias, PT DPT Physical Therapist    Emilie Musa OTR/L, CSRS Occupational Therapist    [facility] Hospital[facility]iz, RN Registered Nurse                    Occupational Therapy Education       Title: PT OT SLP Therapies (In Progress)       Topic: Occupational Therapy (In Progress)       Point: ADL training (Done)       Description:   Instruct learner(s) on proper safety adaptation and remediation techniques during self care or transfers.   Instruct in proper use of assistive devices.                  Learning Progress Summary            Patient Acceptance, E, VU by MARITZA at 2/17/2025 1108                      Point: Home exercise program (Not Started)       Description:   Instruct learner(s) on appropriate technique for monitoring, assisting and/or progressing therapeutic exercises/activities.                  Learner Progress:  Not documented in this visit.              Point: Precautions (Done)       Description:   Instruct learner(s) on prescribed precautions during  self-care and functional transfers.                  Learning Progress Summary            Patient Acceptance, E, VU by  at 2/17/2025 1108                      Point: Body mechanics (Done)       Description:   Instruct learner(s) on proper positioning and spine alignment during self-care, functional mobility activities and/or exercises.                  Learning Progress Summary            Patient Acceptance, E, VU by  at 2/17/2025 1108                                      User Key       Initials Effective Dates Name Provider Type Discipline     11/15/24 -  Emilie Martin, OTR/L, CSRS Occupational Therapist OT                  OT Recommendation and Plan  Planned Therapy Interventions (OT): activity tolerance training, adaptive equipment training, BADL retraining, functional balance retraining, transfer/mobility retraining, strengthening exercise, occupation/activity based interventions, cognitive/visual perception retraining, patient/caregiver education/training  Therapy Frequency (OT): 5 times/wk  Plan of Care Review  Plan of Care Reviewed With: patient, daughter  Outcome Evaluation: OT eval completed. Pt presents asleep upon therapist arrival but easily awoken for session. She is oriented to self and place only but was able to recall PLOF. Daughter reports she has been working with therapy at [facility] but mainly uses a w/c to navigate and Min A for most adls. Today she was able to bring self to sitting at EOB with Min A. Did required Max A to don socks. Min Ax2 for sit <> stand t/f from EOB and to take steps from bed to chair with use of rwx. She has a forward flexed posture in standing that requires vcs to correct. She was left sitting up in chair with exit alarm on. She would benefit from skilled OT services to address these deficits and assist with return to PLOF. Recommend d/c back to [facility].     Time Calculation:         Time Calculation- OT       Row Name 02/17/25 1000             Time Calculation- OT     OT Start Time 1000  -      OT Stop Time 1042  -      OT Time Calculation (min) 42 min  -      OT Received On 02/17/25  -      OT Goal Re-Cert Due Date 02/27/25  -                User Key  (r) = Recorded By, (t) = Taken By, (c) = Cosigned By      Initials Name Provider Type    Emilie Musa, OTR/L, CSRS Occupational Therapist                  Therapy Charges for Today       Code Description Service Date Service Provider Modifiers Qty    42876222191 HC OT EVAL LOW COMPLEXITY 3 2/17/2025 Emilie Martin OTR/L, CSRS GO 1                 VENITA Castro/L, CSRS  2/17/2025

## 2025-02-18 ENCOUNTER — READMISSION MANAGEMENT (OUTPATIENT)
Dept: CALL CENTER | Facility: HOSPITAL | Age: 80
End: 2025-02-18
Payer: COMMERCIAL

## 2025-02-18 VITALS
HEIGHT: 63 IN | SYSTOLIC BLOOD PRESSURE: 132 MMHG | BODY MASS INDEX: 24.83 KG/M2 | RESPIRATION RATE: 18 BRPM | DIASTOLIC BLOOD PRESSURE: 42 MMHG | TEMPERATURE: 97.7 F | OXYGEN SATURATION: 97 % | WEIGHT: 140.13 LBS | HEART RATE: 62 BPM

## 2025-02-18 PROCEDURE — 94799 UNLISTED PULMONARY SVC/PX: CPT

## 2025-02-18 PROCEDURE — 25010000002 ENOXAPARIN PER 10 MG: Performed by: FAMILY MEDICINE

## 2025-02-18 RX ORDER — LOPERAMIDE HYDROCHLORIDE 2 MG/1
2 CAPSULE ORAL 4 TIMES DAILY PRN
Qty: 60 CAPSULE | Refills: 0 | Status: SHIPPED | OUTPATIENT
Start: 2025-02-18 | End: 2025-03-20

## 2025-02-18 RX ORDER — ALPRAZOLAM 0.5 MG
0.5 TABLET ORAL 2 TIMES DAILY
Qty: 6 TABLET | Refills: 0 | Status: SHIPPED | OUTPATIENT
Start: 2025-02-18 | End: 2025-02-21

## 2025-02-18 RX ORDER — GABAPENTIN 100 MG/1
100 CAPSULE ORAL 2 TIMES DAILY
Qty: 60 CAPSULE | Refills: 0 | Status: SHIPPED | OUTPATIENT
Start: 2025-02-18 | End: 2025-03-20

## 2025-02-18 RX ORDER — CITALOPRAM HYDROBROMIDE 20 MG/1
20 TABLET ORAL DAILY
Qty: 30 TABLET | Refills: 0 | Status: SHIPPED | OUTPATIENT
Start: 2025-02-19 | End: 2025-03-21

## 2025-02-18 RX ADMIN — Medication 10 ML: at 08:36

## 2025-02-18 RX ADMIN — ENOXAPARIN SODIUM 40 MG: 100 INJECTION SUBCUTANEOUS at 08:35

## 2025-02-18 RX ADMIN — PANTOPRAZOLE SODIUM 40 MG: 40 TABLET, DELAYED RELEASE ORAL at 08:35

## 2025-02-18 RX ADMIN — ASPIRIN 81 MG: 81 TABLET, CHEWABLE ORAL at 08:35

## 2025-02-18 RX ADMIN — BUDESONIDE AND FORMOTEROL FUMARATE DIHYDRATE 2 PUFF: 160; 4.5 AEROSOL RESPIRATORY (INHALATION) at 06:15

## 2025-02-18 RX ADMIN — ALPRAZOLAM 0.5 MG: 0.5 TABLET ORAL at 08:35

## 2025-02-18 RX ADMIN — CITALOPRAM HYDROBROMIDE 20 MG: 20 TABLET ORAL at 08:35

## 2025-02-18 RX ADMIN — IPRATROPIUM BROMIDE 0.5 MG: 0.5 SOLUTION RESPIRATORY (INHALATION) at 10:55

## 2025-02-18 RX ADMIN — IPRATROPIUM BROMIDE 0.5 MG: 0.5 SOLUTION RESPIRATORY (INHALATION) at 06:15

## 2025-02-18 RX ADMIN — LOPERAMIDE HYDROCHLORIDE 2 MG: 2 CAPSULE ORAL at 02:05

## 2025-02-18 RX ADMIN — LOPERAMIDE HYDROCHLORIDE 2 MG: 2 CAPSULE ORAL at 08:35

## 2025-02-18 RX ADMIN — GABAPENTIN 100 MG: 100 CAPSULE ORAL at 08:35

## 2025-02-18 NOTE — THERAPY DISCHARGE NOTE
Acute Care - Physical Therapy Discharge Summary  [facility]       Patient Name: Pattie Liu  : 1945  MRN: [id number]    Today's Date: 2025                 Admit Date: 2025      PT Recommendation and Plan    Visit Dx:    ICD-10-CM ICD-9-CM   1. Altered mental status, unspecified altered mental status type  R41.82 780.97   2. Polypharmacy  Z79.899 V58.69   3. Fall, initial encounter  W19.XXXA E888.9   4. Impaired mobility [Z74.09]  Z74.09 799.89   5. Degeneration of intervertebral disc of lumbar region with discogenic back pain  M51.360 722.52   6. SURI (generalized anxiety disorder)  F41.1 300.02                PT Rehab Goals       Row Name 25 1400             Bed Mobility Goal 1 (PT)    Activity/Assistive Device (Bed Mobility Goal 1, PT) sit to supine/supine to sit  -AB      [place] Level/Cues Needed (Bed Mobility Goal 1, PT) standby assist  -AB      Time Frame (Bed Mobility Goal 1, PT) long term goal (LTG);10 days  -AB      Progress/Outcomes (Bed Mobility Goal 1, PT) goal not met  -AB         Transfer Goal 1 (PT)    Activity/Assistive Device (Transfer Goal 1, PT) sit-to-stand/stand-to-sit;bed-to-chair/chair-to-bed;walker, rolling  -AB      [place] Level/Cues Needed (Transfer Goal 1, PT) standby assist  -AB      Time Frame (Transfer Goal 1, PT) long term goal (LTG);10 days  -AB      Progress/Outcome (Transfer Goal 1, PT) goal not met  -AB         Gait Training Goal 1 (PT)    Activity/Assistive Device (Gait Training Goal 1, PT) gait (walking locomotion);assistive device use;decrease fall risk;improve balance and speed;increase endurance/gait distance;walker, rolling  -AB      [place] Level (Gait Training Goal 1, PT) standby assist  -AB      Distance (Gait Training Goal 1, PT) 50 ft  -AB      Time Frame (Gait Training Goal 1, PT) long term goal (LTG);10 days  -AB      Progress/Outcome (Gait Training Goal 1, PT) goal not met  -AB                User Key  (r) = Recorded By, (t)  = Taken By, (c) = Cosigned By      Initials Name Provider Type Discipline    AB Desirae Beckwith, PTA Physical Therapist Assistant PT                        PT Discharge Summary  Anticipated Discharge Disposition (PT): assisted living, home with home health, sub acute care setting, skilled nursing facility  Reason for Discharge: Discharge from facility  Outcomes Achieved: Refer to plan of care for updates on goals achieved  Discharge Destination: Assisted Living Facility      Desirae Beckwith PTA   2/18/2025

## 2025-02-18 NOTE — H&P
History and Physical    Patient:  Pattie Liu  MRN: [id number]    CHIEF COMPLAINT: Unwitnessed fall    History Obtained From: the patient   PCP: Rafat Espinoza MD    HISTORY OF PRESENT ILLNESS:   The patient is a 79 y.o. female who presents after having been found at the assisted living in the floor the bathroom without her walker or any other assistive devices.  According to the daughter she had been last checked on and given her medications approximately 7:30 PM and when they went to check on her again at 10 PM she was found down on the floor.  In the emergency department a thorough evaluation including imaging and laboratory testing was unremarkable.  Given her fragility and her increased confusion she is admitted to my service for neurological checks and delineation of care.    REVIEW OF SYSTEMS:    Review of Systems   Constitutional:  Positive for activity change and fatigue.   HENT: Negative.     Respiratory:  Positive for shortness of breath and wheezing.    Cardiovascular: Negative.    Gastrointestinal: Negative.    Musculoskeletal:  Positive for arthralgias, back pain and joint swelling.   Neurological:  Positive for dizziness and weakness.   Psychiatric/Behavioral:  Positive for behavioral problems and confusion. The patient is nervous/anxious.           Past Medical History:  Past Medical History:   Diagnosis Date   • CAD in native artery 7/29/2019   • COPD (chronic obstructive pulmonary disease)    • Essential hypertension 12/7/2021   • GERD (gastroesophageal reflux disease)    • Lung nodule        Past Surgical History:  Past Surgical History:   Procedure Laterality Date   • BREAST IMPLANT REMOVAL     • BREAST TISSUE EXPANDER REMOVAL INSERTION OF IMPLANT     • CARDIAC CATHETERIZATION N/A 6/21/2019    Procedure: Left Heart Cath;  Surgeon: Edi Armijo MD;  Location: [facility] CATH INVASIVE LOCATION;  Service: Cardiology   • CHOLECYSTECTOMY     • HYSTERECTOMY     • MASTECTOMY       BILATERAL   • OOPHORECTOMY         Medications Prior to Admission:    Medications Prior to Admission   Medication Sig Dispense Refill Last Dose/Taking   • ALPRAZolam (XANAX) 1 MG tablet Take 1 tablet by mouth 2 (Two) Times a Day. 60 tablet 0 Taking   • aspirin 81 MG chewable tablet Chew 1 tablet Daily.   Taking   • atorvastatin (LIPITOR) 20 MG tablet Take 1 tablet by mouth Every Night.   Taking   • Budeson-Glycopyrrol-Formoterol (Breztri Aerosphere) 160-9-4.8 MCG/ACT aerosol inhaler Inhale 2 puffs 2 (Two) Times a Day.   Taking   • bumetanide (BUMEX) 0.5 MG tablet Take 1 tablet by mouth Daily.   Taking   • citalopram (CeleXA) 40 MG tablet Take 1 tablet by mouth Daily.   Taking   • gabapentin (NEURONTIN) 300 MG capsule Take 1 capsule by mouth 2 (Two) Times a Day. 60 capsule 0 Taking   • lisinopril (PRINIVIL,ZESTRIL) 20 MG tablet Take 1 tablet by mouth Daily.   Taking   • pantoprazole (PROTONIX) 40 MG EC tablet Take 1 tablet by mouth Daily.   Taking   • potassium chloride (KLOR-CON M20) 20 MEQ CR tablet Take 1 tablet by mouth Daily.   Taking   • Melatonin 10 MG tablet Take 1 tablet by mouth At Night As Needed (Insomnia).      • ondansetron (ZOFRAN) 4 MG tablet Take 1 tablet by mouth Daily As Needed for Nausea or Vomiting.   Unknown   • traMADol (ULTRAM) 50 MG tablet Take 1 tablet by mouth Every 8 (Eight) Hours As Needed for Moderate Pain. 90 tablet 0 Unknown       Allergies:  Morphine, Codeine, Levofloxacin, Tramadol, Zanaflex  [tizanidine hcl], and Albuterol    Social History:   Social History     Socioeconomic History   • Marital status:    Tobacco Use   • Smoking status: Every Day     Current packs/day: 0.50     Average packs/day: 0.5 packs/day for 62.0 years (31.0 ttl pk-yrs)     Types: Cigarettes   • Smokeless tobacco: Never   • Tobacco comments:     states she has no plan to quit smoking   Vaping Use   • Vaping status: Former   Substance and Sexual Activity   • Alcohol use: No   • Drug use: No   • Sexual  "activity: Not Currently       Family History:   Family History   Problem Relation Age of Onset   • Cancer Mother    • Cancer Father            Physical Exam:    Vitals: /74 (BP Location: Left arm, Patient Position: Lying)   Pulse 65   Temp 97.5 °F (36.4 °C) (Oral)   Resp 16   Ht 160 cm (63\")   Wt 63.6 kg (140 lb 2 oz)   SpO2 96%   BMI 24.82 kg/m²   Physical Exam  Vitals and nursing note reviewed. Exam conducted with a chaperone present.   Constitutional:       Appearance: Normal appearance.   HENT:      Head: Normocephalic and atraumatic.      Right Ear: Tympanic membrane normal.      Left Ear: Tympanic membrane normal.      Nose: Nose normal.      Mouth/Throat:      Mouth: Mucous membranes are moist.   Eyes:      Pupils: Pupils are equal, round, and reactive to light.   Cardiovascular:      Rate and Rhythm: Normal rate and regular rhythm.      Pulses: Normal pulses.      Heart sounds: Normal heart sounds.   Pulmonary:      Breath sounds: Wheezing present.   Abdominal:      General: Abdomen is flat. Bowel sounds are normal.      Palpations: Abdomen is soft.   Skin:     General: Skin is warm.      Capillary Refill: Capillary refill takes less than 2 seconds.   Neurological:      Mental Status: She is alert. She is disoriented.      Motor: Weakness present.      Coordination: Coordination abnormal.       Lab Results (last 24 hours)       Procedure Component Value Units Date/Time    Comprehensive Metabolic Panel [[id number]]  (Abnormal) Collected: 02/17/25 0426    Specimen: Blood Updated: 02/17/25 0455     Glucose 80 mg/dL      BUN 12 mg/dL      Creatinine 0.91 mg/dL      Sodium 138 mmol/L      Potassium 4.7 mmol/L      Chloride 104 mmol/L      CO2 25.0 mmol/L      Calcium 9.4 mg/dL      Total Protein 6.3 g/dL      Albumin 3.3 g/dL      ALT (SGPT) 8 U/L      AST (SGOT) 12 U/L      Alkaline Phosphatase 88 U/L      Total Bilirubin 0.2 mg/dL      Globulin 3.0 gm/dL      A/G Ratio 1.1 g/dL      BUN/Creatinine " Ratio 13.2     Anion Gap 9.0 mmol/L      eGFR 63.5 mL/min/1.73     Narrative:      GFR Categories in Chronic Kidney Disease (CKD)      GFR Category          GFR (mL/min/1.73)    Interpretation  G1                     90 or greater         Normal or high (1)  G2                      60-89                Mild decrease (1)  G3a                   45-59                Mild to moderate decrease  G3b                   30-44                Moderate to severe decrease  G4                    15-29                Severe decrease  G5                    14 or less           Kidney failure          (1)In the absence of evidence of kidney disease, neither GFR category G1 or G2 fulfill the criteria for CKD.    eGFR calculation 2021 CKD-EPI creatinine equation, which does not include race as a factor    CBC Auto Differential [[id number]]  (Abnormal) Collected: 02/17/25 0426    Specimen: Blood Updated: 02/17/25 0435     WBC 7.76 10*3/mm3      RBC 3.66 10*6/mm3      Hemoglobin 10.4 g/dL      Hematocrit 34.2 %      MCV 93.4 fL      MCH 28.4 pg      MCHC 30.4 g/dL      RDW 13.8 %      RDW-SD 47.5 fl      MPV 10.4 fL      Platelets 329 10*3/mm3      Neutrophil % 73.9 %      Lymphocyte % 12.9 %      Monocyte % 9.9 %      Eosinophil % 2.4 %      Basophil % 0.5 %      Immature Grans % 0.4 %      Neutrophils, Absolute 5.73 10*3/mm3      Lymphocytes, Absolute 1.00 10*3/mm3      Monocytes, Absolute 0.77 10*3/mm3      Eosinophils, Absolute 0.19 10*3/mm3      Basophils, Absolute 0.04 10*3/mm3      Immature Grans, Absolute 0.03 10*3/mm3      nRBC 0.0 /100 WBC              -----------------------------------------------------------------  EKG: Nonacute  Radiology:     EEG    Result Date: 2/17/2025  History: 79 y old female Procedure: A 21 Channel digital electroencephalogram was performed in the Clinical Neurophysiology Laboratory. The 10/20 International System of electrode placement was used and both Bipolar and referential electrode montages  were monitored.  EEG Description: This EEG is continuous and symmetrical. During the awake state with eye closed, there is a posterior dominate rhythm of  --9-  Hz that is symmetrical and reacts appropriately to eye opening. Anterior to posterior amplitude frequency gradient is preserved. With Drowsiness, there is waxing and waning of the posterior dominant rhythm with eventual replacement by a mixture of beta, alpha and theta activity. A prolonged Lead I EKG rhythm strip revealed Irregular heart rate at --70--/min         Interpretation: This EEG obtained in the awake and sleep state is normal for age. Irregular heart rhythm noted.  Clinical correlation: The diagnosis of epilepsy is clinical one. A normal EEG dose not excludes this Diagnosis. However there are no epileptiform features in this recording to suggest an underlining diagnosis of Epilepsy. Therefore, Clinical correlation is recommended.     CT Lumbar Spine Without Contrast    Result Date: 2/16/2025  EXAMINATION: CT LUMBAR SPINE WO CONTRAST- 2/16/2025 1:07 PM  HISTORY: fall, low back pain  TOTAL DOSE: 1680.25 mGy.cm (Automatic exposure control technique was implemented in an effort to keep the radiation dose as low as possible without compromising image quality)  REPORT: Spiral CT of the lumbar spine was performed without contrast, reconstructed coronal and sagittal images were also reviewed.  COMPARISON: CT lumbar spine without contrast 8/26/2024.  There is mild dextroscoliosis, sagittal images demonstrate no evidence of spondylolisthesis. No acute fracture is identified. Mild disc space narrowing is present L5-S1, with a partial vacuum disc. Small endplate spurs are present at each lumbar level. There is facet overgrowth at multiple levels, greatest at L4-5 and L5-S1. Review of soft tissue windows shows no obvious disc herniation. No significant spinal canal stenosis. Mild bilateral neural foraminal narrowing L5-S1. Moderately heavy calcified plaque is  present within the abdominal aorta and its branches. No evidence of aortic aneurysm.      1. No fracture, no acute osseous lumbar spine abnormality. Degenerative changes as described above. No significant spinal canal stenosis nor high-grade neural foraminal narrowing.    This report was signed and finalized on 2/16/2025 1:10 PM by Dr. Rafat Vaca MD.      CT Cervical Spine Without Contrast    Result Date: 2/16/2025  EXAMINATION: CT CERVICAL SPINE WO CONTRAST- 2/16/2025 1:04 PM  HISTORY: Fall, neck pain  TOTAL DOSE: 1680.25 mGy.cm (Automatic exposure control technique was implemented in an effort to keep the radiation dose as low as possible without compromising image quality)  REPORT:    TOTAL DOSE: 1680.25 mGy.cm  Spiral CT of the cervical spine was performed without contrast, reconstructed coronal and sagittal images are also reviewed.  COMPARISON: CT cervical spine without contrast 8/26/2024.  Sagittal images demonstrate mild motion artifact. Vertebral body alignment is normal. No fracture is identified. The disc spaces are relatively well-preserved. The prevertebral soft tissues are within normal limits. Mild multilevel facet degeneration appears stable. No osseous spinal canal stenosis is identified. Review of soft tissue windows demonstrates no gross evidence of traumatic cervical disc herniation, given the limitations of noncontrast CT . The airway is patent. The visualized upper lungs are clear. Emphysematous changes are present.      No fracture, no acute osseous cervical spine abnormality.      This report was signed and finalized on 2/16/2025 1:06 PM by Dr. Rafat Vaca MD.      CT Head Without Contrast    Result Date: 2/16/2025  EXAMINATION: CT HEAD WO CONTRAST- 2/16/2025 1:00 PM  HISTORY: Altered mental status. Head trauma  DOSE: 864 mGy-cm (Automatic exposure control technique was implemented in an effort to keep the radiation dose as low as possible without compromising image quality)  REPORT:  Spiral CT of the head was performed without contrast, reconstructed coronal and sagittal images were also reviewed.  COMPARISON: CT head without contrast 12/29/2024.  No intracranial hemorrhage, mass or mass effect is identified. There is moderate diffuse cerebral atrophy. Small chronic lacunar infarct is noted in the right cerebral hemisphere, this is stable. Decreased attenuation in the periventricular white matter tracts is stable and compatible with chronic small vessel white matter ischemic disease. The ventricles and basal cisterns are within normal limits. Bone windows show no acute fractures. There is a dependent fluid level within the left maxillary sinus as well as mucosal thickening. Mucosal thickening is present within the ethmoid, left sphenoid and left frontal sinuses. This probably represents chronic sinusitis. There is normal aeration of the mastoid air cells.      1. No acute intracranial abnormality. Diffuse atrophy and mild chronic small vessel white matter ischemic disease. Small stable chronic lacunar infarct in the right cerebellar hemisphere. 2. Acute on chronic left maxillary sinusitis, as well as chronic ethmoid, left sphenoid and left frontal sinusitis   This report was signed and finalized on 2/16/2025 1:03 PM by Dr. Rafat Vaca MD.      XR Pelvis 1 or 2 View    Result Date: 2/16/2025  EXAMINATION: XR PELVIS 1 OR 2 VW- 2/16/2025 12:39 PM  HISTORY: pain.  REPORT: An AP view of the pelvis was obtained.  COMPARISON: X-rays of the right femur 8/26/2024.  There is diffuse osteopenia, previous right total hip arthroplasty, the prosthesis is well seated and normally aligned. There appear to be healed fractures of the right pubic ring. Normal appearance of the left hip other than mild narrowing of the joint space.      No acute fracture, no dislocation. The right hip prosthesis appears well seated and normally aligned. Probable healed fractures of the right pubic ring. Diffuse osteopenia.   This report was signed and finalized on 2/16/2025 12:40 PM by Dr. Rafat Vaca MD.      XR Humerus Right    Result Date: 2/16/2025  EXAMINATION: XR HUMERUS RIGHT- 2/16/2025 12:37 PM  HISTORY: fall/ pain.  REPORT: 2 views of the right humerus were obtained.  COMPARISON: There are no correlative imaging studies for comparison.  Osseous alignment is normal, no fracture is identified. The joint spaces are preserved, except for mild arthrosis at the AC joint on the right. The soft tissues appear within normal limits.      No fracture, no acute osseous abnormality.  This report was signed and finalized on 2/16/2025 12:38 PM by Dr. Rafat Vaca MD.      XR Humerus Left    Result Date: 2/16/2025  EXAMINATION: XR HUMERUS LEFT- 2/16/2025 12:37 PM  HISTORY: fall/ pain.  REPORT: 2 views of the left humerus were obtained.  COMPARISON: X-rays of the left shoulder 8/27/2024.  Osseous alignment is normal, no fracture is identified. The joint spaces are preserved. Small degenerative spurring of the inferior glenoid process and inferomedial humeral head. The soft tissues appear within normal limits.      No fracture, no acute osseous abnormality.  This report was signed and finalized on 2/16/2025 12:37 PM by Dr. Rafat Vaca MD.        Assessment and Plan     Primary Problem:  Altered mental status  Dementia with acute delirium  Polypharmacy  End-stage COPD O2 dependent  Generalized anxiety disorder  Hypertension    Hospital Problem list:    Altered mental status      PMH:  Past Medical History:   Diagnosis Date   • CAD in native artery 7/29/2019   • COPD (chronic obstructive pulmonary disease)    • Essential hypertension 12/7/2021   • GERD (gastroesophageal reflux disease)    • Lung nodule        Treatment Plan:  Will minimize neuro sedating medications and attempt to wean these as tolerated  Will ask neurology to evaluate make any further recommendations  Will ask physical occupational therapy to evaluate    Disposition:  Assisted living    Rafat Espinoza MD 2/17/2025 18:18 CST

## 2025-02-18 NOTE — PLAN OF CARE
Problem: Violence Risk or Actual  Goal: Anger and Impulse Control  Outcome: Progressing     Problem: Adult Inpatient Plan of Care  Goal: Plan of Care Review  Outcome: Progressing  Goal: Patient-Specific Goal (Individualized)  Outcome: Progressing  Goal: Absence of Hospital-Acquired Illness or Injury  Outcome: Progressing  Goal: Optimal Comfort and Wellbeing  Outcome: Progressing  Goal: Readiness for Transition of Care  Outcome: Progressing     Problem: Fall Injury Risk  Goal: Absence of Fall and Fall-Related Injury  Outcome: Progressing     Problem: Confusion Acute  Goal: Optimal Cognitive Function  Outcome: Progressing     Problem: Skin Injury Risk Increased  Goal: Skin Health and Integrity  Outcome: Progressing   Goal Outcome Evaluation:

## 2025-02-18 NOTE — DISCHARGE SUMMARY
Hospital Discharge Summary    Pattie Liu  :  1945  MRN:  [id number]    Admit date:  2025  Discharge date:  2025    Admitting Physician:    Rafat Espinoza MD    Discharge Diagnoses:      Altered mental status  Gastroenteritis  Dehydration  Baseline dementia with acute delirium-resolved  End-stage COPD O2 dependent  Chronic respiratory failure    Hospital Course:   The patient is a 79 y.o. female who presents after having been found at the assisted living in the floor the bathroom without her walker or any other assistive devices.  According to the daughter she had been last checked on and given her medications approximately 7:30 PM and when they went to check on her again at 10 PM she was found down on the floor.  In the emergency department a thorough evaluation including imaging and laboratory testing was unremarkable.  Given her fragility and her increased confusion she is admitted to my service for neurological checks and delineation of care.     Workup was unremarkable. she was evaluated by neurology and an EEG performed which was also unremarkable.  GI PCR was negative as well.  She returned to her baseline cognition and functioning.  Patient resides in assisted living and the daughters are in the room this morning feeling they can take care of her in that setting with additional resources available if necessary.  I am going to attempt to minimize her neuro sedating medications and have cut her Xanax in half decreased her gabapentin and gotten rid of her tramadol.  Additionally, I have cut her Celexa down to 20 mg daily.    The patient was admitted for the above noted medical/surgical issues. Please see daily progress note for further details concerning their stay. The patient improved throughout their stay and reached maximum medical improvement on the day of discharge. The patient/family agree with the treatment plan as outlined above. All questions concerning their stay were  answered prior to discharge. They understand the importance of follow up concerning any abnormal test results.     Physical Exam  Vitals and nursing note reviewed. Exam conducted with a chaperone present.   Constitutional:       Appearance: Normal appearance.   HENT:      Head: Normocephalic and atraumatic.   Cardiovascular:      Rate and Rhythm: Normal rate and regular rhythm.      Pulses: Normal pulses.      Heart sounds: Normal heart sounds.   Pulmonary:      Effort: Pulmonary effort is normal.      Breath sounds: Normal breath sounds.   Abdominal:      General: Abdomen is flat. Bowel sounds are normal.      Palpations: Abdomen is soft.   Skin:     General: Skin is warm.      Capillary Refill: Capillary refill takes less than 2 seconds.   Neurological:      General: No focal deficit present.      Mental Status: She is alert.         Discharge Medications:         Discharge Medications        New Medications        Instructions Start Date   citalopram 20 MG tablet  Commonly known as: CeleXA  Replaces: Citalopram Hydrobromide 30 MG capsule   20 mg, Oral, Daily   Start Date: February 19, 2025     loperamide 2 MG capsule  Commonly known as: IMODIUM   2 mg, Oral, 4 Times Daily PRN             Changes to Medications        Instructions Start Date   ALPRAZolam 0.5 MG tablet  Commonly known as: XANAX  What changed:   medication strength  how much to take   0.5 mg, Oral, 2 Times Daily      gabapentin 100 MG capsule  Commonly known as: NEURONTIN  What changed:   medication strength  how much to take   100 mg, Oral, 2 Times Daily             Continue These Medications        Instructions Start Date   aspirin 81 MG chewable tablet   81 mg, Oral, Daily      atorvastatin 20 MG tablet  Commonly known as: LIPITOR   20 mg, Oral, Nightly      Breztri Aerosphere 160-9-4.8 MCG/ACT aerosol inhaler  Generic drug: Budeson-Glycopyrrol-Formoterol   2 puffs, Inhalation, 2 Times Daily      bumetanide 0.5 MG tablet  Commonly known as:  BUMEX   0.5 mg, Oral, Daily      lisinopril 20 MG tablet  Commonly known as: PRINIVIL,ZESTRIL   20 mg, Oral, Daily      Melatonin 10 MG tablet   1 tablet, Oral, Nightly PRN      ondansetron 4 MG tablet  Commonly known as: ZOFRAN   4 mg, Oral, Daily PRN      pantoprazole 40 MG EC tablet  Commonly known as: PROTONIX   40 mg, Oral, Daily      potassium chloride 20 MEQ CR tablet  Commonly known as: KLOR-CON M20   20 mEq, Oral, Daily             Stop These Medications      CeleXA 40 MG tablet  Generic drug: citalopram     Citalopram Hydrobromide 30 MG capsule  Replaced by: citalopram 20 MG tablet     traMADol 50 MG tablet  Commonly known as: ULTRAM              Activity: Up with assistance only    Diet: Regular    Consults: Neurology    Significant Diagnostic Studies:    CT Lumbar Spine Without Contrast    Result Date: 2/16/2025  1. No fracture, no acute osseous lumbar spine abnormality. Degenerative changes as described above. No significant spinal canal stenosis nor high-grade neural foraminal narrowing.    This report was signed and finalized on 2/16/2025 1:10 PM by Dr. Rafat Vaca MD.      CT Cervical Spine Without Contrast    Result Date: 2/16/2025  No fracture, no acute osseous cervical spine abnormality.      This report was signed and finalized on 2/16/2025 1:06 PM by Dr. Rafat Vaca MD.      CT Head Without Contrast    Result Date: 2/16/2025  1. No acute intracranial abnormality. Diffuse atrophy and mild chronic small vessel white matter ischemic disease. Small stable chronic lacunar infarct in the right cerebellar hemisphere. 2. Acute on chronic left maxillary sinusitis, as well as chronic ethmoid, left sphenoid and left frontal sinusitis   This report was signed and finalized on 2/16/2025 1:03 PM by Dr. Rafta Vaca MD.      XR Pelvis 1 or 2 View    Result Date: 2/16/2025  No acute fracture, no dislocation. The right hip prosthesis appears well seated and normally aligned. Probable healed fractures  of the right pubic ring. Diffuse osteopenia.  This report was signed and finalized on 2/16/2025 12:40 PM by Dr. Rafat Vaca MD.      XR Humerus Right    Result Date: 2/16/2025  No fracture, no acute osseous abnormality.  This report was signed and finalized on 2/16/2025 12:38 PM by Dr. Rafat Vaca MD.      XR Humerus Left    Result Date: 2/16/2025  No fracture, no acute osseous abnormality.  This report was signed and finalized on 2/16/2025 12:37 PM by Dr. Rafat Vaca MD.            Treatments:   As above    Disposition:   Skilled nursing versus assisted living    54 mins=Time spent on discharge including discussion with patient/family, SW, and coordination of care.     Follow up with Rafat Espinoza MD in 1 weeks.    Signed:  Rafat Espinoza MD   2/18/2025, 08:52 CST

## 2025-02-18 NOTE — PLAN OF CARE
Problem: Adult Inpatient Plan of Care  Goal: Plan of Care Review  Outcome: Progressing  Flowsheets (Taken 2/18/2025 3899)  Progress: no change  Outcome Evaluation: Patient has no c/o pain. GI panel obtained and was negative, prn imodium given. Multiple loose bowel movements this shift. SB/SR 51-71 on tele. Bed check on. VSS. Safety maintained.  Plan of Care Reviewed With: patient

## 2025-02-18 NOTE — NURSING NOTE
"While gettying patient ready for d/c, daughter expressed concerns about patients mental status. Patient is alert to self and place, which is pts documented baseline. She aslo states patient is not eating, despite lunch tray being eaten. Informed daughter I will call physician and make a new plan of care patient. Patients daughter then refused, stating, \"No, we are leaving right now and you're going to put her in my vehicle.\" Patient taken by wheelchair to personal vehicle.   "

## 2025-02-18 NOTE — THERAPY DISCHARGE NOTE
Acute Care - Occupational Therapy Discharge Summary  [facility]     Patient Name: Pattie Liu  : 1945  MRN: [id number]    Today's Date: 2025                 Admit Date: 2025        OT Recommendation and Plan    Visit Dx:    ICD-10-CM ICD-9-CM   1. Altered mental status, unspecified altered mental status type  R41.82 780.97   2. Polypharmacy  Z79.899 V58.69   3. Fall, initial encounter  W19.XXXA E888.9   4. Impaired mobility [Z74.09]  Z74.09 799.89   5. Degeneration of intervertebral disc of lumbar region with discogenic back pain  M51.360 722.52   6. SURI (generalized anxiety disorder)  F41.1 300.02                OT Rehab Goals       Row Name 25 1400             Transfer Goal 1 (OT)    Activity/Assistive Device (Transfer Goal 1, OT) toilet;shower chair  -LS      [place] Level/Cues Needed (Transfer Goal 1, OT) standby assist  -LS      Time Frame (Transfer Goal 1, OT) long term goal (LTG);by discharge  -LS      Progress/Outcome (Transfer Goal 1, OT) goal not met  -LS         Dressing Goal 1 (OT)    Activity/Device (Dressing Goal 1, OT) dressing skills, all  -LS      [place]/Cues Needed (Dressing Goal 1, OT) standby assist  -LS      Time Frame (Dressing Goal 1, OT) long term goal (LTG);by discharge  -LS      Progress/Outcome (Dressing Goal 1, OT) goal not met  -LS         Toileting Goal 1 (OT)    Activity/Device (Toileting Goal 1, OT) toileting skills, all  -LS      [place] Level/Cues Needed (Toileting Goal 1, OT) standby assist  -LS      Time Frame (Toileting Goal 1, OT) long term goal (LTG);by discharge  -LS      Progress/Outcome (Toileting Goal 1, OT) goal not met  -LS                User Key  (r) = Recorded By, (t) = Taken By, (c) = Cosigned By      Initials Name Provider Type Discipline    LS Brooklyn Alba COTA Occupational Therapist Assistant THERAPIES                                OT Discharge Summary  Anticipated Discharge Disposition (OT): assisted living  Reason for  Discharge: Discharge from facility  Outcomes Achieved: Refer to plan of care for updates on goals achieved  Discharge Destination: Assisted Living Facility      DWAYNE Azar  2/18/2025

## 2025-02-19 NOTE — OUTREACH NOTE
Prep Survey      Flowsheet Row Responses   [facility] facility patient discharged from? [place]   Is LACE score < 7 ? No   Eligibility Readm Mgmt   Discharge diagnosis Altered mental status   Does the patient have one of the following disease processes/diagnoses(primary or secondary)? Other   Does the patient have Home health ordered? No   Is there a DME ordered? No   Prep survey completed? Yes            CHRIS OLEARY - Registered Nurse

## 2025-02-25 ENCOUNTER — READMISSION MANAGEMENT (OUTPATIENT)
Dept: CALL CENTER | Facility: HOSPITAL | Age: 80
End: 2025-02-25
Payer: COMMERCIAL

## 2025-02-25 NOTE — OUTREACH NOTE
Medical Week 1 Survey      Flowsheet Row Responses   [facility] patient discharged from? [place]   Does the patient have one of the following disease processes/diagnoses(primary or secondary)? Other   Week 1 attempt successful? No   Unsuccessful attempts Attempt 1   Revoke Change in health status-moved to LTC/SNF/Hospice            SAMIR SALGUERO - Registered Nurse

## (undated) DEVICE — GW STARTER FXD CORE J .035 3X260CM 3MM

## (undated) DEVICE — KT NDL GUIDE STRL 18GA

## (undated) DEVICE — Device

## (undated) DEVICE — CATH DIAG IMPULSE KIMNY 5F 110CM

## (undated) DEVICE — RADIFOCUS OPTITORQUE ANGIOGRAPHIC CATHETER: Brand: OPTITORQUE

## (undated) DEVICE — Device: Brand: MEDEX

## (undated) DEVICE — CATH F6 INF TL JL 3.5 100 CM: Brand: INFINITI

## (undated) DEVICE — IMMOB KN 3PNL DLX CANVS 19IN BLU

## (undated) DEVICE — CATH DIAG DXTERITY NOTO CRV 6F 100CM 0/SH

## (undated) DEVICE — TIBURON BRACHIAL ANGIOGRAPHY DRAPE: Brand: CONVERTORS

## (undated) DEVICE — SOL IRR NACL 0.9PCT BT 1000ML

## (undated) DEVICE — PINNACLE INTRODUCER SHEATH: Brand: PINNACLE

## (undated) DEVICE — CANN CO2/O2 NASL A/

## (undated) DEVICE — GLIDESHEATH SLENDER STAINLESS STEEL KIT: Brand: GLIDESHEATH SLENDER

## (undated) DEVICE — TR BAND RADIAL ARTERY COMPRESSION DEVICE: Brand: TR BAND

## (undated) DEVICE — SOLIDIFIER LIQUI LOC PLUS 2000CC

## (undated) DEVICE — GW WHOLEY HITORQ MOD/J .035 175CM

## (undated) DEVICE — CATH F6INF TL 3DRC 100CM: Brand: INFINITI

## (undated) DEVICE — PERCLOSE PROGLIDE™ SUTURE-MEDIATED CLOSURE SYSTEM: Brand: PERCLOSE PROGLIDE™

## (undated) DEVICE — PK CATH CARD 30 CA/4

## (undated) DEVICE — SUP ARMBRD ART/LINE BLU

## (undated) DEVICE — ELECTRD PAD DEFIB A/

## (undated) DEVICE — DRSNG PRESS SAFEGUARD

## (undated) DEVICE — CATH F6INF TL JR 4 100CM: Brand: INFINITI

## (undated) DEVICE — CATH F6INF TL AL I 100CM: Brand: INFINITI